# Patient Record
Sex: FEMALE | Race: WHITE | NOT HISPANIC OR LATINO | Employment: OTHER | URBAN - METROPOLITAN AREA
[De-identification: names, ages, dates, MRNs, and addresses within clinical notes are randomized per-mention and may not be internally consistent; named-entity substitution may affect disease eponyms.]

---

## 2017-01-20 ENCOUNTER — MYC MEDICAL ADVICE (OUTPATIENT)
Dept: FAMILY MEDICINE | Facility: CLINIC | Age: 57
End: 2017-01-20

## 2017-01-20 DIAGNOSIS — G93.32 CHRONIC FATIGUE SYNDROME: Primary | ICD-10-CM

## 2017-01-20 RX ORDER — ZOLPIDEM TARTRATE 5 MG/1
TABLET ORAL
Qty: 60 TABLET | Refills: 5 | Status: SHIPPED | OUTPATIENT
Start: 2017-01-20 | End: 2017-05-02

## 2017-01-27 ENCOUNTER — OFFICE VISIT (OUTPATIENT)
Dept: FAMILY MEDICINE | Facility: CLINIC | Age: 57
End: 2017-01-27
Payer: COMMERCIAL

## 2017-01-27 VITALS
TEMPERATURE: 97 F | DIASTOLIC BLOOD PRESSURE: 80 MMHG | SYSTOLIC BLOOD PRESSURE: 126 MMHG | WEIGHT: 202 LBS | HEIGHT: 68 IN | RESPIRATION RATE: 16 BRPM | OXYGEN SATURATION: 97 % | BODY MASS INDEX: 30.62 KG/M2 | HEART RATE: 72 BPM

## 2017-01-27 DIAGNOSIS — H10.45 CHRONIC ALLERGIC CONJUNCTIVITIS: ICD-10-CM

## 2017-01-27 DIAGNOSIS — G93.32 CHRONIC FATIGUE SYNDROME: Primary | ICD-10-CM

## 2017-01-27 PROCEDURE — 99213 OFFICE O/P EST LOW 20 MIN: CPT | Performed by: FAMILY MEDICINE

## 2017-01-27 RX ORDER — FLUTICASONE PROPIONATE 50 MCG
1-2 SPRAY, SUSPENSION (ML) NASAL DAILY
Qty: 1 BOTTLE | Refills: 11 | COMMUNITY
Start: 2017-01-27 | End: 2018-11-12

## 2017-01-27 RX ORDER — METHYLPHENIDATE HYDROCHLORIDE 10 MG/1
10 TABLET ORAL DAILY PRN
Qty: 30 TABLET | Refills: 0 | COMMUNITY
Start: 2016-09-14 | End: 2021-02-19

## 2017-01-27 RX ORDER — ZOLPIDEM TARTRATE 10 MG/1
10 TABLET ORAL
Qty: 30 TABLET | Refills: 1 | Status: SHIPPED | OUTPATIENT
Start: 2017-01-27 | End: 2017-03-09

## 2017-01-27 RX ORDER — KETOROLAC TROMETHAMINE 5 MG/ML
1 SOLUTION OPHTHALMIC 3 TIMES DAILY PRN
Qty: 1 BOTTLE | Refills: 1 | Status: SHIPPED | OUTPATIENT
Start: 2017-01-27 | End: 2017-07-21

## 2017-01-27 ASSESSMENT — PAIN SCALES - GENERAL: PAINLEVEL: NO PAIN (0)

## 2017-01-27 NOTE — PATIENT INSTRUCTIONS
Trial of eye drop for a week at least twice daily and if not better let us know for ophthalmology consult to see if cataract is an issue  If 10 mg of zolpidem is not approve, we will use 5 mg as before.   Keep watching the skin on chest.  It will be years before it becomes more

## 2017-01-27 NOTE — PROGRESS NOTES
Nikki Morales is a 56 year old female who presents to clinic today for A.D.H.D; Insomnia; and Bladder Problems   She is here to discuss chronic fatigue and new found energy drink that helps     She is well known to me.    Past medical, surgical, social histories were reviewed and updated.  Social History   Substance Use Topics     Smoking status: Current Some Day Smoker     Types: Cigarettes     Smokeless tobacco: Never Used      Comment: social  6/mo     Alcohol Use: 1.2 oz/week     2 Standard drinks or equivalent per week      Comment: social     Current Outpatient Prescriptions   Medication Sig     methylphenidate (RITALIN) 10 MG tablet 1/2 to 1 tablet every day as needed     fluticasone (FLONASE) 50 MCG/ACT spray Spray 1-2 sprays into both nostrils daily     zolpidem (AMBIEN) 10 MG tablet Take 1 tablet (10 mg) by mouth nightly as needed for sleep     ketorolac (ACULAR) 0.5 % ophthalmic solution Place 1 drop into both eyes 3 times daily as needed     zolpidem (AMBIEN) 5 MG tablet 1 to 2 tabs at hour of sleep as needed     trospium (SANCTURA) 20 MG tablet 1-2 tablets daily as needed     cyanocobalamin (VITAMIN B12) 1000 MCG/ML injection INJECT 1 ML INTRAMUSCULARLY EVERY 30 DAYS     SYNTHROID 112 MCG tablet Take 1 tablet (112 mcg) by mouth daily     estradiol (VIVELLE-DOT) 0.05 MG/24HR patch Place 1 patch onto the skin twice a week     multivitamin, therapeutic with minerals (MULTI-VITAMIN) TABS Take 1 tablet by mouth daily     venlafaxine (EFFEXOR-XR) 37.5 MG 24 hr capsule TAKE ONE TO TWO CAPSULES BY MOUTH EVERY DAY     buPROPion (WELLBUTRIN XL) 150 MG 24 hr tablet TAKE TWO TABLETS BY MOUTH EVERY MORNING     budesonide (PULMICORT) 0.5 MG/2ML nebulizer solution Mix 2 vials with 1 oz coffee flavoring and drink twice a day and rinse mouth aftrerwards     fluticasone (FLOVENT HFA) 220 MCG/ACT inhaler Inhale 2 puffs into the lungs 2 times daily     valACYclovir (VALTREX) 1000 mg tablet TAKE 1 TABLET BY MOUTH  THREE TIMES DAILY AS NEEDED     budesonide-formoterol (SYMBICORT) 160-4.5 MCG/ACT inhaler Inhale 2 puffs into the lungs 2 times daily     omeprazole (PRILOSEC) 20 MG capsule Take 1 capsule (20 mg) by mouth daily 1 TAB PO QD (Once per day)  As needed     [DISCONTINUED] NEW MED Methylcobalamin 25mg/ml  0.1ml weekly     No current facility-administered medications for this visit.       Allergies   Allergen Reactions     Codeine Other (See Comments)     Causes agitation       Patient Active Problem List   Diagnosis     Chronic fatigue syndrome     Other and unspecified disc disorder of lumbar region     ESOPHAGEAL REFLUX     Female stress incontinence     Herpes simplex virus (HSV) infection     HYPERLIPIDEMIA LDL GOAL <130     Advanced directives, counseling/discussion     Eosinophilic esophagitis     Overweight     Intermittent asthma     Adhesive capsulitis of shoulder     Degenerative arthritis of cervical spine with cord compression     Hypothyroidism due to acquired atrophy of thyroid     Attention deficit hyperactivity disorder, inattentive type     Skin lesion       REVIEW OF SYSTEMS:  Constitutional, HEENT, cardiovascular, pulmonary, gi and gu systems are negative, except as otherwise noted.  Patient found an energy drink that allows her to be active and complete tasks which she always struggled with her chronic fatigue. He does have some caffeine. Sleep is always been an issue for her and this does not seem to be changed with this medication/drink. If she drinks one can, it will be 80 mg a caffeine and she never uses a whole can in the day. 10 mg of Ambien is most useful for her and has been used in the past. Sometimes she uses 5-10 mg over a 24-hour period but small amounts overnight if she awakens, or during the day if she needs a nap. This is worked well for her. Other sleep medications or medications have not done nearly as well. She reports siblings and parents have had similar sleep issues.  Patient  "always has trouble with vision. Optometrist does not think her cataracts are that bad but she does have trouble with lights at night. At times during the day it feels like there is a film on her glasses. She does have itchy eyes at times and uses saline drops.    EXAM:  /80 mmHg  Pulse 72  Temp(Src) 97  F (36.1  C) (Temporal)  Resp 16  Ht 5' 8\" (1.727 m)  Wt 202 lb (91.627 kg)  BMI 30.72 kg/m2  SpO2 97%  GENERAL APPEARANCE: healthy, alert and no distress  EYES: Eyes grossly normal to inspection, PERRL, conjunctivae and sclerae normal except some mild injection  PSYCmentation appears normal, affect normal/bright and more focused than usual    ASSESSMENT:    ICD-10-CM    1. Chronic fatigue syndrome R53.82 methylphenidate (RITALIN) 10 MG tablet     zolpidem (AMBIEN) 10 MG tablet   2. Chronic allergic conjunctivitis H10.45 ketorolac (ACULAR) 0.5 % ophthalmic solution       PLAN:   10 mg of zolpidem is ordered for her daily.  Ketorolac for inflammation of the eye is ordered and she will continue with saline drops. This does not work an ophthalmology consult would be considered to be a cataracts and perhaps for other eye problems.     Weight management plan: Discussed healthy diet and exercise guidelines and patient will follow up in 6 months in clinic to re-evaluate.  Dicussed general issues around the treatment, evaluation and prevetion  [facility]  Reviewed medications and side effects in detail.  Return to clinic 3 months  AVS printed. Electronically signed by Erik Peraza MD      "

## 2017-01-27 NOTE — MR AVS SNAPSHOT
After Visit Summary   1/27/2017    Nikki Morales    MRN: [id number]           Patient Information     Date Of Birth          1960        Visit Information        Provider Department      1/27/2017 3:15 PM Erik Peraza MD [facility]        Today's Diagnoses     Chronic fatigue syndrome    -  1     Chronic allergic conjunctivitis           Care Instructions    Trial of eye drop for a week at least twice daily and if not better let us know for ophthalmology consult to see if cataract is an issue  If 10 mg of zolpidem is not approve, we will use 5 mg as before.   Keep watching the skin on chest.  It will be years before it becomes more          Follow-ups after your visit        Who to contact     If you have questions or need follow up information about today's clinic visit or your schedule please contact [facility] directly at [phone].  Normal or non-critical lab and imaging results will be communicated to you by [employer], letter or phone within 4 business days after the clinic has received the results. If you do not hear from us within 7 days, please contact the clinic through [employer] or phone. If you have a critical or abnormal lab result, we will notify you by phone as soon as possible.  Submit refill requests through [employer] or call your pharmacy and they will forward the refill request to us. Please allow 3 business days for your refill to be completed.          Additional Information About Your Visit        MyChart Information     [employer] gives you secure access to your electronic health record. If you see a primary care provider, you can also send messages to your care team and make appointments. If you have questions, please call your primary care clinic.  If you do not have a primary care provider, please call [phone] and they will assist you.        Care EveryWhere ID     This is your Care EveryWhere ID. This could be used by other  "organizations to access your [place] medical records  [id number]        Your Vitals Were     Pulse Temperature Respirations Height BMI (Body Mass Index) Pulse Oximetry    72 97  F (36.1  C) (Temporal) 16 5' 8\" (1.727 m) 30.72 kg/m2 97%       Blood Pressure from Last 3 Encounters:   01/27/17 126/80   12/06/16 120/69   11/01/16 126/64    Weight from Last 3 Encounters:   01/27/17 202 lb (91.627 kg)   11/01/16 200 lb (90.719 kg)   10/26/16 198 lb (89.812 kg)              Today, you had the following     No orders found for display         Today's Medication Changes          These changes are accurate as of: 1/27/17  4:13 PM.  If you have any questions, ask your nurse or doctor.               Start taking these medicines.        Dose/Directions    ketorolac 0.5 % ophthalmic solution   Commonly known as:  ACULAR   Used for:  Chronic allergic conjunctivitis   Started by:  Erik Peraza MD        Dose:  1 drop   Place 1 drop into both eyes 3 times daily as needed   Quantity:  1 Bottle   Refills:  1         These medicines have changed or have updated prescriptions.        Dose/Directions    methylphenidate 10 MG tablet   Commonly known as:  RITALIN   This may have changed:    - how much to take  - how to take this  - when to take this  - additional instructions   Used for:  Chronic fatigue syndrome   Changed by:  Erik Peraza MD        1/2 to 1 tablet every day as needed   Quantity:  30 tablet   Refills:  0       * zolpidem 5 MG tablet   Commonly known as:  AMBIEN   This may have changed:  Another medication with the same name was added. Make sure you understand how and when to take each.   Used for:  Chronic fatigue syndrome   Changed by:  Erik Peraza MD        1 to 2 tabs at hour of sleep as needed   Quantity:  60 tablet   Refills:  5       * zolpidem 10 MG tablet   Commonly known as:  AMBIEN   This may have changed:  You were already taking a medication with the same name, and this prescription " was added. Make sure you understand how and when to take each.   Used for:  Chronic fatigue syndrome   Changed by:  Erik Peraza MD        Dose:  10 mg   Take 1 tablet (10 mg) by mouth nightly as needed for sleep   Quantity:  30 tablet   Refills:  1       * Notice:  This list has 2 medication(s) that are the same as other medications prescribed for you. Read the directions carefully, and ask your doctor or other care provider to review them with you.      Stop taking these medicines if you haven't already. Please contact your care team if you have questions.     fexofenadine 60 MG tablet   Commonly known as:  ALLEGRA   Stopped by:  Erik Peraza MD                Where to get your medicines      These medications were sent to [facility] - [address]  [address], [place] [address]     Phone:  [phone]    - ketorolac 0.5 % ophthalmic solution      Some of these will need a paper prescription and others can be bought over the counter.  Ask your nurse if you have questions.     Bring a paper prescription for each of these medications    - zolpidem 10 MG tablet             Primary Care Provider Office Phone # Fax #    Erik Peraza [phone]       [facility] [address]  [place] [address]        Thank you!     Thank you for choosing [facility]  for your care. Our goal is always to provide you with excellent care. Hearing back from our patients is one way we can continue to improve our services. Please take a few minutes to complete the written survey that you may receive in the mail after your visit with us. Thank you!             Your Updated Medication List - Protect others around you: Learn how to safely use, store and throw away your medicines at www.disposemymeds.org.          This list is accurate as of: 1/27/17  4:13 PM.  Always use your most recent med list.                   Brand Name Dispense  Instructions for use    budesonide 0.5 MG/2ML neb solution    PULMICORT    1 Box    Mix 2 vials with 1 oz coffee flavoring and drink twice a day and rinse mouth aftrerwards       buPROPion 150 MG 24 hr tablet    WELLBUTRIN XL    60 tablet    TAKE TWO TABLETS BY MOUTH EVERY MORNING       cyanocobalamin 1000 MCG/ML injection    VITAMIN B12    3 mL    INJECT 1 ML INTRAMUSCULARLY EVERY 30 DAYS       estradiol 0.05 MG/24HR BIW patch    VIVELLE-DOT     Place 1 patch onto the skin twice a week       fluticasone 220 MCG/ACT Inhaler    FLOVENT HFA    1 Inhaler    Inhale 2 puffs into the lungs 2 times daily       fluticasone 50 MCG/ACT spray    FLONASE    1 Bottle    Spray 1-2 sprays into both nostrils daily       ketorolac 0.5 % ophthalmic solution    ACULAR    1 Bottle    Place 1 drop into both eyes 3 times daily as needed       methylphenidate 10 MG tablet    RITALIN    30 tablet    1/2 to 1 tablet every day as needed       Multi-vitamin Tabs tablet     100 tablet    Take 1 tablet by mouth daily       omeprazole 20 MG CR capsule    priLOSEC    30 capsule    Take 1 capsule (20 mg) by mouth daily 1 TAB PO QD (Once per day)  As needed       SYMBICORT 160-4.5 MCG/ACT Inhaler   Generic drug:  budesonide-formoterol     3 Inhaler    Inhale 2 puffs into the lungs 2 times daily       SYNTHROID 112 MCG tablet   Generic drug:  levothyroxine     90 tablet    Take 1 tablet (112 mcg) by mouth daily       trospium 20 MG tablet    SANCTURA    40 tablet    1-2 tablets daily as needed       valACYclovir 1000 mg tablet    VALTREX    21 tablet    TAKE 1 TABLET BY MOUTH THREE TIMES DAILY AS NEEDED       venlafaxine 37.5 MG 24 hr capsule    EFFEXOR-XR    180 capsule    TAKE ONE TO TWO CAPSULES BY MOUTH EVERY DAY       * zolpidem 5 MG tablet    AMBIEN    60 tablet    1 to 2 tabs at hour of sleep as needed       * zolpidem 10 MG tablet    AMBIEN    30 tablet    Take 1 tablet (10 mg) by mouth nightly as needed for sleep       * Notice:  This list  has 2 medication(s) that are the same as other medications prescribed for you. Read the directions carefully, and ask your doctor or other care provider to review them with you.

## 2017-01-27 NOTE — NURSING NOTE
"Chief Complaint   Patient presents with     A.D.H.D     Recheck     Insomnia     Recheck     Bladder Problems     Recheck urine incontenence       Initial /80 mmHg  Pulse 72  Temp(Src) 97  F (36.1  C) (Temporal)  Resp 16  Ht 5' 8\" (1.727 m)  Wt 202 lb (91.627 kg)  BMI 30.72 kg/m2  SpO2 97% Estimated body mass index is 30.72 kg/(m^2) as calculated from the following:    Height as of this encounter: 5' 8\" (1.727 m).    Weight as of this encounter: 202 lb (91.627 kg)..  BP completed using cuff size: large  Patient declines Tdap injection today  /Samara Yu/ACE(AAMA)     "

## 2017-01-28 ASSESSMENT — ASTHMA QUESTIONNAIRES: ACT_TOTALSCORE: 21

## 2017-03-06 DIAGNOSIS — J20.9 ACUTE BRONCHITIS, UNSPECIFIED ORGANISM: Primary | ICD-10-CM

## 2017-03-06 DIAGNOSIS — G93.32 CHRONIC FATIGUE SYNDROME: ICD-10-CM

## 2017-03-06 RX ORDER — CODEINE PHOSPHATE AND GUAIFENESIN 10; 100 MG/5ML; MG/5ML
1 SOLUTION ORAL EVERY 4 HOURS PRN
Qty: 120 ML | Refills: 0 | Status: SHIPPED | OUTPATIENT
Start: 2017-03-06 | End: 2017-03-09

## 2017-03-06 RX ORDER — AZITHROMYCIN 250 MG/1
TABLET, FILM COATED ORAL
Qty: 6 TABLET | Refills: 0 | Status: SHIPPED
Start: 2017-03-06 | End: 2017-03-09

## 2017-03-06 NOTE — TELEPHONE ENCOUNTER
Reason for call: Symptom   Symptom or request: cough, fever, body aches    Duration (how long have symptoms been present): 1 week  Have you been treated for this before? Yes    Additional comments: fever just started yesterday    Phone Number Pt can be reached at: Cell number on file:    Telephone Information:   Mobile [phone]     Best Time: anytime  Can we leave a detailed message on this number? YES

## 2017-03-06 NOTE — TELEPHONE ENCOUNTER
": 1960  PHONE #'s: [phone] (home)     PRESENTING PROBLEM:  Body aches, fatigue, cough, fever ( 101) head congestion .  Requesting Z-Adriana and Cough syrup.     NURSING ASSESSMENT:  Pt is actively coughing while talking with me on the phone. She sounds like she has adult croup. Sounds very tight.   Description:  \" It started last Tuesday, 17. I had a tickle cough, then a head cold and now it has settle in my chest. I am so tired of the coughing. I thought I was going to cough an eyeball out yesterday , I was coughing so hard. My temp went up last night to 101. Otherwise it has been hanging around 99.3. \"   Onset/duration:   17  Precip. factors:   \" I did get the flu shot this Fall, but I haven't gone to work. Don't have the stamina to work.\"  Assoc. Sx:  Tired out.   Improves/worsens Sx:   Worse.   Pain scale (1-10)   Denies S. T., headache or  Chest pain at this time.   Sx specific meds:  Has been taking Nyquil to sleep. SYMBICORT 2 TO 3 TIMES PER DAY.   LMP/preg/breast feeding:   NA  Last exam/Tx:  Has NOT been seen for this.  SHE IS ASKING TO SEE IF DR. MURPHY WILL PRESCRIBE A Z-ADRIANA FOR HER AND A COUGH SYRUP.?  \"  I use [facility] in [place]. \"      RECOMMENDED DISPOSITION:  Home care advice - *  Drink 6 to 8 glasses of water daily.  *  Warm mist may help improve conditions. Helps to run humidifier or vaporizer in bedroom. May sit in a steam-filled bathroom for 20 to 30 minutes prn.   *  Elevate head of bed to reduce coughing at night.  * For children  younger than 1 year, give 1/2 tsp lemon mixed with 1/2 tsp corn syrup to soothe cough. ( DO NOT give young children honey.)  *  Give older children and adults 1/2 tsp lemon mixed with 1/2 tsp honey or corn syrup.   * Drink warm lemonade, apple cider, or tea to help soother cough.   * Avoid irritants such as smoking, smog,  and chemical smells.   *  Turn heat down, open the windows, or go out into cooler air to help suppress cough.  *Take " cough suppressants (ask your pharmacist for product suggestions) If cough is interfering with activity, causing chest pain or vomiting, or interrupting sleep at night. Follow instructions on the label.  *  If congested avoid milk products.   *  Take OTC meds as needed, being sure to follow instructions on the labetl. For a wet cough, use a decongestant,for a dry cough, use an expectorant during the day and suppressant at night. for allergy, use an antihistamine or decongestant. Ask your pharmacist for product for product suggestion.  * If fever for > 72 hours then schedule appointment.      RN  Will forward message to Dr. Peraza to approved or deny MIKHAIL-aminha request and would like something for her cough.    TO be seen in the ER if Any of the following Occur:  Altered mental status, difficulty btreathing, fever > 104.9 in adult, flat purple or dark red spots on face or trunk, stiff or painful neck, severe headache New onset of skin or lips turning blue, unable to swallow your own spit.     Will comply with recommendation: YES   If further questions/concerns or if Sx do not improve, worsen or new Sx develop, call your PCP or [facility] as soon as possible.    NOTES:  Disposition was determined by the first positive assessment question, therefore all previous assessment questions were negative.  Informed to check provider manual or call insurance company to assure coverage.     Guideline used:  Influenza. Cough, adult  Telephone Triage Protocols for Nurses, Fifth Edition, Amnia Romero RN

## 2017-03-09 ENCOUNTER — OFFICE VISIT (OUTPATIENT)
Dept: FAMILY MEDICINE | Facility: CLINIC | Age: 57
End: 2017-03-09
Payer: COMMERCIAL

## 2017-03-09 ENCOUNTER — MYC MEDICAL ADVICE (OUTPATIENT)
Dept: FAMILY MEDICINE | Facility: CLINIC | Age: 57
End: 2017-03-09

## 2017-03-09 VITALS
SYSTOLIC BLOOD PRESSURE: 124 MMHG | RESPIRATION RATE: 20 BRPM | BODY MASS INDEX: 30.77 KG/M2 | WEIGHT: 203 LBS | DIASTOLIC BLOOD PRESSURE: 72 MMHG | OXYGEN SATURATION: 98 % | HEIGHT: 68 IN | TEMPERATURE: 98.3 F | HEART RATE: 102 BPM

## 2017-03-09 DIAGNOSIS — R05.9 COUGH: ICD-10-CM

## 2017-03-09 DIAGNOSIS — J06.9 VIRAL URI: Primary | ICD-10-CM

## 2017-03-09 LAB
BASOPHILS # BLD AUTO: 0 10E9/L (ref 0–0.2)
BASOPHILS NFR BLD AUTO: 0.6 %
DIFFERENTIAL METHOD BLD: NORMAL
EOSINOPHIL # BLD AUTO: 0.2 10E9/L (ref 0–0.7)
EOSINOPHIL NFR BLD AUTO: 2.8 %
ERYTHROCYTE [DISTWIDTH] IN BLOOD BY AUTOMATED COUNT: 13.3 % (ref 10–15)
HCT VFR BLD AUTO: 45.2 % (ref 35–47)
HGB BLD-MCNC: 14.9 G/DL (ref 11.7–15.7)
IMM GRANULOCYTES # BLD: 0 10E9/L (ref 0–0.4)
IMM GRANULOCYTES NFR BLD: 0.2 %
LYMPHOCYTES # BLD AUTO: 2.5 10E9/L (ref 0.8–5.3)
LYMPHOCYTES NFR BLD AUTO: 47.2 %
MCH RBC QN AUTO: 29.4 PG (ref 26.5–33)
MCHC RBC AUTO-ENTMCNC: 33 G/DL (ref 31.5–36.5)
MCV RBC AUTO: 89 FL (ref 78–100)
MONOCYTES # BLD AUTO: 0.6 10E9/L (ref 0–1.3)
MONOCYTES NFR BLD AUTO: 10.9 %
NEUTROPHILS # BLD AUTO: 2.1 10E9/L (ref 1.6–8.3)
NEUTROPHILS NFR BLD AUTO: 38.3 %
PLATELET # BLD AUTO: 220 10E9/L (ref 150–450)
RBC # BLD AUTO: 5.06 10E12/L (ref 3.8–5.2)
WBC # BLD AUTO: 5.3 10E9/L (ref 4–11)

## 2017-03-09 PROCEDURE — 36415 COLL VENOUS BLD VENIPUNCTURE: CPT | Performed by: FAMILY MEDICINE

## 2017-03-09 PROCEDURE — 99213 OFFICE O/P EST LOW 20 MIN: CPT | Performed by: FAMILY MEDICINE

## 2017-03-09 PROCEDURE — 85025 COMPLETE CBC W/AUTO DIFF WBC: CPT | Performed by: FAMILY MEDICINE

## 2017-03-09 ASSESSMENT — PAIN SCALES - GENERAL: PAINLEVEL: NO PAIN (0)

## 2017-03-09 NOTE — MR AVS SNAPSHOT
"              After Visit Summary   3/9/2017    Nikki Morales    MRN: [id number]           Patient Information     Date Of Birth          1960        Visit Information        Provider Department      3/9/2017 2:00 PM Orlando Arroyo MD [facility]         Follow-ups after your visit        Who to contact     If you have questions or need follow up information about today's clinic visit or your schedule please contact [facility] directly at [phone].  Normal or non-critical lab and imaging results will be communicated to you by MyChart, letter or phone within 4 business days after the clinic has received the results. If you do not hear from us within 7 days, please contact the clinic through [employer] or phone. If you have a critical or abnormal lab result, we will notify you by phone as soon as possible.  Submit refill requests through [employer] or call your pharmacy and they will forward the refill request to us. Please allow 3 business days for your refill to be completed.          Additional Information About Your Visit        MyChart Information     [employer] gives you secure access to your electronic health record. If you see a primary care provider, you can also send messages to your care team and make appointments. If you have questions, please call your primary care clinic.  If you do not have a primary care provider, please call [phone] and they will assist you.        Care EveryWhere ID     This is your Care EveryWhere ID. This could be used by other organizations to access your [facility] records  [id number]        Your Vitals Were     Pulse Temperature Respirations Height Pulse Oximetry Breastfeeding?    102 98.3  F (36.8  C) 20 5' 8\" (1.727 m) 98% No    BMI (Body Mass Index)                   30.87 kg/m2            Blood Pressure from Last 3 Encounters:   03/09/17 124/72   01/27/17 126/80   12/06/16 120/69    Weight from Last 3 Encounters: "   03/09/17 203 lb (92.1 kg)   01/27/17 202 lb (91.6 kg)   11/01/16 200 lb (90.7 kg)              Today, you had the following     No orders found for display         Today's Medication Changes          These changes are accurate as of: 3/9/17  2:20 PM.  If you have any questions, ask your nurse or doctor.               These medicines have changed or have updated prescriptions.        Dose/Directions    zolpidem 5 MG tablet   Commonly known as:  AMBIEN   This may have changed:  Another medication with the same name was removed. Continue taking this medication, and follow the directions you see here.   Used for:  Chronic fatigue syndrome   Changed by:  Erik Peraza MD        1 to 2 tabs at hour of sleep as needed   Quantity:  60 tablet   Refills:  5         Stop taking these medicines if you haven't already. Please contact your care team if you have questions.     azithromycin 250 MG tablet   Commonly known as:  ZITHROMAX   Stopped by:  Orlando Arroyo MD           budesonide 0.5 MG/2ML neb solution   Commonly known as:  PULMICORT   Stopped by:  Orlando Arroyo MD           fluticasone 220 MCG/ACT Inhaler   Commonly known as:  FLOVENT HFA   Stopped by:  Orlando Arroyo MD           guaiFENesin-codeine 100-10 MG/5ML Soln solution   Commonly known as:  ROBITUSSIN AC   Stopped by:  Orlando Arroyo MD           SYNTHROID 112 MCG tablet   Generic drug:  levothyroxine   Stopped by:  Orlando Arroyo MD           trospium 20 MG tablet   Commonly known as:  SANCTURA   Stopped by:  Orlando Arroyo MD           valACYclovir 1000 mg tablet   Commonly known as:  VALTREX   Stopped by:  Orlando Arroyo MD           venlafaxine 37.5 MG 24 hr capsule   Commonly known as:  EFFEXOR-XR   Stopped by:  Orlando Arroyo MD                    Primary Care Provider Office Phone # Fax #    Erik Hamzah Peraza [phone]       [address] [id number] [facility]  [address] [place] [address]        Thank you!     Thank you for choosing [facility]  for your care. Our goal is always to provide you with excellent care. Hearing back from our patients is one way we can continue to improve our services. Please take a few minutes to complete the written survey that you may receive in the mail after your visit with us. Thank you!             Your Updated Medication List - Protect others around you: Learn how to safely use, store and throw away your medicines at www.disposemymeds.org.          This list is accurate as of: 3/9/17  2:20 PM.  Always use your most recent med list.                   Brand Name Dispense Instructions for use    buPROPion 150 MG 24 hr tablet    WELLBUTRIN XL    60 tablet    TAKE TWO TABLETS BY MOUTH EVERY MORNING       cyanocobalamin 1000 MCG/ML injection    VITAMIN B12    3 mL    INJECT 1 ML INTRAMUSCULARLY EVERY 30 DAYS       estradiol 0.05 MG/24HR BIW patch    VIVELLE-DOT     Place 1 patch onto the skin twice a week       fluticasone 50 MCG/ACT spray    FLONASE    1 Bottle    Spray 1-2 sprays into both nostrils daily       ketorolac 0.5 % ophthalmic solution    ACULAR    1 Bottle    Place 1 drop into both eyes 3 times daily as needed       methylphenidate 10 MG tablet    RITALIN    30 tablet    1/2 to 1 tablet every day as needed       Multi-vitamin Tabs tablet     100 tablet    Take 1 tablet by mouth daily       omeprazole 20 MG CR capsule    priLOSEC    30 capsule    Take 1 capsule (20 mg) by mouth daily 1 TAB PO QD (Once per day)  As needed       SYMBICORT 160-4.5 MCG/ACT Inhaler   Generic drug:  budesonide-formoterol     3 Inhaler    Inhale 2 puffs into the lungs 2 times daily       zolpidem 5 MG tablet    AMBIEN    60 tablet    1 to 2 tabs at hour of sleep as needed

## 2017-03-09 NOTE — PROGRESS NOTES
SUBJECTIVE:                                                    Nikki Morales is a 57 year old female who presents to clinic today for the following health issues:      Acute Illness /cough   Acute illness concerns: cough, sinus fever   Onset: 2-28    Fever: YES    Chills/Sweats: YES    Headache (location?): YES    Sinus Pressure:YES    Conjunctivitis:  no    Ear Pain: no    Rhinorrhea: no     Congestion: YES    Sore Throat: no      Cough: YES-    Wheeze: no     Decreased Appetite: YES    Nausea: no     Vomiting: no     Diarrhea:  YES    Dysuria/Freq.: no     Fatigue/Achiness: YES    Sick/Strep Exposure: no      Therapies Tried and outcome: Z-aminah, inhalers, OTC everything.     Problem list and histories reviewed & adjusted, as indicated.  Additional history: as documented    Reviewed and updated as needed this visit by clinical staff  Tobacco  Allergies  Meds  Problems  Soc Hx      Reviewed and updated as needed this visit by Provider  Allergies  Meds  Problems       Patient notes that she feels a little better today.  She called 3 days ago and was given an prescription for cough medication.  No history of asthma or COPD per patient, but is on Symbicort.  She apparently has a very weakened immune symptoms due to her chronic fatigue syndrome.  She informs me today that she was so immunosuppressed during a similar episode like the one she currently is experiencing that after the doctor saw her blood work, she immediately put the patient on two z-packs.      Patient notes that she has productive cough.  Having lots of postnasal discharge, but she always has this and it is nothing new.  She notes that she is doing nasal rinses, nasal sprays, over the counter cough medication, decongestants, but isn't doing the netti pots all that much and hasn't for a while.  She does not think the Flonase works much anymore.      ROS:  General: as above  Skin: no rash  Eyes: as above  ENT: as above  Resp: as above  CV:  "negative  Musculoskeletal: positive for muscle aches/pains.    OBJECTIVE:                                                    /72 (BP Location: Left arm, Patient Position: Chair, Cuff Size: Adult Regular)  Pulse 102  Temp 98.3  F (36.8  C)  Resp 20  Ht 5' 8\" (1.727 m)  Wt 203 lb (92.1 kg)  SpO2 98%  Breastfeeding? No  BMI 30.87 kg/m2  Body mass index is 30.87 kg/(m^2).  GENERAL APPEARANCE: healthy, alert and no distress  EYES: Eyes grossly normal to inspection and conjunctivae and sclerae normal  HENT: ear canals and TM's normal, TM's pearly grey, normal light reflex bilateral, rhinorrhea clear, oral mucous membranes moist, oropharynx clear and nasal mucosa erythematous and swollen, sinuses nontender to palpation/percussion.  NECK: no adenopathy and no asymmetry, masses, or scars  RESP: lungs clear to auscultation - no rales, rhonchi or wheezes  CV: regular rates and rhythm, normal S1 S2, no S3 or S4 and no murmur, click or rub           ASSESSMENT/PLAN:                                                        ICD-10-CM    1. Viral URI J06.9     B97.89    2. Cough R05 CBC with platelets and differential     PLAN:  1.  Patient has signs and symptoms of a typical viral upper respiratory illness without any concerns for wheezing or reactive airway warranting albuterol, prednisone.  Should not get any additional antibiotic as I do not see a bacterial process at this time.    2.  Patient insists on having a CBC done today to see how weak her immune symptoms is and to find out if she is going to get better.  I attempted to explain to her that this will not predict prognosis, but I obliged to make sure that she does not have an outrageously high or low wbc or differential that would make us more watchful over the next few days of her illness.  I expect that she will improve to her usual state in due time.  See below for over the counter medication recommendations I offered her today to help with her symptoms while her " body fends off viral infection.  We will contact her via [employer] with her CBC results.      Patient Instructions   1.  Saline sinus rinse (one form comes in a squirt bottle form and the another kind is also known as Neti Pots).  Follow directions on package.  May do this 1-2 times per day.  2.  May also use Afrin (oxymetazoline) nasal spray for a maximum of 3 days for symptom control.  Do not use for longer than this.  A good idea is to use this medication with saline nasal irrigation.  If you choose to do this, use the Afrin about 30-45 minutes after the sinus rinse to maximize effect of medication.    3.  Sudafed (psudoephedrine) per package directions.  This is the medication that has to be obtained from behind the pharmacist's counter  4.  Antihistamines:  Daytime:  Claritin (loratadine) or zyrtec (cetirizine).  Nighttime:  Benadryl (diphenhydramine).  5.  Tylenol (acetaminophen) or Advil (ibuprofen) as needed for pain and/or fever.        Follow up with Provider - only if symptoms do not improve within a week, sooner if symptoms worsen.       Orlando Arroyo MD   [facility]

## 2017-03-09 NOTE — PATIENT INSTRUCTIONS
1.  Saline sinus rinse (one form comes in a squirt bottle form and the another kind is also known as Neti Pots).  Follow directions on package.  May do this 1-2 times per day.  2.  May also use Afrin (oxymetazoline) nasal spray for a maximum of 3 days for symptom control.  Do not use for longer than this.  A good idea is to use this medication with saline nasal irrigation.  If you choose to do this, use the Afrin about 30-45 minutes after the sinus rinse to maximize effect of medication.    3.  Sudafed (psudoephedrine) per package directions.  This is the medication that has to be obtained from behind the pharmacist's counter  4.  Antihistamines:  Daytime:  Claritin (loratadine) or zyrtec (cetirizine).  Nighttime:  Benadryl (diphenhydramine).  5.  Tylenol (acetaminophen) or Advil (ibuprofen) as needed for pain and/or fever.

## 2017-03-09 NOTE — NURSING NOTE
"Chief Complaint   Patient presents with     Cough     2/28 cough, then sinus, croup, Got z-aminah  from Peraza, Still has coughing, feverish, diarrhea.        Initial /72 (BP Location: Left arm, Patient Position: Chair, Cuff Size: Adult Regular)  Pulse 102  Temp 98.3  F (36.8  C)  Resp 20  Ht 5' 8\" (1.727 m)  Wt 203 lb (92.1 kg)  SpO2 98%  Breastfeeding? No  BMI 30.87 kg/m2 Estimated body mass index is 30.87 kg/(m^2) as calculated from the following:    Height as of this encounter: 5' 8\" (1.727 m).    Weight as of this encounter: 203 lb (92.1 kg).  Medication Reconciliation: complete    "

## 2017-03-10 NOTE — TELEPHONE ENCOUNTER
"I will have staff contact patient to clarify.  I reviewed her chart.  The tests she had done on 11/8/2005 were a strep test and a \"WBC with diff.\"  The test I ordered on Thursday was the same thing only it included her hemoglobin and platelet counts which is more complete.  Her test on Thursday was negative, so much better than when she was sick in 2005.  If she wants any further testing done we certainly could have her come back to have it drawn.    Orlando Arroyo MD   "

## 2017-05-02 DIAGNOSIS — G93.32 CHRONIC FATIGUE SYNDROME: ICD-10-CM

## 2017-05-02 NOTE — TELEPHONE ENCOUNTER
ambien      Last Written Prescription Date:  1/20/17  Last Fill Quantity: 60,   # refills: 5  Last Office Visit with [facility] prescribing provider: 3/9/17  Future Office visit:       Routing refill request to provider for review/approval because:  Drug not on the [facility] or [facility] refill protocol or controlled substance

## 2017-05-03 RX ORDER — ZOLPIDEM TARTRATE 5 MG/1
TABLET ORAL
Qty: 60 TABLET | Refills: 5 | Status: SHIPPED | OUTPATIENT
Start: 2017-05-03 | End: 2017-08-23

## 2017-06-28 ENCOUNTER — TELEPHONE (OUTPATIENT)
Dept: FAMILY MEDICINE | Facility: CLINIC | Age: 57
End: 2017-06-28

## 2017-06-28 NOTE — TELEPHONE ENCOUNTER
Good morning, Margarita. This is Margarita Romero RN.  Thought I should just check in with you to be sure it is OK to wait until your scheduled kit with Dr. Peraza 7/31/17. Are you having any black or bloody stools with blood clots?  If so, we need to see you sooner than your scheduled appt. If it is hemorrhoids that you are dealing with , I know they can be very painful. I am sending you some information about them to help better understand the  Issues they create.  There are some home therapies to try, like soaking a warm bath for 20 to 30 minutes daily. Helps keep the rectal area soothed, clean and can be very healing.  Can use TUCKS, which can be bought without a prescription. It is a topical anesthetic wipe you can use after BMs , which can be soothing and keeps the area clean. Also, avoid constipating foods ( cheese and white flour products. Your diet should include fresh fruits, vegetables, whole grain, and drinks lots of water every day( or Non-caffeine drinks as they tend to dehydrate you)  You could also use Over the Counter hemorrhoid preparations to help shrink the swelling in the area.   Call or write back if you have any other questions or concerns. Thanks, Margarita................................YRN Brown      Understanding Hemorrhoids    Hemorrhoid tissues are  cushions  of blood vessels that swell slightly during bowel movements. Too much pressure on the anal canal can make these tissues remain enlarged, become inflamed, and cause symptoms. This can happen both inside and outside the anal canal.  Parts of the anal canal  The parts of the anal canal are:    Internal hemorrhoid tissue is in the upper area of the anal canal.    The rectum is the last several inches of the colon. This is where stool is stored prior to bowel movements.    Anal sphincters are ring-shaped muscles that expand and contract to control the anal opening.    External hemorrhoid tissue lies under the anal skin.    The anus is the passage  between the rectum and the outside of the body.  Normal hemorrhoid tissue  Hemorrhoid tissues play an important role in helping your body eliminate waste. Food passes from the stomach through the intestines. The waste (stool) then travels through the colon to the rectum. It is stored in the rectum until it s ready to be passed from the anus. During bowel movements, hemorrhoids swell with blood and become slightly larger. This swelling helps protect and cushion the anal canal as stool passes from the body. Once the stool has passed, the tissues stop swelling and return to normal.  Problem hemorrhoids  Pressure due to straining or other factors can cause hemorrhoid tissues to remain swollen. When this happens to the hemorrhoid tissues in the anal canal they re called internal hemorrhoids. Swollen tissues around the anal opening are called external hemorrhoids. Depending on the location, your symptoms can differ.    Internal hemorrhoids often happen in clusters around the wall of the anal canal. They are usually painless. But they may prolapse (protrude out of the anus) due to straining or pressure from hard stool. After the bowel movement is over, they may then reduce (return inside the body). Internal hemorrhoids often bleed. They can also discharge mucus.      External hemorrhoids are located at the anal opening, just beneath the skin. These tissues rarely cause problems unless they thrombose (form a blood clot). When this happens, a hard, bluish lump may appear. A thrombosed hemorrhoid also causes sudden, severe pain. In time, the clot may go away on its own. This sometimes leaves a  skin tag  of tissue stretched by the clot.  Hemorrhoid symptoms  Hemorrhoid symptoms may include:    Pain or a burning sensation    Bleeding during bowel movements    Protrusion of tissue from the anus    Itching around the anus  Causes of hemorrhoids  There s no single cause of hemorrhoids. Most often, though, they are caused by too  much pressure on the anal canal. This can be due to:    Chronic (ongoing) constipation    Straining during bowel movements    Sitting too long on the toilet    Strenuous exercise or heavy lifting    Pregnancy and childbirth    Aging    Diarrhea

## 2017-06-28 NOTE — TELEPHONE ENCOUNTER
----- Message from Rosibel Jacobo sent at 6/27/2017  4:21 PM CDT -----  Regarding: Appointment scheduled from [facility]  Contact: [phone]  Appointment For: ROSIBEL JACOBO ([id number])  Visit Type: [facility] OFFICE VISIT SHORT (910)    7/31/2017   10:00 AM  30 mins.  Erik Peraza MD     FAMILY PRACTICE    Patient Comments:  Primary Care  Increased pain in the rectum. Check out possible   hemorrhoid. Have had this on going problem for a   couple of years, now getting more pain and discomfort   and sometimes even feel slightly nauseous after BM.   That's the part that scares me time to check it out.

## 2017-07-21 ENCOUNTER — OFFICE VISIT (OUTPATIENT)
Dept: FAMILY MEDICINE | Facility: OTHER | Age: 57
End: 2017-07-21
Payer: COMMERCIAL

## 2017-07-21 VITALS
OXYGEN SATURATION: 95 % | RESPIRATION RATE: 20 BRPM | DIASTOLIC BLOOD PRESSURE: 70 MMHG | BODY MASS INDEX: 31.63 KG/M2 | TEMPERATURE: 95.9 F | HEART RATE: 74 BPM | WEIGHT: 208 LBS | SYSTOLIC BLOOD PRESSURE: 112 MMHG

## 2017-07-21 DIAGNOSIS — N89.8 VAGINAL ODOR: ICD-10-CM

## 2017-07-21 DIAGNOSIS — N89.8 VAGINAL ITCHING: Primary | ICD-10-CM

## 2017-07-21 LAB
MICRO REPORT STATUS: NORMAL
SPECIMEN SOURCE: NORMAL
WET PREP SPEC: NORMAL

## 2017-07-21 PROCEDURE — 99213 OFFICE O/P EST LOW 20 MIN: CPT | Performed by: NURSE PRACTITIONER

## 2017-07-21 PROCEDURE — 87210 SMEAR WET MOUNT SALINE/INK: CPT | Performed by: NURSE PRACTITIONER

## 2017-07-21 RX ORDER — FLUCONAZOLE 150 MG/1
150 TABLET ORAL ONCE
Qty: 1 TABLET | Refills: 0 | Status: SHIPPED | OUTPATIENT
Start: 2017-07-21 | End: 2017-07-21

## 2017-07-21 NOTE — PROGRESS NOTES
SUBJECTIVE:                                                    Nikki Morales is a 57 year old female who presents to clinic today for the following health issues:      Vaginal Symptoms      Duration: 1 week    Description  itching and odor    Intensity:  moderate    Accompanying signs and symptoms (fever/dysuria/abdominal or back pain): None    History  Sexually active: yes, single partner, contraception not required  Possibility of pregnancy: No  Recent antibiotic use: no     Precipitating or alleviating factors: None    Therapies tried and outcome: probiotics, coconut oil   Outcome: helped slightly (short term)    No urinary symptoms, no vaginal discharge  No fevesr/chills  Her  has had some urethral irritation as well.  She has no concerns for STDs, same partner for 20+ years.        Problem list and histories reviewed & adjusted, as indicated.  Additional history: none    Patient Active Problem List   Diagnosis     Chronic fatigue syndrome     Other and unspecified disc disorder of lumbar region     ESOPHAGEAL REFLUX     Female stress incontinence     Herpes simplex virus (HSV) infection     HYPERLIPIDEMIA LDL GOAL <130     Advanced directives, counseling/discussion     Eosinophilic esophagitis     Overweight     Intermittent asthma     Adhesive capsulitis of shoulder     Degenerative arthritis of cervical spine with cord compression     Hypothyroidism due to acquired atrophy of thyroid     Attention deficit hyperactivity disorder, inattentive type     Skin lesion     Past Surgical History:   Procedure Laterality Date     C  DELIVERY ONLY      , Low Cervical     C NONSPECIFIC PROCEDURE  's    sinus     C NONSPECIFIC PROCEDURE      Esophgeal dilatation multiple     C NONSPECIFIC PROCEDURE  2008    sling     C VAGINAL HYSTERECTOMY  1995    Hysterectomy, Vaginal     COLONOSCOPY  10/06/09     COLONOSCOPY  2013    Procedure: COMBINED COLONOSCOPY, SINGLE BIOPSY/POLYPECTOMY  BY BIOPSY;;  Surgeon: Cuate Miles MD;  Location: [facility]     CONIZATION CERVIX,KNIFE/LASER       ESOPHAGOSCOPY, GASTROSCOPY, DUODENOSCOPY (EGD), COMBINED  7/2/2013    Procedure: COMBINED ESOPHAGOSCOPY, GASTROSCOPY, DUODENOSCOPY (EGD), BIOPSY SINGLE OR MULTIPLE;  egd with rabdain biopsies and colonoscopy with polypectomy by biopsy ;  Surgeon: Caute Miles MD;  Location: [facility]     HC DILATION/CURETTAGE DIAG/THER NON OB      D & C     HC REMOVAL OF TONSILS,<9 Y/O      Tonsils <10y.o.     HC UGI ENDOSCOPY DIAG W BIOPSY  12/05/07     HC UGI ENDOSCOPY, SIMPLE EXAM  09/10/99 & 04/14/98     HYSTERECTOMY, PAP NO LONGER INDICATED       LAPAROSCOPIC CHOLECYSTECTOMY  10/12/13     TUBAL LIGATION  1994       Social History   Substance Use Topics     Smoking status: Current Some Day Smoker     Types: Cigarettes     Smokeless tobacco: Never Used      Comment: social  6/mo     Alcohol use 2.4 oz/week     2 Standard drinks or equivalent, 2 Cans of beer per week      Comment: social     Family History   Problem Relation Age of Onset     DIABETES Father      Hypertension Father      CANCER Father      prostate cancer     Cardiovascular Sister      recurrent blood clots     CEREBROVASCULAR DISEASE Sister 51     Prostate Cancer Brother      CANCER Mother      Uterine     DIABETES Maternal Grandfather      HEART DISEASE Maternal Grandmother      Heart condition age 96     DIABETES Paternal Grandfather      Psychotic Disorder Son      severe Add,      Asthma Son      exercised induced asthma     Asthma Son      ecxercise induced asthma     Prostate Cancer Mother      CANCER Brother      CEREBROVASCULAR DISEASE Father      CEREBROVASCULAR DISEASE Paternal Grandmother          Current Outpatient Prescriptions   Medication Sig Dispense Refill     zolpidem (AMBIEN) 5 MG tablet 1 to 2 tabs at hour of sleep as needed 60 tablet 5     methylphenidate (RITALIN) 10 MG tablet 1/2 to 1 tablet every day as needed 30 tablet 0     fluticasone  (FLONASE) 50 MCG/ACT spray Spray 1-2 sprays into both nostrils daily 1 Bottle 11     cyanocobalamin (VITAMIN B12) 1000 MCG/ML injection INJECT 1 ML INTRAMUSCULARLY EVERY 30 DAYS 3 mL 1     budesonide-formoterol (SYMBICORT) 160-4.5 MCG/ACT inhaler Inhale 2 puffs into the lungs 2 times daily 3 Inhaler 3     omeprazole (PRILOSEC) 20 MG capsule Take 1 capsule (20 mg) by mouth daily 1 TAB PO QD (Once per day)  As needed 30 capsule 11     estradiol (VIVELLE-DOT) 0.05 MG/24HR patch Place 1 patch onto the skin twice a week       multivitamin, therapeutic with minerals (MULTI-VITAMIN) TABS Take 1 tablet by mouth daily 100 tablet 3     buPROPion (WELLBUTRIN XL) 150 MG 24 hr tablet TAKE TWO TABLETS BY MOUTH EVERY MORNING 60 tablet 11     [DISCONTINUED] NEW MED Methylcobalamin 25mg/ml  0.1ml weekly 1 Bottle 11     Allergies   Allergen Reactions     Codeine Other (See Comments)     Causes agitation     BP Readings from Last 3 Encounters:   07/21/17 112/70   03/09/17 124/72   01/27/17 126/80    Wt Readings from Last 3 Encounters:   07/21/17 208 lb (94.3 kg)   03/09/17 203 lb (92.1 kg)   01/27/17 202 lb (91.6 kg)                Reviewed and updated as needed this visit by clinical staffTobacco  Allergies  Meds  Soc Hx      Reviewed and updated as needed this visit by Provider         ROS:  C: NEGATIVE for fever, chills, change in weight  E/M: NEGATIVE for ear, mouth and throat problems  R: NEGATIVE for significant cough or SOB  CV: NEGATIVE for chest pain, palpitations or peripheral edema  GI: NEGATIVE for nausea, abdominal pain, heartburn, or change in bowel habits  : as above    OBJECTIVE:     /70  Pulse 74  Temp 95.9  F (35.5  C) (Tympanic)  Resp 20  Wt 208 lb (94.3 kg)  SpO2 95%  Breastfeeding? No  BMI 31.63 kg/m2  Body mass index is 31.63 kg/(m^2).  GENERAL: healthy, alert and no distress  RESP: lungs clear to auscultation - no rales, rhonchi or wheezes  CV: regular rate and rhythm, normal S1 S2, no S3 or  S4, no murmur, click or rub, no peripheral edema and peripheral pulses strong  ABDOMEN: soft, nontender, no hepatosplenomegaly, no masses and bowel sounds normal  MS: no gross musculoskeletal defects noted, no edema    Diagnostic Test Results:  Office Visit on 07/21/2017   Component Date Value Ref Range Status     Specimen Description 07/21/2017 Vagina   Final     Wet Prep 07/21/2017    Final                    Value:No Trichomonas seen  No clue cells seen  No yeast seen       Micro Report Status 07/21/2017 FINAL 07/21/2017   Final           ASSESSMENT/PLAN:         1. Vaginal itching  She has a history of chronic yeast infections and feels this is similar.  Will treat with Diflucan.  I did advise her  be seen for his issues as well.  She states he was doing that today and she would let him know what we discussed today. She did not want any STD screening today.  If symptoms fail to resolve, she will consider that.   - Wet prep  - fluconazole (DIFLUCAN) 150 MG tablet; Take 1 tablet (150 mg) by mouth once for 1 dose  Dispense: 1 tablet; Refill: 0    2. Vaginal odor  - Wet prep  - fluconazole (DIFLUCAN) 150 MG tablet; Take 1 tablet (150 mg) by mouth once for 1 dose  Dispense: 1 tablet; Refill: 0    FUTURE APPOINTMENTS:       - Follow up if symptoms fail to resolve as expected.   See Patient Instructions    JOSEFINA Denis [facility]

## 2017-07-21 NOTE — NURSING NOTE
"Chief Complaint   Patient presents with     Vaginal Problem     vaginal itching, odor       Initial /70  Pulse 74  Temp 95.9  F (35.5  C) (Tympanic)  Resp 20  Wt 208 lb (94.3 kg)  SpO2 95%  Breastfeeding? No  BMI 31.63 kg/m2 Estimated body mass index is 31.63 kg/(m^2) as calculated from the following:    Height as of 3/9/17: 5' 8\" (1.727 m).    Weight as of this encounter: 208 lb (94.3 kg).  Medication Reconciliation: complete   ................Eduardo Barrett LPN,   July 21, 2017,      10:40 AM,   [facility]    "

## 2017-07-21 NOTE — MR AVS SNAPSHOT
After Visit Summary   7/21/2017    Nikki Morales    MRN: [id number]           Patient Information     Date Of Birth          1960        Visit Information        Provider Department      7/21/2017 10:20 AM Belem Mercado APRN CNP [facility]        Today's Diagnoses     Vaginal itching    -  1    Vaginal odor          Care Instructions    Your wet prep was normal.     Take the Diflucan - 1 dose, 1 time.     Follow up if symptoms fail to resolve as expected.                 Follow-ups after your visit        Who to contact     If you have questions or need follow up information about today's clinic visit or your schedule please contact [facility] directly at [phone].  Normal or non-critical lab and imaging results will be communicated to you by XOR.[email or web address], letter or phone within 4 business days after the clinic has received the results. If you do not hear from us within 7 days, please contact the clinic through [email or web address].[email or web address] or phone. If you have a critical or abnormal lab result, we will notify you by phone as soon as possible.  Submit refill requests through [employer] or call your pharmacy and they will forward the refill request to us. Please allow 3 business days for your refill to be completed.          Additional Information About Your Visit        MyChart Information     [employer] gives you secure access to your electronic health record. If you see a primary care provider, you can also send messages to your care team and make appointments. If you have questions, please call your primary care clinic.  If you do not have a primary care provider, please call [phone] and they will assist you.        Care EveryWhere ID     This is your Care EveryWhere ID. This could be used by other organizations to access your [place] medical records  [id number]        Your Vitals Were     Pulse Temperature Respirations Pulse Oximetry Breastfeeding? BMI (Body Mass Index)    74  95.9  F (35.5  C) (Tympanic) 20 95% No 31.63 kg/m2       Blood Pressure from Last 3 Encounters:   07/21/17 112/70   03/09/17 124/72   01/27/17 126/80    Weight from Last 3 Encounters:   07/21/17 208 lb (94.3 kg)   03/09/17 203 lb (92.1 kg)   01/27/17 202 lb (91.6 kg)              We Performed the Following     Wet prep          Today's Medication Changes          These changes are accurate as of: 7/21/17 11:07 AM.  If you have any questions, ask your nurse or doctor.               Start taking these medicines.        Dose/Directions    fluconazole 150 MG tablet   Commonly known as:  DIFLUCAN   Used for:  Vaginal itching, Vaginal odor   Started by:  Belem Mercado APRN CNP        Dose:  150 mg   Take 1 tablet (150 mg) by mouth once for 1 dose   Quantity:  1 tablet   Refills:  0            Where to get your medicines      These medications were sent to [address]  [address], [facility] [address]     Phone:  [phone]     fluconazole 150 MG tablet                Primary Care Provider Office Phone # Fax #    Erik Hamzah Peraza [phone]       [facility] [address] [place] [address]        Equal Access to Services     HAIR STACK AH: Hadii dalia ku hadasho Soomaali, waaxda luqadaha, qaybta kaalmada adeegyada, waxlaurie smallin hayaan myah pike. So [facility] [phone].    ATENCIÓN: Si habla español, tiene a newsome disposición servicios gratuitos de asistencia lingüística. Llame al [phone].    We comply with applicable federal civil rights laws and [place] laws. We do not discriminate on the basis of race, color, national origin, age, disability sex, sexual orientation or gender identity.            Thank you!     Thank you for choosing [facility]  for your care. Our goal is always to provide you with excellent care. Hearing back from our patients is one way we can continue to improve our services. Please take a few  minutes to complete the written survey that you may receive in the mail after your visit with us. Thank you!             Your Updated Medication List - Protect others around you: Learn how to safely use, store and throw away your medicines at www.disposemymeds.org.          This list is accurate as of: 7/21/17 11:07 AM.  Always use your most recent med list.                   Brand Name Dispense Instructions for use Diagnosis    buPROPion 150 MG 24 hr tablet    WELLBUTRIN XL    60 tablet    TAKE TWO TABLETS BY MOUTH EVERY MORNING    Attention deficit hyperactivity disorder, inattentive type       cyanocobalamin 1000 MCG/ML injection    VITAMIN B12    3 mL    INJECT 1 ML INTRAMUSCULARLY EVERY 30 DAYS    Chronic fatigue syndrome, Vitamin B12 deficiency (dietary) anemia       estradiol 0.05 MG/24HR BIW patch    VIVELLE-DOT     Place 1 patch onto the skin twice a week    Hypertrophy of breast, Eosinophilic esophagitis, Overweight(278.02)       fluconazole 150 MG tablet    DIFLUCAN    1 tablet    Take 1 tablet (150 mg) by mouth once for 1 dose    Vaginal itching, Vaginal odor       fluticasone 50 MCG/ACT spray    FLONASE    1 Bottle    Spray 1-2 sprays into both nostrils daily        methylphenidate 10 MG tablet    RITALIN    30 tablet    1/2 to 1 tablet every day as needed    Chronic fatigue syndrome       Multi-vitamin Tabs tablet     100 tablet    Take 1 tablet by mouth daily        omeprazole 20 MG CR capsule    priLOSEC    30 capsule    Take 1 capsule (20 mg) by mouth daily 1 TAB PO QD (Once per day)  As needed    Eosinophilic esophagitis       SYMBICORT 160-4.5 MCG/ACT Inhaler   Generic drug:  budesonide-formoterol     3 Inhaler    Inhale 2 puffs into the lungs 2 times daily        zolpidem 5 MG tablet    AMBIEN    60 tablet    1 to 2 tabs at hour of sleep as needed    Chronic fatigue syndrome

## 2017-07-24 ENCOUNTER — TELEPHONE (OUTPATIENT)
Dept: FAMILY MEDICINE | Facility: CLINIC | Age: 57
End: 2017-07-24

## 2017-07-24 DIAGNOSIS — D51.8 VITAMIN B12 DEFICIENCY (DIETARY) ANEMIA: ICD-10-CM

## 2017-07-24 DIAGNOSIS — R30.0 DYSURIA: ICD-10-CM

## 2017-07-24 DIAGNOSIS — B00.9 HERPES SIMPLEX VIRUS (HSV) INFECTION: ICD-10-CM

## 2017-07-24 DIAGNOSIS — G93.32 CHRONIC FATIGUE SYNDROME: ICD-10-CM

## 2017-07-24 DIAGNOSIS — R30.0 DYSURIA: Primary | ICD-10-CM

## 2017-07-24 LAB
ALBUMIN UR-MCNC: NEGATIVE MG/DL
APPEARANCE UR: CLEAR
BILIRUB UR QL STRIP: NEGATIVE
COLOR UR AUTO: YELLOW
CREAT SERPL-MCNC: 0.77 MG/DL (ref 0.52–1.04)
GFR SERPL CREATININE-BSD FRML MDRD: 76 ML/MIN/1.7M2
GLUCOSE UR STRIP-MCNC: NEGATIVE MG/DL
HGB BLD-MCNC: 14.2 G/DL (ref 11.7–15.7)
HGB UR QL STRIP: NEGATIVE
KETONES UR STRIP-MCNC: NEGATIVE MG/DL
LEUKOCYTE ESTERASE UR QL STRIP: NEGATIVE
NITRATE UR QL: NEGATIVE
PH UR STRIP: 5 PH (ref 5–7)
SP GR UR STRIP: 1.02 (ref 1–1.03)
URN SPEC COLLECT METH UR: NORMAL
UROBILINOGEN UR STRIP-MCNC: 0 MG/DL (ref 0–2)

## 2017-07-24 PROCEDURE — 82565 ASSAY OF CREATININE: CPT | Performed by: FAMILY MEDICINE

## 2017-07-24 PROCEDURE — 85018 HEMOGLOBIN: CPT | Performed by: FAMILY MEDICINE

## 2017-07-24 PROCEDURE — 36415 COLL VENOUS BLD VENIPUNCTURE: CPT | Performed by: FAMILY MEDICINE

## 2017-07-24 PROCEDURE — 81003 URINALYSIS AUTO W/O SCOPE: CPT | Performed by: FAMILY MEDICINE

## 2017-07-24 NOTE — TELEPHONE ENCOUNTER
Reason for Call: Request for an order or referral:    Order or referral being requested: Patient is requesting orders for an UTI, (itching and burning)...patinet states her &  are having issues & he was diagnosed with an UTI.  Patient seen Belem PRESCOTT last week & requesting orders as well.  Please advise    Date needed: as soon as possible, leaving for camping at noon    Has the patient been seen by the PCP for this problem? YES    Additional comments:     Phone number Patient can be reached at:  Cell number on file:    Telephone Information:   Mobile [phone]       Best Time:      Can we leave a detailed message on this number?  YES    Call taken on 7/24/2017 at 7:05 AM by Marilee Christianson

## 2017-07-24 NOTE — TELEPHONE ENCOUNTER
I put in orders for her to drop off a UA.  Please call patient.     Electronically signed by Belem Mercado CNP.

## 2017-07-24 NOTE — TELEPHONE ENCOUNTER
Pt already left urine, results given. Patient voiced understanding.   ................Eduardo Barrett LPN,   July 24, 2017,      11:51 AM,   [facility]

## 2017-08-07 ENCOUNTER — TELEPHONE (OUTPATIENT)
Dept: FAMILY MEDICINE | Facility: CLINIC | Age: 57
End: 2017-08-07

## 2017-08-07 NOTE — TELEPHONE ENCOUNTER
----- Message from Rosibel Jacobo sent at 8/7/2017  1:06 PM CDT -----  Regarding: Appointment scheduled from [facility]  Contact: [phone]  Appointment For: ROSIBEL JACOBO ([id number])  Visit Type: [facility] OFFICE VISIT LONG (907)    8/23/2017    7:30 AM  30 mins.  Erik Peraza MD     FAMILY PRACTICE    Patient Comments:  Primary Care  Pain in the rectum about 1 inch in.

## 2017-08-10 NOTE — TELEPHONE ENCOUNTER
HI Margarita. I sent a message on Monday and I have NOT heard back yet. Just wondering how you are doing and if the pain is getting any better? If it is worse, and you are having black or bloody stools,  more than twice and you have any red peeling rash in the rectal area , you should be seen in the ER and NOT wait for your appt with DR. Peraza.                                                                                                                                         YRN Brown

## 2017-08-23 ENCOUNTER — OFFICE VISIT (OUTPATIENT)
Dept: FAMILY MEDICINE | Facility: CLINIC | Age: 57
End: 2017-08-23
Payer: COMMERCIAL

## 2017-08-23 VITALS
HEART RATE: 72 BPM | DIASTOLIC BLOOD PRESSURE: 62 MMHG | RESPIRATION RATE: 16 BRPM | SYSTOLIC BLOOD PRESSURE: 116 MMHG | WEIGHT: 209 LBS | BODY MASS INDEX: 31.78 KG/M2 | TEMPERATURE: 96.1 F | OXYGEN SATURATION: 97 %

## 2017-08-23 DIAGNOSIS — E03.4 HYPOTHYROIDISM DUE TO ACQUIRED ATROPHY OF THYROID: ICD-10-CM

## 2017-08-23 DIAGNOSIS — G93.32 CHRONIC FATIGUE SYNDROME: ICD-10-CM

## 2017-08-23 DIAGNOSIS — J45.20 INTERMITTENT ASTHMA, UNCOMPLICATED: ICD-10-CM

## 2017-08-23 DIAGNOSIS — Z23 NEED FOR VACCINATION: ICD-10-CM

## 2017-08-23 DIAGNOSIS — H65.21 RIGHT CHRONIC SEROUS OTITIS MEDIA: ICD-10-CM

## 2017-08-23 DIAGNOSIS — E66.3 OVERWEIGHT: ICD-10-CM

## 2017-08-23 DIAGNOSIS — K64.4 EXTERNAL HEMORRHOIDS: Primary | ICD-10-CM

## 2017-08-23 DIAGNOSIS — N39.3 FEMALE STRESS INCONTINENCE: ICD-10-CM

## 2017-08-23 DIAGNOSIS — K20.0 EOSINOPHILIC ESOPHAGITIS: ICD-10-CM

## 2017-08-23 PROCEDURE — 90471 IMMUNIZATION ADMIN: CPT | Performed by: FAMILY MEDICINE

## 2017-08-23 PROCEDURE — 90715 TDAP VACCINE 7 YRS/> IM: CPT | Performed by: FAMILY MEDICINE

## 2017-08-23 PROCEDURE — 99214 OFFICE O/P EST MOD 30 MIN: CPT | Mod: 25 | Performed by: FAMILY MEDICINE

## 2017-08-23 RX ORDER — ZOLPIDEM TARTRATE 5 MG/1
TABLET ORAL
Qty: 60 TABLET | Refills: 5 | Status: SHIPPED | OUTPATIENT
Start: 2017-08-23 | End: 2017-12-22

## 2017-08-23 ASSESSMENT — PAIN SCALES - GENERAL: PAINLEVEL: NO PAIN (0)

## 2017-08-23 NOTE — MR AVS SNAPSHOT
After Visit Summary   8/23/2017    Nikki Morales    MRN: [id number]           Patient Information     Date Of Birth          1960        Visit Information        Provider Department      8/23/2017 7:30 AM Erik Peraza MD [facility]        Today's Diagnoses     Intermittent asthma, with acute exacerbation    -  1    Hypothyroidism due to acquired atrophy of thyroid        Chronic fatigue syndrome        Need for vaccination          Care Instructions    Let me know if rectal area come back.  Ever=ything else is the same.          Follow-ups after your visit        Who to contact     If you have questions or need follow up information about today's clinic visit or your schedule please contact [facility] directly at [phone].  Normal or non-critical lab and imaging results will be communicated to you by AppSociallyhart, letter or phone within 4 business days after the clinic has received the results. If you do not hear from us within 7 days, please contact the clinic through AppSociallyhart or phone. If you have a critical or abnormal lab result, we will notify you by phone as soon as possible.  Submit refill requests through [employer] or call your pharmacy and they will forward the refill request to us. Please allow 3 business days for your refill to be completed.          Additional Information About Your Visit        MyChart Information     [employer] gives you secure access to your electronic health record. If you see a primary care provider, you can also send messages to your care team and make appointments. If you have questions, please call your primary care clinic.  If you do not have a primary care provider, please call [phone] and they will assist you.        Care EveryWhere ID     This is your Care EveryWhere ID. This could be used by other organizations to access your [place] medical records  [id number]        Your Vitals Were     Pulse Temperature  Respirations Pulse Oximetry BMI (Body Mass Index)       72 96.1  F (35.6  C) (Temporal) 16 97% 31.78 kg/m2        Blood Pressure from Last 3 Encounters:   08/23/17 116/62   07/21/17 112/70   03/09/17 124/72    Weight from Last 3 Encounters:   08/23/17 209 lb (94.8 kg)   07/21/17 208 lb (94.3 kg)   03/09/17 203 lb (92.1 kg)              We Performed the Following     1st  Administration  [20622]     Asthma Action Plan (AAP)     TDAP VACCINE (ADACEL) [97862.002]          Where to get your medicines      Some of these will need a paper prescription and others can be bought over the counter.  Ask your nurse if you have questions.     Bring a paper prescription for each of these medications     zolpidem 5 MG tablet          Primary Care Provider Office Phone # Fax #    Erik Hamzah Peraza [phone] [address] [place] [address]        Equal Access to Services     [facility]: Hadii dalia ku hadasho Soomaali, waaxda luqadaha, qaybta kaalmada adeegyada, waxay geovannyin haysole [place] . So [facility] [phone].    ATENCIÓN: Si habla español, tiene a newsome disposición servicios gratuitos de asistencia lingüística. Llame al [phone].    We comply with applicable federal civil rights laws and [place] laws. We do not discriminate on the basis of race, color, national origin, age, disability sex, sexual orientation or gender identity.            Thank you!     Thank you for choosing [facility]  for your care. Our goal is always to provide you with excellent care. Hearing back from our patients is one way we can continue to improve our services. Please take a few minutes to complete the written survey that you may receive in the mail after your visit with us. Thank you!             Your Updated Medication List - Protect others around you: Learn how to safely use, store and throw away your medicines at www.disposemymeds.org.          This list is accurate as of: 8/23/17   8:23 AM.  Always use your most recent med list.                   Brand Name Dispense Instructions for use Diagnosis    buPROPion 150 MG 24 hr tablet    WELLBUTRIN XL    60 tablet    TAKE TWO TABLETS BY MOUTH EVERY MORNING    Attention deficit hyperactivity disorder, inattentive type       cyanocobalamin 1000 MCG/ML injection    VITAMIN B12    3 mL    INJECT 1 ML INTRAMUSCULARLY EVERY 30 DAYS    Chronic fatigue syndrome, Vitamin B12 deficiency (dietary) anemia       estradiol 0.05 MG/24HR BIW patch    VIVELLE-DOT     Place 1 patch onto the skin twice a week    Hypertrophy of breast, Eosinophilic esophagitis, Overweight(278.02)       fluticasone 50 MCG/ACT spray    FLONASE    1 Bottle    Spray 1-2 sprays into both nostrils daily        methylphenidate 10 MG tablet    RITALIN    30 tablet    1/2 to 1 tablet every day as needed    Chronic fatigue syndrome       Multi-vitamin Tabs tablet     100 tablet    Take 1 tablet by mouth daily        omeprazole 20 MG CR capsule    priLOSEC    30 capsule    Take 1 capsule (20 mg) by mouth daily 1 TAB PO QD (Once per day)  As needed    Eosinophilic esophagitis       SYMBICORT 160-4.5 MCG/ACT Inhaler   Generic drug:  budesonide-formoterol     3 Inhaler    Inhale 2 puffs into the lungs 2 times daily        zolpidem 5 MG tablet    AMBIEN    60 tablet    1 to 2 tabs at hour of sleep as needed    Chronic fatigue syndrome

## 2017-08-23 NOTE — PROGRESS NOTES
SUBJECTIVE:   Nikki Morales is a 57 year old female who presents to clinic today for the following health issues:    Primary complaint is rectal discomfort around the rectum and is she would describe at about the 7:00 region. There is pain especially with defecation. There is some bleeding. He has been present for more than a month and not getting better. Interestingly in the last 2 days things have improved greatly. Strong family history for colon cancer with last colonoscopy done maximally 4 years ago. She has appropriate fears about what might be happening.      Asthma Follow-Up    Was ACT completed today?    Yes    ACT Total Scores 8/23/2017   ACT TOTAL SCORE -   ASTHMA ER VISITS -   ASTHMA HOSPITALIZATIONS -   ACT TOTAL SCORE (Goal Greater than or Equal to 20) 24   In the past 12 months, how many times did you visit the emergency room for your asthma without being admitted to the hospital? 0   In the past 12 months, how many times were you hospitalized overnight because of your asthma? 0       Recent asthma triggers that patient is dealing with: humidity and cold air      Hypothyroidism Follow-up      Since last visit, patient describes the following symptoms: Weight gain, no hair loss, no skin changes, no constipation, no loose stools    C/o Rectal Pain      Amount of exercise or physical activity: 2-3 days/week for an average of 45-60 minutes    Problems taking medications regularly: Yes,  cost of medication and problems remembering to take    Medication side effects: none  Diet: regular (no restrictions)      Her reflux symptoms are relatively well-controlled.  Without her estrogen patch she has significant vaginal dryness and soreness and more urinary continence control issues. She is post hysterectomy  Her chronic fatigue remains and she manages this by activity control. She tries to have a consistent daily schedule.  Patient continues to have ear pain periodically which is very limiting and  unpredictable. Hearing seems diminished as well. This is been ongoing for months.    Problem list and histories reviewed & adjusted, as indicated.  Additional history: as documented    BP Readings from Last 3 Encounters:   08/23/17 116/62   07/21/17 112/70   03/09/17 124/72    Wt Readings from Last 3 Encounters:   08/23/17 209 lb (94.8 kg)   07/21/17 208 lb (94.3 kg)   03/09/17 203 lb (92.1 kg)                  Labs reviewed in EPIC      Reviewed and updated as needed this visit by clinical staff     Reviewed and updated as needed this visit by Provider         ROS:  Constitutional, HEENT, cardiovascular, pulmonary, gi and gu systems are negative, except as otherwise noted.      OBJECTIVE:   /62 (BP Location: Left arm, Patient Position: Chair, Cuff Size: Adult Large)  Pulse 72  Temp 96.1  F (35.6  C) (Temporal)  Resp 16  Wt 209 lb (94.8 kg)  SpO2 97%  BMI 31.78 kg/m2  Body mass index is 31.78 kg/(m^2).  GENERAL: healthy, alert and no distress  EYES: Eyes grossly normal to inspection, PERRL and conjunctivae and sclerae normal  HENT: normal cephalic/atraumatic, right ear: Seems very dull, possible fluid behind the drum, withdrawn, left ear: normal: no effusions, no erythema, normal landmarks, nose and mouth without ulcers or lesions, oropharynx clear and oral mucous membranes moist  NECK: no adenopathy, no asymmetry, masses, or scars and thyroid normal to palpation  RESP: lungs clear to auscultation - no rales, rhonchi or wheezes  CV: regular rate and rhythm, normal S1 S2, no S3 or S4, no murmur, click or rub, no peripheral edema and peripheral pulses strong  ABDOMEN: soft, nontender, no hepatosplenomegaly, no masses and bowel sounds normal  RECTAL (female): Digitally rectal exam is unremarkable for lumps bumps. In that 7:00 position she has a discoloration and light inflamed mucosal surface. She is not tender today.  ANOSCOPY: Using clear to scope and a visualization, I do not see any abnormalities this  "to the mucosa beyond that one on the external hemorrhoid  MS: no gross musculoskeletal defects noted, no edema  SKIN: no suspicious lesions or rashes  NEURO: Grossly negative  PSYCH: mentation appears normal, affect normal/bright    Diagnostic Test Results:  none     ASSESSMENT/PLAN:           Tobacco Cessation:   reports that she has been smoking Cigarettes.  She has never used smokeless tobacco.  Tobacco Cessation Action Plan: Information offered: Patient not interested at this time  Patient is already greatly diminished or tobacco use and understands importance of quitting and totality    BMI:   Estimated body mass index is 31.78 kg/(m^2) as calculated from the following:    Height as of 3/9/17: 5' 8\" (1.727 m).    Weight as of this encounter: 209 lb (94.8 kg).   Weight management plan: Discussed healthy diet and exercise guidelines and patient will follow up in 6 months in clinic to re-evaluate.          1. External hemorrhoids  One small external hemorrhoid which actually appears to be shrinking and not an issue. Might consider steroid suppositories if not improving. Patient also has good bowel habits at this point    2. Intermittent asthma, uncomplicated  Well-controlled    3. Hypothyroidism due to acquired atrophy of thyroid  No problems with previous testing    4. Chronic fatigue syndrome  Uses this to help with sleep. Does not have side effects and disappointed insurance only allows a few tablets  - zolpidem (AMBIEN) 5 MG tablet; 1 to 2 tabs at hour of sleep as needed  Dispense: 60 tablet; Refill: 5    5. Need for vaccination  Updated this one and not interested in others  - 1st  Administration  [41581]  - TDAP VACCINE (ADACEL) [07940.002]    6. Eosinophilic esophagitis  Well-controlled, dietary changes have helped    7. Female stress incontinence  Controlled reasonably well with topical estrogen. Will continue same treatment    8. Overweight  Encouraging more activity    9. Right chronic serous otitis " media  Should see ENT given the persistence and appearance of eardrum today      Patient Instructions   Let me know if rectal area come back.  Ever=ything else is the same.      Erik Hamzah Peraza MD  [facility]

## 2017-08-23 NOTE — NURSING NOTE
"Chief Complaint   Patient presents with     Rectal Problem     c/o rectal pain     Asthma     Recheck     Thyroid Disease     Recheck       Initial /62 (BP Location: Left arm, Patient Position: Chair, Cuff Size: Adult Large)  Pulse 72  Temp 96.1  F (35.6  C) (Temporal)  Resp 16  Wt 209 lb (94.8 kg)  SpO2 97%  BMI 31.78 kg/m2 Estimated body mass index is 31.78 kg/(m^2) as calculated from the following:    Height as of 3/9/17: 5' 8\" (1.727 m).    Weight as of this encounter: 209 lb (94.8 kg).   Screening Questionnaire for Adult Immunization    Are you sick today?   No   Do you have allergies to medications, food, a vaccine component or latex?   Yes   Have you ever had a serious reaction after receiving a vaccination?   No   Do you have a long-term health problem with heart disease, lung disease, asthma, kidney disease, metabolic disease (e.g. diabetes), anemia, or other blood disorder?   Yes   Do you have cancer, leukemia, HIV/AIDS, or any other immune system problem?   No   In the past 3 months, have you taken medications that affect  your immune system, such as prednisone, other steroids, or anticancer drugs; drugs for the treatment of rheumatoid arthritis, Crohn s disease, or psoriasis; or have you had radiation treatments?   No   Have you had a seizure, or a brain or other nervous system problem?   No   During the past year, have you received a transfusion of blood or blood     products, or been given immune (gamma) globulin or antiviral drug?   No   For women: Are you pregnant or is there a chance you could become        pregnant during the next month?   No   Have you received any vaccinations in the past 4 weeks?   No     Immunization questionnaire was positive for at least one answer.  Notified Dr. Peraza.        Per orders of Dr. Peraza, injection of Tdap given by Samara Yu. Patient instructed to remain in clinic for 15 minutes afterwards, and to report any adverse reaction to me " immediately.  Prior to injection verified patient identity using patient's name and date of birth.        Screening performed by Samara Yu on 8/23/2017 at 7:58 AM.    Medication Reconciliation: complete

## 2017-08-24 DIAGNOSIS — G93.32 CHRONIC FATIGUE SYNDROME: ICD-10-CM

## 2017-08-24 DIAGNOSIS — D51.8 VITAMIN B12 DEFICIENCY (DIETARY) ANEMIA: ICD-10-CM

## 2017-08-24 ASSESSMENT — ASTHMA QUESTIONNAIRES: ACT_TOTALSCORE: 24

## 2017-08-25 NOTE — TELEPHONE ENCOUNTER
Vitamin B-12    Last Written Prescription Date: 12/19/2016  Last Fill Quantity: 3 mL,    # refills: 1  Last Office Visit with G, P or [facility] prescribing provider:  8/23/2017      Lab Results   Component Value Date    WBC 5.3 03/09/2017     Lab Results   Component Value Date    RBC 5.06 03/09/2017     Lab Results   Component Value Date    HGB 14.2 07/24/2017     Lab Results   Component Value Date    HCT 45.2 03/09/2017     No components found for: MCT  Lab Results   Component Value Date    MCV 89 03/09/2017     Lab Results   Component Value Date    MCH 29.4 03/09/2017     Lab Results   Component Value Date    MCHC 33.0 03/09/2017     Lab Results   Component Value Date    RDW 13.3 03/09/2017     Lab Results   Component Value Date     03/09/2017     Lab Results   Component Value Date    AST 27 02/20/2014     Lab Results   Component Value Date    ALT 33 02/20/2014     Creatinine   Date Value Ref Range Status   07/24/2017 0.77 0.52 - 1.04 mg/dL Final

## 2017-08-29 RX ORDER — CYANOCOBALAMIN 1000 UG/ML
INJECTION, SOLUTION INTRAMUSCULAR; SUBCUTANEOUS
Qty: 3 ML | Refills: 2 | Status: SHIPPED | OUTPATIENT
Start: 2017-08-29 | End: 2019-04-29

## 2017-08-29 NOTE — TELEPHONE ENCOUNTER
Vitamin B12  Routing refill request to provider for review/approval because:  A break in medication    Braeden Serra RN, BSN

## 2017-09-12 ENCOUNTER — TELEPHONE (OUTPATIENT)
Dept: FAMILY MEDICINE | Facility: CLINIC | Age: 57
End: 2017-09-12

## 2017-09-14 ENCOUNTER — OFFICE VISIT (OUTPATIENT)
Dept: FAMILY MEDICINE | Facility: CLINIC | Age: 57
End: 2017-09-14
Payer: COMMERCIAL

## 2017-09-14 VITALS
TEMPERATURE: 98.3 F | OXYGEN SATURATION: 97 % | HEART RATE: 80 BPM | WEIGHT: 209 LBS | BODY MASS INDEX: 31.78 KG/M2 | DIASTOLIC BLOOD PRESSURE: 80 MMHG | SYSTOLIC BLOOD PRESSURE: 130 MMHG

## 2017-09-14 DIAGNOSIS — R06.02 SHORTNESS OF BREATH: ICD-10-CM

## 2017-09-14 DIAGNOSIS — R07.9 CHEST PAIN, UNSPECIFIED TYPE: Primary | ICD-10-CM

## 2017-09-14 DIAGNOSIS — J45.30 MILD PERSISTENT ASTHMA WITHOUT COMPLICATION: ICD-10-CM

## 2017-09-14 PROCEDURE — 99214 OFFICE O/P EST MOD 30 MIN: CPT | Performed by: NURSE PRACTITIONER

## 2017-09-14 RX ORDER — BUDESONIDE AND FORMOTEROL FUMARATE DIHYDRATE 160; 4.5 UG/1; UG/1
2 AEROSOL RESPIRATORY (INHALATION) 2 TIMES DAILY
Qty: 1 INHALER | Refills: 3 | Status: SHIPPED | OUTPATIENT
Start: 2017-09-14 | End: 2017-09-25

## 2017-09-14 RX ORDER — ALBUTEROL SULFATE 90 UG/1
2 AEROSOL, METERED RESPIRATORY (INHALATION) EVERY 6 HOURS PRN
Qty: 1 INHALER | Refills: 3 | Status: SHIPPED | OUTPATIENT
Start: 2017-09-14 | End: 2018-12-05

## 2017-09-14 NOTE — MR AVS SNAPSHOT
After Visit Summary   9/14/2017    Nikki Morales    MRN: [id number]           Patient Information     Date Of Birth          1960        Visit Information        Provider Department      9/14/2017 11:30 AM Devika Ochoa APRN [facility]        Today's Diagnoses     Chest pain, unspecified type    -  1    Shortness of breath        Mild persistent asthma without complication           Follow-ups after your visit        Your next 10 appointments already scheduled     Sep 18, 2017 11:00 AM CDT   Ech Stress Test with PHECHR2   [facility] ([facility])    [address] [place] [address]   [phone]           1. Please bring or wear a comfortable two-piece outfit and walking shoes. 2. Stop eating 3 hours before the test. You may drink water or juice. 3. Stop all caffeine 12 hours before the test. This includes coffee, tea, soda pop, chocolate and certain medicines (such as Anacin and Excederin). Also avoid decaf coffee and tea, as these contain small amounts of caffeine. 4. No alcohol, smoking or use of other tobacco products for 12 hours before the test. 5. Refer to your provider instructions to see if you need to stop any medications (such as beta-blockers or nitrates) for this test. 6. For patients with diabetes: - If you take insulin, call your diabetes care team. Ask if you should take a   dose the morning of your test. - If you take diabetes medicine by mouth, dont take it on the morning of your test. Bring it with you to take after the test. (If you have questions, call your diabetes care team) 7. When you arrive, please tell us if: - You have diabetes. - You have taken Viagra, Cialis or Levitra in the past 48 hours. 8. For any questions that cannot be answered, please contact the ordering physician              Who to contact     If you have questions or need follow up information about today's clinic visit or  your schedule please contact [facility] directly at [phone].  Normal or non-critical lab and imaging results will be communicated to you by MyChart, letter or phone within 4 business days after the clinic has received the results. If you do not hear from us within 7 days, please contact the clinic through [employer] or phone. If you have a critical or abnormal lab result, we will notify you by phone as soon as possible.  Submit refill requests through [employer] or call your pharmacy and they will forward the refill request to us. Please allow 3 business days for your refill to be completed.          Additional Information About Your Visit        [employer] Information     [employer] gives you secure access to your electronic health record. If you see a primary care provider, you can also send messages to your care team and make appointments. If you have questions, please call your primary care clinic.  If you do not have a primary care provider, please call [phone] and they will assist you.        Care EveryWhere ID     This is your Care EveryWhere ID. This could be used by other organizations to access your [facility] medical records  [id number]        Your Vitals Were     Pulse Temperature Pulse Oximetry BMI (Body Mass Index)          80 98.3  F (36.8  C) (Tympanic) 97% 31.78 kg/m2         Blood Pressure from Last 3 Encounters:   09/14/17 130/80   08/23/17 116/62   07/21/17 112/70    Weight from Last 3 Encounters:   09/14/17 209 lb (94.8 kg)   08/23/17 209 lb (94.8 kg)   07/21/17 208 lb (94.3 kg)                 Today's Medication Changes          These changes are accurate as of: 9/14/17 11:59 PM.  If you have any questions, ask your nurse or doctor.               Start taking these medicines.        Dose/Directions    albuterol 108 (90 BASE) MCG/ACT Inhaler   Commonly known as:  PROAIR HFA/PROVENTIL HFA/VENTOLIN HFA   Used for:  Mild persistent asthma without complication   Started by:  Don  Devika Wynn, JOSEFINA CNP        Dose:  2 puff   Inhale 2 puffs into the lungs every 6 hours as needed for shortness of breath / dyspnea or wheezing   Quantity:  1 Inhaler   Refills:  3            Where to get your medicines      These medications were sent to [facility] - [place], [place] - [address]  [address], [facility] [address]     Phone:  [phone]     albuterol 108 (90 BASE) MCG/ACT Inhaler    budesonide-formoterol 160-4.5 MCG/ACT Inhaler                Primary Care Provider Office Phone # Fax #    Erik Hamzah Peraza [phone] [address] [place] [address]        Equal Access to Services     HAIR STACK : Hadii dalia saleh hadasho Soalexysali, waaxda luqadaha, qaybta kaalmada adeegyada, phuc shelton . So [facility] [phone].    ATENCIÓN: Si habla español, tiene a newsome disposición servicios gratuitos de asistencia lingüística. Ll[facility]ton [facility] [phone].    We comply with applicable federal civil rights laws and [place] laws. We do not discriminate on the basis of race, color, national origin, age, disability sex, sexual orientation or gender identity.            Thank you!     Thank you for choosing [facility]  for your care. Our goal is always to provide you with excellent care. Hearing back from our patients is one way we can continue to improve our services. Please take a few minutes to complete the written survey that you may receive in the mail after your visit with us. Thank you!             Your Updated Medication List - Protect others around you: Learn how to safely use, store and throw away your medicines at www.disposemymeds.org.          This list is accurate as of: 9/14/17 11:59 PM.  Always use your most recent med list.                   Brand Name Dispense Instructions for use Diagnosis    albuterol 108 (90 BASE) MCG/ACT Inhaler    PROAIR HFA/PROVENTIL HFA/VENTOLIN HFA    1 Inhaler    Inhale 2 puffs into the lungs every 6  hours as needed for shortness of breath / dyspnea or wheezing    Mild persistent asthma without complication       budesonide-formoterol 160-4.5 MCG/ACT Inhaler    SYMBICORT    1 Inhaler    Inhale 2 puffs into the lungs 2 times daily    Mild persistent asthma without complication       buPROPion 150 MG 24 hr tablet    WELLBUTRIN XL    60 tablet    TAKE TWO TABLETS BY MOUTH EVERY MORNING    Attention deficit hyperactivity disorder, inattentive type       cyanocobalamin 1000 MCG/ML injection    VITAMIN B12    3 mL    INJECT 1ML INTRAMUSCULARLY EVERY 30 DAYS    Chronic fatigue syndrome, Vitamin B12 deficiency (dietary) anemia       estradiol 0.05 MG/24HR BIW patch    VIVELLE-DOT     Place 1 patch onto the skin twice a week    Hypertrophy of breast, Eosinophilic esophagitis, Overweight(278.02)       fluticasone 50 MCG/ACT spray    FLONASE    1 Bottle    Spray 1-2 sprays into both nostrils daily        methylphenidate 10 MG tablet    RITALIN    30 tablet    1/2 to 1 tablet every day as needed    Chronic fatigue syndrome       Multi-vitamin Tabs tablet     100 tablet    Take 1 tablet by mouth daily        omeprazole 20 MG CR capsule    priLOSEC    30 capsule    Take 1 capsule (20 mg) by mouth daily 1 TAB PO QD (Once per day)  As needed    Eosinophilic esophagitis       zolpidem 5 MG tablet    AMBIEN    60 tablet    1 to 2 tabs at hour of sleep as needed    Chronic fatigue syndrome

## 2017-09-14 NOTE — PROGRESS NOTES
SUBJECTIVE:   Nikki Morales is a 57 year old female who presents to clinic today for the following health issues:      Concern - some SOB, acid reflux     Onset: few weeks    Description:   Some increased SOB, increased reflux?    Intensity: moderate    Progression of Symptoms:  same    Accompanying Signs & Symptoms:  Hurts to wear bra, wheezing, non productive cough    Previous history of similar problem:   Hx of asthma    Precipitating factors:   Worsened by: stair climbing, walks distances    Alleviating factors:  Improved by: symbicort helps, but is outdated    Therapies Tried and outcome: tried milk, lying down not much helps      The patient is seen in clinic today with report of an incident of chest tightness and shortness of breath that occurred 6 days ago. She states her chest felt very tight, as if she had a ropel around her chest beneath her breasts. She couldn't stand to even have her bra on and loosened that, but still felt the tightness throughout her chest. She felt  pain with this tightness that radiated through to her back between her shoulder blades; she had no accompanying nausea or diaphoresis. The chest discomfort did not radiate to her neck or upper extremities. She states she has had 2 similar episodes previously. She initially thought perhaps it was her reflux, but her medications did not improve the symptoms. She has recently noted increased shortness of breath and wheezing. States she has decreased exercise tolerance. She does have a history of asthma, states her medications typically work very well, and she has had no problems. The shortness of breath that she is experiencing now seems different than when she has an asthma flare. She has home spirometry, and is able to blow 450-500 mL.  She did not present to the ED with this chest tightness, although she states it crossed her mind that she may be having a heart attack. This discomfort persisted for 3-4 hours, after which she was  exhausted and went to bed. When she got up, the chest tightness had resolved.  The patient has had a cholecystectomy    Problem list and histories reviewed & adjusted, as indicated.  Additional history: as documented    BP Readings from Last 3 Encounters:   09/14/17 130/80   08/23/17 116/62   07/21/17 112/70    Wt Readings from Last 3 Encounters:   09/14/17 209 lb (94.8 kg)   08/23/17 209 lb (94.8 kg)   07/21/17 208 lb (94.3 kg)                      Reviewed and updated as needed this visit by clinical staffTobacco  Meds  Med Hx  Surg Hx  Fam Hx  Soc Hx      Reviewed and updated as needed this visit by Provider         ROS:  Constitutional, HEENT, cardiovascular, pulmonary, gi and gu systems are negative, except as otherwise noted.      OBJECTIVE:   /80  Pulse 80  Temp 98.3  F (36.8  C) (Tympanic)  Wt 209 lb (94.8 kg)  SpO2 97%  BMI 31.78 kg/m2  Body mass index is 31.78 kg/(m^2).   GENERAL: healthy, alert and no distress  EYES: Eyes grossly normal to inspection, PERRL and conjunctivae and sclerae normal  NECK: no adenopathy, no asymmetry, masses, or scars and thyroid normal to palpation  RESP: lungs clear to auscultation - no rales, rhonchi or wheezes  CV: regular rate and rhythm, normal S1 S2, no S3 or S4, no murmur, click or rub, no peripheral edema and peripheral pulses strong  ABDOMEN: soft, nontender, no hepatosplenomegaly, no masses and bowel sounds normal  MS: no gross musculoskeletal defects noted, no edema  NEURO: Normal strength and tone, mentation intact and speech normal        ASSESSMENT/PLAN:     Problem List Items Addressed This Visit        Medium    Shortness of breath    Relevant Medications    budesonide-formoterol (SYMBICORT) 160-4.5 MCG/ACT Inhaler    albuterol (PROAIR HFA/PROVENTIL HFA/VENTOLIN HFA) 108 (90 BASE) MCG/ACT Inhaler    Other Relevant Orders    Exercise Stress Echocardiogram    Mild persistent asthma without complication    Relevant Medications     budesonide-formoterol (SYMBICORT) 160-4.5 MCG/ACT Inhaler    albuterol (PROAIR HFA/PROVENTIL HFA/VENTOLIN HFA) 108 (90 BASE) MCG/ACT Inhaler    Chest pain, unspecified type - Primary    Relevant Orders    Exercise Stress Echocardiogram           Since this incident occurred nearly a week ago, labs were not ordered. We'll get her scheduled for an exercise stress echocardiogram, further follow-up pending that result. Patient is advised to present to the ED if she should experience another similar episode.    JOSEFINA [facility]

## 2017-09-15 ENCOUNTER — TELEPHONE (OUTPATIENT)
Dept: FAMILY MEDICINE | Facility: CLINIC | Age: 57
End: 2017-09-15

## 2017-09-15 DIAGNOSIS — J45.20 INTERMITTENT ASTHMA, UNCOMPLICATED: Primary | ICD-10-CM

## 2017-09-18 ENCOUNTER — HOSPITAL ENCOUNTER (OUTPATIENT)
Dept: CARDIOLOGY | Facility: CLINIC | Age: 57
Discharge: HOME | End: 2017-09-18
Attending: NURSE PRACTITIONER | Admitting: NURSE PRACTITIONER
Payer: COMMERCIAL

## 2017-09-18 DIAGNOSIS — R07.9 CHEST PAIN, UNSPECIFIED TYPE: ICD-10-CM

## 2017-09-18 DIAGNOSIS — R06.02 SHORTNESS OF BREATH: ICD-10-CM

## 2017-09-18 PROCEDURE — 93325 DOPPLER ECHO COLOR FLOW MAPG: CPT | Mod: 26 | Performed by: INTERNAL MEDICINE

## 2017-09-18 PROCEDURE — 25500064 ZZH RX 255 OP 636: Performed by: INTERNAL MEDICINE

## 2017-09-18 PROCEDURE — 93018 CV STRESS TEST I&R ONLY: CPT | Performed by: INTERNAL MEDICINE

## 2017-09-18 PROCEDURE — 40000264 ECHO STRESS WITH OPTISON

## 2017-09-18 PROCEDURE — 93321 DOPPLER ECHO F-UP/LMTD STD: CPT | Mod: 26 | Performed by: INTERNAL MEDICINE

## 2017-09-18 PROCEDURE — 93016 CV STRESS TEST SUPVJ ONLY: CPT | Performed by: INTERNAL MEDICINE

## 2017-09-18 PROCEDURE — 93017 CV STRESS TEST TRACING ONLY: CPT | Performed by: REHABILITATION PRACTITIONER

## 2017-09-18 PROCEDURE — 93350 STRESS TTE ONLY: CPT | Mod: 26 | Performed by: INTERNAL MEDICINE

## 2017-09-18 RX ADMIN — HUMAN ALBUMIN MICROSPHERES AND PERFLUTREN 2 ML: 10; .22 INJECTION, SOLUTION INTRAVENOUS at 11:29

## 2017-09-18 NOTE — TELEPHONE ENCOUNTER
Prior auth denied. Patient has to try all of these before they will approve symbicort.  She has tried dulera but now she has to also try breo ellipta and advair.

## 2017-09-25 NOTE — TELEPHONE ENCOUNTER
I will order BreoElipta for her. We should let her know that this is an equivalent medication if not equal. Her insurance does cover this one. Erik TANISHA Peraza M.D.

## 2017-11-25 DIAGNOSIS — G93.32 CHRONIC FATIGUE SYNDROME: ICD-10-CM

## 2017-11-29 RX ORDER — LEVOTHYROXINE SODIUM 112 MCG
TABLET ORAL
Qty: 90 TABLET | Refills: 1 | Status: SHIPPED | OUTPATIENT
Start: 2017-11-29 | End: 2018-01-26 | Stop reason: DRUGHIGH

## 2017-11-29 NOTE — TELEPHONE ENCOUNTER
Routing refill request to provider for review/approval because:  Drug not active on patient's medication list  Medication discontinued on 3/9/2017  TSH out of range   Jamila Dumas RN    Synthroid     Last Written Prescription Date:   Last Quantity: , # refills:   Last Office Visit with G, P or [facility] prescribing provider: 9/14/2017      TSH   Date Value Ref Range Status   09/14/2016 0.07 (L) 0.40 - 4.00 mU/L Final

## 2017-12-01 DIAGNOSIS — D51.8 VITAMIN B12 DEFICIENCY (DIETARY) ANEMIA: ICD-10-CM

## 2017-12-01 DIAGNOSIS — G93.32 CHRONIC FATIGUE SYNDROME: ICD-10-CM

## 2017-12-01 NOTE — TELEPHONE ENCOUNTER
cyanocobalamin (VITAMIN B12) 1000 MCG/ML injection        Last Written Prescription Date: 8/29/17  Last Fill Quantity: 3,    # refills: 2  Last Office Visit with G, P or [facility] prescribing provider:  9/14/17      Lab Results   Component Value Date    WBC 5.3 03/09/2017     Lab Results   Component Value Date    RBC 5.06 03/09/2017     Lab Results   Component Value Date    HGB 14.2 07/24/2017     Lab Results   Component Value Date    HCT 45.2 03/09/2017     No components found for: MCT  Lab Results   Component Value Date    MCV 89 03/09/2017     Lab Results   Component Value Date    MCH 29.4 03/09/2017     Lab Results   Component Value Date    MCHC 33.0 03/09/2017     Lab Results   Component Value Date    RDW 13.3 03/09/2017     Lab Results   Component Value Date     03/09/2017     Lab Results   Component Value Date    AST 27 02/20/2014     Lab Results   Component Value Date    ALT 33 02/20/2014     Creatinine   Date Value Ref Range Status   07/24/2017 0.77 0.52 - 1.04 mg/dL Final

## 2017-12-04 RX ORDER — CYANOCOBALAMIN 1000 UG/ML
INJECTION, SOLUTION INTRAMUSCULAR; SUBCUTANEOUS
Qty: 1 ML | Refills: 11 | Status: SHIPPED | OUTPATIENT
Start: 2017-12-04 | End: 2018-01-15

## 2017-12-04 NOTE — TELEPHONE ENCOUNTER
Prescription approved per [facility] Refill Protocol.  Zamzam Carlton RN

## 2017-12-22 DIAGNOSIS — G93.32 CHRONIC FATIGUE SYNDROME: ICD-10-CM

## 2017-12-22 RX ORDER — ZOLPIDEM TARTRATE 5 MG/1
TABLET ORAL
Qty: 60 TABLET | Refills: 5 | Status: SHIPPED | OUTPATIENT
Start: 2017-12-22 | End: 2018-08-03

## 2017-12-22 NOTE — TELEPHONE ENCOUNTER
Last Written Prescription Date:  8/23/17  Last Fill Quantity: 60,  # refills: 5   Last Office Visit with [facility], [facility] or [facility] prescribing provider:  11-06-17  Future Office Visit:       Refill for Zolpidem was routed to Dr. Carlton to refill in Dr. Peraza absence.

## 2017-12-22 NOTE — TELEPHONE ENCOUNTER
Reason for call:  Medication  Reason for Call:  Medication or medication refill:    Do you use a [facility]?  Name of the pharmacy and phone number for the current request:  [facility] - [phone]    Name of the medication requested: zolpidem (AMBIEN) 5 MG tablet    Other request: please call when approved     Can we leave a detailed message on this number? YES    Phone number patient can be reached at: Home number on file [phone] (home)    Best Time: anytime    Call taken on 12/22/2017 at 9:02 AM by Janae Corcoran

## 2018-01-11 ENCOUNTER — TELEPHONE (OUTPATIENT)
Dept: FAMILY MEDICINE | Facility: CLINIC | Age: 58
End: 2018-01-11

## 2018-01-11 DIAGNOSIS — L98.9 SKIN PROBLEM: Primary | ICD-10-CM

## 2018-01-11 NOTE — TELEPHONE ENCOUNTER
"Reason for Call: Request for an order or referral:    Order or referral being requested: for skin clinic \"[facility]\" in [place]    Date needed: as soon as possible    Has the patient been seen by the PCP for this problem? YES    Additional comments: patient needs order for this Wednesday 1/17/18    Phone number Patient can be reached at:  Cell number on file:    Telephone Information:   Mobile [phone]       Best Time:  Any     Can we leave a detailed message on this number?  YES    Call taken on 1/11/2018 at 3:10 PM by Doreen Elizabeth    "

## 2018-01-12 NOTE — TELEPHONE ENCOUNTER
I have approved visit to dermatology.  She has had pigmented lesions in the past.  She has had warts slow to resolve in the past.  She has had other lesions requiring dermatology.  Any need would be appropriate for a dermatology consult.  We will let her know that this has been approved.  When we call we might ask if she has a more specific diagnosis than skin problem we could enter.  Erik Peraza MD

## 2018-01-15 ENCOUNTER — NURSE TRIAGE (OUTPATIENT)
Dept: NURSING | Facility: CLINIC | Age: 58
End: 2018-01-15

## 2018-01-15 ENCOUNTER — HOSPITAL ENCOUNTER (EMERGENCY)
Facility: CLINIC | Age: 58
Discharge: HOME | End: 2018-01-16
Attending: FAMILY MEDICINE | Admitting: FAMILY MEDICINE
Payer: COMMERCIAL

## 2018-01-15 DIAGNOSIS — I48.0 PAROXYSMAL ATRIAL FIBRILLATION (H): ICD-10-CM

## 2018-01-15 LAB
ANION GAP SERPL CALCULATED.3IONS-SCNC: 10 MMOL/L (ref 3–14)
APTT PPP: 29 SEC (ref 22–37)
BASOPHILS # BLD AUTO: 0 10E9/L (ref 0–0.2)
BASOPHILS NFR BLD AUTO: 0.3 %
BUN SERPL-MCNC: 19 MG/DL (ref 7–30)
CALCIUM SERPL-MCNC: 8.6 MG/DL (ref 8.5–10.1)
CHLORIDE SERPL-SCNC: 107 MMOL/L (ref 94–109)
CO2 SERPL-SCNC: 25 MMOL/L (ref 20–32)
CREAT SERPL-MCNC: 0.72 MG/DL (ref 0.52–1.04)
DIFFERENTIAL METHOD BLD: ABNORMAL
EOSINOPHIL # BLD AUTO: 0.8 10E9/L (ref 0–0.7)
EOSINOPHIL NFR BLD AUTO: 7.4 %
ERYTHROCYTE [DISTWIDTH] IN BLOOD BY AUTOMATED COUNT: 14.1 % (ref 10–15)
GFR SERPL CREATININE-BSD FRML MDRD: 83 ML/MIN/1.7M2
GLUCOSE SERPL-MCNC: 104 MG/DL (ref 70–99)
HCT VFR BLD AUTO: 42.8 % (ref 35–47)
HGB BLD-MCNC: 13.9 G/DL (ref 11.7–15.7)
IMM GRANULOCYTES # BLD: 0 10E9/L (ref 0–0.4)
IMM GRANULOCYTES NFR BLD: 0.2 %
INR PPP: 0.96 (ref 0.86–1.14)
LYMPHOCYTES # BLD AUTO: 4.2 10E9/L (ref 0.8–5.3)
LYMPHOCYTES NFR BLD AUTO: 39.8 %
MCH RBC QN AUTO: 28.7 PG (ref 26.5–33)
MCHC RBC AUTO-ENTMCNC: 32.5 G/DL (ref 31.5–36.5)
MCV RBC AUTO: 88 FL (ref 78–100)
MONOCYTES # BLD AUTO: 1.4 10E9/L (ref 0–1.3)
MONOCYTES NFR BLD AUTO: 13 %
NEUTROPHILS # BLD AUTO: 4.2 10E9/L (ref 1.6–8.3)
NEUTROPHILS NFR BLD AUTO: 39.3 %
NT-PROBNP SERPL-MCNC: 274 PG/ML (ref 0–900)
PLATELET # BLD AUTO: 253 10E9/L (ref 150–450)
POTASSIUM SERPL-SCNC: 3.8 MMOL/L (ref 3.4–5.3)
RBC # BLD AUTO: 4.84 10E12/L (ref 3.8–5.2)
SODIUM SERPL-SCNC: 142 MMOL/L (ref 133–144)
T4 FREE SERPL-MCNC: 1.22 NG/DL (ref 0.76–1.46)
TROPONIN I SERPL-MCNC: <0.015 UG/L (ref 0–0.04)
TSH SERPL DL<=0.005 MIU/L-ACNC: 0.22 MU/L (ref 0.4–4)
WBC # BLD AUTO: 10.5 10E9/L (ref 4–11)

## 2018-01-15 PROCEDURE — 84443 ASSAY THYROID STIM HORMONE: CPT | Performed by: FAMILY MEDICINE

## 2018-01-15 PROCEDURE — 99152 MOD SED SAME PHYS/QHP 5/>YRS: CPT | Performed by: FAMILY MEDICINE

## 2018-01-15 PROCEDURE — 93005 ELECTROCARDIOGRAM TRACING: CPT | Performed by: FAMILY MEDICINE

## 2018-01-15 PROCEDURE — 25000128 H RX IP 250 OP 636: Performed by: FAMILY MEDICINE

## 2018-01-15 PROCEDURE — 99152 MOD SED SAME PHYS/QHP 5/>YRS: CPT | Mod: Z6 | Performed by: FAMILY MEDICINE

## 2018-01-15 PROCEDURE — 92960 CARDIOVERSION ELECTRIC EXT: CPT | Performed by: FAMILY MEDICINE

## 2018-01-15 PROCEDURE — 99285 EMERGENCY DEPT VISIT HI MDM: CPT | Mod: 25 | Performed by: FAMILY MEDICINE

## 2018-01-15 PROCEDURE — 85730 THROMBOPLASTIN TIME PARTIAL: CPT | Performed by: FAMILY MEDICINE

## 2018-01-15 PROCEDURE — 85610 PROTHROMBIN TIME: CPT | Performed by: FAMILY MEDICINE

## 2018-01-15 PROCEDURE — 96374 THER/PROPH/DIAG INJ IV PUSH: CPT | Performed by: FAMILY MEDICINE

## 2018-01-15 PROCEDURE — 85025 COMPLETE CBC W/AUTO DIFF WBC: CPT | Performed by: FAMILY MEDICINE

## 2018-01-15 PROCEDURE — 92960 CARDIOVERSION ELECTRIC EXT: CPT | Mod: Z6 | Performed by: FAMILY MEDICINE

## 2018-01-15 PROCEDURE — 80048 BASIC METABOLIC PNL TOTAL CA: CPT | Performed by: FAMILY MEDICINE

## 2018-01-15 PROCEDURE — 83880 ASSAY OF NATRIURETIC PEPTIDE: CPT | Performed by: FAMILY MEDICINE

## 2018-01-15 PROCEDURE — 27211117 ZZH CARDIOVERT/DEFIB/PACER SUPP: Performed by: FAMILY MEDICINE

## 2018-01-15 PROCEDURE — 93010 ELECTROCARDIOGRAM REPORT: CPT | Mod: 59 | Performed by: FAMILY MEDICINE

## 2018-01-15 PROCEDURE — 84484 ASSAY OF TROPONIN QUANT: CPT | Performed by: FAMILY MEDICINE

## 2018-01-15 PROCEDURE — 84439 ASSAY OF FREE THYROXINE: CPT | Performed by: FAMILY MEDICINE

## 2018-01-15 RX ORDER — NALOXONE HYDROCHLORIDE 0.4 MG/ML
.1-.4 INJECTION, SOLUTION INTRAMUSCULAR; INTRAVENOUS; SUBCUTANEOUS
Status: DISCONTINUED | OUTPATIENT
Start: 2018-01-15 | End: 2018-01-16 | Stop reason: HOSPADM

## 2018-01-15 RX ORDER — FENTANYL CITRATE 50 UG/ML
50 INJECTION, SOLUTION INTRAMUSCULAR; INTRAVENOUS
Status: DISCONTINUED | OUTPATIENT
Start: 2018-01-15 | End: 2018-01-16 | Stop reason: HOSPADM

## 2018-01-15 RX ORDER — FENTANYL CITRATE 50 UG/ML
50 INJECTION, SOLUTION INTRAMUSCULAR; INTRAVENOUS ONCE
Status: COMPLETED | OUTPATIENT
Start: 2018-01-15 | End: 2018-01-15

## 2018-01-15 RX ORDER — PROPOFOL 10 MG/ML
.5-1 INJECTION, EMULSION INTRAVENOUS ONCE
Status: COMPLETED | OUTPATIENT
Start: 2018-01-15 | End: 2018-01-16

## 2018-01-15 RX ORDER — FLUMAZENIL 0.1 MG/ML
0.2 INJECTION, SOLUTION INTRAVENOUS
Status: DISCONTINUED | OUTPATIENT
Start: 2018-01-15 | End: 2018-01-16 | Stop reason: HOSPADM

## 2018-01-15 RX ADMIN — FENTANYL CITRATE 50 MCG: 50 INJECTION, SOLUTION INTRAMUSCULAR; INTRAVENOUS at 23:21

## 2018-01-15 NOTE — ED AVS SNAPSHOT
[facility] Emergency Department    [address]     [place] [address]    Phone:  [phone]    Fax:  [phone]                                       Nikki Morales   MRN: [id number]    Department:  [facility] Emergency Department   Date of Visit:  1/15/2018           Patient Information     Date Of Birth          1960        Your diagnoses for this visit were:     Paroxysmal atrial fibrillation (H) cardioverted to NSR       You were seen by Samm Smalls MD.      Follow-up Information     Follow up with Erik Peraza MD.    Specialty:  Family Practice    Contact information:    [address]   [place] [id number] [phone]          Follow up with Rodriguez Sandoval MD.    Specialty:  Cardiology    Why:  or one of his partners in the [facility] [place] [facility]    Contact information:    [address]  [place] [address]  [phone]          Discharge Instructions       Take a baby aspirin a day.  I sent Dr. Peraza a note asking him to help you arrange for a cardiology appointment.  Please return to the ED if this recurs and lasts longer than 3-4 hours or if you develop chest pain or shortness of breath.  It was great to see you again tonight.  I am glad you are feeling better.  Happy Birthday next month!    Thank you for choosing [facility]. We appreciate the opportunity to meet your urgent medical needs. Please let us know if we could have done anything to make your stay more satisfying.    After discharge, please closely monitor for any new or worsening symptoms. Return to the Emergency Department if you develop any acute worsening signs or symptoms.    If you had lab work, cultures or imaging studies done during your stay, the final results may still be pending. We will call you if your plan of care needs to change. However, if you are not improving as expected, please follow up with your primary care provider or clinic.      Start any prescription medications that were prescribed to you and take them as directed.     Please see additional handouts that may be pertinent to your condition.        24 Hour Appointment Hotline       To make an appointment at any [place] [facility] clinic, call [phone] ([phone]). If you don't have a family doctor or clinic, we will help you find one. [place] [facility] clinics are conveniently located to serve the needs of you and your family.             Review of your medicines      START taking        Dose / Directions Last dose taken    aspirin 81 MG tablet   Dose:  81 mg        Take 1 tablet (81 mg) by mouth daily   Refills:  OTC          Our records show that you are taking the medicines listed below. If these are incorrect, please call your family doctor or clinic.        Dose / Directions Last dose taken    albuterol 108 (90 BASE) MCG/ACT Inhaler   Commonly known as:  PROAIR HFA/PROVENTIL HFA/VENTOLIN HFA   Dose:  2 puff   Quantity:  1 Inhaler        Inhale 2 puffs into the lungs every 6 hours as needed for shortness of breath / dyspnea or wheezing   Refills:  3        cyanocobalamin 1000 MCG/ML injection   Commonly known as:  VITAMIN B12   Quantity:  3 mL        INJECT 1ML INTRAMUSCULARLY EVERY 30 DAYS   Refills:  2        estradiol 0.05 MG/24HR BIW patch   Commonly known as:  VIVELLE-DOT   Dose:  1 patch        Place 1 patch onto the skin twice a week   Refills:  0        fluticasone 50 MCG/ACT spray   Commonly known as:  FLONASE   Dose:  1-2 spray   Quantity:  1 Bottle        Spray 1-2 sprays into both nostrils daily   Refills:  11        methylphenidate 10 MG tablet   Commonly known as:  RITALIN   Quantity:  30 tablet        1/2 to 1 tablet every day as needed   Refills:  0        Multi-vitamin Tabs tablet   Dose:  1 tablet   Quantity:  100 tablet        Take 1 tablet by mouth daily   Refills:  3        omeprazole 20 MG CR capsule   Commonly known as:  priLOSEC   Dose:  20 mg   Quantity:  30 capsule         Take 1 capsule (20 mg) by mouth daily 1 TAB PO QD (Once per day)  As needed   Refills:  11        SYNTHROID 112 MCG tablet   Quantity:  90 tablet   Generic drug:  levothyroxine        TAKE ONE TABLET BY MOUTH ONCE DAILY   Refills:  1        zolpidem 5 MG tablet   Commonly known as:  AMBIEN   Quantity:  60 tablet        1 to 2 tabs at hour of sleep as needed   Refills:  5                Prescriptions were sent or printed at these locations (1 Prescription)                   [facility]   [address] [address]    Telephone:  [phone]   Fax:  [phone]   Hours:                  Not Printed or Sent (1 of 1)         aspirin 81 MG tablet                Procedures and tests performed during your visit     Procedure/Test Number of Times Performed    Basic metabolic panel 1    CBC with platelets differential 1    Cardiac Continuous Monitoring 2    EKG 12 lead 2    INR 1    Nt probnp inpatient (BNP) 1    Obtain affirmation of informed consent 1    Partial thromboplastin time 1    Peripheral IV: Standard 1    Pulse oximetry nursing 1    T4 free 1    TSH with free T4 reflex 1    Troponin I 1      Orders Needing Specimen Collection     None      Pending Results     No orders found for last 3 day(s).            Pending Culture Results     No orders found for last 3 day(s).            Pending Results Instructions     If you had any lab results that were not finalized at the time of your Discharge, you can call the ED Lab Result RN at [phone]. You will be contacted by this team for any positive Lab results or changes in treatment. The nurses are available 7 days a week from 10A to 6:30P.  You can leave a message 24 hours per day and they will return your call.        Thank you for choosing [facility]       Thank you for choosing [facility] for your care. Our goal is always to provide you with excellent care. Hearing back from our patients is one way we can continue to  improve our services. Please take a few minutes to complete the written survey that you may receive in the mail after you visit with us. Thank you!        [facility] Information     [employer] gives you secure access to your electronic health record. If you see a primary care provider, you can also send messages to your care team and make appointments. If you have questions, please call your primary care clinic.  If you do not have a primary care provider, please call [phone] and they will assist you.        Care EveryWhere ID     This is your Care EveryWhere ID. This could be used by other organizations to access your [facility] medical records  [id number]        Equal Access to Services     HAIR STACK : Purnima Pace, marco carrasco, phuc downey. So [facility] [phone].    ATENCIÓN: Si habla español, tiene a newsome disposición servicios gratuitos de asistencia lingüística. Llame al [phone].    We comply with applicable federal civil rights laws and [place] laws. We do not discriminate on the basis of race, color, national origin, age, disability, sex, sexual orientation, or gender identity.            After Visit Summary       This is your record. Keep this with you and show to your community pharmacist(s) and doctor(s) at your next visit.

## 2018-01-15 NOTE — ED AVS SNAPSHOT
[facility] Emergency Department    [address] [place] [address]    Phone:  [phone]    Fax:  [phone]                                       Nikki Morales   MRN: [id number]    Department:  [facility] Emergency Department   Date of Visit:  1/15/2018           After Visit Summary Signature Page     I have received my discharge instructions, and my questions have been answered. I have discussed any challenges I see with this plan with the nurse or doctor.    ..........................................................................................................................................  Patient/Patient Representative Signature      ..........................................................................................................................................  Patient Representative Print Name and Relationship to Patient    ..................................................               ................................................  Date                                            Time    ..........................................................................................................................................  Reviewed by Signature/Title    ...................................................              ..............................................  Date                                                            Time

## 2018-01-16 VITALS
OXYGEN SATURATION: 93 % | WEIGHT: 205 LBS | BODY MASS INDEX: 31.07 KG/M2 | HEIGHT: 68 IN | DIASTOLIC BLOOD PRESSURE: 76 MMHG | SYSTOLIC BLOOD PRESSURE: 126 MMHG | RESPIRATION RATE: 16 BRPM | TEMPERATURE: 97.1 F

## 2018-01-16 DIAGNOSIS — I48.0 PAROXYSMAL ATRIAL FIBRILLATION (H): Primary | ICD-10-CM

## 2018-01-16 PROCEDURE — 25000132 ZZH RX MED GY IP 250 OP 250 PS 637: Performed by: FAMILY MEDICINE

## 2018-01-16 PROCEDURE — 25000128 H RX IP 250 OP 636: Performed by: FAMILY MEDICINE

## 2018-01-16 RX ORDER — ASPIRIN 81 MG/1
81 TABLET, CHEWABLE ORAL ONCE
Status: COMPLETED | OUTPATIENT
Start: 2018-01-16 | End: 2018-01-16

## 2018-01-16 RX ADMIN — ASPIRIN 81 MG 81 MG: 81 TABLET ORAL at 02:10

## 2018-01-16 RX ADMIN — PROPOFOL 80 MG: 10 INJECTION, EMULSION INTRAVENOUS at 00:39

## 2018-01-16 NOTE — ED NOTES
Pt up to the bathroom. Pt states that she felt like her heart rate increased when she was up walking.  Once back on the monitor heart rate was 114.

## 2018-01-16 NOTE — ED NOTES
Started about 6 hours ago with sudden fast heart rate, has had this before but this time isn't going away

## 2018-01-16 NOTE — DISCHARGE INSTRUCTIONS
Take a baby aspirin a day.  I sent Dr. Peraza a note asking him to help you arrange for a cardiology appointment.  Please return to the ED if this recurs and lasts longer than 3-4 hours or if you develop chest pain or shortness of breath.  It was great to see you again tonight.  I am glad you are feeling better.  Happy Birthday next month!    Thank you for choosing [facility]. We appreciate the opportunity to meet your urgent medical needs. Please let us know if we could have done anything to make your stay more satisfying.    After discharge, please closely monitor for any new or worsening symptoms. Return to the Emergency Department if you develop any acute worsening signs or symptoms.    If you had lab work, cultures or imaging studies done during your stay, the final results may still be pending. We will call you if your plan of care needs to change. However, if you are not improving as expected, please follow up with your primary care provider or clinic.     Start any prescription medications that were prescribed to you and take them as directed.     Please see additional handouts that may be pertinent to your condition.

## 2018-01-16 NOTE — ED NOTES
Provider in room to assess pt.  Pt's heart rate drops to 's then back to the 110's.  At this time provider wants to watch heart rate for about 30 minutes and then reassess for possible cardioversion.  Lights turned down and door closed,  at bedside.

## 2018-01-16 NOTE — TELEPHONE ENCOUNTER
For the last 6 hours her heart has been racing and gets SOB with exertion. Right now heart rate by BP machine is down to 140. BP is 146/80 or so. She took Ambien a little bit ago to see if that would help. There is someone that can bring her to the ED now. They are about 2 miles away from the hospital.     Jovita Cerda RN, [facility]    Reason for Disposition    [1] Heart beating very rapidly (e.g., > 140 / minute) AND [2] present now  (Exception: during exercise)    Additional Information    Negative: Passed out (i.e., lost consciousness, collapsed and was not responding)    Negative: Shock suspected (e.g., cold/pale/clammy skin, too weak to stand, low BP, rapid pulse)    Negative: Difficult to awaken or acting confused  (e.g., disoriented, slurred speech)    Negative: Visible sweat on face or sweat dripping down face    Negative: Unable to walk, or can only walk with assistance (e.g., requires support)    Negative: [1] Received SHOCK from implantable cardiac defibrillator AND [2] persisting symptoms (i.e., palpitations, lightheadedness)    Negative: Sounds like a life-threatening emergency to the triager    Negative: Chest pain    Negative: Dizziness, lightheadedness, or weakness    Negative: Difficulty breathing    Protocols used: HEART RATE AND HEARTBEAT QUESTIONS-ADULT-

## 2018-01-16 NOTE — ED NOTES
Pt complaining of increased heart rate with some shortness of breath.   EKG completed.  Pt placed on cardiac monitor, blood pressure, and oxymetry.  IV was placed and lab work completed.  Pt denies any chest pain.

## 2018-01-16 NOTE — ED NOTES
Synchronized cardioversion planned for A-fib with Dr Smalls:    Prior to procedure pt was placed on oxygen via nasal cannula.  Emergency equipment prepared.  Placed cardioversion pads on pt.  Rina RONDON present to monitor airway.    0037 time out completed with pt and staff  0039 administered 40 mg of Propofol IV  0040 administered additional 40 mg of Propofol IV per provider  0041 synchronized cardioversion using a 100 joules     Pt required a few breaths via PPV after cardioversion then maintained her own respirations.  Pt woke up and was speaking with staff by 0043.  Pt denies pain.  EKG completed.

## 2018-01-17 ENCOUNTER — TRANSFERRED RECORDS (OUTPATIENT)
Dept: HEALTH INFORMATION MANAGEMENT | Facility: CLINIC | Age: 58
End: 2018-01-17

## 2018-01-18 ENCOUNTER — HOSPITAL ENCOUNTER (OUTPATIENT)
Dept: CARDIOLOGY | Facility: CLINIC | Age: 58
Discharge: HOME | End: 2018-01-18
Attending: INTERNAL MEDICINE | Admitting: INTERNAL MEDICINE
Payer: COMMERCIAL

## 2018-01-18 ENCOUNTER — OFFICE VISIT (OUTPATIENT)
Dept: CARDIOLOGY | Facility: CLINIC | Age: 58
End: 2018-01-18
Payer: COMMERCIAL

## 2018-01-18 VITALS
BODY MASS INDEX: 31.43 KG/M2 | RESPIRATION RATE: 12 BRPM | WEIGHT: 207.4 LBS | DIASTOLIC BLOOD PRESSURE: 60 MMHG | SYSTOLIC BLOOD PRESSURE: 124 MMHG | HEART RATE: 70 BPM | HEIGHT: 68 IN | OXYGEN SATURATION: 94 %

## 2018-01-18 DIAGNOSIS — I48.0 PAROXYSMAL ATRIAL FIBRILLATION (H): Primary | ICD-10-CM

## 2018-01-18 DIAGNOSIS — I48.91 NEW ONSET ATRIAL FIBRILLATION (H): Primary | ICD-10-CM

## 2018-01-18 PROCEDURE — 93000 ELECTROCARDIOGRAM COMPLETE: CPT | Performed by: INTERNAL MEDICINE

## 2018-01-18 PROCEDURE — 93005 ELECTROCARDIOGRAM TRACING: CPT | Performed by: REHABILITATION PRACTITIONER

## 2018-01-18 PROCEDURE — 99204 OFFICE O/P NEW MOD 45 MIN: CPT | Performed by: INTERNAL MEDICINE

## 2018-01-18 ASSESSMENT — PAIN SCALES - GENERAL: PAINLEVEL: NO PAIN (0)

## 2018-01-18 NOTE — PROGRESS NOTES
2018         Erik Peraza MD    [facility]    [address] [place] [address]      RE: Nikki Morales   MRN: [id number]   : 1960      Dear Dr. Peraza:      I saw Ms. Morales for evaluation of new onset atrial fibrillation.  She is a 57-year-old white female who has had recurrent palpitations for years.  The duration of palpitation could be up to 2 hours.  The patient did not have syncope, shortness of breath or other severe symptoms.  She therefore did not pay attention or seek medical help for her palpitations.      She did have some chest discomfort and underwent a stress test around 2017.  The test was reported to be normal.      On 01/15, she had a very long episode of persistent palpitation for 6 hours.  She therefore decided to go to the ER where she was found to have atrial fibrillation with heart rate of about 120 beats per minute.  She was cardioverted to sinus rhythm and was discharged to home without change of medications.      Her other medical conditions include hypothyroidism, hyperlipidemia and chronic fatigue.  She had some reaction with muscle ache on a cholesterol medicine in the past and she does not want to start a new cholesterol medicine without discussing with you.  Her recent TSH is relatively low, suggesting that she may have received overtreatment for hypothyroidism,      She has a tendency to drink alcohol when she has social gatherings such as a football game.  During the same time, she also has social smoking.      The patient mentions that she may have sleep apnea at night because she tends to wake up with difficulty breathing occasionally.      PHYSICAL EXAMINATION:   VITAL SIGNS:  Blood pressure was 124/60, heart rate 70 beats per minute, body weight 207 pounds.   HEENT:  Eyes and ENT were unremarkable.   LUNGS:  Clear.   CARDIAC:  Rhythm was regular and heart sounds were normal with no murmur.   ABDOMEN:  Showed moderate obesity.    EXTREMITIES:  There was no pedal edema.      EKG today showed normal sinus rhythm.      ASSESSMENT AND RECOMMENDATIONS:  Ms. Jacobo is a 57-year-old white female with 1 episode of confirmed atrial fibrillation that required DC cardioversion on 01/15/2018.  It is unknown whether her other arrhythmia is due to atrial fibrillation or a different arrhythmia.  For confirmation of the underlying rhythm during her symptoms, she will wear a ZIO Patch monitor for 2 weeks.  She is a regular swimmer and therefore the ZIO Patch monitor may put her on swim restriction for about 2 weeks.  After extensive consideration, she agreed to wear a ZIO Patch monitor.      She will have a repeat echocardiography that will assess the cardiac structure and function.  She had a stress echo in 2017.      She is asked to stop smoking.  She is encouraged to avoid binge drinking in order to prevent recurrent atrial fibrillation.      She will have a sleep study for assessment of possible sleep apnea.  She will return to your office for discussion about hyperlipidemia treatment.  She may need lower dose of hypothyroid medication.      I will see her for followup in February and discuss if we should make long-term commitment of treatment for atrial fibrillation.      Sincerely,         MARCY MORENO MD             D: 2018 10:21   T: 2018 12:14   MT: [place]      Name:     ROSIBEL JACOBO   MRN:      [id number]        Account:      [id number]   :      1960           Service Date: 2018      Document: [id number]

## 2018-01-18 NOTE — PROGRESS NOTES
HPI and Plan:   See dictation    Orders Placed This Encounter   Procedures     SLEEP EVALUATION & MANAGEMENT REFERRAL - [facility] [phone] (Age 11 if over 100 lbs)     Follow-Up with Electrophysiologist     Liamo Patch Monitor     Echocardiogram       No orders of the defined types were placed in this encounter.      There are no discontinued medications.      Encounter Diagnosis   Name Primary?     New onset atrial fibrillation (H) Yes       CURRENT MEDICATIONS:  Current Outpatient Prescriptions   Medication Sig Dispense Refill     aspirin 81 MG tablet Take 1 tablet (81 mg) by mouth daily  OTC     zolpidem (AMBIEN) 5 MG tablet 1 to 2 tabs at hour of sleep as needed 60 tablet 5     SYNTHROID 112 MCG tablet TAKE ONE TABLET BY MOUTH ONCE DAILY 90 tablet 1     albuterol (PROAIR HFA/PROVENTIL HFA/VENTOLIN HFA) 108 (90 BASE) MCG/ACT Inhaler Inhale 2 puffs into the lungs every 6 hours as needed for shortness of breath / dyspnea or wheezing 1 Inhaler 3     cyanocobalamin (VITAMIN B12) 1000 MCG/ML injection INJECT 1ML INTRAMUSCULARLY EVERY 30 DAYS 3 mL 2     methylphenidate (RITALIN) 10 MG tablet 1/2 to 1 tablet every day as needed 30 tablet 0     fluticasone (FLONASE) 50 MCG/ACT spray Spray 1-2 sprays into both nostrils daily 1 Bottle 11     estradiol (VIVELLE-DOT) 0.05 MG/24HR patch Place 1 patch onto the skin twice a week       multivitamin, therapeutic with minerals (MULTI-VITAMIN) TABS Take 1 tablet by mouth daily 100 tablet 3     omeprazole (PRILOSEC) 20 MG capsule Take 1 capsule (20 mg) by mouth daily 1 TAB PO QD (Once per day)  As needed (Patient not taking: Reported on 1/18/2018) 30 capsule 11     [DISCONTINUED] NEW MED Methylcobalamin 25mg/ml  0.1ml weekly 1 Bottle 11       ALLERGIES     Allergies   Allergen Reactions     Codeine Other (See Comments)     Causes agitation       PAST MEDICAL HISTORY:  Past Medical History:   Diagnosis Date     Chronic fatigue      Hyperlipidemia       Hypothyroid      New onset a-fib (H)      Other vitamin B12 deficiency anemia        PAST SURGICAL HISTORY:  Past Surgical History:   Procedure Laterality Date     C  DELIVERY ONLY      , Low Cervical     C NONSPECIFIC PROCEDURE      sinus     C NONSPECIFIC PROCEDURE      Esophgeal dilatation multiple     C NONSPECIFIC PROCEDURE      sling     C VAGINAL HYSTERECTOMY  1995    Hysterectomy, Vaginal     COLONOSCOPY  10/06/09     COLONOSCOPY  2013    Procedure: COMBINED COLONOSCOPY, SINGLE BIOPSY/POLYPECTOMY BY BIOPSY;;  Surgeon: Cuate Miles MD;  Location: [facility]     CONIZATION CERVIX,KNIFE/LASER       ESOPHAGOSCOPY, GASTROSCOPY, DUODENOSCOPY (EGD), COMBINED  2013    Procedure: COMBINED ESOPHAGOSCOPY, GASTROSCOPY, DUODENOSCOPY (EGD), BIOPSY SINGLE OR MULTIPLE;  egd with rabdain biopsies and colonoscopy with polypectomy by biopsy ;  Surgeon: Cuate Miles MD;  Location: [facility]     HC DILATION/CURETTAGE DIAG/THER NON OB      D & C     HC REMOVAL OF TONSILS,<9 Y/O      Tonsils <10y.o.     HC UGI ENDOSCOPY DIAG W BIOPSY  07     HC UGI ENDOSCOPY, SIMPLE EXAM  09/10/99 & 98     HYSTERECTOMY, PAP NO LONGER INDICATED       LAPAROSCOPIC CHOLECYSTECTOMY  10/12/13     TUBAL LIGATION         FAMILY HISTORY:  Family History   Problem Relation Age of Onset     CANCER Mother      Uterine     Prostate Cancer Mother      DIABETES Father      Hypertension Father      CANCER Father      prostate cancer     CEREBROVASCULAR DISEASE Father      Psychotic Disorder Son      severe Add,      Asthma Son      exercised induced asthma     Asthma Son      ecxercise induced asthma     Cardiovascular Sister      recurrent blood clots     CEREBROVASCULAR DISEASE Sister 51     Prostate Cancer Brother      DIABETES Maternal Grandfather      HEART DISEASE Maternal Grandmother      Heart condition age 96     DIABETES Paternal Grandfather      CANCER Brother      CEREBROVASCULAR DISEASE  "Paternal Grandmother        SOCIAL HISTORY:  Social History     Social History     Marital status:      Spouse name: Jared     Number of children: 2     Years of education: 12     Occupational History     HomeMaker Homemaker     Social History Main Topics     Smoking status: Current Some Day Smoker     Types: Cigarettes     Smokeless tobacco: Never Used      Comment: social  6/mo     Alcohol use 2.4 oz/week     2 Standard drinks or equivalent, 2 Cans of beer per week      Comment: social     Drug use: No      Comment: used in past any and all     Sexual activity: Yes     Partners: Male     Birth control/ protection: Female Surgical      Comment: Hx: hystectomy     Other Topics Concern      Service No     Blood Transfusions No     Caffeine Concern No     Occupational Exposure No     Hobby Hazards No     Sleep Concern Yes     Difficult with sleeping at night, sleeps most of the day     Stress Concern No     Weight Concern Yes     desire wt loss     Special Diet No     Back Care Yes     weight restrictions     Exercise Yes     Bike Helmet No     Seat Belt Yes     Self-Exams No     Parent/Sibling W/ Cabg, Mi Or Angioplasty Before 65f 55m? No     Social History Narrative       Review of Systems:  Skin:  Negative       Eyes:  Positive for glasses    ENT:  Negative   throbbing in ear  Respiratory:  Positive for shortness of breath;dyspnea on exertion     Cardiovascular:  Negative;dizziness;edema;chest pain Positive for;palpitations;dizziness;fatigue;exercise intolerance    Gastroenterology: Negative reflux;heartburn meds for reflux  Genitourinary:  not assessed      Musculoskeletal:  Positive for back pain;neck pain low back and right knee  Neurologic:  Negative      Psychiatric:  Positive for excessive stress    Heme/Lymph/Imm:  Positive for allergies    Endocrine:  Negative        Physical Exam:  Vitals: /60 (BP Location: Left arm, Cuff Size: Adult Regular)  Pulse 70  Resp 12  Ht 1.727 m (5' 8\")  " Wt 94.1 kg (207 lb 6.4 oz)  SpO2 94%  BMI 31.54 kg/m2    Constitutional:  cooperative, alert and oriented, well developed, well nourished, in no acute distress        Skin:  warm and dry to the touch, no apparent skin lesions or masses noted          Head:  normocephalic, no masses or lesions        Eyes:  pupils equal and round, conjunctivae and lids unremarkable, sclera white, no xanthalasma, EOMS intact, no nystagmus        Lymph:No Cervical lymphadenopathy present     ENT:  no pallor or cyanosis, dentition good        Neck:  carotid pulses are full and equal bilaterally, JVP normal, no carotid bruit        Respiratory:  normal breath sounds, clear to auscultation, normal A-P diameter, normal symmetry, normal respiratory excursion, no use of accessory muscles         Cardiac: regular rhythm, normal S1/S2, no S3 or S4, apical impulse not displaced, no murmurs, gallops or rubs                                                         GI:  abdomen soft, non-tender, BS normoactive, no mass, no HSM, no bruits        Extremities and Muscular Skeletal:  no deformities, clubbing, cyanosis, erythema observed              Neurological:  no gross motor deficits        Psych:  Alert and Oriented x 3        CC  Erik Hamzah Peraza MD  [address] [place] [address]

## 2018-01-18 NOTE — MR AVS SNAPSHOT
After Visit Summary   1/18/2018    Nikki Morales    MRN: [id number]           Patient Information     Date Of Birth          1960        Visit Information        Provider Department      1/18/2018 9:30 AM Jose Luis Rogers MD [facility]        Today's Diagnoses     New onset atrial fibrillation (H)    -  1       Follow-ups after your visit        Additional Services     SLEEP EVALUATION & MANAGEMENT REFERRAL - [facility] [phone] (Age 11 if over 100 lbs)       Please be aware that coverage of these services is subject to the terms and limitations of your health insurance plan.  Call member services at your health plan with any benefit or coverage questions.      Please bring the following to your appointment:    >>   List of current medications   >>   This referral request   >>   Any documents/labs given to you for this referral                Follow-Up with Electrophysiologist                 Future tests that were ordered for you today     Open Future Orders        Priority Expected Expires Ordered    Follow-Up with Electrophysiologist Routine 2/17/2018 1/18/2019 1/18/2018    SLEEP EVALUATION & MANAGEMENT REFERRAL - [facility] [phone] (Age 11 if over 100 lbs) Routine 1/18/2018 1/18/2020 1/18/2018    Zio Patch Monitor Routine 1/25/2018 1/18/2019 1/18/2018    Echocardiogram Routine 1/25/2018 1/18/2019 1/18/2018            Who to contact     If you have questions or need follow up information about today's clinic visit or your schedule please contact [facility] directly at [phone].  Normal or non-critical lab and imaging results will be communicated to you by MyChart, letter or phone within 4 business days after the clinic has received the results. If you do not hear from us within 7 days, please contact the clinic through [employer] or  "phone. If you have a critical or abnormal lab result, we will notify you by phone as soon as possible.  Submit refill requests through [employer] or call your pharmacy and they will forward the refill request to us. Please allow 3 business days for your refill to be completed.          Additional Information About Your Visit        [facility] Information     [employer] gives you secure access to your electronic health record. If you see a primary care provider, you can also send messages to your care team and make appointments. If you have questions, please call your primary care clinic.  If you do not have a primary care provider, please call [phone] and they will assist you.        Care EveryWhere ID     This is your Care EveryWhere ID. This could be used by other organizations to access your [facility] medical records  [id number]        Your Vitals Were     Pulse Respirations Height Pulse Oximetry BMI (Body Mass Index)       70 12 1.727 m (5' 8\") 94% 31.54 kg/m2        Blood Pressure from Last 3 Encounters:   01/18/18 124/60   01/16/18 126/76   09/14/17 130/80    Weight from Last 3 Encounters:   01/18/18 94.1 kg (207 lb 6.4 oz)   01/15/18 93 kg (205 lb)   09/14/17 94.8 kg (209 lb)               Primary Care Provider Office Phone # Fax #    Erik Hamzah Peraza [phone]       [address] [place] [address]        Equal Access to Services     [facility] AH: Hadii aad ku hadasho Soomaali, waaxda luqadaha, qaybta kaalmada adeegyada, phuc shelton . So [facility] [phone].    ATENCIÓN: Si habla español, tiene a newsome disposición servicios gratuitos de asistencia lingüística. Vazquezame al [phone].    We comply with applicable federal civil rights laws and [place] laws. We do not discriminate on the basis of race, color, national origin, age, disability, sex, sexual orientation, or gender identity.            Thank you!     Thank you for choosing [facility] " [facility]  for your care. Our goal is always to provide you with excellent care. Hearing back from our patients is one way we can continue to improve our services. Please take a few minutes to complete the written survey that you may receive in the mail after your visit with us. Thank you!             Your Updated Medication List - Protect others around you: Learn how to safely use, store and throw away your medicines at www.disposemymeds.org.          This list is accurate as of: 1/18/18 10:08 AM.  Always use your most recent med list.                   Brand Name Dispense Instructions for use Diagnosis    albuterol 108 (90 BASE) MCG/ACT Inhaler    PROAIR HFA/PROVENTIL HFA/VENTOLIN HFA    1 Inhaler    Inhale 2 puffs into the lungs every 6 hours as needed for shortness of breath / dyspnea or wheezing    Mild persistent asthma without complication       aspirin 81 MG tablet      Take 1 tablet (81 mg) by mouth daily        cyanocobalamin 1000 MCG/ML injection    VITAMIN B12    3 mL    INJECT 1ML INTRAMUSCULARLY EVERY 30 DAYS    Chronic fatigue syndrome, Vitamin B12 deficiency (dietary) anemia       estradiol 0.05 MG/24HR BIW patch    VIVELLE-DOT     Place 1 patch onto the skin twice a week    Hypertrophy of breast, Eosinophilic esophagitis, Overweight(278.02)       fluticasone 50 MCG/ACT spray    FLONASE    1 Bottle    Spray 1-2 sprays into both nostrils daily        methylphenidate 10 MG tablet    RITALIN    30 tablet    1/2 to 1 tablet every day as needed    Chronic fatigue syndrome       Multi-vitamin Tabs tablet     100 tablet    Take 1 tablet by mouth daily        omeprazole 20 MG CR capsule    priLOSEC    30 capsule    Take 1 capsule (20 mg) by mouth daily 1 TAB PO QD (Once per day)  As needed    Eosinophilic esophagitis       SYNTHROID 112 MCG tablet   Generic drug:  levothyroxine     90 tablet    TAKE ONE TABLET BY MOUTH ONCE DAILY    Chronic fatigue syndrome       zolpidem 5 MG tablet     AMBIEN    60 tablet    1 to 2 tabs at hour of sleep as needed    Chronic fatigue syndrome

## 2018-01-18 NOTE — LETTER
2018         Erik Peraza MD    [facility]    [address] [place] [address]      RE: Nikki Morales   MRN: [id number]   : 1960      Dear Dr. Peraza:      I saw Ms. Morales for evaluation of new onset atrial fibrillation.  She is a 57-year-old white female who has had recurrent palpitations for years.  The duration of palpitation could be up to 2 hours.  The patient did not have syncope, shortness of breath or other severe symptoms.  She therefore did not pay attention or seek medical help for her palpitations.      She did have some chest discomfort and underwent a stress test around 2017.  The test was reported to be normal.      On 01/15, she had a very long episode of persistent palpitation for 6 hours.  She therefore decided to go to the ER where she was found to have atrial fibrillation with heart rate of about 120 beats per minute.  She was cardioverted to sinus rhythm and was discharged to home without change of medications.      Her other medical conditions include hypothyroidism, hyperlipidemia and chronic fatigue.  She had some reaction with muscle ache on a cholesterol medicine in the past and she does not want to start a new cholesterol medicine without discussing with you.  Her recent TSH is relatively low, suggesting that she may have received overtreatment for hypothyroidism,      She has a tendency to drink alcohol when she has social gatherings such as a football game.  During the same time, she also has social smoking.      The patient mentions that she may have sleep apnea at night because she tends to wake up with difficulty breathing occasionally.      PHYSICAL EXAMINATION:   VITAL SIGNS:  Blood pressure was 124/60, heart rate 70 beats per minute, body weight 207 pounds.   HEENT:  Eyes and ENT were unremarkable.   LUNGS:  Clear.   CARDIAC:  Rhythm was regular and heart sounds were normal with no murmur.   ABDOMEN:  Showed moderate obesity.    EXTREMITIES:  There was no pedal edema.      EKG today showed normal sinus rhythm.      ASSESSMENT AND RECOMMENDATIONS:  Ms. Morales is a 57-year-old white female with 1 episode of confirmed atrial fibrillation that required DC cardioversion on 01/15/2018.  It is unknown whether her other arrhythmia is due to atrial fibrillation or a different arrhythmia.  For confirmation of the underlying rhythm during her symptoms, she will wear a ZIO Patch monitor for 2 weeks.  She is a regular swimmer and therefore the ZIO Patch monitor may put her on swim restriction for about 2 weeks.  After extensive consideration, she agreed to wear a ZIO Patch monitor.      She will have a repeat echocardiography that will assess the cardiac structure and function.  She had a stress echo in 09/2017.      She is asked to stop smoking.  She is encouraged to avoid binge drinking in order to prevent recurrent atrial fibrillation.      She will have a sleep study for assessment of possible sleep apnea.  She will return to your office for discussion about hyperlipidemia treatment.  She may need lower dose of hypothyroid medication.      I will see her for followup in February and discuss if we should make long-term commitment of treatment for atrial fibrillation.           Outpatient Encounter Prescriptions as of 1/18/2018   Medication Sig Dispense Refill     aspirin 81 MG tablet Take 1 tablet (81 mg) by mouth daily  OTC     zolpidem (AMBIEN) 5 MG tablet 1 to 2 tabs at hour of sleep as needed 60 tablet 5     SYNTHROID 112 MCG tablet TAKE ONE TABLET BY MOUTH ONCE DAILY 90 tablet 1     albuterol (PROAIR HFA/PROVENTIL HFA/VENTOLIN HFA) 108 (90 BASE) MCG/ACT Inhaler Inhale 2 puffs into the lungs every 6 hours as needed for shortness of breath / dyspnea or wheezing 1 Inhaler 3     cyanocobalamin (VITAMIN B12) 1000 MCG/ML injection INJECT 1ML INTRAMUSCULARLY EVERY 30 DAYS 3 mL 2     methylphenidate (RITALIN) 10 MG tablet 1/2 to 1 tablet every  day as needed 30 tablet 0     fluticasone (FLONASE) 50 MCG/ACT spray Spray 1-2 sprays into both nostrils daily 1 Bottle 11     estradiol (VIVELLE-DOT) 0.05 MG/24HR patch Place 1 patch onto the skin twice a week       multivitamin, therapeutic with minerals (MULTI-VITAMIN) TABS Take 1 tablet by mouth daily 100 tablet 3     omeprazole (PRILOSEC) 20 MG capsule Take 1 capsule (20 mg) by mouth daily 1 TAB PO QD (Once per day)  As needed (Patient not taking: Reported on 1/18/2018) 30 capsule 11     No facility-administered encounter medications on file as of 1/18/2018.        Again, thank you for allowing me to participate in the care of your patient.      Sincerely,    Jose Luis Rogers MD     [facility]

## 2018-01-23 ENCOUNTER — HOSPITAL ENCOUNTER (OUTPATIENT)
Dept: CARDIOLOGY | Facility: CLINIC | Age: 58
End: 2018-01-23
Attending: INTERNAL MEDICINE
Payer: COMMERCIAL

## 2018-01-23 ENCOUNTER — TELEPHONE (OUTPATIENT)
Dept: CARDIOLOGY | Facility: CLINIC | Age: 58
End: 2018-01-23

## 2018-01-23 ENCOUNTER — HOSPITAL ENCOUNTER (OUTPATIENT)
Dept: CARDIOLOGY | Facility: CLINIC | Age: 58
Discharge: HOME | End: 2018-01-23
Attending: INTERNAL MEDICINE | Admitting: INTERNAL MEDICINE
Payer: COMMERCIAL

## 2018-01-23 DIAGNOSIS — I48.91 NEW ONSET ATRIAL FIBRILLATION (H): ICD-10-CM

## 2018-01-23 PROCEDURE — 0296T ZIO PATCH HOLTER: CPT

## 2018-01-23 PROCEDURE — 93306 TTE W/DOPPLER COMPLETE: CPT | Mod: 26 | Performed by: INTERNAL MEDICINE

## 2018-01-23 PROCEDURE — 0298T ZZC EXT ECG > 48HR TO 21 DAY REVIEW AND INTERPRETATN: CPT | Performed by: INTERNAL MEDICINE

## 2018-01-23 PROCEDURE — 93306 TTE W/DOPPLER COMPLETE: CPT

## 2018-01-23 NOTE — TELEPHONE ENCOUNTER
Interpretation Summary     The left ventricle is normal in size. There is normal left ventricular wall  thickness. Left ventricular systolic function is normal. The visual ejection  fraction is estimated at 55-60%. Grade I or early diastolic dysfunction. No  regional wall motion abnormalities noted.  The right ventricle is normal size. The right ventricular systolic function is  normal.  The left atrium is mildly dilated. Right atrial size is normal. There is no  color Doppler evidence of an atrial shunt.  Trace to mild mitral and tricuspid regurgitation.  No pericardial effusion.    Writer attempted to call pt with results, but no answer.  left with results. TIERNEY Campa RN.

## 2018-01-25 ENCOUNTER — MYC MEDICAL ADVICE (OUTPATIENT)
Dept: FAMILY MEDICINE | Facility: CLINIC | Age: 58
End: 2018-01-25

## 2018-01-25 NOTE — TELEPHONE ENCOUNTER
Spoke with pt who would like to see Dr. Peraza. Pt was scheduled for an appt tomorrow at 9 am. Pt was in agreement with appt time and date.

## 2018-01-26 ENCOUNTER — OFFICE VISIT (OUTPATIENT)
Dept: FAMILY MEDICINE | Facility: CLINIC | Age: 58
End: 2018-01-26
Payer: COMMERCIAL

## 2018-01-26 VITALS
OXYGEN SATURATION: 96 % | BODY MASS INDEX: 30.31 KG/M2 | DIASTOLIC BLOOD PRESSURE: 72 MMHG | RESPIRATION RATE: 14 BRPM | WEIGHT: 200 LBS | SYSTOLIC BLOOD PRESSURE: 122 MMHG | HEIGHT: 68 IN | TEMPERATURE: 96.1 F | HEART RATE: 80 BPM

## 2018-01-26 DIAGNOSIS — E78.5 HYPERLIPIDEMIA LDL GOAL <130: ICD-10-CM

## 2018-01-26 DIAGNOSIS — E66.3 OVERWEIGHT: ICD-10-CM

## 2018-01-26 DIAGNOSIS — I48.0 INTERMITTENT ATRIAL FIBRILLATION (H): Primary | ICD-10-CM

## 2018-01-26 DIAGNOSIS — J45.30 MILD PERSISTENT ASTHMA WITHOUT COMPLICATION: ICD-10-CM

## 2018-01-26 DIAGNOSIS — E03.4 HYPOTHYROIDISM DUE TO ACQUIRED ATROPHY OF THYROID: ICD-10-CM

## 2018-01-26 DIAGNOSIS — F90.0 ATTENTION DEFICIT HYPERACTIVITY DISORDER, INATTENTIVE TYPE: ICD-10-CM

## 2018-01-26 DIAGNOSIS — R73.9 ELEVATED BLOOD SUGAR: ICD-10-CM

## 2018-01-26 DIAGNOSIS — K20.0 EOSINOPHILIC ESOPHAGITIS: ICD-10-CM

## 2018-01-26 PROBLEM — R06.02 SHORTNESS OF BREATH: Status: RESOLVED | Noted: 2017-09-14 | Resolved: 2018-01-26

## 2018-01-26 PROBLEM — R07.9 CHEST PAIN, UNSPECIFIED TYPE: Status: RESOLVED | Noted: 2017-09-14 | Resolved: 2018-01-26

## 2018-01-26 LAB — HBA1C MFR BLD: 5.6 % (ref 4.3–6)

## 2018-01-26 PROCEDURE — 36415 COLL VENOUS BLD VENIPUNCTURE: CPT | Performed by: FAMILY MEDICINE

## 2018-01-26 PROCEDURE — 99215 OFFICE O/P EST HI 40 MIN: CPT | Performed by: FAMILY MEDICINE

## 2018-01-26 PROCEDURE — 83036 HEMOGLOBIN GLYCOSYLATED A1C: CPT | Performed by: FAMILY MEDICINE

## 2018-01-26 RX ORDER — FLUTICASONE PROPIONATE 220 UG/1
2 AEROSOL, METERED RESPIRATORY (INHALATION) 2 TIMES DAILY
Qty: 3 INHALER | Refills: 1 | Status: SHIPPED | OUTPATIENT
Start: 2018-01-26 | End: 2018-11-12

## 2018-01-26 RX ORDER — ROSUVASTATIN CALCIUM 10 MG/1
10 TABLET, COATED ORAL DAILY
Qty: 30 TABLET | Refills: 1 | Status: SHIPPED | OUTPATIENT
Start: 2018-01-26 | End: 2018-02-23

## 2018-01-26 RX ORDER — LEVOTHYROXINE SODIUM 100 UG/1
100 TABLET ORAL DAILY
Qty: 90 TABLET | Refills: 3 | Status: ON HOLD | OUTPATIENT
Start: 2018-01-26 | End: 2018-11-13

## 2018-01-26 ASSESSMENT — PAIN SCALES - GENERAL: PAINLEVEL: SEVERE PAIN (6)

## 2018-01-26 NOTE — NURSING NOTE
"Chief Complaint   Patient presents with     Shortness of Breath       Initial /72 (BP Location: Right arm, Patient Position: Sitting, Cuff Size: Adult Regular)  Pulse 80  Temp 96.1  F (35.6  C) (Temporal)  Resp 14  Ht 5' 8\" (1.727 m)  Wt 200 lb (90.7 kg)  SpO2 96%  BMI 30.41 kg/m2 Estimated body mass index is 30.41 kg/(m^2) as calculated from the following:    Height as of this encounter: 5' 8\" (1.727 m).    Weight as of this encounter: 200 lb (90.7 kg).  Medication Reconciliation: complete   Inez Brito MA 1/26/2018      "

## 2018-01-26 NOTE — MR AVS SNAPSHOT
After Visit Summary   1/26/2018    Nikki Morales    MRN: [id number]           Patient Information     Date Of Birth          1960        Visit Information        Provider Department      1/26/2018 9:00 AM Erik Peraza MD [facility]        Today's Diagnoses     Hyperlipidemia LDL goal <130    -  1    Eosinophilic esophagitis        Mild persistent asthma without complication        Elevated blood sugar        Hypothyroidism due to acquired atrophy of thyroid          Care Instructions    flovent use twice daily for up to 2 weeks and if breathing is better keep doing this.  If not improved greatly, change to Advair which also opens airways and not just controls inflammation  Start rosuvastatin 10 mg daily and if aches and pains start I would do blood test CPK and consider dose reduction.   Lowered dose of levothyroxine to 100 mcg, you should not notice the difference except you may have fewer atrial fibrillation symptoms          Follow-ups after your visit        Your next 10 appointments already scheduled     Jan 31, 2018  1:30 PM CST   New Visit with Martina Andrade MD   [facility] ([facility]    [address] [place] [id number] [phone]            Mar 01, 2018 11:30 AM CST   Return Visit with Jose Luis Rogers MD   [facility] ([facility])    [address] [address] [phone]              Who to contact     If you have questions or need follow up information about today's clinic visit or your schedule please contact [facility] directly at [phone].  Normal or non-critical lab and imaging results will be communicated to you by MyChart, letter or phone within 4 business days after the clinic has received the results. If you do not hear from us within 7 days, please contact the clinic through MyChart or phone. If  "you have a critical or abnormal lab result, we will notify you by phone as soon as possible.  Submit refill requests through [employer]) or call your pharmacy and they will forward the refill request to us. Please allow 3 business days for your refill to be completed.          Additional Information About Your Visit        [email or web address] Information     [employer] gives you secure access to your electronic health record. If you see a primary care provider, you can also send messages to your care team and make appointments. If you have questions, please call your primary care clinic.  If you do not have a primary care provider, please call [phone] and they will assist you.        Care EveryWhere ID     This is your Care EveryWhere ID. This could be used by other organizations to access your [place] medical records  [id number]        Your Vitals Were     Pulse Temperature Respirations Height Pulse Oximetry BMI (Body Mass Index)    80 96.1  F (35.6  C) (Temporal) 14 5' 8\" (1.727 m) 96% 30.41 kg/m2       Blood Pressure from Last 3 Encounters:   01/26/18 122/72   01/18/18 124/60   01/16/18 126/76    Weight from Last 3 Encounters:   01/26/18 200 lb (90.7 kg)   01/18/18 207 lb 6.4 oz (94.1 kg)   01/15/18 205 lb (93 kg)              We Performed the Following     Hemoglobin A1c          Today's Medication Changes          These changes are accurate as of 1/26/18  9:57 AM.  If you have any questions, ask your nurse or doctor.               Start taking these medicines.        Dose/Directions    fluticasone 220 MCG/ACT Inhaler   Commonly known as:  FLOVENT HFA   Used for:  Mild persistent asthma without complication   Started by:  Erik Peraza MD        Dose:  2 puff   Inhale 2 puffs into the lungs 2 times daily   Quantity:  3 Inhaler   Refills:  1       rosuvastatin 10 MG tablet   Commonly known as:  CRESTOR   Used for:  Hyperlipidemia LDL goal <130   Started by:  Erik Peraza MD        Dose:  10 mg   Take 1 tablet " (10 mg) by mouth daily   Quantity:  30 tablet   Refills:  1         These medicines have changed or have updated prescriptions.        Dose/Directions    levothyroxine 100 MCG tablet   Commonly known as:  SYNTHROID/LEVOTHROID   This may have changed:  See the new instructions.   Used for:  Hypothyroidism due to acquired atrophy of thyroid   Changed by:  Erik Peraza MD        Dose:  100 mcg   Take 1 tablet (100 mcg) by mouth daily   Quantity:  90 tablet   Refills:  3            Where to get your medicines      These medications were sent to [address]  [address], [facility] [address]     Phone:  [phone]     fluticasone 220 MCG/ACT Inhaler    levothyroxine 100 MCG tablet    rosuvastatin 10 MG tablet                Primary Care Provider Office Phone # Fax #    Erik Peraza [phone] [address] [place] [address]        Equal Access to Services     [facility]: Hadii aad ku hadasho Soomaali, waaxda luqadaha, qaybta kaalmada adeegyada, waxay idiin hayeloisan myah shelton . So [facility] [phone].    ATENCIÓN: Si habla español, tiene a newsome disposición servicios gratuitos de asistencia lingüística. [facility] [phone].    We comply with applicable federal civil rights laws and [place] laws. We do not discriminate on the basis of race, color, national origin, age, disability, sex, sexual orientation, or gender identity.            Thank you!     Thank you for choosing [facility]  for your care. Our goal is always to provide you with excellent care. Hearing back from our patients is one way we can continue to improve our services. Please take a few minutes to complete the written survey that you may receive in the mail after your visit with us. Thank you!             Your Updated Medication List - Protect others around you: Learn how to safely use, store and throw away your medicines at www.disposemymeds.org.           This list is accurate as of 1/26/18  9:57 AM.  Always use your most recent med list.                   Brand Name Dispense Instructions for use Diagnosis    albuterol 108 (90 BASE) MCG/ACT Inhaler    PROAIR HFA/PROVENTIL HFA/VENTOLIN HFA    1 Inhaler    Inhale 2 puffs into the lungs every 6 hours as needed for shortness of breath / dyspnea or wheezing    Mild persistent asthma without complication       aspirin 81 MG tablet      Take 1 tablet (81 mg) by mouth daily        cyanocobalamin 1000 MCG/ML injection    VITAMIN B12    3 mL    INJECT 1ML INTRAMUSCULARLY EVERY 30 DAYS    Chronic fatigue syndrome, Vitamin B12 deficiency (dietary) anemia       estradiol 0.05 MG/24HR BIW patch    VIVELLE-DOT     Place 1 patch onto the skin twice a week    Hypertrophy of breast, Eosinophilic esophagitis, Overweight(278.02)       fluticasone 220 MCG/ACT Inhaler    FLOVENT HFA    3 Inhaler    Inhale 2 puffs into the lungs 2 times daily    Mild persistent asthma without complication       fluticasone 50 MCG/ACT spray    FLONASE    1 Bottle    Spray 1-2 sprays into both nostrils daily        levothyroxine 100 MCG tablet    SYNTHROID/LEVOTHROID    90 tablet    Take 1 tablet (100 mcg) by mouth daily    Hypothyroidism due to acquired atrophy of thyroid       methylphenidate 10 MG tablet    RITALIN    30 tablet    1/2 to 1 tablet every day as needed    Chronic fatigue syndrome       Multi-vitamin Tabs tablet     100 tablet    Take 1 tablet by mouth daily        omeprazole 20 MG CR capsule    priLOSEC    30 capsule    Take 1 capsule (20 mg) by mouth daily 1 TAB PO QD (Once per day)  As needed    Eosinophilic esophagitis       rosuvastatin 10 MG tablet    CRESTOR    30 tablet    Take 1 tablet (10 mg) by mouth daily    Hyperlipidemia LDL goal <130       zolpidem 5 MG tablet    AMBIEN    60 tablet    1 to 2 tabs at hour of sleep as needed    Chronic fatigue syndrome

## 2018-01-26 NOTE — PATIENT INSTRUCTIONS
flovent use twice daily for up to 2 weeks and if breathing is better keep doing this.  If not improved greatly, change to Advair which also opens airways and not just controls inflammation  Start rosuvastatin 10 mg daily and if aches and pains start I would do blood test CPK and consider dose reduction.   Lowered dose of levothyroxine to 100 mcg, you should not notice the difference except you may have fewer atrial fibrillation symptoms

## 2018-01-26 NOTE — PROGRESS NOTES
SUBJECTIVE:   Nikki Morales is a 57 year old female who presents to clinic today for the following health issues:    Heart doctor said to come in and see Primary     Talk about cholesterol and A1C    Breathing difficulties        Patient had protracted atrial fibrillation which is now felt to have happened off and on for a long time.  She required cardioversion in the emergency room.  She is now wearing a monitor.  Yesterday after going up and down her steps a few times on the last trip up when she quit doing so she felt extremely short of breath in the reason she quit making trips,, somewhat lightheaded, very fatigued and perhaps lightheaded but not nauseated or in a cold sweat.  There was little or no chest pain.  She felt her heart beating regularly and pounding which she could feel and hear in her ears as well.  Within a few minutes she had recovered.  She has known asthma but has not been using her inhaled steroids.  If she uses albuterol as she does often, she gets shaky and heart does race.  She has Flovent available to her.  Cost of inhaler sometimes is been an issue.  She does not have fever or chills associated with any of her cough.  She has not had chest pain although occasionally she will have pain with seems like her esophagitis.  She started pushing the event recorder button now for any symptom just to make sure we are paying attention.  She is worried that even though her echo is negative, her stress test from last fall may not be showing adequately signs of ischemia in her heart.  She has known high cholesterol for a long time and stopped any statin we have used in the past because she would ache afterwards.  She was never sure if the ache was truly from the medication or fibromyalgia or other problem.  2 previous statins were atorvastatin and simvastatin.  She understands now with these fears and these risks, she should resume a statin again.  Appetite remains too good for her.  She  "continues to maintain weight which she would prefer to lose.  She does swim as able for exercise.  It is not a competitive type swimming just a gentle swim for 30 minutes or more.  Currently unable to swim because of the monitor.  She states if she goes too fast with swimming she has trouble breathing in the pool.  She also has ear problems if she gets water in her ears too much so she keeps her head out of the water.  Stressor in her life is her 30+-year-old son who has trouble maintaining employment in will go through treatment remaining sober and then begin using again.  He often ends up living with patient and her  because he has no other place to go.  Treatment is never seem to help him more than few months at a time.  She just does not know where to go with him but understands the need to prevent enabling him.  Patient worries about elevated blood sugar and she has access to test.  There is a lot of family history for diabetes.  When she checks she has seldom over 125 if at all.  TSH when tested in the emergency room was less then 0.5 but T4 was in a normal range and not at the top.  Problem list and histories reviewed & adjusted, as indicated.  Additional history: as documented    BP Readings from Last 3 Encounters:   01/26/18 122/72   01/18/18 124/60   01/16/18 126/76    Wt Readings from Last 3 Encounters:   01/26/18 200 lb (90.7 kg)   01/18/18 207 lb 6.4 oz (94.1 kg)   01/15/18 205 lb (93 kg)                  Labs reviewed in EPIC    Reviewed and updated as needed this visit by clinical staff  Tobacco  Allergies  Meds  Soc Hx      Reviewed and updated as needed this visit by Provider         ROS:  Constitutional, HEENT, cardiovascular, pulmonary, gi and gu systems are negative, except as otherwise noted.    OBJECTIVE:     /72 (BP Location: Right arm, Patient Position: Sitting, Cuff Size: Adult Regular)  Pulse 80  Temp 96.1  F (35.6  C) (Temporal)  Resp 14  Ht 5' 8\" (1.727 m)  Wt 200 lb " "(90.7 kg)  SpO2 96%  BMI 30.41 kg/m2  Body mass index is 30.41 kg/(m^2).  GENERAL: healthy, alert and no distress  EYES: Eyes grossly normal to inspection, PERRL and conjunctivae and sclerae normal  HENT: normal cephalic/atraumatic and oral mucous membranes moist  NECK: no adenopathy, no asymmetry, masses, or scars and thyroid normal to palpation  RESP: Patient seemed slightly dyspneic with a slight dry cough but lungs were very clear and respiration was otherwise unlabored.  CV: regular rate and rhythm, normal S1 S2, no S3 or S4, no murmur, click or rub, no peripheral edema and peripheral pulses strong  ABDOMEN: soft, nontender, no hepatosplenomegaly, no masses and bowel sounds normal  MS: no gross musculoskeletal defects noted, no edema  SKIN: no suspicious lesions or rashes  NEURO: Normal strength and tone, mentation intact and speech normal  PSYCH: mentation appears normal, anxious, judgement and insight intact and appearance well groomed    Diagnostic Test Results:  Results for orders placed or performed in visit on 01/26/18 (from the past 24 hour(s))   Hemoglobin A1c   Result Value Ref Range    Hemoglobin A1C 5.6 4.3 - 6.0 %       ASSESSMENT/PLAN:         BP Screening:   Last 3 BP Readings:    BP Readings from Last 3 Encounters:   01/26/18 122/72   01/18/18 124/60   01/16/18 126/76       The following was recommended to the patient:  Re-screen BP within a year and recommended lifestyle modifications  Tobacco Cessation:   reports that she has been smoking Cigarettes.  She has never used smokeless tobacco.  Tobacco Cessation Action Plan: Self help information given to patient    BMI:   Estimated body mass index is 30.41 kg/(m^2) as calculated from the following:    Height as of this encounter: 5' 8\" (1.727 m).    Weight as of this encounter: 200 lb (90.7 kg).   Weight management plan: Discussed healthy diet and exercise guidelines and patient will follow up in 6 months in clinic to re-evaluate.      1. " Intermittent atrial fibrillation (H)  Given current symptoms she will remain on aspirin and see what her monitor shows.  She will work with cardiology.  We discussed risk for strokes especially since family members have  from strokes at young ages.  We discussed the relationship to possible excess thyroid and have chosen to reduce her thyroid dose today since TSH was low    2. Mild persistent asthma without complication  Discussed asthma medication and how important a steroid is routinely.  She has the medication below and will start using this.  Since albuterol makes her feel anxious with heart racing etc., we may switch to something with steroid and a long-acting beta agonist.  She will let me know in the next couple weeks.  - fluticasone (FLOVENT HFA) 220 MCG/ACT Inhaler; Inhale 2 puffs into the lungs 2 times daily  Dispense: 3 Inhaler; Refill: 1    3. Hyperlipidemia LDL goal <130  Given current cholesterol levels, she really should be on medication.  Since the last 2 were in one class of statin we will try rosuvastatin at 10 mg because I think she needs a bigger dose and 5.  If she has aches or pains we will reduce weekly dose and see if we can get her to tolerate 1.  Given her fear of strokes and now atrial fibrillation and fear of ischemic heart, she seems more motivated.  We discussed how a stress test has high specificity and sensitivity to find ischemia.  She is aware and angiogram is significantly of concern.  - rosuvastatin (CRESTOR) 10 MG tablet; Take 1 tablet (10 mg) by mouth daily  Dispense: 30 tablet; Refill: 1    4. Eosinophilic esophagitis  Reasonably well-controlled and no new changes.  Some of her chest pain may indeed be from this.    5. Elevated blood sugar  With elevated sugars at most probably is related to dietary choices.  She has been on a very healthful diet in the last few weeks.  Today's hemoglobin A1c indicates non-diabetic and we will tell her to continue doing what she is doing.   We discussed that with weight loss she may be mobilizing internal fat and until she does with increased activity at any one point she may develop symptoms like she did going up the stairs.  She is comfortable with this explanation  - Hemoglobin A1c    6. Hypothyroidism due to acquired atrophy of thyroid  Reduced dose as discussed above  - levothyroxine (SYNTHROID/LEVOTHROID) 100 MCG tablet; Take 1 tablet (100 mcg) by mouth daily  Dispense: 90 tablet; Refill: 3    7. Attention deficit hyperactivity disorder, inattentive type  This has not been an issue for her with medication and contributing to heart irregularities.  Would continue medication as desired    8. Overweight  Patient is losing weight and congratulated on same      MEDICATIONS:  Continue current medications without change  Patient Instructions   flovent use twice daily for up to 2 weeks and if breathing is better keep doing this.  If not improved greatly, change to Advair which also opens airways and not just controls inflammation  Start rosuvastatin 10 mg daily and if aches and pains start I would do blood test CPK and consider dose reduction.   Lowered dose of levothyroxine to 100 mcg, you should not notice the difference except you may have fewer atrial fibrillation symptoms    Patient certainly had multiple questions and concerns as noted above.Time spent with greater than 50% spent in discussion, making the plan, or filling out paperwork with patient for illness/treatments was 40 minutes or more.    Erik Peraza MD  [facility]

## 2018-01-27 ASSESSMENT — ASTHMA QUESTIONNAIRES: ACT_TOTALSCORE: 17

## 2018-02-01 ENCOUNTER — PRE VISIT (OUTPATIENT)
Dept: OPHTHALMOLOGY | Facility: CLINIC | Age: 58
End: 2018-02-01

## 2018-02-01 NOTE — TELEPHONE ENCOUNTER
For the evaluation of   Chief Complaint   Patient presents with     Previsit     appt w/ Dr Andrade, refered by Dr Aditya Gamez      Neoplasm, Eyebrow/lid Evaluation     xanthelasma, possible dermatochalasis eval       When was your last eye exam w/ Dilation? not applicable  Do you wear glasses? Yes Please bring them with you to your appointment.  Do you wear contacts?   How many hours per day to you wear your lenses? not applicable  Please bring the boxes with you to your appointment.      HPI:  Location:   OU     Symptoms:   Yellow fatty tisse on eye lids  Pertinent Negatives:   Negative for vision changes or eye problems  Severity:   moderate  Duration:   years    Timing:   gradual onset  Other:           Do you use any eye drops?   For what condition(s)?    Ocular Meds:         ROS:    Review Of Systems  Eyes: as above  Endocrine:  thyroid disorder    Allergies:    Allergies   Allergen Reactions     Codeine Other (See Comments)     Causes agitation       Systemic Medications:   Current Outpatient Prescriptions   Medication     rosuvastatin (CRESTOR) 10 MG tablet     fluticasone (FLOVENT HFA) 220 MCG/ACT Inhaler     levothyroxine (SYNTHROID/LEVOTHROID) 100 MCG tablet     aspirin 81 MG tablet     zolpidem (AMBIEN) 5 MG tablet     albuterol (PROAIR HFA/PROVENTIL HFA/VENTOLIN HFA) 108 (90 BASE) MCG/ACT Inhaler     cyanocobalamin (VITAMIN B12) 1000 MCG/ML injection     methylphenidate (RITALIN) 10 MG tablet     fluticasone (FLONASE) 50 MCG/ACT spray     omeprazole (PRILOSEC) 20 MG capsule     estradiol (VIVELLE-DOT) 0.05 MG/24HR patch     multivitamin, therapeutic with minerals (MULTI-VITAMIN) TABS     [DISCONTINUED] NEW MED     No current facility-administered medications for this visit.        Family History   Problem Relation Age of Onset     CANCER Mother      Uterine     Prostate Cancer Mother      DIABETES Father      Hypertension Father      CANCER Father      prostate cancer     CEREBROVASCULAR DISEASE  Father      Psychotic Disorder Son      severe Add,      Asthma Son      exercised induced asthma     Asthma Son      ecxercise induced asthma     Cardiovascular Sister      recurrent blood clots     CEREBROVASCULAR DISEASE Sister 51     Prostate Cancer Brother      DIABETES Maternal Grandfather      HEART DISEASE Maternal Grandmother      Heart condition age 96     DIABETES Paternal Grandfather      CANCER Brother      CEREBROVASCULAR DISEASE Paternal Grandmother        Social History   Substance Use Topics     Smoking status: Current Some Day Smoker     Types: Cigarettes     Smokeless tobacco: Never Used      Comment: social  6/mo     Alcohol use 2.4 oz/week     2 Cans of beer, 2 Standard drinks or equivalent per week      Comment: social [facility] [facility]

## 2018-02-07 ENCOUNTER — OFFICE VISIT (OUTPATIENT)
Dept: OPHTHALMOLOGY | Facility: CLINIC | Age: 58
End: 2018-02-07
Payer: COMMERCIAL

## 2018-02-07 DIAGNOSIS — H02.836 DERMATOCHALASIS OF EYELIDS OF BOTH EYES: ICD-10-CM

## 2018-02-07 DIAGNOSIS — H02.833 DERMATOCHALASIS OF EYELIDS OF BOTH EYES: ICD-10-CM

## 2018-02-07 DIAGNOSIS — H02.66 XANTHELASMA OF LEFT EYELID: ICD-10-CM

## 2018-02-07 DIAGNOSIS — H02.402 ACQUIRED PTOSIS OF LEFT EYELID: Primary | ICD-10-CM

## 2018-02-07 DIAGNOSIS — H02.401 ACQUIRED PTOSIS OF RIGHT EYELID: ICD-10-CM

## 2018-02-07 PROCEDURE — 92285 EXTERNAL OCULAR PHOTOGRAPHY: CPT | Performed by: OPHTHALMOLOGY

## 2018-02-07 PROCEDURE — 99203 OFFICE O/P NEW LOW 30 MIN: CPT | Performed by: OPHTHALMOLOGY

## 2018-02-07 PROCEDURE — 92081 LIMITED VISUAL FIELD XM: CPT | Performed by: OPHTHALMOLOGY

## 2018-02-07 ASSESSMENT — SLIT LAMP EXAM - LIDS: COMMENTS: HEAVY DERMATOCHALASIS RESTING ON LASHES, TRUE PTOSIS

## 2018-02-07 ASSESSMENT — VISUAL ACUITY
METHOD: SNELLEN - LINEAR
OD_CC: 20/15
OS_CC+: -1
OS_CC: 20/15

## 2018-02-07 ASSESSMENT — CONF VISUAL FIELD
OS_NORMAL: 1
OD_NORMAL: 1

## 2018-02-07 NOTE — MR AVS SNAPSHOT
After Visit Summary   2/7/2018    Nikki Morales    MRN: [id number]           Patient Information     Date Of Birth          1960        Visit Information        Provider Department      2/7/2018 11:00 AM Martina Andrade MD [facility]        Today's Diagnoses     Acquired ptosis of left eyelid    -  1    Acquired ptosis of right eyelid        Xanthelasma of left eyelid        Dermatochalasis of eyelids of both eyes           Follow-ups after your visit        Your next 10 appointments already scheduled     Feb 23, 2018  9:30 AM CST   Office Visit with Erik Peraza MD   [facility] ([address] [phone]           Bring a current list of meds and any records pertaining to this visit. For Physicals, please bring immunization records and any forms needing to be filled out. Please arrive 10 minutes early to complete paperwork.            Mar 01, 2018 11:30 AM CST   Return Visit with Jose Luis Rogers MD   [facility] ([address] [phone]              Who to contact     If you have questions or need follow up information about today's clinic visit or your schedule please contact [facility] directly at [phone].  Normal or non-critical lab and imaging results will be communicated to you by MyChart, letter or phone within 4 business days after the clinic has received the results. If you do not hear from us within 7 days, please contact the clinic through MyChart or phone. If you have a critical or abnormal lab result, we will notify you by phone as soon as possible.  Submit refill requests through [employer] or call your pharmacy and they will forward the refill request to us. Please allow 3 business days for your refill to be completed.          Additional  Information About Your Visit        Next [facility] Information     [employer] gives you secure access to your electronic health record. If you see a primary care provider, you can also send messages to your care team and make appointments. If you have questions, please call your primary care clinic.  If you do not have a primary care provider, please call [phone] and they will assist you.      [employer] is an electronic gateway that provides easy, online access to your medical records. With [employer], you can request a clinic appointment, read your test results, renew a prescription or communicate with your care team.     To access your existing account, please contact your [facility] or call [phone] for assistance.        Care EveryWhere ID     This is your Care EveryWhere ID. This could be used by other organizations to access your [facility] medical records  [id number]         Blood Pressure from Last 3 Encounters:   01/26/18 122/72   01/18/18 124/60   01/16/18 126/76    Weight from Last 3 Encounters:   01/26/18 90.7 kg (200 lb)   01/18/18 94.1 kg (207 lb 6.4 oz)   01/15/18 93 kg (205 lb)              We Performed the Following     External Photos OU (both eyes)     Kinetic Ptosis OU     Sonja-Operative Worksheet (Plastics)        Primary Care Provider Office Phone # Fax #    Erik Hamzah Peraza [phone]       [address] [place] [address]        Equal Access to Services     [facility] Center[facility] : Hadii aad ku hadasho Soomaali, waaxda luqadaha, qaybta kaalmada adeegyada, phuc shelton . So [facility] [phone].    ATENCIÓN: Si habla español, tiene a newsome disposición servicios gratuitos de asistencia lingüística. Llame al [phone].    We comply with applicable federal civil rights laws and [place] laws. We do not discriminate on the basis of race, color, national origin, age, disability, sex, sexual orientation, or gender identity.             Thank you!     Thank you for choosing [facility]  for your care. Our goal is always to provide you with excellent care. Hearing back from our patients is one way we can continue to improve our services. Please take a few minutes to complete the written survey that you may receive in the mail after your visit with us. Thank you!             Your Updated Medication List - Protect others around you: Learn how to safely use, store and throw away your medicines at www.disposemymeds.org.          This list is accurate as of 2/7/18 12:18 PM.  Always use your most recent med list.                   Brand Name Dispense Instructions for use Diagnosis    albuterol 108 (90 BASE) MCG/ACT Inhaler    PROAIR HFA/PROVENTIL HFA/VENTOLIN HFA    1 Inhaler    Inhale 2 puffs into the lungs every 6 hours as needed for shortness of breath / dyspnea or wheezing    Mild persistent asthma without complication       aspirin 81 MG tablet      Take 1 tablet (81 mg) by mouth daily        cyanocobalamin 1000 MCG/ML injection    VITAMIN B12    3 mL    INJECT 1ML INTRAMUSCULARLY EVERY 30 DAYS    Chronic fatigue syndrome, Vitamin B12 deficiency (dietary) anemia       estradiol 0.05 MG/24HR BIW patch    VIVELLE-DOT     Place 1 patch onto the skin twice a week    Hypertrophy of breast, Eosinophilic esophagitis, Overweight(278.02)       fluticasone 220 MCG/ACT Inhaler    FLOVENT HFA    3 Inhaler    Inhale 2 puffs into the lungs 2 times daily    Mild persistent asthma without complication       fluticasone 50 MCG/ACT spray    FLONASE    1 Bottle    Spray 1-2 sprays into both nostrils daily        levothyroxine 100 MCG tablet    SYNTHROID/LEVOTHROID    90 tablet    Take 1 tablet (100 mcg) by mouth daily    Hypothyroidism due to acquired atrophy of thyroid       methylphenidate 10 MG tablet    RITALIN    30 tablet    1/2 to 1 tablet every day as needed    Chronic fatigue syndrome       Multi-vitamin Tabs tablet     100 tablet    Take  1 tablet by mouth daily        omeprazole 20 MG CR capsule    priLOSEC    30 capsule    Take 1 capsule (20 mg) by mouth daily 1 TAB PO QD (Once per day)  As needed    Eosinophilic esophagitis       rosuvastatin 10 MG tablet    CRESTOR    30 tablet    Take 1 tablet (10 mg) by mouth daily    Hyperlipidemia LDL goal <130       zolpidem 5 MG tablet    AMBIEN    60 tablet    1 to 2 tabs at hour of sleep as needed    Chronic fatigue syndrome

## 2018-02-07 NOTE — NURSING NOTE
Patient presents with:  Lesion On Left Lower Lid: Referred by Aditya Cheung.   Dermatochalasis Evaluation      Referring Provider:  Aditya Gamez MD  [facility]  [address] [place] [address]    HPI    Affected eye(s):  Left, Both   Location:  Lower, Upper   Symptoms:     Blurred vision   Decreased vision   Difficulty with reading   Difficulty watching television   Difficulty with driving   Dryness      Duration:  2 years   Frequency:  Daily       Do you have eye pain now?:  No      Comments:  Pt currently using art tears QHS during the night.  Some times she can hardly keep eyelids open they droop so much.   Lid dropping x 10 years and  getting worse.

## 2018-02-07 NOTE — PROGRESS NOTES
Oculoplastic Clinic New Patient    Patient: Nikki Morales MRN# [id number]   YOB: 1960 Age: 57 year old   Date of Visit: Feb 7, 2018    CC: Droopy eyelids obstructing vision.  Chief Complaints and History of Present Illnesses   Patient presents with     Lesion On Left Lower Lid     Referred by Aditya Cheung.      Dermatochalasis Evaluation                 HPI:     Nikki Morales is a 57 year old female who has noted gradual onset of droopy eyelids over the past years. The droopy eyelid is interfering with activities of daily living including driving, and reading. The patient denies double vision, variability of the eyelid position. She does have dry eye symptoms and requires artificial tears - worse at night time. Also had lesions excised from right and left eyelid, xanthelasma. The left side has recurred and is getting bigger.     EXAM:     MRD1: 0 right eye and 1 left eye   Dermatochalasis with excess skin touching eyelashes   Myogenic blepharoptosis  Left upper and lower eyelid yellow lesions     VISUAL FIELD:  Right eye untaped:10 degrees Right eye taped:60 degrees  Left eye untaped:25 degrees Left eye taped:60 degrees    Assessment & Plan     Nikki Morales is a 57 year old female with the following diagnoses:   1. Acquired ptosis of left eyelid    2. Acquired ptosis of right eyelid    3. Xanthelasma of left eyelid    4. Dermatochalasis of eyelids of both eyes       Both upper eyelid blepharoplasty  and Bilateral ptosis repair external levator approach (did respond to phenyl but has dry eye symptoms.     Would like lesion excised separately in clinic.       ANTICOAGULATION:  Aspirin 81, can hold         PHOTOS DEMONSTRATE:    Significant dermatochalasis with lids resting on eyelashes and obstructing visual axis  Myogenic blepharoptosis    Left upper and lower eyelid lesions    Complete documentation of historical and exam elements from today's encounter can be found in the  full encounter summary report (not reduplicated in this progress note). I personally obtained the chief complaint(s) and history of present illness.  I confirmed and edited as necessary the review of systems, past medical/surgical history, family history, social history, and examination findings as documented by others; and I examined the patient myself. I personally reviewed the relevant tests, images, and reports as documented above. I formulated and edited as necessary the assessment and plan and discussed the findings and management plan with the patient and family. - Martina Andrade MD      Today with Nikki Morales, I reviewed the indications, risks, benefits, and alternatives of the proposed surgical procedure including, but not limited to, failure obtain the desired result  and need for additional surgery, bleeding, infection, loss of vision, loss of the eye, and the remote possibility of permanent damage to any organ system or death with the use of anesthesia.  I provided multiple opportunities for the questions, answered all questions to the best of my ability, and confirmed that my answers and my discussion were understood.

## 2018-02-07 NOTE — LETTER
2018         RE:  :  MRN: Nikki Morales  1960  [id number]     Dear Dr. Gamez,    Thank you for asking me to see your patient, Nikki Morales, for an oculoplastic   consultation.  My assessment and plan are below.  For further details, please see my attached clinic note.               HPI:     Nikki Morales is a 57 year old female who has noted gradual onset of droopy eyelids over the past years. The droopy eyelid is interfering with activities of daily living including driving, and reading. The patient denies double vision, variability of the eyelid position. She does have dry eye symptoms and requires artificial tears - worse at night time. Also had lesions excised from right and left eyelid, xanthelasma. The left side has recurred and is getting bigger.     EXAM:     MRD1: 0 right eye and 1 left eye   Dermatochalasis with excess skin touching eyelashes   Myogenic blepharoptosis  Left upper and lower eyelid yellow lesions     VISUAL FIELD:  Right eye untaped:10 degrees Right eye taped:60 degrees  Left eye untaped:25 degrees Left eye taped:60 degrees    Assessment & Plan     Nikki Morales is a 57 year old female with the following diagnoses:   1. Acquired ptosis of left eyelid    2. Acquired ptosis of right eyelid    3. Xanthelasma of left eyelid    4. Dermatochalasis of eyelids of both eyes       Both upper eyelid blepharoplasty  and Bilateral ptosis repair external levator approach (did respond to phenyl but has dry eye symptoms.     Would like lesion excised separately in clinic.       ANTICOAGULATION:  Aspirin 81, can hold        Again, thank you for allowing me to participate in the care of your patient.      Sincerely,    Martina Andrade MD  Department of Ophthalmology and Visual Neurosciences  [facility]    CC: Erikmyrna Peraza MD   [address] [place] [address]  VIA In Basket     Aditya Gamez MD  [facility]  [address]  [facility] [address]  VIA In Basket

## 2018-02-16 ENCOUNTER — TELEPHONE (OUTPATIENT)
Dept: CARDIOLOGY | Facility: CLINIC | Age: 58
End: 2018-02-16

## 2018-02-16 NOTE — TELEPHONE ENCOUNTER
Writer attempted to call pt with results, but no answer. VM left to return our phone call. Scheduled for F/U OV with Dr. Rogers on 3/1/18. TIERNEY Campa RN.

## 2018-02-21 NOTE — TELEPHONE ENCOUNTER
Writer called back and Zio Patch results reviewed. Pt is scheduled to see Dr. Rogers on 3/1/18. TIERNEY Campa RN.

## 2018-02-23 ENCOUNTER — OFFICE VISIT (OUTPATIENT)
Dept: SLEEP MEDICINE | Facility: CLINIC | Age: 58
End: 2018-02-23
Attending: INTERNAL MEDICINE
Payer: COMMERCIAL

## 2018-02-23 ENCOUNTER — OFFICE VISIT (OUTPATIENT)
Dept: FAMILY MEDICINE | Facility: CLINIC | Age: 58
End: 2018-02-23
Payer: COMMERCIAL

## 2018-02-23 VITALS
DIASTOLIC BLOOD PRESSURE: 70 MMHG | SYSTOLIC BLOOD PRESSURE: 122 MMHG | HEIGHT: 68 IN | OXYGEN SATURATION: 96 % | HEART RATE: 90 BPM | WEIGHT: 201 LBS | BODY MASS INDEX: 30.46 KG/M2 | TEMPERATURE: 97.6 F

## 2018-02-23 VITALS
HEART RATE: 90 BPM | HEIGHT: 68 IN | BODY MASS INDEX: 30.46 KG/M2 | SYSTOLIC BLOOD PRESSURE: 122 MMHG | DIASTOLIC BLOOD PRESSURE: 70 MMHG | WEIGHT: 201 LBS | RESPIRATION RATE: 18 BRPM | OXYGEN SATURATION: 96 %

## 2018-02-23 DIAGNOSIS — G93.32 CHRONIC FATIGUE SYNDROME: ICD-10-CM

## 2018-02-23 DIAGNOSIS — I48.91 NEW ONSET ATRIAL FIBRILLATION (H): ICD-10-CM

## 2018-02-23 DIAGNOSIS — J20.9 ACUTE BRONCHITIS WITH SYMPTOMS > 10 DAYS: ICD-10-CM

## 2018-02-23 DIAGNOSIS — E78.5 HYPERLIPIDEMIA LDL GOAL <130: Primary | ICD-10-CM

## 2018-02-23 DIAGNOSIS — I48.0 INTERMITTENT ATRIAL FIBRILLATION (H): ICD-10-CM

## 2018-02-23 DIAGNOSIS — J45.30 MILD PERSISTENT ASTHMA WITHOUT COMPLICATION: ICD-10-CM

## 2018-02-23 DIAGNOSIS — F90.0 ATTENTION DEFICIT HYPERACTIVITY DISORDER, INATTENTIVE TYPE: ICD-10-CM

## 2018-02-23 PROCEDURE — 99214 OFFICE O/P EST MOD 30 MIN: CPT | Performed by: FAMILY MEDICINE

## 2018-02-23 PROCEDURE — 99204 OFFICE O/P NEW MOD 45 MIN: CPT | Performed by: PHYSICIAN ASSISTANT

## 2018-02-23 RX ORDER — ROSUVASTATIN CALCIUM 10 MG/1
10 TABLET, COATED ORAL DAILY
Qty: 30 TABLET | Refills: 1 | COMMUNITY
Start: 2018-02-23 | End: 2020-03-25

## 2018-02-23 RX ORDER — PREDNISONE 20 MG/1
20 TABLET ORAL DAILY
Qty: 5 TABLET | Refills: 0 | Status: SHIPPED | OUTPATIENT
Start: 2018-02-23 | End: 2018-03-01

## 2018-02-23 RX ORDER — AZITHROMYCIN 250 MG/1
TABLET, FILM COATED ORAL
Qty: 6 TABLET | Refills: 0 | Status: SHIPPED | OUTPATIENT
Start: 2018-02-23 | End: 2018-03-01

## 2018-02-23 NOTE — PATIENT INSTRUCTIONS
Take antibiotics and prdnisone  See cardiology for plan.  Start crestor when better  Come for lab about 8 weeks after start

## 2018-02-23 NOTE — MR AVS SNAPSHOT
After Visit Summary   2/23/2018    Nikki Morales    MRN: [id number]           Patient Information     Date Of Birth          1960        Visit Information        Provider Department      2/23/2018 1:00 PM Geoffrey Loving PA-C [facility] [place]        Today's Diagnoses     New onset atrial fibrillation (H)          Care Instructions    Stress, tension, and anxiety can be big factors contributing to insomnia.  If you can t relax your mind and body, then you won t be able to sleep.  You would likely benefit from trying some of the relaxation exercises that we ve assembled below.  These are all internet based links.  If you don t have or use a computer, you may benefit from one of the stress management books listed below that you can get at your public library or at a local bookstore for a relatively low price.      Copy and paste into web browser address window   https://www.BioMedomics.com/watch?v=Ycno4blVfWj    Progressive Muscle Relaxation (PMR):     http://www.Rogue Sports TV/progressive-muscle-relaxation-exercise.html     http://studentsupport.[facility]/counseling/resources/self-help/relaxation-and-stress-management/   Deep Breathing Exercises:    http://www.Rogue Sports TV/breathing-awareness.html     Meditation:     wwwCorkShare    www.QuestetraguidedBokemeditationBokesite.ubigrate You may have to pay for some of these resources.    Guided Imagery:    http://www.Rogue Sports TV/guided-imagery-scripts.html     http://Milk/library/dptgjjbefc-rylbjr-whkxhcc/    [place] site under construction : http://www.[place].org/Services/SleepMedicine/Audio/index.htm  Sometimes insomnia can be made worse by our own habits or situation.  You should consider making some of the following changes to help improve your sleep:     If there is excessive noise in your house / neighborhood use a fan or similar device to make a quiet background noise  that can drown out other noises    If your room is too bright early in the morning, hang a blanket or extra curtain over the windows to keep the light out or use a sleeping mask to cover your eyes    If your room is too warm during the night, set the temperature in the house down a few degrees for the night    Spend a little time before bedtime trying to relax.  Don t work right up until bedtime.  Take 30-60 minutes to relax and unwind before bedtime with a book or watching TV    Do not drink anything with alcohol or caffeine in them for at least 4-5 hours before bedtime    Do not smoke right before bedtime or during the night    No strenuous exercise for 2-3 hours before bedtime  Stress, tension, and anxiety can be big factors contributing to insomnia.  If you can t relax your mind and body, then you won t be able to sleep.  You would likely benefit from trying some of the relaxation exercises that we ve assembled below.  These are all internet based links.  If you don t have or use a computer, you may benefit from one of the stress management books listed below that you can get at your public library or at a local bookstore for a relatively low price.      Copy and paste into web browser address window   https://www.Veezeon.com/watch?v=Gldh0djTmTa    Progressive Muscle Relaxation (PMR):     http://www.Aeromot/progressive-muscle-relaxation-exercise.html     http://studentsupport.[facility]/counseling/resources/self-help/relaxation-and-stress-management/   Deep Breathing Exercises:    http://www.Aeromot/breathing-awareness.html     Meditation:     www.Step On Up GraphicsrantheartTo The Tops    www.theTagMiiguided-meditation-site.com You may have to pay for some of these resources.    Guided Imagery:    http://www.Aeromot/guided-imagery-scripts.html     http://Red Ventures/library/rdlmusiwan-powmxq-zrhybxk/    [place] site under construction :  "http://www.[place].org/Services/SleepMedicine/Audio/index.htm    MY INFORMATION ON SLEEP APNEA-  Nikki Morales    DOCTOR : Lesia Muniz  SLEEP CENTER :      MY CONTACT NUMBER:   [facility]  [phone]  [facility]   [phone]  [facility]   [phone]      [facility]  [phone]  [facility]   [phone]      Li Points:  1. What is Obstructive Sleep Apnea (CHASE)? CHASE is the most common type of sleep apnea. Apnea literally means, \"without breath.\" It is characterized by repetitive pauses in breathing, despite continued effort to breathe, and is usually associated with a reduction in blood oxygen saturation. Apneas can last 10 to over 60 seconds. It is caused by narrowing or collapse of the upper airway as muscles relax during sleep.   2. What are the consequences of CHASE? Symptoms include: daytime sleepiness- possibly increasing the risk of falling asleep while driving, unrefreshing/restless sleep, snoring, insomnia, waking frequently to urinate, waking with heartburn or reflux, reduced concentration and memory, and morning headaches. Other health consequences may include development of high blood pressure. Untreated CHASE also can contribute to heart disease, stroke and diabetes.   3. What are the treatment options? In most situations, sleep apnea is a lifelong disease that must be managed with daily therapy. Continuous Positive Airway (CPAP) is the most reliable treatment. A mouthguard to hold your jaw forward is usually the next most reliable option. Other options include postioning devices (to keep you off your back), nasal valves, tongue retaining device, weight loss, surgery. There is more detail about these options toward the end of this document.  4. What are the most important things to remember about using CPAP?     WHERE CAN I FIND MORE INFORMATION?    American Academy of Sleep " Medicine Patient information on sleep disorders:  http://yoursleep.aasmnet.org    CPAP -  WHY AND HOW?                 Continuous positive airway pressure, or CPAP, is the most effective treatment for obstructive sleep apnea. It works by blowing room air, through a mask, to hold your throat open. A decision to use CPAP is a major step forward in the pursuit of a healthier life. The successful use of CPAP will help you breathe easier, sleep better and live healthier. Using CPAP can be a positive experience if you keep these garcia points in mind:  1. Commitment  CPAP is not a quick fix for your problem. It involves a long-term commitment to improve your sleep and your health.    2. Communication  Stay in close communication with both your sleep doctor and your CPAP supplier. Ask lots of questions and seek help when you need it.    3. Consistency  Use CPAP all night, every night and for every nap. You will receive the maximum health benefits from CPAP when you use it every time that you sleep. This will also make it easier for your body to adjust to the treatment.    4. Correction  The first machine and mask that you try may not be the best ones for you. Work with your sleep doctor and your CPAP supplier to make corrections to your equipment selection. Ask about trying a different type of machine or mask if you have ongoing problems. Make sure that your mask is a good fit and learn to use your equipment properly.    5. Challenge  Tell a family member or close friend to ask you each morning if you used your CPAP the previous night. Have someone to challenge you to give it your best effort.    6. Connection   Your adjustment to CPAP will be easier if you are able to connect with others who use the same treatment. Ask your sleep doctor if there is a support group in your area for people who have sleep apnea, or look for one on the Internet.  7. Comfort   Increase your level of comfort by using a saline spray, decongestant  "or heated humidifier if CPAP irritates your nose, mouth or throat. Use your unit's \"ramp\" setting to slowly get used to the air pressure level. There may be soft pads you can buy that will fit over your mask straps. Look on www.CPAP.com for accessories that can help make CPAP use more comfortable.  8. Cleaning   Clean your mask, tubing and headgear on a regular basis. Put this time in your schedule so that you don't forget to do it. Check and replace the filters for your CPAP unit and humidifier.    9. Completion   Although you are never finished with CPAP therapy, you should reward yourself by celebrating the completion of your first month of treatment. Expect this first month to be your hardest period of adjustment. It will involve some trial and error as you find the machine, mask and pressure settings that are right for you.    10. Continuation  After your first month of treatment, continue to make a daily commitment to use your CPAP all night, every night and for every nap.    CPAP-Tips to starting with success:  Begin using your CPAP for short periods of time during the day while you watch TV or read.    Use CPAP every night and for every nap. Using it less often reduces the health benefits and makes it harder for your body to get used to it.    Newer CPAP models are virtually silent; however, if you find the sound of your CPAP machine to be bothersome, place the unit under your bed to dampen the sound.     Make small adjustments to your mask, tubing, straps and headgear until you get the right fit. Tightening the mask may actually worsen the leak.  If it leaves significant marks on your face or irritates the bridge of your nose, it may not be the best mask for you.  Speak with the person who supplied the mask and consider trying other masks. Insurances will allow you to try different masks during the first month of starting CPAP.  Insurance also covers a new mask, hose and filter about every 6 months.    Use " a saline nasal spray to ease mild nasal congestion. Neti-Pot or saline nasal rinses may also help. Nasal gel sprays can help reduce nasal dryness.  Biotene mouthwash can be helpful to protect your teeth if you experience frequent dry mouth.  Dry mouth may be a sign of air escaping out of your mouth or out of the mask in the case of a full face mask.  Speak with your provider if you expect that is the case.     Take a nasal decongestant to relieve more severe nasal or sinus congestion.  Do not use Afrin (oxymetazoline) nasal spray more than 3 days in a row.  Speak with your sleep doctor if your nasal congestion is chronic.    Use a heated humidifier that fits your CPAP model to enhance your breathing comfort. Adjust the heat setting up if you get a dry nose or throat, down if you get condensation in the hose or mask.  Position the CPAP lower than you so that any condensation in the hose drains back into the machine rather than towards the mask.    Try a system that uses nasal pillows if traditional masks give you problems.    Clean your mask, tubing and headgear once a week. Make sure the equipment dries fully.    Regularly check and replace the filters for your CPAP unit and humidifier.    Work closely with your sleep provider and your CPAP supplier to make sure that you have the machine, mask and air pressure setting that works best for you. It is better to stop using it and call your provider to solve problems than to lay awake all night frustrated with the device.    BESIDES CPAP, WHAT OTHER THERAPIES ARE THERE?    Postioning devices if you only have the snoring or apnea while on your back    Dental devices if your condition is mild    Nasal valves may be effective though experience is limited    Weight loss if you are overweight    Surgery in limited cases where devices are not acceptable or there are problems with structures in the nose and throat  If treated with one of these alternative options, further  evaluation is necessary to ensure that the therapy is effective. This may require some form of repeat testing.      Healthy Lifestyle:  Healthy diet, exercise and limit alcohol: Not only will excessive alcohol increase your weight over time, but it irritates the throat tissues and make them swell, shrinking the airway and causing snoring. Drinking alcohol should be limited and stopped within 3-4 hours before going to bed.   Stop smoking: (Red swollen throat, heat, nicotine), also irritates and swells the airway, among numerous other negative health consequences.    Positioning Device  This example shows a pillow that straps around the waist. It may be appropriate for those whose sleep study shows milder sleep apnea that occurs primarily when lying flat on one's back. Preliminary studies have shown benefit but effectiveness at home should be verified.                      Oral Appliance  These are examples of two of many custom-made devices that are more likely to work in mild sleep apnea   Oral appliances are dental mouth pieces that fit very much like a sports mouth guards or removable orthodontic retainers. They are used to treat snoring and obstructive sleep apnea . The device prevents the airway from collapsing by either supporting the jaw in a forward position. Since oral appliances are non-invasive and easy to use, they may be considered as an early treatment option. Oral appliance therapy (OAT) involves the customization, selection, fabrication, fitting, adjustments and long-term follow-up care.  Custom made oral appliances are proven to be more effective than over-the-counter devices. Therefore, the over-the-counter devices are recommended not to be used as a screening tool nor as a therapeutic option.     Who gets a dental device?  Oral appliance therapy can be used as an alternative to CPAP therapy for the treatment of mild to moderate sleep apnea and for those patients who prefer OAT to CPAP. Oral  appliance therapy is a first line therapy for the treatment of primary snoring. Additionally, OAT is an option for those that cannot tolerate CPAP as therapy or who have experienced insufficient surgical results.                 Possible side effects?  Frequent but minor side effects include: excessive salivation, dry mouth, discomfort of teeth and jaw and temporary changes in the patient s bite.  Potential complications include: jaw pain, permanent occlusal changes and TMJ symptoms.  The above mentioned side effects and complications can be recognized and managed by dentists trained in dental sleep medicine.   Finding a dentist that practices dental sleep medicine  Specific training is available through the American Academy of Dental Sleep Medicine for dentists interested in working in the field of sleep. To find a dentist who is educated in the field of sleep and the use of oral appliances, near you, visit the Web site of the American Academy of Dental Sleep Medicine; also see http://www.accpstorage.org/newOrganization/patients/oralAppliances.pdf   To search for a dentist certified in these practices:  Http://aadsm.org/FindADentist.aspx?1  Nasal Valves              Nasal valves may be an option for mild apnea if other options are not well tolerated. The efficacy of these devices is generally less than CPAP or oral appliances.They may not be effective if you have frequent nasal congestion or have difficulty breathing through your nose.   Weight Loss:    Weight loss decreases severity of sleep apnea in most people with obesity. For those with mild obesity who have developed snoring with weight gain, even 15-30 pound weight loss can improve and occasionally eliminate sleep apnea.  Structured and life-long dietary and health habits are necessary to lose weight and keep healthier weight levels.     Though there are significant health benefits from weight loss, long-term weight loss is very difficult to achieve- studies  show success with dietary management in less than 10% of people. In addition, substantial weight loss may require years of dietary control and may be difficult if patients have severe obesity. In these cases, surgical management may be considered.    If you are interested in methods for weight loss, you should review the options discussed at the National Institutes of Health patient information sites:     http://win.niddk.nih.gov/publications/index.htm  http:/www.health.nih.gov/topic/WeightLossDieting    Bariatric programs offer counseling in all methods of weight loss:    Http:/www.[facility]edic[facility].org/Specialties/WeightLossSurgeryandMedicalMgmt/htm    Your BMI is Body mass index is 30.56 kg/(m^2).    Weight management plan: Patient was referred to their PCP to discuss a diet and exercise plan.    Body mass index (BMI) is one way to tell whether you are at a healthy weight, overweight, or obese. It measures your weight in relation to your height.  A BMI of 18.5 to 24.9 is in the healthy range. A person with a BMI of 25 to 29.9 is considered overweight, and someone with a BMI of 30 or greater is considered obese.  Another way to find out if you are at risk for health problems caused by overweight and obesity is to measure your waist. If you are a woman and your waist is more than 35 inches, or if you are a man and your waist is more than 40 inches, your risk of disease may be higher.  More than two-thirds of American adults are considered overweight or obese. Being overweight or obese increases the risk for further weight gain.  Excess weight may lead to heart disease and diabetes. Creating and following plans for healthy eating and physical activity may help you improve your health.    Methods for maintaining or losing weight.    Weight control is part of healthy lifestyle and includes exercise, emotional health, and healthy eating habits.  Careful eating habits lifelong is the mainstay of weight control.  Though  "there are significant health benefits from weight loss, long-term weight loss with diet alone may be very difficult to achieve- studies show long-term success with dietary management in less than 10% of people. Attaining a healthy weight may be especially difficult to achieve in those with severe obesity. In some cases, medications, devices and surgical management might be considered.    What can you do?    If you are overweight or obese and are interested in methods for weight loss, you should discuss this with your provider. In addition, we recommend that you review healthy life styles and methods for weight loss available through the National Institutes of Health patient information sites:     http://win.niddk.nih.gov/publications/index.htm      Surgery:  There are a number of surgeries that have been attempted to treat apnea. In general, surgical options are usually reserved for cases in which there is a physical abnormality contributing to obstruction or other treatment options are ineffective or not tolerated. Most surgical options are either unreliable or quite invasive. One of the more common procedures is:  Uvulopalatopharyngoplasty: In this procedure, the uvula (the finger-like tissue that hangs in the back of the throat), part of the soft palate (the tissue that the uvula is attached to), and sometimes the tonsils or adenoids are removed. The efficacy of this surgery is around 30-50% .  After surgery, complications may include:  Sleepiness and sleep apnea related to post-surgery medication   Swelling, infection and bleeding   A sore throat and/or difficulty swallowing   Drainage of secretions into the nose and a nasal quality to the voice. English language speech does not seem to be affected by this surgery.   Narrowing of the airway in the nose and throat (hence constricting breathing) snoring and even iatrogenically caused sleep apnea. By cutting the tissues, excess scar tissue can \"tighten\" the airway " and make it even smaller than it was before UPPP.  Patients who have had the uvula removed will become unable to correctly speak Divehi or any other language that has a uvular 'r' phoneme.    Surgeries to help resolve nasal congestion may help reduce the severity of apnea slightly. Nasal congestion does not cause apnea on its own, so these surgeries are usually not performed just for CHASE.  They may be worth considering if the nasal congestion is significantly bothersome independent of apnea.                  Follow-ups after your visit        Follow-up notes from your care team     Return in about 2 weeks (around 3/9/2018).      Your next 10 appointments already scheduled     Feb 28, 2018  1:15 PM CST   [facility]ina [facility]rade [facility]   [facility]    [id number] [place] [id number] [phone]            Mar 01, 2018 11:30 AM CST   Return Visit with Jose Luis Rogers MD   [facility] ([facility]    [address] [facility] [id number] [phone]            Jul 23, 2018   Procedure with Martina Andrade MD   [facility] (--)    [address] [address] [place] [id number] [phone]              Future tests that were ordered for you today     Open Future Orders        Priority Expected Expires Ordered    Comprehensive Sleep Study Routine  8/22/2018 2/23/2018            Who to contact     If you have questions or need follow up information about today's clinic visit or your schedule please contact [facility] directly at [phone].  Normal or non-critical lab and imaging results will be communicated to you by MyChart, letter or phone within 4 business days after the clinic has received the results. If you do not hear from us within 7 days, please contact the clinic through MyChart or phone. If you have a critical or abnormal lab result,  "we will notify you by phone as soon as possible.  Submit refill requests through [employer]he[employer] or call your pharmacy and they will forward the refill request to us. Please allow 3 business days for your refill to be completed.          Additional Information About Your Visit        Accountablehart Information     [employer] gives you secure access to your electronic health record. If you see a primary care provider, you can also send messages to your care team and make appointments. If you have questions, please call your primary care clinic.  If you do not have a primary care provider, please call [phone] and they will assist you.        Care EveryWhere ID     This is your Care EveryWhere ID. This could be used by other organizations to access your [place] medical records  [id number]        Your Vitals Were     Pulse Respirations Height Pulse Oximetry BMI (Body Mass Index)       90 18 1.727 m (5' 8\") 96% 30.56 kg/m2        Blood Pressure from Last 3 Encounters:   02/23/18 122/70   02/23/18 122/70   01/26/18 122/72    Weight from Last 3 Encounters:   02/23/18 91.2 kg (201 lb)   02/23/18 91.2 kg (201 lb)   01/26/18 90.7 kg (200 lb)              We Performed the Following     SLEEP EVALUATION & MANAGEMENT REFERRAL - ADULT -[place] [facility] [phone] (Age 13 if over 100 lbs)          Today's Medication Changes          These changes are accurate as of 2/23/18  2:13 PM.  If you have any questions, ask your nurse or doctor.               Start taking these medicines.        Dose/Directions    azithromycin 250 MG tablet   Commonly known as:  ZITHROMAX   Used for:  Mild persistent asthma without complication, Acute bronchitis with symptoms > 10 days   Started by:  Erik Peraza MD        Two tablets first day, then one tablet daily for four days.   Quantity:  6 tablet   Refills:  0       predniSONE 20 MG tablet   Commonly known as:  DELTASONE   Used for:  Mild persistent asthma without complication, Acute " bronchitis with symptoms > 10 days   Started by:  Erik Preaza MD        Dose:  20 mg   Take 1 tablet (20 mg) by mouth daily   Quantity:  5 tablet   Refills:  0            Where to get your medicines      These medications were sent to [facility] - [place], [place] - [address]  [address], [facility] [address]     Phone:  [phone]     azithromycin 250 MG tablet    predniSONE 20 MG tablet                Primary Care Provider Office Phone # Fax #    Erik Peraza [phone]       3 [facility] [address] [place] [address]        Equal Access to Services     : Hadii aad ku hadasho Soomaali, waaxda luqadaha, qaybta kaalmada adeegyada, waxlaurie bellamy hayaan adedivina shelton . So [facility] [phone].    ATENCIÓN: Si habla español, tiene a newsome disposición servicios gratuitos de asistencia lingüística. [facility] [phone].    We comply with applicable federal civil rights laws and [place] laws. We do not discriminate on the basis of race, color, national origin, age, disability, sex, sexual orientation, or gender identity.            Thank you!     Thank you for choosing [facility]  for your care. Our goal is always to provide you with excellent care. Hearing back from our patients is one way we can continue to improve our services. Please take a few minutes to complete the written survey that you may receive in the mail after your visit with us. Thank you!             Your Updated Medication List - Protect others around you: Learn how to safely use, store and throw away your medicines at www.disposemymeds.org.          This list is accurate as of 2/23/18  2:13 PM.  Always use your most recent med list.                   Brand Name Dispense Instructions for use Diagnosis    albuterol 108 (90 BASE) MCG/ACT Inhaler    PROAIR HFA/PROVENTIL HFA/VENTOLIN HFA    1 Inhaler    Inhale 2 puffs into the lungs every 6 hours as needed for shortness of breath /  dyspnea or wheezing    Mild persistent asthma without complication       aspirin 81 MG tablet      Take 1 tablet (81 mg) by mouth daily        azithromycin 250 MG tablet    ZITHROMAX    6 tablet    Two tablets first day, then one tablet daily for four days.    Mild persistent asthma without complication, Acute bronchitis with symptoms > 10 days       CRESTOR 10 MG tablet   Generic drug:  rosuvastatin     30 tablet    Take 1 tablet (10 mg) by mouth daily    Hyperlipidemia LDL goal <130       cyanocobalamin 1000 MCG/ML injection    VITAMIN B12    3 mL    INJECT 1ML INTRAMUSCULARLY EVERY 30 DAYS    Chronic fatigue syndrome, Vitamin B12 deficiency (dietary) anemia       estradiol 0.05 MG/24HR BIW patch    VIVELLE-DOT     Place 1 patch onto the skin twice a week    Hypertrophy of breast, Eosinophilic esophagitis, Overweight(278.02)       fluticasone 220 MCG/ACT Inhaler    FLOVENT HFA    3 Inhaler    Inhale 2 puffs into the lungs 2 times daily    Mild persistent asthma without complication       fluticasone 50 MCG/ACT spray    FLONASE    1 Bottle    Spray 1-2 sprays into both nostrils daily        levothyroxine 100 MCG tablet    SYNTHROID/LEVOTHROID    90 tablet    Take 1 tablet (100 mcg) by mouth daily    Hypothyroidism due to acquired atrophy of thyroid       methylphenidate 10 MG tablet    RITALIN    30 tablet    1/2 to 1 tablet every day as needed    Chronic fatigue syndrome       Multi-vitamin Tabs tablet     100 tablet    Take 1 tablet by mouth daily        omeprazole 20 MG CR capsule    priLOSEC    30 capsule    Take 1 capsule (20 mg) by mouth daily 1 TAB PO QD (Once per day)  As needed    Eosinophilic esophagitis       predniSONE 20 MG tablet    DELTASONE    5 tablet    Take 1 tablet (20 mg) by mouth daily    Mild persistent asthma without complication, Acute bronchitis with symptoms > 10 days       zolpidem 5 MG tablet    AMBIEN    60 tablet    1 to 2 tabs at hour of sleep as needed    Chronic fatigue syndrome

## 2018-02-23 NOTE — PROGRESS NOTES
Sleep Consultation:    Date on this visit: 2/23/2018    Nikki Morales is sent by Jose Luis Rogers for a sleep consultation regarding snoring, fatigue, multiple awakenings. History of atrial fib. ADHD uses Ritalin rarely. Uses Ambien nightly. .    Primary Physician: Erik Peraza     Nikki Morales reports frequent snoring and snorting for many years.     Nikki goes to sleep at 12:00 PM during the week. She wakes up at 9:00 AM without an alarm. She falls asleep in 60 minutes.  Nikki has difficulty falling asleep.  She wakes up 5-8 times a night for  Minutes to hours before falling back to sleep.  Nikki wakes up to go to the bathroom and uncertain reasons.  On weekends, Nikki goes to sleep at 12:00 PM.  She wakes up at 9:00 AM without an alarm. She falls asleep in 60 minutes.  Patient gets an average of 9 hours of sleep per night.     Patient does use electronics in bed and read in bed.     Nikki does not do shift work.      Nikki does snore every night. Patient does have a regular bed partner. There is report of snoring.  She does not have witnessed apneas. They never sleep separately.  Patient sleeps on her back and side. She has occasional snort arousals and morning dry mouth,. Nikki has occasional sleep paralysis.    She confirms sleep terrors as a child.  Nikki has reflux at night, heartburn and depression.      Nikki has gained 5-10 pounds in the last year.  Patient describes themself as a night person.  She would prefer to go to sleep at 2:00 AM and wake up at 11:00 AM.  Patient's [place] Sleepiness score 8/24 inconsistent with excessive  daytime sleepiness.      Nikki naps 0 times per week. She takes no inadvertant naps.  She denies closing eyes, dozing and falling asleep while driving.Patient was counseled on the importance of driving while alert, to pull over if drowsy, or nap before getting into the vehicle if sleepy.  She uses occasional caffeine.    Allergies:    Allergies    Allergen Reactions     Codeine Other (See Comments)     Causes agitation       Medications:    Current Outpatient Prescriptions   Medication Sig Dispense Refill     rosuvastatin (CRESTOR) 10 MG tablet Take 1 tablet (10 mg) by mouth daily 30 tablet 1     azithromycin (ZITHROMAX) 250 MG tablet Two tablets first day, then one tablet daily for four days. 6 tablet 0     predniSONE (DELTASONE) 20 MG tablet Take 1 tablet (20 mg) by mouth daily 5 tablet 0     fluticasone (FLOVENT HFA) 220 MCG/ACT Inhaler Inhale 2 puffs into the lungs 2 times daily 3 Inhaler 1     levothyroxine (SYNTHROID/LEVOTHROID) 100 MCG tablet Take 1 tablet (100 mcg) by mouth daily 90 tablet 3     aspirin 81 MG tablet Take 1 tablet (81 mg) by mouth daily  OTC     zolpidem (AMBIEN) 5 MG tablet 1 to 2 tabs at hour of sleep as needed 60 tablet 5     albuterol (PROAIR HFA/PROVENTIL HFA/VENTOLIN HFA) 108 (90 BASE) MCG/ACT Inhaler Inhale 2 puffs into the lungs every 6 hours as needed for shortness of breath / dyspnea or wheezing 1 Inhaler 3     cyanocobalamin (VITAMIN B12) 1000 MCG/ML injection INJECT 1ML INTRAMUSCULARLY EVERY 30 DAYS 3 mL 2     methylphenidate (RITALIN) 10 MG tablet 1/2 to 1 tablet every day as needed 30 tablet 0     fluticasone (FLONASE) 50 MCG/ACT spray Spray 1-2 sprays into both nostrils daily 1 Bottle 11     omeprazole (PRILOSEC) 20 MG capsule Take 1 capsule (20 mg) by mouth daily 1 TAB PO QD (Once per day)  As needed 30 capsule 11     estradiol (VIVELLE-DOT) 0.05 MG/24HR patch Place 1 patch onto the skin twice a week       multivitamin, therapeutic with minerals (MULTI-VITAMIN) TABS Take 1 tablet by mouth daily 100 tablet 3     [DISCONTINUED] rosuvastatin (CRESTOR) 10 MG tablet Take 1 tablet (10 mg) by mouth daily (Patient not taking: Reported on 2/23/2018) 30 tablet 1     [DISCONTINUED] NEW MED Methylcobalamin 25mg/ml  0.1ml weekly 1 Bottle 11       Problem List:  Patient Active Problem List    Diagnosis Date Noted     Intermittent  atrial fibrillation (H) 2018     Priority: Medium     Mild persistent asthma without complication 2017     Priority: Medium     Attention deficit hyperactivity disorder, inattentive type 10/20/2015     Priority: Medium     Hypothyroidism due to acquired atrophy of thyroid 2015     Priority: Medium     Adhesive capsulitis of shoulder 2015     Priority: Medium     Degenerative arthritis of cervical spine with cord compression 2015     Priority: Medium     Eosinophilic esophagitis 2014     Priority: Medium     Overweight 2014     Priority: Medium     Problem list name updated by automated process. Provider to review       Advanced directives, counseling/discussion 2013     Priority: Medium     13 Advance care directed given to pt.//Samara Yu/ACE(AAMA)        HYPERLIPIDEMIA LDL GOAL <130 10/31/2010     Priority: Medium     Herpes simplex virus (HSV) infection 2007     Priority: Medium     Problem list name updated by automated process. Provider to review       Female stress incontinence 2007     Priority: Medium     ESOPHAGEAL REFLUX 2006     Priority: Medium     Other and unspecified disc disorder of lumbar region 2003     Priority: Medium     Chronic fatigue syndrome      Priority: Medium        Past Medical/Surgical History:  Past Medical History:   Diagnosis Date     Chronic fatigue      Hyperlipidemia      Hypothyroid      New onset a-fib (H)      Other vitamin B12 deficiency anemia      Past Surgical History:   Procedure Laterality Date     C  DELIVERY ONLY      , Low Cervical     C NONSPECIFIC PROCEDURE  's    sinus     C NONSPECIFIC PROCEDURE      Esophgeal dilatation multiple     C NONSPECIFIC PROCEDURE  2008    sling     C VAGINAL HYSTERECTOMY  1995    Hysterectomy, Vaginal     COLONOSCOPY  10/06/09     COLONOSCOPY  2013    Procedure: COMBINED COLONOSCOPY, SINGLE BIOPSY/POLYPECTOMY BY BIOPSY;;  Surgeon:  Cuate Miles MD;  Location: [facility]     CONIZATION CERVIX,KNIFE/LASER       ESOPHAGOSCOPY, GASTROSCOPY, DUODENOSCOPY (EGD), COMBINED  7/2/2013    Procedure: COMBINED ESOPHAGOSCOPY, GASTROSCOPY, DUODENOSCOPY (EGD), BIOPSY SINGLE OR MULTIPLE;  egd with rabdain biopsies and colonoscopy with polypectomy by biopsy ;  Surgeon: Cuate Miles MD;  Location: [facility]     HC DILATION/CURETTAGE DIAG/THER NON OB      D & C     HC REMOVAL OF TONSILS,<11 Y/O      Tonsils <10y.o.     HC UGI ENDOSCOPY DIAG W BIOPSY  12/05/07     HC UGI ENDOSCOPY, SIMPLE EXAM  09/10/99 & 04/14/98     HYSTERECTOMY, PAP NO LONGER INDICATED       LAPAROSCOPIC CHOLECYSTECTOMY  10/12/13     TUBAL LIGATION  1994       Social History:  Social History     Social History     Marital status:      Spouse name: Jared     Number of children: 2     Years of education: 12     Occupational History     HomeMaker Homemaker     Social History Main Topics     Smoking status: Former Smoker     Types: Cigarettes     Smokeless tobacco: Never Used      Comment: social  6/mo     Alcohol use 2.4 oz/week     2 Cans of beer, 2 Standard drinks or equivalent per week      Comment: social     Drug use: No      Comment: used in past any and all     Sexual activity: Yes     Partners: Male     Birth control/ protection: Female Surgical      Comment: Hx: hystectomy     Other Topics Concern      Service No     Blood Transfusions No     Caffeine Concern No     Occupational Exposure No     Hobby Hazards No     Sleep Concern Yes     Difficult with sleeping at night, sleeps most of the day     Stress Concern No     Weight Concern Yes     desire wt loss     Special Diet No     Back Care Yes     weight restrictions     Exercise Yes     Bike Helmet No     Seat Belt Yes     Self-Exams No     Parent/Sibling W/ Cabg, Mi Or Angioplasty Before 65f 55m? No     Social History Narrative       Family History:  Family History   Problem Relation Age of Onset     CANCER Mother       "Uterine     Prostate Cancer Mother      DIABETES Father      Hypertension Father      CANCER Father      prostate cancer     CEREBROVASCULAR DISEASE Father      Psychotic Disorder Son      severe Add,      Asthma Son      exercised induced asthma     Asthma Son      ecxercise induced asthma     Cardiovascular Sister      recurrent blood clots     CEREBROVASCULAR DISEASE Sister 51     Prostate Cancer Brother      DIABETES Maternal Grandfather      HEART DISEASE Maternal Grandmother      Heart condition age 96     DIABETES Paternal Grandfather      CANCER Brother      CEREBROVASCULAR DISEASE Paternal Grandmother        Review of Systems:  A complete review of systems reviewed by me is negative with the exeption of what has been mentioned in the history of present illness.  CONSTITUTIONAL: NEGATIVE for weight gain/loss, fever, chills, sweats or night sweats, drug allergies.  EYES: NEGATIVE for changes in vision, blind spots, double vision.  ENT:  POSITIVE for  runny nose  CARDIAC:  POSITIVE for  fast heart beats  NEUROLOGIC: NEGATIVE headaches, weakness or numbness in the arms or legs.  DERMATOLOGIC: NEGATIVE for rashes, new moles or change in mole(s)  PULMONARY:  POSITIVE for  SOB with activity, productive cough and wheezing   GASTROINTESTINAL: NEGATIVE for nausea or vomitting, loose or watery stools, fat or grease in stools, constipation, abdominal pain, bowel movements black in color or blood noted.  GENITOURINARY:  POSITIVE for  urinating more frequently than usual  MUSCULOSKELETAL: NEGATIVE for muscle pain, bone or joint pain, swollen joints.  ENDOCRINE: NEGATIVE for increased thirst or urination, diabetes.  LYMPHATIC: NEGATIVE for swollen lymph nodes, lumps or bumps in the breasts or nipple discharge.    Physical Examination:  Vitals: /70  Pulse 90  Resp 18  Ht 1.727 m (5' 8\")  Wt 91.2 kg (201 lb)  SpO2 96%  BMI 30.56 kg/m2  BMI= Body mass index is 30.56 kg/(m^2).    Neck circumference: 36 cm  Stop " bang=3    No flowsheet data found.    GENERAL APPEARANCE: healthy, alert, no distress and over weight  HENT: nose and mouth without ulcers or lesions, oropharynx crowded, nasal mucosa edematous without rhinorrhea and normal cephalic/atraumatic  NECK: no adenopathy, no asymmetry, masses, or scars, thyroid normal to palpation and trachea midline and normal to palpation  RESP: lungs clear to auscultation - no rales, rhonchi or wheezes  CV: regular rates and rhythm, normal S1 S2, no S3 or S4 and no murmur, click or rub  MS: extremities normal- no gross deformities noted  PSYCH: mentation appears normal and affect normal/bright  Mallampati Class: III.  Tonsillar Stage: 0  surgically removed.    Impression/Plan:  Worsening snoring, weight gain, frequent episodes of atrial fib. Will set up for a split night study for possible sleep apnea.   Literature provided regarding sleep apnea.      She will follow up with me in approximately two weeks after her sleep study has been competed to review the results and discuss plan of care.       Polysomnography reviewed.  Obstructive sleep apnea reviewed.  Complications of untreated sleep apnea were reviewed.        CC: Jose Luis Rogers

## 2018-02-23 NOTE — NURSING NOTE
"Chief Complaint   Patient presents with     Recheck Medication     crestor      Cough     cough        Initial /70 (BP Location: Right arm, Patient Position: Chair, Cuff Size: Adult Large)  Pulse 90  Temp 97.6  F (36.4  C) (Temporal)  Ht 5' 8\" (1.727 m)  Wt 201 lb (91.2 kg)  SpO2 96%  BMI 30.56 kg/m2 Estimated body mass index is 30.56 kg/(m^2) as calculated from the following:    Height as of this encounter: 5' 8\" (1.727 m).    Weight as of this encounter: 201 lb (91.2 kg).  Medication Reconciliation: complete     "

## 2018-02-23 NOTE — NURSING NOTE
"Chief Complaint   Patient presents with     Sleep Problem     New onset atrial fibrillation, Ref:Jose Luis Rogers MD     Consult       Initial /70  Pulse 90  Resp 18  Ht 1.727 m (5' 8\")  Wt 91.2 kg (201 lb)  SpO2 96%  BMI 30.56 kg/m2 Estimated body mass index is 30.56 kg/(m^2) as calculated from the following:    Height as of this encounter: 1.727 m (5' 8\").    Weight as of this encounter: 91.2 kg (201 lb).  Medication Reconciliation: complete   Neck circumference: 36cm  Stop bang=3    Lesia Muniz CMA         "

## 2018-02-23 NOTE — MR AVS SNAPSHOT
After Visit Summary   2/23/2018    Nikki Morales    MRN: [id number]           Patient Information     Date Of Birth          1960        Visit Information        Provider Department      2/23/2018 9:30 AM Erik Peraza MD [facility]        Today's Diagnoses     Mild persistent asthma without complication    -  1    Hyperlipidemia LDL goal <130        Intermittent atrial fibrillation (H)        Acute bronchitis with symptoms > 10 days        Attention deficit hyperactivity disorder, inattentive type          Care Instructions    Take antibiotics and prdnisone  See cardiology for plan.  Start crestor when better  Come for lab about 8 weeks after start                Follow-ups after your visit        Your next 10 appointments already scheduled     Feb 23, 2018  1:00 PM CST   New Sleep Patient with Geoffrey Loving PA-C   [facility] [place]    [address] [place] [id number] [phone]            Feb 28, 2018  1:15 PM CST   Return Visit with Martina Andrade MD   [facility]    [address] [place] [address] [id number] [phone]            Mar 01, 2018 11:30 AM CST   Return Visit with Jose Luis Rogers MD   [facility] ([facility])    [address] [place] [id number] [phone]            Jul 23, 2018   Procedure with Martina Andrade MD   [facility] (--)    [address] [place] [id number] [phone]              Who to contact     If you have questions or need follow up information about today's clinic visit or your schedule please contact [facility] directly at [phone].  Normal or non-critical lab and imaging results will be communicated to you by MyChart, letter or phone within 4 business days after the clinic has  "received the results. If you do not hear from us within 7 days, please contact the clinic through [facility] or phone. If you have a critical or abnormal lab result, we will notify you by phone as soon as possible.  Submit refill requests through [employer] or call your pharmacy and they will forward the refill request to us. Please allow 3 business days for your refill to be completed.          Additional Information About Your Visit        [employer] Information     [employer] gives you secure access to your electronic health record. If you see a primary care provider, you can also send messages to your care team and make appointments. If you have questions, please call your primary care clinic.  If you do not have a primary care provider, please call [phone] and they will assist you.        Care EveryWhere ID     This is your Care EveryWhere ID. This could be used by other organizations to access your [facility] medical records  [id number]        Your Vitals Were     Pulse Temperature Height Pulse Oximetry BMI (Body Mass Index)       90 97.6  F (36.4  C) (Temporal) 5' 8\" (1.727 m) 96% 30.56 kg/m2        Blood Pressure from Last 3 Encounters:   02/23/18 122/70   01/26/18 122/72   01/18/18 124/60    Weight from Last 3 Encounters:   02/23/18 201 lb (91.2 kg)   01/26/18 200 lb (90.7 kg)   01/18/18 207 lb 6.4 oz (94.1 kg)              Today, you had the following     No orders found for display         Today's Medication Changes          These changes are accurate as of 2/23/18 10:06 AM.  If you have any questions, ask your nurse or doctor.               Start taking these medicines.        Dose/Directions    azithromycin 250 MG tablet   Commonly known as:  ZITHROMAX   Used for:  Mild persistent asthma without complication, Acute bronchitis with symptoms > 10 days   Started by:  Erik Peraza MD        Two tablets first day, then one tablet daily for four days.   Quantity:  6 tablet   Refills:  0       predniSONE 20 MG " tablet   Commonly known as:  DELTASONE   Used for:  Mild persistent asthma without complication, Acute bronchitis with symptoms > 10 days   Started by:  Erik Peraza MD        Dose:  20 mg   Take 1 tablet (20 mg) by mouth daily   Quantity:  5 tablet   Refills:  0            Where to get your medicines      These medications were sent to [facility] - [place], [place] - [address]  [address], [facility] [address]     Phone:  [phone]     azithromycin 250 MG tablet    predniSONE 20 MG tablet                Primary Care Provider Office Phone # Fax #    Erik Peraza [phone] [address] [place] [address]        Equal Access to Services     [facility]: Hadii dalia saleh hadasho Sokwame, waaxda luqadaha, qaybta kaalmada adeegyada, phuc shelton . So [facility] [phone].    ATENCIÓN: Si habla español, tiene a newsome disposición servicios gratuitos de asistencia lingüística. Llame al [phone].    We comply with applicable federal civil rights laws and [place] laws. We do not discriminate on the basis of race, color, national origin, age, disability, sex, sexual orientation, or gender identity.            Thank you!     Thank you for choosing [facility]  for your care. Our goal is always to provide you with excellent care. Hearing back from our patients is one way we can continue to improve our services. Please take a few minutes to complete the written survey that you may receive in the mail after your visit with us. Thank you!             Your Updated Medication List - Protect others around you: Learn how to safely use, store and throw away your medicines at www.disposemymeds.org.          This list is accurate as of 2/23/18 10:06 AM.  Always use your most recent med list.                   Brand Name Dispense Instructions for use Diagnosis    albuterol 108 (90 BASE) MCG/ACT Inhaler    PROAIR HFA/PROVENTIL HFA/VENTOLIN HFA     1 Inhaler    Inhale 2 puffs into the lungs every 6 hours as needed for shortness of breath / dyspnea or wheezing    Mild persistent asthma without complication       aspirin 81 MG tablet      Take 1 tablet (81 mg) by mouth daily        azithromycin 250 MG tablet    ZITHROMAX    6 tablet    Two tablets first day, then one tablet daily for four days.    Mild persistent asthma without complication, Acute bronchitis with symptoms > 10 days       CRESTOR 10 MG tablet   Generic drug:  rosuvastatin     30 tablet    Take 1 tablet (10 mg) by mouth daily    Hyperlipidemia LDL goal <130       cyanocobalamin 1000 MCG/ML injection    VITAMIN B12    3 mL    INJECT 1ML INTRAMUSCULARLY EVERY 30 DAYS    Chronic fatigue syndrome, Vitamin B12 deficiency (dietary) anemia       estradiol 0.05 MG/24HR BIW patch    VIVELLE-DOT     Place 1 patch onto the skin twice a week    Hypertrophy of breast, Eosinophilic esophagitis, Overweight(278.02)       fluticasone 220 MCG/ACT Inhaler    FLOVENT HFA    3 Inhaler    Inhale 2 puffs into the lungs 2 times daily    Mild persistent asthma without complication       fluticasone 50 MCG/ACT spray    FLONASE    1 Bottle    Spray 1-2 sprays into both nostrils daily        levothyroxine 100 MCG tablet    SYNTHROID/LEVOTHROID    90 tablet    Take 1 tablet (100 mcg) by mouth daily    Hypothyroidism due to acquired atrophy of thyroid       methylphenidate 10 MG tablet    RITALIN    30 tablet    1/2 to 1 tablet every day as needed    Chronic fatigue syndrome       Multi-vitamin Tabs tablet     100 tablet    Take 1 tablet by mouth daily        omeprazole 20 MG CR capsule    priLOSEC    30 capsule    Take 1 capsule (20 mg) by mouth daily 1 TAB PO QD (Once per day)  As needed    Eosinophilic esophagitis       predniSONE 20 MG tablet    DELTASONE    5 tablet    Take 1 tablet (20 mg) by mouth daily    Mild persistent asthma without complication, Acute bronchitis with symptoms > 10 days       zolpidem 5 MG tablet     AMBIEN    60 tablet    1 to 2 tabs at hour of sleep as needed    Chronic fatigue syndrome

## 2018-02-23 NOTE — PATIENT INSTRUCTIONS
Stress, tension, and anxiety can be big factors contributing to insomnia.  If you can t relax your mind and body, then you won t be able to sleep.  You would likely benefit from trying some of the relaxation exercises that we ve assembled below.  These are all internet based links.  If you don t have or use a computer, you may benefit from one of the stress management books listed below that you can get at your public library or at a local bookstore for a relatively low price.      Copy and paste into web browser address window   https://www.NaturalPath Media.com/watch?v=Zqfv4qlYqHy    Progressive Muscle Relaxation (PMR):     http://www.StickyADS.tv/progressive-muscle-relaxation-exercise.html     http://studentsupport.[facility]/counseling/resources/self-help/relaxation-and-stress-management/   Deep Breathing Exercises:    http://www.StickyADS.tv/breathing-awareness.html     Meditation:     wwwArsenal Vascular    www.Plum.ioguidedPixelPinmeditation-site.com You may have to pay for some of these resources.    Guided Imagery:    http://www.StickyADS.tv/guided-imagery-scripts.html     http://Redu.us/library/mwtafbdoyk-wkydig-edsyqci/    [place] site under construction : http://www.[facility]view.org/Services/SleepMedicine/Audio/index.htm  Sometimes insomnia can be made worse by our own habits or situation.  You should consider making some of the following changes to help improve your sleep:     If there is excessive noise in your house / neighborhood use a fan or similar device to make a quiet background noise that can drown out other noises    If your room is too bright early in the morning, hang a blanket or extra curtain over the windows to keep the light out or use a sleeping mask to cover your eyes    If your room is too warm during the night, set the temperature in the house down a few degrees for the night    Spend a little time before bedtime trying to relax.  Don t work right up  until bedtime.  Take 30-60 minutes to relax and unwind before bedtime with a book or watching TV    Do not drink anything with alcohol or caffeine in them for at least 4-5 hours before bedtime    Do not smoke right before bedtime or during the night    No strenuous exercise for 2-3 hours before bedtime  Stress, tension, and anxiety can be big factors contributing to insomnia.  If you can t relax your mind and body, then you won t be able to sleep.  You would likely benefit from trying some of the relaxation exercises that we ve assembled below.  These are all internet based links.  If you don t have or use a computer, you may benefit from one of the stress management books listed below that you can get at your public library or at a local bookstore for a relatively low price.      Copy and paste into web browser address window   https://www.[employer]Up [employer].com/watch?v=Ginn5spVvEn    Progressive Muscle Relaxation (PMR):     http://www.Audinate/progressive-muscle-relaxation-exercise.html     http://studentsupport.[facility]/counseling/resources/self-help/relaxation-and-stress-management/   Deep Breathing Exercises:    http://www.Audinate/breathing-awareness.html     Meditation:     wwwVal[employer]ductor    www.VibesguidedAmerican Hometown MediameditationAmerican Hometown Mediasite.com You may have to pay for some of these resources.    Guided Imagery:    http://www.Audinate/guided-imagery-scripts.html     http://Addictive/library/jmfkyqreqo-pmcffi-fyniyuq/    [place] site under construction : http://www.[place].org/Services/SleepMedicine/Audio/index.htm    MY INFORMATION ON SLEEP APNEA-  Nikki Morales    DOCTOR : Lesia Muniz  SLEEP CENTER :      MY CONTACT NUMBER:   [facility]  [phone]  [facility]   [phone]  [facility]   [phone]      [facility]  [phone]  [facility]  "[facility]   [phone]      Li Points:  1. What is Obstructive Sleep Apnea (CHASE)? CHASE is the most common type of sleep apnea. Apnea literally means, \"without breath.\" It is characterized by repetitive pauses in breathing, despite continued effort to breathe, and is usually associated with a reduction in blood oxygen saturation. Apneas can last 10 to over 60 seconds. It is caused by narrowing or collapse of the upper airway as muscles relax during sleep.   2. What are the consequences of CHASE? Symptoms include: daytime sleepiness- possibly increasing the risk of falling asleep while driving, unrefreshing/restless sleep, snoring, insomnia, waking frequently to urinate, waking with heartburn or reflux, reduced concentration and memory, and morning headaches. Other health consequences may include development of high blood pressure. Untreated CHASE also can contribute to heart disease, stroke and diabetes.   3. What are the treatment options? In most situations, sleep apnea is a lifelong disease that must be managed with daily therapy. Continuous Positive Airway (CPAP) is the most reliable treatment. A mouthguard to hold your jaw forward is usually the next most reliable option. Other options include postioning devices (to keep you off your back), nasal valves, tongue retaining device, weight loss, surgery. There is more detail about these options toward the end of this document.  4. What are the most important things to remember about using CPAP?     WHERE CAN I FIND MORE INFORMATION?    American Academy of Sleep Medicine Patient information on sleep disorders:  http://yoursleep.aasmnet.org    CPAP -  WHY AND HOW?                 Continuous positive airway pressure, or CPAP, is the most effective treatment for obstructive sleep apnea. It works by blowing room air, through a mask, to hold your throat open. A decision to use CPAP is a major step forward in the pursuit of a healthier life. The successful use of CPAP will " "help you breathe easier, sleep better and live healthier. Using CPAP can be a positive experience if you keep these garcia points in mind:  1. Commitment  CPAP is not a quick fix for your problem. It involves a long-term commitment to improve your sleep and your health.    2. Communication  Stay in close communication with both your sleep doctor and your CPAP supplier. Ask lots of questions and seek help when you need it.    3. Consistency  Use CPAP all night, every night and for every nap. You will receive the maximum health benefits from CPAP when you use it every time that you sleep. This will also make it easier for your body to adjust to the treatment.    4. Correction  The first machine and mask that you try may not be the best ones for you. Work with your sleep doctor and your CPAP supplier to make corrections to your equipment selection. Ask about trying a different type of machine or mask if you have ongoing problems. Make sure that your mask is a good fit and learn to use your equipment properly.    5. Challenge  Tell a family member or close friend to ask you each morning if you used your CPAP the previous night. Have someone to challenge you to give it your best effort.    6. Connection   Your adjustment to CPAP will be easier if you are able to connect with others who use the same treatment. Ask your sleep doctor if there is a support group in your area for people who have sleep apnea, or look for one on the Internet.  7. Comfort   Increase your level of comfort by using a saline spray, decongestant or heated humidifier if CPAP irritates your nose, mouth or throat. Use your unit's \"ramp\" setting to slowly get used to the air pressure level. There may be soft pads you can buy that will fit over your mask straps. Look on www.CPAP.com for accessories that can help make CPAP use more comfortable.  8. Cleaning   Clean your mask, tubing and headgear on a regular basis. Put this time in your schedule so that you " don't forget to do it. Check and replace the filters for your CPAP unit and humidifier.    9. Completion   Although you are never finished with CPAP therapy, you should reward yourself by celebrating the completion of your first month of treatment. Expect this first month to be your hardest period of adjustment. It will involve some trial and error as you find the machine, mask and pressure settings that are right for you.    10. Continuation  After your first month of treatment, continue to make a daily commitment to use your CPAP all night, every night and for every nap.    CPAP-Tips to starting with success:  Begin using your CPAP for short periods of time during the day while you watch TV or read.    Use CPAP every night and for every nap. Using it less often reduces the health benefits and makes it harder for your body to get used to it.    Newer CPAP models are virtually silent; however, if you find the sound of your CPAP machine to be bothersome, place the unit under your bed to dampen the sound.     Make small adjustments to your mask, tubing, straps and headgear until you get the right fit. Tightening the mask may actually worsen the leak.  If it leaves significant marks on your face or irritates the bridge of your nose, it may not be the best mask for you.  Speak with the person who supplied the mask and consider trying other masks. Insurances will allow you to try different masks during the first month of starting CPAP.  Insurance also covers a new mask, hose and filter about every 6 months.    Use a saline nasal spray to ease mild nasal congestion. Neti-Pot or saline nasal rinses may also help. Nasal gel sprays can help reduce nasal dryness.  Biotene mouthwash can be helpful to protect your teeth if you experience frequent dry mouth.  Dry mouth may be a sign of air escaping out of your mouth or out of the mask in the case of a full face mask.  Speak with your provider if you expect that is the case.      Take a nasal decongestant to relieve more severe nasal or sinus congestion.  Do not use Afrin (oxymetazoline) nasal spray more than 3 days in a row.  Speak with your sleep doctor if your nasal congestion is chronic.    Use a heated humidifier that fits your CPAP model to enhance your breathing comfort. Adjust the heat setting up if you get a dry nose or throat, down if you get condensation in the hose or mask.  Position the CPAP lower than you so that any condensation in the hose drains back into the machine rather than towards the mask.    Try a system that uses nasal pillows if traditional masks give you problems.    Clean your mask, tubing and headgear once a week. Make sure the equipment dries fully.    Regularly check and replace the filters for your CPAP unit and humidifier.    Work closely with your sleep provider and your CPAP supplier to make sure that you have the machine, mask and air pressure setting that works best for you. It is better to stop using it and call your provider to solve problems than to lay awake all night frustrated with the device.    BESIDES CPAP, WHAT OTHER THERAPIES ARE THERE?    Postioning devices if you only have the snoring or apnea while on your back    Dental devices if your condition is mild    Nasal valves may be effective though experience is limited    Weight loss if you are overweight    Surgery in limited cases where devices are not acceptable or there are problems with structures in the nose and throat  If treated with one of these alternative options, further evaluation is necessary to ensure that the therapy is effective. This may require some form of repeat testing.      Healthy Lifestyle:  Healthy diet, exercise and limit alcohol: Not only will excessive alcohol increase your weight over time, but it irritates the throat tissues and make them swell, shrinking the airway and causing snoring. Drinking alcohol should be limited and stopped within 3-4 hours before  going to bed.   Stop smoking: (Red swollen throat, heat, nicotine), also irritates and swells the airway, among numerous other negative health consequences.    Positioning Device  This example shows a pillow that straps around the waist. It may be appropriate for those whose sleep study shows milder sleep apnea that occurs primarily when lying flat on one's back. Preliminary studies have shown benefit but effectiveness at home should be verified.                      Oral Appliance  These are examples of two of many custom-made devices that are more likely to work in mild sleep apnea   Oral appliances are dental mouth pieces that fit very much like a sports mouth guards or removable orthodontic retainers. They are used to treat snoring and obstructive sleep apnea . The device prevents the airway from collapsing by either supporting the jaw in a forward position. Since oral appliances are non-invasive and easy to use, they may be considered as an early treatment option. Oral appliance therapy (OAT) involves the customization, selection, fabrication, fitting, adjustments and long-term follow-up care.  Custom made oral appliances are proven to be more effective than over-the-counter devices. Therefore, the over-the-counter devices are recommended not to be used as a screening tool nor as a therapeutic option.     Who gets a dental device?  Oral appliance therapy can be used as an alternative to CPAP therapy for the treatment of mild to moderate sleep apnea and for those patients who prefer OAT to CPAP. Oral appliance therapy is a first line therapy for the treatment of primary snoring. Additionally, OAT is an option for those that cannot tolerate CPAP as therapy or who have experienced insufficient surgical results.                 Possible side effects?  Frequent but minor side effects include: excessive salivation, dry mouth, discomfort of teeth and jaw and temporary changes in the patient s bite.  Potential  complications include: jaw pain, permanent occlusal changes and TMJ symptoms.  The above mentioned side effects and complications can be recognized and managed by dentists trained in dental sleep medicine.   Finding a dentist that practices dental sleep medicine  Specific training is available through the American Academy of Dental Sleep Medicine for dentists interested in working in the field of sleep. To find a dentist who is educated in the field of sleep and the use of oral appliances, near you, visit the Web site of the American Academy of Dental Sleep Medicine; also see http://www.accpstorage.org/newOrganization/patients/oralAppliances.pdf   To search for a dentist certified in these practices:  Http://aadsm.org/FindADentist.aspx?1  Nasal Valves              Nasal valves may be an option for mild apnea if other options are not well tolerated. The efficacy of these devices is generally less than CPAP or oral appliances.They may not be effective if you have frequent nasal congestion or have difficulty breathing through your nose.   Weight Loss:    Weight loss decreases severity of sleep apnea in most people with obesity. For those with mild obesity who have developed snoring with weight gain, even 15-30 pound weight loss can improve and occasionally eliminate sleep apnea.  Structured and life-long dietary and health habits are necessary to lose weight and keep healthier weight levels.     Though there are significant health benefits from weight loss, long-term weight loss is very difficult to achieve- studies show success with dietary management in less than 10% of people. In addition, substantial weight loss may require years of dietary control and may be difficult if patients have severe obesity. In these cases, surgical management may be considered.    If you are interested in methods for weight loss, you should review the options discussed at the National Institutes of Health patient information sites:      http://win.niddk.nih.gov/publications/index.htm  http:/www.health.nih.gov/topic/WeightLossDieting    Bariatric programs offer counseling in all methods of weight loss:    Http:/www.[facility].org/Specialties/WeightLossSurgeryandMedicalMgmt/htm    Your BMI is Body mass index is 30.56 kg/(m^2).    Weight management plan: Patient was referred to their PCP to discuss a diet and exercise plan.    Body mass index (BMI) is one way to tell whether you are at a healthy weight, overweight, or obese. It measures your weight in relation to your height.  A BMI of 18.5 to 24.9 is in the healthy range. A person with a BMI of 25 to 29.9 is considered overweight, and someone with a BMI of 30 or greater is considered obese.  Another way to find out if you are at risk for health problems caused by overweight and obesity is to measure your waist. If you are a woman and your waist is more than 35 inches, or if you are a man and your waist is more than 40 inches, your risk of disease may be higher.  More than two-thirds of American adults are considered overweight or obese. Being overweight or obese increases the risk for further weight gain.  Excess weight may lead to heart disease and diabetes. Creating and following plans for healthy eating and physical activity may help you improve your health.    Methods for maintaining or losing weight.    Weight control is part of healthy lifestyle and includes exercise, emotional health, and healthy eating habits.  Careful eating habits lifelong is the mainstay of weight control.  Though there are significant health benefits from weight loss, long-term weight loss with diet alone may be very difficult to achieve- studies show long-term success with dietary management in less than 10% of people. Attaining a healthy weight may be especially difficult to achieve in those with severe obesity. In some cases, medications, devices and surgical management might be considered.    What can you  "do?    If you are overweight or obese and are interested in methods for weight loss, you should discuss this with your provider. In addition, we recommend that you review healthy life styles and methods for weight loss available through the National Institutes of Health patient information sites:     http://win.niddk.nih.gov/publications/index.htm      Surgery:  There are a number of surgeries that have been attempted to treat apnea. In general, surgical options are usually reserved for cases in which there is a physical abnormality contributing to obstruction or other treatment options are ineffective or not tolerated. Most surgical options are either unreliable or quite invasive. One of the more common procedures is:  Uvulopalatopharyngoplasty: In this procedure, the uvula (the finger-like tissue that hangs in the back of the throat), part of the soft palate (the tissue that the uvula is attached to), and sometimes the tonsils or adenoids are removed. The efficacy of this surgery is around 30-50% .  After surgery, complications may include:  Sleepiness and sleep apnea related to post-surgery medication   Swelling, infection and bleeding   A sore throat and/or difficulty swallowing   Drainage of secretions into the nose and a nasal quality to the voice. English language speech does not seem to be affected by this surgery.   Narrowing of the airway in the nose and throat (hence constricting breathing) snoring and even iatrogenically caused sleep apnea. By cutting the tissues, excess scar tissue can \"tighten\" the airway and make it even smaller than it was before UPPP.  Patients who have had the uvula removed will become unable to correctly speak Romanian or any other language that has a uvular 'r' phoneme.    Surgeries to help resolve nasal congestion may help reduce the severity of apnea slightly. Nasal congestion does not cause apnea on its own, so these surgeries are usually not performed just for CHASE.  They may be " worth considering if the nasal congestion is significantly bothersome independent of apnea.

## 2018-02-23 NOTE — PROGRESS NOTES
SUBJECTIVE:   Nikki Morales is a 58 year old female who presents to clinic today for the following health issues:    Patient has not started taking her crestor due to the fact that she didn't want to start a new med during her vacation. She now has a cough which started on Saturday.     Patient is also requesting print outs of past surgeries and medications for her sleep study which she is having done here.       Medication Followup of Crestor    Taking Medication as prescribed: NO    Side Effects:  NA    Medication Helping Symptoms:  not applicable     Acute Illness   Acute illness concerns: Cough   Onset: 6 days ago     Fever: no    Chills/Sweats: YES- chills     Headache (location?): YES- from coughing     Sinus Pressure:no    Conjunctivitis:  no    Ear Pain: no    Rhinorrhea: YES-PND    Congestion: YES- chest     Sore Throat: no     Cough: YES-productive of yellow sputum    Wheeze: YES    Decreased Appetite: YES    Nausea: no    Vomiting: no    Diarrhea:  no    Dysuria/Freq.: no    Fatigue/Achiness: YES- both     Sick/Strep Exposure: YES- sick people      Therapies Tried and outcome: cough drops and inhalers.       See below    Problem list and histories reviewed & adjusted, as indicated.  Additional history: as documented    BP Readings from Last 3 Encounters:   02/23/18 122/70   01/26/18 122/72   01/18/18 124/60    Wt Readings from Last 3 Encounters:   02/23/18 201 lb (91.2 kg)   01/26/18 200 lb (90.7 kg)   01/18/18 207 lb 6.4 oz (94.1 kg)                  Labs reviewed in EPIC    Reviewed and updated as needed this visit by clinical staff       Reviewed and updated as needed this visit by Provider         ROS:  Constitutional, HEENT, cardiovascular, pulmonary, gi and gu systems are negative, except as otherwise noted.  Patient did not start Crestor because she was going to be traveling and already had some aches and pains, came back and became ill with a respiratory infection and wants to make sure  "she is well so that if she has side effects, she can be certain there from medication    Patient's adult son lives with them and continues to struggle with chemical issues.  This just affects her mood greatly and makes it difficult for her to function with her own mental issues.  Currently does not feel significantly depressed.  Because of life situation, she is able to rest when she needs to and be active when she can.    Patient developed slight sore throat slight headache slight fever and now with very persistent cough and mid chest tightness.  Has been using Brio inhaler.  This is been helping minimally.  She is wondering what else we might do for her.    Patient continues to have some irregular heartbeats.  She did have her ZIO patch and PVCs SVT but no atrial fibrillation were noted.  She will see cardiology soon.    OBJECTIVE:     /70 (BP Location: Right arm, Patient Position: Chair, Cuff Size: Adult Large)  Pulse 90  Temp 97.6  F (36.4  C) (Temporal)  Ht 5' 8\" (1.727 m)  Wt 201 lb (91.2 kg)  SpO2 96%  BMI 30.56 kg/m2  Body mass index is 30.56 kg/(m^2).  GENERAL: alert, mild physical distress and frequent dry hacking cough  EYES: Eyes grossly normal to inspection, PERRL and conjunctivae and sclerae normal  HENT: Moist membranes and normal pharynx  NECK: no adenopathy, no asymmetry, masses, or scars and thyroid normal to palpation  RESP: Perhaps mild inspiratory and expiratory wheeze bilaterally.  No rales.  Frequent cough is noted  CV: regular rate and rhythm, normal S1 S2, no S3 or S4, no murmur, click or rub, no peripheral edema and peripheral pulses strong  MS: no gross musculoskeletal defects noted, no edema  NEURO: Normal strength and tone, mentation intact and speech normal  PSYCH: mentation appears normal, fatigued, judgement and insight intact and appearance well groomed    Diagnostic Test Results:  none     ASSESSMENT/PLAN:             1. Hyperlipidemia LDL goal <130  Patient will start " Crestor as soon as this illness is improved.  Then she will come back for labs.  - rosuvastatin (CRESTOR) 10 MG tablet; Take 1 tablet (10 mg) by mouth daily  Dispense: 30 tablet; Refill: 1    2. Mild persistent asthma without complication  Currently technically is having complication with respiratory infection is noted in #4 below.  Antibiotic and prednisone will be prescribed.  She always likes prednisone because it does give her energy.  - azithromycin (ZITHROMAX) 250 MG tablet; Two tablets first day, then one tablet daily for four days.  Dispense: 6 tablet; Refill: 0  - predniSONE (DELTASONE) 20 MG tablet; Take 1 tablet (20 mg) by mouth daily  Dispense: 5 tablet; Refill: 0    3. Intermittent atrial fibrillation (H)  This is in addition to other irregular heartbeats.  She will be seeing cardiology to determine next course whether medication or perhaps EP studies    4. Acute bronchitis with symptoms > 10 days  Treating the acute infection because seldom will she improve without antibiotics and certainly without prednisone  - azithromycin (ZITHROMAX) 250 MG tablet; Two tablets first day, then one tablet daily for four days.  Dispense: 6 tablet; Refill: 0  - predniSONE (DELTASONE) 20 MG tablet; Take 1 tablet (20 mg) by mouth daily  Dispense: 5 tablet; Refill: 0    5. Attention deficit hyperactivity disorder, inattentive type  Remains present but controlled    6. Chronic fatigue syndrome  Again as noted above, if she can rest when she needs to and be active and she feels well this works well.  She is stressed again by her adult son and some of his issues.      Patient Instructions   Take antibiotics and prdnisone  See cardiology for plan.  Start crestor when better  Come for lab about 8 weeks after start          Time spent with greater than 50% spent in discussion, making the plan, or filling out paperwork with patient for illness/treatments was 25 minutes or more.    Erik Peraza MD  [facility]  [place]

## 2018-03-01 ENCOUNTER — OFFICE VISIT (OUTPATIENT)
Dept: CARDIOLOGY | Facility: CLINIC | Age: 58
End: 2018-03-01
Attending: INTERNAL MEDICINE
Payer: COMMERCIAL

## 2018-03-01 VITALS
BODY MASS INDEX: 30.08 KG/M2 | HEART RATE: 68 BPM | DIASTOLIC BLOOD PRESSURE: 80 MMHG | WEIGHT: 198.5 LBS | HEIGHT: 68 IN | SYSTOLIC BLOOD PRESSURE: 124 MMHG | OXYGEN SATURATION: 96 %

## 2018-03-01 DIAGNOSIS — I48.91 NEW ONSET ATRIAL FIBRILLATION (H): ICD-10-CM

## 2018-03-01 PROCEDURE — 99214 OFFICE O/P EST MOD 30 MIN: CPT | Performed by: INTERNAL MEDICINE

## 2018-03-01 NOTE — PROGRESS NOTES
Service Date: 2018      Erik Peraza MD    [facility]    [address] [address]      RE: Nikki Morales   MRN: [id number]   : 1960      Dear Dr. Peraza:       I saw Ms. Morales for followup of atrial fibrillation.  She is a 58-year-old white female who had atrial fibrillation that required DC cardioversion in the ER on 01/15.  I saw her on  and recommended a Zio Patch monitor.  So far, the Zio Patch monitor has detected a few episodes of atrial tachycardia that were nonsustained with variable heart rate.  Her symptoms of palpitation during the Zio Patch monitor were not associated with cardiac arrhythmia.  Symptomatically, she has occasional palpitation but no persistent heart racing.      PHYSICAL EXAMINATION:   VITAL SIGNS:  Blood pressure was 124/80, heart rate 68 beats per minute, body weight 198 pounds.   HEENT:  Eyes and ENT were unremarkable.   LUNGS:  Clear.   CARDIAC:  Rhythm was regular and heart sounds were normal with no murmur.   ABDOMEN:  Examination showed moderate obesity.   EXTREMITIES:  There was no pedal edema.      ASSESSMENT AND RECOMMENDATIONS:  Ms. Morales has not had recurrent atrial fibrillation so far.  The Zio Patch monitor detected some nonsustained atrial tachycardia that does not require intervention at the present time.  It is unknown when or whether she will have recurrent atrial fibrillation.  For the time being, I recommend observation only.  If she has recurrent palpitations, she is asked to check her radial pulses for 1 minute for regularity and rate.  If there is any suspicion of atrial fibrillation, she should get an EKG for confirmation or reconsider a repeat event monitor for confirmation before long-term commitment.  For the time being, I do not schedule her for routine cardiology followup but she can contact us if she has recurrent atrial fibrillation in the future.      Sincerely,         MARCY MORENO MD              D: 2018   T: 2018   MT: [place]      Name:     ROSIBEL JACOBO   MRN:      [id number]        Account:      [id number]   :      1960           Service Date: 2018      Document: [id number]

## 2018-03-01 NOTE — MR AVS SNAPSHOT
After Visit Summary   3/1/2018    Nikki Morales    MRN: [id number]           Patient Information     Date Of Birth          1960        Visit Information        Provider Department      3/1/2018 11:30 AM Jose Luis Rogers MD [facility]        Today's Diagnoses     New onset atrial fibrillation (H)           Follow-ups after your visit        Your next 10 appointments already scheduled     Apr 24, 2018  8:00 PM CDT   PSG Split with PH BED 2   [facility]    [address] [place] [id number] [phone]            May 02, 2018 10:30 AM CDT   Return Sleep Patient with Geoffrey Loving PA-C   [facility]    [address] [place] [id number] [phone]            Jul 23, 2018   Procedure with Martina Andrade MD   [facility]    [address] [place]  [facility] [address] [phone]              Who to contact     If you have questions or need follow up information about today's clinic visit or your schedule please contact [facility] directly at [phone].  Normal or non-critical lab and imaging results will be communicated to you by MyChart, letter or phone within 4 business days after the clinic has received the results. If you do not hear from us within 7 days, please contact the clinic through MyChart or phone. If you have a critical or abnormal lab result, we will notify you by phone as soon as possible.  Submit refill requests through [employer] or call your pharmacy and they will forward the refill request to us. Please allow 3 business days for your refill to be completed.          Additional Information About Your Visit        "[employer] [facility] Information     [employer] gives you secure access to your electronic health record. If you see a  "primary care provider, you can also send messages to your care team and make appointments. If you have questions, please call your primary care clinic.  If you do not have a primary care provider, please call [phone] and they will assist you.        Care EveryWhere ID     This is your Care EveryWhere ID. This could be used by other organizations to access your [facility] medical records  [id number]        Your Vitals Were     Pulse Height Pulse Oximetry BMI (Body Mass Index)          68 1.727 m (5' 8\") 96% 30.18 kg/m2         Blood Pressure from Last 3 Encounters:   03/01/18 124/80   02/23/18 122/70   02/23/18 122/70    Weight from Last 3 Encounters:   03/01/18 90 kg (198 lb 8 oz)   02/23/18 91.2 kg (201 lb)   02/23/18 91.2 kg (201 lb)              We Performed the Following     Follow-Up with Electrophysiologist        Primary Care Provider Office Phone # Fax #    Erik Hamzah Peraza [phone] [address] [place] [address]        Equal Access to Services     [facility] : Hadii aad ku hadasho Sokwame, waaxda luqadaha, qaybta kaalmada adekamron, phuc shelton . So [facility] [phone].    ATENCIÓN: Si habla español, tiene a newsome disposición servicios gratuitos de asistencia lingüística. [facility] [phone].    We comply with applicable federal civil rights laws and [place] laws. We do not discriminate on the basis of race, color, national origin, age, disability, sex, sexual orientation, or gender identity.            Thank you!     Thank you for choosing [facility]  for your care. Our goal is always to provide you with excellent care. Hearing back from our patients is one way we can continue to improve our services. Please take a few minutes to complete the written survey that you may receive in the mail after your visit with us. Thank you!             Your Updated Medication List - Protect others around " you: Learn how to safely use, store and throw away your medicines at www.disposemymeds.org.          This list is accurate as of 3/1/18 11:52 AM.  Always use your most recent med list.                   Brand Name Dispense Instructions for use Diagnosis    albuterol 108 (90 BASE) MCG/ACT Inhaler    PROAIR HFA/PROVENTIL HFA/VENTOLIN HFA    1 Inhaler    Inhale 2 puffs into the lungs every 6 hours as needed for shortness of breath / dyspnea or wheezing    Mild persistent asthma without complication       aspirin 81 MG tablet      Take 1 tablet (81 mg) by mouth daily        CRESTOR 10 MG tablet   Generic drug:  rosuvastatin     30 tablet    Take 1 tablet (10 mg) by mouth daily    Hyperlipidemia LDL goal <130       cyanocobalamin 1000 MCG/ML injection    VITAMIN B12    3 mL    INJECT 1ML INTRAMUSCULARLY EVERY 30 DAYS    Chronic fatigue syndrome, Vitamin B12 deficiency (dietary) anemia       estradiol 0.05 MG/24HR BIW patch    VIVELLE-DOT     Place 1 patch onto the skin twice a week    Hypertrophy of breast, Eosinophilic esophagitis, Overweight(278.02)       fluticasone 220 MCG/ACT Inhaler    FLOVENT HFA    3 Inhaler    Inhale 2 puffs into the lungs 2 times daily    Mild persistent asthma without complication       fluticasone 50 MCG/ACT spray    FLONASE    1 Bottle    Spray 1-2 sprays into both nostrils daily        levothyroxine 100 MCG tablet    SYNTHROID/LEVOTHROID    90 tablet    Take 1 tablet (100 mcg) by mouth daily    Hypothyroidism due to acquired atrophy of thyroid       methylphenidate 10 MG tablet    RITALIN    30 tablet    1/2 to 1 tablet every day as needed    Chronic fatigue syndrome       Multi-vitamin Tabs tablet     100 tablet    Take 1 tablet by mouth daily        omeprazole 20 MG CR capsule    priLOSEC    30 capsule    Take 1 capsule (20 mg) by mouth daily 1 TAB PO QD (Once per day)  As needed    Eosinophilic esophagitis       zolpidem 5 MG tablet    AMBIEN    60 tablet    1 to 2 tabs at hour of sleep  as needed    Chronic fatigue syndrome

## 2018-03-01 NOTE — LETTER
3/1/2018      Erikmyrna Peraza MD  [address] [place] [address]      RE: Nikki Morales       Dear Colleague,    I had the pleasure of seeing Nikki Morales in the [facility].    Service Date: 2018      Erik Peraza MD    [facility]    [address] [place] [address]      RE: Nikki Morales   MRN: [id number]   : 1960      Dear Dr. Peraza:       I saw Ms. Morales for followup of atrial fibrillation.  She is a 58-year-old white female who had atrial fibrillation that required DC cardioversion in the ER on 01/15.  I saw her on  and recommended a Zio Patch monitor.  So far, the Zio Patch monitor has detected a few episodes of atrial tachycardia that were nonsustained with variable heart rate.  Her symptoms of palpitation during the Zio Patch monitor were not associated with cardiac arrhythmia.  Symptomatically, she has occasional palpitation but no persistent heart racing.      PHYSICAL EXAMINATION:   VITAL SIGNS:  Blood pressure was 124/80, heart rate 68 beats per minute, body weight 198 pounds.   HEENT:  Eyes and ENT were unremarkable.   LUNGS:  Clear.   CARDIAC:  Rhythm was regular and heart sounds were normal with no murmur.   ABDOMEN:  Examination showed moderate obesity.   EXTREMITIES:  There was no pedal edema.      ASSESSMENT AND RECOMMENDATIONS:  Ms. Morales has not had recurrent atrial fibrillation so far.  The Zio Patch monitor detected some nonsustained atrial tachycardia that does not require intervention at the present time.  It is unknown when or whether she will have recurrent atrial fibrillation.  For the time being, I recommend observation only.  If she has recurrent palpitations, she is asked to check her radial pulses for 1 minute for regularity and rate.  If there is any suspicion of atrial fibrillation, she should get an EKG for confirmation or reconsider a repeat event monitor for  confirmation before long-term commitment.  For the time being, I do not schedule her for routine cardiology followup but she can contact us if she has recurrent atrial fibrillation in the future.      Sincerely,         MARCY MORENO MD             D: 2018   T: 2018   MT: [place]      Name:     ROSIBEL JACOBO   MRN:      [id number]        Account:      [id number]   :      1960           Service Date: 2018      Document: [id number]           Outpatient Encounter Prescriptions as of 3/1/2018   Medication Sig Dispense Refill     rosuvastatin (CRESTOR) 10 MG tablet Take 1 tablet (10 mg) by mouth daily 30 tablet 1     fluticasone (FLOVENT HFA) 220 MCG/ACT Inhaler Inhale 2 puffs into the lungs 2 times daily 3 Inhaler 1     levothyroxine (SYNTHROID/LEVOTHROID) 100 MCG tablet Take 1 tablet (100 mcg) by mouth daily 90 tablet 3     aspirin 81 MG tablet Take 1 tablet (81 mg) by mouth daily  OTC     zolpidem (AMBIEN) 5 MG tablet 1 to 2 tabs at hour of sleep as needed 60 tablet 5     albuterol (PROAIR HFA/PROVENTIL HFA/VENTOLIN HFA) 108 (90 BASE) MCG/ACT Inhaler Inhale 2 puffs into the lungs every 6 hours as needed for shortness of breath / dyspnea or wheezing 1 Inhaler 3     cyanocobalamin (VITAMIN B12) 1000 MCG/ML injection INJECT 1ML INTRAMUSCULARLY EVERY 30 DAYS 3 mL 2     methylphenidate (RITALIN) 10 MG tablet 1/2 to 1 tablet every day as needed 30 tablet 0     fluticasone (FLONASE) 50 MCG/ACT spray Spray 1-2 sprays into both nostrils daily 1 Bottle 11     omeprazole (PRILOSEC) 20 MG capsule Take 1 capsule (20 mg) by mouth daily 1 TAB PO QD (Once per day)  As needed 30 capsule 11     estradiol (VIVELLE-DOT) 0.05 MG/24HR patch Place 1 patch onto the skin twice a week       multivitamin, therapeutic with minerals (MULTI-VITAMIN) TABS Take 1 tablet by mouth daily 100 tablet 3     [DISCONTINUED] azithromycin (ZITHROMAX) 250 MG tablet Two tablets first day, then one tablet daily for four days. 6 tablet  0     [DISCONTINUED] predniSONE (DELTASONE) 20 MG tablet Take 1 tablet (20 mg) by mouth daily 5 tablet 0     No facility-administered encounter medications on file as of 3/1/2018.        Again, thank you for allowing me to participate in the care of your patient.      Sincerely,    Jose Luis Rogers MD     [facility]

## 2018-03-01 NOTE — PROGRESS NOTES
HPI and Plan:   See dictation    No orders of the defined types were placed in this encounter.      No orders of the defined types were placed in this encounter.      Medications Discontinued During This Encounter   Medication Reason     azithromycin (ZITHROMAX) 250 MG tablet Therapy completed     predniSONE (DELTASONE) 20 MG tablet Therapy completed         Encounter Diagnosis   Name Primary?     New onset atrial fibrillation (H)        CURRENT MEDICATIONS:  Current Outpatient Prescriptions   Medication Sig Dispense Refill     rosuvastatin (CRESTOR) 10 MG tablet Take 1 tablet (10 mg) by mouth daily 30 tablet 1     fluticasone (FLOVENT HFA) 220 MCG/ACT Inhaler Inhale 2 puffs into the lungs 2 times daily 3 Inhaler 1     levothyroxine (SYNTHROID/LEVOTHROID) 100 MCG tablet Take 1 tablet (100 mcg) by mouth daily 90 tablet 3     aspirin 81 MG tablet Take 1 tablet (81 mg) by mouth daily  OTC     zolpidem (AMBIEN) 5 MG tablet 1 to 2 tabs at hour of sleep as needed 60 tablet 5     albuterol (PROAIR HFA/PROVENTIL HFA/VENTOLIN HFA) 108 (90 BASE) MCG/ACT Inhaler Inhale 2 puffs into the lungs every 6 hours as needed for shortness of breath / dyspnea or wheezing 1 Inhaler 3     cyanocobalamin (VITAMIN B12) 1000 MCG/ML injection INJECT 1ML INTRAMUSCULARLY EVERY 30 DAYS 3 mL 2     methylphenidate (RITALIN) 10 MG tablet 1/2 to 1 tablet every day as needed 30 tablet 0     fluticasone (FLONASE) 50 MCG/ACT spray Spray 1-2 sprays into both nostrils daily 1 Bottle 11     omeprazole (PRILOSEC) 20 MG capsule Take 1 capsule (20 mg) by mouth daily 1 TAB PO QD (Once per day)  As needed 30 capsule 11     estradiol (VIVELLE-DOT) 0.05 MG/24HR patch Place 1 patch onto the skin twice a week       multivitamin, therapeutic with minerals (MULTI-VITAMIN) TABS Take 1 tablet by mouth daily 100 tablet 3     [DISCONTINUED] NEW MED Methylcobalamin 25mg/ml  0.1ml weekly 1 Bottle 11       ALLERGIES     Allergies   Allergen Reactions     Codeine Other (See  Comments)     Causes agitation       PAST MEDICAL HISTORY:  Past Medical History:   Diagnosis Date     Chronic fatigue      Hyperlipidemia      Hypothyroid      New onset a-fib (H)      Other vitamin B12 deficiency anemia        PAST SURGICAL HISTORY:  Past Surgical History:   Procedure Laterality Date     C  DELIVERY ONLY      , Low Cervical     C NONSPECIFIC PROCEDURE      sinus     C NONSPECIFIC PROCEDURE      Esophgeal dilatation multiple     C NONSPECIFIC PROCEDURE      sling     C VAGINAL HYSTERECTOMY  1995    Hysterectomy, Vaginal     COLONOSCOPY  10/06/09     COLONOSCOPY  2013    Procedure: COMBINED COLONOSCOPY, SINGLE BIOPSY/POLYPECTOMY BY BIOPSY;;  Surgeon: Cuate Miles MD;  Location: [facility]     CONIZATION CERVIX,KNIFE/LASER       ESOPHAGOSCOPY, GASTROSCOPY, DUODENOSCOPY (EGD), COMBINED  2013    Procedure: COMBINED ESOPHAGOSCOPY, GASTROSCOPY, DUODENOSCOPY (EGD), BIOPSY SINGLE OR MULTIPLE;  egd with rabdain biopsies and colonoscopy with polypectomy by biopsy ;  Surgeon: Cuate Miles MD;  Location: [facility]     HC DILATION/CURETTAGE DIAG/THER NON OB      D & C     HC REMOVAL OF TONSILS,<11 Y/O      Tonsils <10y.o.     HC UGI ENDOSCOPY DIAG W BIOPSY  07     HC UGI ENDOSCOPY, SIMPLE EXAM  09/10/99 & 98     HYSTERECTOMY, PAP NO LONGER INDICATED       LAPAROSCOPIC CHOLECYSTECTOMY  10/12/13     TUBAL LIGATION         FAMILY HISTORY:  Family History   Problem Relation Age of Onset     CANCER Mother      Uterine     Prostate Cancer Mother      DIABETES Father      Hypertension Father      CANCER Father      prostate cancer     CEREBROVASCULAR DISEASE Father      Psychotic Disorder Son      severe Add,      Asthma Son      exercised induced asthma     Asthma Son      ecxercise induced asthma     Cardiovascular Sister      recurrent blood clots     CEREBROVASCULAR DISEASE Sister 51     Prostate Cancer Brother      DIABETES Maternal Grandfather      HEART  DISEASE Maternal Grandmother      Heart condition age 96     DIABETES Paternal Grandfather      CANCER Brother      CEREBROVASCULAR DISEASE Paternal Grandmother        SOCIAL HISTORY:  Social History     Social History     Marital status:      Spouse name: Jared     Number of children: 2     Years of education: 12     Occupational History     HomeMaker Homemaker     Social History Main Topics     Smoking status: Former Smoker     Types: Cigarettes     Smokeless tobacco: Never Used      Comment: social  6/mo     Alcohol use 2.4 oz/week     2 Cans of beer, 2 Standard drinks or equivalent per week      Comment: social     Drug use: No      Comment: used in past any and all     Sexual activity: Yes     Partners: Male     Birth control/ protection: Female Surgical      Comment: Hx: hystectomy     Other Topics Concern      Service No     Blood Transfusions No     Caffeine Concern No     Occupational Exposure No     Hobby Hazards No     Sleep Concern Yes     Difficult with sleeping at night, sleeps most of the day     Stress Concern No     Weight Concern Yes     desire wt loss     Special Diet No     Back Care Yes     weight restrictions     Exercise Yes     Bike Helmet No     Seat Belt Yes     Self-Exams No     Parent/Sibling W/ Cabg, Mi Or Angioplasty Before 65f 55m? No     Social History Narrative       Review of Systems:  Skin:  Negative       Eyes:  Positive for glasses    ENT:  Negative      Respiratory:  Positive for shortness of breath;dyspnea on exertion     Cardiovascular:  Negative;dizziness;edema;chest pain Positive for;palpitations;dizziness;fatigue;exercise intolerance    Gastroenterology: Negative      Genitourinary:  Negative      Musculoskeletal:  Positive for back pain;neck pain    Neurologic:  Negative      Psychiatric:  Positive for excessive stress    Heme/Lymph/Imm:  Positive for allergies    Endocrine:  Negative        Physical Exam:  Vitals: /80 (BP Location: Right arm, Patient  "Position: Fowlers, Cuff Size: Adult Large)  Pulse 68  Ht 1.727 m (5' 8\")  Wt 90 kg (198 lb 8 oz)  SpO2 96%  BMI 30.18 kg/m2    Constitutional:  cooperative, alert and oriented, well developed, well nourished, in no acute distress        Skin:  warm and dry to the touch, no apparent skin lesions or masses noted          Head:  normocephalic, no masses or lesions        Eyes:  pupils equal and round, conjunctivae and lids unremarkable, sclera white, no xanthalasma, EOMS intact, no nystagmus        Lymph:No Cervical lymphadenopathy present     ENT:  no pallor or cyanosis, dentition good        Neck:  carotid pulses are full and equal bilaterally, JVP normal, no carotid bruit        Respiratory:  normal breath sounds, clear to auscultation, normal A-P diameter, normal symmetry, normal respiratory excursion, no use of accessory muscles         Cardiac: regular rhythm, normal S1/S2, no S3 or S4, apical impulse not displaced, no murmurs, gallops or rubs                                                         GI:  abdomen soft, non-tender, BS normoactive, no mass, no HSM, no bruits        Extremities and Muscular Skeletal:  no deformities, clubbing, cyanosis, erythema observed              Neurological:  no gross motor deficits        Psych:  Alert and Oriented x 3        CC  Jose Luis Rogers MD  [address]  [address]  [place] [id number]              "

## 2018-03-01 NOTE — LETTER
3/1/2018    Erik Hamzah Peraza MD  [address] [place] [address]    RE: Nikki Morales       Dear Colleague,    I had the pleasure of seeing Nikki Morales in the [facility].    HPI and Plan:   See dictation    No orders of the defined types were placed in this encounter.      No orders of the defined types were placed in this encounter.      Medications Discontinued During This Encounter   Medication Reason     azithromycin (ZITHROMAX) 250 MG tablet Therapy completed     predniSONE (DELTASONE) 20 MG tablet Therapy completed         Encounter Diagnosis   Name Primary?     New onset atrial fibrillation (H)        CURRENT MEDICATIONS:  Current Outpatient Prescriptions   Medication Sig Dispense Refill     rosuvastatin (CRESTOR) 10 MG tablet Take 1 tablet (10 mg) by mouth daily 30 tablet 1     fluticasone (FLOVENT HFA) 220 MCG/ACT Inhaler Inhale 2 puffs into the lungs 2 times daily 3 Inhaler 1     levothyroxine (SYNTHROID/LEVOTHROID) 100 MCG tablet Take 1 tablet (100 mcg) by mouth daily 90 tablet 3     aspirin 81 MG tablet Take 1 tablet (81 mg) by mouth daily  OTC     zolpidem (AMBIEN) 5 MG tablet 1 to 2 tabs at hour of sleep as needed 60 tablet 5     albuterol (PROAIR HFA/PROVENTIL HFA/VENTOLIN HFA) 108 (90 BASE) MCG/ACT Inhaler Inhale 2 puffs into the lungs every 6 hours as needed for shortness of breath / dyspnea or wheezing 1 Inhaler 3     cyanocobalamin (VITAMIN B12) 1000 MCG/ML injection INJECT 1ML INTRAMUSCULARLY EVERY 30 DAYS 3 mL 2     methylphenidate (RITALIN) 10 MG tablet 1/2 to 1 tablet every day as needed 30 tablet 0     fluticasone (FLONASE) 50 MCG/ACT spray Spray 1-2 sprays into both nostrils daily 1 Bottle 11     omeprazole (PRILOSEC) 20 MG capsule Take 1 capsule (20 mg) by mouth daily 1 TAB PO QD (Once per day)  As needed 30 capsule 11     estradiol (VIVELLE-DOT) 0.05 MG/24HR patch Place 1 patch onto the skin twice a week       multivitamin,  therapeutic with minerals (MULTI-VITAMIN) TABS Take 1 tablet by mouth daily 100 tablet 3     [DISCONTINUED] NEW MED Methylcobalamin 25mg/ml  0.1ml weekly 1 Bottle 11       ALLERGIES     Allergies   Allergen Reactions     Codeine Other (See Comments)     Causes agitation       PAST MEDICAL HISTORY:  Past Medical History:   Diagnosis Date     Chronic fatigue      Hyperlipidemia      Hypothyroid      New onset a-fib (H)      Other vitamin B12 deficiency anemia        PAST SURGICAL HISTORY:  Past Surgical History:   Procedure Laterality Date     C  DELIVERY ONLY      , Low Cervical     C NONSPECIFIC PROCEDURE  's    sinus     C NONSPECIFIC PROCEDURE      Esophgeal dilatation multiple     C NONSPECIFIC PROCEDURE      sling     C VAGINAL HYSTERECTOMY  1995    Hysterectomy, Vaginal     COLONOSCOPY  10/06/09     COLONOSCOPY  2013    Procedure: COMBINED COLONOSCOPY, SINGLE BIOPSY/POLYPECTOMY BY BIOPSY;;  Surgeon: Cuate Miles MD;  Location: [facility]     CONIZATION CERVIX,KNIFE/LASER       ESOPHAGOSCOPY, GASTROSCOPY, DUODENOSCOPY (EGD), COMBINED  2013    Procedure: COMBINED ESOPHAGOSCOPY, GASTROSCOPY, DUODENOSCOPY (EGD), BIOPSY SINGLE OR MULTIPLE;  egd with rabdain biopsies and colonoscopy with polypectomy by biopsy ;  Surgeon: Cuate Miles MD;  Location: [facility]     HC DILATION/CURETTAGE DIAG/THER NON OB      D & C     HC REMOVAL OF TONSILS,<9 Y/O      Tonsils <10y.o.     HC UGI ENDOSCOPY DIAG W BIOPSY  07     HC UGI ENDOSCOPY, SIMPLE EXAM  09/10/99 & 98     HYSTERECTOMY, PAP NO LONGER INDICATED       LAPAROSCOPIC CHOLECYSTECTOMY  10/12/13     TUBAL LIGATION         FAMILY HISTORY:  Family History   Problem Relation Age of Onset     CANCER Mother      Uterine     Prostate Cancer Mother      DIABETES Father      Hypertension Father      CANCER Father      prostate cancer     CEREBROVASCULAR DISEASE Father      Psychotic Disorder Son      severe Add,      Asthma Son       exercised induced asthma     Asthma Son      ecxercise induced asthma     Cardiovascular Sister      recurrent blood clots     CEREBROVASCULAR DISEASE Sister 51     Prostate Cancer Brother      DIABETES Maternal Grandfather      HEART DISEASE Maternal Grandmother      Heart condition age 96     DIABETES Paternal Grandfather      CANCER Brother      CEREBROVASCULAR DISEASE Paternal Grandmother        SOCIAL HISTORY:  Social History     Social History     Marital status:      Spouse name: Jared     Number of children: 2     Years of education: 12     Occupational History     HomeMaker Homemaker     Social History Main Topics     Smoking status: Former Smoker     Types: Cigarettes     Smokeless tobacco: Never Used      Comment: social  6/mo     Alcohol use 2.4 oz/week     2 Cans of beer, 2 Standard drinks or equivalent per week      Comment: social     Drug use: No      Comment: used in past any and all     Sexual activity: Yes     Partners: Male     Birth control/ protection: Female Surgical      Comment: Hx: hystectomy     Other Topics Concern      Service No     Blood Transfusions No     Caffeine Concern No     Occupational Exposure No     Hobby Hazards No     Sleep Concern Yes     Difficult with sleeping at night, sleeps most of the day     Stress Concern No     Weight Concern Yes     desire wt loss     Special Diet No     Back Care Yes     weight restrictions     Exercise Yes     Bike Helmet No     Seat Belt Yes     Self-Exams No     Parent/Sibling W/ Cabg, Mi Or Angioplasty Before 65f 55m? No     Social History Narrative       Review of Systems:  Skin:  Negative       Eyes:  Positive for glasses    ENT:  Negative      Respiratory:  Positive for shortness of breath;dyspnea on exertion     Cardiovascular:  Negative;dizziness;edema;chest pain Positive for;palpitations;dizziness;fatigue;exercise intolerance    Gastroenterology: Negative      Genitourinary:  Negative      Musculoskeletal:  Positive  "for back pain;neck pain    Neurologic:  Negative      Psychiatric:  Positive for excessive stress    Heme/Lymph/Imm:  Positive for allergies    Endocrine:  Negative        Physical Exam:  Vitals: /80 (BP Location: Right arm, Patient Position: Fowlers, Cuff Size: Adult Large)  Pulse 68  Ht 1.727 m (5' 8\")  Wt 90 kg (198 lb 8 oz)  SpO2 96%  BMI 30.18 kg/m2    Constitutional:  cooperative, alert and oriented, well developed, well nourished, in no acute distress        Skin:  warm and dry to the touch, no apparent skin lesions or masses noted          Head:  normocephalic, no masses or lesions        Eyes:  pupils equal and round, conjunctivae and lids unremarkable, sclera white, no xanthalasma, EOMS intact, no nystagmus        Lymph:No Cervical lymphadenopathy present     ENT:  no pallor or cyanosis, dentition good        Neck:  carotid pulses are full and equal bilaterally, JVP normal, no carotid bruit        Respiratory:  normal breath sounds, clear to auscultation, normal A-P diameter, normal symmetry, normal respiratory excursion, no use of accessory muscles         Cardiac: regular rhythm, normal S1/S2, no S3 or S4, apical impulse not displaced, no murmurs, gallops or rubs                                                         GI:  abdomen soft, non-tender, BS normoactive, no mass, no HSM, no bruits        Extremities and Muscular Skeletal:  no deformities, clubbing, cyanosis, erythema observed              Neurological:  no gross motor deficits        Psych:  Alert and Oriented x 3        CC  Jose Luis Rogers MD  [address]  [address]  [place] [id number]                Service Date: 2018      Erik Peraza MD    [facility]    [address] [place] [address]      RE: Nikki Morales   MRN: [id number]   : 1960      Dear Dr. Peraza:       I saw Ms. Morales for followup of atrial fibrillation.  She is a 58-year-old white female who had atrial fibrillation that required DC " cardioversion in the ER on 01/15.  I saw her on  and recommended a Zio Patch monitor.  So far, the Zio Patch monitor has detected a few episodes of atrial tachycardia that were nonsustained with variable heart rate.  Her symptoms of palpitation during the Zio Patch monitor were not associated with cardiac arrhythmia.  Symptomatically, she has occasional palpitation but no persistent heart racing.      PHYSICAL EXAMINATION:   VITAL SIGNS:  Blood pressure was 124/80, heart rate 68 beats per minute, body weight 198 pounds.   HEENT:  Eyes and ENT were unremarkable.   LUNGS:  Clear.   CARDIAC:  Rhythm was regular and heart sounds were normal with no murmur.   ABDOMEN:  Examination showed moderate obesity.   EXTREMITIES:  There was no pedal edema.      ASSESSMENT AND RECOMMENDATIONS:  Ms. Jacobo has not had recurrent atrial fibrillation so far.  The Zio Patch monitor detected some nonsustained atrial tachycardia that does not require intervention at the present time.  It is unknown when or whether she will have recurrent atrial fibrillation.  For the time being, I recommend observation only.  If she has recurrent palpitations, she is asked to check her radial pulses for 1 minute for regularity and rate.  If there is any suspicion of atrial fibrillation, she should get an EKG for confirmation or reconsider a repeat event monitor for confirmation before long-term commitment.  For the time being, I do not schedule her for routine cardiology followup but she can contact us if she has recurrent atrial fibrillation in the future.      Sincerely,         JOSE LUIS ROGERS MD             D: 2018   T: 2018   MT: [place]      Name:     ROSIBEL JACOBO   MRN:      [id number]        Account:      [id number]   :      1960           Service Date: 2018      Document: [id number]        Thank you for allowing me to participate in the care of your patient.      Sincerely,     Jose Luis Rogers MD     [facility]  [facility]    cc:   Jose Luis Rogers MD  [address]  [address]  [place] [id number]

## 2018-03-26 ENCOUNTER — TELEPHONE (OUTPATIENT)
Dept: OPHTHALMOLOGY | Facility: CLINIC | Age: 58
End: 2018-03-26

## 2018-03-26 NOTE — TELEPHONE ENCOUNTER
[facility] Call Center    Phone Message    May a detailed message be left on voicemail: yes    Reason for Call: Other: FYI patient r/s appt from 2/28 to 5/9- and wants to know if it is covered by insurance- what is the procedure called?  what procedure is she having done in July?  please advise- and then she can call insurance to double check     Action Taken: Message routed to:  Adult Clinics: Eye p [id number]

## 2018-03-26 NOTE — TELEPHONE ENCOUNTER
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  She is on the schedule in July for both upper eyelid blepharoplasty and ptosis repair [id number]. PA to preferred one submitted and received auth approval back on 3/25/18.

## 2018-03-27 DIAGNOSIS — N39.3 FEMALE STRESS INCONTINENCE: ICD-10-CM

## 2018-03-27 NOTE — TELEPHONE ENCOUNTER
Margarita called and discussed the lesion excision she is scheduled for in May. She wanted a procedure code so she could call the ins co. Since there was none in the chart noted from last office visit. She was advised to just call and see what they tell her about lesion excision.  Sofia EVANS. [facility]. [facility]

## 2018-03-28 RX ORDER — TROSPIUM CHLORIDE 20 MG/1
TABLET, FILM COATED ORAL
Qty: 40 TABLET | Refills: 2 | Status: SHIPPED | OUTPATIENT
Start: 2018-03-28 | End: 2018-12-05

## 2018-03-28 NOTE — TELEPHONE ENCOUNTER
trospium (SANCTURA) 20 MG tablet      Last Written Prescription Date:  12/28/16  Last Fill Quantity: 40,   # refills: 2  Last Office Visit: 2/23/18  Future Office visit:    Next 5 appointments (look out 90 days)     May 02, 2018  1:30 PM CDT   Return Visit with Martina Anrdade MD   [facility] ([facility])    [address] [place] [id number] [phone]                   Routing refill request to provider for review/approval because:  Drug not on the [facility], [facility] or [facility] refill protocol or controlled substance

## 2018-05-02 ENCOUNTER — OFFICE VISIT (OUTPATIENT)
Dept: OPHTHALMOLOGY | Facility: CLINIC | Age: 58
End: 2018-05-02
Payer: COMMERCIAL

## 2018-05-02 DIAGNOSIS — L98.9 SKIN LESION: Primary | ICD-10-CM

## 2018-05-02 PROCEDURE — 67840 REMOVE EYELID LESION: CPT | Mod: 59 | Performed by: OPHTHALMOLOGY

## 2018-05-02 PROCEDURE — 88305 TISSUE EXAM BY PATHOLOGIST: CPT | Performed by: OPHTHALMOLOGY

## 2018-05-02 PROCEDURE — 99207 ZZC NO CHARGE LOS: CPT | Performed by: OPHTHALMOLOGY

## 2018-05-02 PROCEDURE — 67840 REMOVE EYELID LESION: CPT | Mod: E2 | Performed by: OPHTHALMOLOGY

## 2018-05-02 NOTE — MR AVS SNAPSHOT
After Visit Summary   5/2/2018    Nikki Morales    MRN: [id number]           Patient Information     Date Of Birth          1960        Visit Information        Provider Department      5/2/2018 1:30 PM Martina Andrade MD [facility]         Follow-ups after your visit        Your next 10 appointments already scheduled     May 09, 2018  1:15 PM CDT   Return Visit with Martina Andrade MD   [facility] ([facility])    [address]  [facility] [address] [phone]            Jul 23, 2018   Procedure with Martina Andrade MD   [facility])    [address]  [facility] [address] [phone]              Who to contact     If you have questions or need follow up information about today's clinic visit or your schedule please contact [facility] directly at [phone].  Normal or non-critical lab and imaging results will be communicated to you by Promisechart, letter or phone within 4 business days after the clinic has received the results. If you do not hear from us within 7 days, please contact the clinic through Promisechart or phone. If you have a critical or abnormal lab result, we will notify you by phone as soon as possible.  Submit refill requests through [employer] or call your pharmacy and they will forward the refill request to us. Please allow 3 business days for your refill to be completed.          Additional Information About Your Visit        Promisechart Information     [employer] gives you secure access to your electronic health record. If you see a primary care provider, you can also send messages to your care team and make appointments. If you have questions, please call your primary care clinic.  If you do not have a primary care provider, please call [phone] and they will assist you.      [employer] is an electronic gateway that provides easy, online access to your  medical records. With [employer], you can request a clinic appointment, read your test results, renew a prescription or communicate with your care team.     To access your existing account, please contact your [facility] or call [phone] for assistance.        Care EveryWhere ID     This is your Care EveryWhere ID. This could be used by other organizations to access your [place] medical records  [id number]         Blood Pressure from Last 3 Encounters:   03/01/18 124/80   02/23/18 122/70   02/23/18 122/70    Weight from Last 3 Encounters:   03/01/18 90 kg (198 lb 8 oz)   02/23/18 91.2 kg (201 lb)   02/23/18 91.2 kg (201 lb)              Today, you had the following     No orders found for display       Primary Care Provider Office Phone # Fax #    Erik Hamzah Peraza [phone]       [address] [place] [address]        Equal Access to Services     HAIR STACK : Hadii aad ku hadasho Soomaali, waaxda luqadaha, qaybta kaalmada adeegyada, waxay idiin hayeloisan myah shelton . So [facility] [phone].    ATENCIÓN: Si habla español, tiene a newsome disposición servicios gratuitos de asistencia lingüística. Llame al [phone].    We comply with applicable federal civil rights laws and [place] laws. We do not discriminate on the basis of race, color, national origin, age, disability, sex, sexual orientation, or gender identity.            Thank you!     Thank you for choosing [facility]  for your care. Our goal is always to provide you with excellent care. Hearing back from our patients is one way we can continue to improve our services. Please take a few minutes to complete the written survey that you may receive in the mail after your visit with us. Thank you!             Your Updated Medication List - Protect others around you: Learn how to safely use, store and throw away your medicines at www.disposemymeds.org.          This list is  accurate as of 5/2/18  2:21 PM.  Always use your most recent med list.                   Brand Name Dispense Instructions for use Diagnosis    albuterol 108 (90 Base) MCG/ACT Inhaler    PROAIR HFA/PROVENTIL HFA/VENTOLIN HFA    1 Inhaler    Inhale 2 puffs into the lungs every 6 hours as needed for shortness of breath / dyspnea or wheezing    Mild persistent asthma without complication       aspirin 81 MG tablet      Take 1 tablet (81 mg) by mouth daily        CRESTOR 10 MG tablet   Generic drug:  rosuvastatin     30 tablet    Take 1 tablet (10 mg) by mouth daily    Hyperlipidemia LDL goal <130       cyanocobalamin 1000 MCG/ML injection    VITAMIN B12    3 mL    INJECT 1ML INTRAMUSCULARLY EVERY 30 DAYS    Chronic fatigue syndrome, Vitamin B12 deficiency (dietary) anemia       estradiol 0.05 MG/24HR BIW patch    VIVELLE-DOT     Place 1 patch onto the skin twice a week    Hypertrophy of breast, Eosinophilic esophagitis, Overweight(278.02)       fluticasone 220 MCG/ACT Inhaler    FLOVENT HFA    3 Inhaler    Inhale 2 puffs into the lungs 2 times daily    Mild persistent asthma without complication       fluticasone 50 MCG/ACT spray    FLONASE    1 Bottle    Spray 1-2 sprays into both nostrils daily        levothyroxine 100 MCG tablet    SYNTHROID/LEVOTHROID    90 tablet    Take 1 tablet (100 mcg) by mouth daily    Hypothyroidism due to acquired atrophy of thyroid       methylphenidate 10 MG tablet    RITALIN    30 tablet    1/2 to 1 tablet every day as needed    Chronic fatigue syndrome       Multi-vitamin Tabs tablet     100 tablet    Take 1 tablet by mouth daily        omeprazole 20 MG CR capsule    priLOSEC    30 capsule    Take 1 capsule (20 mg) by mouth daily 1 TAB PO QD (Once per day)  As needed    Eosinophilic esophagitis       trospium 20 MG tablet    SANCTURA    40 tablet    TAKE 1-2 TABLETS BY MOUTH DAILY AS NEEDED.    Female stress incontinence       zolpidem 5 MG tablet    AMBIEN    60 tablet    1 to 2 tabs at  hour of sleep as needed    Chronic fatigue syndrome

## 2018-05-02 NOTE — PROGRESS NOTES
Preoperative diagnosis: Left upper and lower eyelid lesions  Postoperative diagnosis: Left upper and lower eyelid lesions  Procedure performed: 1.  Excision of left upper eyelid lesion final defect size 15 mm x 10 mm.  2.  Excision of left lower eyelid lesion final defect size 15 mm X 10 mm.  Surgeon: Martina Andrade   Anesthesia: Local 2% lidocaine with epinephrine  Specimen: Eyelid lesion in formalin to pathology    Indication for procedure:  Patient presented with recurrent left upper and lower eyelid lesions.  These have been excised years ago but are now recurring.  They are gradually enlarging.  We discussed risks benefits and alternatives to the excision of these lesions and she elected to proceed today.    Description of procedure:  Nikki was brought to the minor room and placed supine on the operating table.  After obtaining informed consent the left upper and lower eyelid lesions were marked out with a marking pen and the area was infiltrated with local anesthetic.  She was prepped and draped in the usual sterile fashion.  Beginning with the left upper eyelid lesion, a 15 blade was used to excise the lesion in an ellipse fashion.  The skin flap and underlying yellowish material were excised with Caleb scissors.  Hemostasis was obtained with thermal cautery.  Attention was then directed to the lower eyelid lesion, which was excised in a similar fashion.  Hemostasis was obtained.  A small amount of undermining was performed to allow reapproximation without any tension on the eyelid margins.  The defects were then closed with interrupted 6-0 nylon sutures.  Erythromycin ophthalmic ointment was applied.  She tolerated the procedure well and left the minor room in stable condition.    This report was dictated using Dragon voice recognition software.     I was present for the entire procedure. Martina Andrade MD

## 2018-05-08 LAB — COPATH REPORT: NORMAL

## 2018-05-09 ENCOUNTER — OFFICE VISIT (OUTPATIENT)
Dept: OPHTHALMOLOGY | Facility: CLINIC | Age: 58
End: 2018-05-09
Payer: COMMERCIAL

## 2018-05-09 DIAGNOSIS — H02.66 XANTHELASMA OF LEFT EYELID: Primary | ICD-10-CM

## 2018-05-09 PROCEDURE — 99024 POSTOP FOLLOW-UP VISIT: CPT | Performed by: OPHTHALMOLOGY

## 2018-05-09 ASSESSMENT — VISUAL ACUITY
OD_CC: 20/20
METHOD: SNELLEN - LINEAR
OS_CC: 20/30
CORRECTION_TYPE: GLASSES

## 2018-05-09 NOTE — PROGRESS NOTES
Nikki Morales is status post excision of enlarging left upper and lower eyelid lesions.  Path Xanthelasma  Incision(s) healing well.  Sutures were removed today    Encounter Diagnosis   Name Primary?     Xanthelasma of left eyelid Yes       I have recommended:  * Continue antibiotic ointment or bland lubricating ointment (eg vaseline or aquaphor) to the incision site BID.  * Massage along the incision 2-3 x daily.   * Warm soaks 3-4x daily until all edema and ecchymoses resolve  * Return to clinic in 2 months     Attending Physician Attestation:  Complete documentation of historical and exam elements from today's encounter can be found in the full encounter summary report (not reduplicated in this progress note).  I personally obtained the chief complaint(s) and history of present illness.  I confirmed and edited as necessary the review of systems, past medical/surgical history, family history, social history, and examination findings as documented by others; and I examined the patient myself.  I personally reviewed the relevant tests, images, and reports as documented above.  I formulated and edited as necessary the assessment and plan and discussed the findings and management plan with the patient and family. - Martina Andrade MD

## 2018-05-09 NOTE — MR AVS SNAPSHOT
After Visit Summary   5/9/2018    Nikki Morales    MRN: [id number]           Patient Information     Date Of Birth          1960        Visit Information        Provider Department      5/9/2018 1:15 PM Martina Andrade MD [facility]         Follow-ups after your visit        Follow-up notes from your care team     Return in about 2 months (around 7/9/2018) for recheck and , Surgery.      Your next 10 appointments already scheduled     Jul 25, 2018 11:00 AM CDT   Return Visit with Martina Andrade MD   [facility] ([facility])    [address] [place] [address] [phone]            Sep 24, 2018   Procedure with Martina Andrade MD   [facility] (--)    [address] [address] [facility] [address]   [phone]            Oct 17, 2018 11:00 AM CDT   Return Visit with Martina Andrade MD   [facility] ([facility])    [address] [place] [address]   [phone]              Who to contact     If you have questions or need follow up information about today's clinic visit or your schedule please contact [facility] directly at [phone].  Normal or non-critical lab and imaging results will be communicated to you by MyChart, letter or phone within 4 business days after the clinic has received the results. If you do not hear from us within 7 days, please contact the clinic through [employer] or phone. If you have a critical or abnormal lab result, we will notify you by phone as soon as possible.  Submit refill requests through [employer] or call your pharmacy and they will forward the refill request to us. Please allow 3 business days for your refill to be completed.          Additional Information About Your Visit        MyChart Information     [employer] gives you secure access to your electronic health record. If you see a primary care  provider, you can also send messages to your care team and make appointments. If you have questions, please call your primary care clinic.  If you do not have a primary care provider, please call [phone] and they will assist you.      [employer] is an electronic gateway that provides easy, online access to your medical records. With [employer], you can request a clinic appointment, read your test results, renew a prescription or communicate with your care team.     To access your existing account, please contact your [facility] or call [phone] for assistance.        Care EveryWhere ID     This is your Care EveryWhere ID. This could be used by other organizations to access your [place] medical records  [id number]         Blood Pressure from Last 3 Encounters:   03/01/18 124/80   02/23/18 122/70   02/23/18 122/70    Weight from Last 3 Encounters:   03/01/18 90 kg (198 lb 8 oz)   02/23/18 91.2 kg (201 lb)   02/23/18 91.2 kg (201 lb)              Today, you had the following     No orders found for display       Primary Care Provider Office Phone # Fax #    Erik Hamzah Peraza [phone]       [address] [place] [address]        Equal Access to Services     HAIR STACK : Hadii aad ku hadasho Soomaali, waaxda luqadaha, qaybta kaalmada adeegyada, waxay idiin hayeloisan myah pike. So [facility] [phone].    ATENCIÓN: Si habla español, tiene a newsome disposición servicios gratuitos de asistencia lingüística. Llame al [phone].    We comply with applicable federal civil rights laws and [place] laws. We do not discriminate on the basis of race, color, national origin, age, disability, sex, sexual orientation, or gender identity.            Thank you!     Thank you for choosing [facility]  for your care. Our goal is always to provide you with excellent care. Hearing back from our patients is one way we can continue to improve our  services. Please take a few minutes to complete the written survey that you may receive in the mail after your visit with us. Thank you!             Your Updated Medication List - Protect others around you: Learn how to safely use, store and throw away your medicines at www.disposemymeds.org.          This list is accurate as of 5/9/18  2:00 PM.  Always use your most recent med list.                   Brand Name Dispense Instructions for use Diagnosis    albuterol 108 (90 Base) MCG/ACT Inhaler    PROAIR HFA/PROVENTIL HFA/VENTOLIN HFA    1 Inhaler    Inhale 2 puffs into the lungs every 6 hours as needed for shortness of breath / dyspnea or wheezing    Mild persistent asthma without complication       aspirin 81 MG tablet      Take 1 tablet (81 mg) by mouth daily        CRESTOR 10 MG tablet   Generic drug:  rosuvastatin     30 tablet    Take 1 tablet (10 mg) by mouth daily    Hyperlipidemia LDL goal <130       cyanocobalamin 1000 MCG/ML injection    VITAMIN B12    3 mL    INJECT 1ML INTRAMUSCULARLY EVERY 30 DAYS    Chronic fatigue syndrome, Vitamin B12 deficiency (dietary) anemia       estradiol 0.05 MG/24HR BIW patch    VIVELLE-DOT     Place 1 patch onto the skin twice a week    Hypertrophy of breast, Eosinophilic esophagitis, Overweight(278.02)       fluticasone 220 MCG/ACT Inhaler    FLOVENT HFA    3 Inhaler    Inhale 2 puffs into the lungs 2 times daily    Mild persistent asthma without complication       fluticasone 50 MCG/ACT spray    FLONASE    1 Bottle    Spray 1-2 sprays into both nostrils daily        levothyroxine 100 MCG tablet    SYNTHROID/LEVOTHROID    90 tablet    Take 1 tablet (100 mcg) by mouth daily    Hypothyroidism due to acquired atrophy of thyroid       methylphenidate 10 MG tablet    RITALIN    30 tablet    1/2 to 1 tablet every day as needed    Chronic fatigue syndrome       Multi-vitamin Tabs tablet     100 tablet    Take 1 tablet by mouth daily        omeprazole 20 MG CR capsule    priLOSEC     30 capsule    Take 1 capsule (20 mg) by mouth daily 1 TAB PO QD (Once per day)  As needed    Eosinophilic esophagitis       trospium 20 MG tablet    SANCTURA    40 tablet    TAKE 1-2 TABLETS BY MOUTH DAILY AS NEEDED.    Female stress incontinence       zolpidem 5 MG tablet    AMBIEN    60 tablet    1 to 2 tabs at hour of sleep as needed    Chronic fatigue syndrome

## 2018-06-08 ENCOUNTER — TELEPHONE (OUTPATIENT)
Dept: FAMILY MEDICINE | Facility: CLINIC | Age: 58
End: 2018-06-08

## 2018-06-08 DIAGNOSIS — Z12.31 VISIT FOR SCREENING MAMMOGRAM: Primary | ICD-10-CM

## 2018-06-08 NOTE — TELEPHONE ENCOUNTER
Panel Management Review      Patient has the following on her problem list:     Asthma review     ACT Total Scores 1/26/2018   ACT TOTAL SCORE -   ASTHMA ER VISITS -   ASTHMA HOSPITALIZATIONS -   ACT TOTAL SCORE (Goal Greater than or Equal to 20) 17   In the past 12 months, how many times did you visit the emergency room for your asthma without being admitted to the hospital? 0   In the past 12 months, how many times were you hospitalized overnight because of your asthma? 0      1. Is Asthma diagnosis on the Problem List? Yes    2. Is Asthma listed on Health Maintenance? Yes    3. Patient is due for:  ACT      Composite cancer screening  Chart review shows that this patient is due/due soon for the following Mammogram  Summary:    Patient is due/failing the following:   ACT and MAMMOGRAM    Action needed:   Patient needs to do ACT  Patient needs to schedule Mammogram    Type of outreach:    Sent [employer] message. and Sent letter.    Questions for provider review:    None                                                                                                                                    Liliane Wood CMA (AAMA)       Chart routed to Closed.

## 2018-06-08 NOTE — LETTER
[address] [id number] [phone]        June 8, 2018    Nikki Morales  [address] [place] [address]              Dear Nikki Morales    After review of your medical record, we would like you to fill out the attached Asthma Control Test and return to the clinic.    A score of 20 or greater indicates that your symptoms are controlled and your asthma medications are working as they should be. A score of 19 or less may indicate that you need additional management with your asthma symptoms. In an effort to ensure the best asthma care for our patients, we will need you to complete the form.     If you have any questions or concerns while completing the Asthma Control Test, please feel free to contact the clinic at [phone] and ask to speak with anyone on the care team.     Sincerely,    Erik Peraza MD

## 2018-08-03 DIAGNOSIS — G93.32 CHRONIC FATIGUE SYNDROME: ICD-10-CM

## 2018-08-03 RX ORDER — ZOLPIDEM TARTRATE 5 MG/1
TABLET ORAL
Qty: 60 TABLET | Refills: 5 | Status: SHIPPED | OUTPATIENT
Start: 2018-08-03 | End: 2019-03-13

## 2018-08-03 NOTE — TELEPHONE ENCOUNTER
Zolpidem 5 MG       Last Written Prescription Date:  12/22/17  Last Fill Quantity: 60,   # refills: 5  Last Office Visit: *1/26/18**  Future Office visit:    Next 5 appointments (look out 90 days)     Oct 17, 2018 11:00 AM CDT   Return Visit with Martina Andrade MD   [facility] ([facility])    [address] [place] [address] [phone]                   Routing refill request to provider for review/approval because:  Drug not on the [facility], [facility] or [facility] refill protocol or controlled substance

## 2018-08-13 ENCOUNTER — TELEPHONE (OUTPATIENT)
Dept: FAMILY MEDICINE | Facility: CLINIC | Age: 58
End: 2018-08-13

## 2018-08-13 DIAGNOSIS — Z12.11 SCREENING FOR COLON CANCER: Primary | ICD-10-CM

## 2018-08-13 NOTE — TELEPHONE ENCOUNTER
Reason for Call: Request for an order or referral:    Order or referral being requested:Colonoscopy     Date needed: at your convenience    Has the patient been seen by the PCP for this problem? YES    Additional comments:     Phone number Patient can be reached at:  Home number on file [phone] (home)    Best Time:  Any     Can we leave a detailed message on this number?  YES    Call taken on 8/13/2018 at 3:04 PM by Sofie Penny

## 2018-08-17 ENCOUNTER — TELEPHONE (OUTPATIENT)
Dept: FAMILY MEDICINE | Facility: CLINIC | Age: 58
End: 2018-08-17

## 2018-08-17 NOTE — TELEPHONE ENCOUNTER
Date of colonoscopy/EGD: 9/6  Surgeon: Dr. Dudley  Prep:Miralax  Packet:Colonoscopy/EGD instructions mailed to patient's home address.   Date: 8/17/2018      Surgery Scheduler

## 2018-08-29 DIAGNOSIS — R30.0 DYSURIA: Primary | ICD-10-CM

## 2018-08-29 NOTE — PROGRESS NOTES
Having some ongoing issues of dysuria and other vaginal symptoms and her  having prostate issue.  Will check urine for infection.  Erik Peraza MD   
Patent

## 2018-09-04 DIAGNOSIS — B37.31 CANDIDIASIS OF VULVA AND VAGINA: ICD-10-CM

## 2018-09-04 DIAGNOSIS — N30.00 ACUTE CYSTITIS WITHOUT HEMATURIA: Primary | ICD-10-CM

## 2018-09-04 LAB
ALBUMIN UR-MCNC: NEGATIVE MG/DL
APPEARANCE UR: CLEAR
BACTERIA #/AREA URNS HPF: ABNORMAL /HPF
BILIRUB UR QL STRIP: NEGATIVE
COLOR UR AUTO: YELLOW
GLUCOSE UR STRIP-MCNC: NEGATIVE MG/DL
HGB UR QL STRIP: NEGATIVE
KETONES UR STRIP-MCNC: NEGATIVE MG/DL
LEUKOCYTE ESTERASE UR QL STRIP: ABNORMAL
MUCOUS THREADS #/AREA URNS LPF: PRESENT /LPF
NITRATE UR QL: NEGATIVE
PH UR STRIP: 6 PH (ref 5–7)
RBC #/AREA URNS AUTO: 12 /HPF (ref 0–2)
SOURCE: ABNORMAL
SP GR UR STRIP: 1.02 (ref 1–1.03)
SQUAMOUS #/AREA URNS AUTO: <1 /HPF (ref 0–1)
UROBILINOGEN UR STRIP-MCNC: 0 MG/DL (ref 0–2)
WBC #/AREA URNS AUTO: 108 /HPF (ref 0–5)
WBC CLUMPS #/AREA URNS HPF: PRESENT /HPF

## 2018-09-04 PROCEDURE — 87186 SC STD MICRODIL/AGAR DIL: CPT | Performed by: FAMILY MEDICINE

## 2018-09-04 PROCEDURE — 81001 URINALYSIS AUTO W/SCOPE: CPT | Performed by: FAMILY MEDICINE

## 2018-09-04 PROCEDURE — 87086 URINE CULTURE/COLONY COUNT: CPT | Performed by: FAMILY MEDICINE

## 2018-09-04 PROCEDURE — 87088 URINE BACTERIA CULTURE: CPT | Performed by: FAMILY MEDICINE

## 2018-09-05 LAB
BACTERIA SPEC CULT: ABNORMAL
Lab: ABNORMAL
SPECIMEN SOURCE: ABNORMAL

## 2018-09-05 NOTE — PROGRESS NOTES
She does have an infection and I am waiting for definitive treatment but states the sensitivity report

## 2018-09-06 ENCOUNTER — ANESTHESIA EVENT (OUTPATIENT)
Dept: GASTROENTEROLOGY | Facility: CLINIC | Age: 58
End: 2018-09-06
Payer: COMMERCIAL

## 2018-09-06 ENCOUNTER — SURGERY (OUTPATIENT)
Age: 58
End: 2018-09-06

## 2018-09-06 ENCOUNTER — HOSPITAL ENCOUNTER (OUTPATIENT)
Facility: CLINIC | Age: 58
Discharge: HOME | End: 2018-09-06
Attending: SURGERY | Admitting: SURGERY
Payer: COMMERCIAL

## 2018-09-06 ENCOUNTER — ANESTHESIA (OUTPATIENT)
Dept: GASTROENTEROLOGY | Facility: CLINIC | Age: 58
End: 2018-09-06
Payer: COMMERCIAL

## 2018-09-06 VITALS
TEMPERATURE: 98 F | DIASTOLIC BLOOD PRESSURE: 84 MMHG | SYSTOLIC BLOOD PRESSURE: 148 MMHG | RESPIRATION RATE: 18 BRPM | OXYGEN SATURATION: 98 %

## 2018-09-06 LAB — COLONOSCOPY: NORMAL

## 2018-09-06 PROCEDURE — G0105 COLORECTAL SCRN; HI RISK IND: HCPCS | Performed by: SURGERY

## 2018-09-06 PROCEDURE — 45378 DIAGNOSTIC COLONOSCOPY: CPT | Performed by: SURGERY

## 2018-09-06 PROCEDURE — 25000128 H RX IP 250 OP 636: Performed by: NURSE ANESTHETIST, CERTIFIED REGISTERED

## 2018-09-06 PROCEDURE — 25000128 H RX IP 250 OP 636: Performed by: SURGERY

## 2018-09-06 PROCEDURE — 25000125 ZZHC RX 250: Performed by: NURSE ANESTHETIST, CERTIFIED REGISTERED

## 2018-09-06 RX ORDER — ONDANSETRON 2 MG/ML
4 INJECTION INTRAMUSCULAR; INTRAVENOUS
Status: COMPLETED | OUTPATIENT
Start: 2018-09-06 | End: 2018-09-06

## 2018-09-06 RX ORDER — SULFAMETHOXAZOLE/TRIMETHOPRIM 800-160 MG
1 TABLET ORAL 2 TIMES DAILY
Qty: 10 TABLET | Refills: 0 | Status: SHIPPED | OUTPATIENT
Start: 2018-09-06 | End: 2018-09-19

## 2018-09-06 RX ORDER — ONDANSETRON 4 MG/1
4 TABLET, ORALLY DISINTEGRATING ORAL EVERY 30 MIN PRN
Status: DISCONTINUED | OUTPATIENT
Start: 2018-09-06 | End: 2018-09-06 | Stop reason: HOSPADM

## 2018-09-06 RX ORDER — PROPOFOL 10 MG/ML
INJECTION, EMULSION INTRAVENOUS PRN
Status: DISCONTINUED | OUTPATIENT
Start: 2018-09-06 | End: 2018-09-06

## 2018-09-06 RX ORDER — FLUCONAZOLE 150 MG/1
150 TABLET ORAL
Qty: 4 TABLET | Refills: 0 | Status: SHIPPED | OUTPATIENT
Start: 2018-09-06 | End: 2018-11-12

## 2018-09-06 RX ORDER — ALBUTEROL SULFATE 0.83 MG/ML
2.5 SOLUTION RESPIRATORY (INHALATION) EVERY 4 HOURS PRN
Status: DISCONTINUED | OUTPATIENT
Start: 2018-09-06 | End: 2018-09-06 | Stop reason: HOSPADM

## 2018-09-06 RX ORDER — ONDANSETRON 4 MG/1
4 TABLET, ORALLY DISINTEGRATING ORAL EVERY 6 HOURS PRN
Status: DISCONTINUED | OUTPATIENT
Start: 2018-09-06 | End: 2018-09-06 | Stop reason: HOSPADM

## 2018-09-06 RX ORDER — LIDOCAINE HYDROCHLORIDE 20 MG/ML
INJECTION, SOLUTION INFILTRATION; PERINEURAL PRN
Status: DISCONTINUED | OUTPATIENT
Start: 2018-09-06 | End: 2018-09-06

## 2018-09-06 RX ORDER — SODIUM CHLORIDE, SODIUM LACTATE, POTASSIUM CHLORIDE, CALCIUM CHLORIDE 600; 310; 30; 20 MG/100ML; MG/100ML; MG/100ML; MG/100ML
INJECTION, SOLUTION INTRAVENOUS CONTINUOUS
Status: DISCONTINUED | OUTPATIENT
Start: 2018-09-06 | End: 2018-09-06 | Stop reason: HOSPADM

## 2018-09-06 RX ORDER — NALOXONE HYDROCHLORIDE 0.4 MG/ML
.1-.4 INJECTION, SOLUTION INTRAMUSCULAR; INTRAVENOUS; SUBCUTANEOUS
Status: DISCONTINUED | OUTPATIENT
Start: 2018-09-06 | End: 2018-09-06 | Stop reason: HOSPADM

## 2018-09-06 RX ORDER — LIDOCAINE 40 MG/G
CREAM TOPICAL
Status: DISCONTINUED | OUTPATIENT
Start: 2018-09-06 | End: 2018-09-06 | Stop reason: HOSPADM

## 2018-09-06 RX ORDER — PROPOFOL 10 MG/ML
INJECTION, EMULSION INTRAVENOUS CONTINUOUS PRN
Status: DISCONTINUED | OUTPATIENT
Start: 2018-09-06 | End: 2018-09-06

## 2018-09-06 RX ORDER — FLUMAZENIL 0.1 MG/ML
0.2 INJECTION, SOLUTION INTRAVENOUS
Status: DISCONTINUED | OUTPATIENT
Start: 2018-09-06 | End: 2018-09-06 | Stop reason: HOSPADM

## 2018-09-06 RX ORDER — ONDANSETRON 2 MG/ML
4 INJECTION INTRAMUSCULAR; INTRAVENOUS EVERY 6 HOURS PRN
Status: DISCONTINUED | OUTPATIENT
Start: 2018-09-06 | End: 2018-09-06 | Stop reason: HOSPADM

## 2018-09-06 RX ORDER — ONDANSETRON 2 MG/ML
4 INJECTION INTRAMUSCULAR; INTRAVENOUS EVERY 30 MIN PRN
Status: DISCONTINUED | OUTPATIENT
Start: 2018-09-06 | End: 2018-09-06 | Stop reason: HOSPADM

## 2018-09-06 RX ADMIN — LIDOCAINE HYDROCHLORIDE 60 MG: 20 INJECTION, SOLUTION INFILTRATION; PERINEURAL at 12:03

## 2018-09-06 RX ADMIN — PROPOFOL 50 MG: 10 INJECTION, EMULSION INTRAVENOUS at 12:05

## 2018-09-06 RX ADMIN — PROPOFOL 50 MG: 10 INJECTION, EMULSION INTRAVENOUS at 12:03

## 2018-09-06 RX ADMIN — ONDANSETRON 4 MG: 2 INJECTION INTRAMUSCULAR; INTRAVENOUS at 12:39

## 2018-09-06 RX ADMIN — SODIUM CHLORIDE, POTASSIUM CHLORIDE, SODIUM LACTATE AND CALCIUM CHLORIDE: 600; 310; 30; 20 INJECTION, SOLUTION INTRAVENOUS at 10:23

## 2018-09-06 RX ADMIN — PROPOFOL 150 MCG/KG/MIN: 10 INJECTION, EMULSION INTRAVENOUS at 12:05

## 2018-09-06 ASSESSMENT — ENCOUNTER SYMPTOMS: DYSRHYTHMIAS: 1

## 2018-09-06 ASSESSMENT — LIFESTYLE VARIABLES: TOBACCO_USE: 1

## 2018-09-06 NOTE — ANESTHESIA POSTPROCEDURE EVALUATION
Patient: Nikki Morales    Procedure(s):  Colonoscopy - Wound Class: II-Clean Contaminated    Diagnosis:Screening  Diagnosis Additional Information: No value filed.    Anesthesia Type:  MAC    Note:  Anesthesia Post Evaluation    Patient location during evaluation: Phase 2  Patient participation: Able to fully participate in evaluation  Level of consciousness: awake  Pain management: adequate  Airway patency: patent  Cardiovascular status: acceptable and hemodynamically stable  Respiratory status: acceptable, room air and nonlabored ventilation  Hydration status: stable  PONV: none     Anesthetic complications: None    Comments: Patient was happy with the anesthesia care received and no anesthesia related complications were noted.  I will follow up with the patient again if it is needed.        Last vitals:  Vitals:    09/06/18 1300 09/06/18 1315 09/06/18 1330   BP: 159/89 146/71 148/84   Resp:      Temp:      SpO2: 99% 98%          Electronically Signed By: JOSEFINA Gray CRNA  September 6, 2018  1:54 PM

## 2018-09-06 NOTE — PROGRESS NOTES
Notified via MyChart of urinary tract infection with antibiotic ordered and possible request for these medication

## 2018-09-06 NOTE — ANESTHESIA PREPROCEDURE EVALUATION
Anesthesia Evaluation     . Pt has had prior anesthetic. Type: General and MAC           ROS/MED HX    ENT/Pulmonary:     (+)CHASE risk factors snores loudly, hypertension, daytime somnolence, tobacco use, Past use Intermittent asthma Treatment: Inhaler prn,  , . .    Neurologic:  - neg neurologic ROS     Cardiovascular: Comment: DC cardioversion in January of 2018 for atrial fibrillation     (+) Dyslipidemia, hypertension----. : . . . :. dysrhythmias a-fib, Irregular Heartbeat/Palpitations, . Previous cardiac testing Echodate:1/23/18results:EF 55-60%date: results:ECG reviewed date:1/18/18 results:NSR date: results:          METS/Exercise Tolerance:     Hematologic:  - neg hematologic  ROS       Musculoskeletal:   (+) arthritis, , , -       GI/Hepatic:     (+) GERD Asymptomatic on medication,       Renal/Genitourinary:  - ROS Renal section negative       Endo:     (+) thyroid problem hypothyroidism, .      Psychiatric: Comment: ADHD        Infectious Disease:  - neg infectious disease ROS       Malignancy:      - no malignancy   Other:    - neg other ROS                 Physical Exam  Normal systems: cardiovascular, pulmonary and dental    Airway   Mallampati: II  TM distance: >3 FB  Neck ROM: full    Dental     Cardiovascular   Rhythm and rate: regular and normal      Pulmonary    breath sounds clear to auscultation                    Anesthesia Plan      History & Physical Review  History and physical reviewed and following examination; no interval change.    ASA Status:  3 .    NPO Status:  > 6 hours    Plan for MAC with Propofol induction. Maintenance will be TIVA.  Reason for MAC:  Deep or markedly invasive procedure (G8)         Postoperative Care      Consents  Anesthetic plan, risks, benefits and alternatives discussed with:  Patient.  Use of blood products discussed: No .   .                          .

## 2018-09-06 NOTE — IP AVS SNAPSHOT
[facility]    [address] [address]    Phone:  [phone]                                       After Visit Summary   9/6/2018    Nikki Morales    MRN: [id number]           After Visit Summary Signature Page     I have received my discharge instructions, and my questions have been answered. I have discussed any challenges I see with this plan with the nurse or doctor.    ..........................................................................................................................................  Patient/Patient Representative Signature      ..........................................................................................................................................  Patient Representative Print Name and Relationship to Patient    ..................................................               ................................................  Date                                            Time    ..........................................................................................................................................  Reviewed by Signature/Title    ...................................................              ..............................................  Date                                                            Time          22EPIC Rev 08/18

## 2018-09-06 NOTE — ANESTHESIA CARE TRANSFER NOTE
Patient: Nikki Morales    Procedure(s):  Colonoscopy - Wound Class: II-Clean Contaminated    Diagnosis: Screening  Diagnosis Additional Information: No value filed.    Anesthesia Type:   MAC     Note:  Airway :Face Mask  Patient transferred to:Phase II  Handoff Report: Identifed the Patient, Identified the Reponsible Provider, Reviewed the pertinent medical history, Discussed the surgical course, Reviewed Intra-OP anesthesia mangement and issues during anesthesia, Set expectations for post-procedure period and Allowed opportunity for questions and acknowledgement of understanding      Vitals: (Last set prior to Anesthesia Care Transfer)    CRNA VITALS  9/6/2018 1155 - 9/6/2018 1244      9/6/2018             Pulse: 58    SpO2: 94 %    Resp Rate (observed): 25                Electronically Signed By: JOSEFINA Gray CRNA  September 6, 2018  12:44 PM

## 2018-09-06 NOTE — IP AVS SNAPSHOT
MRN:[id number]                      After Visit Summary   9/6/2018    Nikki Morales    MRN: [id number]           Thank you!     Thank you for choosing [facility] for your care. Our goal is always to provide you with excellent care. Hearing back from our patients is one way we can continue to improve our services. Please take a few minutes to complete the written survey that you may receive in the mail after you visit with us. Thank you!        Patient Information     Date Of Birth          1960        About your hospital stay     You were admitted on:  September 6, 2018 You last received care in the:  [facility]    You were discharged on:  September 6, 2018       Who to Call     For medical emergencies, please call 911.  For non-urgent questions about your medical care, please call your primary care provider or clinic, [phone]  For questions related to your surgery, please call your surgery clinic        Attending Provider     Provider Specialty    Hamzah Dudley DO Surgery       Primary Care Provider Office Phone # Fax #    Erik Hmazah Peraza [phone]      Your next 10 appointments already scheduled     Sep 24, 2018   Procedure with Martina Andrade MD   [facility] (--)    [address] [place] [address] [id number] [phone]            Oct 17, 2018 11:00 AM CDT   [facility] Visit with Martina Andrade MD   [facility]    [id number] [place] [address] [id number] [phone]              Further instructions from your care team         [facility]    Home Care Following Endoscopy          Activity:    You have just undergone an endoscopic procedure usually performed with conscious sedation.  Do not work or operate machinery (including a car) for at least 12 hours.      I encourage you to walk and attempt to pass this air as soon as  possible.    Diet:    Return to the diet you were on before your procedure but eat lightly for the first 12-24 hours.    Drink plenty of water.    Resume any regular medications unless otherwise advised by your physician.  Please begin any new medication prescribed as a result of your procedure as directed by your physician.     If you had any biopsy or polyp removed please refrain from aspirin or aspirin products for 2 days.  If on Coumadin please restart as instructed by your physician.   Pain:    You may take Tylenol as needed for pain.  Expected Recovery:    You can expect some mild abdominal fullness and/or discomfort due to the air used to inflate your intestinal tract. It is also normal to have a mild sore throat after upper endoscopy.    Call Your Physician if You Have:    After Colonoscopy:  o Worsening persisting abdominal pain which is worse with activity.  o Fevers (>101 degrees F), chills or shakes.  o Passage of continued blood with bowel movements.   Any questions or concerns about your recovery, please call [phone] or after hours [phone] Nurse Advice Line.    Follow-up Care:    You should receive a call or letter with your results within 1 week. Please call if you have not received a notification of your results.    If asked to return to clinic please make an appointment 1 week after your procedure.  Call [phone].       Pending Results     No orders found from 9/4/2018 to 9/7/2018.            Admission Information     Date & Time Provider Department Dept. Phone    9/6/2018 Hamzah Dudley,  [facility] [phone]      Your Vitals Were     Blood Pressure Temperature Respirations Pulse Oximetry          146/71 98  F (36.7  C) (Oral) 18 98%        MyChart Information     [employer] gives you secure access to your electronic health record. If you see a primary care provider, you can also send messages to your care team and make appointments. If you have questions,  please call your primary care clinic.  If you do not have a primary care provider, please call [phone] and they will assist you.        Care EveryWhere ID     This is your Care EveryWhere ID. This could be used by other organizations to access your [place] medical records  [id number]        Equal Access to Services     HAIR STACK : Purnima matthew Sokwame, waaxda luqadaha, qaybta kaalmada jeanne, phuc pike. So [facility] [phone].    ATENCIÓN: Si habla español, tiene a newsome disposición servicios gratuitos de asistencia lingüística. Bety al [phone].    We comply with applicable federal civil rights laws and [place] laws. We do not discriminate on the basis of race, color, national origin, age, disability, sex, sexual orientation, or gender identity.               Review of your medicines      CONTINUE these medicines which have NOT CHANGED        Dose / Directions    albuterol 108 (90 Base) MCG/ACT inhaler   Commonly known as:  PROAIR HFA/PROVENTIL HFA/VENTOLIN HFA   Used for:  Mild persistent asthma without complication        Dose:  2 puff   Inhale 2 puffs into the lungs every 6 hours as needed for shortness of breath / dyspnea or wheezing   Quantity:  1 Inhaler   Refills:  3       aspirin 81 MG tablet        Dose:  81 mg   Take 1 tablet (81 mg) by mouth daily   Refills:  OTC       CRESTOR 10 MG tablet   Used for:  Hyperlipidemia LDL goal <130   Generic drug:  rosuvastatin        Dose:  10 mg   Take 1 tablet (10 mg) by mouth daily   Quantity:  30 tablet   Refills:  1       cyanocobalamin 1000 MCG/ML injection   Commonly known as:  VITAMIN B12   Used for:  Chronic fatigue syndrome, Vitamin B12 deficiency (dietary) anemia        INJECT 1ML INTRAMUSCULARLY EVERY 30 DAYS   Quantity:  3 mL   Refills:  2       estradiol 0.05 MG/24HR BIW patch   Commonly known as:  VIVELLE-DOT   Used for:  Hypertrophy of breast, Eosinophilic esophagitis, Overweight(278.02)         Dose:  1 patch   Place 1 patch onto the skin twice a week   Refills:  0       fluticasone 220 MCG/ACT Inhaler   Commonly known as:  FLOVENT HFA   Used for:  Mild persistent asthma without complication        Dose:  2 puff   Inhale 2 puffs into the lungs 2 times daily   Quantity:  3 Inhaler   Refills:  1       fluticasone 50 MCG/ACT spray   Commonly known as:  FLONASE        Dose:  1-2 spray   Spray 1-2 sprays into both nostrils daily   Quantity:  1 Bottle   Refills:  11       levothyroxine 100 MCG tablet   Commonly known as:  SYNTHROID/LEVOTHROID   Used for:  Hypothyroidism due to acquired atrophy of thyroid        Dose:  100 mcg   Take 1 tablet (100 mcg) by mouth daily   Quantity:  90 tablet   Refills:  3       methylphenidate 10 MG tablet   Commonly known as:  RITALIN   Indication:  Tiredness   Used for:  Chronic fatigue syndrome        1/2 to 1 tablet every day as needed   Quantity:  30 tablet   Refills:  0       Multi-vitamin Tabs tablet        Dose:  1 tablet   Take 1 tablet by mouth daily   Quantity:  100 tablet   Refills:  3       omeprazole 20 MG CR capsule   Commonly known as:  priLOSEC   Used for:  Eosinophilic esophagitis        Dose:  20 mg   Take 1 capsule (20 mg) by mouth daily 1 TAB PO QD (Once per day)  As needed   Quantity:  30 capsule   Refills:  11       trospium 20 MG tablet   Commonly known as:  SANCTURA   Used for:  Female stress incontinence        TAKE 1-2 TABLETS BY MOUTH DAILY AS NEEDED.   Quantity:  40 tablet   Refills:  2       zolpidem 5 MG tablet   Commonly known as:  AMBIEN   Used for:  Chronic fatigue syndrome        1 to 2 tabs at hour of sleep as needed   Quantity:  60 tablet   Refills:  5                Protect others around you: Learn how to safely use, store and throw away your medicines at www.disposemymeds.org.             Medication List: This is a list of all your medications and when to take them. Check marks below indicate your daily home schedule. Keep this list as a  reference.      Medications           Morning Afternoon Evening Bedtime As Needed    albuterol 108 (90 Base) MCG/ACT inhaler   Commonly known as:  PROAIR HFA/PROVENTIL HFA/VENTOLIN HFA   Inhale 2 puffs into the lungs every 6 hours as needed for shortness of breath / dyspnea or wheezing                                aspirin 81 MG tablet   Take 1 tablet (81 mg) by mouth daily                                CRESTOR 10 MG tablet   Take 1 tablet (10 mg) by mouth daily   Generic drug:  rosuvastatin                                cyanocobalamin 1000 MCG/ML injection   Commonly known as:  VITAMIN B12   INJECT 1ML INTRAMUSCULARLY EVERY 30 DAYS                                estradiol 0.05 MG/24HR BIW patch   Commonly known as:  VIVELLE-DOT   Place 1 patch onto the skin twice a week                                fluticasone 220 MCG/ACT Inhaler   Commonly known as:  FLOVENT HFA   Inhale 2 puffs into the lungs 2 times daily                                fluticasone 50 MCG/ACT spray   Commonly known as:  FLONASE   Spray 1-2 sprays into both nostrils daily                                levothyroxine 100 MCG tablet   Commonly known as:  SYNTHROID/LEVOTHROID   Take 1 tablet (100 mcg) by mouth daily                                methylphenidate 10 MG tablet   Commonly known as:  RITALIN   1/2 to 1 tablet every day as needed                                Multi-vitamin Tabs tablet   Take 1 tablet by mouth daily                                omeprazole 20 MG CR capsule   Commonly known as:  priLOSEC   Take 1 capsule (20 mg) by mouth daily 1 TAB PO QD (Once per day)  As needed                                trospium 20 MG tablet   Commonly known as:  SANCTURA   TAKE 1-2 TABLETS BY MOUTH DAILY AS NEEDED.                                zolpidem 5 MG tablet   Commonly known as:  AMBIEN   1 to 2 tabs at hour of sleep as needed

## 2018-09-06 NOTE — DISCHARGE INSTRUCTIONS
[facility]    Home Care Following Endoscopy          Activity:    You have just undergone an endoscopic procedure usually performed with conscious sedation.  Do not work or operate machinery (including a car) for at least 12 hours.      I encourage you to walk and attempt to pass this air as soon as possible.    Diet:    Return to the diet you were on before your procedure but eat lightly for the first 12-24 hours.    Drink plenty of water.    Resume any regular medications unless otherwise advised by your physician.  Please begin any new medication prescribed as a result of your procedure as directed by your physician.     If you had any biopsy or polyp removed please refrain from aspirin or aspirin products for 2 days.  If on Coumadin please restart as instructed by your physician.   Pain:    You may take Tylenol as needed for pain.  Expected Recovery:    You can expect some mild abdominal fullness and/or discomfort due to the air used to inflate your intestinal tract. It is also normal to have a mild sore throat after upper endoscopy.    Call Your Physician if You Have:    After Colonoscopy:  o Worsening persisting abdominal pain which is worse with activity.  o Fevers (>101 degrees F), chills or shakes.  o Passage of continued blood with bowel movements.   Any questions or concerns about your recovery, please call [phone] or after hours [phone] Nurse Advice Line.    Follow-up Care:    You should receive a call or letter with your results within 1 week. Please call if you have not received a notification of your results.    If asked to return to clinic please make an appointment 1 week after your procedure.  Call [phone].

## 2018-09-07 ENCOUNTER — TELEPHONE (OUTPATIENT)
Dept: FAMILY MEDICINE | Facility: CLINIC | Age: 58
End: 2018-09-07

## 2018-09-07 ENCOUNTER — MYC MEDICAL ADVICE (OUTPATIENT)
Dept: FAMILY MEDICINE | Facility: CLINIC | Age: 58
End: 2018-09-07

## 2018-09-07 NOTE — TELEPHONE ENCOUNTER
Reason for Call:  Other appointment    Detailed comments: patient is having surgery on  9/24/18, at [facility], Dr. Barrios, and would like to know if provider can work her in before that, as his next available appointment is not until November. Please call and advise     Phone Number Patient can be reached at: Cell number on file:    Telephone Information:   Mobile [phone]       Best Time: any    Can we leave a detailed message on this number? YES    Call taken on 9/7/2018 at 1:17 PM by Doreen Elizabeth

## 2018-09-10 NOTE — TELEPHONE ENCOUNTER
Per Dr. Peraza, okay to put at 2:30 on Wednesday. I have replied to pt's [employer] message with this time. I have also added pt to schedule. Liliane Wood CMA ([facility])

## 2018-09-12 ENCOUNTER — HOSPITAL ENCOUNTER (OUTPATIENT)
Dept: MAMMOGRAPHY | Facility: CLINIC | Age: 58
Discharge: HOME | End: 2018-09-12
Attending: FAMILY MEDICINE | Admitting: FAMILY MEDICINE
Payer: COMMERCIAL

## 2018-09-12 ENCOUNTER — OFFICE VISIT (OUTPATIENT)
Dept: FAMILY MEDICINE | Facility: CLINIC | Age: 58
End: 2018-09-12
Payer: COMMERCIAL

## 2018-09-12 VITALS
DIASTOLIC BLOOD PRESSURE: 68 MMHG | WEIGHT: 194.5 LBS | BODY MASS INDEX: 29.57 KG/M2 | RESPIRATION RATE: 16 BRPM | TEMPERATURE: 97.6 F | SYSTOLIC BLOOD PRESSURE: 108 MMHG | OXYGEN SATURATION: 94 % | HEART RATE: 96 BPM

## 2018-09-12 DIAGNOSIS — M47.12 DEGENERATIVE ARTHRITIS OF CERVICAL SPINE WITH CORD COMPRESSION: ICD-10-CM

## 2018-09-12 DIAGNOSIS — Z01.818 PREOP GENERAL PHYSICAL EXAM: Primary | ICD-10-CM

## 2018-09-12 DIAGNOSIS — H02.403 PTOSIS, BOTH EYELIDS: ICD-10-CM

## 2018-09-12 DIAGNOSIS — K21.00 GASTROESOPHAGEAL REFLUX DISEASE WITH ESOPHAGITIS: ICD-10-CM

## 2018-09-12 DIAGNOSIS — J45.30 MILD PERSISTENT ASTHMA WITHOUT COMPLICATION: ICD-10-CM

## 2018-09-12 DIAGNOSIS — Z12.31 VISIT FOR SCREENING MAMMOGRAM: ICD-10-CM

## 2018-09-12 PROCEDURE — 99214 OFFICE O/P EST MOD 30 MIN: CPT | Performed by: FAMILY MEDICINE

## 2018-09-12 PROCEDURE — 77067 SCR MAMMO BI INCL CAD: CPT

## 2018-09-12 ASSESSMENT — PAIN SCALES - GENERAL: PAINLEVEL: NO PAIN (0)

## 2018-09-12 NOTE — MR AVS SNAPSHOT
After Visit Summary   9/12/2018    Nikki Morales    MRN: [id number]           Patient Information     Date Of Birth          1960        Visit Information        Provider Department      9/12/2018 2:30 PM Erik Peraza MD [facility]        Today's Diagnoses     Preop general physical exam    -  1      Care Instructions      Before Your Surgery      Call your surgeon if there is any change in your health. This includes signs of a cold or flu (such as a sore throat, runny nose, cough, rash or fever).    Do not smoke, drink alcohol or take over the counter medicine (unless your surgeon or primary care doctor tells you to) for the 24 hours before and after surgery.    If you take prescribed drugs: Follow your doctor s orders about which medicines to take and which to stop until after surgery.    Eating and drinking prior to surgery: follow the instructions from your surgeon    Take a shower or bath the night before surgery. Use the soap your surgeon gave you to gently clean your skin. If you do not have soap from your surgeon, use your regular soap. Do not shave or scrub the surgery site.  Wear clean pajamas and have clean sheets on your bed.           Follow-ups after your visit        Your next 10 appointments already scheduled     Sep 24, 2018   Procedure with Martina Andrade MD   [facility] [place]    [address] [place] [id number]   [phone]            Oct 17, 2018 11:00 AM CDT   Return Visit with Martina Andrade MD   [facility]    [address] [place] [id number]   [phone]              Future tests that were ordered for you today     Open Future Orders        Priority Expected Expires Ordered    MA Screen Bilateral w/Kam Routine  6/8/2019 6/8/2018            Who to contact     If you have questions or need follow up information about today's clinic visit or your  schedule please contact [facility] directly at [phone].  Normal or non-critical lab and imaging results will be communicated to you by MyChart, letter or phone within 4 business days after the clinic has received the results. If you do not hear from us within 7 days, please contact the clinic through MyChart or phone. If you have a critical or abnormal lab result, we will notify you by phone as soon as possible.  Submit refill requests through [employer] or call your pharmacy and they will forward the refill request to us. Please allow 3 business days for your refill to be completed.          Additional Information About Your Visit        [employer] Information     [employer] gives you secure access to your electronic health record. If you see a primary care provider, you can also send messages to your care team and make appointments. If you have questions, please call your primary care clinic.  If you do not have a primary care provider, please call [phone] and they will assist you.        Care EveryWhere ID     This is your Care EveryWhere ID. This could be used by other organizations to access your [place] medical records  [id number]        Your Vitals Were     Pulse Temperature Respirations Pulse Oximetry BMI (Body Mass Index)       96 97.6  F (36.4  C) (Temporal) 16 94% 29.57 kg/m2        Blood Pressure from Last 3 Encounters:   09/12/18 108/68   09/06/18 148/84   03/01/18 124/80    Weight from Last 3 Encounters:   09/12/18 194 lb 8 oz (88.2 kg)   03/01/18 198 lb 8 oz (90 kg)   02/23/18 201 lb (91.2 kg)              Today, you had the following     No orders found for display       Primary Care Provider Office Phone # Fax #    Erik Hamzah Peraza [phone]        [facility] [address] [place] [address]        Equal Access to Services     HAIR STACK AH: Purnima villaltao Sokwame, waaxda luqadaha, qaybta kaalmada jeanne, phuc pike. So  [facility] [phone].    ATENCIÓN: Si maine gaviria, tiene a newsome disposición servicios gratuitos de asistencia lingüística. Bety fields [phone].    We comply with applicable federal civil rights laws and [place] laws. We do not discriminate on the basis of race, color, national origin, age, disability, sex, sexual orientation, or gender identity.            Thank you!     Thank you for choosing [facility]  for your care. Our goal is always to provide you with excellent care. Hearing back from our patients is one way we can continue to improve our services. Please take a few minutes to complete the written survey that you may receive in the mail after your visit with us. Thank you!             Your Updated Medication List - Protect others around you: Learn how to safely use, store and throw away your medicines at www.disposemymeds.org.          This list is accurate as of 9/12/18  2:53 PM.  Always use your most recent med list.                   Brand Name Dispense Instructions for use Diagnosis    albuterol 108 (90 Base) MCG/ACT inhaler    PROAIR HFA/PROVENTIL HFA/VENTOLIN HFA    1 Inhaler    Inhale 2 puffs into the lungs every 6 hours as needed for shortness of breath / dyspnea or wheezing    Mild persistent asthma without complication       aspirin 81 MG tablet      Take 1 tablet (81 mg) by mouth daily        CRESTOR 10 MG tablet   Generic drug:  rosuvastatin     30 tablet    Take 1 tablet (10 mg) by mouth daily    Hyperlipidemia LDL goal <130       cyanocobalamin 1000 MCG/ML injection    VITAMIN B12    3 mL    INJECT 1ML INTRAMUSCULARLY EVERY 30 DAYS    Chronic fatigue syndrome, Vitamin B12 deficiency (dietary) anemia       estradiol 0.05 MG/24HR BIW patch    VIVELLE-DOT     Place 1 patch onto the skin twice a week    Hypertrophy of breast, Eosinophilic esophagitis, Overweight(278.02)       fluconazole 150 MG tablet    DIFLUCAN    4 tablet    Take 1 tablet (150 mg) by mouth every 3 days     Candidiasis of vulva and vagina       fluticasone 220 MCG/ACT Inhaler    FLOVENT HFA    3 Inhaler    Inhale 2 puffs into the lungs 2 times daily    Mild persistent asthma without complication       fluticasone 50 MCG/ACT spray    FLONASE    1 Bottle    Spray 1-2 sprays into both nostrils daily        levothyroxine 100 MCG tablet    SYNTHROID/LEVOTHROID    90 tablet    Take 1 tablet (100 mcg) by mouth daily    Hypothyroidism due to acquired atrophy of thyroid       methylphenidate 10 MG tablet    RITALIN    30 tablet    1/2 to 1 tablet every day as needed    Chronic fatigue syndrome       Multi-vitamin Tabs tablet     100 tablet    Take 1 tablet by mouth daily        omeprazole 20 MG CR capsule    priLOSEC    30 capsule    Take 1 capsule (20 mg) by mouth daily 1 TAB PO QD (Once per day)  As needed    Eosinophilic esophagitis       sulfamethoxazole-trimethoprim 800-160 MG per tablet    BACTRIM DS/SEPTRA DS    10 tablet    Take 1 tablet by mouth 2 times daily    Acute cystitis without hematuria       trospium 20 MG tablet    SANCTURA    40 tablet    TAKE 1-2 TABLETS BY MOUTH DAILY AS NEEDED.    Female stress incontinence       zolpidem 5 MG tablet    AMBIEN    60 tablet    1 to 2 tabs at hour of sleep as needed    Chronic fatigue syndrome

## 2018-09-12 NOTE — PROGRESS NOTES
[address] [address]  [phone]  Dept: [phone]    PRE-OP EVALUATION:  Today's date: 2018    Nikki Morales (: 1960) presents for pre-operative evaluation assessment as requested by Dr. Andrade.  She requires evaluation and anesthesia risk assessment prior to undergoing surgery/procedure for treatment of swollen eyelids .    Fax number for surgical facility: [facility]  Primary Physician: Erik Peraza  Type of Anesthesia Anticipated: Monitor Anesthesia Care    Patient has a Health Care Directive or Living Will:  NO    Preop Questions 2018   Who is doing your surgery? Martina Andrade   What are you having done? Blepharoplasty Bilateral   Date of Surgery/Procedure: 2018   Facility or Hospital where procedure/surgery will be performed: [facility]   1.  Do you have a history of Heart attack, stroke, stent, coronary bypass surgery, or other heart surgery? No   2.  Do you ever have any pain or discomfort in your chest? No   3.  Do you have a history of  Heart Failure? No   4.   Are you troubled by shortness of breath when:  walking on a level surface, or up a slight hill, or at night? No   5.  Do you currently have a cold, bronchitis or other respiratory infection? No   6.  Do you have a cough, shortness of breath, or wheezing? No   7.  Do you sometimes get pains in the calves of your legs when you walk? No   8. Do you or anyone in your family have previous history of blood clots? YES -    9.  Do you or does anyone in your family have a serious bleeding problem such as prolonged bleeding following surgeries or cuts? No   10. Have you ever had problems with anemia or been told to take iron pills? No   11. Have you had any abnormal blood loss such as black, tarry or bloody stools, or abnormal vaginal bleeding? No   12. Have you ever had a blood transfusion? No   13. Have you or any of your relatives ever had  problems with anesthesia? YES - trouble waking up   14. Do you have sleep apnea, excessive snoring or daytime drowsiness? YES -    15. Do you have any prosthetic heart valves? No   16. Do you have prosthetic joints? No   17. Is there any chance that you may be pregnant? No         HPI:     HPI related to upcoming procedure: needs excess eyelid tissue removed to protect eyes      See problem list for active medical problems.  Problems all longstanding and stable, except as noted/documented.  See ROS for pertinent symptoms related to these conditions.                                                                                                                                                          .    MEDICAL HISTORY:     Patient Active Problem List    Diagnosis Date Noted     Intermittent atrial fibrillation (H) 01/26/2018     Priority: Medium     Mild persistent asthma without complication 09/14/2017     Priority: Medium     Attention deficit hyperactivity disorder, inattentive type 10/20/2015     Priority: Medium     Hypothyroidism due to acquired atrophy of thyroid 09/23/2015     Priority: Medium     Adhesive capsulitis of shoulder 01/23/2015     Priority: Medium     Degenerative arthritis of cervical spine with cord compression 01/23/2015     Priority: Medium     Eosinophilic esophagitis 08/04/2014     Priority: Medium     Overweight 08/04/2014     Priority: Medium     Problem list name updated by automated process. Provider to review       Advanced directives, counseling/discussion 07/17/2013     Priority: Medium     7/17/13 Advance care directed given to pt.//Samara Yu/ACE([facility])        HYPERLIPIDEMIA LDL GOAL <130 10/31/2010     Priority: Medium     Herpes simplex virus (HSV) infection 05/05/2007     Priority: Medium     Problem list name updated by automated process. Provider to review       Female stress incontinence 05/04/2007     Priority: Medium     ESOPHAGEAL REFLUX 11/27/2006     Priority: Medium      Other and unspecified disc disorder of lumbar region 2003     Priority: Medium     Chronic fatigue syndrome      Priority: Medium      Past Medical History:   Diagnosis Date     Chronic fatigue      Hyperlipidemia      Hypothyroid      New onset a-fib (H)      Other vitamin B12 deficiency anemia      Past Surgical History:   Procedure Laterality Date     C  DELIVERY ONLY      , Low Cervical     C NONSPECIFIC PROCEDURE      sinus     C NONSPECIFIC PROCEDURE      Esophgeal dilatation multiple     C NONSPECIFIC PROCEDURE      sling     C VAGINAL HYSTERECTOMY  1995    Hysterectomy, Vaginal     COLONOSCOPY  10/06/09     COLONOSCOPY  2013    Procedure: COMBINED COLONOSCOPY, SINGLE BIOPSY/POLYPECTOMY BY BIOPSY;;  Surgeon: Cuate iMles MD;  Location: [facility]     COLONOSCOPY N/A 2018    Procedure: COLONOSCOPY;  Colonoscopy;  Surgeon: Hamzah Dudley DO;  Location: [facility]     CONIZATION CERVIX,KNIFE/LASER       ESOPHAGOSCOPY, GASTROSCOPY, DUODENOSCOPY (EGD), COMBINED  2013    Procedure: COMBINED ESOPHAGOSCOPY, GASTROSCOPY, DUODENOSCOPY (EGD), BIOPSY SINGLE OR MULTIPLE;  egd with rabdain biopsies and colonoscopy with polypectomy by biopsy ;  Surgeon: Cuate Miles MD;  Location: [facility]     HC DILATION/CURETTAGE DIAG/THER NON OB      D & C     HC REMOVAL OF TONSILS,<9 Y/O      Tonsils <10y.o.     HC UGI ENDOSCOPY DIAG W BIOPSY  07     HC UGI ENDOSCOPY, SIMPLE EXAM  09/10/99 & 98     HYSTERECTOMY, PAP NO LONGER INDICATED       LAPAROSCOPIC CHOLECYSTECTOMY  10/12/13     REPAIR PTOSIS       TUBAL LIGATION       Current Outpatient Prescriptions   Medication Sig Dispense Refill     albuterol (PROAIR HFA/PROVENTIL HFA/VENTOLIN HFA) 108 (90 BASE) MCG/ACT Inhaler Inhale 2 puffs into the lungs every 6 hours as needed for shortness of breath / dyspnea or wheezing 1 Inhaler 3     aspirin 81 MG tablet Take 1 tablet (81 mg) by mouth daily  OTC      cyanocobalamin (VITAMIN B12) 1000 MCG/ML injection INJECT 1ML INTRAMUSCULARLY EVERY 30 DAYS 3 mL 2     estradiol (VIVELLE-DOT) 0.05 MG/24HR patch Place 1 patch onto the skin twice a week       fluconazole (DIFLUCAN) 150 MG tablet Take 1 tablet (150 mg) by mouth every 3 days 4 tablet 0     fluticasone (FLONASE) 50 MCG/ACT spray Spray 1-2 sprays into both nostrils daily 1 Bottle 11     fluticasone (FLOVENT HFA) 220 MCG/ACT Inhaler Inhale 2 puffs into the lungs 2 times daily 3 Inhaler 1     levothyroxine (SYNTHROID/LEVOTHROID) 100 MCG tablet Take 1 tablet (100 mcg) by mouth daily 90 tablet 3     methylphenidate (RITALIN) 10 MG tablet 1/2 to 1 tablet every day as needed 30 tablet 0     multivitamin, therapeutic with minerals (MULTI-VITAMIN) TABS Take 1 tablet by mouth daily 100 tablet 3     omeprazole (PRILOSEC) 20 MG capsule Take 1 capsule (20 mg) by mouth daily 1 TAB PO QD (Once per day)  As needed 30 capsule 11     rosuvastatin (CRESTOR) 10 MG tablet Take 1 tablet (10 mg) by mouth daily 30 tablet 1     sulfamethoxazole-trimethoprim (BACTRIM DS/SEPTRA DS) 800-160 MG per tablet Take 1 tablet by mouth 2 times daily 10 tablet 0     trospium (SANCTURA) 20 MG tablet TAKE 1-2 TABLETS BY MOUTH DAILY AS NEEDED. 40 tablet 2     zolpidem (AMBIEN) 5 MG tablet 1 to 2 tabs at hour of sleep as needed 60 tablet 5     [DISCONTINUED] NEW MED Methylcobalamin 25mg/ml  0.1ml weekly 1 Bottle 11     OTC products: None, except as noted above    Allergies   Allergen Reactions     Codeine Other (See Comments)     Causes agitation      Latex Allergy: NO    Social History   Substance Use Topics     Smoking status: Former Smoker     Types: Cigarettes     Smokeless tobacco: Never Used      Comment: social  6/mo     Alcohol use 2.4 oz/week     2 Cans of beer, 2 Standard drinks or equivalent per week      Comment: social     History   Drug Use No     Comment: used in past any and all       REVIEW OF SYSTEMS:   CONSTITUTIONAL: NEGATIVE for fever,  chills, change in weight  INTEGUMENTARY/SKIN: NEGATIVE for worrisome rashes, moles or lesions  EYES: NEGATIVE for vision changes or irritation  ENT/MOUTH: NEGATIVE for ear, mouth and throat problems  RESP: NEGATIVE for significant cough or SOB  BREAST: NEGATIVE for masses, tenderness or discharge  CV: NEGATIVE for chest pain, palpitations or peripheral edema  GI: NEGATIVE for nausea, abdominal pain, heartburn, or change in bowel habits  : NEGATIVE for frequency, dysuria, or hematuria  MUSCULOSKELETAL: NEGATIVE for significant arthralgias or myalgia  NEURO: NEGATIVE for weakness, dizziness or paresthesias  ENDOCRINE: NEGATIVE for temperature intolerance, skin/hair changes  HEME: NEGATIVE for bleeding problems  PSYCHIATRIC: NEGATIVE for changes in mood or affect    EXAM:   There were no vitals taken for this visit.    GENERAL APPEARANCE: healthy, alert and no distress     EYES: EOMI, PERRL     HENT: ear canals and TM's normal and nose and mouth without ulcers or lesions     NECK: no adenopathy, no asymmetry, masses, or scars and thyroid normal to palpation     RESP: lungs clear to auscultation - no rales, rhonchi or wheezes     CV: regular rates and rhythm, normal S1 S2, no S3 or S4 and no murmur, click or rub     ABDOMEN:  soft, nontender, no HSM or masses and bowel sounds normal     MS: extremities normal- no gross deformities noted, no evidence of inflammation in joints, FROM in all extremities.     SKIN: no suspicious lesions or rashes     NEURO: Normal strength and tone, sensory exam grossly normal, mentation intact and speech normal     PSYCH: mentation appears normal. and affect normal/bright     LYMPHATICS: No cervical adenopathy    DIAGNOSTICS:   No labs or EKG required for low risk surgery (cataract, skin procedure, breast biopsy, etc)    Recent Labs   Lab Test  01/26/18   1018  01/15/18   2238  07/24/17   1025  03/09/17   1438   02/05/15   1059  02/20/14   1035   HGB   --   13.9  14.2  14.9   < >   --    14.7   PLT   --   253   --   220   < >   --   243   INR   --   0.96   --    --    --    --    --    NA   --   142   --    --    --    --   139   POTASSIUM   --   3.8   --    --    --    --   4.4   CR   --   0.72  0.77   --    --    --   0.84   A1C  5.6   --    --    --    --   5.6   --     < > = values in this interval not displayed.        IMPRESSION:   Reason for surgery/procedure: Excessive eyelid tissue which is putting pressure on the eye needing reconstruction and removal.  Diagnosis/reason for consult:     ICD-10-CM    1. Preop general physical exam Z01.818    2. Ptosis, both eyelids H02.403    3. Degenerative arthritis of cervical spine with cord compression M47.12    4. Mild persistent asthma without complication J45.30    5. Gastroesophageal reflux disease with esophagitis K21.0        The proposed surgical procedure is considered LOW risk.    REVISED CARDIAC RISK INDEX  The patient has the following serious cardiovascular risks for perioperative complications such as (MI, PE, VFib and 3  AV Block):  No serious cardiac risks  INTERPRETATION: 0 risks: Class I (very low risk - 0.4% complication rate)    The patient has the following additional risks for perioperative complications:  No identified additional risks      ICD-10-CM    1. Preop general physical exam Z01.818        RECOMMENDATIONS:           APPROVAL GIVEN to proceed with proposed procedure, without further diagnostic evaluation       Signed Electronically by: Erik Peraza MD    Copy of this evaluation report is provided to requesting physician.    [facility] Preop Guidelines    Revised Cardiac Risk Index

## 2018-09-13 ASSESSMENT — ASTHMA QUESTIONNAIRES: ACT_TOTALSCORE: 23

## 2018-09-21 ENCOUNTER — ANESTHESIA EVENT (OUTPATIENT)
Dept: SURGERY | Facility: AMBULATORY SURGERY CENTER | Age: 58
End: 2018-09-21

## 2018-09-24 ENCOUNTER — HOSPITAL ENCOUNTER (OUTPATIENT)
Facility: AMBULATORY SURGERY CENTER | Age: 58
Discharge: HOME | End: 2018-09-24
Attending: OPHTHALMOLOGY | Admitting: OPHTHALMOLOGY
Payer: COMMERCIAL

## 2018-09-24 ENCOUNTER — ANESTHESIA (OUTPATIENT)
Dept: SURGERY | Facility: AMBULATORY SURGERY CENTER | Age: 58
End: 2018-09-24
Payer: COMMERCIAL

## 2018-09-24 ENCOUNTER — SURGERY (OUTPATIENT)
Age: 58
End: 2018-09-24
Payer: COMMERCIAL

## 2018-09-24 VITALS
SYSTOLIC BLOOD PRESSURE: 128 MMHG | DIASTOLIC BLOOD PRESSURE: 66 MMHG | HEIGHT: 68 IN | OXYGEN SATURATION: 96 % | HEART RATE: 67 BPM | BODY MASS INDEX: 29.57 KG/M2 | RESPIRATION RATE: 16 BRPM | TEMPERATURE: 97 F

## 2018-09-24 DIAGNOSIS — Z98.890 POSTOPERATIVE EYE STATE: Primary | ICD-10-CM

## 2018-09-24 PROCEDURE — 67904 REPAIR EYELID DEFECT: CPT | Mod: 50 | Performed by: OPHTHALMOLOGY

## 2018-09-24 PROCEDURE — G8907 PT DOC NO EVENTS ON DISCHARG: HCPCS

## 2018-09-24 PROCEDURE — G8918 PT W/O PREOP ORDER IV AB PRO: HCPCS

## 2018-09-24 PROCEDURE — 67904 REPAIR EYELID DEFECT: CPT | Mod: 50

## 2018-09-24 RX ORDER — HYDROMORPHONE HYDROCHLORIDE 1 MG/ML
.3-.5 INJECTION, SOLUTION INTRAMUSCULAR; INTRAVENOUS; SUBCUTANEOUS EVERY 10 MIN PRN
Status: DISCONTINUED | OUTPATIENT
Start: 2018-09-24 | End: 2018-09-25 | Stop reason: HOSPADM

## 2018-09-24 RX ORDER — OXYCODONE HYDROCHLORIDE 5 MG/1
5-10 TABLET ORAL EVERY 4 HOURS PRN
Status: DISCONTINUED | OUTPATIENT
Start: 2018-09-24 | End: 2018-09-25 | Stop reason: HOSPADM

## 2018-09-24 RX ORDER — MEPERIDINE HYDROCHLORIDE 25 MG/ML
12.5 INJECTION INTRAMUSCULAR; INTRAVENOUS; SUBCUTANEOUS
Status: DISCONTINUED | OUTPATIENT
Start: 2018-09-24 | End: 2018-09-25 | Stop reason: HOSPADM

## 2018-09-24 RX ORDER — KETOROLAC TROMETHAMINE 30 MG/ML
30 INJECTION, SOLUTION INTRAMUSCULAR; INTRAVENOUS EVERY 6 HOURS PRN
Status: DISCONTINUED | OUTPATIENT
Start: 2018-09-24 | End: 2018-09-25 | Stop reason: HOSPADM

## 2018-09-24 RX ORDER — ALBUTEROL SULFATE 0.83 MG/ML
2.5 SOLUTION RESPIRATORY (INHALATION) EVERY 4 HOURS PRN
Status: DISCONTINUED | OUTPATIENT
Start: 2018-09-24 | End: 2018-09-25 | Stop reason: HOSPADM

## 2018-09-24 RX ORDER — ERYTHROMYCIN 5 MG/G
OINTMENT OPHTHALMIC
Qty: 3.5 G | Refills: 0 | Status: SHIPPED | OUTPATIENT
Start: 2018-09-24 | End: 2018-09-24

## 2018-09-24 RX ORDER — TETRACAINE HYDROCHLORIDE 5 MG/ML
SOLUTION OPHTHALMIC PRN
Status: DISCONTINUED | OUTPATIENT
Start: 2018-09-24 | End: 2018-09-24 | Stop reason: HOSPADM

## 2018-09-24 RX ORDER — SODIUM CHLORIDE, SODIUM LACTATE, POTASSIUM CHLORIDE, CALCIUM CHLORIDE 600; 310; 30; 20 MG/100ML; MG/100ML; MG/100ML; MG/100ML
INJECTION, SOLUTION INTRAVENOUS CONTINUOUS
Status: DISCONTINUED | OUTPATIENT
Start: 2018-09-24 | End: 2018-09-25 | Stop reason: HOSPADM

## 2018-09-24 RX ORDER — ONDANSETRON 2 MG/ML
INJECTION INTRAMUSCULAR; INTRAVENOUS PRN
Status: DISCONTINUED | OUTPATIENT
Start: 2018-09-24 | End: 2018-09-24

## 2018-09-24 RX ORDER — PROPOFOL 10 MG/ML
INJECTION, EMULSION INTRAVENOUS PRN
Status: DISCONTINUED | OUTPATIENT
Start: 2018-09-24 | End: 2018-09-24

## 2018-09-24 RX ORDER — ERYTHROMYCIN 5 MG/G
OINTMENT OPHTHALMIC
Qty: 3.5 G | Refills: 0 | Status: SHIPPED | OUTPATIENT
Start: 2018-09-24 | End: 2018-11-12

## 2018-09-24 RX ORDER — ONDANSETRON 2 MG/ML
4 INJECTION INTRAMUSCULAR; INTRAVENOUS EVERY 30 MIN PRN
Status: DISCONTINUED | OUTPATIENT
Start: 2018-09-24 | End: 2018-09-25 | Stop reason: HOSPADM

## 2018-09-24 RX ORDER — ONDANSETRON 4 MG/1
4 TABLET, ORALLY DISINTEGRATING ORAL EVERY 30 MIN PRN
Status: DISCONTINUED | OUTPATIENT
Start: 2018-09-24 | End: 2018-09-25 | Stop reason: HOSPADM

## 2018-09-24 RX ORDER — DEXAMETHASONE SODIUM PHOSPHATE 4 MG/ML
4 INJECTION, SOLUTION INTRA-ARTICULAR; INTRALESIONAL; INTRAMUSCULAR; INTRAVENOUS; SOFT TISSUE EVERY 10 MIN PRN
Status: DISCONTINUED | OUTPATIENT
Start: 2018-09-24 | End: 2018-09-25 | Stop reason: HOSPADM

## 2018-09-24 RX ORDER — LIDOCAINE HYDROCHLORIDE 20 MG/ML
INJECTION, SOLUTION INFILTRATION; PERINEURAL PRN
Status: DISCONTINUED | OUTPATIENT
Start: 2018-09-24 | End: 2018-09-24

## 2018-09-24 RX ORDER — DEXAMETHASONE SODIUM PHOSPHATE 4 MG/ML
INJECTION, SOLUTION INTRA-ARTICULAR; INTRALESIONAL; INTRAMUSCULAR; INTRAVENOUS; SOFT TISSUE PRN
Status: DISCONTINUED | OUTPATIENT
Start: 2018-09-24 | End: 2018-09-24

## 2018-09-24 RX ORDER — ERYTHROMYCIN 5 MG/G
OINTMENT OPHTHALMIC PRN
Status: DISCONTINUED | OUTPATIENT
Start: 2018-09-24 | End: 2018-09-24 | Stop reason: HOSPADM

## 2018-09-24 RX ORDER — FENTANYL CITRATE 50 UG/ML
25-50 INJECTION, SOLUTION INTRAMUSCULAR; INTRAVENOUS
Status: DISCONTINUED | OUTPATIENT
Start: 2018-09-24 | End: 2018-09-25 | Stop reason: HOSPADM

## 2018-09-24 RX ORDER — LIDOCAINE 40 MG/G
CREAM TOPICAL
Status: DISCONTINUED | OUTPATIENT
Start: 2018-09-24 | End: 2018-09-25 | Stop reason: HOSPADM

## 2018-09-24 RX ORDER — ACETAMINOPHEN 325 MG/1
975 TABLET ORAL ONCE
Status: COMPLETED | OUTPATIENT
Start: 2018-09-24 | End: 2018-09-24

## 2018-09-24 RX ORDER — FENTANYL CITRATE 50 UG/ML
INJECTION, SOLUTION INTRAMUSCULAR; INTRAVENOUS PRN
Status: DISCONTINUED | OUTPATIENT
Start: 2018-09-24 | End: 2018-09-24

## 2018-09-24 RX ORDER — NALOXONE HYDROCHLORIDE 0.4 MG/ML
.1-.4 INJECTION, SOLUTION INTRAMUSCULAR; INTRAVENOUS; SUBCUTANEOUS
Status: DISCONTINUED | OUTPATIENT
Start: 2018-09-24 | End: 2018-09-25 | Stop reason: HOSPADM

## 2018-09-24 RX ADMIN — TETRACAINE HYDROCHLORIDE 1 DROP: 5 SOLUTION OPHTHALMIC at 07:28

## 2018-09-24 RX ADMIN — ERYTHROMYCIN 1 INCH: 5 OINTMENT OPHTHALMIC at 07:45

## 2018-09-24 RX ADMIN — SODIUM CHLORIDE, SODIUM LACTATE, POTASSIUM CHLORIDE, CALCIUM CHLORIDE: 600; 310; 30; 20 INJECTION, SOLUTION INTRAVENOUS at 07:25

## 2018-09-24 RX ADMIN — ACETAMINOPHEN 975 MG: 325 TABLET ORAL at 06:21

## 2018-09-24 RX ADMIN — FENTANYL CITRATE 25 MCG: 50 INJECTION, SOLUTION INTRAMUSCULAR; INTRAVENOUS at 07:32

## 2018-09-24 RX ADMIN — PROPOFOL 50 MG: 10 INJECTION, EMULSION INTRAVENOUS at 07:34

## 2018-09-24 RX ADMIN — LIDOCAINE HYDROCHLORIDE 50 MG: 20 INJECTION, SOLUTION INFILTRATION; PERINEURAL at 07:34

## 2018-09-24 RX ADMIN — Medication 4.5 ML: at 07:35

## 2018-09-24 RX ADMIN — DEXAMETHASONE SODIUM PHOSPHATE 4 MG: 4 INJECTION, SOLUTION INTRA-ARTICULAR; INTRALESIONAL; INTRAMUSCULAR; INTRAVENOUS; SOFT TISSUE at 07:32

## 2018-09-24 RX ADMIN — PROPOFOL 10 MG: 10 INJECTION, EMULSION INTRAVENOUS at 07:46

## 2018-09-24 RX ADMIN — ONDANSETRON 4 MG: 2 INJECTION INTRAMUSCULAR; INTRAVENOUS at 07:45

## 2018-09-24 NOTE — IP AVS SNAPSHOT
MRN:[id number]                      After Visit Summary   9/24/2018    Nikki Morales    MRN: [id number]           Thank you!     Thank you for choosing [facility] for your care. Our goal is always to provide you with excellent care. Hearing back from our patients is one way we can continue to improve our services. Please take a few minutes to complete the written survey that you may receive in the mail after you visit with us. Thank you!        Patient Information     Date Of Birth          1960        About your hospital stay     You were admitted on:  September 24, 2018 You last received care in the:  [facility]    You were discharged on:  September 24, 2018       Who to Call     For medical emergencies, please call 911.  For non-urgent questions about your medical care, please call your primary care provider or clinic, [phone]  For questions related to your surgery, please call your surgery clinic        Attending Provider     Provider Specialty    Martina Andrade MD Ophthalmology       Primary Care Provider Office Phone # Fax #    Erik Hamzah Peraza [phone]      After Care Instructions     Discharge Medication Instructions       Do NOT take aspirin or medications containing NSAIDS for 72 hours after procedure.            Ice to affected area       Apply cold pack for 15 minutes on, 15 minutes off, for 48 hours while awake.                  Your next 10 appointments already scheduled     Oct 17, 2018 11:00 AM CDT   Return Visit with Martina Andrade MD   [facility]    [address] [place] [id number] [phone]              Further instructions from your care team       [facility]  Same-Day Surgery   Adult Discharge Orders & Instructions   For 24 hours after surgery  1. Get plenty of rest.  A responsible adult must stay with you for at least 24  hours after you leave the hospital.   2. Do not drive or use heavy equipment.  If you have weakness or tingling, don't drive or use heavy equipment until this feeling goes away.  3. Do not drink alcohol.  4. Avoid strenuous or risky activities.  Ask for help when climbing stairs.   5. You may feel lightheaded.  IF so, sit for a few minutes before standing.  Have someone help you get up.   6. If you have nausea (feel sick to your stomach): Drink only clear liquids such as apple juice, ginger ale, broth or 7-Up.  Rest may also help.  Be sure to drink enough fluids.  Move to a regular diet as you feel able.  7. You may have a slight fever. Call the doctor if your fever is over 100 F (37.7 C) (taken under the tongue) or lasts longer than 24 hours.  8. You may have a dry mouth, a sore throat, muscle aches or trouble sleeping.  These should go away after 24 hours.  9. Do not make important or legal decisions.   Call your doctor for any of the followin.  Signs of infection (fever, growing tenderness at the surgery site, a large amount of drainage or bleeding, severe pain, foul-smelling drainage, redness, swelling).    2. It has been over 8 to 10 hours since surgery and you are still not able to urinate (pass water).    3.  Headache for over 24 hours.    4.  Numbness, tingling or weakness the day after surgery (if you had spinal anesthesia).   To contact Dr Andrade call:  [phone] - Day  [phone] - After Hours, ask for the Opthomologist on call      Post-operative Instructions  Ophthalmic Plastic and Reconstructive Surgery    Martina Andrade M.D.     All instructions apply to the operated eye(s) or eyelid(s).    Wound care and personal care  ? If a patch or bandage has been placed, please leave this in place until seen by your physician. Ensure that the bandage does not get wet when you take a shower.  ? Apply ice compresses 15 minutes on 15 minutes off while awake for 2 days, then switch to warm water  compresses 4 times a day until seen by your physician. For warm packs you can place a cup of dry uncooked rice in a clean cotton sock. Then place sock in microwave 30 seconds to one minute. Next place the warm sock into a plastic bag and wrap the bag with clean warm wet washcloth and place over operated eye.    ? You may shower or wash your hair the day after surgery. Do not bathe or go swimming for 1 week to prevent contamination of your wounds.  ? Do not apply make-up to the eyes or eyelids for 2 weeks after surgery.  ? Expect some swelling, bruising, black eye (even into the lower eyelids and cheeks). Also expect serum caking, crusting and discharge from the eye and/or incisions. A small amount of surface bleeding is normal for the first 48 hours.  ? Your eye(s) and eyelid(s) may be painful and tender. This is normal after surgery.      Contact information and follow-up  ? Return to the Eye Clinic for a follow-up appointment with your physician as  scheduled. If no appointment has been scheduled:   - [facility] eye clinic: [phone] for an appointment with Dr. Andrade within 1 to 2 weeks from your date of surgery.   -  [facility] eye clinic: [phone] for an appointment with Dr. Andrade within 1 to 2 weeks from your date of surgery.     ? For severe pain, bleeding, or loss of vision, call the [facility] at [phone] or [facility] at [phone].     After hours or on weekends and holidays, call [phone] and ask to speak with the ophthalmologist on call.    An on call person can be reached after hours for concerns. The on call doctor should not call in medication refill requests after hours or on weekends, so please plan accordingly. An effort has been made to provide adequate pain medications following every surgery, and refills will not be provided in most instances. Narcotic pain medications cannot be called in.     Activity  "restrictions and driving  ? Avoid heavy lifting, bending, exercise or strenuous activity for 1 week after surgery.  You may resume other activities and return to work as tolerated.  ? You may not resume driving until have you stopped using narcotic pain medications (such as Norco, Percocet, Tylenol #3).    Medications  ? Restart all your regular home medications and eye drops. If you take Plavix or  Aspirin on a regular basis, wait for 72 hours after your surgery before restarting these in order to decrease the risk of bleeding complications.  ? Avoid aspirin and aspirin-like medications (Motrin, Aleve, Ibuprofen, Génesis-  [place] etc) for 72 hours to reduce the risk of bleeding. You may take Tylenol  (acetaminophen) for pain.  ? In addition to your home medications, take the following post-operative medications as prescribed by your physician.    ? Apply antibiotic ointment to all sutures three times a day, and into the operated eye(s) at night.    ? WARNING:  You must not take more than 4,000 mg of acetaminophen per  24-hour period.       Pending Results     No orders found from 9/22/2018 to 9/25/2018.            Admission Information     Date & Time Provider Department Dept. Phone    9/24/2018 Martina Andrade MD [facility] [phone]      Your Vitals Were     Blood Pressure Pulse Temperature Respirations Height Pulse Oximetry    112/61 67 97.3  F (36.3  C) (Tympanic) 16 1.727 m (5' 8\") 95%    BMI (Body Mass Index)                   29.57 kg/m2           [employer] Information     [employer] gives you secure access to your electronic health record. If you see a primary care provider, you can also send messages to your care team and make appointments. If you have questions, please call your primary care clinic.  If you do not have a primary care provider, please call [phone] and they will assist you.        Care EveryWhere ID     This is your Care EveryWhere ID. This could be used by other " organizations to access your [facility] medical records  [id number]        Equal Access to Services     HAIR STACK : Hadii dalia Pace, marco carrasco, phuc downey. So [facility] [phone].    ATENCIÓN: Si habla español, tiene a newsome disposición servicios gratuitos de asistencia lingüística. Llame al [phone].    We comply with applicable federal civil rights laws and [place] laws. We do not discriminate on the basis of race, color, national origin, age, disability, sex, sexual orientation, or gender identity.               Review of your medicines      START taking        Dose / Directions    erythromycin ophthalmic ointment   Commonly known as:  ROMYCIN   Used for:  Postoperative eye state        Apply small amount to incision sites three times daily for 7 days, then apply to inner lower lid of operative eye(s) at bedtime for 7 days, as directed per physician instructions.   Quantity:  3.5 g   Refills:  0         CONTINUE these medicines which have NOT CHANGED        Dose / Directions    albuterol 108 (90 Base) MCG/ACT inhaler   Commonly known as:  PROAIR HFA/PROVENTIL HFA/VENTOLIN HFA   Used for:  Mild persistent asthma without complication        Dose:  2 puff   Inhale 2 puffs into the lungs every 6 hours as needed for shortness of breath / dyspnea or wheezing   Quantity:  1 Inhaler   Refills:  3       aspirin 81 MG tablet        Dose:  81 mg   Take 1 tablet (81 mg) by mouth daily   Refills:  OTC       CRESTOR 10 MG tablet   Used for:  Hyperlipidemia LDL goal <130   Generic drug:  rosuvastatin        Dose:  10 mg   Take 1 tablet (10 mg) by mouth daily   Quantity:  30 tablet   Refills:  1       cyanocobalamin 1000 MCG/ML injection   Commonly known as:  VITAMIN B12   Used for:  Chronic fatigue syndrome, Vitamin B12 deficiency (dietary) anemia        INJECT 1ML INTRAMUSCULARLY EVERY 30 DAYS   Quantity:  3 mL   Refills:  2       estradiol 0.05  MG/24HR BIW patch   Commonly known as:  VIVELLE-DOT   Used for:  Hypertrophy of breast, Eosinophilic esophagitis, Overweight(278.02)        Dose:  1 patch   Place 1 patch onto the skin twice a week   Refills:  0       fluconazole 150 MG tablet   Commonly known as:  DIFLUCAN   Used for:  Candidiasis of vulva and vagina        Dose:  150 mg   Take 1 tablet (150 mg) by mouth every 3 days   Quantity:  4 tablet   Refills:  0       fluticasone 220 MCG/ACT Inhaler   Commonly known as:  FLOVENT HFA   Used for:  Mild persistent asthma without complication        Dose:  2 puff   Inhale 2 puffs into the lungs 2 times daily   Quantity:  3 Inhaler   Refills:  1       fluticasone 50 MCG/ACT spray   Commonly known as:  FLONASE        Dose:  1-2 spray   Spray 1-2 sprays into both nostrils daily   Quantity:  1 Bottle   Refills:  11       levothyroxine 100 MCG tablet   Commonly known as:  SYNTHROID/LEVOTHROID   Used for:  Hypothyroidism due to acquired atrophy of thyroid        Dose:  100 mcg   Take 1 tablet (100 mcg) by mouth daily   Quantity:  90 tablet   Refills:  3       methylphenidate 10 MG tablet   Commonly known as:  RITALIN   Indication:  Tiredness   Used for:  Chronic fatigue syndrome        1/2 to 1 tablet every day as needed   Quantity:  30 tablet   Refills:  0       Multi-vitamin Tabs tablet        Dose:  1 tablet   Take 1 tablet by mouth daily   Quantity:  100 tablet   Refills:  3       omeprazole 20 MG CR capsule   Commonly known as:  priLOSEC   Used for:  Eosinophilic esophagitis        Dose:  20 mg   Take 1 capsule (20 mg) by mouth daily 1 TAB PO QD (Once per day)  As needed   Quantity:  30 capsule   Refills:  11       trospium 20 MG tablet   Commonly known as:  SANCTURA   Used for:  Female stress incontinence        TAKE 1-2 TABLETS BY MOUTH DAILY AS NEEDED.   Quantity:  40 tablet   Refills:  2       zolpidem 5 MG tablet   Commonly known as:  AMBIEN   Used for:  Chronic fatigue syndrome        1 to 2 tabs at hour of  sleep as needed   Quantity:  60 tablet   Refills:  5            Where to get your medicines      Some of these will need a paper prescription and others can be bought over the counter. Ask your nurse if you have questions.     Bring a paper prescription for each of these medications     erythromycin ophthalmic ointment                Protect others around you: Learn how to safely use, store and throw away your medicines at www.disposemymeds.org.             Medication List: This is a list of all your medications and when to take them. Check marks below indicate your daily home schedule. Keep this list as a reference.      Medications           Morning Afternoon Evening Bedtime As Needed    albuterol 108 (90 Base) MCG/ACT inhaler   Commonly known as:  PROAIR HFA/PROVENTIL HFA/VENTOLIN HFA   Inhale 2 puffs into the lungs every 6 hours as needed for shortness of breath / dyspnea or wheezing                                aspirin 81 MG tablet   Take 1 tablet (81 mg) by mouth daily                                CRESTOR 10 MG tablet   Take 1 tablet (10 mg) by mouth daily   Generic drug:  rosuvastatin                                cyanocobalamin 1000 MCG/ML injection   Commonly known as:  VITAMIN B12   INJECT 1ML INTRAMUSCULARLY EVERY 30 DAYS                                erythromycin ophthalmic ointment   Commonly known as:  ROMYCIN   Apply small amount to incision sites three times daily for 7 days, then apply to inner lower lid of operative eye(s) at bedtime for 7 days, as directed per physician instructions.   Last time this was given:  1 inch on 9/24/2018  7:45 AM                                estradiol 0.05 MG/24HR BIW patch   Commonly known as:  VIVELLE-DOT   Place 1 patch onto the skin twice a week                                fluconazole 150 MG tablet   Commonly known as:  DIFLUCAN   Take 1 tablet (150 mg) by mouth every 3 days                                fluticasone 220 MCG/ACT Inhaler   Commonly known  as:  FLOVENT HFA   Inhale 2 puffs into the lungs 2 times daily                                fluticasone 50 MCG/ACT spray   Commonly known as:  FLONASE   Spray 1-2 sprays into both nostrils daily                                levothyroxine 100 MCG tablet   Commonly known as:  SYNTHROID/LEVOTHROID   Take 1 tablet (100 mcg) by mouth daily                                methylphenidate 10 MG tablet   Commonly known as:  RITALIN   1/2 to 1 tablet every day as needed                                Multi-vitamin Tabs tablet   Take 1 tablet by mouth daily                                omeprazole 20 MG CR capsule   Commonly known as:  priLOSEC   Take 1 capsule (20 mg) by mouth daily 1 TAB PO QD (Once per day)  As needed                                trospium 20 MG tablet   Commonly known as:  SANCTURA   TAKE 1-2 TABLETS BY MOUTH DAILY AS NEEDED.                                zolpidem 5 MG tablet   Commonly known as:  AMBIEN   1 to 2 tabs at hour of sleep as needed

## 2018-09-24 NOTE — IP AVS SNAPSHOT
[facility]    [address] [place] [address]    Phone:  [phone]                                       After Visit Summary   9/24/2018    Nikki Morales    MRN: [id number]           After Visit Summary Signature Page     I have received my discharge instructions, and my questions have been answered. I have discussed any challenges I see with this plan with the nurse or doctor.    ..........................................................................................................................................  Patient/Patient Representative Signature      ..........................................................................................................................................  Patient Representative Print Name and Relationship to Patient    ..................................................               ................................................  Date                                   Time    ..........................................................................................................................................  Reviewed by Signature/Title    ...................................................              ..............................................  Date                                               Time          22EPIC Rev 08/18

## 2018-09-24 NOTE — BRIEF OP NOTE
[facility]    Brief Operative Note    Pre-operative diagnosis: Dermatochalasis & Ptosis  Post-operative diagnosis * No post-op diagnosis entered *  Procedure: Procedure(s):  Bilateral upper eyelid Blepharoplasty and ptosis repair  - Wound Class: I-Clean  Surgeon: Surgeon(s) and Role:     * Martina Andrade MD - Primary  Anesthesia: Monitor Anesthesia Care   Estimated blood loss: Minimal  Drains: None  Specimens: * No specimens in log *  Findings:   None.  Complications: None.  Implants: None.

## 2018-09-24 NOTE — ANESTHESIA CARE TRANSFER NOTE
Patient: Nikki Morales    Procedure(s):  Bilateral upper eyelid Blepharoplasty and ptosis repair  - Wound Class: I-Clean    Diagnosis: Dermatochalasis & Ptosis  Diagnosis Additional Information: No value filed.    Anesthesia Type:   MAC     Note:  Airway :Room Air  Patient transferred to:Phase II  Handoff Report: Identifed the Patient, Identified the Reponsible Provider, Reviewed the pertinent medical history, Discussed the surgical course, Reviewed Intra-OP anesthesia mangement and issues during anesthesia, Set expectations for post-procedure period and Allowed opportunity for questions and acknowledgement of understanding      Vitals: (Last set prior to Anesthesia Care Transfer)    CRNA VITALS  9/24/2018 0740 - 9/24/2018 0822      9/24/2018             Pulse: 61    SpO2: 96 %                Electronically Signed By: JOSEFINA Junior CRNA  September 24, 2018  8:22 AM

## 2018-09-24 NOTE — DISCHARGE INSTRUCTIONS
[facility]  Same-Day Surgery   Adult Discharge Orders & Instructions   For 24 hours after surgery  1. Get plenty of rest.  A responsible adult must stay with you for at least 24 hours after you leave the hospital.   2. Do not drive or use heavy equipment.  If you have weakness or tingling, don't drive or use heavy equipment until this feeling goes away.  3. Do not drink alcohol.  4. Avoid strenuous or risky activities.  Ask for help when climbing stairs.   5. You may feel lightheaded.  IF so, sit for a few minutes before standing.  Have someone help you get up.   6. If you have nausea (feel sick to your stomach): Drink only clear liquids such as apple juice, ginger ale, broth or 7-Up.  Rest may also help.  Be sure to drink enough fluids.  Move to a regular diet as you feel able.  7. You may have a slight fever. Call the doctor if your fever is over 100 F (37.7 C) (taken under the tongue) or lasts longer than 24 hours.  8. You may have a dry mouth, a sore throat, muscle aches or trouble sleeping.  These should go away after 24 hours.  9. Do not make important or legal decisions.   Call your doctor for any of the followin.  Signs of infection (fever, growing tenderness at the surgery site, a large amount of drainage or bleeding, severe pain, foul-smelling drainage, redness, swelling).    2. It has been over 8 to 10 hours since surgery and you are still not able to urinate (pass water).    3.  Headache for over 24 hours.    4.  Numbness, tingling or weakness the day after surgery (if you had spinal anesthesia).   To contact Dr Andrdae call:  [phone] - Day  [phone] - After Hours, ask for the Opthomologist on call      Post-operative Instructions  Ophthalmic Plastic and Reconstructive Surgery    Martina Andrade M.D.     All instructions apply to the operated eye(s) or eyelid(s).    Wound care and personal care  ? If a patch or bandage has been placed, please leave this in  place until seen by your physician. Ensure that the bandage does not get wet when you take a shower.  ? Apply ice compresses 15 minutes on 15 minutes off while awake for 2 days, then switch to warm water compresses 4 times a day until seen by your physician. For warm packs you can place a cup of dry uncooked rice in a clean cotton sock. Then place sock in microwave 30 seconds to one minute. Next place the warm sock into a plastic bag and wrap the bag with clean warm wet washcloth and place over operated eye.    ? You may shower or wash your hair the day after surgery. Do not bathe or go swimming for 1 week to prevent contamination of your wounds.  ? Do not apply make-up to the eyes or eyelids for 2 weeks after surgery.  ? Expect some swelling, bruising, black eye (even into the lower eyelids and cheeks). Also expect serum caking, crusting and discharge from the eye and/or incisions. A small amount of surface bleeding is normal for the first 48 hours.  ? Your eye(s) and eyelid(s) may be painful and tender. This is normal after surgery.      Contact information and follow-up  ? Return to the Eye Clinic for a follow-up appointment with your physician as  scheduled. If no appointment has been scheduled:   - [facility] eye clinic: [phone] for an appointment with Dr. Andrade within 1 to 2 weeks from your date of surgery.   -  [facility] eye clinic: [phone] for an appointment with Dr. Andraed within 1 to 2 weeks from your date of surgery.     ? For severe pain, bleeding, or loss of vision, call the [facility] Eye Clinic at [phone] or [facility] at [phone].     After hours or on weekends and holidays, call [phone] and ask to speak with the ophthalmologist on call.    An on call person can be reached after hours for concerns. The on call doctor should not call in medication refill requests after hours or on weekends, so please plan  accordingly. An effort has been made to provide adequate pain medications following every surgery, and refills will not be provided in most instances. Narcotic pain medications cannot be called in.     Activity restrictions and driving  ? Avoid heavy lifting, bending, exercise or strenuous activity for 1 week after surgery.  You may resume other activities and return to work as tolerated.  ? You may not resume driving until have you stopped using narcotic pain medications (such as Norco, Percocet, Tylenol #3).    Medications  ? Restart all your regular home medications and eye drops. If you take Plavix or  Aspirin on a regular basis, wait for 72 hours after your surgery before restarting these in order to decrease the risk of bleeding complications.  ? Avoid aspirin and aspirin-like medications (Motrin, Aleve, Ibuprofen, Génesis-  [place] etc) for 72 hours to reduce the risk of bleeding. You may take Tylenol  (acetaminophen) for pain.  ? In addition to your home medications, take the following post-operative medications as prescribed by your physician.    ? Apply antibiotic ointment to all sutures three times a day, and into the operated eye(s) at night.    ? WARNING:  You must not take more than 4,000 mg of acetaminophen per  24-hour period.

## 2018-09-24 NOTE — ANESTHESIA PREPROCEDURE EVALUATION
Anesthesia Evaluation     . Pt has had prior anesthetic. Type: General    History of anesthetic complications   - PONV        ROS/MED HX    ENT/Pulmonary:     (+)Intermittent asthma Treatment: Inhaler prn,  , . .    Neurologic:  - neg neurologic ROS     Cardiovascular:  - neg cardiovascular ROS       METS/Exercise Tolerance:     Hematologic:  - neg hematologic  ROS       Musculoskeletal:  - neg musculoskeletal ROS       GI/Hepatic:     (+) GERD Asymptomatic on medication,       Renal/Genitourinary:  - ROS Renal section negative       Endo:     (+) thyroid problem hypothyroidism, .      Psychiatric:  - neg psychiatric ROS       Infectious Disease:  - neg infectious disease ROS       Malignancy:      - no malignancy   Other:    - neg other ROS                 Physical Exam  Normal systems: cardiovascular and dental    Airway   Mallampati: I  TM distance: >3 FB  Neck ROM: full    Dental     Cardiovascular       Pulmonary    breath sounds clear to auscultation                    Anesthesia Plan      History & Physical Review  History and physical reviewed and following examination; no interval change.    ASA Status:  3 .    NPO Status:  > 8 hours    Plan for MAC with Intravenous and Propofol induction. Reason for MAC:  Deep or markedly invasive procedure (G8)  PONV prophylaxis:  Ondansetron (or other 5HT-3) and Dexamethasone or Solumedrol       Postoperative Care  Postoperative pain management:  Multi-modal analgesia.      Consents  Anesthetic plan, risks, benefits and alternatives discussed with:  Patient..                          .

## 2018-09-24 NOTE — OP NOTE
PREOPERATIVE DIAGNOSES:   1. Bilateral upper eyelid dermatochalasis.   2. Bilateral upper eyelid ptosis.   POSTOPERATIVE DIAGNOSES:   1. Bilateral upper eyelid dermatochalasis.   2. Bilateral upper eyelid ptosis.   PROCEDURES:  1. Bilateral upper eyelid ptosis repair by external levator resection.  2. Bilateral upper eyelid blepharoplasty.  SURGEON: Martina Andrade MD.  ASSISTANT: Dinorah Bowen MD  ANESTHESIA: Monitored with local infiltration of a 50/50 mixture of 2% lidocaine with epinephrine and 0.5% Marcaine.   COMPLICATIONS: None.   ESTIMATED BLOOD LOSS: 3 mL.   SPECIMEN: * No specimens in log *  HISTORY: Nikki Morales presented with upper eyelid drooping secondary to dermatochalasis and ptosis of the upper eyelids leading to blockage of the superior visual field and interfering with activities of daily living. After the risks, benefits and alternatives to the proposed procedure were explained, informed consent was obtained.   PROCEDURE: The patient was brought to the operating room and placed supine on the operating table. IV sedation was given. Each upper lid crease and the excess upper eyelid skin  was marked in a blepharoplasty ellipse with a marking pen.  These areas were all infiltrated with local anesthetic and  was prepped and draped in the typical sterile fashion for oculoplastic surgery. Attention was directed to the left side. The skin was incised following the marked lines. The skin flap was excised with a high temperature cautery. Hemostasis was obtained with high temperature cautery. The orbicularis and orbital septum were opened horizontally and levator aponeurosis identified and dissected from the superior tarsal border. A strip of pretarsal orbicularis was removed. A 5-0 Mersilene suture was placed in a horizontal mattress fashion taking a partial thickness bite of the superior tarsal border, bringing each end of the double armed suture underneath the levator aponeurosis and tied in  a temporary fashion. Attention was directed to the right side where the same procedure was performed. The sutures were adjusted for symmetry then tied in a permanent fashion.  Each skin incision was closed with a running 6-0 plain gut suture. Ophthalmic antibiotic ointment was applied to the incisions and into both eyes. The patient tolerated the procedure well and left the operating room in stable condition.   Martina Andrade MD

## 2018-09-24 NOTE — ANESTHESIA POSTPROCEDURE EVALUATION
Patient: Nikki Morales    Procedure(s):  Bilateral upper eyelid Blepharoplasty and ptosis repair  - Wound Class: I-Clean    Diagnosis:Dermatochalasis & Ptosis  Diagnosis Additional Information: No value filed.    Anesthesia Type:  MAC    Note:  Anesthesia Post Evaluation    Patient location during evaluation: PACU  Patient participation: Able to fully participate in evaluation  Level of consciousness: awake  Pain management: adequate  Airway patency: patent  Cardiovascular status: acceptable  Respiratory status: acceptable  Hydration status: acceptable  PONV: none     Anesthetic complications: None    Comments: Stable recovery noted.        Last vitals:  Vitals:    09/24/18 0600 09/24/18 0815   BP: 124/73 112/61   Pulse: 67    Resp: 16 16   Temp: 97.3  F (36.3  C) 97  F (36.1  C)   SpO2: 96% 95%         Electronically Signed By: Farhat Guido MD  September 24, 2018  8:31 AM

## 2018-10-17 ENCOUNTER — OFFICE VISIT (OUTPATIENT)
Dept: OPHTHALMOLOGY | Facility: CLINIC | Age: 58
End: 2018-10-17
Payer: COMMERCIAL

## 2018-10-17 DIAGNOSIS — H02.66 XANTHELASMA OF LEFT EYELID: Primary | ICD-10-CM

## 2018-10-17 DIAGNOSIS — H02.831 DERMATOCHALASIS OF BOTH UPPER EYELIDS: ICD-10-CM

## 2018-10-17 DIAGNOSIS — H02.403 INVOLUTIONAL PTOSIS, ACQUIRED, BILATERAL: ICD-10-CM

## 2018-10-17 DIAGNOSIS — H02.834 DERMATOCHALASIS OF BOTH UPPER EYELIDS: ICD-10-CM

## 2018-10-17 PROCEDURE — 99024 POSTOP FOLLOW-UP VISIT: CPT | Performed by: OPHTHALMOLOGY

## 2018-10-17 ASSESSMENT — VISUAL ACUITY
OD_CC: 20/15
OS_CC: 20/20

## 2018-10-17 NOTE — MR AVS SNAPSHOT
After Visit Summary   10/17/2018    Nikki Morales    MRN: [id number]           Patient Information     Date Of Birth          1960        Visit Information        Provider Department      10/17/2018 11:00 AM Martina Andrade MD [facility]        Today's Diagnoses     Xanthelasma of left eyelid    -  1    Dermatochalasis of both upper eyelids        Involutional ptosis, acquired, bilateral           Follow-ups after your visit        Your next 10 appointments already scheduled     Jan 09, 2019 11:00 AM CST   Return Visit with Martina Andrade MD   [facility] ([facility])    [address] [address] [place] [address] [phone]              Who to contact     If you have questions or need follow up information about today's clinic visit or your schedule please contact [facility] directly at [phone].  Normal or non-critical lab and imaging results will be communicated to you by Handshakehart, letter or phone within 4 business days after the clinic has received the results. If you do not hear from us within 7 days, please contact the clinic through Business Ins[employer] or phone. If you have a critical or abnormal lab result, we will notify you by phone as soon as possible.  Submit refill requests through [employer] or call your pharmacy and they will forward the refill request to us. Please allow 3 business days for your refill to be completed.          Additional Information About Your Visit        Handshakehart Information     [employer] gives you secure access to your electronic health record. If you see a primary care provider, you can also send messages to your care team and make appointments. If you have questions, please call your primary care clinic.  If you do not have a primary care provider, please call [phone] and they will assist you.      [employer] is an electronic gateway that provides easy, online access to your  medical records. With LetMeGo, you can request a clinic appointment, read your test results, renew a prescription or communicate with your care team.     To access your existing account, please contact your [facility] or call [phone] for assistance.        Care EveryWhere ID     This is your Care EveryWhere ID. This could be used by other organizations to access your [facility] medical records  [id number]         Blood Pressure from Last 3 Encounters:   09/24/18 128/66   09/12/18 108/68   09/06/18 148/84    Weight from Last 3 Encounters:   09/12/18 88.2 kg (194 lb 8 oz)   03/01/18 90 kg (198 lb 8 oz)   02/23/18 91.2 kg (201 lb)              Today, you had the following     No orders found for display       Primary Care Provider Office Phone # Fax #    Erik Hamzah Peraza [phone]       [address] [place] [address]        Equal Access to Services     [facility]: Hadii aad ku hadasho Soomaali, waaxda luqadaha, qaybta kaalmada adeegyada, waxay idiin hayaan myah shelton . So [facility] [phone].    ATENCIÓN: Si habla español, tiene a newsome disposición servicios gratuitos de asistencia lingüística. Llame al [phone].    We comply with applicable federal civil rights laws and [place] laws. We do not discriminate on the basis of race, color, national origin, age, disability, sex, sexual orientation, or gender identity.            Thank you!     Thank you for choosing [facility]  for your care. Our goal is always to provide you with excellent care. Hearing back from our patients is one way we can continue to improve our services. Please take a few minutes to complete the written survey that you may receive in the mail after your visit with us. Thank you!             Your Updated Medication List - Protect others around you: Learn how to safely use, store and throw away your medicines at www.disposemymeds.org.          This list  "is accurate as of 10/17/18 11:31 AM.  Always use your most recent med list.                   Brand Name Dispense Instructions for use Diagnosis    albuterol 108 (90 Base) MCG/ACT inhaler    PROAIR HFA/PROVENTIL HFA/VENTOLIN HFA    1 Inhaler    Inhale 2 puffs into the lungs every 6 hours as needed for shortness of breath / dyspnea or wheezing    Mild persistent asthma without complication       aspirin 81 MG tablet      Take 1 tablet (81 mg) by mouth daily        azithromycin 250 MG tablet    ZITHROMAX    6 tablet    Two tablets first day, then one tablet daily for four days.    Acute wheezy bronchitis       CRESTOR 10 MG tablet   Generic drug:  rosuvastatin     30 tablet    Take 1 tablet (10 mg) by mouth daily    Hyperlipidemia LDL goal <130       cyanocobalamin 1000 MCG/ML injection    VITAMIN B12    3 mL    INJECT 1ML INTRAMUSCULARLY EVERY 30 DAYS    Chronic fatigue syndrome, Vitamin B12 deficiency (dietary) anemia       erythromycin ophthalmic ointment    ROMYCIN    3.5 g    Apply small amount to incision sites three times daily and 1/2\" strip into the operated eye at bedtime    Postoperative eye state       estradiol 0.05 MG/24HR BIW patch    VIVELLE-DOT     Place 1 patch onto the skin twice a week    Hypertrophy of breast, Eosinophilic esophagitis, Overweight(278.02)       fluconazole 150 MG tablet    DIFLUCAN    4 tablet    Take 1 tablet (150 mg) by mouth every 3 days    Candidiasis of vulva and vagina       fluticasone 220 MCG/ACT Inhaler    FLOVENT HFA    3 Inhaler    Inhale 2 puffs into the lungs 2 times daily    Mild persistent asthma without complication       fluticasone 50 MCG/ACT spray    FLONASE    1 Bottle    Spray 1-2 sprays into both nostrils daily        levothyroxine 100 MCG tablet    SYNTHROID/LEVOTHROID    90 tablet    Take 1 tablet (100 mcg) by mouth daily    Hypothyroidism due to acquired atrophy of thyroid       methylphenidate 10 MG tablet    RITALIN    30 tablet    1/2 to 1 tablet every " day as needed    Chronic fatigue syndrome       Multi-vitamin Tabs tablet     100 tablet    Take 1 tablet by mouth daily        omeprazole 20 MG CR capsule    priLOSEC    30 capsule    Take 1 capsule (20 mg) by mouth daily 1 TAB PO QD (Once per day)  As needed    Eosinophilic esophagitis       predniSONE 20 MG tablet    DELTASONE    5 tablet    Take 1 tablet (20 mg) by mouth daily    Acute wheezy bronchitis       trospium 20 MG tablet    SANCTURA    40 tablet    TAKE 1-2 TABLETS BY MOUTH DAILY AS NEEDED.    Female stress incontinence       zolpidem 5 MG tablet    AMBIEN    60 tablet    1 to 2 tabs at hour of sleep as needed    Chronic fatigue syndrome

## 2018-10-17 NOTE — NURSING NOTE
Patient presents with:  Post Op (Ophthalmology) Both Eyes: S/P Bilateral upper eyelid Blepharoplasty and ptosis repair 9/24/18      Referring Provider:  No referring provider defined for this encounter.    HPI    Symptoms:              Comments:  Pt denies any gtts/oint.  Pt feels the left eye is open more than right eye.              Nery Azar, COT

## 2018-11-01 ENCOUNTER — TELEPHONE (OUTPATIENT)
Dept: FAMILY MEDICINE | Facility: CLINIC | Age: 58
End: 2018-11-01

## 2018-11-01 NOTE — TELEPHONE ENCOUNTER
Please call pt/ she would like to know what kind of hearing aids we have etc. She wants to know for ins. to see if she can be seen here for them or not.

## 2018-11-05 NOTE — TELEPHONE ENCOUNTER
Left message indicating we are [employer] providers and work with most of the major manufacturers.    Addi Hercules.  [place] Licensed Audiologist #[id number]

## 2018-11-10 DIAGNOSIS — N39.3 FEMALE STRESS INCONTINENCE: ICD-10-CM

## 2018-11-12 ENCOUNTER — SURGERY (OUTPATIENT)
Age: 58
End: 2018-11-12

## 2018-11-12 ENCOUNTER — HOSPITAL ENCOUNTER (OUTPATIENT)
Facility: CLINIC | Age: 58
Setting detail: OBSERVATION
Discharge: HOME | End: 2018-11-13
Attending: EMERGENCY MEDICINE | Admitting: STUDENT IN AN ORGANIZED HEALTH CARE EDUCATION/TRAINING PROGRAM
Payer: COMMERCIAL

## 2018-11-12 ENCOUNTER — APPOINTMENT (OUTPATIENT)
Dept: GENERAL RADIOLOGY | Facility: CLINIC | Age: 58
End: 2018-11-12
Attending: STUDENT IN AN ORGANIZED HEALTH CARE EDUCATION/TRAINING PROGRAM
Payer: COMMERCIAL

## 2018-11-12 ENCOUNTER — APPOINTMENT (OUTPATIENT)
Dept: GENERAL RADIOLOGY | Facility: CLINIC | Age: 58
End: 2018-11-12
Attending: EMERGENCY MEDICINE
Payer: COMMERCIAL
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
## 2018-11-12 DIAGNOSIS — K20.0 EOSINOPHILIC ESOPHAGITIS: Primary | ICD-10-CM

## 2018-11-12 DIAGNOSIS — E03.4 HYPOTHYROIDISM DUE TO ACQUIRED ATROPHY OF THYROID: ICD-10-CM

## 2018-11-12 DIAGNOSIS — K22.11 ULCER OF ESOPHAGUS WITH BLEEDING: ICD-10-CM

## 2018-11-12 DIAGNOSIS — T18.128A FOOD IMPACTION OF ESOPHAGUS, INITIAL ENCOUNTER: ICD-10-CM

## 2018-11-12 LAB
ANION GAP SERPL CALCULATED.3IONS-SCNC: 8 MMOL/L (ref 3–14)
BASOPHILS # BLD AUTO: 0.3 10E9/L (ref 0–0.2)
BASOPHILS NFR BLD AUTO: 3.5 %
BUN SERPL-MCNC: 17 MG/DL (ref 7–30)
CALCIUM SERPL-MCNC: 8.6 MG/DL (ref 8.5–10.1)
CHLORIDE SERPL-SCNC: 110 MMOL/L (ref 94–109)
CO2 SERPL-SCNC: 29 MMOL/L (ref 20–32)
CREAT SERPL-MCNC: 0.88 MG/DL (ref 0.52–1.04)
DIFFERENTIAL METHOD BLD: ABNORMAL
EOSINOPHIL # BLD AUTO: 1.1 10E9/L (ref 0–0.7)
EOSINOPHIL NFR BLD AUTO: 14.2 %
ERYTHROCYTE [DISTWIDTH] IN BLOOD BY AUTOMATED COUNT: 13.8 % (ref 10–15)
GFR SERPL CREATININE-BSD FRML MDRD: 65 ML/MIN/1.7M2
GLUCOSE SERPL-MCNC: 107 MG/DL (ref 70–99)
HCT VFR BLD AUTO: 42.8 % (ref 35–47)
HGB BLD-MCNC: 14.1 G/DL (ref 11.7–15.7)
INR PPP: 0.95 (ref 0.86–1.14)
LYMPHOCYTES # BLD AUTO: 2.8 10E9/L (ref 0.8–5.3)
LYMPHOCYTES NFR BLD AUTO: 35.4 %
MCH RBC QN AUTO: 30.5 PG (ref 26.5–33)
MCHC RBC AUTO-ENTMCNC: 32.9 G/DL (ref 31.5–36.5)
MCV RBC AUTO: 93 FL (ref 78–100)
MONOCYTES # BLD AUTO: 0.8 10E9/L (ref 0–1.3)
MONOCYTES NFR BLD AUTO: 9.7 %
NEUTROPHILS # BLD AUTO: 2.9 10E9/L (ref 1.6–8.3)
NEUTROPHILS NFR BLD AUTO: 37.2 %
PLATELET # BLD AUTO: 240 10E9/L (ref 150–450)
PLATELET # BLD EST: ABNORMAL 10*3/UL
POTASSIUM SERPL-SCNC: 3.8 MMOL/L (ref 3.4–5.3)
RBC # BLD AUTO: 4.62 10E12/L (ref 3.8–5.2)
RBC MORPH BLD: NORMAL
SODIUM SERPL-SCNC: 146 MMOL/L (ref 133–144)
T4 FREE SERPL-MCNC: 0.84 NG/DL (ref 0.76–1.46)
TSH SERPL DL<=0.005 MIU/L-ACNC: 5.42 MU/L (ref 0.4–4)
WBC # BLD AUTO: 7.8 10E9/L (ref 4–11)

## 2018-11-12 PROCEDURE — 25000128 H RX IP 250 OP 636: Performed by: STUDENT IN AN ORGANIZED HEALTH CARE EDUCATION/TRAINING PROGRAM

## 2018-11-12 PROCEDURE — 85025 COMPLETE CBC W/AUTO DIFF WBC: CPT | Performed by: EMERGENCY MEDICINE

## 2018-11-12 PROCEDURE — 25000128 H RX IP 250 OP 636: Performed by: INTERNAL MEDICINE

## 2018-11-12 PROCEDURE — 99285 EMERGENCY DEPT VISIT HI MDM: CPT | Mod: Z6 | Performed by: EMERGENCY MEDICINE

## 2018-11-12 PROCEDURE — G0500 MOD SEDAT ENDO SERVICE >5YRS: HCPCS | Performed by: INTERNAL MEDICINE

## 2018-11-12 PROCEDURE — 84443 ASSAY THYROID STIM HORMONE: CPT | Performed by: EMERGENCY MEDICINE

## 2018-11-12 PROCEDURE — 25000128 H RX IP 250 OP 636: Performed by: EMERGENCY MEDICINE

## 2018-11-12 PROCEDURE — 25000132 ZZH RX MED GY IP 250 OP 250 PS 637: Performed by: INTERNAL MEDICINE

## 2018-11-12 PROCEDURE — 96375 TX/PRO/DX INJ NEW DRUG ADDON: CPT

## 2018-11-12 PROCEDURE — 99153 MOD SED SAME PHYS/QHP EA: CPT | Performed by: INTERNAL MEDICINE

## 2018-11-12 PROCEDURE — 99285 EMERGENCY DEPT VISIT HI MDM: CPT | Mod: 25

## 2018-11-12 PROCEDURE — 25000132 ZZH RX MED GY IP 250 OP 250 PS 637: Performed by: STUDENT IN AN ORGANIZED HEALTH CARE EDUCATION/TRAINING PROGRAM

## 2018-11-12 PROCEDURE — 71046 X-RAY EXAM CHEST 2 VIEWS: CPT | Mod: 77

## 2018-11-12 PROCEDURE — 96376 TX/PRO/DX INJ SAME DRUG ADON: CPT

## 2018-11-12 PROCEDURE — 84439 ASSAY OF FREE THYROXINE: CPT | Performed by: EMERGENCY MEDICINE

## 2018-11-12 PROCEDURE — 80048 BASIC METABOLIC PNL TOTAL CA: CPT | Performed by: EMERGENCY MEDICINE

## 2018-11-12 PROCEDURE — G0378 HOSPITAL OBSERVATION PER HR: HCPCS

## 2018-11-12 PROCEDURE — 99220 ZZC INITIAL OBSERVATION CARE,LEVL III: CPT | Mod: AI | Performed by: STUDENT IN AN ORGANIZED HEALTH CARE EDUCATION/TRAINING PROGRAM

## 2018-11-12 PROCEDURE — 85610 PROTHROMBIN TIME: CPT | Performed by: EMERGENCY MEDICINE

## 2018-11-12 PROCEDURE — 71046 X-RAY EXAM CHEST 2 VIEWS: CPT

## 2018-11-12 PROCEDURE — 96374 THER/PROPH/DIAG INJ IV PUSH: CPT | Mod: 59

## 2018-11-12 PROCEDURE — 96361 HYDRATE IV INFUSION ADD-ON: CPT | Mod: 59

## 2018-11-12 PROCEDURE — 84443 ASSAY THYROID STIM HORMONE: CPT | Performed by: STUDENT IN AN ORGANIZED HEALTH CARE EDUCATION/TRAINING PROGRAM

## 2018-11-12 PROCEDURE — C9113 INJ PANTOPRAZOLE SODIUM, VIA: HCPCS | Performed by: EMERGENCY MEDICINE

## 2018-11-12 PROCEDURE — 43255 EGD CONTROL BLEEDING ANY: CPT | Performed by: INTERNAL MEDICINE

## 2018-11-12 RX ORDER — LEVOTHYROXINE SODIUM 100 UG/1
100 TABLET ORAL DAILY
Status: DISCONTINUED | OUTPATIENT
Start: 2018-11-12 | End: 2018-11-13 | Stop reason: HOSPADM

## 2018-11-12 RX ORDER — CALCIUM CARBONATE 500 MG/1
2 TABLET, CHEWABLE ORAL 3 TIMES DAILY PRN
COMMUNITY

## 2018-11-12 RX ORDER — ALBUTEROL SULFATE 90 UG/1
2 AEROSOL, METERED RESPIRATORY (INHALATION) EVERY 6 HOURS PRN
Status: DISCONTINUED | OUTPATIENT
Start: 2018-11-12 | End: 2018-11-13 | Stop reason: HOSPADM

## 2018-11-12 RX ORDER — SODIUM CHLORIDE, SODIUM LACTATE, POTASSIUM CHLORIDE, CALCIUM CHLORIDE 600; 310; 30; 20 MG/100ML; MG/100ML; MG/100ML; MG/100ML
INJECTION, SOLUTION INTRAVENOUS CONTINUOUS
Status: ACTIVE | OUTPATIENT
Start: 2018-11-12 | End: 2018-11-12

## 2018-11-12 RX ORDER — NALOXONE HYDROCHLORIDE 0.4 MG/ML
.1-.4 INJECTION, SOLUTION INTRAMUSCULAR; INTRAVENOUS; SUBCUTANEOUS
Status: DISCONTINUED | OUTPATIENT
Start: 2018-11-12 | End: 2018-11-13 | Stop reason: HOSPADM

## 2018-11-12 RX ORDER — SIMETHICONE
LIQUID (ML) MISCELLANEOUS PRN
Status: DISCONTINUED | OUTPATIENT
Start: 2018-11-12 | End: 2018-11-12 | Stop reason: HOSPADM

## 2018-11-12 RX ORDER — ONDANSETRON 2 MG/ML
4 INJECTION INTRAMUSCULAR; INTRAVENOUS EVERY 6 HOURS PRN
Status: DISCONTINUED | OUTPATIENT
Start: 2018-11-12 | End: 2018-11-13 | Stop reason: HOSPADM

## 2018-11-12 RX ORDER — FENTANYL CITRATE 50 UG/ML
INJECTION, SOLUTION INTRAMUSCULAR; INTRAVENOUS PRN
Status: DISCONTINUED | OUTPATIENT
Start: 2018-11-12 | End: 2018-11-12 | Stop reason: HOSPADM

## 2018-11-12 RX ORDER — METHYLPHENIDATE HYDROCHLORIDE 10 MG/1
10 TABLET ORAL DAILY PRN
Status: DISCONTINUED | OUTPATIENT
Start: 2018-11-12 | End: 2018-11-13 | Stop reason: HOSPADM

## 2018-11-12 RX ORDER — ONDANSETRON 2 MG/ML
4 INJECTION INTRAMUSCULAR; INTRAVENOUS ONCE
Status: COMPLETED | OUTPATIENT
Start: 2018-11-12 | End: 2018-11-12

## 2018-11-12 RX ORDER — OLANZAPINE 10 MG/2ML
2.5 INJECTION, POWDER, FOR SOLUTION INTRAMUSCULAR ONCE
Status: COMPLETED | OUTPATIENT
Start: 2018-11-12 | End: 2018-11-12

## 2018-11-12 RX ORDER — TOLTERODINE TARTRATE 2 MG/1
2 TABLET, EXTENDED RELEASE ORAL
Status: DISCONTINUED | OUTPATIENT
Start: 2018-11-12 | End: 2018-11-13 | Stop reason: HOSPADM

## 2018-11-12 RX ORDER — FLUTICASONE PROPIONATE 220 UG/1
2 AEROSOL, METERED RESPIRATORY (INHALATION) 2 TIMES DAILY PRN
COMMUNITY
End: 2020-03-25

## 2018-11-12 RX ORDER — ONDANSETRON 2 MG/ML
INJECTION INTRAMUSCULAR; INTRAVENOUS PRN
Status: DISCONTINUED | OUTPATIENT
Start: 2018-11-12 | End: 2018-11-12 | Stop reason: HOSPADM

## 2018-11-12 RX ORDER — ZOLPIDEM TARTRATE 5 MG/1
5 TABLET ORAL AT BEDTIME
Status: DISCONTINUED | OUTPATIENT
Start: 2018-11-12 | End: 2018-11-13 | Stop reason: HOSPADM

## 2018-11-12 RX ORDER — TROSPIUM CHLORIDE 20 MG/1
TABLET, FILM COATED ORAL
Qty: 40 TABLET | Refills: 8 | Status: SHIPPED | OUTPATIENT
Start: 2018-11-12 | End: 2019-11-20

## 2018-11-12 RX ORDER — SODIUM CHLORIDE 9 MG/ML
1000 INJECTION, SOLUTION INTRAVENOUS CONTINUOUS
Status: DISCONTINUED | OUTPATIENT
Start: 2018-11-12 | End: 2018-11-12

## 2018-11-12 RX ADMIN — OLANZAPINE 2.5 MG: 10 INJECTION, POWDER, LYOPHILIZED, FOR SOLUTION INTRAMUSCULAR at 03:00

## 2018-11-12 RX ADMIN — LEVOTHYROXINE SODIUM 100 MCG: 100 TABLET ORAL at 15:46

## 2018-11-12 RX ADMIN — FENTANYL CITRATE 25 MCG: 50 INJECTION, SOLUTION INTRAMUSCULAR; INTRAVENOUS at 08:59

## 2018-11-12 RX ADMIN — MIDAZOLAM 1 MG: 1 INJECTION INTRAMUSCULAR; INTRAVENOUS at 09:01

## 2018-11-12 RX ADMIN — FENTANYL CITRATE 25 MCG: 50 INJECTION, SOLUTION INTRAMUSCULAR; INTRAVENOUS at 09:04

## 2018-11-12 RX ADMIN — SODIUM CHLORIDE, POTASSIUM CHLORIDE, SODIUM LACTATE AND CALCIUM CHLORIDE: 600; 310; 30; 20 INJECTION, SOLUTION INTRAVENOUS at 15:38

## 2018-11-12 RX ADMIN — GLUCAGON HYDROCHLORIDE 1 MG: 1 INJECTION, POWDER, FOR SOLUTION INTRAMUSCULAR; INTRAVENOUS; SUBCUTANEOUS at 03:27

## 2018-11-12 RX ADMIN — ZOLPIDEM TARTRATE 5 MG: 5 TABLET, FILM COATED ORAL at 22:11

## 2018-11-12 RX ADMIN — ONDANSETRON 4 MG: 2 INJECTION INTRAMUSCULAR; INTRAVENOUS at 08:58

## 2018-11-12 RX ADMIN — PANTOPRAZOLE SODIUM 40 MG: 40 INJECTION, POWDER, FOR SOLUTION INTRAVENOUS at 15:42

## 2018-11-12 RX ADMIN — SODIUM CHLORIDE 1000 ML: 9 INJECTION, SOLUTION INTRAVENOUS at 03:35

## 2018-11-12 RX ADMIN — MIDAZOLAM 1 MG: 1 INJECTION INTRAMUSCULAR; INTRAVENOUS at 09:04

## 2018-11-12 RX ADMIN — MIDAZOLAM 1 MG: 1 INJECTION INTRAMUSCULAR; INTRAVENOUS at 09:26

## 2018-11-12 RX ADMIN — MIDAZOLAM 1 MG: 1 INJECTION INTRAMUSCULAR; INTRAVENOUS at 08:59

## 2018-11-12 RX ADMIN — ONDANSETRON HYDROCHLORIDE 4 MG: 2 INJECTION, SOLUTION INTRAMUSCULAR; INTRAVENOUS at 02:58

## 2018-11-12 RX ADMIN — PANTOPRAZOLE SODIUM 40 MG: 40 INJECTION, POWDER, FOR SOLUTION INTRAVENOUS at 10:55

## 2018-11-12 RX ADMIN — Medication 2 ML: at 09:07

## 2018-11-12 RX ADMIN — FENTANYL CITRATE 25 MCG: 50 INJECTION, SOLUTION INTRAMUSCULAR; INTRAVENOUS at 09:01

## 2018-11-12 RX ADMIN — FENTANYL CITRATE 25 MCG: 50 INJECTION, SOLUTION INTRAMUSCULAR; INTRAVENOUS at 09:26

## 2018-11-12 RX ADMIN — Medication 1 MG: at 22:11

## 2018-11-12 ASSESSMENT — PAIN DESCRIPTION - DESCRIPTORS: DESCRIPTORS: SORE

## 2018-11-12 NOTE — LETTER
Transition Communication Hand-off for Care Transitions to Next Level of Care Provider    Name: Nikki Morales  : 1960  MRN #: [id number]  Primary Care Provider: Erik Peraza     [facility] [address] [facility] [place] [address]     Reason for Hospitalization:  Ulcer of esophagus with bleeding [K22.11]  Food impaction of esophagus, initial encounter [T18.128A]    Admitted to OBS STATUS Date/Time: 2018  2:22 AM  Discharge Date: 2018    Payor Source: Payor: [employer] / Plan: [employer] [place] [employer]AGE / Product Type: PPO /     Follow-up plan:  Future Appointments  Date Time Provider Department Center   11/15/2018 8:30 AM Jamila Pop LMFT [facility]   2019 11:00 AM Martina Andrade MD [facility]       Any outstanding tests or procedures:  YES      Referrals     Future Labs/Procedures    GASTROENTEROLOGY ADULT REF CONSULT ONLY     Comments:    Preferred Location: [facility], [facility] [phone]      Please be aware that coverage of these services is subject to the terms and limitations of your health insurance plan.  Call member services at your health plan with any benefit or coverage questions.  Any procedures must be performed at a [place] facility OR coordinated by your clinic's referral office.    Please bring the following with you to your appointment:    (1) Any X-Rays, CTs or MRIs which have been performed.  Contact the facility where they were done to arrange for  prior to your scheduled appointment.    (2) List of current medications   (3) This referral request   (4) Any documents/labs given to you for this referral    GASTROENTEROLOGY ADULT REF PROCEDURE ONLY     Comments:    Evaluate EoE with biopsy and re-look at at GE junction mucosal tear.  Last Lab Result: Creatinine (mg/dL)       Date                     Value                 2018               0.84             ----------  Body mass index is 29.65 kg/(m^2).      Needed:  No  Language:  English    Patient will be contacted to schedule procedure.     Please be aware that coverage of these services is subject to the terms and limitations of your health insurance plan.  Call member services at your health plan with any benefit or coverage questions.  Any procedures must be performed at a [place] facility OR coordinated by your clinic's referral office.    Please bring the following with you to your appointment:    (1) Any X-Rays, CTs or MRIs which have been performed.  Contact the facility where they were done to arrange for  prior to your scheduled appointment.    (2) List of current medications   (3) This referral request   (4) Any documents/labs given to you for this referral            Key Recommendations:  Please review AVS    Dana Carrington RN, BSN, PHN  Medicine Care Coordinator  [facility], [facility] and [facility]  Desk Phone: [phone]  Pager: [phone]    AVS/Discharge Summary is the source of truth; this is a helpful guide for improved communication of patient story

## 2018-11-12 NOTE — ED PROVIDER NOTES
"  History     Chief Complaint   Patient presents with     Swallowed Foreign Body     steak     HPI  Nikki Morales is a 58 year old female with history of esophageal strictures status post multiple dilations, eosinophilic esophagitis among other medical problems presents with food impaction.  Was at a wedding, ate steak, felt a piece of steak lodged in her esophagus.  This is happened greater than 4 times before.  She usually is able to drink water, vomit multiple times and eventually the bolus advances forward.  When she did present to the emergency department but it spontaneously resolved as well.  Has never needed a specific GI intervention for a food impaction.    Prior to this have been feeling completely well.    I have reviewed the Medications, Allergies, Past Medical and Surgical History, and Social History in the Epic system.    Review of Systems   14 point review of symptoms was performed and is negative except as noted above.     Physical Exam   BP: 142/88  Pulse: 87  Temp: 98  F (36.7  C)  Resp: 14  Height: 172.7 cm (5' 8\")  Weight: 88.5 kg (195 lb)  SpO2: 99 %      Physical Exam  GEN: Well appearing, non toxic, cooperative and conversant.  Unable to tolerate any oral intake.  HEENT: The head is normocephalic and atraumatic. Pupils are equal round and reactive to light. Extraocular motions are intact. There is no facial swelling. The neck is nontender and supple.   CV: Regular rate and rhythm without murmurs rubs or gallops. 2+ radial pulses bilaterally.  PULM: Clear to auscultation bilaterally.  ABD: Soft, nontender, nondistended.   EXT: Full range of motion.  No edema.  NEURO: Cranial nerves II through XII are intact and symmetric. Bilateral upper and lower extremities grossly show full range of motion without any focal deficits.   SKIN: No rashes, ecchymosis, or lacerations  PSYCH: Calm and cooperative, interactive.     ED Course     ED Course     Procedures               Labs Ordered and " Resulted from Time of ED Arrival Up to the Time of Departure from the ED   CBC WITH PLATELETS DIFFERENTIAL - Abnormal; Notable for the following:        Result Value    Absolute Eosinophils 1.1 (*)     Absolute Basophils 0.3 (*)     All other components within normal limits   BASIC METABOLIC PANEL - Abnormal; Notable for the following:     Sodium 146 (*)     Chloride 110 (*)     Glucose 107 (*)     All other components within normal limits   INR            Assessments & Plan (with Medical Decision Making)   58-year-old female with history of food impactions presents for persistent food impaction  Clinically she is otherwise very well-appearing and had been feeling well prior to this event.  She is nontoxic    Patient was given antiemetics, IV hydration and subsequently glucagon 1 mg IV.  Unfortunately, on p.o. trial afterwards the patient felt the presence of the impaction and also did vomit, unable to tolerate p.o. Secretions.  GI was consulted and will see the patient in the morning with anticipated endoscopy for impaction removal.    Patient signed out to morning attending            I have reviewed the nursing notes.    I have reviewed the findings, diagnosis, plan and need for follow up with the patient.    New Prescriptions    No medications on file       Final diagnoses:   Food impaction of esophagus, initial encounter       11/12/2018   [facility], [place], EMERGENCY DEPARTMENT     Anselmo Levy MD  11/12/18 0558

## 2018-11-12 NOTE — H&P
[facility]   General Medicine Service H & P      Patient Name: Nikki Morales   Date of Admission: 11/12/2018            Summary:     59 y/o female with PMHx significant for eosinophilic esophagitis and esophageal strictures s/p multiple dilations, a-fib s/p cardioversion now on sinus rhythm, was admitted due to food impaction in her esophagus, nausea, vomiting and poor PO tolerance.           Assessment and Plan:     #Food impaction in esophagus  #Eosinophilic esophagitis  #Eosinophila  #Esophageal strictures  #Dysphagia  She has been undergoing dilations of her esophagus since at least 1998. At that time, she was experiencing arthralgia. PORFIRIO negative, but RF positive. She then had another EGD in 2013 with biopsies of her esophagus consistent with eosinophilic esophagitis. Does carry a dx of asthma. Has been inconsistent with PPI, which she admits to it. Today, presented after eating steak and feeling food stuck. EGD showed inflammation in upper and middle third consistent with known eosinophilic esophagitis. Impacted food bolus was removed. No biopsies were obtained. Labs showed eosinophilia.   - Continue PPI IV  - NPO for now  - Will need outpatient EGD in 8 weeks  - Discuss with GI utility of starting steroid tx, as she has been on pulmicort in the past    #Large mucosal tear  Noticed following CO2 insufflation and food disimpaction at the lower esophagus close to GE junction. Clips were placed. Hemodynamically stable.   - PPI as above  - CXR upright for signs of free air, if negative will ADAT    #Hypernatremia  #Hyperchloremia  Secondary to dehydration from GI losses.   - mIVF  - Na check in AM    #Hx of mild intermittent asthma  - Continue with home inhalers    #?Hx of hypothyroidism  Has had low TSH with low free T4.   - Continue home synthroid      Patient evaluated and discussed with Dr. Cadet.    Code Status: FULL  FEN: NPO,   PPx:  Dispo: General Medicine    Sancho Smith MD  "PhD  Internal Medicine, PGY-1  Pager: [phone]         HPI:     57 y/o female with PMHx significant for eosinophilic esophagitis and esophageal strictures s/p multiple dilations, a-fib s/p cardioversion now on sinus rhythm, was admitted due to food impaction in her esophagus, nausea, vomiting and poor PO tolerance.     She was at a wedding over the weekend and on the day of admission ate steak. Felt that one of the pieces was stuck to her chest. Drank water to alleviate this, but had difficulties \"keeping water down\". She vomited several times, but denied hematemesis.     Reports that about a week ago had nausea, vomiting and diarrhea that resolved. She otherwise denies fever, chills, night sweats, headaches, chest pain, rhinorrhea, sore throat, cough, shortness of breath, abdominal pain, dysuria, hematuria, joint pain, joint swelling, new rash, presyncope or syncope.     Patient arrived in the ED. Vitals were significant for /95. Labs with elevated eosinophils. Started on PPI IV and admitted to the hospital to undergo EGD and monitoring.               Review of Systems:     10 pt ROS neg except for HPI             Past Medical History:   Past medical history discussed with patient.  Past Medical History:   Diagnosis Date     Chronic fatigue      Hyperlipidemia      Hypothyroid      New onset a-fib (H)      Other vitamin B12 deficiency anemia               Past Surgical History:   Past surgical history discussed with patient.     Past Surgical History:   Procedure Laterality Date     C  DELIVERY ONLY      , Low Cervical     C NONSPECIFIC PROCEDURE  's    sinus     C NONSPECIFIC PROCEDURE      Esophgeal dilatation multiple     C NONSPECIFIC PROCEDURE  2008    sling     C VAGINAL HYSTERECTOMY  1995    Hysterectomy, Vaginal     COLONOSCOPY  10/06/09     COLONOSCOPY  2013    Procedure: COMBINED COLONOSCOPY, SINGLE BIOPSY/POLYPECTOMY BY BIOPSY;;  Surgeon: Cuate Miles MD;  " Location: [facility]     COLONOSCOPY N/A 9/6/2018    Procedure: COLONOSCOPY;  Colonoscopy;  Surgeon: Hamzah Dudley DO;  Location: [facility]     COMBINED REPAIR PTOSIS WITH BLEPHAROPLASTY BILATERAL Bilateral 9/24/2018    Procedure: COMBINED REPAIR PTOSIS WITH BLEPHAROPLASTY BILATERAL;  Bilateral upper eyelid Blepharoplasty and ptosis repair ;  Surgeon: Martina Andrade MD;  Location: [facility]     CONIZATION CERVIX,KNIFE/LASER       ESOPHAGOSCOPY, GASTROSCOPY, DUODENOSCOPY (EGD), COMBINED  7/2/2013    Procedure: COMBINED ESOPHAGOSCOPY, GASTROSCOPY, DUODENOSCOPY (EGD), BIOPSY SINGLE OR MULTIPLE;  egd with rabdain biopsies and colonoscopy with polypectomy by biopsy ;  Surgeon: Cuate Miles MD;  Location: [facility]     HC DILATION/CURETTAGE DIAG/THER NON OB      D & C     HC REMOVAL OF TONSILS,<13 Y/O      Tonsils <10y.o.     HC UGI ENDOSCOPY DIAG W BIOPSY  12/05/07     HC UGI ENDOSCOPY, SIMPLE EXAM  09/10/99 & 04/14/98     HYSTERECTOMY, PAP NO LONGER INDICATED       LAPAROSCOPIC CHOLECYSTECTOMY  10/12/13     REPAIR PTOSIS       TUBAL LIGATION  1994              Social History:   Social history discussed with patient.  Social History   Substance Use Topics     Smoking status: Former Smoker     Types: Cigarettes     Smokeless tobacco: Never Used      Comment: social  6/mo     Alcohol use 2.4 oz/week     2 Cans of beer, 2 Standard drinks or equivalent per week      Comment: social              Family History:   Family history discussed with patient.  Family History   Problem Relation Age of Onset     Cancer Mother      Uterine     Prostate Cancer Mother      Diabetes Father      Hypertension Father      Cancer Father      prostate cancer     Cerebrovascular Disease Father      Psychotic Disorder Son      severe Add,      Asthma Son      exercised induced asthma     Asthma Son      ecxercise induced asthma     Cardiovascular Sister      recurrent blood clots     Cerebrovascular Disease Sister 51     Prostate Cancer  Brother      Diabetes Maternal Grandfather      HEART DISEASE Maternal Grandmother      Heart condition age 96     Diabetes Paternal Grandfather      Cancer Brother      Cerebrovascular Disease Paternal Grandmother               Immunizations:     Immunization History   Administered Date(s) Administered     HepB 05/03/1996, 09/04/1996, 05/14/1997     Historical DTP/aP 1960, 1960, 1960, 06/02/1960     Influenza (IIV3) PF 12/11/2007, 09/19/2008, 12/01/2010, 09/22/2011     Influenza Vaccine IM 3yrs+ 4 Valent IIV4 11/23/2015, 09/14/2016     OPV, trivalent, live 1960, 1960, 02/18/1959     TD (ADULT, 7+) 09/05/1996, 02/02/2007     TDAP Vaccine (Adacel) 08/23/2017              Allergies:   Allergies discussed with patient  Allergies   Allergen Reactions     Codeine Other (See Comments)     Causes agitation              Medications:       dextrose 5% and 0.45% NaCl   Intravenous Once     pantoprazole  40 mg Intravenous BID              Medications prior to hospitalization:       No current facility-administered medications on file prior to encounter.   Current Outpatient Prescriptions on File Prior to Encounter:  albuterol (PROAIR HFA/PROVENTIL HFA/VENTOLIN HFA) 108 (90 BASE) MCG/ACT Inhaler Inhale 2 puffs into the lungs every 6 hours as needed for shortness of breath / dyspnea or wheezing   aspirin 81 MG tablet Take 1 tablet (81 mg) by mouth daily   cyanocobalamin (VITAMIN B12) 1000 MCG/ML injection INJECT 1ML INTRAMUSCULARLY EVERY 30 DAYS   methylphenidate (RITALIN) 10 MG tablet Take 10 mg by mouth daily as needed (for chronic fatigue syndrome)    multivitamin, therapeutic with minerals (MULTI-VITAMIN) TABS Take 1 tablet by mouth daily   omeprazole (PRILOSEC) 20 MG capsule Take 1 capsule (20 mg) by mouth daily 1 TAB PO QD (Once per day)  As needed   trospium (SANCTURA) 20 MG tablet TAKE 1-2 TABLETS BY MOUTH DAILY AS NEEDED.   zolpidem (AMBIEN) 5 MG tablet 1 to 2 tabs at hour of sleep as  "needed   estradiol (VIVELLE-DOT) 0.05 MG/24HR patch Place 1 patch onto the skin twice a week   levothyroxine (SYNTHROID/LEVOTHROID) 100 MCG tablet Take 1 tablet (100 mcg) by mouth daily   rosuvastatin (CRESTOR) 10 MG tablet Take 1 tablet (10 mg) by mouth daily   [DISCONTINUED] fluticasone (FLOVENT HFA) 220 MCG/ACT Inhaler Inhale 2 puffs into the lungs 2 times daily   [DISCONTINUED] NEW MED Methylcobalamin 25mg/ml  0.1ml weekly             Physical Exam:   Vital signs at time of admission note: /88  Pulse 82  Temp 98.7  F (37.1  C)  Resp 20  Ht 1.727 m (5' 8\")  Wt 88.5 kg (195 lb)  SpO2 98%  BMI 29.65 kg/m2  General: AAOX3, NAD  HEENT: PERRLA, EOMI, anicteric sclera, erythema in posterior oropharynx  Neck: no JVD  CV: RRR, no murmur, no rubs, no gallops  Resp: CTAB  Abdomen: non tender, non distended, no organomegaly  Extremities: WWP bilaterally, no edema  Skin: no rash  Psych: adequate mood  Neuro: no focal neurological deficit    No intake or output data in the 24 hours ending 11/12/18 1203                Data:     Results for orders placed or performed during the hospital encounter of 11/12/18 (from the past 24 hour(s))   CBC with platelets differential   Result Value Ref Range    WBC 7.8 4.0 - 11.0 10e9/L    RBC Count 4.62 3.8 - 5.2 10e12/L    Hemoglobin 14.1 11.7 - 15.7 g/dL    Hematocrit 42.8 35.0 - 47.0 %    MCV 93 78 - 100 fl    MCH 30.5 26.5 - 33.0 pg    MCHC 32.9 31.5 - 36.5 g/dL    RDW 13.8 10.0 - 15.0 %    Platelet Count 240 150 - 450 10e9/L    Diff Method Manual Differential     % Neutrophils 37.2 %    % Lymphocytes 35.4 %    % Monocytes 9.7 %    % Eosinophils 14.2 %    % Basophils 3.5 %    Absolute Neutrophil 2.9 1.6 - 8.3 10e9/L    Absolute Lymphocytes 2.8 0.8 - 5.3 10e9/L    Absolute Monocytes 0.8 0.0 - 1.3 10e9/L    Absolute Eosinophils 1.1 (H) 0.0 - 0.7 10e9/L    Absolute Basophils 0.3 (H) 0.0 - 0.2 10e9/L    RBC Morphology Normal     Platelet Estimate Confirming automated cell count  "   Basic metabolic panel   Result Value Ref Range    Sodium 146 (H) 133 - 144 mmol/L    Potassium 3.8 3.4 - 5.3 mmol/L    Chloride 110 (H) 94 - 109 mmol/L    Carbon Dioxide 29 20 - 32 mmol/L    Anion Gap 8 3 - 14 mmol/L    Glucose 107 (H) 70 - 99 mg/dL    Urea Nitrogen 17 7 - 30 mg/dL    Creatinine 0.88 0.52 - 1.04 mg/dL    GFR Estimate 65 >60 mL/min/1.7m2    GFR Estimate If Black 79 >60 mL/min/1.7m2    Calcium 8.6 8.5 - 10.1 mg/dL   INR   Result Value Ref Range    INR 0.95 0.86 - 1.14   XR Chest 2 Views    Narrative    Exam: XR CHEST 2 VW, 11/12/2018 7:12 AM    Indication: Food impaction, eval for any evident FB.    Comparison: 2/20/2014    Findings:   PA and lateral views of the chest were obtained. The cardiomediastinal  silhouette is within normal limits. No pneumothorax or pleural  effusion. No focal airspace opacities. Cholecystectomy clips.      Impression    Impression:   Normal chest radiograph.    I have personally reviewed the examination and initial interpretation  and I agree with the findings.    YENIFER TIAN MD   UPPER GI ENDOSCOPY   Result Value Ref Range    Upper GI Endoscopy       [address] [place] [address] [phone]     Endoscopy Department  _______________________________________________________________________________  Patient Name: Nikki Morales     Procedure Date: 11/12/2018 8:15 AM  MRN: [id number]                       Account Number: [id number]  YOB: 1960              Admit Type: Inpatient  Age: 58                                Gender: Female  Note Status: Finalized                Attending MD: Theodora Ortega MD  Pause for the Cause: Pause for cause completed. Total Sedation Time:   _______________________________________________________________________________     Procedure:           Upper GI endoscopy  Indications:         Foreign body in the esophagus  Providers:           Theodora Ortega MD, Alessandro Palacios RN, Floridalma  Stevan Price MD:          Medicines:           Fentanyl 100 micrograms IV, Midazolam 4 mg IV  Complications:       No immediate complications.  ______________________ _________________________________________________________  Procedure:           Pre-Anesthesia Assessment:                       - Prior to the procedure, a History and Physical was                        performed, and patient medications and allergies were                        reviewed. The patient is competent. The risks and                        benefits of the procedure and the sedation options and                        risks were discussed with the patient. All questions                        were answered and informed consent was obtained. Patient                        identification and proposed procedure were verified by                        the physician and the nurse in the pre-procedure area.                        Mental Status Examination: alert and oriented. Airway                        Examination: normal oropharyngeal airway and neck                        mobility. Respiratory Examination: clear to                        auscultation. CV Examination: normal.  Prophylactic                        Antibiotics: The patient does not require prophylactic                        antibiotics. Prior Anticoagulants: The patient has taken                        no previous anticoagulant or antiplatelet agents. ASA                        Grade Assessment: II - A patient with mild systemic                        disease. After reviewing the risks and benefits, the                        patient was deemed in satisfactory condition to undergo                        the procedure. The anesthesia plan was to use moderate                        sedation / analgesia (conscious sedation). Immediately                        prior to administration of medications, the patient was                        re-assessed for adequacy to receive  sedatives. The heart                        rate, respiratory rate, oxygen saturations, blood                        pressure, adequacy of pulmonary ventilation, and                        response to care were monitored through out the                        procedure. The physical status of the patient was                        re-assessed after the procedure.                       After obtaining informed consent, the endoscope was                        passed under direct vision. Throughout the procedure,                        the patient's blood pressure, pulse, and oxygen                        saturations were monitored continuously. The Endoscope                        was introduced through the mouth, and advanced to the                        second part of duodenum. The upper GI endoscopy was                        accomplished without difficulty. The patient tolerated                        the procedure well.                                                                                   Findings:       The oropharynx was normal.       Mucosal changes including white plaques and congestion (edema) were        found in the upper third of the esophagus and in the middle third of the        esop hagus. Esophageal findings were graded using the Eosinophilic        Esophagitis Endoscopic Reference Score (EoE-EREFS) as: Edema Grade 1        Present (decreased clarity or absence of vascular markings), Rings Grade        0 None (no ridges or rings seen), Exudates Grade 1 Mild (scattered white        lesions involving less than 10 percent of the esophageal surface area),        Furrows Grade 0 None (no vertical lines seen) and Stricture present in        the lower esophagus.       Food was found in the lower third of the esophagus. Food was disimpacted        in to the stomach with gentle CO2 insufflation. A 1.5 cm mucosal tear        was seen at the level of GEJ after disimpaction. Possible from         mechanical effect of the bolus on the edematous esophageal mucosa. Mild        oozing was observed. To prevent bleeding, three hemostatic clips were        successfully placed. There was no bleeding at the end of the procedure.       The cardia, gastric fundus, gastric body and antru m were normal.       The duodenal bulb and second portion of the duodenum were normal.                                                                                   Moderate Sedation:       Moderate (conscious) sedation was administered by the endoscopy nurse        and supervised by the endoscopist. The following parameters were        monitored: oxygen saturation, heart rate, blood pressure, and response        to care. Total physician intraservice time was 30 minutes.  Impression:          - Normal oropharynx.                       - Esophageal mucosal changes in the upper and middle                        third of esophagus secondary to known eosinophilic                        esophagitis.                       - Impacted large food bolus in the lower third of the                        esophagus. A 1.5 cm mucosal tear was seen at the level                        of GEJ after disimpaction just with CO2 insufflation.                        Possible from mechanical effect of the  bolus on the                        edematous esophageal mucosa. Clips were placed to                        prevent bleeding.                       - Normal cardia, gastric fundus, antrum and gastric body.                       - Normal duodenal bulb and second portion of the                        duodenum.  Recommendation:      - Admit the patient to hospital [facility] for observation.                       - NPO for now. Consider advancing to clears this evening.                       - Obtain repeat CXR in the setting of mucosal tear.                       - Use Protonix (pantoprazole) 40 mg IV twice daily.                        Start Omeprazole 40 mg  BID x 2 months at discharge.                       - Educated on importance of chewing the food before                        swallowing and following with GI for                        evaluation/treatment of EoE.                       - Repeat upper endoscopy in 8 weeks to evaluate the                        response to therapy and ob tain esophageal biopsies.                       - Return to GI clinic after repeat EGD in 8 weeks.                                                                                     Electronically signed by: Theodora Ortega  ________________  Theodora Ortega MD  11/12/2018 10:07:33 AM    _______________  Floridalma Calles,   Number of Addenda: 0    Note Initiated On: 11/12/2018 8:15 AM  Scope In:  Scope Out:

## 2018-11-12 NOTE — OR NURSING
EGD completed. Food bolus pushed through esophagus with scope, no tools used.  3 cook clips placed to esophageal ulcer to control bleed.  No additional interventions completed.  Hypertensive and tachycardic during periods of retching, otherwise vitally stable on 4L NC.  Sedation given per MD instruction.  Report called to ED nurseShailesh, post-procedure.

## 2018-11-12 NOTE — ED TRIAGE NOTES
Pt has a piece of steak caught in her throat, she has a hx of this happening and usually she can get it to pass with alittle water, but so far that has not worked, she did get some small pieces of it out but she still can feel theres aa chunk in there. Breathing is fine.

## 2018-11-12 NOTE — ED NOTES
[facility], [place]   ED Nurse to Floor Handoff     Nikki Morales is a 58 year old female who speaks English and lives with a spouse,  in a home  They arrived in the ED by car from home    ED Chief Complaint: Swallowed Foreign Body (steak)    ED Dx;   Final diagnoses:   Food impaction of esophagus, initial encounter - removed via endoscopy this visit   Ulcer of esophagus with bleeding         Needed?: No    Allergies:   Allergies   Allergen Reactions     Codeine Other (See Comments)     Causes agitation   .  Past Medical Hx:   Past Medical History:   Diagnosis Date     Chronic fatigue      Hyperlipidemia      Hypothyroid      New onset a-fib (H)      Other vitamin B12 deficiency anemia       Baseline Mental status: [facility]  Current Mental Status changes: at basesline    Infection present or suspected this encounter: no  Sepsis suspected: No  Isolation type: No active isolations     Activity level - Baseline/Home:  Independent  Activity Level - Current:   Independent    Bariatric equipment needed?: No    In the ED these meds were given:   Medications   pantoprazole (PROTONIX) 40 mg IV push injection (40 mg Intravenous Given 11/12/18 1055)   dextrose 5% and 0.45% NaCl infusion (not administered)   ondansetron (ZOFRAN) injection 4 mg (not administered)   ondansetron (ZOFRAN) injection 4 mg (4 mg Intravenous Given 11/12/18 0258)   glucagon injection 1 mg (1 mg Intravenous Given 11/12/18 0327)   OLANZapine (zyPREXA) injection 2.5 mg (2.5 mg Intramuscular Given 11/12/18 0300)   0.9% sodium chloride BOLUS (0 mLs Intravenous Stopped 11/12/18 0520)       Drips running?  Yes    Home pump  No    Current LDAs  Peripheral IV 11/12/18 Right Upper forearm (Active)   Site Assessment WDL 11/12/2018  9:02 AM   Line Status Infusing 11/12/2018  9:02 AM   Phlebitis Scale 0-->no symptoms 11/12/2018  9:02 AM   Infiltration Scale 0 11/12/2018  9:02 AM   Infiltration Site Treatment Method  None  "11/12/2018  2:58 AM   Extravasation? No 11/12/2018  2:58 AM   Dressing Intervention New dressing  11/12/2018  2:58 AM   Number of days:0       Incision/Surgical Site 09/24/18 Bilateral Eye (Active)   Number of days:49       Labs results:   Labs Ordered and Resulted from Time of ED Arrival Up to the Time of Departure from the ED   CBC WITH PLATELETS DIFFERENTIAL - Abnormal; Notable for the following:        Result Value    Absolute Eosinophils 1.1 (*)     Absolute Basophils 0.3 (*)     All other components within normal limits   BASIC METABOLIC PANEL - Abnormal; Notable for the following:     Sodium 146 (*)     Chloride 110 (*)     Glucose 107 (*)     All other components within normal limits   INR       Imaging Studies:   Recent Results (from the past 24 hour(s))   XR Chest 2 Views    Narrative    Exam: XR CHEST 2 VW, 11/12/2018 7:12 AM    Indication: Food impaction, eval for any evident FB.    Comparison: 2/20/2014    Findings:   PA and lateral views of the chest were obtained. The cardiomediastinal  silhouette is within normal limits. No pneumothorax or pleural  effusion. No focal airspace opacities. Cholecystectomy clips.      Impression    Impression:   Normal chest radiograph.    I have personally reviewed the examination and initial interpretation  and I agree with the findings.    YENIFER TIAN MD       Recent vital signs:   /89  Pulse 87  Temp 98  F (36.7  C) (Oral)  Resp 12  Ht 1.727 m (5' 8\")  Wt 88.5 kg (195 lb)  SpO2 99%  BMI 29.65 kg/m2    Cardiac Rhythm: Normal Sinus  Pt needs tele? No  Skin/wound Issues: None    Code Status: Full Code    Pain control: good    Nausea control: pt had none    Abnormal labs/tests/findings requiring intervention: See EMR    Family present during ED course? Yes   Family Comments/Social Situation comments:     Tasks needing completion: None    Shailesh Rowell, RN  ascom-- [phone] [facility]  [phone] [facility]      "

## 2018-11-12 NOTE — CONSULTS
GASTROENTEROLOGY CONSULTATION      Date of Admission:  11/12/2018          ASSESSMENT AND RECOMMENDATIONS:   Assessment:  Ms. Morales is a 58 year old female with a history of eosinophilic esophagitis, prior distal esophageal stricture (dilated 1999) who presented to the ED with a food impaction (steak ~ midnight).    #. Eosinophilic esophagitis c/w food impaction:  EGD completed this morning, steak piece found in the distal esophagus that was advanced into the stomach with CO2 insufflation. There was a noted 1.5cm mucosal tear at the GE junction that was treated with three lips. Plan to admit to observation overnight and treat with IV PPI to assist mucosal healing. See formal procedure note for full details.     Recommendations:  -- Patient to remain NPO if she is doing well this afternoon she may start clear liquid diet  -- CXR today given mucosal tear  -- Protonix 40mg IV BID today, she may transition to 40mg PO BID for 2 months after discharge  -- She will need repeat EGD in 8 weeks to evaluate response to therapy and obtain esophageal biopsies  -- Follow up in GI clinic in 8 weeks after EGD for ongoing management of Eosinophilic esophagitis  -- Inpatient GI will follow with you    Thank you for involving us in this patient's care. Please do not hesitate to contact the GI service with any questions or concerns.     Pt care plan discussed with Dr. Ortega, GI staff physician.    Floridalma Calles MD  GI Fellow  [phone]  -------------------------------------------------------------------------------------------------------------------          Chief Complaint:   We were asked by the ED to evaluate this patient with a food impaction    History is obtained from the patient and the medical record.          History of Present Illness:   Ms. Morales is a 58 year old female with a history of eosinophilic esophagitis, prior distal esophageal stricture (dilated 1999) who presented to the ED with a food  impaction (steak ~ midnight).    The patient reports that she was at a wedding last night. Just before midnight she ate a few bites of steak at which time she started to feel like they were getting stuck in her throat. She states that when this happens she can usually drink some water which then makes her vomiting to clear her throat out. She notes that this has not happened in some time. Notes that she was diagnosed with EoE years ago, she was initially treated with a swallowed steroid spray but has not taken this is quite a while. She is not currently taking any PPI and has not tried any dietary elimination.    She has otherwise been at her usual state of health with no recent fevers, chills, cough, congestion or any new concerns.            Past Medical History:   Reviewed and edited as appropriate  Past Medical History:   Diagnosis Date     Chronic fatigue      Hyperlipidemia      Hypothyroid      New onset a-fib (H)      Other vitamin B12 deficiency anemia             Past Surgical History:   Reviewed and edited as appropriate   Past Surgical History:   Procedure Laterality Date     C  DELIVERY ONLY      , Low Cervical     C NONSPECIFIC PROCEDURE  's    sinus     C NONSPECIFIC PROCEDURE      Esophgeal dilatation multiple     C NONSPECIFIC PROCEDURE      sling     C VAGINAL HYSTERECTOMY  1995    Hysterectomy, Vaginal     COLONOSCOPY  10/06/09     COLONOSCOPY  2013    Procedure: COMBINED COLONOSCOPY, SINGLE BIOPSY/POLYPECTOMY BY BIOPSY;;  Surgeon: Cuate Miles MD;  Location:  GI     COLONOSCOPY N/A 2018    Procedure: COLONOSCOPY;  Colonoscopy;  Surgeon: Hamzah Dudley DO;  Location:  GI     COMBINED REPAIR PTOSIS WITH BLEPHAROPLASTY BILATERAL Bilateral 2018    Procedure: COMBINED REPAIR PTOSIS WITH BLEPHAROPLASTY BILATERAL;  Bilateral upper eyelid Blepharoplasty and ptosis repair ;  Surgeon: Martina Andrade MD;  Location: [facility]     CONIZATION  CERVIX,KNIFE/LASER       ESOPHAGOSCOPY, GASTROSCOPY, DUODENOSCOPY (EGD), COMBINED  7/2/2013    Procedure: COMBINED ESOPHAGOSCOPY, GASTROSCOPY, DUODENOSCOPY (EGD), BIOPSY SINGLE OR MULTIPLE;  egd with rabdain biopsies and colonoscopy with polypectomy by biopsy ;  Surgeon: Cuate Miles MD;  Location: [facility]     HC DILATION/CURETTAGE DIAG/THER NON OB      D & C     HC REMOVAL OF TONSILS,<13 Y/O      Tonsils <10y.o.     HC UGI ENDOSCOPY DIAG W BIOPSY  12/05/07     HC UGI ENDOSCOPY, SIMPLE EXAM  09/10/99 & 04/14/98     HYSTERECTOMY, PAP NO LONGER INDICATED       LAPAROSCOPIC CHOLECYSTECTOMY  10/12/13     REPAIR PTOSIS       TUBAL LIGATION  1994            Previous Endoscopy:   EGD (11/12/18):  Impression:          - Normal oropharynx.                        - Esophageal mucosal changes in the upper and middle                        third of esophagus secondary to known eosinophilic                        esophagitis.                        - Impacted large food bolus in the lower third of the                        esophagus. A 1.5 cm mucosal tear was seen at the level                        of GEJ after disimpaction just with CO2 insufflation.                        Possible from mechanical effect of the bolus on the                        edematous esophageal mucosa. Clips were placed to                        prevent bleeding.                        - Normal cardia, gastric fundus, antrum and gastric body.                        - Normal duodenal bulb and second portion of the                        duodenum.   Recommendation:      - Admit the patient to hospital [facility] for observation.                        - NPO for now. Consider advancing to clears this evening.                        - Obtain repeat CXR in the setting of mucosal tear.                        - Use Protonix (pantoprazole) 40 mg IV twice daily.                        Start Omeprazole 40 mg BID x 2 months at discharge.                        -  Educated on importance of chewing the food before                        swallowing and following with GI for                        evaluation/treatment of EoE.                        - Repeat upper endoscopy in 8 weeks to evaluate the                        response to therapy and obtain esophageal biopsies.                        - Return to GI clinic after repeat EGD in 8 weeks.       EGD (9/1999):    EGD (4/1998):    EGD (7/2013):      Results for orders placed or performed during the hospital encounter of 09/06/18   COLONOSCOPY   Result Value Ref Range    COLONOSCOPY       [address] [address] [phone]     Endoscopy Department  _______________________________________________________________________________  Patient Name: Nikki Morales     Procedure Date: 9/6/2018 10:32 AM  MRN: [id number]                       Account Number: [id number]  YOB: 1960              Admit Type: Outpatient  Age: 58                               Room: Room 2  Gender: Female                        Note Status: Finalized  Attending MD: Hamzah Dudley MD  Total Sedation Time:   _______________________________________________________________________________     Procedure:           Colonoscopy  Indications:         High risk colon cancer surveillance: Personal history of                        colonic polyps  Providers:           Hamzah Dudley MD  Referring MD:        Erik Peraza MD  Medicines:           Monitored Anesthesia Care  Complications:       No immediate complications. Estimated blood lo ss: None.  _______________________________________________________________________________  Procedure:           Pre-Anesthesia Assessment:                       - Prior to the procedure, a History and Physical was                        performed, and patient medications, allergies and                        sensitivities were reviewed. The patient's  tolerance of                        previous anesthesia was reviewed.                       - The risks and benefits of the procedure and the                        sedation options and risks were discussed with the                        patient. All questions were answered and informed                        consent was obtained.                       - After reviewing the risks and benefits, the patient                        was deemed in satisfactory condition to undergo the                        procedure.                       After obtaining informed consent, the colonoscope was                        passed under direct vision.  Throughout the procedure,                        the patient's blood pressure, pulse, and oxygen                        saturations were monitored continuously. The Colonoscope                        was introduced through the anus and advanced to the                        cecum, identified by appendiceal orifice and ileocecal                        valve. The colonoscopy was performed without difficulty.                        The patient tolerated the procedure well. The quality of                        the bowel preparation was good.                                                                                   Findings:       Hemorrhoids were found on perianal exam.       Many small and large-mouthed diverticula were found in the sigmoid colon        and descending colon.       The sigmoid colon was tortuous.       The exam was otherwise without abnormality.                                                                                   Impression:          - Hemorrhoids fou nd on perianal exam.                       - Diverticulosis in the sigmoid colon and in the                        descending colon.                       - Tortuous colon.                       - The examination was otherwise normal.                       - No specimens collected.  Recommendation:       - Discharge patient to home.                       - High fiber diet and low fat diet.                       - Continue present medications.                       - Repeat colonoscopy in 5 years for surveillance.                                                                                     _____________________  Hamzah Dudley MD  9/6/2018 12:36:06 PM  Number of Addenda: 0    Note Initiated On: 9/6/2018 10:32 AM              Social History:   Reviewed and edited as appropriate  Smokes and drinks socially.    Social History     Social History     Marital status:      Spouse name: Jared     Number of children: 2     Years of education: 12     Occupational History     HomeMaker Homemaker     Social History Main Topics     Smoking status: Former Smoker     Types: Cigarettes     Smokeless tobacco: Never Used      Comment: social  6/mo     Alcohol use 2.4 oz/week     2 Cans of beer, 2 Standard drinks or equivalent per week      Comment: social     Drug use: No      Comment: used in past any and all     Sexual activity: Yes     Partners: Male     Birth control/ protection: Female Surgical      Comment: Hx: hystectomy     Other Topics Concern      Service No     Blood Transfusions No     Caffeine Concern No     Occupational Exposure No     Hobby Hazards No     Sleep Concern Yes     Difficult with sleeping at night, sleeps most of the day     Stress Concern No     Weight Concern Yes     desire wt loss     Special Diet No     Back Care Yes     weight restrictions     Exercise Yes     Bike Helmet No     Seat Belt Yes     Self-Exams No     Parent/Sibling W/ Cabg, Mi Or Angioplasty Before 65f 55m? No     Social History Narrative          Family History:   Reviewed and edited as appropriate  No known history of gastrointestinal/liver disease or  gastrointestinal malignancies       Allergies:   Reviewed and edited as appropriate     Allergies   Allergen Reactions     Codeine Other (See Comments)      "Causes agitation          Medications:     Current Facility-Administered Medications   Medication     sodium chloride 0.9% infusion     Current Outpatient Prescriptions   Medication Sig     albuterol (PROAIR HFA/PROVENTIL HFA/VENTOLIN HFA) 108 (90 BASE) MCG/ACT Inhaler Inhale 2 puffs into the lungs every 6 hours as needed for shortness of breath / dyspnea or wheezing     aspirin 81 MG tablet Take 1 tablet (81 mg) by mouth daily     azithromycin (ZITHROMAX) 250 MG tablet Two tablets first day, then one tablet daily for four days.     cyanocobalamin (VITAMIN B12) 1000 MCG/ML injection INJECT 1ML INTRAMUSCULARLY EVERY 30 DAYS     erythromycin (ROMYCIN) ophthalmic ointment Apply small amount to incision sites three times daily and 1/2\" strip into the operated eye at bedtime     estradiol (VIVELLE-DOT) 0.05 MG/24HR patch Place 1 patch onto the skin twice a week     fluconazole (DIFLUCAN) 150 MG tablet Take 1 tablet (150 mg) by mouth every 3 days     fluticasone (FLONASE) 50 MCG/ACT spray Spray 1-2 sprays into both nostrils daily     fluticasone (FLOVENT HFA) 220 MCG/ACT Inhaler Inhale 2 puffs into the lungs 2 times daily     levothyroxine (SYNTHROID/LEVOTHROID) 100 MCG tablet Take 1 tablet (100 mcg) by mouth daily     methylphenidate (RITALIN) 10 MG tablet 1/2 to 1 tablet every day as needed     multivitamin, therapeutic with minerals (MULTI-VITAMIN) TABS Take 1 tablet by mouth daily     omeprazole (PRILOSEC) 20 MG capsule Take 1 capsule (20 mg) by mouth daily 1 TAB PO QD (Once per day)  As needed     predniSONE (DELTASONE) 20 MG tablet Take 1 tablet (20 mg) by mouth daily     rosuvastatin (CRESTOR) 10 MG tablet Take 1 tablet (10 mg) by mouth daily     trospium (SANCTURA) 20 MG tablet TAKE 1-2 TABLETS BY MOUTH DAILY AS NEEDED.     zolpidem (AMBIEN) 5 MG tablet 1 to 2 tabs at hour of sleep as needed     [DISCONTINUED] NEW MED Methylcobalamin 25mg/ml  0.1ml weekly             Review of Systems:   A complete review of " "systems was performed and is negative except as noted in the HPI           Physical Exam:   BP (!) 148/91  Pulse 87  Temp 98  F (36.7  C) (Oral)  Resp 14  Ht 1.727 m (5' 8\")  Wt 88.5 kg (195 lb)  SpO2 96%  BMI 29.65 kg/m2  Wt:   Wt Readings from Last 2 Encounters:   18 88.5 kg (195 lb)   18 88.2 kg (194 lb 8 oz)      Constitutional: Ms. Morales is lying down in bed in no apparent distress, she is cooperative, pleasant, not dyspneic/diaphoretic, no acute distress  Eyes: Sclera anicteric/injected  Ears/nose/mouth/throat: Normal oropharynx without ulcers or exudate, mucus membranes moist, hearing intact. She is spitting up her secretions on exam.  CV: No edema  Respiratory: Unlabored breathing  Abd:  Nondistended, +bs, no hepatosplenomegaly, nontender, no peritoneal signs  Skin: warm, perfused  Neuro: AAO x 3  Psych: Normal affect  MSK: No gross deformities         Data:   Labs and imaging below were independently reviewed and interpreted    BMP  Recent Labs  Lab 18  0259   *   POTASSIUM 3.8   CHLORIDE 110*   ROLANDA 8.6   CO2 29   BUN 17   CR 0.88   *     CBC  Recent Labs  Lab 18  0259   WBC 7.8   RBC 4.62   HGB 14.1   HCT 42.8   MCV 93   MCH 30.5   MCHC 32.9   RDW 13.8        INR  Recent Labs  Lab 18  0259   INR 0.95     Imaginv CXR (2018):   FINDINGS: PA and lateral views of chest. Heart is normal. No pleural  effusion. No pneumomediastinum or pneumothorax. No pulmonary opacity.  Lower esophageal clips and cholecystectomy clear.    "

## 2018-11-12 NOTE — PROGRESS NOTES
Admitted to [facility] at 1345 from ED, unaccompanied by spouse  With chief c/o food lodged in her esophagus last night at a wedding.  Unable to swallow/manage secretions through the night.  Now s/p endoscopy to remove food bolus with placement of clips in esophagus. C/o throat pain R>L, but is able to swallow.  No difficulty breathing, lungs clear, O2 sats 96% on RA.  Currently NPO pending CXR results.    awake and alert, oriented x4  Oriented to room/call light/unit routines  Admission Profile Done  Med Rec Done  MD, orders Reviewed

## 2018-11-12 NOTE — PHARMACY-ADMISSION MEDICATION HISTORY
Admission medication history interview status for the 11/12/2018 admission is complete. See Epic admission navigator for allergy information, pharmacy, prior to admission medications and immunization status.     Medication history interview sources: Patient and [facility] (regarding levothyroxine only)    Changes made to PTA medication list (reason)  Added: calcium carbonate (Tums)  Deleted: erythromycin (therapy completed following eyelid lift), azithromycin and prednisone (therapy completed for bronchitis), fluconazole  Changed: none    Additional medication history information (including reliability of information, actions taken by pharmacist):  -Patient is a reasonably good medication historian, but acknowledges she is poor about taking her prescribed medications  -She reports not taking her levothyroxine and rosuvastatin for 6 months or so with the last fill of levothyroxine in January 2018. Talked with patient about importance of taking the medications. She did not offer a reason why she is not taking the levothyroxine, but stated she stopped taking the rosuvastatin due to muscle aches  - allergies reviewed with patient  -has not received her influenza vaccine this year    Prior to Admission medications    Medication Sig Last Dose Taking? Auth Provider   albuterol (PROAIR HFA/PROVENTIL HFA/VENTOLIN HFA) 108 (90 BASE) MCG/ACT Inhaler Inhale 2 puffs into the lungs every 6 hours as needed for shortness of breath / dyspnea or wheezing Past Week at 1 week ago Yes Devika Ochoa APRN CNP   aspirin 81 MG tablet Take 1 tablet (81 mg) by mouth daily 11/10/2018 at AM Yes Samm Smalls MD   calcium carbonate (TUMS) 500 MG chewable tablet Take 2 chew tab by mouth 3 times daily as needed for other (bloating/flatulence) Past Week at Unknown time Yes Unknown, Entered By History   cyanocobalamin (VITAMIN B12) 1000 MCG/ML injection INJECT 1ML INTRAMUSCULARLY EVERY 30 DAYS Past Week at last inj 1 week ago  Yes Erik Peraza MD   fluticasone (FLOVENT HFA) 220 MCG/ACT Inhaler Inhale 2 puffs into the lungs 2 times daily as needed (for bronchitis) Past Month at Unknown time Yes Unknown, Entered By History   methylphenidate (RITALIN) 10 MG tablet Take 10 mg by mouth daily as needed (for chronic fatigue syndrome)  Past Month at about one month ago Yes Erik Peraza MD   multivitamin, therapeutic with minerals (MULTI-VITAMIN) TABS Take 1 tablet by mouth daily 11/10/2018 at AM Yes Erik Peraza MD   omeprazole (PRILOSEC) 20 MG capsule Take 1 capsule (20 mg) by mouth daily 1 TAB PO QD (Once per day)  As needed Past Month at about one month ago Yes Erik Peraza MD   trospium (SANCTURA) 20 MG tablet TAKE 1-2 TABLETS BY MOUTH DAILY AS NEEDED. 11/11/2018 at AM Yes Erik Peraza MD   zolpidem (AMBIEN) 5 MG tablet 1 to 2 tabs at hour of sleep as needed 11/11/2018 at HS Yes Erik Peraza MD   estradiol (VIVELLE-DOT) 0.05 MG/24HR patch Place 1 patch onto the skin twice a week 11/10/2018 at unknown time Yes Reported, Patient   levothyroxine (SYNTHROID/LEVOTHROID) 100 MCG tablet Take 1 tablet (100 mcg) by mouth daily More than a month at Unknown time  Erik Peraza MD   rosuvastatin (CRESTOR) 10 MG tablet Take 1 tablet (10 mg) by mouth daily More than a month at 6 months ago  Erik Peraza MD       Medication history completed by: Hamzah Portillo, PGY-1 Pharmacy Resident

## 2018-11-12 NOTE — IP AVS SNAPSHOT
MRN:[id number]                      After Visit Summary   11/12/2018    Nikki Morales    MRN: [id number]           Thank you!     Thank you for choosing [facility] for your care. Our goal is always to provide you with excellent care. Hearing back from our patients is one way we can continue to improve our services. Please take a few minutes to complete the written survey that you may receive in the mail after you visit with us. Thank you!        Patient Information     Date Of Birth          1960        Designated Caregiver       Most Recent Value    Caregiver    Will someone help with your care after discharge? yes    Name of designated caregiver Andrzej (spouse)    Phone number of caregiver [phone] (cell)    Caregiver address same as pt      About your hospital stay     You were admitted on:  November 12, 2018 You last received care in the:  [address] [facility]    You were discharged on:  November 13, 2018        Reason for your hospital stay       You were admitted because of food impaction in your esophagus.                  Who to Call     For medical emergencies, please call 911.  For non-urgent questions about your medical care, please call your primary care provider or clinic, [phone]  For questions related to your surgery, please call your surgery clinic        Attending Provider     Provider Specialty    Anselmo Levy MD Emergency Medicine    Wilder Leigh MD Emergency Medicine    Geoffrey Cadet MD Internal Medicine    Salome Thompson MD Internal Medicine       Primary Care Provider Office Phone # Fax #    Erik Peraza [phone]      After Care Instructions     Activity       Your activity upon discharge: activity as tolerated            Diet       Follow this diet upon discharge: Orders Placed This Encounter      Snacks/Supplements Adult: Boost Shake; Between Meals      Mechanical/Dental Soft Diet                   Follow-up Appointments     Adult [facility] Follow-up and recommended labs and tests       Follow up with primary care provider, Erik Peraza, within 7 days to evaluate medication change and for hospital follow- up.  The following labs/tests are recommended: BMP + Mg, CBC.      Appointments on [facility] and/or [facility] (with [facility] or [facility] provider or service). Call [phone] if you haven't heard regarding these appointments within 7 days of discharge.            Follow Up and recommended labs and tests       -- Follow up recommendations:                          - Clinic appointment within 8 weeks (after ED) in the General GI clinic - ([phone])                          - Repeat procedure needed:  EGD in 8 weeks to assess for healing and biopsies for EoE                  Your next 10 appointments already scheduled     Nov 15, 2018  8:30 AM CST   [facility]    [address] [place] [id number] [phone]            Jan 09, 2019 11:00 AM CST   [facility] Visit with Martina Andrade MD   [facility]    [address] [place] [id number] [phone]              Additional Services     GASTROENTEROLOGY ADULT REF CONSULT ONLY       Preferred Location: [facility] [phone]      Please be aware that coverage of these services is subject to the terms and limitations of your health insurance plan.  Call member services at your health plan with any benefit or coverage questions.  Any procedures must be performed at a [facility] OR coordinated by your clinic's referral office.    Please bring the following with you to your appointment:    (1) Any X-Rays, CTs or MRIs which have been performed.  Contact the facility where they were done to arrange for  prior to your scheduled appointment.    (2) List of current  "medications   (3) This referral request   (4) Any documents/labs given to you for this referral            GASTROENTEROLOGY ADULT REF PROCEDURE ONLY       Evaluate EoE with biopsy and re-look at at GE junction mucosal tear.  Last Lab Result: Creatinine (mg/dL)       Date                     Value                 11/13/2018               0.84             ----------  Body mass index is 29.65 kg/(m^2).     Needed:  No  Language:  English    Patient will be contacted to schedule procedure.     Please be aware that coverage of these services is subject to the terms and limitations of your health insurance plan.  Call member services at your health plan with any benefit or coverage questions.  Any procedures must be performed at a [facility] facility OR coordinated by your clinic's referral office.    Please bring the following with you to your appointment:    (1) Any X-Rays, CTs or MRIs which have been performed.  Contact the facility where they were done to arrange for  prior to your scheduled appointment.    (2) List of current medications   (3) This referral request   (4) Any documents/labs given to you for this referral                  Pending Results     Date and Time Order Name Status Description    11/13/2018 0653 CBC with platelets In process     11/12/2018 0259 T4 free In process             Statement of Approval     Ordered          11/13/18 1122  I have reviewed and agree with all the recommendations and orders detailed in this document.  EFFECTIVE NOW     Approved and electronically signed by:  Sancho Smith MD             Admission Information     Date & Time Provider Department Dept. Phone    11/12/2018 Salome Thompson MD [address] [facility] [phone]      Your Vitals Were     Blood Pressure Pulse Temperature Respirations Height Weight    142/66 (BP Location: Left arm) 57 96.2  F (35.7  C) (Oral) 16 1.727 m (5' 8\") 88.5 kg (195 lb)    Pulse Oximetry BMI (Body Mass Index)    "             96% 29.65 kg/m2          [facility] Information     [facility] gives you secure access to your electronic health record. If you see a primary care provider, you can also send messages to your care team and make appointments. If you have questions, please call your primary care clinic.  If you do not have a primary care provider, please call [phone] and they will assist you.        Care EveryWhere ID     This is your Care EveryWhere ID. This could be used by other organizations to access your [facility] medical records  [id number]        Equal Access to Services     HAIR STACK : Hadii dalia saleh hadasho Soomaali, waaxda luqadaha, qaybta kaalmada jeanne, phuc shelton . So [facility] [phone].    ATENCIÓN: Si habla español, tiene a newsome disposición servicios gratuitos de asistencia lingüística. Llame al [phone].    We comply with applicable federal civil rights laws and [place] laws. We do not discriminate on the basis of race, color, national origin, age, disability, sex, sexual orientation, or gender identity.               Review of your medicines      START taking        Dose / Directions    pantoprazole 40 MG EC tablet   Commonly known as:  PROTONIX   Used for:  Eosinophilic esophagitis        Dose:  40 mg   Take 1 tablet (40 mg) by mouth 2 times daily (before meals)   Quantity:  90 tablet   Refills:  1         CONTINUE these medicines which may have CHANGED, or have new prescriptions. If we are uncertain of the size of tablets/capsules you have at home, strength may be listed as something that might have changed.        Dose / Directions    * trospium 20 MG tablet   Commonly known as:  SANCTURA   This may have changed:  Another medication with the same name was added. Make sure you understand how and when to take each.   Used for:  Female stress incontinence        TAKE 1-2 TABLETS BY MOUTH DAILY AS NEEDED.   Quantity:  40 tablet   Refills:  2       * trospium 20 MG  tablet   Commonly known as:  SANCTURA   This may have changed:  You were already taking a medication with the same name, and this prescription was added. Make sure you understand how and when to take each.   Used for:  Female stress incontinence        TAKE 1-2 TABLETS BY MOUTH DAILY AS NEEDED.   Quantity:  40 tablet   Refills:  8       * Notice:  This list has 2 medication(s) that are the same as other medications prescribed for you. Read the directions carefully, and ask your doctor or other care provider to review them with you.      CONTINUE these medicines which have NOT CHANGED        Dose / Directions    albuterol 108 (90 Base) MCG/ACT inhaler   Commonly known as:  PROAIR HFA/PROVENTIL HFA/VENTOLIN HFA   Used for:  Mild persistent asthma without complication        Dose:  2 puff   Inhale 2 puffs into the lungs every 6 hours as needed for shortness of breath / dyspnea or wheezing   Quantity:  1 Inhaler   Refills:  3       aspirin 81 MG tablet        Dose:  81 mg   Take 1 tablet (81 mg) by mouth daily   Refills:  OTC       calcium carbonate 500 MG chewable tablet   Commonly known as:  TUMS        Dose:  2 chew tab   Take 2 chew tab by mouth 3 times daily as needed for other (bloating/flatulence)   Refills:  0       CRESTOR 10 MG tablet   Used for:  Hyperlipidemia LDL goal <130   Generic drug:  rosuvastatin        Dose:  10 mg   Take 1 tablet (10 mg) by mouth daily   Quantity:  30 tablet   Refills:  1       cyanocobalamin 1000 MCG/ML injection   Commonly known as:  VITAMIN B12   Used for:  Chronic fatigue syndrome, Vitamin B12 deficiency (dietary) anemia        INJECT 1ML INTRAMUSCULARLY EVERY 30 DAYS   Quantity:  3 mL   Refills:  2       estradiol 0.05 MG/24HR BIW patch   Commonly known as:  VIVELLE-DOT   Used for:  Hypertrophy of breast, Eosinophilic esophagitis, Overweight(278.02)        Dose:  1 patch   Place 1 patch onto the skin twice a week   Refills:  0       fluticasone 220 MCG/ACT Inhaler   Commonly  known as:  FLOVENT HFA   Indication:  Bronchitis        Dose:  2 puff   Inhale 2 puffs into the lungs 2 times daily as needed (for bronchitis)   Refills:  0       levothyroxine 100 MCG tablet   Commonly known as:  SYNTHROID/LEVOTHROID   Used for:  Hypothyroidism due to acquired atrophy of thyroid        Dose:  100 mcg   Take 1 tablet (100 mcg) by mouth daily   Quantity:  90 tablet   Refills:  3       methylphenidate 10 MG tablet   Commonly known as:  RITALIN   Indication:  Tiredness   Used for:  Chronic fatigue syndrome        Dose:  10 mg   Take 10 mg by mouth daily as needed (for chronic fatigue syndrome)   Quantity:  30 tablet   Refills:  0       Multi-vitamin Tabs tablet        Dose:  1 tablet   Take 1 tablet by mouth daily   Quantity:  100 tablet   Refills:  3       zolpidem 5 MG tablet   Commonly known as:  AMBIEN   Used for:  Chronic fatigue syndrome        1 to 2 tabs at hour of sleep as needed   Quantity:  60 tablet   Refills:  5         STOP taking     omeprazole 20 MG CR capsule   Commonly known as:  priLOSEC                Where to get your medicines      These medications were sent to [facility]  [address] [facility] [address]     Phone:  [phone]     levothyroxine 100 MCG tablet    pantoprazole 40 MG EC tablet    trospium 20 MG tablet                Protect others around you: Learn how to safely use, store and throw away your medicines at www.disposemymeds.org.             Medication List: This is a list of all your medications and when to take them. Check marks below indicate your daily home schedule. Keep this list as a reference.      Medications           Morning Afternoon Evening Bedtime As Needed    albuterol 108 (90 Base) MCG/ACT inhaler   Commonly known as:  PROAIR HFA/PROVENTIL HFA/VENTOLIN HFA   Inhale 2 puffs into the lungs every 6 hours as needed for shortness of breath / dyspnea or wheezing                                aspirin 81 MG tablet    Take 1 tablet (81 mg) by mouth daily                                calcium carbonate 500 MG chewable tablet   Commonly known as:  TUMS   Take 2 chew tab by mouth 3 times daily as needed for other (bloating/flatulence)                                CRESTOR 10 MG tablet   Take 1 tablet (10 mg) by mouth daily   Generic drug:  rosuvastatin                                cyanocobalamin 1000 MCG/ML injection   Commonly known as:  VITAMIN B12   INJECT 1ML INTRAMUSCULARLY EVERY 30 DAYS                                estradiol 0.05 MG/24HR BIW patch   Commonly known as:  VIVELLE-DOT   Place 1 patch onto the skin twice a week                                fluticasone 220 MCG/ACT Inhaler   Commonly known as:  FLOVENT HFA   Inhale 2 puffs into the lungs 2 times daily as needed (for bronchitis)                                levothyroxine 100 MCG tablet   Commonly known as:  SYNTHROID/LEVOTHROID   Take 1 tablet (100 mcg) by mouth daily   Last time this was given:  100 mcg on 11/13/2018  7:41 AM                                methylphenidate 10 MG tablet   Commonly known as:  RITALIN   Take 10 mg by mouth daily as needed (for chronic fatigue syndrome)                                Multi-vitamin Tabs tablet   Take 1 tablet by mouth daily                                pantoprazole 40 MG EC tablet   Commonly known as:  PROTONIX   Take 1 tablet (40 mg) by mouth 2 times daily (before meals)   Last time this was given:  40 mg on 11/13/2018  7:41 AM                                * trospium 20 MG tablet   Commonly known as:  SANCTURA   TAKE 1-2 TABLETS BY MOUTH DAILY AS NEEDED.                                * trospium 20 MG tablet   Commonly known as:  SANCTURA   TAKE 1-2 TABLETS BY MOUTH DAILY AS NEEDED.                                zolpidem 5 MG tablet   Commonly known as:  AMBIEN   1 to 2 tabs at hour of sleep as needed   Last time this was given:  5 mg on 11/12/2018 10:11 PM                                * Notice:  This  list has 2 medication(s) that are the same as other medications prescribed for you. Read the directions carefully, and ask your doctor or other care provider to review them with you.              More Information        Soft Diet  Your healthcare provider has prescribed a soft diet (also called gastrointestinal soft diet). This means eating foods that are soft, low in fiber, and easy to digest. This diet is for people with digestive problems. A soft diet provides foods that are easy to chew and swallow. Foods should be bite-size and very soft or moist. Follow your healthcare provider s specific instructions about what foods and drinks you may have. The general guidelines below can help you get started on this diet.       Beverages  OK: Milk, tea, coffee, fruit juices, carbonated beverages, nutrition shakes, and drinks (Note: Thin liquids may be hard to swallow. They may need to be thickened.)  Avoid: None, unless they need to be thickened  Breads and crackers  OK: Refined white, wheat, or seedless rye bread; ally or soda crackers that have been moistened; plain rolls or bagels; very soft tortillas  Avoid: Whole-grain breads, rolls, or bagels with nuts, raisins, or seeds; crackers, croutons, taco shells  Cereals and grains  OK: Cooked cereals, plain dry cereals that have been moistened, plain macaroni, spaghetti, noodles, rice  Avoid: Whole-grain cereals and granola, or cereals containing bran, raisins, seeds or nuts; coconut; brown or wild rice  Desserts and sweets  OK: Moist cake; soft fruit pie with bottom crust only; soft cookies moistened in milk or other liquid; gelatin, custard, pudding, plain ice cream, plain sherbet, sugar, honey, clear jelly  Avoid: Pastries, desserts, and ice cream that have nuts, coconut, seeds, or dried fruit; popcorn; chips of any kind including potato chips and tortilla chips; jam, marmalade  Eggs and cheese  OK: Poached, soft boiled, or scrambled eggs; cottage cheese, ricotta  cheese, cream cheese, cheese sauces, or cheese melted in other dishes  Avoid: Crisp fried eggs, cheese slices and cubes  Fruits  OK: Avocado, banana, baked peeled apple, applesauce, peeled ripe peaches or pears, canned fruit (apricots, cherries, peaches, pears), melons  Avoid: Raw apple, dried fruits, coconut, pineapple, grapes, fruit chasidy, fruit snacks  Meat and fish  OK: All fresh meat, poultry, or fish that is cooked until tender  Avoid: Meat, fish, or poultry that is fried; tough or stringy meat including alvarado, sausage, bratwurst, jerky, corned beef  Other protein foods  OK: Tofu, baked beans, smooth peanut butter or other nut or seed butters  Avoid: Deep-fried tofu; crunchy peanut or other nut or seed butters; nuts or seeds that are whole or chopped  Soups  OK: All soups, but they may need to be thickened. Thin liquid may be too hard to swallow.  Avoid: Soups made with stringy meat pieces or chunky vegetables  Vegetables  OK: Peeled and well-cooked potatoes or sweet potatoes; fresh, cooked, canned, or frozen vegetables without seeds, skin, or coarse fiber  Avoid: Raw vegetables, deep-fried vegetables (such as tempura), and corn  Date Last Reviewed: 8/1/2016 2000-2018 [employer]. [address] [place] [address]. All rights reserved. This information is not intended as a substitute for professional medical care. Always follow your healthcare professional's instructions.                Eosinophilic Esophagitis (EoE)  Eosinophilic esophagitis (EoE) is an allergic reaction. It occurs in the esophagus. This is the tube that leads from the mouth down to the stomach. The immune system responds to an allergen by making white blood cells called eosinophils. The esophagus then becomes red and swollen. EoE is much more common in people with asthma or allergies.  What causes eosinophilic esophagitis?  Researchers are working to understand the causes of EoE. It may be caused by allergens in  the environment. Or it may be caused by food allergens. Foods that are often found to be the cause of EoE include wheat, dairy foods, eggs, and soy. You may be at higher risk for EoE if you have a family history of allergies or EoE as there may be a genetic link.  Symptoms of eosinophilic esophagitis  Symptoms of EoE vary from person to person and may include:    Trouble swallowing or painful swallowing    Chest pain    Pain in the belly (abdomen)    Vomiting    Food getting stuck in the throat (a medical emergency)  Diagnosing eosinophilic esophagitis  Your healthcare provider will ask about your health history and symptoms. He or she will likely want to test you for allergies. You may have an endoscopy. A thin, flexible tube (endoscope) is passed through your mouth and down your throat. The scope has a camera. This lets your healthcare provider look at your esophagus. The doctor will check for signs of inflammation and an increased number of eosinophils. You may have a biopsy during the procedure. This is a small tissue sample taken from your esophagus. In some situations, stretching (dilation) will be performed if the esophagus is narrowed.  Other diseases can cause eosinophils in the esophagus. These include gastroesophageal reflux disease (GERD) and inflammatory bowel disease. Your healthcare provider will check for these.  Treatment for eosinophilic esophagitis  You will likely work with special doctors for treatment. You may see an allergy doctor. And you may see a doctor who treats digestive problems (gastroenterologist). Your treatment may include:    Medicine. No medicines can cure EoE. but some can help reduce the redness and swelling. These include corticosteroids and proton pump inhibitors.    Elimination diet. Not eating certain foods can also help reduce the redness and swelling in your esophagus. These may include dairy foods, eggs, wheat, soy, peanuts, tree nuts, and fish. Your healthcare team will  give you a plan for finding out what foods may cause your symptoms. This will likely include an elimination diet. On this type of diet, you eat very few foods at first. You then slowly add more foods to see if you have a reaction. An EoE reaction may take days or weeks to develop. Keep this in mind when starting a food elimination diet. It may take some time after avoiding a food to see if that strategy worked.  Living with EoE  You can help manage EoE by learning what things cause your allergic reaction. You will need to avoid these things ongoing to prevent symptoms of EoE. If medicines are prescribed, take them as directed. If you have difficulty swallowing, your healthcare provider can recommend certain eating habits to improve your symptoms. Repeat endoscopy is sometimes needed to check for improvement in the esophagus.  When to call your healthcare provider  Call your healthcare provider if you have any of the following:    Chest pain    Unexplained weight loss    Vomiting   When to call 911  Call 911 if you have:    Food stuck in your throat    Trouble breathing or talking    Trouble handling your spit or secretions   Date Last Reviewed: 8/1/2017 2000-2018 [employer]. [address] [place] [address], [place] [address]. All rights reserved. This information is not intended as a substitute for professional medical care. Always follow your healthcare professional's instructions.

## 2018-11-12 NOTE — UTILIZATION REVIEW
"[facility] Utilization Review  Admission Status; Secondary Review Determination     Admission Date: 11/12/2018  2:22 AM      Under the authority of the Utilization Management Committee, the utilization review process indicated a secondary review on the above patient.  The review outcome is based on review of the medical records, discussions with staff, and applying clinical experience noted on the date of the review.        (X) Observation Status Appropriate - This patient does not meet hospital inpatient criteria and is placed in observation status. If this patient's primary payer is Medicare and was admitted as an inpatient, Condition Code 44 should be used and patient status changed to \"observation\".   () Observation Status concurrent Review           RATIONALE FOR DETERMINATION   58-year-old female with history of eosinophilic esophagitis and esophageal strictures status post multiple dilatations, atrial fibrillation, admitted due to food impaction in her esophagus, nausea, vomiting, poor oral tolerance.  Patient has had history of multiple dilatations of her esophagus, has been noncompliant with her PPI.  Patient had steak and feeling of food stuck, EGD showed inflammation in the upper and middle third consistent with known eosinophilic esophagitis, impacted food bolus was removed.  Currently n.p.o., started on IV PPI, possibly starting steroids.  EGD also showed large mucosal tear, clips were placed.  Patient has severe electrolyte abnormalities including hypernatremia and hyper uremia secondary to dehydration from GI losses.  Discussed with Dr. Smith, patient appears to be stable hemodynamically, plan for discharge tomorrow, recommend change to observation status to which he is in agreement      The severity of illness, intensity of service provided, expected LOS make the care appropriate for observation status at this time.        The information on this document is developed by the utilization " review team in order for the business office to ensure compliance.  This only denotes the appropriateness of proper admission status and does not reflect the quality of care rendered.              Sincerely,       Saul Bowens MD  Physician Advisor  Utilization Review-[place]    Phone: [phone]

## 2018-11-12 NOTE — IP AVS SNAPSHOT
[address] [address]    Phone:  [phone]                                       After Visit Summary   11/12/2018    Nikki Morales    MRN: [id number]           After Visit Summary Signature Page     I have received my discharge instructions, and my questions have been answered. I have discussed any challenges I see with this plan with the nurse or doctor.    ..........................................................................................................................................  Patient/Patient Representative Signature      ..........................................................................................................................................  Patient Representative Print Name and Relationship to Patient    ..................................................               ................................................  Date                                   Time    ..........................................................................................................................................  Reviewed by Signature/Title    ...................................................              ..............................................  Date                                               Time          22EPIC Rev 08/18

## 2018-11-13 ENCOUNTER — PATIENT OUTREACH (OUTPATIENT)
Dept: CARE COORDINATION | Facility: CLINIC | Age: 58
End: 2018-11-13

## 2018-11-13 ENCOUNTER — TELEPHONE (OUTPATIENT)
Dept: GASTROENTEROLOGY | Facility: CLINIC | Age: 58
End: 2018-11-13

## 2018-11-13 VITALS
SYSTOLIC BLOOD PRESSURE: 142 MMHG | DIASTOLIC BLOOD PRESSURE: 66 MMHG | BODY MASS INDEX: 29.55 KG/M2 | HEIGHT: 68 IN | RESPIRATION RATE: 16 BRPM | HEART RATE: 57 BPM | TEMPERATURE: 96.2 F | WEIGHT: 195 LBS | OXYGEN SATURATION: 96 %

## 2018-11-13 LAB
ANION GAP SERPL CALCULATED.3IONS-SCNC: 8 MMOL/L (ref 3–14)
BUN SERPL-MCNC: 8 MG/DL (ref 7–30)
CALCIUM SERPL-MCNC: 8.3 MG/DL (ref 8.5–10.1)
CHLORIDE SERPL-SCNC: 109 MMOL/L (ref 94–109)
CO2 SERPL-SCNC: 27 MMOL/L (ref 20–32)
CREAT SERPL-MCNC: 0.84 MG/DL (ref 0.52–1.04)
ERYTHROCYTE [DISTWIDTH] IN BLOOD BY AUTOMATED COUNT: 13.8 % (ref 10–15)
GFR SERPL CREATININE-BSD FRML MDRD: 69 ML/MIN/1.7M2
GLUCOSE SERPL-MCNC: 90 MG/DL (ref 70–99)
HCT VFR BLD AUTO: 40.8 % (ref 35–47)
HGB BLD-MCNC: 12.8 G/DL (ref 11.7–15.7)
MCH RBC QN AUTO: 29.6 PG (ref 26.5–33)
MCHC RBC AUTO-ENTMCNC: 31.4 G/DL (ref 31.5–36.5)
MCV RBC AUTO: 94 FL (ref 78–100)
PLATELET # BLD AUTO: 213 10E9/L (ref 150–450)
POTASSIUM SERPL-SCNC: 3.9 MMOL/L (ref 3.4–5.3)
RBC # BLD AUTO: 4.32 10E12/L (ref 3.8–5.2)
SODIUM SERPL-SCNC: 144 MMOL/L (ref 133–144)
WBC # BLD AUTO: 8.8 10E9/L (ref 4–11)

## 2018-11-13 PROCEDURE — 94640 AIRWAY INHALATION TREATMENT: CPT

## 2018-11-13 PROCEDURE — 25000132 ZZH RX MED GY IP 250 OP 250 PS 637: Performed by: STUDENT IN AN ORGANIZED HEALTH CARE EDUCATION/TRAINING PROGRAM

## 2018-11-13 PROCEDURE — G0378 HOSPITAL OBSERVATION PER HR: HCPCS

## 2018-11-13 PROCEDURE — 85027 COMPLETE CBC AUTOMATED: CPT | Performed by: STUDENT IN AN ORGANIZED HEALTH CARE EDUCATION/TRAINING PROGRAM

## 2018-11-13 PROCEDURE — 80048 BASIC METABOLIC PNL TOTAL CA: CPT | Performed by: STUDENT IN AN ORGANIZED HEALTH CARE EDUCATION/TRAINING PROGRAM

## 2018-11-13 PROCEDURE — 36415 COLL VENOUS BLD VENIPUNCTURE: CPT | Performed by: STUDENT IN AN ORGANIZED HEALTH CARE EDUCATION/TRAINING PROGRAM

## 2018-11-13 PROCEDURE — 99217 ZZC OBSERVATION CARE DISCHARGE: CPT | Mod: 24 | Performed by: INTERNAL MEDICINE

## 2018-11-13 RX ORDER — POLYETHYLENE GLYCOL 3350 17 G/17G
17 POWDER, FOR SOLUTION ORAL DAILY PRN
Status: DISCONTINUED | OUTPATIENT
Start: 2018-11-13 | End: 2018-11-13 | Stop reason: HOSPADM

## 2018-11-13 RX ORDER — LEVOTHYROXINE SODIUM 100 UG/1
100 TABLET ORAL DAILY
Qty: 90 TABLET | Refills: 3 | Status: SHIPPED | OUTPATIENT
Start: 2018-11-13 | End: 2020-02-12 | Stop reason: DRUGHIGH

## 2018-11-13 RX ORDER — PANTOPRAZOLE SODIUM 40 MG/1
40 TABLET, DELAYED RELEASE ORAL
Status: DISCONTINUED | OUTPATIENT
Start: 2018-11-13 | End: 2018-11-13 | Stop reason: HOSPADM

## 2018-11-13 RX ORDER — PANTOPRAZOLE SODIUM 40 MG/1
40 TABLET, DELAYED RELEASE ORAL
Qty: 90 TABLET | Refills: 1 | Status: SHIPPED | OUTPATIENT
Start: 2018-11-13 | End: 2019-11-21

## 2018-11-13 RX ADMIN — PANTOPRAZOLE SODIUM 40 MG: 40 TABLET, DELAYED RELEASE ORAL at 07:41

## 2018-11-13 RX ADMIN — ALBUTEROL SULFATE 2 PUFF: 90 AEROSOL, METERED RESPIRATORY (INHALATION) at 12:10

## 2018-11-13 RX ADMIN — LEVOTHYROXINE SODIUM 100 MCG: 100 TABLET ORAL at 07:41

## 2018-11-13 RX ADMIN — POLYETHYLENE GLYCOL 3350 17 G: 17 POWDER, FOR SOLUTION ORAL at 07:49

## 2018-11-13 NOTE — PLAN OF CARE
Problem: Patient Care Overview  Goal: Plan of Care/Patient Progress Review  Outcome: Improving  A&O x 4.  Independent. VSS. Voiding, had a BM. Ambien and melatonin given for sleep. Tolerating clears, then advanced to fulls and tolerating fine. Throat pain is tolerable, no pain medication needed. Swallowing without difficulty. LS clear. BS+.

## 2018-11-13 NOTE — PLAN OF CARE
Problem: Patient Care Overview  Goal: Plan of Care/Patient Progress Review  Outcome: Improving  A&O x 4.  Independent. VSS. Voiding spontaneously.Tolerating fulls. Throat pain is tolerable, no pain medication needed. Swallowing without difficulty. LS clear. BS+. Sleeping between cares. Possible discharge. Continue current POC.     R arm with mild edema, elevated with pillows. PIV WDL, blood return noted.

## 2018-11-13 NOTE — PLAN OF CARE
Problem: Patient Care Overview  Goal: Plan of Care/Patient Progress Review  Outcome: Adequate for Discharge Date Met: 11/13/18  Patient is tolerating soft diet, minimal discomfort.    OBSERVATION GOALS: Met  Tolerate PO diet. Tolerated soft  Correction of hypernatremia- lab normal

## 2018-11-13 NOTE — PROGRESS NOTES
OBS Goals:  Tolerate diet: YES, tolerating clears  Correction of hypernatremia: No, Na 146. Waiting for AM lab draw.

## 2018-11-13 NOTE — TELEPHONE ENCOUNTER
Nurse called and left a message to return call to schedule.    Kandy Yanez RN, BSN, PHN  [facility]  GI/Gen Surg/Hepatology Care Coordinator

## 2018-11-13 NOTE — PROGRESS NOTES
Gastroenterology Inpatient Sign Off Note    ASSESSMENT:  Ms. Morales is a 58 year old female with a history of eosinophilic esophagitis, prior distal esophageal stricture (dilated 1999) who presented to the ED with a food impaction (steak ~ midnight). EGD completed 11/12/18 with successful relief of impaction. There was a 1.5cm mucosal tear at the GE junction that was treated with 3 clips, the patient was observed overnight and did well. She was educated on strict adherence to a mechanical soft diet for the next week, twice daily oral PPI for the next 8 weeks. She will follow up in our GI clinic for ongoing management of her EoE with repeat EGD in 8 weeks.     Inpatient GI consults service will sign off. No further recommendations at this time. If primary team has addition questions, please page consult fellow listed in [place].    RECOMMENDATIONS:  -- Continue protonix 40mg PO BID for at least 2 months after discharge (until repeat EGD)  -- Follow up recommendations:    - Clinic appointment within 8 weeks (after ED) in the General GI clinic - ([phone])    - Repeat procedure needed:  EGD in 8 weeks to assess for healing and biopsies for EoE    Patient and plan discussed with Dr. Ortega.    Floridalma Calles MD  GI Fellow, PGY-4  P: [phone]

## 2018-11-13 NOTE — TELEPHONE ENCOUNTER
[facility] Call Center    Phone Message    May a detailed message be left on voicemail: yes    Reason for Call: Other: Patient needs a Gi consult within 8 weeks per [facility] discharge     Action Taken: Message routed to:  [facility] GI

## 2018-11-13 NOTE — PROGRESS NOTES
OBSERVATION GOALS: Not Met  Tolerate PO diet. Just ordered liquid diet  Correction of hypernatremia- sodium pending this morning

## 2018-11-14 ENCOUNTER — PATIENT OUTREACH (OUTPATIENT)
Dept: FAMILY MEDICINE | Facility: CLINIC | Age: 58
End: 2018-11-14

## 2018-11-14 ENCOUNTER — MYC MEDICAL ADVICE (OUTPATIENT)
Dept: FAMILY MEDICINE | Facility: CLINIC | Age: 58
End: 2018-11-14

## 2018-11-14 NOTE — DISCHARGE SUMMARY
[facility], [place]    Internal Medicine Discharge Summary- [facility]    Date of Admission:  11/12/2018  Date of Discharge:  11/13/2018  1:48 PM  Discharging Attending Provider: Salome Thompson MD  Discharge Team: [facility]    Discharge Diagnoses   Food impaction in esophagus  Eosinophilic esophagitis  Eosinophilia  Esophageal strictures  Dysphagia  Large mucosal tear  Hypernatremia  Hyperchloremia  Hx of intermittent asthma  Hx of hypothyroidism      Follow-ups Needed After Discharge   PCP in 1-week  GI clinic in 8-weeks for EGD    Hospital Course   59 y/o female with PMHx significant for eosinophilic esophagitis and esophageal strictures s/p multiple dilations, a-fib s/p cardioversion now on sinus rhythm, was admitted due to food impaction in her esophagus, nausea, vomiting and poor PO tolerance.     #Food impaction in esophagus  #Eosinophilic esophagitis  #Eosinophila  #Esophageal strictures  #Dysphagia  She has been undergoing dilations of her esophagus since at least 1998. At that time, she was experiencing arthralgia. PORFIRIO negative, but RF positive. She then had another EGD in 2013 with biopsies of her esophagus consistent with eosinophilic esophagitis. Does carry a dx of asthma. Has been inconsistent with PPI, which she admits to it. Today, presented after eating steak and feeling food stuck. EGD showed inflammation in upper and middle third consistent with known eosinophilic esophagitis. Impacted food bolus was removed. No biopsies were obtained. Labs with eosinophilia.  She discharged on oral PPI BID and is to undergo repeat EGD in 8-weeks. Stressed the importance of mechanical soft diet.      #Large mucosal tear  Noticed following CO2 insufflation and food disimpaction at the lower esophagus close to GE junction. Clips were placed. Hemodynamically stable. PPI as above. No melena, hemoglobin stable.    #Hypernatremia  #Hyperchloremia  Secondary to dehydration from GI  losses. Resolved with IVF and oral hydration.      #Hx of mild intermittent asthma  Continued with home inhalers.     #?Hx of hypothyroidism  Has had low TSH with low free T4.  Discussed that she needs to take synthroid.       Consultations This Hospital Stay   MEDICATION HISTORY IP PHARMACY CONSULT  GI LUMINAL ADULT IP CONSULT    Code Status   Full Code       The patient was discussed with Dr. Thompson.     Sancho Smith MD  [facility]  Pager: [phone]  ______________________________________________________________________    Physical Exam   Vital Signs: Temp: 96.2  F (35.7  C) Temp src: Oral BP: 142/66 Pulse: 57   Resp: 16 SpO2: 96 % O2 Device: None (Room air)    Weight: 195 lbs 0 oz    General: AAOX3, NAD  HEENT: PERRLA, EOMI, anicteric sclera, erythema in posterior oropharynx  Neck: no JVD  CV: RRR, no murmur, no rubs, no gallops  Resp: CTAB  Abdomen: non tender, non distended, no organomegaly  Extremities: WWP bilaterally, no edema  Skin: no rash  Psych: adequate mood  Neuro: no focal neurological deficit    Significant Results and Procedures   Results for orders placed or performed during the hospital encounter of 11/12/18   XR Chest 2 Views    Narrative    Exam: XR CHEST 2 VW, 11/12/2018 7:12 AM    Indication: Food impaction, eval for any evident FB.    Comparison: 2/20/2014    Findings:   PA and lateral views of the chest were obtained. The cardiomediastinal  silhouette is within normal limits. No pneumothorax or pleural  effusion. No focal airspace opacities. Cholecystectomy clips.      Impression    Impression:   Normal chest radiograph.    I have personally reviewed the examination and initial interpretation  and I agree with the findings.    YENIFER TIAN MD   XR Chest 2 Views    Narrative    EXAM: XR CHEST 2 VW  11/12/2018 2:41 PM     HISTORY:  please assess standing to evaluate free air, pt with large  mucosal ulceration in esophagus seen on EGD today;        COMPARISON:  11/12/2018    FINDINGS: PA and lateral views of chest. Heart is normal. No pleural  effusion. No pneumomediastinum or pneumothorax. No pulmonary opacity.  Lower esophageal clips and cholecystectomy clear.      Impression    IMPRESSION: Clear lungs. No pneumothorax or pneumomediastinum.    I have personally reviewed the examination and initial interpretation  and I agree with the findings.    PIPER DONOHUE MD       Pending Results   These results will be followed up by PCP  Unresulted Labs Ordered in the Past 30 Days of this Admission     Date and Time Order Name Status Description    11/12/2018 0259 T4 free In process              Primary Care Physician   Erik Peraza    Discharge Disposition   Discharged to home  Condition at discharge: Stable    Discharge Orders     GASTROENTEROLOGY ADULT REF PROCEDURE ONLY     GASTROENTEROLOGY ADULT REF CONSULT ONLY     Reason for your hospital stay   You were admitted because of food impaction in your esophagus.     Adult [facility] Follow-up and recommended labs and tests   Follow up with primary care provider, Erik Peraza, within 7 days to evaluate medication change and for hospital follow- up.  The following labs/tests are recommended: BMP + Mg, CBC.      Appointments on [place] and/or [facility] (with [facility] or [facility] provider or service). Call [phone] if you haven't heard regarding these appointments within 7 days of discharge.     Follow Up and recommended labs and tests   -- Follow up recommendations:                         - Clinic appointment within 8 weeks (after ED) in the General GI clinic - ([phone])                         - Repeat procedure needed:  EGD in 8 weeks to assess for healing and biopsies for EoE     Activity   Your activity upon discharge: activity as tolerated     Full Code     Diet   Follow this diet upon discharge: Orders Placed This Encounter     Snacks/Supplements Adult: Boost Shake; Between Meals     Mechanical/Dental Soft  Diet       Discharge Medications   Discharge Medication List as of 11/13/2018 11:33 AM      START taking these medications    Details   pantoprazole (PROTONIX) 40 MG EC tablet Take 1 tablet (40 mg) by mouth 2 times daily (before meals), Disp-90 tablet, R-1, E-Prescribe         CONTINUE these medications which have CHANGED    Details   levothyroxine (SYNTHROID/LEVOTHROID) 100 MCG tablet Take 1 tablet (100 mcg) by mouth daily, Disp-90 tablet, R-3, E-Prescribe         CONTINUE these medications which have NOT CHANGED    Details   albuterol (PROAIR HFA/PROVENTIL HFA/VENTOLIN HFA) 108 (90 BASE) MCG/ACT Inhaler Inhale 2 puffs into the lungs every 6 hours as needed for shortness of breath / dyspnea or wheezing, Disp-1 Inhaler, R-3, E-Prescribe      aspirin 81 MG tablet Take 1 tablet (81 mg) by mouth daily, R-OTC, OTC      calcium carbonate (TUMS) 500 MG chewable tablet Take 2 chew tab by mouth 3 times daily as needed for other (bloating/flatulence), Historical      cyanocobalamin (VITAMIN B12) 1000 MCG/ML injection INJECT 1ML INTRAMUSCULARLY EVERY 30 DAYS, Disp-3 mL, R-2, E-Prescribe      fluticasone (FLOVENT HFA) 220 MCG/ACT Inhaler Inhale 2 puffs into the lungs 2 times daily as needed (for bronchitis), Historical      methylphenidate (RITALIN) 10 MG tablet Take 10 mg by mouth daily as needed (for chronic fatigue syndrome) , Disp-30 tablet, R-0, Historical      multivitamin, therapeutic with minerals (MULTI-VITAMIN) TABS Take 1 tablet by mouth daily, Disp-100 tablet, R-3, Historical      !! trospium (SANCTURA) 20 MG tablet TAKE 1-2 TABLETS BY MOUTH DAILY AS NEEDED., Disp-40 tablet, R-2, E-Prescribe      zolpidem (AMBIEN) 5 MG tablet 1 to 2 tabs at hour of sleep as needed, Disp-60 tablet, R-5, Local Print      estradiol (VIVELLE-DOT) 0.05 MG/24HR patch Place 1 patch onto the skin twice a weekHistorical      rosuvastatin (CRESTOR) 10 MG tablet Take 1 tablet (10 mg) by mouth daily, Disp-30 tablet, R-1, Historical      !!  trospium (SANCTURA) 20 MG tablet TAKE 1-2 TABLETS BY MOUTH DAILY AS NEEDED., Disp-40 tablet, R-8, E-Prescribe       !! - Potential duplicate medications found. Please discuss with provider.      STOP taking these medications       omeprazole (PRILOSEC) 20 MG capsule Comments:   Reason for Stopping:             Allergies   Allergies   Allergen Reactions     Codeine Other (See Comments)     Causes agitation

## 2018-11-14 NOTE — TELEPHONE ENCOUNTER
Pt returned call and scheduled with Dr. Dominguez.    Kandy Yanez RN, BSN, PHN  [facility]  GI/Gen Surg/Hepatology Care Coordinator

## 2018-11-14 NOTE — TELEPHONE ENCOUNTER
She needs help arranging a follow up no more than 7 days after discharge.  Regarding her cough, it may because of the clips she is coughing. Usually this tissue heals in 3 days if nothing like acid is continuing to irritate.  If she does begin bleeding she needs to go to ED.   Erik Peraza MD

## 2018-11-14 NOTE — PROGRESS NOTES
Patient has clinic visit within 24-48 hours of discharge so no post DC follow up call is needed.    Name: ROSIBEL JACOBO MRN: [id number]   Date: 11/15/2018 Status: [facility]   Time: 8:30 AM Length: 60     Visit Type: NEW [656]   BRIE:     Provider: Jamila Pop LMFT Department:  BEHAVIORAL HEALTH

## 2018-11-15 ENCOUNTER — TELEPHONE (OUTPATIENT)
Dept: FAMILY MEDICINE | Facility: CLINIC | Age: 58
End: 2018-11-15

## 2018-11-15 ENCOUNTER — TELEPHONE (OUTPATIENT)
Dept: GASTROENTEROLOGY | Facility: CLINIC | Age: 58
End: 2018-11-15

## 2018-11-15 DIAGNOSIS — J45.20 INTERMITTENT ASTHMA, UNCOMPLICATED: ICD-10-CM

## 2018-11-15 ASSESSMENT — ACTIVITIES OF DAILY LIVING (ADL): DEPENDENT_IADLS:: INDEPENDENT

## 2018-11-15 NOTE — TELEPHONE ENCOUNTER
Reason for Call:  Other question    Detailed comments: patient calling would like to know if her and her , who have been treated by Jamila Pop, if they need to continue to come together or come seporate, please call and advise    Phone Number Patient can be reached at: Home number on file [phone] (home)    Best Time: any    Can we leave a detailed message on this number? YES    Call taken on 11/15/2018 at 4:17 PM by Doreen Elizabeth

## 2018-11-15 NOTE — TELEPHONE ENCOUNTER
LVM for patient in regards to setting up GI clinic appointment for EoE management per Dr. Calles. Patient can see either Ori Tran or Ghazala Godinez. Left call back number for patient to call and schedule appointment.

## 2018-11-15 NOTE — PROGRESS NOTES
Clinic Care Coordination Contact    Clinic Care Coordination Contact  OUTREACH    Referral Information:  Referral Source: IP Handoff    Primary Diagnosis: GI Disorders    Chief Complaint   Patient presents with     Clinic Care Coordination - Post Hospital     RN assessment      Universal Utilization:   Clinic Utilization  Difficulty keeping appointments:: No  Compliance Concerns: Yes  No-Show Concerns: No  No PCP office visit in Past Year: No  Utilization    Last refreshed: 11/15/2018  4:16 PM:  No Show Count (past year) 3       Last refreshed: 11/15/2018  4:16 PM:  ED visits 1       Last refreshed: 11/15/2018  4:16 PM:  Hospital admissions 1          Current as of: 11/15/2018  4:16 PM           Clinical Concerns:  Current Medical Concerns:  Called and spoke with pt,  Pt states she seems to be doing ok. Pt admits she has difficulty remembering to take her medications. States she used to be on thyroid medication but she stopped because she was having a hard time taking it on an empty stomach. Also reviewed new medications and the importance of taking them. Pt also states she has bronchitis again and is wanting her inhaler filled. Pt states she only uses it when she needs it which is usually this time of year.  RNCC did review lung cancer screening with pt as she is a 30 plus year smoker.  States she currently smokes socially. Advised her PCP can talk to her about the screening at her appt.    Current Behavioral Concerns: no current concerns    Education Provided to patient: advised to set phone alarms, set meds by bed or in bathroom so she can take when she gets up.     Pain  Pain (GOAL):: No  Health Maintenance Reviewed: Due/Overdue not assessed   Clinical Pathway: None    Medication Management:  Difficulty remembering to take medications-would benefit from MTM     Functional Status:  Dependent ADLs:: Independent  Dependent IADLs:: Independent  Mobility Status: Independent    Living Situation:  Current living  arrangement:: I live in a private home with family  Type of residence:: Private home - stairs    Diet/Exercise/Sleep:  Diet:: Regular  Inadequate nutrition (GOAL):: No  Food Insecurity: No  Tube Feeding: No  Exercise:: Currently not exercising  Inadequate activity/exercise (GOAL):: No  Significant changes in sleep pattern (GOAL): No    Transportation:  Transportation concerns (GOAL):: No  Transportation means:: Regular car     Psychosocial:  Mental health DX:: No  Mental health management concern (GOAL):: No  Informal Support system:: Family    Financial/Insurance concerns (GOAL):: No     Resources and Interventions:  Current Resources:   Community Resources: None  Supplies used at home:: None  Equipment Currently Used at Home: none    Advance Care Plan/Directive  Advanced Care Plans/Directives on file:: No  Advanced Care Plan/Directive Status: Not Applicable    Referrals Placed: None     Goals: take medications every day on time all the time.     Patient/Caregiver understanding: pt verbalizes understanding of medications and the need to take them daily.     Outreach Frequency: 2 weeks  Future Appointments              In 4 days Jamila Pop LMFT [facility]    In 4 days Erik Peraza MD [facility]    In 3 weeks Garry Dominguez MD [facility]    In 1 month Martina Andrade MD [facility]          Plan:   Pt will find away that works best for her to remember medications  Pt will follow up with PCP on 11/19  Pt declines any further CC needs.  RN CC will do no further outreaches.     Radha SIMMS, RN, PHN  Care Coordination    [facility]  [address] [place] [address]  Office: [phone]  Fax [phone]   [facility]  [address] [place] [address]  Office: [phone] Fax [phone]  [email or web address]    [email or web address]   Connect with [facility] on social media.

## 2018-11-15 NOTE — TELEPHONE ENCOUNTER
Breo-Ellipta Routing refill request to provider for review/approval because:  Drug not active on patient's medication list  YRN Brown

## 2018-11-16 ENCOUNTER — OFFICE VISIT (OUTPATIENT)
Dept: BEHAVIORAL HEALTH | Facility: CLINIC | Age: 58
End: 2018-11-16
Payer: COMMERCIAL

## 2018-11-16 ENCOUNTER — TELEPHONE (OUTPATIENT)
Dept: GASTROENTEROLOGY | Facility: CLINIC | Age: 58
End: 2018-11-16

## 2018-11-16 DIAGNOSIS — F43.23 ADJUSTMENT DISORDER WITH MIXED ANXIETY AND DEPRESSED MOOD: Primary | ICD-10-CM

## 2018-11-16 PROCEDURE — 90837 PSYTX W PT 60 MINUTES: CPT | Performed by: MARRIAGE & FAMILY THERAPIST

## 2018-11-16 ASSESSMENT — ANXIETY QUESTIONNAIRES
6. BECOMING EASILY ANNOYED OR IRRITABLE: SEVERAL DAYS
5. BEING SO RESTLESS THAT IT IS HARD TO SIT STILL: NOT AT ALL
7. FEELING AFRAID AS IF SOMETHING AWFUL MIGHT HAPPEN: NEARLY EVERY DAY
GAD7 TOTAL SCORE: 13
IF YOU CHECKED OFF ANY PROBLEMS ON THIS QUESTIONNAIRE, HOW DIFFICULT HAVE THESE PROBLEMS MADE IT FOR YOU TO DO YOUR WORK, TAKE CARE OF THINGS AT HOME, OR GET ALONG WITH OTHER PEOPLE: SOMEWHAT DIFFICULT
2. NOT BEING ABLE TO STOP OR CONTROL WORRYING: MORE THAN HALF THE DAYS
1. FEELING NERVOUS, ANXIOUS, OR ON EDGE: NEARLY EVERY DAY
3. WORRYING TOO MUCH ABOUT DIFFERENT THINGS: MORE THAN HALF THE DAYS

## 2018-11-16 ASSESSMENT — PATIENT HEALTH QUESTIONNAIRE - PHQ9
SUM OF ALL RESPONSES TO PHQ QUESTIONS 1-9: 13
5. POOR APPETITE OR OVEREATING: MORE THAN HALF THE DAYS

## 2018-11-16 NOTE — PROGRESS NOTES
[facility]  November 16, 2018      Behavioral Health Clinician Progress Note    Patient Name: Nikki Morales           Service Type:  Individual      Service Location:   Face to Face in Clinic     Session Start Time: 8:00  Session End Time: 9:15      Session Length: 53 - 60      Attendees: Patient    Visit Activities (Refresh list every visit): NEW and [facility] Only    Diagnostic Assessment Date: due next visit  Treatment Plan Review Date: due 3rd visit  See Flowsheets for today's PHQ-9 and KAILEE-7 results  Previous PHQ-9:   PHQ-9 SCORE 9/23/2015 11/16/2018   Total Score 15 13     Previous KAILEE-7:   KAILEE-7 SCORE 11/16/2018   Total Score 13       JOHANNE LEVEL:  JOHANNE Score (Last Two) 7/17/2013   JOHANNE Raw Score 36   Activation Score 47.4   JOHANNE Level 2       DATA  Extended Session (60+ minutes): PROLONGED SERVICE IN THE OUTPATIENT SETTING REQUIRING DIRECT (FACE-TO-FACE) PATIENT CONTACT BEYOND THE USUAL SERVICE:    - Longer session due to limited access to mental health appointments and necessity to address patient's distress / complexity    - High distress and under complex circumstances.  See Data section for details  Interactive Complexity: -The need to manage maladaptive communication (related to, e.g., high anxiety, high reactivity, repeated questions, or disagreement) among participants that complicates delivery of care  Crisis: No  [facility] Patient: No    Treatment Objective(s) Addressed in This Session:  Target Behavior(s): disease management/lifestyle changes depression, anxiety and relationship issues    Depressed Mood: Increase interest, engagement, and pleasure in doing things  Decrease frequency and intensity of feeling down, depressed, hopeless  Improve quantity and quality of night time sleep / decrease daytime naps  Feel less tired and more energy during the day   Improve diet, appetite, mindful eating, and / or meal planning  Identify negative self-talk and behaviors: challenge core  beliefs, myths, and actions  Improve concentration, focus, and mindfulness in daily activities   Feel less fidgety, restless or slow in daily activities / interpersonal interactions  Anxiety: will experience a reduction in anxiety, will develop more effective coping skills to manage anxiety symptoms, will develop healthy cognitive patterns and beliefs and will increase ability to function adaptively  Relationship Problems: will address relationship difficulties in a more adaptive manner  Psychological distress related to Chronic Disease Management    Current Stressors / Issues:    Patient reports feeling more depressed and anxious; she feels she is either one or the other and up or down. She finds that she is cleaning one day and then sleeping the next. Early this summer patient found texts on her 's phone to a younger woman. This has increased her anxiety and caused her to worry and feel paranoid that her spouse has been unfaithful.    Patient reports that she is feeling triggered to her experience with her high school sweetheart; whom had been unfaithful. Patient talked about how she would be hearing one thing and seeing another.   Patient recalled that two years ago, at someone else's wedding, she noticed her spouse rubbing his sister-in-law's arm. This created questions and feelings of jealousy. She has had questions about his faithfulness after this.    Patient is currently disabled. Patient has been on disability for 10 years. She states that her dad was giving them $15,000, annually.      Patient's spouse is open to couples counseling as well as individual. Patient reports that they were close to divorce earlier this spring, but things have come out in the open and they are both wanting to work on their marriage. Patient states that they have been talking more, however there continues to be increased conflict. Her son also lives with them and is a source of conflict in their marriage as her son is an  alcoholic. She states that they feel stuck right now as they don't feel able to kick him out due to the cold weather months.      Progress on Treatment Objective(s) / Homework:  In development-first visit    Motivational Interviewing    MI Intervention: Expressed Empathy/Understanding, Supported Autonomy, Collaboration, Evocation, Permission to raise concern or advise, Open-ended questions, Reflections: simple and complex, Change talk (evoked) and Reframe     Change Talk Expressed by the Patient: Desire to change Ability to change Reasons to change Need to change Committment to change Activation Taking steps    Provider Response to Change Talk: E - Evoked more info from patient about behavior change, A - Affirmed patient's thoughts, decisions, or attempts at behavior change, R - Reflected patient's change talk and S - Summarized patient's change talk statements    Also provided psychoeducation about behavioral health condition, symptoms, and treatment options    Care Plan review completed: Yes    Medication Review:  No changes to current psychiatric medication(s)    Medication Compliance:  Yes    Changes in Health Issues:   None reported    Chemical Use Review:   Substance Use: Chemical use reviewed, no active concerns identified      Tobacco Use: No current tobacco use.      Assessment: Current Emotional / Mental Status (status of significant symptoms):  Risk status (Self / Other harm or suicidal ideation)  Patient has had a history of suicide attempts: age 18 patient overdosed, she did not clarify on what she used and denies a history of suicidal ideation, self-injurious behavior, homicidal ideation, homicidal behavior and and other safety concerns  Patient denies current fears or concerns for personal safety.  Patient reports the following current or recent suicidal ideation or behaviors: patient will have thoughts of wishing she were not alive, however denies any thoughts of wanting to take her own life, she  denies any suicidal plan or intent.  Patient denies current or recent homicidal ideation or behaviors.  Patient denies current or recent self injurious behavior or ideation.  Patient denies other safety concerns.  A safety and risk management plan has not been developed at this time, however patient was encouraged to call [place] [phone] should there be a change in any of these risk factors.    Appearance:   Appropriate   Eye Contact:   Good   Psychomotor Behavior: Restless   Attitude:   Cooperative   Orientation:   All  Speech   Rate / Production: Normal    Volume:  Normal   Mood:    Anxious  Depressed   Affect:    Restricted   Thought Content:  Rumination   Thought Form:  Circumstantial  Insight:    Good     Diagnoses:  1. Adjustment disorder with mixed anxiety and depressed mood        Collateral Reports Completed:  Not Applicable    Plan: (Homework, other):  Patient was given information about behavioral services and encouraged to schedule a follow up appointment with the clinic [facility] in 1 week.  She was also given information about mental health symptoms and treatment options , Cognitive Behavioral Therapy skills to practice when experiencing anxiety and depression and deep Breathing Strategies to practice when experiencing anxiety and depression.  CD Recommendations: No indications of CD issues.  ROSANNE Block, [facility]

## 2018-11-17 ASSESSMENT — ANXIETY QUESTIONNAIRES: GAD7 TOTAL SCORE: 13

## 2018-11-19 ENCOUNTER — TELEPHONE (OUTPATIENT)
Dept: GASTROENTEROLOGY | Facility: CLINIC | Age: 58
End: 2018-11-19

## 2018-11-19 ENCOUNTER — OFFICE VISIT (OUTPATIENT)
Dept: FAMILY MEDICINE | Facility: CLINIC | Age: 58
End: 2018-11-19
Payer: COMMERCIAL

## 2018-11-19 ENCOUNTER — OFFICE VISIT (OUTPATIENT)
Dept: BEHAVIORAL HEALTH | Facility: CLINIC | Age: 58
End: 2018-11-19
Payer: COMMERCIAL

## 2018-11-19 VITALS
RESPIRATION RATE: 16 BRPM | SYSTOLIC BLOOD PRESSURE: 122 MMHG | HEART RATE: 88 BPM | DIASTOLIC BLOOD PRESSURE: 78 MMHG | TEMPERATURE: 96.9 F | OXYGEN SATURATION: 97 % | WEIGHT: 195.3 LBS | BODY MASS INDEX: 29.7 KG/M2

## 2018-11-19 DIAGNOSIS — K20.0 EOSINOPHILIC ESOPHAGITIS: ICD-10-CM

## 2018-11-19 DIAGNOSIS — F51.04 PSYCHOPHYSIOLOGICAL INSOMNIA: Primary | ICD-10-CM

## 2018-11-19 DIAGNOSIS — K21.00 GASTROESOPHAGEAL REFLUX DISEASE WITH ESOPHAGITIS: ICD-10-CM

## 2018-11-19 DIAGNOSIS — F41.1 GAD (GENERALIZED ANXIETY DISORDER): Primary | ICD-10-CM

## 2018-11-19 DIAGNOSIS — F31.60 MIXED BIPOLAR I DISORDER (H): ICD-10-CM

## 2018-11-19 DIAGNOSIS — J45.21 INTERMITTENT ASTHMA, WITH ACUTE EXACERBATION: ICD-10-CM

## 2018-11-19 DIAGNOSIS — F90.0 ATTENTION DEFICIT HYPERACTIVITY DISORDER, INATTENTIVE TYPE: ICD-10-CM

## 2018-11-19 DIAGNOSIS — F33.1 MODERATE RECURRENT MAJOR DEPRESSION (H): ICD-10-CM

## 2018-11-19 PROBLEM — T18.128A ESOPHAGEAL OBSTRUCTION DUE TO FOOD IMPACTION: Status: RESOLVED | Noted: 2018-11-12 | Resolved: 2018-11-19
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
## 2018-11-19 PROCEDURE — 99495 TRANSJ CARE MGMT MOD F2F 14D: CPT | Performed by: FAMILY MEDICINE

## 2018-11-19 PROCEDURE — 90791 PSYCH DIAGNOSTIC EVALUATION: CPT | Performed by: MARRIAGE & FAMILY THERAPIST

## 2018-11-19 RX ORDER — BUDESONIDE 0.5 MG/2ML
INHALANT ORAL
Qty: 1 BOX | Refills: 11 | Status: SHIPPED | OUTPATIENT
Start: 2018-11-19 | End: 2018-12-05

## 2018-11-19 RX ORDER — BENZONATATE 200 MG/1
200 CAPSULE ORAL 3 TIMES DAILY PRN
Qty: 30 CAPSULE | Refills: 3 | Status: SHIPPED | OUTPATIENT
Start: 2018-11-19 | End: 2019-07-17

## 2018-11-19 RX ORDER — TRAZODONE HYDROCHLORIDE 50 MG/1
50 TABLET, FILM COATED ORAL
Qty: 30 TABLET | Refills: 1 | Status: SHIPPED | OUTPATIENT
Start: 2018-11-19 | End: 2018-12-05

## 2018-11-19 ASSESSMENT — PAIN SCALES - GENERAL: PAINLEVEL: NO PAIN (1)

## 2018-11-19 NOTE — PROGRESS NOTES
[facility]  Integrated Behavioral Health Services   Diagnostic Assessment      PATIENT'S NAME: Nikki Morales  MRN:   [id number]  :   1960  DATE OF SERVICE: 2018  SERVICE LOCATION: Face to Face in Clinic  Visit Activities: NEW and [facility] Only      Identifying Information:  Patient is a 58 year old year old, ,  female.  Patient attended the session alone.        Referral:  Patient was referred for an assessment by self.   Reason for referral: determine behavioral health treatment options and address the interaction of behavioral and medical issues.       Patient's Statement of Presenting Concern:  Patient reports the following reason(s) for seeking an assessment at this time: anxiety and depression. Patient reports increased anxiety and depressed mood. She states that she will feel very anxious and have difficulty controlling her worry thoughts as well as trouble relaxing. She and her spouse have had increased conflict. Patient states that she has also felt more depressed and more tearful. She finds that she will ruminate and have negative thoughts about herself and her body.  Patient stated that her symptoms have resulted in the following functional impairments: management of the household and or completion of tasks, relationship(s), self-care, social interactions and work / vocational responsibilities      History of Presenting Concern:  Patient reports that these problem(s) began over 10 years ago. Patient has attempted to resolve these concerns in the past through: counseling, inpatient mental health services, medication(s) from physician / PCP and psychiatry. Patient reports that other professional(s) are involved in providing support / services. Patient's PCP prescribes medication.  Patient reports that she and her  were close to  earlier this spring. They have been working to address their issues and are interested in  "couples counseling. Patient states that they are reading articles together about relationships and ADHD and she has found these insightful for how they respond and react to one another. Patient expressed continued concern about her  possibly cheating. She states that she will go back and forth between wanting to know yet feeling that this would also worsen her mood and symptoms.  Patient's 36yo son lives with them and he is an alcoholic. She and her spouse are not sure what to do to get him the help he needs. Her son is a contributing factor to stress on her and her marriage.      Social History:  Patient reported she grew up in [place], [place]. They were the fourth born of 4 children. She has two brothers and one  sister due to a stroke/aneurism.  Patient reported that by age 9 all of her siblings were out of the house, and her parents went to the bar every night. She states that her dad was a \"happy go bebeto fun successful drinker\". Mom was a homemaker.  Patient reported a history of 2 committed relationships or marriages. Patient was previously  for 1 year at age 17. Then she remained single until she met her current  in her 30's. Patient has been  for 27 years. Patient reported having 2 children; she has two adult sons. Patient identified extensive stable and meaningful social connections.     Patient lives with her spouse and her oldest son.  Patient is currently disabled.  Patient has been on disability for 10 years. She states that this is for mental health.    Patient reported that she has not been involved with the legal system.  Patient's highest education level was high school graduate. Patient did identify the following learning problems: reading and writing. There are no ethnic, cultural or Jewish factors that may be relevant for therapy.  Patient did not serve in the .       Mental Health History:  Patient reported no family history of mental health issues. " "Patient has received the following mental health services in the past: counseling, inpatient mental health services, medication(s) from physician / PCP and psychiatry. Patient is currently receiving the following services: medication(s) from physician / PCP.  Patient reports that she has been diagnosed with ADHD and there have been questions of possible Bipolar II. She has been treated for anxiety and depression.    Chemical Health History:  Patient reported the following biological family members or relatives with chemical health issues: Brother reportedly uses alcohol , Father reportedly used alcohol . Patient has received chemical dependency treatment in the past at an unknown location, she went after she attempted suicide. Patient is not currently receiving any chemical dependency treatment. Patient reports no problems as a result of their drinking / drug use.  Patient reports use of alcohol as once or twice a month.  Patient denies use of cannabis and other illicit drugs.  Patient reports use of tobacco as only times when she drinks alcohol.  Patient reports use of caffeine as \"seldom\". She states that she had some today and she will have half of a caffeinated slim-fast drink. She will also have Pepsi, if she is drinking a caffeinated beverage.    Cage-AID score is: negative. Based on Cage-Aid score and clinical interview there  are not indications of drug or alcohol abuse.      Discussed the general effects of drugs and alcohol on health and well-being.      Significant Losses / Trauma / Abuse / Neglect Issues:  There are indications or report of significant loss, trauma, abuse or neglect issues related to: death of older sister and mom, patient reports that her mom was 61 when she ; patient reports that there has been a lot of cancer in her family; and divorce / relational changes at age 17 in her first marriage, which ended in divorce after infidelity by her spouse, patient reports that her current " spouse has issues with anger.     Issues of possible neglect are not present.      Medical History:   Patient Active Problem List   Diagnosis     Chronic fatigue syndrome     Other and unspecified disc disorder of lumbar region     ESOPHAGEAL REFLUX     Female stress incontinence     Herpes simplex virus (HSV) infection     HYPERLIPIDEMIA LDL GOAL <130     Eosinophilic esophagitis     Overweight     Adhesive capsulitis of shoulder     Degenerative arthritis of cervical spine with cord compression     Hypothyroidism due to acquired atrophy of thyroid     Attention deficit hyperactivity disorder, inattentive type     Mild persistent asthma without complication     Intermittent atrial fibrillation (H)     Esophageal obstruction due to food impaction       Medication Review:  Current Outpatient Prescriptions   Medication     albuterol (PROAIR HFA/PROVENTIL HFA/VENTOLIN HFA) 108 (90 BASE) MCG/ACT Inhaler     aspirin 81 MG tablet     BREO ELLIPTA 200-25 MCG/INH Inhaler     calcium carbonate (TUMS) 500 MG chewable tablet     cyanocobalamin (VITAMIN B12) 1000 MCG/ML injection     estradiol (VIVELLE-DOT) 0.05 MG/24HR patch     fluticasone (FLOVENT HFA) 220 MCG/ACT Inhaler     levothyroxine (SYNTHROID/LEVOTHROID) 100 MCG tablet     methylphenidate (RITALIN) 10 MG tablet     multivitamin, therapeutic with minerals (MULTI-VITAMIN) TABS     pantoprazole (PROTONIX) 40 MG EC tablet     rosuvastatin (CRESTOR) 10 MG tablet     trospium (SANCTURA) 20 MG tablet     trospium (SANCTURA) 20 MG tablet     zolpidem (AMBIEN) 5 MG tablet     [DISCONTINUED] NEW MED     No current facility-administered medications for this visit.        Patient was provided recommendation to follow-up with physician.    Mental Status Assessment:  Appearance:   Appropriate   Eye Contact:   Good   Psychomotor Behavior: Normal   Attitude:   Cooperative   Orientation:   All  Speech   Rate / Production: Normal    Volume:  Normal   Mood:    Anxious  Depressed    Affect:    Restricted   Thought Content:  Clear   Thought Form:  Coherent  Circumstantial  Insight:    Good       Safety Assessment:    Patient has had a history of suicide attempts: age 18 patient overdosed, she did not clarify on what she used and denies a history of suicidal ideation, self-injurious behavior, homicidal ideation, homicidal behavior and and other safety concerns  Patient reports the following current or recent suicidal ideation or behaviors: patient reports having off and on thoughts that she wishes she were not alive; she denies having thoughts of wanting to take her own life and denies any suicidal plan or intent.  Patient denies current or recent homicidal ideation or behaviors.  Patient denies current or recent self injurious behavior or ideation.  Patient denies other safety concerns.  Patient reports there are no firearms in the house  Protective Factors Sense of responsibility to family, Reality testing ability and Positive social support   Risk Factors Current high stress and Intense emotionality      Plan for Safety and Risk Management:  A safety and risk management plan has been developed including: calling spouse, calling friend, calling 911 and presenting to the ER      Review of Symptoms:  Depression: Sleep Interest Energy Concentration Psychomotor slowing or agitation Worthless Ruminations Irritability  Chelle:  Racing Thoughts Sleepless  Psychosis: No symptoms  Anxiety: Worries Nervousness Describe: concerns about spouse cheating   Panic:  No symptoms  Post Traumatic Stress Disorder: No symptoms  Obsessive Compulsive Disorder: No symptoms  Eating Disorder: No symptoms  Oppositional Defiant Disorder: No symptoms  ADD / ADHD: Attention Task Completion Distractiblity Interrupts  Conduct Disorder: No symptoms    Patient's Strengths and Limitations:  Patient identified the following strengths or resources that will help her succeed in counseling: commitment to health and well being and  intelligence. Patient identified the following supports: friends. Things that may interfere with the patien'ts success in behavioral health services include:none identified.    Diagnostic Criteria:  A. Excessive anxiety and worry about a number of events or activities (such as work or school performance).   B. The person finds it difficult to control the worry.  C. Select 3 or more symptoms (required for diagnosis). Only one item is required in children.   - Restlessness or feeling keyed up or on edge.    - Being easily fatigued.    - Difficulty concentrating or mind going blank.    - Irritability.    - Muscle tension.    - Sleep disturbance (difficulty falling or staying asleep, or restless unsatisfying sleep).   D. The focus of the anxiety and worry is not confined to features of an Axis I disorder.  E. The anxiety, worry, or physical symptoms cause clinically significant distress or impairment in social, occupational, or other important areas of functioning.   F. The disturbance is not due to the direct physiological effects of a substance (e.g., a drug of abuse, a medication) or a general medical condition (e.g., hyperthyroidism) and does not occur exclusively during a Mood Disorder, a Psychotic Disorder, or a Pervasive Developmental Disorder.    - The aformentioned symptoms began over 1 year(s) ago and occurs 7 days per week and is experienced as moderate.  A) Recurrent episode(s) - symptoms have been present during the same 2-week period and represent a change from previous functioning 5 or more symptoms (required for diagnosis)   - Depressed mood. Note: In children and adolescents, can be irritable mood.     - Diminished interest or pleasure in all, or almost all, activities.    - Decreased sleep.    - Psychomotor activity agitation.    - Fatigue or loss of energy.    - Feelings of worthlessness or inappropriate and excessive guilt.    - Diminished ability to think or concentrate, or indecisiveness.   B) The  symptoms cause clinically significant distress or impairment in social, occupational, or other important areas of functioning  C) The episode is not attributable to the physiological effects of a substance or to another medical condition  D) The occurence of major depressive episode is not better explained by other thought / psychotic disorders  E) There has never been a manic episode or hypomanic episode      Functional Status:  Patient's symptoms are causing reduced functional status in the following areas: Activities of Daily Living - low energy and motivation to complete tasks, sleep disturbance  Occupational / Vocational - patient has been on disability for 10 years  Social / Relational - increased marital conflict      DSM5 Diagnoses: (Sustained by DSM5 Criteria Listed Above)  Diagnoses: 296.32 (F33.1) Major Depressive Disorder, Recurrent Episode, Moderate With mixed features  300.02 (F41.1) Generalized Anxiety Disorder   ADHD per patient  Psychosocial & Contextual Factors: marital conflict, patient is on disability  WHODAS Score: 17  See Media section of EPIC medical record for completed WHODAS    Preliminary Treatment Plan:    The client reports no currently identified Restorationist, ethnic or cultural issues relevant to therapy.    Initial Treatment will focus on: Depressed Mood - low energy and motivation, sleep difficulty, feelings of worthlessness, psychomotor agitation  Anxiety - excess worry, trouble relaxing, increased irritability, concentration difficulty  Relational Problems related to: Conflict or difficulties with partner/spouse.    Chemical dependency recommendations: No indications of CD issues    As a preliminary treatment goal, patient will experience a reduction in depressed mood, will develop more effective coping skills to manage depressive symptoms, will develop healthy cognitive patterns and beliefs, will increase ability to function adaptively and will continue to take medications as  prescribed / participate in supportive activities and services , will experience a reduction in anxiety and will develop more effective coping skills to manage anxiety symptoms and will address relationship difficulties in a more adaptive manner.    The focus of initial interventions will be to alleviate anxiety, alleviate depressed mood, increase ability to function adaptively, increase coping skills, increase self esteem, teach CBT skills, teach communication skills, teach conflict management skills, teach distress tolerance skills, teach emotional regulation, teach relaxation strategies and teach sleep hygiene.    The patient is receiving treatment / structured support from the following professional(s) / service and treatment. Collaboration will be initiated with: primary care physician.    Referral to another professional/service is not indicated at this time..    A Release of Information is not needed at this time.    Report to child or adult protection services was NA.    ROSANNE Swift, Behavioral Health Clinician

## 2018-11-19 NOTE — PROGRESS NOTES
SUBJECTIVE:   Nikki Morales is a 58 year old female who presents to clinic today for the following health issues:        Hospital Follow-up Visit:    Hospital/Nursing Home/IP Rehab Facility: [facility]  Date of Admission: 11/12/2018  Date of Discharge: 11/13/2018  Reason(s) for Admission: esophagitis            Problems taking medications regularly:  None       Medication changes since discharge: started on Protonix       Problems adhering to non-medication therapy:  None    Summary of hospitalization:  [facility] discharge summary reviewed  Diagnostic Tests/Treatments reviewed.  Follow up needed: none  Other Healthcare Providers Involved in Patient s Care:         None  Update since discharge: stable.     Post Discharge Medication Reconciliation: discharge medications reconciled and changed, per note/orders (see AVS).  Plan of care communicated with patient     Coding guidelines for this visit:  Type of Medical   Decision Making Face-to-Face Visit       within 7 Days of discharge Face-to-Face Visit        within 14 days of discharge   Moderate Complexity 18296 [id number]   High Complexity 45050 [id number]                  Problem list and histories reviewed & adjusted, as indicated.  Additional history: as documented    BP Readings from Last 3 Encounters:   11/19/18 122/78   11/13/18 142/66   09/24/18 128/66    Wt Readings from Last 3 Encounters:   11/19/18 195 lb 4.8 oz (88.6 kg)   11/12/18 195 lb (88.5 kg)   09/12/18 194 lb 8 oz (88.2 kg)                  Labs reviewed in EPIC    Reviewed and updated as needed this visit by clinical staff  Tobacco  Allergies  Meds  Med Hx  Surg Hx  Fam Hx  Soc Hx      Reviewed and updated as needed this visit by Provider         ROS:  Constitutional, HEENT, cardiovascular, pulmonary, gi and gu systems are negative, except as otherwise noted.  Patient has had a long-standing chronic cough that we have had trouble dealing with using multiple inhalers.   After this finding of eosinophilic esophagitis again and ulcers and acid, we may have been treating the wrong organ in need to treat esophagus better.  It was suggested they just use a proton pump inhibitor and then follow-up before the address that eosinophilic part of her esophagus.  She would like something done more urgently.  She wonders if Tessalon could be used.  Her son had some and this did help quiet her cough.  Any breathing any suddenly motion any laugh causes a cough.  She cannot lay flat because she is coughing.  It is truly disrupting her life and sleep.  She does not like Ambien for sleep anymore and wonders if trazodone could be used.  She is heard from an RN friend that this works for her.  Her depression and mood is bothered while of this persistent illness.  She actually says today is 1 of her manic days where she can do everything but she will crash now for a few days after this.    OBJECTIVE:     /78  Pulse 88  Temp 96.9  F (36.1  C) (Temporal)  Resp 16  Wt 195 lb 4.8 oz (88.6 kg)  SpO2 97%  BMI 29.7 kg/m2  Body mass index is 29.7 kg/(m^2).  GENERAL: healthy, alert, mild physical distress and coughs with any deep breath, often with speech, and certainly with a left and laying down  EYES: Eyes grossly normal to inspection, PERRL and conjunctivae and sclerae normal  HENT: normal cephalic/atraumatic, oropharynx clear and oral mucous membranes moist  NECK: no adenopathy, no asymmetry, masses, or scars and thyroid normal to palpation  RESP: Some bilateral wheezing but no rales  CV: regular rate and rhythm, normal S1 S2, no S3 or S4, no murmur, click or rub, no peripheral edema and peripheral pulses strong  ABDOMEN: Tender mid epigastric  MS: no gross musculoskeletal defects noted, no edema  SKIN: no suspicious lesions or rashes  PSYCH: mentation appears normal, speech pressured, judgement and insight intact, appearance well groomed and everything is rapidfire and almost nonstop  today.    Diagnostic Test Results:  none     ASSESSMENT/PLAN:             1. Intermittent asthma, with acute exacerbation  I think is much is anything she is having trouble with #3 below.  This will be used to treat though both are asthma related from esophagitis and eosinophilic esophagitis.  Because she is having such significant daily symptoms I do not want for her to wait until mid December before we actually address this problem of eosinophilic esophagitis and persistent cough.  She will keep her inhalers but they have not really been working  - budesonide (PULMICORT) 0.5 MG/2ML neb solution; Mix 2 vials with 1 oz coffee flavoring and drink twice a day and rinse mouth aftrerwards  Dispense: 1 Box; Refill: 11    2. Psychophysiological insomnia  We will try 25-50 mg of trazodone at night for sleep  - traZODone (DESYREL) 50 MG tablet; Take 1 tablet (50 mg) by mouth nightly as needed for sleep  Dispense: 30 tablet; Refill: 1    3. Eosinophilic esophagitis  As above  - benzonatate (TESSALON) 200 MG capsule; Take 1 capsule (200 mg) by mouth 3 times daily as needed for cough  Dispense: 30 capsule; Refill: 3    4. Gastroesophageal reflux disease with esophagitis  As above  - benzonatate (TESSALON) 200 MG capsule; Take 1 capsule (200 mg) by mouth 3 times daily as needed for cough  Dispense: 30 capsule; Refill: 3    5. Mixed bipolar I disorder (H)  Seems to be on them hypomania face today.  No change in treatment.      Patient Instructions   Use budesonide solution as ordered  Keep taking pantoprazole  Benzonatate can still be used  Use Breo as well for now  Try honey also for cough  See you in couple weeks  trazodone 25 mg to start and if no better sleep in 5 days or more, take whole 50 mg tab      Erik Hamzah Peraza MD  [facility]

## 2018-11-19 NOTE — MR AVS SNAPSHOT
After Visit Summary   11/19/2018    Nikki Morales    MRN: [id number]           Patient Information     Date Of Birth          1960        Visit Information        Provider Department      11/19/2018 2:15 PM Erik Peraza MD [facility]        Today's Diagnoses     Psychophysiological insomnia    -  1    Intermittent asthma, with acute exacerbation        Eosinophilic esophagitis        Gastroesophageal reflux disease with esophagitis        Mixed bipolar I disorder (H)          Care Instructions    Use budesonide solution as ordered  Keep taking pantoprazole  Benzonatate can still be used  Use Breo as well for now  Try honey also for cough  See you in couple weeks  trazodone 25 mg to start and if no better sleep in 5 days or more, take whole 50 mg tab          Follow-ups after your visit        Your next 10 appointments already scheduled     Dec 05, 2018 10:30 AM CST   [facility] MD   [facility]    [address] [id number] [phone]           Bring a current list of meds and any records pertaining to this visit. For Physicals, please bring immunization records and any forms needing to be filled out. Please arrive 10 minutes early to complete paperwork.            Dec 11, 2018 11:00 AM CST   New Visit with Garry Dominguez MD   [facility]    [address] [place] [address] [phone]            Jan 09, 2019 11:00 AM CST   Return Visit with Martina Andrade MD   [facility])    [address] [place] [address] [phone]              Who to contact     If you have questions or need follow up information about today's clinic visit or your schedule please contact [facility] directly at [phone].  Normal or  non-critical lab and imaging results will be communicated to you by MyChart, letter or phone within 4 business days after the clinic has received the results. If you do not hear from us within 7 days, please contact the clinic through [employer] or phone. If you have a critical or abnormal lab result, we will notify you by phone as soon as possible.  Submit refill requests through [employer] or call your pharmacy and they will forward the refill request to us. Please allow 3 business days for your refill to be completed.          Additional Information About Your Visit        [employer] Information     [employer] gives you secure access to your electronic health record. If you see a primary care provider, you can also send messages to your care team and make appointments. If you have questions, please call your primary care clinic.  If you do not have a primary care provider, please call [phone] and they will assist you.        Care EveryWhere ID     This is your Care EveryWhere ID. This could be used by other organizations to access your [place] medical records  [id number]        Your Vitals Were     Pulse Temperature Respirations Pulse Oximetry BMI (Body Mass Index)       88 96.9  F (36.1  C) (Temporal) 16 97% 29.7 kg/m2        Blood Pressure from Last 3 Encounters:   11/19/18 122/78   11/13/18 142/66   09/24/18 128/66    Weight from Last 3 Encounters:   11/19/18 195 lb 4.8 oz (88.6 kg)   11/12/18 195 lb (88.5 kg)   09/12/18 194 lb 8 oz (88.2 kg)              Today, you had the following     No orders found for display         Today's Medication Changes          These changes are accurate as of 11/19/18  5:22 PM.  If you have any questions, ask your nurse or doctor.               Start taking these medicines.        Dose/Directions    benzonatate 200 MG capsule   Commonly known as:  TESSALON   Used for:  Gastroesophageal reflux disease with esophagitis, Eosinophilic esophagitis   Started by:  Erik Peraza MD         Dose:  200 mg   Take 1 capsule (200 mg) by mouth 3 times daily as needed for cough   Quantity:  30 capsule   Refills:  3       budesonide 0.5 MG/2ML neb solution   Commonly known as:  PULMICORT   Used for:  Intermittent asthma, with acute exacerbation   Started by:  Erik Peraza MD        Mix 2 vials with 1 oz coffee flavoring and drink twice a day and rinse mouth aftrerwards   Quantity:  1 Box   Refills:  11       traZODone 50 MG tablet   Commonly known as:  DESYREL   Used for:  Psychophysiological insomnia   Started by:  Erik Peraza MD        Dose:  50 mg   Take 1 tablet (50 mg) by mouth nightly as needed for sleep   Quantity:  30 tablet   Refills:  1            Where to get your medicines      These medications were sent to [address]  [address], [facility] [address]     Phone:  [phone]     benzonatate 200 MG capsule    budesonide 0.5 MG/2ML neb solution    traZODone 50 MG tablet                Primary Care Provider Office Phone # Fax #    Erik Peraza [phone] [address] [place] [address]        Equal Access to Services     [facility]: Hadii aad ku hadasho Soomaali, waaxda luqadaha, qaybta kaalmada adeegyada, waxay geovannyin haysole shelton . So [facility] [phone].    ATENCIÓN: Si habla español, tiene a newsome disposición servicios gratuitos de asistencia lingüística. Llame al [phone].    We comply with applicable federal civil rights laws and [place] laws. We do not discriminate on the basis of race, color, national origin, age, disability, sex, sexual orientation, or gender identity.            Thank you!     Thank you for choosing [facility]  for your care. Our goal is always to provide you with excellent care. Hearing back from our patients is one way we can continue to improve our services. Please take a few minutes to complete the written survey that you may receive in  the mail after your visit with us. Thank you!             Your Updated Medication List - Protect others around you: Learn how to safely use, store and throw away your medicines at www.disposemymeds.org.          This list is accurate as of 11/19/18  5:22 PM.  Always use your most recent med list.                   Brand Name Dispense Instructions for use Diagnosis    albuterol 108 (90 Base) MCG/ACT inhaler    PROAIR HFA/PROVENTIL HFA/VENTOLIN HFA    1 Inhaler    Inhale 2 puffs into the lungs every 6 hours as needed for shortness of breath / dyspnea or wheezing    Mild persistent asthma without complication       aspirin 81 MG tablet      Take 1 tablet (81 mg) by mouth daily        benzonatate 200 MG capsule    TESSALON    30 capsule    Take 1 capsule (200 mg) by mouth 3 times daily as needed for cough    Gastroesophageal reflux disease with esophagitis, Eosinophilic esophagitis       BREO ELLIPTA 200-25 MCG/INH inhaler   Generic drug:  fluticasone-vilanterol     60 Inhaler    INHALE 1 PUFF BY MOUTH ONCE DAILY    Intermittent asthma, uncomplicated       budesonide 0.5 MG/2ML neb solution    PULMICORT    1 Box    Mix 2 vials with 1 oz coffee flavoring and drink twice a day and rinse mouth aftrerwards    Intermittent asthma, with acute exacerbation       calcium carbonate 500 MG chewable tablet    TUMS     Take 2 chew tab by mouth 3 times daily as needed for other (bloating/flatulence)        CRESTOR 10 MG tablet   Generic drug:  rosuvastatin     30 tablet    Take 1 tablet (10 mg) by mouth daily    Hyperlipidemia LDL goal <130       cyanocobalamin 1000 MCG/ML injection    VITAMIN B12    3 mL    INJECT 1ML INTRAMUSCULARLY EVERY 30 DAYS    Chronic fatigue syndrome, Vitamin B12 deficiency (dietary) anemia       estradiol 0.05 MG/24HR BIW patch    VIVELLE-DOT     Place 1 patch onto the skin twice a week    Hypertrophy of breast, Eosinophilic esophagitis, Overweight(278.02)       fluticasone 220 MCG/ACT Inhaler    FLOVENT  HFA     Inhale 2 puffs into the lungs 2 times daily as needed (for bronchitis)        levothyroxine 100 MCG tablet    SYNTHROID/LEVOTHROID    90 tablet    Take 1 tablet (100 mcg) by mouth daily    Hypothyroidism due to acquired atrophy of thyroid       methylphenidate 10 MG tablet    RITALIN    30 tablet    Take 10 mg by mouth daily as needed (for chronic fatigue syndrome)    Chronic fatigue syndrome       Multi-vitamin Tabs tablet     100 tablet    Take 1 tablet by mouth daily        pantoprazole 40 MG EC tablet    PROTONIX    90 tablet    Take 1 tablet (40 mg) by mouth 2 times daily (before meals)    Eosinophilic esophagitis       traZODone 50 MG tablet    DESYREL    30 tablet    Take 1 tablet (50 mg) by mouth nightly as needed for sleep    Psychophysiological insomnia       * trospium 20 MG tablet    SANCTURA    40 tablet    TAKE 1-2 TABLETS BY MOUTH DAILY AS NEEDED.    Female stress incontinence       * trospium 20 MG tablet    SANCTURA    40 tablet    TAKE 1-2 TABLETS BY MOUTH DAILY AS NEEDED.    Female stress incontinence       zolpidem 5 MG tablet    AMBIEN    60 tablet    1 to 2 tabs at hour of sleep as needed    Chronic fatigue syndrome       * Notice:  This list has 2 medication(s) that are the same as other medications prescribed for you. Read the directions carefully, and ask your doctor or other care provider to review them with you.

## 2018-11-19 NOTE — TELEPHONE ENCOUNTER
LVM #2 for patient in regards to setting up GI clinic appointment for EoE management per Dr. Calles. Patient can see either Ori Tran or Ghazala Godinez. Left call back number for patient to call and schedule appointment.

## 2018-11-28 ENCOUNTER — TELEPHONE (OUTPATIENT)
Dept: FAMILY MEDICINE | Facility: CLINIC | Age: 58
End: 2018-11-28

## 2018-11-28 NOTE — TELEPHONE ENCOUNTER
Phone Encounter   [facility] spoke with patient regarding scheduling. She states that she was hoping to be seen sooner than two weeks out. She states that she and her spouse are making progress on their own with conversations they are having. Patient was scheduled for an appointment on 12/7/18.

## 2018-11-28 NOTE — TELEPHONE ENCOUNTER
Reason for Call:  Other call back    Detailed comments: Is just asking for a call back from Jamila Pop. Would like to discuss her schedule.    Phone Number Patient can be reached at: Cell number on file:    Telephone Information:   Mobile [phone]       Best Time: any    Can we leave a detailed message on this number? YES    Call taken on 11/28/2018 at 3:32 PM by Norma Vasquez

## 2018-12-05 ENCOUNTER — OFFICE VISIT (OUTPATIENT)
Dept: FAMILY MEDICINE | Facility: CLINIC | Age: 58
End: 2018-12-05
Payer: COMMERCIAL

## 2018-12-05 VITALS
DIASTOLIC BLOOD PRESSURE: 68 MMHG | RESPIRATION RATE: 16 BRPM | SYSTOLIC BLOOD PRESSURE: 112 MMHG | WEIGHT: 198.3 LBS | OXYGEN SATURATION: 98 % | BODY MASS INDEX: 30.15 KG/M2 | HEART RATE: 80 BPM | TEMPERATURE: 96.2 F

## 2018-12-05 DIAGNOSIS — J45.21 INTERMITTENT ASTHMA, WITH ACUTE EXACERBATION: ICD-10-CM

## 2018-12-05 DIAGNOSIS — F51.05 INSOMNIA DUE TO OTHER MENTAL DISORDER: ICD-10-CM

## 2018-12-05 DIAGNOSIS — G93.32 CHRONIC FATIGUE SYNDROME: ICD-10-CM

## 2018-12-05 DIAGNOSIS — K20.0 EOSINOPHILIC ESOPHAGITIS: Primary | ICD-10-CM

## 2018-12-05 DIAGNOSIS — F99 INSOMNIA DUE TO OTHER MENTAL DISORDER: ICD-10-CM

## 2018-12-05 DIAGNOSIS — K21.00 GASTROESOPHAGEAL REFLUX DISEASE WITH ESOPHAGITIS: ICD-10-CM

## 2018-12-05 PROCEDURE — 99214 OFFICE O/P EST MOD 30 MIN: CPT | Performed by: FAMILY MEDICINE

## 2018-12-05 RX ORDER — BUDESONIDE 0.5 MG/2ML
INHALANT ORAL
Qty: 4 BOX | Refills: 11 | Status: SHIPPED | OUTPATIENT
Start: 2018-12-05 | End: 2021-06-29

## 2018-12-05 ASSESSMENT — PAIN SCALES - GENERAL: PAINLEVEL: NO PAIN (0)

## 2018-12-05 NOTE — MR AVS SNAPSHOT
After Visit Summary   12/5/2018    Nikki Morales    MRN: [id number]           Patient Information     Date Of Birth          1960        Visit Information        Provider Department      12/5/2018 10:30 AM Erik Peraza MD [facility]        Today's Diagnoses     Intermittent asthma, with acute exacerbation          Care Instructions    Quetiapine, risperidone, olanzapine are some choices in sleep aid category  Ask him if you can continue pantoprazole and he will make sure your insurance covers it.  I believe this is a better proton pump inhibitor  Would budesonide be needed with it since we did not find what food you reacted to   Should you used both or could 1 medicine do it, if possible, I suggest both meds  Since testing with anesthesia, my clarity of thought is less present. Do I really need another exam with anesthesia.  I have had 4 procedures in the last.  Since better if another scoping is needed, 3 or more months might be an idea.          Follow-ups after your visit        Your next 10 appointments already scheduled     Dec 11, 2018 11:00 AM CST   New Visit with Garry Dominguez MD   [facility])    [address] [place] [address] [phone]            Dec 13, 2018  8:00 AM CST   [facility] with ROSANNE Swift   [facility]    [address] [place] [address] [phone]            Jan 09, 2019 11:00 AM CST   Return Visit with Martina Andrade MD   [facility])    [address] [place] [address] [phone]              Who to contact     If you have questions or need follow up information about today's clinic visit or your schedule please contact [facility] directly at [phone].  Normal or non-critical lab and imaging results will  be communicated to you by [employer], letter or phone within 4 business days after the clinic has received the results. If you do not hear from us within 7 days, please contact the clinic through [employer] or phone. If you have a critical or abnormal lab result, we will notify you by phone as soon as possible.  Submit refill requests through [employer] or call your pharmacy and they will forward the refill request to us. Please allow 3 business days for your refill to be completed.          Additional Information About Your Visit        [employer] Information     [employer] gives you secure access to your electronic health record. If you see a primary care provider, you can also send messages to your care team and make appointments. If you have questions, please call your primary care clinic.  If you do not have a primary care provider, please call [phone] and they will assist you.        Care EveryWhere ID     This is your Care EveryWhere ID. This could be used by other organizations to access your [place] medical records  [id number]        Your Vitals Were     Pulse Temperature Respirations Pulse Oximetry BMI (Body Mass Index)       80 96.2  F (35.7  C) (Temporal) 16 98% 30.15 kg/m2        Blood Pressure from Last 3 Encounters:   12/05/18 112/68   11/19/18 122/78   11/13/18 142/66    Weight from Last 3 Encounters:   12/05/18 198 lb 4.8 oz (89.9 kg)   11/19/18 195 lb 4.8 oz (88.6 kg)   11/12/18 195 lb (88.5 kg)              Today, you had the following     No orders found for display         Today's Medication Changes          These changes are accurate as of 12/5/18 11:23 AM.  If you have any questions, ask your nurse or doctor.               These medicines have changed or have updated prescriptions.        Dose/Directions    trospium 20 MG tablet   Commonly known as:  SANCTURA   This may have changed:  Another medication with the same name was removed. Continue taking this medication, and follow the directions you see  here.   Used for:  Female stress incontinence   Changed by:  Erik Peraza MD        TAKE 1-2 TABLETS BY MOUTH DAILY AS NEEDED.   Quantity:  40 tablet   Refills:  8            Where to get your medicines      These medications were sent to [facility] - [place], [place] - [address]  [address], [facility] [address]     Phone:  [phone]     budesonide 0.5 MG/2ML neb solution                Primary Care Provider Office Phone # Fax #    Erik Peraza [phone]       [address] [place] [address]        Equal Access to Services     [facility]: Hadii aad ku hadasho Soomaali, waaxda luqadaha, qaybta kaalmada adeegyada, waxlaurie bellamy haysole shelton . So [facility] [phone].    ATENCIÓN: Si habla español, tiene a newsome disposición servicios gratuitos de asistencia lingüística. [facility] [phone].    We comply with applicable federal civil rights laws and [place] laws. We do not discriminate on the basis of race, color, national origin, age, disability, sex, sexual orientation, or gender identity.            Thank you!     Thank you for choosing [facility]  for your care. Our goal is always to provide you with excellent care. Hearing back from our patients is one way we can continue to improve our services. Please take a few minutes to complete the written survey that you may receive in the mail after your visit with us. Thank you!             Your Updated Medication List - Protect others around you: Learn how to safely use, store and throw away your medicines at www.disposemymeds.org.          This list is accurate as of 12/5/18 11:23 AM.  Always use your most recent med list.                   Brand Name Dispense Instructions for use Diagnosis    aspirin 81 MG tablet    ASA     Take 1 tablet (81 mg) by mouth daily        benzonatate 200 MG capsule    TESSALON    30 capsule    Take 1 capsule (200 mg) by mouth 3 times daily as needed for cough     Gastroesophageal reflux disease with esophagitis, Eosinophilic esophagitis       budesonide 0.5 MG/2ML neb solution    PULMICORT    4 Box    Mix 2 vials with 1 oz coffee flavoring and drink twice a day and rinse mouth aftrerwards    Intermittent asthma, with acute exacerbation       calcium carbonate 500 MG chewable tablet    TUMS     Take 2 chew tab by mouth 3 times daily as needed for other (bloating/flatulence)        CRESTOR 10 MG tablet   Generic drug:  rosuvastatin     30 tablet    Take 1 tablet (10 mg) by mouth daily    Hyperlipidemia LDL goal <130       estradiol 0.05 MG/24HR bi-weekly patch    VIVELLE-DOT     Place 1 patch onto the skin twice a week    Hypertrophy of breast, Eosinophilic esophagitis, Overweight(278.02)       fluticasone 220 MCG/ACT inhaler    FLOVENT HFA     Inhale 2 puffs into the lungs 2 times daily as needed (for bronchitis)        levothyroxine 100 MCG tablet    SYNTHROID/LEVOTHROID    90 tablet    Take 1 tablet (100 mcg) by mouth daily    Hypothyroidism due to acquired atrophy of thyroid       methylphenidate 10 MG tablet    RITALIN    30 tablet    Take 10 mg by mouth daily as needed (for chronic fatigue syndrome)    Chronic fatigue syndrome       Multi-vitamin tablet     100 tablet    Take 1 tablet by mouth daily        pantoprazole 40 MG EC tablet    PROTONIX    90 tablet    Take 1 tablet (40 mg) by mouth 2 times daily (before meals)    Eosinophilic esophagitis       trospium 20 MG tablet    SANCTURA    40 tablet    TAKE 1-2 TABLETS BY MOUTH DAILY AS NEEDED.    Female stress incontinence       vitamin B-12 1000 MCG/ML injection    CYANOCOBALAMIN    3 mL    INJECT 1ML INTRAMUSCULARLY EVERY 30 DAYS    Chronic fatigue syndrome, Vitamin B12 deficiency (dietary) anemia       zolpidem 5 MG tablet    AMBIEN    60 tablet    1 to 2 tabs at hour of sleep as needed    Chronic fatigue syndrome

## 2018-12-05 NOTE — PROGRESS NOTES
SUBJECTIVE:   Nikki Morales is a 58 year old female who presents to clinic today for the following health issues:      Chief Complaint   Patient presents with     RECHECK     discuss esophagitis and what to do next     Patient was seen and had gastroscopy noted to have an ulcer distal esophagus or stomach.  She was placed on pantoprazole and was still having symptoms.  I added budesonide topical solution.  She believes she is improving more now.  She still will have some heartburn.  Her cough which was present even on pantoprazole has diminished.  She believes both medications are helping her improve.    Patient had her third anesthesia event or fourth anesthesia event this year when she had gastroscopy.  Her memory has diminished greatly.  She has trouble remembering the names of family at times.  She has trouble doing calculations or more difficult problem solving.  This is never happened with anesthesia event in the past.        Problem list and histories reviewed & adjusted, as indicated.  Additional history:     BP Readings from Last 3 Encounters:   12/05/18 112/68   11/19/18 122/78   11/13/18 142/66    Wt Readings from Last 3 Encounters:   12/05/18 198 lb 4.8 oz (89.9 kg)   11/19/18 195 lb 4.8 oz (88.6 kg)   11/12/18 195 lb (88.5 kg)                  Labs reviewed in EPIC    Reviewed and updated as needed this visit by clinical staff  Tobacco  Allergies  Meds  Med Hx  Surg Hx  Fam Hx  Soc Hx      Reviewed and updated as needed this visit by Provider         ROS:  Constitutional, HEENT, cardiovascular, pulmonary, gi and gu systems are negative, except as otherwise noted.  Has had stressful summer with , and her adult son and total relationship  Does not sleep well. trazodone worked at first but now seems more wired. Would like Ambien but does have concerns about it, especially since it was taking more to be effective    OBJECTIVE:     /68  Pulse 80  Temp 96.2  F (35.7  C)  (Temporal)  Resp 16  Wt 198 lb 4.8 oz (89.9 kg)  SpO2 98%  BMI 30.15 kg/m2  Body mass index is 30.15 kg/(m^2).  GENERAL: healthy, alert and no distress  EYES: Eyes grossly normal to inspection, PERRL and conjunctivae and sclerae normal  HENT: normal cephalic/atraumatic, oropharynx clear and oral mucous membranes moist  NECK: no adenopathy, no asymmetry, masses, or scars and thyroid normal to palpation  RESP: lungs clear to auscultation - no rales, rhonchi or wheezes  CV: regular rate and rhythm, normal S1 S2, no S3 or S4, no murmur, click or rub, no peripheral edema and peripheral pulses strong  ABDOMEN: soft, nontender, no hepatosplenomegaly, no masses and bowel sounds normal  MS: no gross musculoskeletal defects noted, no edema  SKIN: no suspicious lesions or rashes  NEURO: Normal strength and tone, mentation intact and speech normal  PSYCH: mentation appears normal, slightly animated speech  Diagnostic Test Results:  none     ASSESSMENT/PLAN:             1. Eosinophilic esophagitis  This is present and may or may not be the reason she was having food stuck in the first place.  Symptoms have improved with this and pantoprazole.  It would be my opinion that she should be on both medications for at least 3 months.  She has previously been worked up in the did not find what was the original source for her allergic response.  She is scheduled to see gastroenterology to follow-up.  See #3 below    2. Intermittent asthma, with acute exacerbation  This was listed as the reason for medication perhaps to help insurance cover it.  His use mainly for above.  Currently her asthma symptoms have improved greatly with treatment of 1 and 3  - budesonide (PULMICORT) 0.5 MG/2ML neb solution; Mix 2 vials with 1 oz coffee flavoring and drink twice a day and rinse mouth aftrerwards  Dispense: 4 Box; Refill: 11    3. Gastroesophageal reflux disease with esophagitis  Much improved with pantoprazole.  Ideally she would continue the  same proton pump inhibitor.  It has worked better for her than any others.  She is seeing gastroenterology and they will determine when and if she should have another gastroscopy.  Because of the memory issues after this conscious sedation, she is not sure she would want an exam very quickly if symptoms are better.  She did have at least 3 episodes of true anesthesia or conscious sedation during the last year.  It may be appropriate especially given her underlying with attention deficit chronic fatigue, sometimes depression, recent family stress etc.    4. Chronic fatigue syndrome  Because she does not sleep well she does have symptoms.  She enjoys her 1 day periodically where she has energy to accomplish things.  Sleep may be beneficial but we have not found a medication that works well enough without side effects    5. Insomnia due to other mental disorder  Since she has Ambien she will continue this for now but is aware of all potential side effects.  Discussed possible an atypical antipsychotic like Seroquel, Risperdal, Zyprexa.  We discussed some of the side effects and she said she would like to think about these medications.  Also she has not really been on tricyclic antidepressants for a while.  This may be considered.  The atypical may help with some of her ups and down mood swings.      MEDICATIONS:  Continue current medications without change  Patient Instructions   Quetiapine, risperidone, olanzapine are some choices in sleep aid category  Ask him if you can continue pantoprazole and he will make sure your insurance covers it.  I believe this is a better proton pump inhibitor  Would budesonide be needed with it since we did not find what food you reacted to   Should you used both or could 1 medicine do it, if possible, I suggest both meds  Since testing with anesthesia, my clarity of thought is less present. Do I really need another exam with anesthesia.  I have had 4 procedures in the last.  Since  better if another scoping is needed, 3 or more months might be an idea.    Time spent with greater than 50% spent in discussion, making the plan, or filling out paperwork with patient for illness/treatments was 25 minutes or more.    Erik Peraza MD  [facility]

## 2018-12-05 NOTE — PATIENT INSTRUCTIONS
Quetiapine, risperidone, olanzapine are some choices in sleep aid category  Ask him if you can continue pantoprazole and he will make sure your insurance covers it.  I believe this is a better proton pump inhibitor  Would budesonide be needed with it since we did not find what food you reacted to   Should you used both or could 1 medicine do it, if possible, I suggest both meds  Since testing with anesthesia, my clarity of thought is less present. Do I really need another exam with anesthesia.  I have had 4 procedures in the last.  Since better if another scoping is needed, 3 or more months might be an idea.

## 2018-12-06 ENCOUNTER — TELEPHONE (OUTPATIENT)
Dept: GASTROENTEROLOGY | Facility: CLINIC | Age: 58
End: 2018-12-06

## 2018-12-06 NOTE — TELEPHONE ENCOUNTER
PREVISIT INFORMATION                                                    Nikki JOSHI Andrew scheduled for future visit at [facility].    Patient is scheduled to see Dr. Dominguez on 12/11/18  Reason for visit: eosinophilic esophagitis  Referring provider ER hospitalist  Has patient seen previous specialist? Yes-  [facility]  Medical Records:  In chart, doesn't remember where the records are in [employer]    REVIEW                                                      New patient packet mailed to patient:   Medication reconciliation complete:       Current Outpatient Prescriptions   Medication Sig Dispense Refill     aspirin 81 MG tablet Take 1 tablet (81 mg) by mouth daily (Patient not taking: Reported on 12/5/2018)  OTC     benzonatate (TESSALON) 200 MG capsule Take 1 capsule (200 mg) by mouth 3 times daily as needed for cough 30 capsule 3     budesonide (PULMICORT) 0.5 MG/2ML neb solution Mix 2 vials with 1 oz coffee flavoring and drink twice a day and rinse mouth aftrerwards 4 Box 11     calcium carbonate (TUMS) 500 MG chewable tablet Take 2 chew tab by mouth 3 times daily as needed for other (bloating/flatulence)       cyanocobalamin (VITAMIN B12) 1000 MCG/ML injection INJECT 1ML INTRAMUSCULARLY EVERY 30 DAYS 3 mL 2     estradiol (VIVELLE-DOT) 0.05 MG/24HR patch Place 1 patch onto the skin twice a week       fluticasone (FLOVENT HFA) 220 MCG/ACT Inhaler Inhale 2 puffs into the lungs 2 times daily as needed (for bronchitis)       levothyroxine (SYNTHROID/LEVOTHROID) 100 MCG tablet Take 1 tablet (100 mcg) by mouth daily 90 tablet 3     methylphenidate (RITALIN) 10 MG tablet Take 10 mg by mouth daily as needed (for chronic fatigue syndrome)  30 tablet 0     multivitamin, therapeutic with minerals (MULTI-VITAMIN) TABS Take 1 tablet by mouth daily 100 tablet 3     pantoprazole (PROTONIX) 40 MG EC tablet Take 1 tablet (40 mg) by mouth 2 times daily (before meals) 90 tablet 1      rosuvastatin (CRESTOR) 10 MG tablet Take 1 tablet (10 mg) by mouth daily 30 tablet 1     trospium (SANCTURA) 20 MG tablet TAKE 1-2 TABLETS BY MOUTH DAILY AS NEEDED. 40 tablet 8     zolpidem (AMBIEN) 5 MG tablet 1 to 2 tabs at hour of sleep as needed 60 tablet 5     [DISCONTINUED] NEW MED Methylcobalamin 25mg/ml  0.1ml weekly 1 Bottle 11       Allergies: Codeine        PLAN/FOLLOW-UP NEEDED                                                      Previsit review complete.  Patient will see provider at future scheduled appointment.     Patient Reminders Given:  Please, make sure you bring an updated list of your medications.   If you are having a procedure, please, present 15 minutes early.  If you need to cancel or reschedule,please call [phone].    Kandy Yanez

## 2018-12-11 ENCOUNTER — OFFICE VISIT (OUTPATIENT)
Dept: GASTROENTEROLOGY | Facility: CLINIC | Age: 58
End: 2018-12-11
Payer: COMMERCIAL

## 2018-12-11 VITALS
HEIGHT: 68 IN | OXYGEN SATURATION: 96 % | BODY MASS INDEX: 30.11 KG/M2 | SYSTOLIC BLOOD PRESSURE: 132 MMHG | HEART RATE: 57 BPM | WEIGHT: 198.7 LBS | DIASTOLIC BLOOD PRESSURE: 81 MMHG
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
## 2018-12-11 DIAGNOSIS — K20.0 EOSINOPHILIC ESOPHAGITIS: Primary | ICD-10-CM

## 2018-12-11 DIAGNOSIS — T18.128D FOOD IMPACTION OF ESOPHAGUS, SUBSEQUENT ENCOUNTER: ICD-10-CM

## 2018-12-11 PROCEDURE — 99204 OFFICE O/P NEW MOD 45 MIN: CPT | Performed by: INTERNAL MEDICINE

## 2018-12-11 ASSESSMENT — MIFFLIN-ST. JEOR: SCORE: 1519.8

## 2018-12-11 NOTE — NURSING NOTE
Nikki Morales's goals for this visit include: eosiniphil esophogitis  She requests these members of her care team be copied on today's visit information: no    PCP: Erik Peraza    Referring Provider:  No referring provider defined for this encounter.    There were no vitals taken for this visit.    Do you need any medication refills at today's visit? No    Concepción Burgess LPN

## 2018-12-11 NOTE — PATIENT INSTRUCTIONS
Schedule upper endoscopy with Dr. Dominguez at [facility] with monitored anesthesia care ([facility])- Call [phone] to schedule    Continue pantoprazole twice daily.    Continue swallowed budesonide    Follow up with Dr. Dominguez in 3 months

## 2018-12-11 NOTE — PROGRESS NOTES
GASTROENTEROLOGY NEW PATIENT CLINIC VISIT    CC/REFERRING MD:    Erik Peraza    REASON FOR CONSULTATION:   No ref. provider found for   Chief Complaint   Patient presents with     RECHECK     eosiniphil esophogitis       HISTORY OF PRESENT ILLNESS:    Nikki Morales is 58 year old female who presents for evaluation of recent food impaction and eosinophilic esophagitis.   She notes that she has had esophageal problems going back 20-25 years in the past.  She has required EGD with dilation several times in the past.  She has previously been prescribed acid reducing medication with PPI and has been on swallowed steroid therapy in the past.  Most recently, she was not following with a gastroenterologist and was not on any therapy for EOE.  She notes that she recently was at a social function and ended up having a piece of steak become impacted at her esophagus.  Upper endoscopy November 2018 did note a large food bolus in the esophagus which was removed endoscopically.  There was a mucosal tear at the time of the endoscopy and this was clipped with 3 hemostatic clips.  The patient was monitored and subsequently released and was provided a prescription for pantoprazole twice daily.  She then followed up with her PCP and was prescribed budesonide swallowed therapy.  She reports that her symptoms have improved at this time.  She still does get occasional dysphagia but has not had any additional food impactions.  She reports that she gets increased symptoms if she misses her swallowed budesonide.  She is noted to have several endoscopies in the past and has had EGD with biopsy in 2013  with elevated eosinophils.  She is not clear if she was on PPI at that time.  She notes that she has multiple first and second degree family members who also have eosinophilic esophagitis.  The patient reports that she has had allergy testing in the past which has been unremarkable.  The patient reports that she has done 6  food elimination diet in the past.  She feels that certain foods such as gluten do bother her more but she has not had a formal elimination diet to date.    PROBLEM LIST  Patient Active Problem List    Diagnosis Date Noted     Intermittent atrial fibrillation (H) 2018     Priority: Medium     Mild persistent asthma without complication 2017     Priority: Medium     Attention deficit hyperactivity disorder, inattentive type 10/20/2015     Priority: Medium     Hypothyroidism due to acquired atrophy of thyroid 2015     Priority: Medium     Adhesive capsulitis of shoulder 2015     Priority: Medium     Degenerative arthritis of cervical spine with cord compression 2015     Priority: Medium     Eosinophilic esophagitis 2014     Priority: Medium     Overweight 2014     Priority: Medium     Problem list name updated by automated process. Provider to review       HYPERLIPIDEMIA LDL GOAL <130 10/31/2010     Priority: Medium     Herpes simplex virus (HSV) infection 2007     Priority: Medium     Problem list name updated by automated process. Provider to review       Female stress incontinence 2007     Priority: Medium     ESOPHAGEAL REFLUX 2006     Priority: Medium     Other and unspecified disc disorder of lumbar region 2003     Priority: Medium     Chronic fatigue syndrome      Priority: Medium       PERTINENT PAST MEDICAL HISTORY:  (I personally reviewed this history with the patient at today's visit)   Past Medical History:   Diagnosis Date     Chronic fatigue      Hyperlipidemia      Hypothyroid      New onset a-fib (H)      Other vitamin B12 deficiency anemia          PREVIOUS SURGERIES: (I personally reviewed this history with the patient at today's visit)   Past Surgical History:   Procedure Laterality Date     C  DELIVERY ONLY      , Low Cervical     C NONSPECIFIC PROCEDURE      sinus     C NONSPECIFIC PROCEDURE      Esophgeal  dilatation multiple     C NONSPECIFIC PROCEDURE  2008    sling     C VAGINAL HYSTERECTOMY  09/1995    Hysterectomy, Vaginal     COLONOSCOPY  10/06/09     COLONOSCOPY  7/2/2013    Procedure: COMBINED COLONOSCOPY, SINGLE BIOPSY/POLYPECTOMY BY BIOPSY;;  Surgeon: Cuate Miles MD;  Location: [facility]     COLONOSCOPY N/A 9/6/2018    Procedure: COLONOSCOPY;  Colonoscopy;  Surgeon: Hamzah Dudley DO;  Location: [facility]     COMBINED REPAIR PTOSIS WITH BLEPHAROPLASTY BILATERAL Bilateral 9/24/2018    Procedure: COMBINED REPAIR PTOSIS WITH BLEPHAROPLASTY BILATERAL;  Bilateral upper eyelid Blepharoplasty and ptosis repair ;  Surgeon: Martina Andrade MD;  Location: [facility]     CONIZATION CERVIX,KNIFE/LASER       ESOPHAGOSCOPY, GASTROSCOPY, DUODENOSCOPY (EGD), COMBINED  7/2/2013    Procedure: COMBINED ESOPHAGOSCOPY, GASTROSCOPY, DUODENOSCOPY (EGD), BIOPSY SINGLE OR MULTIPLE;  egd with rabdain biopsies and colonoscopy with polypectomy by biopsy ;  Surgeon: Cuate Miles MD;  Location: [facility]     HC DILATION/CURETTAGE DIAG/THER NON OB      D & C     HC REMOVAL OF TONSILS,<13 Y/O      Tonsils <10y.o.     HC UGI ENDOSCOPY DIAG W BIOPSY  12/05/07     HC UGI ENDOSCOPY, SIMPLE EXAM  09/10/99 & 04/14/98     HYSTERECTOMY, PAP NO LONGER INDICATED       LAPAROSCOPIC CHOLECYSTECTOMY  10/12/13     REPAIR PTOSIS       TUBAL LIGATION  1994         ALLERGIES:     Allergies   Allergen Reactions     Codeine Other (See Comments)     Causes agitation       PERTINENT MEDICATIONS:    Current Outpatient Medications:      aspirin 81 MG tablet, Take 1 tablet (81 mg) by mouth daily, Disp: , Rfl: OTC     benzonatate (TESSALON) 200 MG capsule, Take 1 capsule (200 mg) by mouth 3 times daily as needed for cough, Disp: 30 capsule, Rfl: 3     budesonide (PULMICORT) 0.5 MG/2ML neb solution, Mix 2 vials with 1 oz coffee flavoring and drink twice a day and rinse mouth aftrerwards, Disp: 4 Box, Rfl: 11     calcium carbonate (TUMS) 500 MG chewable  tablet, Take 2 chew tab by mouth 3 times daily as needed for other (bloating/flatulence), Disp: , Rfl:      cyanocobalamin (VITAMIN B12) 1000 MCG/ML injection, INJECT 1ML INTRAMUSCULARLY EVERY 30 DAYS, Disp: 3 mL, Rfl: 2     estradiol (VIVELLE-DOT) 0.05 MG/24HR patch, Place 1 patch onto the skin twice a week, Disp: , Rfl:      fluticasone (FLOVENT HFA) 220 MCG/ACT Inhaler, Inhale 2 puffs into the lungs 2 times daily as needed (for bronchitis), Disp: , Rfl:      levothyroxine (SYNTHROID/LEVOTHROID) 100 MCG tablet, Take 1 tablet (100 mcg) by mouth daily, Disp: 90 tablet, Rfl: 3     methylphenidate (RITALIN) 10 MG tablet, Take 10 mg by mouth daily as needed (for chronic fatigue syndrome) , Disp: 30 tablet, Rfl: 0     multivitamin, therapeutic with minerals (MULTI-VITAMIN) TABS, Take 1 tablet by mouth daily, Disp: 100 tablet, Rfl: 3     pantoprazole (PROTONIX) 40 MG EC tablet, Take 1 tablet (40 mg) by mouth 2 times daily (before meals), Disp: 90 tablet, Rfl: 1     rosuvastatin (CRESTOR) 10 MG tablet, Take 1 tablet (10 mg) by mouth daily, Disp: 30 tablet, Rfl: 1     trospium (SANCTURA) 20 MG tablet, TAKE 1-2 TABLETS BY MOUTH DAILY AS NEEDED., Disp: 40 tablet, Rfl: 8     zolpidem (AMBIEN) 5 MG tablet, 1 to 2 tabs at hour of sleep as needed, Disp: 60 tablet, Rfl: 5    SOCIAL HISTORY:  Social History     Socioeconomic History     Marital status:      Spouse name: Jared     Number of children: 2     Years of education: 12     Highest education level: Not on file   Social Needs     Financial resource strain: Not on file     Food insecurity - worry: Not on file     Food insecurity - inability: Not on file     Transportation needs - medical: Not on file     Transportation needs - non-medical: Not on file   Occupational History     Occupation: HomeMaker     Employer: [employer]   Tobacco Use     Smoking status: Former Smoker     Packs/day: 1.00     Years: 30.00     Pack years: 30.00     Types: Cigarettes     Smokeless  tobacco: Never Used   Substance and Sexual Activity     Alcohol use: Yes     Alcohol/week: 2.4 oz     Types: 2 Cans of beer, 2 Standard drinks or equivalent per week     Comment: social     Drug use: No     Comment: used in past any and all     Sexual activity: Yes     Partners: Male     Birth control/protection: Female Surgical     Comment: Hx: hysterectomy   Other Topics Concern      Service No     Blood Transfusions No     Caffeine Concern No     Occupational Exposure No     Hobby Hazards No     Sleep Concern Yes     Comment: Difficult with sleeping at night, sleeps most of the day     Stress Concern No     Weight Concern Yes     Comment: desire wt loss     Special Diet No     Back Care Yes     Comment: weight restrictions     Exercise Yes     Bike Helmet No     Seat Belt Yes     Self-Exams No     Parent/sibling w/ CABG, MI or angioplasty before 65F 55M? No   Social History Narrative     Not on file       FAMILY HISTORY: (I personally reviewed this history with the patient at today's visit)  Family History   Problem Relation Age of Onset     Cancer Mother         Uterine     Prostate Cancer Mother      Diabetes Father      Hypertension Father      Cancer Father         prostate cancer     Cerebrovascular Disease Father      Psychotic Disorder Son         severe Add,      Asthma Son         exercised induced asthma     Asthma Son         ecxercise induced asthma     Cardiovascular Sister         recurrent blood clots     Cerebrovascular Disease Sister 51     Prostate Cancer Brother      Diabetes Maternal Grandfather      Heart Disease Maternal Grandmother         Heart condition age 96     Diabetes Paternal Grandfather      Cancer Brother      Cerebrovascular Disease Paternal Grandmother         ROS:    No fevers or chills  No weight loss  No blurry vision, double vision or change in vision  No sore throat  No lymphadenopathy  No headache, paraesthesias, or weakness in a limb  No shortness of breath or  "wheezing  No chest pain or pressure  No arthralgias or myalgias  No rashes or skin changes  No odynophagia or dysphagia  No BRBPR, hematochezia, melena  No dysuria, frequency or urgency  No hot/cold intolerance or polyria  No anxiety or depression  PHYSICAL EXAMINATION:  Constitutional: aaox3, cooperative, pleasant, not dyspneic/diaphoretic, no acute distress  Vitals reviewed: /81   Pulse 57   Ht 1.727 m (5' 8\")   Wt 90.1 kg (198 lb 11.2 oz)   SpO2 96%   BMI 30.21 kg/m    Wt:   Wt Readings from Last 2 Encounters:   12/11/18 90.1 kg (198 lb 11.2 oz)   12/05/18 89.9 kg (198 lb 4.8 oz)      Eyes: Sclera anicteric/injected  Ears/nose/mouth/throat: Normal oropharynx without ulcers or exudate, mucus membranes moist, hearing intact  Neck: supple, thyroid normal size  CV: No edema, RRR  Respiratory: Unlabored breathing, CTAB  Lymph: No submandibular, supraclavicular or inguinal lymphadenopathy  Abd: Nondistended, no masses, +bs, no hepatosplenomegaly, nontender, no peritoneal signs  Skin: warm, perfused, no jaundice  Psych: Normal affect  MSK: Normal gait      PERTINENT STUDIES: (I personally reviewed these laboratory studies today)  Most recent CBC:   Recent Labs   Lab Test 11/13/18  1131 11/12/18  0259   WBC 8.8 7.8   HGB 12.8 14.1   HCT 40.8 42.8    240     Most recent hepatic panel:  Recent Labs   Lab Test 02/20/14  1035 03/12/13   ALT 33 35   AST 27 23     Most recent creatinine:  Recent Labs   Lab Test 11/13/18  0718 11/12/18  0259   CR 0.84 0.88         ASSESSMENT/PLAN:    Nikki Morales is a 58 year old female who presents for evaluation of esophageal stricture with recent food impaction status post endoscopic removal of food bolus.  She also had a mucosal tear which was clipped at the time of her most recent endoscopy November 2018.  She is currently on therapy with twice daily PPI as well as swallowed budesonide.  By history, it is not clear if she formally had an 8-week high-dose PPI " trial with repeat biopsies.  However, current diagnostic criteria for eosinophilic esophagitis does not absolutely require this high dose PPI trial prior to solidifying the diagnosis and treating with swallowed steroid is indicated.  I do not think this is simply reflux associated.   I think that she should continue the swallowed steroid as prescribed as well as her PPI therapy.  I recommended that she undergo repeat EGD with monitored anesthesia care for repeat biopsies as well as to consider dilation of the distal esophagus.  She reports that she will consider doing this but is hesitant given that she has minimal dysphagia symptoms at this point.  We did discuss the rationale behind proactive monitoring and treatment of this disease.  We also discussed the role of endoscopic dilation in the setting of eosinophilic esophagitis.   We discussed nutrition referral as well as a 6 food elimination diet but she prefers to hold on that at this time.  She is interested in pursuing clinical trials if she is a candidate, we will see if anything is available at the [facility].  She can also contact [facility] if desired to see if they have any trials at this point.      Eosinophilic esophagitis    RTC 3 months    Thank you for this consultation.  It was a pleasure to participate in the care of this patient; please contact us with any further questions.     This note was created with voice recognition software, and while reviewed for accuracy, typos may remain.     Garry Dominguez MD  Adjunct  of Medicine  Division of Gastroenterology, Hepatology and Nutrition  [facility]  [phone]

## 2018-12-12 ENCOUNTER — TELEPHONE (OUTPATIENT)
Dept: BEHAVIORAL HEALTH | Facility: CLINIC | Age: 58
End: 2018-12-12

## 2018-12-12 NOTE — TELEPHONE ENCOUNTER
Phone Encounter   [facility] spoke with patient to clarify whether patient wanted to attend the short visit she scheduled today or the longer visit for tomorrow as doing both does not make sense. She stated that she feels tense today after last night and she was hoping they could process further. Discussed that more time would make sense. Also informed that looking at referrals for long-term counseling would be helpful as it seems patient is needing more services than what the patient is able to provide. Patient was in agreement and stated that she doesn't want to be scheduled out two months. Reflected that she may not be seen for a month or two, however this writer would still be able to bridge that gap, with the understanding that patient will have additional services soon. Patient denied any suicidal or homicidal ideation or other safety concerns and will cancel today's appointment and keep tomorrow's.

## 2018-12-13 ENCOUNTER — OFFICE VISIT (OUTPATIENT)
Dept: BEHAVIORAL HEALTH | Facility: CLINIC | Age: 58
End: 2018-12-13
Payer: COMMERCIAL

## 2018-12-13 DIAGNOSIS — F33.1 MODERATE RECURRENT MAJOR DEPRESSION (H): ICD-10-CM

## 2018-12-13 DIAGNOSIS — F90.0 ATTENTION DEFICIT HYPERACTIVITY DISORDER, INATTENTIVE TYPE: ICD-10-CM

## 2018-12-13 DIAGNOSIS — F41.1 GAD (GENERALIZED ANXIETY DISORDER): Primary | ICD-10-CM

## 2018-12-13 PROCEDURE — 90834 PSYTX W PT 45 MINUTES: CPT | Performed by: MARRIAGE & FAMILY THERAPIST

## 2018-12-13 NOTE — PROGRESS NOTES
[facility]  December 13, 2018      Behavioral Health Clinician Progress Note    Patient Name: Nikki Morales           Service Type:  Individual      Service Location:   Face to Face in Clinic     Session Start Time: 8:00  Session End Time: 8:52      Session Length: 38 - 52      Attendees: Patient and Spouse / Significant Other    Visit Activities (Refresh list every visit): [facility] Only    Diagnostic Assessment Date: 11/19/18  Treatment Plan Review Date: due next visit  See Flowsheets for today's PHQ-9 and KAILEE-7 results  Previous PHQ-9:   PHQ-9 SCORE 9/23/2015 11/16/2018   PHQ-9 Total Score 15 13     Previous KAILEE-7:   KAILEE-7 SCORE 11/16/2018   Total Score 13       JOHANNE LEVEL:  JOHANNE Score (Last Two) 7/17/2013   JOHANNE Raw Score 36   Activation Score 47.4   JOHANNE Level 2       DATA  Extended Session (60+ minutes): No  Interactive Complexity: No  Crisis: No  [facility] Patient: No    Treatment Objective(s) Addressed in This Session:  Target Behavior(s): disease management/lifestyle changes depression, anxiety and relationship issues    Depressed Mood: Increase interest, engagement, and pleasure in doing things  Decrease frequency and intensity of feeling down, depressed, hopeless  Improve quantity and quality of night time sleep / decrease daytime naps  Feel less tired and more energy during the day   Improve diet, appetite, mindful eating, and / or meal planning  Identify negative self-talk and behaviors: challenge core beliefs, myths, and actions  Improve concentration, focus, and mindfulness in daily activities   Feel less fidgety, restless or slow in daily activities / interpersonal interactions  Anxiety: will experience a reduction in anxiety, will develop more effective coping skills to manage anxiety symptoms, will develop healthy cognitive patterns and beliefs and will increase ability to function adaptively  Relationship Problems: will address relationship difficulties in a more adaptive  manner  Psychological distress related to Chronic Disease Management    Current Stressors / Issues:  Patient and her spouse present with increased stress primarily around their adult son. They state that he is an alcoholic and this creates conflict not only between patient's son and spouse, but between patient and her spouse. They state that they do not know what to do. When they have called the  the  tell them that they can not remove their son from the home, because it is his primary residence as it is on his 's license. Patient states that they want him out of the home and they want him to get the help he needs however he refuses to do either. They have tried to call the  and they have also tried to lock him out and set boundaries, however this does not seem to do anything.     Patient states that she has continued stress related to her marriage and ongoing worry about her spouse being unfaithful. Discussed ways to communicate with each other. Reflected that they are on the same page when it comes to their oldest son and encouraged continued support with stress around this. Discussed boundary setting and continuing to contact the . Informed that they can kick him out of their home, however they have to remain firm on their boundaries. They currently struggle with kicking him out as it is now the winter months. Discussed when it is appropriate for them to contact the  and how to ensure they communicate their concerns with authorities appropriately.     Recommended that they each meet with an individual therapist. Suggested patient consider EMDR due to feeling that she is triggered currently about things that have previously occurred.      Progress on Treatment Objective(s) / Homework:  Minimal progress - PREPARATION (Decided to change - considering how); Intervened by negotiating a change plan and determining options / strategies for behavior change, identifying triggers, exploring social  supports, and working towards setting a date to begin behavior change    Motivational Interviewing    MI Intervention: Expressed Empathy/Understanding, Supported Autonomy, Collaboration, Evocation, Permission to raise concern or advise, Open-ended questions, Reflections: simple and complex, Change talk (evoked) and Reframe     Change Talk Expressed by the Patient: Desire to change Ability to change Reasons to change Need to change Committment to change Activation Taking steps    Provider Response to Change Talk: E - Evoked more info from patient about behavior change, A - Affirmed patient's thoughts, decisions, or attempts at behavior change, R - Reflected patient's change talk and S - Summarized patient's change talk statements    Also provided psychoeducation about behavioral health condition, symptoms, and treatment options    Skills training    Explored skills useful to client in current situation    Skills include assertiveness, communication, conflict management, problem-solving, relaxation, etc.    Solution-Focused Therapy    Explored patterns in patient's relationships and discussed options for new behaviors    Explored patterns in patient's actions and choices and discussed options for new behaviors    Cognitive-behavioral Therapy    Discussed common cognitive distortions, identified them in patient's life    Explored ways to challenge, replace, and act against these cognitions    Acceptance and Commitment Therapy    Explored and identified important values in patient's life    Discussed ways to commit to behavioral activation around these values    Psychodynamic psychotherapy    Discussed patient's emotional dynamics and issues and how they impact behaviors    Explored patient's history of relationships and how they impact present behaviors    Explored how to work with and make changes in these schemas and patterns    Behavioral Activation    Discussed steps patient can take to become more involved in  meaningful activity    Identified barriers to these activities and explored possible solutions    Mindfulness-Based Strategies    Discussed skills based on development and application of mindfulness    Skills drawn from dialectical behavior therapy, mindfulness-based stress reduction, mindfulness-based cognitive therapy, etc.      Care Plan review completed: Yes    Medication Review:  No changes to current psychiatric medication(s)    Medication Compliance:  Yes    Changes in Health Issues:   None reported    Chemical Use Review:   Substance Use: Chemical use reviewed, no active concerns identified      Tobacco Use: No current tobacco use.      Assessment: Current Emotional / Mental Status (status of significant symptoms):  Risk status (Self / Other harm or suicidal ideation)  Patient has had a history of suicide attempts: age 18 patient overdosed, she did not clarify on what she used and denies a history of suicidal ideation, self-injurious behavior, homicidal ideation, homicidal behavior and and other safety concerns  Patient denies current fears or concerns for personal safety.  Patient reports the following current or recent suicidal ideation or behaviors: patient reports having off and on thoughts that she wishes she were not alive; she denies having thoughts of wanting to take her own life and denies any suicidal plan or intent..  Patient denies current or recent homicidal ideation or behaviors.  Patient denies current or recent self injurious behavior or ideation.  Patient denies other safety concerns.  A safety and risk management plan has been developed including: calling her spouse, calling her friend, calling 911 and presenting to the ER    Appearance:   Appropriate   Eye Contact:   Good   Psychomotor Behavior: Restless   Attitude:   Cooperative   Orientation:   All  Speech   Rate / Production: Normal    Volume:  Normal   Mood:    Anxious  Depressed   Affect:    Restricted   Thought Content:  Rumination    Thought Form:  Circumstantial  Insight:    Good     Diagnoses:  1. KAILEE (generalized anxiety disorder)    2. Moderate recurrent major depression (H)    3. Attention deficit hyperactivity disorder, inattentive type        Collateral Reports Completed:  Not Applicable    Plan: (Homework, other):  Patient was given information about behavioral services and encouraged to schedule a follow up appointment with the clinic [facility] in 3 weeks, as needed.  She was also given information about mental health symptoms and treatment options , Cognitive Behavioral Therapy skills to practice when experiencing anxiety and depression and deep Breathing Strategies to practice when experiencing anxiety and depression.  CD Recommendations: No indications of CD issues. Patient was referred to Steph Crain for specialty counseling and EMDR. Patient can meet with [facility] for bridging sessions. This writer will connect with [facility] to see if there is any further recommendations regarding getting their son out of the home and if there are different recommendations from what was discussed patient will be contacted.  ROSANNE Block, [facility]

## 2018-12-21 LAB — UPPER GI ENDOSCOPY: NORMAL

## 2018-12-26 DIAGNOSIS — D51.8 VITAMIN B12 DEFICIENCY (DIETARY) ANEMIA: ICD-10-CM

## 2018-12-26 DIAGNOSIS — G93.32 CHRONIC FATIGUE SYNDROME: ICD-10-CM

## 2018-12-28 RX ORDER — CYANOCOBALAMIN 1000 UG/ML
INJECTION, SOLUTION INTRAMUSCULAR; SUBCUTANEOUS
Qty: 1 ML | Refills: 11 | Status: SHIPPED | OUTPATIENT
Start: 2018-12-28 | End: 2020-02-10

## 2018-12-28 NOTE — TELEPHONE ENCOUNTER
"Requested Prescriptions   Pending Prescriptions Disp Refills     vitamin B-12 (CYANOCOBALAMIN) 1000 MCG/ML injection [Pharmacy Med Name: CYANOCOBALAMIN 1000MCG/ML SOLN] 1 mL 11    Last Written Prescription Date:  8/29/17  Last Fill Quantity: 3ml,  # refills: 2   Last office visit: 12/5/2018 with prescribing provider:     Future Office Visit:   Next 5 appointments (look out 90 days)    Jan 09, 2019 11:00 AM CST  Return Visit with Martina Andrade MD  [facility] ([facility]) [address] [place] [id number]  [phone]   Mar 12, 2019 12:30 PM CDT  Return Visit with Garry Dominguez MD  [facility]) [address] [place] [id number]  [phone]          Sig: INJECT 1ML INTRAMUSCULARLY EVERY 30 DAYS    Vitamin Supplements (Adult) Protocol Passed - 12/26/2018  1:28 PM       Passed - High dose Vitamin D not ordered       Passed - Recent (12 mo) or future (30 days) visit within the authorizing provider's specialty    Patient had office visit in the last 12 months or has a visit in the next 30 days with authorizing provider or within the authorizing provider's specialty.  See \"Patient Info\" tab in inbasket, or \"Choose Columns\" in Meds & Orders section of the refill encounter.            Prescription approved per [facility] Refill Protocol.  Mariela Srinivasan RN    "

## 2019-01-09 ENCOUNTER — OFFICE VISIT (OUTPATIENT)
Dept: OPHTHALMOLOGY | Facility: CLINIC | Age: 59
End: 2019-01-09
Payer: COMMERCIAL

## 2019-01-09 DIAGNOSIS — H02.66 XANTHELASMA OF LEFT EYELID: Primary | ICD-10-CM

## 2019-01-09 DIAGNOSIS — H02.831 DERMATOCHALASIS OF BOTH UPPER EYELIDS: ICD-10-CM

## 2019-01-09 DIAGNOSIS — H02.834 DERMATOCHALASIS OF BOTH UPPER EYELIDS: ICD-10-CM

## 2019-01-09 DIAGNOSIS — H02.403 INVOLUTIONAL PTOSIS, ACQUIRED, BILATERAL: ICD-10-CM

## 2019-01-09 PROCEDURE — 99213 OFFICE O/P EST LOW 20 MIN: CPT | Performed by: OPHTHALMOLOGY

## 2019-01-09 ASSESSMENT — VISUAL ACUITY
CORRECTION_TYPE: GLASSES
OD_CC: 20/15
METHOD: SNELLEN - LINEAR
OS_CC: 20/15

## 2019-01-09 ASSESSMENT — CONF VISUAL FIELD
OS_NORMAL: 1
OD_NORMAL: 1

## 2019-01-09 NOTE — PROGRESS NOTES
Chief Complaint(s) and History of Present Illness(es)     Post Op (Ophthalmology) Both Eyes     Laterality: both eyes    Onset: 2 months ago    Course: resolved    Treatments tried: no treatments    Response to treatment: issue resolved    Pain scale: 0/10    Comments: Bilateral upper eyelid Blepharoplasty and ptosis repair 9/2018      Notices improvement in peripheral vision, and glad xanthelasma is gone. She is noticing a new area of xanthelasma left lower lid laterally - not near where her previous ones were excised.       Assessment & Plan     Nikki Morales is a 58 year old female with the following diagnoses:   1. Xanthelasma of left eyelid    2. Dermatochalasis of both upper eyelids    3. Involutional ptosis, acquired, bilateral       Looks great, healed well  Recommend not using visine any more, switch to regular artificial tears  Use warm compresses   F/u as needed with me         Attending Physician Attestation:  Complete documentation of historical and exam elements from today's encounter can be found in the full encounter summary report (not reduplicated in this progress note).  I personally obtained the chief complaint(s) and history of present illness.  I confirmed and edited as necessary the review of systems, past medical/surgical history, family history, social history, and examination findings as documented by others; and I examined the patient myself.  I personally reviewed the relevant tests, images, and reports as documented above.  I formulated and edited as necessary the assessment and plan and discussed the findings and management plan with the patient and family. - Martina Andrade MD

## 2019-01-09 NOTE — NURSING NOTE
Patient presents with:  Post Op (Ophthalmology) Both Eyes: Bilateral upper eyelid Blepharoplasty and ptosis repair 9/2018      Referring Provider:  No referring provider defined for this encounter.            Sofia EVANS. COA. [facility]

## 2019-03-13 DIAGNOSIS — G93.32 CHRONIC FATIGUE SYNDROME: ICD-10-CM

## 2019-03-13 RX ORDER — ZOLPIDEM TARTRATE 5 MG/1
TABLET ORAL
Qty: 60 TABLET | Refills: 5 | Status: SHIPPED | OUTPATIENT
Start: 2019-03-13 | End: 2019-09-27

## 2019-03-13 NOTE — TELEPHONE ENCOUNTER
Zolpidem 5 MG       Last Written Prescription Date:  8/3/18  Last Fill Quantity: 60,   # refills: 5  Last Office Visit: 12/5/18  Future Office visit:       Routing refill request to provider for review/approval because:  Drug not on the [facility], [facility] or [facility] refill protocol or controlled substance

## 2019-04-01 ENCOUNTER — OFFICE VISIT (OUTPATIENT)
Dept: ORTHOPEDICS | Facility: CLINIC | Age: 59
End: 2019-04-01
Payer: COMMERCIAL

## 2019-04-01 VITALS
OXYGEN SATURATION: 94 % | WEIGHT: 198 LBS | SYSTOLIC BLOOD PRESSURE: 133 MMHG | BODY MASS INDEX: 30.01 KG/M2 | DIASTOLIC BLOOD PRESSURE: 87 MMHG | RESPIRATION RATE: 13 BRPM | HEART RATE: 73 BPM | HEIGHT: 68 IN

## 2019-04-01 DIAGNOSIS — M75.42 IMPINGEMENT SYNDROME OF LEFT SHOULDER: ICD-10-CM

## 2019-04-01 PROCEDURE — 99203 OFFICE O/P NEW LOW 30 MIN: CPT | Mod: 25 | Performed by: ORTHOPAEDIC SURGERY

## 2019-04-01 PROCEDURE — 20610 DRAIN/INJ JOINT/BURSA W/O US: CPT | Mod: LT | Performed by: ORTHOPAEDIC SURGERY

## 2019-04-01 RX ORDER — LIDOCAINE HYDROCHLORIDE 10 MG/ML
5 INJECTION, SOLUTION INFILTRATION; PERINEURAL
Status: DISCONTINUED | OUTPATIENT
Start: 2019-04-01 | End: 2019-04-29

## 2019-04-01 RX ORDER — TRIAMCINOLONE ACETONIDE 40 MG/ML
40 INJECTION, SUSPENSION INTRA-ARTICULAR; INTRAMUSCULAR
Status: DISCONTINUED | OUTPATIENT
Start: 2019-04-01 | End: 2019-04-29

## 2019-04-01 RX ADMIN — LIDOCAINE HYDROCHLORIDE 5 ML: 10 INJECTION, SOLUTION INFILTRATION; PERINEURAL at 11:19

## 2019-04-01 RX ADMIN — TRIAMCINOLONE ACETONIDE 40 MG: 40 INJECTION, SUSPENSION INTRA-ARTICULAR; INTRAMUSCULAR at 11:19

## 2019-04-01 ASSESSMENT — MIFFLIN-ST. JEOR: SCORE: 1511.62
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
## 2019-04-01 NOTE — PATIENT INSTRUCTIONS
Get elastic tubing.  External rotation.  Internal rotation.  Lift to 90 degrees to the side and front with weight. (Scaption)  Shoulder shrug and roll with weights.  Seated row, pull shoulder blades together.  (Horizontal abduction).  Pushup.                                     Rotator Cuff Injury   What is a rotator cuff injury?   A rotator cuff injury is a strain or tear in the group of tendons and muscles that hold your shoulder joint together and help move your shoulder.   How does it occur?   A rotator cuff injury may result from:     using your arm to break a fall     falling onto your arm     lifting a heavy object     use of your shoulder in sports with a repetitive overhead movement, such as swimming, baseball (mainly pitchers), football, and tennis, which gradually strains the tendon     manual labor such as painting, plastering, raking leaves, or housework   What are the symptoms?   The symptoms of a torn rotator cuff are:     arm and shoulder pain     shoulder weakness     shoulder tenderness     loss of shoulder movement, especially overhead   How is it diagnosed?   Your healthcare provider will examine you and check your shoulder for pain, tenderness, and loss of motion as you move your arm in all directions. Your provider will ask if your shoulder pain began suddenly or gradually. You may have an X-ray to make sure there are not any fractures or bone spurs.   Based on these results, you may have other tests or procedures right away or later, such as:     magnetic resonance imaging (MRI), which creates images of your shoulder and surrounding structures with sound waves     an arthrogram, which is an X-ray or MRI that is taken after a special dye has been injected into your shoulder joint to outline its soft structures     arthroscopy, a surgical procedure in which a small instrument is inserted into your shoulder joint so your provider can look directly at your rotator cuff   What is the treatment?   A  tendon in your shoulder can be inflamed, partially torn, or completely torn. What is done about it depends on how torn it is and how much it hurts.   If your tear is a minor one, it can be left to heal by itself if it does not interfere with your everyday activities. Your treatment plan should include:     proper sitting posture, in which your head and shoulders are balanced     rest for your shoulder, which means avoiding strenuous activity or any overhead motion that causes pain     ice packs at least once a day, and preferably 2 or 3 times a day     doing the exercises your healthcare provider gives you     anti-inflammatory drugs. Adults aged 65 years and older should not take non-steroidal anti-inflammatory medicine for more than 7 days without their healthcare provider's approval.     physical therapy to strengthen your shoulder as it heals   If you have a bad tear, you may need to have it repaired by arthroscopy. Arthroscopy can be used to perform surgery on a joint as well as to see inside the joint. The rough edges of a torn tendon can be trimmed and left to heal. Larger tears can be stitched back together. After surgery, your treatment plan will include physical therapy to strengthen your shoulder as it heals.   How long will the effects last?   Full recovery depends on what is torn and how it is treated.   When can I return to my normal activities?   Everyone recovers from an injury at a different rate. Return to your activities will be determined by how soon your shoulder recovers, not by how many days or weeks it has been since your injury has occurred. In general, the longer you have symptoms before you start treatment, the longer it will take to get better. The goal of rehabilitation is to return you to your normal activities as soon as is safely possible. If you return too soon you may worsen your injury.   You may safely return to your normal activities when:     Your injured shoulder has full range  of motion without pain.     Your injured shoulder has regained normal strength compared to the uninjured shoulder.   What can be done to help prevent this from recurring?   The best way to prevent a recurrence is to strengthen your shoulder muscles and keep them in peak condition with shoulder exercises.           Rotator Cuff Strain Rehabilitation Exercises                You may do all of these exercises right away.   Isometric shoulder external rotation:  a doorway with your elbow bent 90 degrees and the back of the wrist on your injured side pressed against the door frame. Try to press your hand outward into the door frame. Hold for 5 seconds. Do 3 sets of 10.   Isometric shoulder internal rotation:  a doorway with your elbow bent 90 degrees and the front of the wrist on your injured side pressed against the door frame. Try to press your palm into the door frame. Hold for 5 seconds. Do 3 sets of 10.   Wand exercise: Flexion: Stand upright and hold a stick in both hands, palms down. Stretch your arms by lifting them over your head, keeping your arms straight. Hold for 5 seconds and return to the starting position. Repeat 10 times.   Wand exercise: Extension: Stand upright and hold a stick in both hands behind your back. Move the stick away from your back. Hold the end position for 5 seconds. Relax and return to the starting position. Repeat 10 times.   Wand exercise: External rotation: Lie on your back and hold a stick in both hands, palms up. Your upper arms should be resting on the floor with your elbows at your sides and bent 90 degrees. Use your uninjured arm to push your injured arm out away from your body. Keep the elbow of your injured arm at your side while it is being pushed. Hold the stretch for 5 seconds. Repeat 10 times.   Wand exercise: Shoulder abduction and adduction: Stand and hold a stick with both hands, palms facing away from your body. Rest the stick against the front of your  thighs. Use your uninjured arm to push your injured arm out to the side and up as high as possible. Keep your arms straight. Hold for 5 seconds. Repeat 10 times.   Resisted shoulder external rotation: Stand sideways next to a door with your injured arm farther from the door. Tie a knot in the end of the tubing and shut the knot in the door at waist level. Hold the other end of the tubing with the hand of your injured arm. Rest the hand of your injured arm across your stomach. Keeping your elbow in at your side, rotate your arm outward and away from your waist. Make sure you keep your elbow bent 90 degrees and your forearm parallel to the floor. Repeat 10 times. Build up to 3 sets of 10.   Resisted shoulder internal rotation: Stand sideways next to a door with your injured arm closest to the door. Tie a knot in the end of the tubing and shut the knot in the door at waist level. Hold the other end of the tubing with the hand of your injured arm. Bend the elbow of your injured arm 90 degrees. Keeping your elbow in at your side, rotate your forearm across your body and then back to the starting position. Make sure you keep your forearm parallel to the floor. Do 3 sets of 10.   Scaption: Stand with your arms at your sides and with your elbows straight. Slowly raise your arms to eye level. As you raise your arms, spread them apart so that they are only slightly in front of your body (at about a 30-degree angle to the front of your body). Point your thumbs toward the ceiling. Hold for 2 seconds and lower your arms slowly. Do 3 sets of 10. Progress to holding a soup can or light weight when you are doing the exercise and increase the weight as the exercise gets easier.   Side-lying external rotation: Lie on your uninjured side with your injured arm at your side and your elbow bent 90 degrees. Keeping your elbow against your side, raise your forearm toward the ceiling and hold for 2 seconds. Slowly lower your arm. Do 3 sets  "of 10. You can start doing this exercise holding a soup can or light weight and gradually increase the weight as long as there is no pain.   Horizontal abduction: Lie on your stomach on a table or the edge of a bed with the arm on your injured side hanging down over the edge. Raise your arm out to the side, with your thumb pointed toward the ceiling, until your arm is parallel to the floor. Hold for 2 seconds and then lower it slowly. Start this exercise with no weight. As you get stronger, add a light weight or hold a soup can. Do 3 sets of 10.   Push-up with a plus: Begin on the floor on your hands and knees. Keep your arms a shoulder width apart and lift your feet off the floor. Arch your back as high as possible and round your shoulders (this is the \"plus\" part or the exercise). Bend your elbows and lower your body to the floor. Return to the starting position and arch your back again. Do 3 sets of 10.   Published by [employer].  This content is reviewed periodically and is subject to change as new health information becomes available. The information is intended to inform and educate and is not a replacement for medical evaluation, advice, diagnosis or treatment by a healthcare professional.   Written by Sofia Vanegas, MS, PT, and Prisca Scott PT, [facility], [facility] [place], for [employer].   ? 2010 [employer] and/or its affiliates. All Rights Reserved.   Copyright   [employer] 2011  Adult Health Advisor                  "
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
## 2019-04-24 ENCOUNTER — TELEPHONE (OUTPATIENT)
Dept: FAMILY MEDICINE | Facility: CLINIC | Age: 59
End: 2019-04-24

## 2019-04-24 NOTE — TELEPHONE ENCOUNTER
I have contacted pt and scheduled her for 1:15 on 4/29/2019. Liliane Wood CMA ([facility])

## 2019-04-24 NOTE — TELEPHONE ENCOUNTER
Reason for Call:  Same Day Appointment, Requested Provider:  Erik Peraza M.D.    PCP: Erik Peraza    Reason for visit: headaches- They happen right before an orgasm and can last up to 24 hours.      Duration of symptoms: 1 month     Have you been treated for this in the past? Yes    Additional comments: Pt has been seeing chiro. They suggest that she see you due to her hx of stroke. Next available is June 10th. Can you work her in before then? Please call.    Can we leave a detailed message on this number? YES    Phone number patient can be reached at: [phone]    Best Time: any     Call taken on 4/24/2019 at 3:13 PM by Tamara Espinoza

## 2019-04-29 ENCOUNTER — OFFICE VISIT (OUTPATIENT)
Dept: FAMILY MEDICINE | Facility: CLINIC | Age: 59
End: 2019-04-29
Payer: COMMERCIAL

## 2019-04-29 VITALS
TEMPERATURE: 96.8 F | WEIGHT: 199.9 LBS | DIASTOLIC BLOOD PRESSURE: 68 MMHG | BODY MASS INDEX: 30.39 KG/M2 | SYSTOLIC BLOOD PRESSURE: 112 MMHG | RESPIRATION RATE: 16 BRPM | HEART RATE: 88 BPM | OXYGEN SATURATION: 92 %

## 2019-04-29 DIAGNOSIS — G44.82 PRIMARY HEADACHE ASSOCIATED WITH SEXUAL ACTIVITY: Primary | ICD-10-CM

## 2019-04-29 DIAGNOSIS — F41.1 GAD (GENERALIZED ANXIETY DISORDER): ICD-10-CM

## 2019-04-29 PROCEDURE — 99214 OFFICE O/P EST MOD 30 MIN: CPT | Performed by: FAMILY MEDICINE

## 2019-04-29 RX ORDER — LORAZEPAM 1 MG/1
1 TABLET ORAL 2 TIMES DAILY PRN
Qty: 20 TABLET | Refills: 1 | Status: SHIPPED | OUTPATIENT
Start: 2019-04-29 | End: 2019-09-05

## 2019-04-29 RX ORDER — INDOMETHACIN 50 MG/1
50 CAPSULE ORAL
Qty: 90 CAPSULE | Refills: 3 | Status: SHIPPED | OUTPATIENT
Start: 2019-04-29 | End: 2019-07-17

## 2019-04-29 ASSESSMENT — PATIENT HEALTH QUESTIONNAIRE - PHQ9: SUM OF ALL RESPONSES TO PHQ QUESTIONS 1-9: 10

## 2019-04-29 ASSESSMENT — PAIN SCALES - GENERAL: PAINLEVEL: MILD PAIN (2)

## 2019-04-29 NOTE — PATIENT INSTRUCTIONS
Take indomethacin twice daily to start.  This is a small but not tiny dose.  Plan is for this to prevent headache but I may suggest taking the third dose during foreplay the next few times.   Follow up in June and anydtime with my chart.

## 2019-04-29 NOTE — PROGRESS NOTES
SUBJECTIVE:   Nikki Morales is a 59 year old female who presents to clinic today for the following   health issues:      Chief Complaint   Patient presents with     Headache     gets a headache during sex       Patient does have history of auras with migraines.  Current headache will developed during foreplay to orgasm and often stop all sexual interaction before orgasm.  Seems to start at the back of her head and move forward and it gets more intense to close her she approaches orgasm.  This is been going on for the last couple weeks.  She has no other neurologic symptoms when this happened.  She has a strong family history of strokes.  She wants to make sure that we are on top of this.    The patient's alcoholic mid 30-year-old son has basically moved out.  This is reduced tension in the house but now she and her  are working on their relationship.  At times she gets very frustrated with him.  He was given some lorazepam, when she gets especially worked up angry and frustrated and starts running what she calls a loop in her head and cannot stop thinking about everything, she is used part of a tablet of his and this calms her and helps her relax.  She is aware many of the females in her family were on benzodiazepines with success for many years.    Additional history: as documented    Reviewed  and updated as needed this visit by clinical staff         Reviewed and updated as needed this visit by Provider         BP Readings from Last 3 Encounters:   04/29/19 112/68   04/01/19 133/87   12/11/18 132/81    Wt Readings from Last 3 Encounters:   04/29/19 90.7 kg (199 lb 14.4 oz)   04/01/19 89.8 kg (198 lb)   12/11/18 90.1 kg (198 lb 11.2 oz)                  Labs reviewed in EPIC    ROS:  Constitutional, HEENT, cardiovascular, pulmonary, gi and gu systems are negative, except as otherwise noted.    OBJECTIVE:     /68   Pulse 88   Temp 96.8  F (36  C) (Temporal)   Resp 16   Wt 90.7 kg (199 lb  "14.4 oz)   SpO2 92%   BMI 30.39 kg/m    Body mass index is 30.39 kg/m .  GENERAL: healthy, alert and no distress  EYES: Eyes grossly normal to inspection, fundi benign-no diabetic or hypertensive changes seen and nystagmus nystagmus absent  HENT: normal cephalic/atraumatic, ear canals and TM's normal, nose and mouth without ulcers or lesions, oropharynx clear and oral mucous membranes moist  NECK: no adenopathy, no asymmetry, masses, or scars and thyroid normal to palpation  RESP: lungs clear to auscultation - no rales, rhonchi or wheezes  CV: regular rate and rhythm, normal S1 S2, no S3 or S4, no murmur, click or rub, no peripheral edema and peripheral pulses strong  MS: no gross musculoskeletal defects noted, no edema  NEURO: Normal strength and tone, mentation intact and speech normal    Diagnostic Test Results:  none     ASSESSMENT/PLAN:           Tobacco Cessation:   reports that she has quit smoking. Her smoking use included cigarettes. She has a 30.00 pack-year smoking history. She has never used smokeless tobacco.      BMI:   Estimated body mass index is 30.39 kg/m  as calculated from the following:    Height as of 4/1/19: 1.727 m (5' 8\").    Weight as of this encounter: 90.7 kg (199 lb 14.4 oz).   Weight management plan: Discussed healthy diet and exercise guidelines      1. Primary headache associated with sexual activity  We discussed headaches associate with sexual activity and that this seems to be that alone.  However aneurysms cannot be totally ruled out.  Other vascular problems cannot be ruled out.  At this point I am starting indomethacin as a preventive agent.  She will be on 2 daily but can take a third when sexual activity begins.  Propranolol would also be considered or 1 of the triptan's.  In order to evaluate brain and vascular function MR I and MRA are to be done.  - indomethacin (INDOCIN) 50 MG capsule; Take 1 capsule (50 mg) by mouth 3 times daily (with meals)  Dispense: 90 capsule; " Refill: 3  - MR Brain w/o Contrast; Future  - MRA Brain ([place]) wo Contrast; Future    2. KAILEE (generalized anxiety disorder)  Of note her long enough that I know she will not abuse benzodiazepine.  If this will help with relationship issues, 20 tablets are ordered and we will see how she does.  - LORazepam (ATIVAN) 1 MG tablet; Take 1 tablet (1 mg) by mouth 2 times daily as needed for anxiety  Dispense: 20 tablet; Refill: 1    Patient Instructions   Take indomethacin twice daily to start.  This is a small but not tiny dose.  Plan is for this to prevent headache but I may suggest taking the third dose during foreplay the next few times.   Follow up in June and anydtime with my chart.     Time spent with greater than 50% spent in discussion, making the plan, or filling out paperwork with patient for illness/treatments was 25 minutes or more.  Part of this was discussing the fear of strokes and in any rhythm especially with headaches with intercourse.  She does understand that this is a real possibility and will keep us informed if something changes or she has neurologic symptoms before MRA/MRI    Erik Peraza MD  [facility]

## 2019-04-30 ASSESSMENT — ASTHMA QUESTIONNAIRES: ACT_TOTALSCORE: 23

## 2019-05-01 ENCOUNTER — HOSPITAL ENCOUNTER (OUTPATIENT)
Dept: MRI IMAGING | Facility: CLINIC | Age: 59
End: 2019-05-01
Attending: FAMILY MEDICINE
Payer: COMMERCIAL

## 2019-05-01 ENCOUNTER — MYC MEDICAL ADVICE (OUTPATIENT)
Dept: FAMILY MEDICINE | Facility: CLINIC | Age: 59
End: 2019-05-01

## 2019-05-01 ENCOUNTER — HOSPITAL ENCOUNTER (OUTPATIENT)
Dept: MRI IMAGING | Facility: CLINIC | Age: 59
Discharge: HOME | End: 2019-05-01
Attending: FAMILY MEDICINE | Admitting: FAMILY MEDICINE
Payer: COMMERCIAL

## 2019-05-01 DIAGNOSIS — G44.82 HEADACHE ASSOCIATED WITH SEXUAL ACTIVITY: ICD-10-CM

## 2019-05-01 DIAGNOSIS — G44.82 PRIMARY HEADACHE ASSOCIATED WITH SEXUAL ACTIVITY: ICD-10-CM

## 2019-05-01 PROCEDURE — 25500064 ZZH RX 255 OP 636: Performed by: RADIOLOGY

## 2019-05-01 PROCEDURE — A9585 GADOBUTROL INJECTION: HCPCS | Performed by: RADIOLOGY

## 2019-05-01 PROCEDURE — 70553 MRI BRAIN STEM W/O & W/DYE: CPT

## 2019-05-01 PROCEDURE — 70544 MR ANGIOGRAPHY HEAD W/O DYE: CPT

## 2019-05-01 RX ORDER — GADOBUTROL 604.72 MG/ML
10 INJECTION INTRAVENOUS ONCE
Status: COMPLETED | OUTPATIENT
Start: 2019-05-01 | End: 2019-05-01

## 2019-05-01 RX ADMIN — GADOBUTROL 10 ML: 604.72 INJECTION INTRAVENOUS at 14:08

## 2019-05-01 NOTE — RESULT ENCOUNTER NOTE
Normal angiogram of head which seems to preclude aneurysm or other major vascular problem to account for sex-induced headaches

## 2019-05-01 NOTE — RESULT ENCOUNTER NOTE
No structural problems found which would account for her sex induced headaches.  Plan was defined in the office and she will continue current plan.

## 2019-05-02 NOTE — TELEPHONE ENCOUNTER
Patient has some questions and confusion on testing that was completed. Was questioning testing on carotid arteries. Please advise. Jelena Mon LPN

## 2019-05-16 PROBLEM — G44.82 HEADACHE ASSOCIATED WITH SEXUAL ACTIVITY: Status: ACTIVE | Noted: 2019-05-16

## 2019-05-16 NOTE — TELEPHONE ENCOUNTER
This was the note I sent patient about testing for her headache in [facility] when she had questions about an MRA. [facility] sent it back with message it was never viewed.   Please check with patient and give her the information if she really did not read it. Determine if she has any concerns. A follow up is scheduled and date is ok if she is doing well.    Erik Peraza MD To  Margarita JOSHI Andrew Sent  5/2/2019  2:23 PM   I ordered the exam for the blood vessels associated most strongly with headaches occurring during sex. These are the ones near the back of the brain. These are supplied mostly by  the posterior cerebral arteries in the neck and associated with the [place] at the base of the brain. Your headaches come from this area and are migraine as we disussed.  This [place] is also supplied by the carotids. From this [place] the brain receives its blood supply. I know people who only had one of these  4 vessels open live for years with no effects because the open one supplied enough blood to feed the connection for all the brain. The examination checks carotids and cerebral arteries as they arrive at the [place] and all vessels after. When your neck is extended, POSSIBLY the cerebral arteries compress but the carotids continue to supply the brain. The [place] is deep in the skull and not affected by any neck position. Mostly the medical community thinks the headache is due to   chemicals released from the brain, near the vessels which leads to headache. Once I know all vessels are open and there are no aneurysms, stroke risk is greatly reduced. This exam tell me this.  We could do an ultrasound of the carotids but that would be unnecessary because this test already tells me blood is well supplied. I have included the impression at the end of this but I hope this response answers your question and provides some reassurance for you. Also if it was purely a matter compression, the headache should  improve greatly with return to normal position. Finally with all the questions I asked, there should be some other issues including loss of vision, coordination problem, speech problems, other very obvious symptoms if it were only a matter of compression. You had negative response to all of these questions.     FINDINGS: The bilateral distal internal carotid, basilar, bilateral   anterior cerebral, bilateral middle cerebral and bilateral posterior   cerebral arteries are patent and unremarkable with no evidence for   cerebral artery stenosis or aneurysm. The anterior communicating   artery is patent and unremarkable.      let me now if you still have questions. Erik Peraza MD

## 2019-05-16 NOTE — TELEPHONE ENCOUNTER
"RN called the patient to inform her of the results of her MRA of her brain. Dr. Peraza had wondering if she had read his My Chart message to her?. \" I did, but I still would feel better if he would check my carotid arteries out. That would make me feel way better about things.  I would really appreciate it. [phone] (home)     RN will forward chart to Dr. Peraza so he can place orders and notify Margarita of appt. .................YRN Brown          "

## 2019-05-16 NOTE — TELEPHONE ENCOUNTER
Left message for patient to return call to clinic.  Order placed for carotid US. Please assist patient in scheduling when she returns call  Karina Dickinson CMA

## 2019-07-17 ENCOUNTER — OFFICE VISIT (OUTPATIENT)
Dept: FAMILY MEDICINE | Facility: CLINIC | Age: 59
End: 2019-07-17
Payer: COMMERCIAL

## 2019-07-17 VITALS
TEMPERATURE: 96.4 F | BODY MASS INDEX: 30.61 KG/M2 | OXYGEN SATURATION: 97 % | HEART RATE: 86 BPM | SYSTOLIC BLOOD PRESSURE: 118 MMHG | DIASTOLIC BLOOD PRESSURE: 70 MMHG | WEIGHT: 201.3 LBS | RESPIRATION RATE: 16 BRPM

## 2019-07-17 DIAGNOSIS — G93.32 CHRONIC FATIGUE SYNDROME: ICD-10-CM

## 2019-07-17 DIAGNOSIS — F31.60 MIXED BIPOLAR I DISORDER (H): ICD-10-CM

## 2019-07-17 DIAGNOSIS — F33.1 MODERATE RECURRENT MAJOR DEPRESSION (H): ICD-10-CM

## 2019-07-17 DIAGNOSIS — G44.82 HEADACHE ASSOCIATED WITH SEXUAL ACTIVITY: Primary | ICD-10-CM

## 2019-07-17 DIAGNOSIS — I48.91 NEW ONSET ATRIAL FIBRILLATION (H): ICD-10-CM

## 2019-07-17 DIAGNOSIS — E03.4 HYPOTHYROIDISM DUE TO ACQUIRED ATROPHY OF THYROID: ICD-10-CM

## 2019-07-17 DIAGNOSIS — I48.0 INTERMITTENT ATRIAL FIBRILLATION (H): ICD-10-CM

## 2019-07-17 PROCEDURE — 99214 OFFICE O/P EST MOD 30 MIN: CPT | Performed by: FAMILY MEDICINE

## 2019-07-17 ASSESSMENT — PAIN SCALES - GENERAL: PAINLEVEL: NO PAIN (0)

## 2019-07-17 ASSESSMENT — PATIENT HEALTH QUESTIONNAIRE - PHQ9: SUM OF ALL RESPONSES TO PHQ QUESTIONS 1-9: 6

## 2019-07-17 NOTE — PROGRESS NOTES
Nikki Morales is a 59 year old female who presents to clinic today for RECHECK (headaches after sexual activity; doing better)   She is here for recheck of headache    Patient was having instant headaches with sexual orgasm.  Since last being here they have diminished greatly in nature although she remains sexually active.  She attributes this some to her treatment, some to craniosacral therapy, some chiropractic therapy of her neck.  She did not have the vascular study done after MRA of brain or other vascular study of the brain was negative.  Headaches improved.  She has no longer on Indocin.  She remains under some stress at home because of behavior of her .  He is working to a number of childhood issues after being raised in an alcoholic home.  He apparently is often deceitful and cannot tell the truth.  1 of her frustrations is her  has resumed smoking.  He is not exactly forthright about this.  She had been hoping the $400/month spent on tobacco could be used for something more fun or entertaining.  This bothers her greatly.  She states she was a child who when she came home past curfew would automatically tell her parents everything she had been doing and why she was late.  She cannot understand why other people cannot be is forthright and honest.  Otherwise physically doing reasonably well.  Occasional use of benzodiazepine helps calm her if things become out of control.     She is well known to me.    Past medical, surgical, social histories were reviewed and updated.  Social History     Tobacco Use     Smoking status: Former Smoker     Packs/day: 1.00     Years: 30.00     Pack years: 30.00     Types: Cigarettes     Smokeless tobacco: Never Used   Substance Use Topics     Alcohol use: Yes     Alcohol/week: 2.4 oz     Types: 2 Cans of beer, 2 Standard drinks or equivalent per week     Comment: social     Current Outpatient Medications   Medication Sig     budesonide (PULMICORT) 0.5  MG/2ML neb solution Mix 2 vials with 1 oz coffee flavoring and drink twice a day and rinse mouth aftrerwards     calcium carbonate (TUMS) 500 MG chewable tablet Take 2 chew tab by mouth 3 times daily as needed for other (bloating/flatulence)     estradiol (VIVELLE-DOT) 0.05 MG/24HR patch Place 1 patch onto the skin twice a week     fluticasone (FLOVENT HFA) 220 MCG/ACT Inhaler Inhale 2 puffs into the lungs 2 times daily as needed (for bronchitis)     levothyroxine (SYNTHROID/LEVOTHROID) 100 MCG tablet Take 1 tablet (100 mcg) by mouth daily     LORazepam (ATIVAN) 1 MG tablet Take 1 tablet (1 mg) by mouth 2 times daily as needed for anxiety     methylphenidate (RITALIN) 10 MG tablet Take 10 mg by mouth daily as needed (for chronic fatigue syndrome)      multivitamin, therapeutic with minerals (MULTI-VITAMIN) TABS Take 1 tablet by mouth daily     pantoprazole (PROTONIX) 40 MG EC tablet Take 1 tablet (40 mg) by mouth 2 times daily (before meals)     trospium (SANCTURA) 20 MG tablet TAKE 1-2 TABLETS BY MOUTH DAILY AS NEEDED.     vitamin B-12 (CYANOCOBALAMIN) 1000 MCG/ML injection INJECT 1ML INTRAMUSCULARLY EVERY 30 DAYS     zolpidem (AMBIEN) 5 MG tablet 1 to 2 tabs at hour of sleep as needed     aspirin 81 MG tablet Take 1 tablet (81 mg) by mouth daily (Patient not taking: Reported on 4/29/2019)     benzonatate (TESSALON) 200 MG capsule Take 1 capsule (200 mg) by mouth 3 times daily as needed for cough (Patient not taking: Reported on 4/29/2019)     indomethacin (INDOCIN) 50 MG capsule Take 1 capsule (50 mg) by mouth 3 times daily (with meals) (Patient not taking: Reported on 7/17/2019)     rosuvastatin (CRESTOR) 10 MG tablet Take 1 tablet (10 mg) by mouth daily     No current facility-administered medications for this visit.        Allergies   Allergen Reactions     Codeine Other (See Comments)     Causes agitation       Patient Active Problem List   Diagnosis     Chronic fatigue syndrome     Other and unspecified disc  disorder of lumbar region     ESOPHAGEAL REFLUX     Female stress incontinence     Herpes simplex virus (HSV) infection     HYPERLIPIDEMIA LDL GOAL <130     Eosinophilic esophagitis     Overweight     Adhesive capsulitis of shoulder     Degenerative arthritis of cervical spine with cord compression     Hypothyroidism due to acquired atrophy of thyroid     Attention deficit hyperactivity disorder, inattentive type     Mild persistent asthma without complication     Intermittent atrial fibrillation (H)     KAILEE (generalized anxiety disorder)     Moderate recurrent major depression (H)     Impingement syndrome of left shoulder     Headache associated with sexual activity       REVIEW OF SYSTEMS:  Constitutional, HEENT, cardiovascular, pulmonary, gi and gu systems are negative, except as otherwise noted.  No heartburn type issues reported today.    EXAM:  /70   Pulse 86   Temp 96.4  F (35.8  C) (Temporal)   Resp 16   Wt 91.3 kg (201 lb 4.8 oz)   SpO2 97%   BMI 30.61 kg/m    GENERAL APPEARANCE: healthy, alert and no distress  HENT: ear canals and TM's normal and nose and mouth without ulcers or lesions  RESP: lungs clear to auscultation - no rales, rhonchi or wheezes  CV: regular rate and rhythm, normal S1 S2, no S3 or S4 and no murmur, click or rub   ABDOMEN: soft, nontender, no HSM or masses and bowel sounds normal  NEURO: Normal strength and tone, sensory exam grossly normal, mentation intact and speech normal  PSYCH: mentation appears normal, affect normal/bright and frustration with her 's apparent but she also describes good management techniques for.    ASSESSMENT:    ICD-10-CM    1. Headache associated with sexual activity G44.82    2. Chronic fatigue syndrome R53.82    3. Mixed bipolar I disorder (H) F31.60    4. New onset atrial fibrillation (H) I48.91    5. Hypothyroidism due to acquired atrophy of thyroid E03.4    6. Moderate recurrent major depression (H) F33.1    7. Intermittent atrial  fibrillation (H) I48.0        PLAN:  Essentially patient does not want any further medical treatments other than natural meth that she is using and current medication.  I am comfortable with this.  She will let us know if she needs more attention to any of her issues    Weight management plan: Discussed healthy diet and exercise guidelines  Current treatment plan is effective, no changes in therapy.  Dicussed general issues around the treatment, evaluation and prevetion  Lab results and schedule of future lab studies reviewed with patient.  Reviewed medications and side effects in detail.  Return to clinic 6 months  Time spent with greater than 50% spent in discussion, making the plan, or filling out paperwork with patient for illness/treatments was a 25 minutes or more.    AVS printed. Electronically signed by Erik Peraza MD

## 2019-07-17 NOTE — PATIENT INSTRUCTIONS
Keep things the same.   Ask for refills of meds   Try freezing wart or use salicylic acid,  Duct tape

## 2019-08-02 ENCOUNTER — OFFICE VISIT (OUTPATIENT)
Dept: URGENT CARE | Facility: RETAIL CLINIC | Age: 59
End: 2019-08-02
Payer: COMMERCIAL

## 2019-08-02 ENCOUNTER — NURSE TRIAGE (OUTPATIENT)
Dept: NURSING | Facility: CLINIC | Age: 59
End: 2019-08-02

## 2019-08-02 VITALS
DIASTOLIC BLOOD PRESSURE: 74 MMHG | OXYGEN SATURATION: 95 % | TEMPERATURE: 97.5 F | SYSTOLIC BLOOD PRESSURE: 151 MMHG | HEART RATE: 70 BPM

## 2019-08-02 DIAGNOSIS — B00.9 HSV (HERPES SIMPLEX VIRUS) INFECTION: ICD-10-CM

## 2019-08-02 PROCEDURE — 99213 OFFICE O/P EST LOW 20 MIN: CPT | Performed by: INTERNAL MEDICINE

## 2019-08-02 RX ORDER — VALACYCLOVIR HYDROCHLORIDE 1 G/1
TABLET, FILM COATED ORAL
Qty: 21 TABLET | Refills: 8 | Status: CANCELLED | OUTPATIENT
Start: 2019-08-02

## 2019-08-02 RX ORDER — VALACYCLOVIR HYDROCHLORIDE 1 G/1
1000 TABLET, FILM COATED ORAL 2 TIMES DAILY
Qty: 20 TABLET | Refills: 0 | Status: SHIPPED | OUTPATIENT
Start: 2019-08-02 | End: 2019-11-15

## 2019-08-02 NOTE — PROGRESS NOTES
[facility] Care Progress Note        Huseyin Schmidt MD, MPH  08/02/2019    Nikki Morales is a pleasant 59 year old female seen for a painful cold sore involving upper lip . No intraoral lesions are referred. There has not been any change in the diet or new detergent, soap or toiletries.  No new medications, no insect bite. No fever or chills and no recent illness. No cough or shortness of breath or wheezing is referred.  No nausea, vomiting or diarrhea.  No arthralgia or myalgia.  No tongue, lip or mouth swelling or swallowing problems are referred.    Physical Exam   BP (!) 151/74   Pulse 70   Temp 97.5  F (36.4  C) (Temporal)   SpO2 95%      Constitutional: Patient is in no distress The patient is pleasant and cooperative.   HEENT: Head:  Head is atraumatic, normocephalic.    Eyes: Pupils are equal, round and reactive to light and accomodation.  Sclera is non-icteric. No conjunctival injection, or exudate noted. Extraocular motion is intact. Visual acuity is intact bilaterally.  Ears:  External acoustic canals are patent and clear.  There is no erythema and bulging( exudate)  of the ( R/L ) tympanic membrane(s ).   Nose:  No Nasal congestion w/o drainage or mucosal ulceration is noted.  Throat:  Oral mucosa is moist.  No oral lesions are noted.  No posterior pharyngeal hyperemia or exudate noted.     Neck Supple.  There is no cervical lymphadenopathy.  No nuchal rigidity noted.  There is no meningismus.     Cardiovascular: Heart is regular to rate and rhythm.  No murmur is noted.     Chest. Chest Symmetrical, no soft tissues, swelling, or tenderness upon palpation   Lungs: Clear in the anterior and posterior pulmonary fields.   Abdomen: Soft and non-tender.    Back No flank tenderness is noted.   Extremeties No edema, no calf tenderness.   Neuro: No focal deficit.   Skin No petechiae or purpura is noted.  There is a solitary vesicular lesion at the mucocutaneous junction ( vermilion  border) of the  upper lip ( mid portion) w/o surrounding cellulitis.There is no pustules, no papules, and no vesicular eruption. No crusty or exudative skin lesions. No ulcerations. No lymphangitis.        Mood Normal         Assessment & Plan        HSV (herpes simplex virus) infection ): upper lip    - valACYclovir (VALTREX) 1000 mg tablet; Take 1 tablet (1,000 mg) by mouth 2 times daily for 10 days  Dispense: 20 tablet; Refill: 0    Follow-up with your PCP in 4-5 days, earlier if symptoms worsen.    Please avoid direct skin contact ( in this particular scenario kissing) with others..  Acetaminophen 650 mg or Ibuprofen 400 mg by mouth every 6 hours as needed.  2. Elevated Blood pressure:  She indicates that she is feeling well and denies any symptoms referable to elevated blood pressure. No chest pain,dyspnea or visual disturbance is referred. She is advised to follow up with Primary Care provider regarding elevated blood pressure.

## 2019-08-02 NOTE — TELEPHONE ENCOUNTER
Nikki calls and says that she feels as if she is getting a cold sore, on her lip, and wants her Valtrex. Pt. Says that she has a standing order for the Valtrex. RN then checked Epic but did not see pt. Taking Valtrex. RN told this to pt. And told pt. That she will need to go to an  clinic, this [facility], if she wants any Valtrex. Pt. Declined triage and voiced understanding.    Reason for Disposition    [1] Follow-up call to recent contact AND [2] information only call, no triage required    Additional Information    Negative: [1] Caller is not with the adult (patient) AND [2] reporting urgent symptoms    Negative: Lab result questions    Negative: Medication questions    Negative: Caller can't be reached by phone    Negative: Caller has already spoken to PCP or another triager    Negative: RN needs further essential information from caller in order to complete triage    Negative: Requesting regular office appointment    Negative: [1] Caller requesting NON-URGENT health information AND [2] PCP's office is the best resource    Negative: Health Information question, no triage required and triager able to answer question    Negative: General information question, no triage required and triager able to answer question    Negative: Question about upcoming scheduled test, no triage required and triager able to answer question    Negative: [1] Caller is not with the adult (patient) AND [2] probable NON-URGENT symptoms    Protocols used: INFORMATION ONLY CALL-A-

## 2019-08-26 ENCOUNTER — TELEPHONE (OUTPATIENT)
Dept: FAMILY MEDICINE | Facility: CLINIC | Age: 59
End: 2019-08-26

## 2019-08-26 DIAGNOSIS — J20.9 ACUTE WHEEZY BRONCHITIS: Primary | ICD-10-CM

## 2019-08-26 RX ORDER — AZITHROMYCIN 250 MG/1
TABLET, FILM COATED ORAL
Qty: 6 TABLET | Refills: 0 | Status: SHIPPED | OUTPATIENT
Start: 2019-08-26 | End: 2019-11-25

## 2019-08-26 NOTE — TELEPHONE ENCOUNTER
Reason for Call:  Medication or medication refill:    Do you use a [facility]?  Name of the pharmacy and phone number for the current request:  [facility] - [phone]    Name of the medication requested: upper raspatory congestion. She has been using the nebulizer, tried cough syrup and has used inhalers. Nothing is working. Patient states that this is something that she deals with every year for the past 25 years. Dr. Peraza is aware of this and that he always sends her a script for this. Would like a script sent again.     Other request:     Can we leave a detailed message on this number? YES    Phone number patient can be reached at: Home number on file [phone] (home)    Best Time: any    Call taken on 8/26/2019 at 2:16 PM by Norma Vasquez CNA

## 2019-08-26 NOTE — TELEPHONE ENCOUNTER
Indeed patient usually has trouble clearing infections due to underlying asthma-like condition.  Antibiotic will be ordered with possible prednisone if she requests this.  She does not like the side effects of prednisone.  Erik Peraza MD

## 2019-09-05 ENCOUNTER — MYC REFILL (OUTPATIENT)
Dept: FAMILY MEDICINE | Facility: CLINIC | Age: 59
End: 2019-09-05

## 2019-09-05 DIAGNOSIS — F41.1 GAD (GENERALIZED ANXIETY DISORDER): ICD-10-CM

## 2019-09-06 RX ORDER — LORAZEPAM 1 MG/1
1 TABLET ORAL 2 TIMES DAILY PRN
Qty: 20 TABLET | Refills: 1 | Status: SHIPPED | OUTPATIENT
Start: 2019-09-06 | End: 2020-02-11

## 2019-09-06 NOTE — TELEPHONE ENCOUNTER
Lorazepam 1 MG       Last Written Prescription Date:  4/29/19  Last Fill Quantity: 20,   # refills: 1  Last Office Visit: 7/17/19  Future Office visit:       Routing refill request to provider for review/approval because:  Drug not on the [facility], P or [facility] refill protocol or controlled substance

## 2019-09-27 DIAGNOSIS — G93.32 CHRONIC FATIGUE SYNDROME: Primary | ICD-10-CM

## 2019-09-27 RX ORDER — ZOLPIDEM TARTRATE 5 MG/1
TABLET ORAL
Qty: 60 TABLET | Refills: 5 | Status: SHIPPED | OUTPATIENT
Start: 2019-09-27 | End: 2020-02-10

## 2019-09-27 NOTE — TELEPHONE ENCOUNTER
Zolpidem        Last Written Prescription Date:  3/13/19  Last Fill Quantity: 60,   # refills: 5  Last Office Visit: 7/17/19  Future Office visit:       Routing refill request to provider for review/approval because:  Drug not on the [facility], [facility] or [facility] refill protocol or controlled substance

## 2019-09-28 ENCOUNTER — HEALTH MAINTENANCE LETTER (OUTPATIENT)
Age: 59
End: 2019-09-28

## 2019-10-26 ENCOUNTER — HEALTH MAINTENANCE LETTER (OUTPATIENT)
Age: 59
End: 2019-10-26

## 2019-11-13 ENCOUNTER — TELEPHONE (OUTPATIENT)
Dept: FAMILY MEDICINE | Facility: CLINIC | Age: 59
End: 2019-11-13

## 2019-11-13 NOTE — TELEPHONE ENCOUNTER
Reason for Call:  Same Day Appointment, Requested Provider:  Erik Peraza MD    PCP: Erik Peraza    Reason for visit: medication check    Duration of symptoms: ongoing    Have you been treated for this in the past?      Additional comments: Patient was offered next available on 12/23/2019 and she does not want to wait that long.  She prefers to be worked in within next week or two.  Please call.    Can we leave a detailed message on this number? YES    Phone number patient can be reached at: Cell number on file:    Telephone Information:   Mobile [phone]       Best Time: any    Call taken on 11/13/2019 at 3:24 PM by Fabio Hunt

## 2019-11-13 NOTE — TELEPHONE ENCOUNTER
Dr. Peraza cannot see pt next week as he is off a couple days and his schedule is full. I have contacted the pt and scheduled an appt for 11/25/2019 at 1:45 pm. Liliane Wood CMA ([facility])

## 2019-11-14 ENCOUNTER — MYC MEDICAL ADVICE (OUTPATIENT)
Dept: FAMILY MEDICINE | Facility: CLINIC | Age: 59
End: 2019-11-14

## 2019-11-14 DIAGNOSIS — B00.9 HSV (HERPES SIMPLEX VIRUS) INFECTION: ICD-10-CM

## 2019-11-15 ENCOUNTER — MYC MEDICAL ADVICE (OUTPATIENT)
Dept: FAMILY MEDICINE | Facility: CLINIC | Age: 59
End: 2019-11-15

## 2019-11-15 RX ORDER — VALACYCLOVIR HYDROCHLORIDE 1 G/1
1000 TABLET, FILM COATED ORAL 2 TIMES DAILY
Qty: 20 TABLET | Refills: 0 | COMMUNITY
Start: 2019-11-15 | End: 2019-12-10

## 2019-11-20 DIAGNOSIS — N39.3 FEMALE STRESS INCONTINENCE: ICD-10-CM

## 2019-11-20 RX ORDER — TROSPIUM CHLORIDE 20 MG/1
TABLET, FILM COATED ORAL
Qty: 40 TABLET | Refills: 0 | Status: SHIPPED | OUTPATIENT
Start: 2019-11-20 | End: 2020-11-10

## 2019-11-20 NOTE — TELEPHONE ENCOUNTER
"sanctura  Last Written Prescription Date:  11/12/2018  Last Fill Quantity: 40,  # refills: 8   Last office visit: 7/17/2019 with prescribing provider:     Future Office Visit:   Next 5 appointments (look out 90 days)    Nov 25, 2019  1:45 PM CST  [facility] Visit with [facility] [address] [place] [id number] [phone]      Prescription approved per [facility] Refill Protocol.   Requested Prescriptions   Pending Prescriptions Disp Refills     trospium (SANCTURA) 20 MG tablet [Pharmacy Med Name: TROSPIUM CHLORIDE 20MG TABS] 40 tablet 8     Sig: TAKE ONE TO TWO TABLETS BY MOUTH EVERY DAY AS NEEDED       Muscarinic Antagonists (Urinary Incontinence Agents) Passed - 11/20/2019  4:54 PM        Passed - Recent (12 mo) or future (30 days) visit within the authorizing provider's specialty     Patient has had an office visit with the authorizing provider or a provider within the authorizing providers department within the previous 12 mos or has a future within next 30 days. See \"Patient Info\" tab in inbasket, or \"Choose Columns\" in Meds & Orders section of the refill encounter.              Passed - Patient does not have a diagnosis of glaucoma on the problem list     If glaucoma diagnosis is new, refer refill to physician.          Passed - Medication is active on med list        Passed - Patient is 18 years of age or older          "

## 2019-11-21 DIAGNOSIS — K20.0 EOSINOPHILIC ESOPHAGITIS: ICD-10-CM

## 2019-11-21 RX ORDER — PANTOPRAZOLE SODIUM 40 MG/1
40 TABLET, DELAYED RELEASE ORAL
Qty: 90 TABLET | Refills: 1 | Status: SHIPPED | OUTPATIENT
Start: 2019-11-21 | End: 2020-11-10

## 2019-11-21 NOTE — TELEPHONE ENCOUNTER
"Prescription approved per RN refill protocol.    protonix  Last Written Prescription Date:  11/13/2018  Last Fill Quantity: 90,  # refills: 1   Last office visit: 7/17/2019 with prescribing provider:  Stu   Future Office Visit:   Next 5 appointments (look out 90 days)    Nov 25, 2019  1:45 PM CST  Office Visit with Erikmyrna [facility] MD  [facility] [address] [place] [id number] [phone]           Requested Prescriptions   Pending Prescriptions Disp Refills     pantoprazole (PROTONIX) 40 MG EC tablet 90 tablet 1     Sig: Take 1 tablet (40 mg) by mouth 2 times daily (before meals)       PPI Protocol Passed - 11/21/2019  9:43 AM        Passed - Not on Clopidogrel (unless Pantoprazole ordered)        Passed - No diagnosis of osteoporosis on record        Passed - Recent (12 mo) or future (30 days) visit within the authorizing provider's specialty     Patient has had an office visit with the authorizing provider or a provider within the authorizing providers department within the previous 12 mos or has a future within next 30 days. See \"Patient Info\" tab in inbasket, or \"Choose Columns\" in Meds & Orders section of the refill encounter.              Passed - Medication is active on med list        Passed - Patient is age 18 or older        Passed - No active pregnacy on record        Passed - No positive pregnancy test in past 12 months        Jamila Dumas RN on 11/21/2019 at 2:14 PM    "

## 2019-11-25 ENCOUNTER — OFFICE VISIT (OUTPATIENT)
Dept: FAMILY MEDICINE | Facility: CLINIC | Age: 59
End: 2019-11-25
Payer: COMMERCIAL

## 2019-11-25 VITALS
SYSTOLIC BLOOD PRESSURE: 132 MMHG | WEIGHT: 201 LBS | HEART RATE: 100 BPM | DIASTOLIC BLOOD PRESSURE: 76 MMHG | TEMPERATURE: 96.5 F | OXYGEN SATURATION: 95 % | RESPIRATION RATE: 16 BRPM | BODY MASS INDEX: 30.56 KG/M2

## 2019-11-25 DIAGNOSIS — F90.0 ATTENTION DEFICIT HYPERACTIVITY DISORDER, INATTENTIVE TYPE: ICD-10-CM

## 2019-11-25 DIAGNOSIS — F41.1 GAD (GENERALIZED ANXIETY DISORDER): ICD-10-CM

## 2019-11-25 DIAGNOSIS — M50.30 DDD (DEGENERATIVE DISC DISEASE), CERVICAL: Primary | ICD-10-CM

## 2019-11-25 PROCEDURE — 99214 OFFICE O/P EST MOD 30 MIN: CPT | Performed by: FAMILY MEDICINE

## 2019-11-25 ASSESSMENT — ANXIETY QUESTIONNAIRES
5. BEING SO RESTLESS THAT IT IS HARD TO SIT STILL: NOT AT ALL
7. FEELING AFRAID AS IF SOMETHING AWFUL MIGHT HAPPEN: SEVERAL DAYS
6. BECOMING EASILY ANNOYED OR IRRITABLE: SEVERAL DAYS
GAD7 TOTAL SCORE: 9
IF YOU CHECKED OFF ANY PROBLEMS ON THIS QUESTIONNAIRE, HOW DIFFICULT HAVE THESE PROBLEMS MADE IT FOR YOU TO DO YOUR WORK, TAKE CARE OF THINGS AT HOME, OR GET ALONG WITH OTHER PEOPLE: SOMEWHAT DIFFICULT
3. WORRYING TOO MUCH ABOUT DIFFERENT THINGS: MORE THAN HALF THE DAYS
2. NOT BEING ABLE TO STOP OR CONTROL WORRYING: MORE THAN HALF THE DAYS
1. FEELING NERVOUS, ANXIOUS, OR ON EDGE: MORE THAN HALF THE DAYS

## 2019-11-25 ASSESSMENT — PAIN SCALES - GENERAL: PAINLEVEL: NO PAIN (0)

## 2019-11-25 ASSESSMENT — PATIENT HEALTH QUESTIONNAIRE - PHQ9
5. POOR APPETITE OR OVEREATING: SEVERAL DAYS
SUM OF ALL RESPONSES TO PHQ QUESTIONS 1-9: 11

## 2019-11-25 NOTE — PROGRESS NOTES
Subjective     Nikki Morales is a 59 year old female who presents to clinic today for the following health issues:    HPI   Depression and Anxiety Follow-Up    How are you doing with your depression since your last visit? No change    How are you doing with your anxiety since your last visit?  No change    Are you having other symptoms that might be associated with depression or anxiety? No    Have you had a significant life event? Job Concerns     Do you have any concerns with your use of alcohol or other drugs? No    Social History     Tobacco Use     Smoking status: Former Smoker     Packs/day: 1.00     Years: 30.00     Pack years: 30.00     Types: Cigarettes     Smokeless tobacco: Never Used   Substance Use Topics     Alcohol use: Yes     Alcohol/week: 4.0 standard drinks     Types: 2 Cans of beer, 2 Standard drinks or equivalent per week     Comment: social     Drug use: No     Comment: used in past any and all     PHQ 11/16/2018 4/29/2019 7/17/2019   PHQ-9 Total Score 13 10 6   Q9: Thoughts of better off dead/self-harm past 2 weeks Several days Not at all Not at all     KAILEE-7 SCORE 11/16/2018   Total Score 13     Last PHQ-9 11/25/2019   1.  Little interest or pleasure in doing things 1   2.  Feeling down, depressed, or hopeless 1   3.  Trouble falling or staying asleep, or sleeping too much 3   4.  Feeling tired or having little energy 1   5.  Poor appetite or overeating 2   6.  Feeling bad about yourself 1   7.  Trouble concentrating 0   8.  Moving slowly or restless 1   Q9: Thoughts of better off dead/self-harm past 2 weeks 1   PHQ-9 Total Score 11   Difficulty at work, home, or with people Somewhat difficult     KAILEE-7  11/25/2019   1. Feeling nervous, anxious, or on edge 2   2. Not being able to stop or control worrying 2   3. Worrying too much about different things 2   4. Trouble relaxing 1   5. Being so restless that it is hard to sit still 0   6. Becoming easily annoyed or irritable 1   7.  Feeling afraid, as if something awful might happen 1   KAILEE-7 Total Score 9   If you checked any problems, how difficult have they made it for you to do your work, take care of things at home, or get along with other people? Somewhat difficult     Not suicidal    Suicide Assessment Five-step Evaluation and Treatment (SAFE-T)      How many servings of fruits and vegetables do you eat daily?  0-1    On average, how many sweetened beverages do you drink each day (Examples: soda, juice, sweet tea, etc.  Do NOT count diet or artificially sweetened beverages)?   0  How many days per week do you miss taking your medication? 3    What makes it hard for you to take your medications?  remembering to take    Bilateral shoulder pain that radiates into both arms. 2 separate episodes-two weeks ago and two days ago    Patient reviewed family records and knows many women managed their life with benzodiazepine(s).  Patient is finding if she uses lorazepam sparingly and intermittently her times of overwhelming anxiety are diminished greatly.  It keeps her brain from racing.  She understands that these medications need to be used sparingly and is fearful that I will cut them off completely.  Wonders if 1 form such as lorazepam is better than another such as alprazolam.  Wonders how diazepam or Valium might fit into this picture.  Currently patient is worried because her  is leaving a job he cannot stand for one that is better for him but puts him in contact with a lot of women away from home.  There is a long-standing issue that he does not understand boundaries.  When he was doing this for a while in the past, will need to be texting him at home and he saw nothing wrong with this and states he will not do anything.  However patient understands that intimacy sometimes develops when one does not expected and therefore is a little more anxious about this change.  She understands she cannot change his behavior.  Also patient  worries about her adult alcoholic son who can stay away from alcohol for.  Of time and then he has issues and he goes back to it.  This son lives with them and is causing some stress.    Few days ago patient was just basically standing and enjoying 1/50 wedding anniversary when she suddenly had a jolt of pain down both arms to the fingertips.  It happened once more and she describes it as intensely uncomfortable.  She has had some issues with pain down her leg before which is much different.  She had a previous MRI of the neck which showed degeneration.  This was some years ago.  She has not had symptoms.  She was active in the 24 hours before this party helping set things up.  Currently she is doing well but she has fears that something more catastrophic may happen.    BP Readings from Last 3 Encounters:   11/25/19 132/76   08/02/19 (!) 151/74   07/17/19 118/70    Wt Readings from Last 3 Encounters:   11/25/19 91.2 kg (201 lb)   07/17/19 91.3 kg (201 lb 4.8 oz)   04/29/19 90.7 kg (199 lb 14.4 oz)                      Reviewed and updated as needed this visit by Provider         Review of Systems   ROS COMP: Constitutional, HEENT, cardiovascular, pulmonary, gi and gu systems are negative, except as otherwise noted.      Objective    /76   Pulse 100   Temp 96.5  F (35.8  C) (Temporal)   Resp 16   Wt 91.2 kg (201 lb)   SpO2 95%   BMI 30.56 kg/m    Body mass index is 30.56 kg/m .  Physical Exam   GENERAL: healthy, alert and no distress  EYES: Eyes grossly normal to inspection, PERRL and conjunctivae and sclerae normal  NECK: no adenopathy, no asymmetry, masses, or scars and thyroid normal to palpation  RESP: lungs clear to auscultation - no rales, rhonchi or wheezes  CV: regular rate and rhythm, normal S1 S2, no S3 or S4, no murmur, click or rub, no peripheral edema and peripheral pulses strong  ABDOMEN: soft, nontender, no hepatosplenomegaly, no masses and bowel sounds normal  MS: extremities normal- no  gross deformities noted and specifically can move her head and neck and her shoulders without causing any further of these episodes of pain.  SKIN: no suspicious lesions or rashes  NEURO: Normal strength and tone, mentation intact and speech normal with good upper extremity strength, nearly absent reflexes upper extremity, sensation intact upper extremity.  Lower extremities unremarkable  PSYCH: mentation appears normal, affect flat, anxious, judgement and insight intact and appearance well groomed    Diagnostic Test Results:  Labs reviewed in Epic  No results found for any visits on 11/25/19.        Assessment & Plan     1. DDD (degenerative disc disease), cervical  I suspect she had some episode of nerve compression on bilateral nerves same level and MRI should be done.  At time of dictation this is confirmed with MRI.  She is not at immediate risk for anything based on this MRI but would benefit from seeing neurosurgical spinal team for advice and treatment options.  She is amenable to this.- MR Cervical Spine w/o Contrast; Future    2. Attention deficit hyperactivity disorder, inattentive type  Continue current medications for attention deficit.    3. KAILEE (generalized anxiety disorder)  We had a discussion about half-life of different benzodiazepines and usefulness.  She currently finds lorazepam helpful and understands that if she uses it daily she would develop possible tolerance.  I do not think she will misuse this and I have reassured her that she may indeed be a person who needs this type of medication although medical community and oral in general sometimes frowns on it.  Based on the fact that her grandmother lived into her 100s on daily Xanax without any risk, this will color my treatment options for this individual.         Regular exercise  Patient Instructions   Have MRI on C spine and I will most probably have you see our spine team.  Once I see the results, we will let you know how the next step  happens.   Use lorazepam for anxiety before MRI  And I will be ok with current use of the above medicine      Return in about 6 months (around 5/25/2020) for mental health.   However we will need more recent follow-up for cervical spine and episode that happened recently.  Encouraged her to continue telling her  about necessary boundaries etc.  She will do the best she can with him and her son.      Erik Peraza MD  [facility]

## 2019-11-25 NOTE — PATIENT INSTRUCTIONS
Have MRI on C spine and I will most probably have you see our spine team.  Once I see the results, we will let you know how the next step happens.   Use lorazepam for anxiety before MRI  And I will be ok with current use of the above medicine

## 2019-11-26 ENCOUNTER — HOSPITAL ENCOUNTER (OUTPATIENT)
Dept: MRI IMAGING | Facility: CLINIC | Age: 59
Discharge: HOME | End: 2019-11-26
Attending: FAMILY MEDICINE | Admitting: FAMILY MEDICINE
Payer: COMMERCIAL

## 2019-11-26 DIAGNOSIS — M50.30 DDD (DEGENERATIVE DISC DISEASE), CERVICAL: ICD-10-CM

## 2019-11-26 PROCEDURE — 72141 MRI NECK SPINE W/O DYE: CPT

## 2019-11-26 ASSESSMENT — ANXIETY QUESTIONNAIRES: GAD7 TOTAL SCORE: 9

## 2019-11-26 ASSESSMENT — ASTHMA QUESTIONNAIRES: ACT_TOTALSCORE: 19

## 2019-11-27 ENCOUNTER — TELEPHONE (OUTPATIENT)
Dept: FAMILY MEDICINE | Facility: CLINIC | Age: 59
End: 2019-11-27

## 2019-11-27 DIAGNOSIS — M50.30 DDD (DEGENERATIVE DISC DISEASE), CERVICAL: Primary | ICD-10-CM

## 2019-11-27 NOTE — RESULT ENCOUNTER NOTE
Notfied via MyChart cervical spine shows C3-C4 and C4-C5 issues of degeneration which would account for her pain down her arm suddenly with a movement.  Neurosurgical team consult will be obtained.

## 2019-11-27 NOTE — TELEPHONE ENCOUNTER
----- Message from Erik Peraza MD sent at 11/27/2019  8:27 AM CST -----  Notfied via [email or web address] cervical spine shows C3-C4 and C4-C5 issues of degeneration which would account for her pain down her arm suddenly with a movement.  Neurosurgical team consult will be obtained.

## 2019-11-27 NOTE — TELEPHONE ENCOUNTER
Left message for Margarita to call back to [phone] to schedule a appointment with Neurosurgeon.    Order placed and pended. Please sign order if appropriate and route back to me..

## 2019-12-10 ENCOUNTER — MYC REFILL (OUTPATIENT)
Dept: FAMILY MEDICINE | Facility: CLINIC | Age: 59
End: 2019-12-10

## 2019-12-10 DIAGNOSIS — B00.9 HSV (HERPES SIMPLEX VIRUS) INFECTION: ICD-10-CM

## 2019-12-10 RX ORDER — VALACYCLOVIR HYDROCHLORIDE 1 G/1
1000 TABLET, FILM COATED ORAL 2 TIMES DAILY
Qty: 20 TABLET | Refills: 0 | Status: SHIPPED | OUTPATIENT
Start: 2019-12-10 | End: 2020-10-10

## 2019-12-10 NOTE — TELEPHONE ENCOUNTER
"Valtrex  Last Written Prescription Date:  11/15/2019  Last Fill Quantity: 20,  # refills: 0   Last office visit: 11/25/2019 with prescribing provider:     Future Office Visit:      Requested Prescriptions   Pending Prescriptions Disp Refills     valACYclovir (VALTREX) 1000 mg tablet 20 tablet 0     Sig: Take 1 tablet (1,000 mg) by mouth 2 times daily       Antivirals for Herpes Protocol Failed - 12/10/2019 10:28 AM        Failed - Normal serum creatinine on file in past 12 months     Recent Labs   Lab Test 11/13/18  0718   CR 0.84             Passed - Patient is age 12 or older        Passed - Recent (12 mo) or future (30 days) visit within the authorizing provider's specialty     Patient has had an office visit with the authorizing provider or a provider within the authorizing providers department within the previous 12 mos or has a future within next 30 days. See \"Patient Info\" tab in inbasket, or \"Choose Columns\" in Meds & Orders section of the refill encounter.              Passed - Medication is active on med list          "

## 2020-02-05 ENCOUNTER — TELEPHONE (OUTPATIENT)
Dept: FAMILY MEDICINE | Facility: CLINIC | Age: 60
End: 2020-02-05

## 2020-02-05 NOTE — TELEPHONE ENCOUNTER
I have attempted to contact pt to schedule an appt. No voicemail picked up. Okay to use a doctor only next Friday or the following week. Liliane Wood CMA ([facility])

## 2020-02-05 NOTE — TELEPHONE ENCOUNTER
Reason for Call:  Same Day Appointment, Requested Provider:  Erik Peraza MD    PCP: Erik Peraza    Reason for visit: chronic fatigue syndrome      Duration of symptoms:      Have you been treated for this in the past? Yes    Additional comments: Patient calling stating she has been sick lately and hit a wall and can't get her body to kick it. Patient requesting a work in [employer] as she does not want to wait until the 26th of Feb to be seen. Please advise     Can we leave a detailed message on this number? YES    Phone number patient can be reached at: Cell number on file:    Telephone Information:   Mobile [phone]       Best Time:      Call taken on 2/5/2020 at 12:10 PM by Juli Carlton

## 2020-02-05 NOTE — TELEPHONE ENCOUNTER
Would you like her scheduled in one of your Dr Only spots on 2/7/2020?    Please advise.     Doyle Loaiza, CMA

## 2020-02-06 NOTE — TELEPHONE ENCOUNTER
Patient is scheduled on 2/14/20 with Dr. Peraza but he she feels that she really needs to be seen sooner. She states that she cannot do anything because she has chronic fatigue and her immune system is down. She would like to see if Douglas can get her in earlier than 2/14/2020?    Please advise.

## 2020-02-10 ENCOUNTER — OFFICE VISIT (OUTPATIENT)
Dept: FAMILY MEDICINE | Facility: CLINIC | Age: 60
End: 2020-02-10
Payer: COMMERCIAL

## 2020-02-10 VITALS
DIASTOLIC BLOOD PRESSURE: 70 MMHG | BODY MASS INDEX: 29.8 KG/M2 | HEIGHT: 69 IN | OXYGEN SATURATION: 94 % | TEMPERATURE: 97 F | RESPIRATION RATE: 12 BRPM | WEIGHT: 201.2 LBS | SYSTOLIC BLOOD PRESSURE: 122 MMHG | HEART RATE: 84 BPM

## 2020-02-10 DIAGNOSIS — I48.0 INTERMITTENT ATRIAL FIBRILLATION (H): ICD-10-CM

## 2020-02-10 DIAGNOSIS — J45.30 MILD PERSISTENT ASTHMA WITHOUT COMPLICATION: ICD-10-CM

## 2020-02-10 DIAGNOSIS — R05.9 COUGH: ICD-10-CM

## 2020-02-10 DIAGNOSIS — E03.4 HYPOTHYROIDISM DUE TO ACQUIRED ATROPHY OF THYROID: ICD-10-CM

## 2020-02-10 DIAGNOSIS — G93.32 CHRONIC FATIGUE SYNDROME: Primary | ICD-10-CM

## 2020-02-10 DIAGNOSIS — F31.60 MIXED BIPOLAR I DISORDER (H): ICD-10-CM

## 2020-02-10 LAB
ALBUMIN SERPL-MCNC: 3.7 G/DL (ref 3.4–5)
ALP SERPL-CCNC: 62 U/L (ref 40–150)
ALT SERPL W P-5'-P-CCNC: 31 U/L (ref 0–50)
ANION GAP SERPL CALCULATED.3IONS-SCNC: 4 MMOL/L (ref 3–14)
AST SERPL W P-5'-P-CCNC: 19 U/L (ref 0–45)
BILIRUB SERPL-MCNC: 0.4 MG/DL (ref 0.2–1.3)
BUN SERPL-MCNC: 27 MG/DL (ref 7–30)
CALCIUM SERPL-MCNC: 9.7 MG/DL (ref 8.5–10.1)
CHLORIDE SERPL-SCNC: 108 MMOL/L (ref 94–109)
CO2 SERPL-SCNC: 28 MMOL/L (ref 20–32)
CREAT SERPL-MCNC: 0.86 MG/DL (ref 0.52–1.04)
ERYTHROCYTE [DISTWIDTH] IN BLOOD BY AUTOMATED COUNT: 12.7 % (ref 10–15)
ERYTHROCYTE [SEDIMENTATION RATE] IN BLOOD BY WESTERGREN METHOD: 11 MM/H (ref 0–30)
GFR SERPL CREATININE-BSD FRML MDRD: 72 ML/MIN/{1.73_M2}
GLUCOSE SERPL-MCNC: 116 MG/DL (ref 70–99)
HCT VFR BLD AUTO: 42.3 % (ref 35–47)
HGB BLD-MCNC: 14.1 G/DL (ref 11.7–15.7)
MCH RBC QN AUTO: 30.3 PG (ref 26.5–33)
MCHC RBC AUTO-ENTMCNC: 33.3 G/DL (ref 31.5–36.5)
MCV RBC AUTO: 91 FL (ref 78–100)
PLATELET # BLD AUTO: 282 10E9/L (ref 150–450)
POTASSIUM SERPL-SCNC: 4.4 MMOL/L (ref 3.4–5.3)
PROT SERPL-MCNC: 7.4 G/DL (ref 6.8–8.8)
RBC # BLD AUTO: 4.65 10E12/L (ref 3.8–5.2)
SODIUM SERPL-SCNC: 140 MMOL/L (ref 133–144)
T4 FREE SERPL-MCNC: 0.83 NG/DL (ref 0.76–1.46)
TSH SERPL DL<=0.005 MIU/L-ACNC: 4.2 MU/L (ref 0.4–4)
WBC # BLD AUTO: 12.1 10E9/L (ref 4–11)

## 2020-02-10 PROCEDURE — 36415 COLL VENOUS BLD VENIPUNCTURE: CPT | Performed by: FAMILY MEDICINE

## 2020-02-10 PROCEDURE — 85027 COMPLETE CBC AUTOMATED: CPT | Performed by: FAMILY MEDICINE

## 2020-02-10 PROCEDURE — 80053 COMPREHEN METABOLIC PANEL: CPT | Performed by: FAMILY MEDICINE

## 2020-02-10 PROCEDURE — 84443 ASSAY THYROID STIM HORMONE: CPT | Performed by: FAMILY MEDICINE

## 2020-02-10 PROCEDURE — 85652 RBC SED RATE AUTOMATED: CPT | Performed by: FAMILY MEDICINE

## 2020-02-10 PROCEDURE — 84439 ASSAY OF FREE THYROXINE: CPT | Performed by: FAMILY MEDICINE

## 2020-02-10 PROCEDURE — 99214 OFFICE O/P EST MOD 30 MIN: CPT | Performed by: FAMILY MEDICINE

## 2020-02-10 RX ORDER — ZOLPIDEM TARTRATE 5 MG/1
TABLET ORAL
Qty: 60 TABLET | Refills: 5 | Status: SHIPPED | OUTPATIENT
Start: 2020-02-10 | End: 2020-08-17

## 2020-02-10 ASSESSMENT — PAIN SCALES - GENERAL: PAINLEVEL: MODERATE PAIN (4)

## 2020-02-10 ASSESSMENT — MIFFLIN-ST. JEOR: SCORE: 1545.4

## 2020-02-11 ENCOUNTER — HOSPITAL ENCOUNTER (OUTPATIENT)
Dept: CT IMAGING | Facility: CLINIC | Age: 60
Discharge: HOME | End: 2020-02-11
Attending: FAMILY MEDICINE | Admitting: FAMILY MEDICINE
Payer: COMMERCIAL

## 2020-02-11 DIAGNOSIS — R05.9 COUGH: ICD-10-CM

## 2020-02-11 DIAGNOSIS — J45.30 MILD PERSISTENT ASTHMA WITHOUT COMPLICATION: ICD-10-CM

## 2020-02-11 PROCEDURE — 25000128 H RX IP 250 OP 636: Performed by: FAMILY MEDICINE

## 2020-02-11 PROCEDURE — 71260 CT THORAX DX C+: CPT

## 2020-02-11 PROCEDURE — 25000125 ZZHC RX 250: Performed by: FAMILY MEDICINE

## 2020-02-11 RX ORDER — IOPAMIDOL 755 MG/ML
500 INJECTION, SOLUTION INTRAVASCULAR ONCE
Status: COMPLETED | OUTPATIENT
Start: 2020-02-11 | End: 2020-02-11

## 2020-02-11 RX ADMIN — IOPAMIDOL 75 ML: 755 INJECTION, SOLUTION INTRAVENOUS at 08:03

## 2020-02-11 RX ADMIN — SODIUM CHLORIDE 75 ML: 9 INJECTION, SOLUTION INTRAVENOUS at 08:02

## 2020-02-12 RX ORDER — LEVOTHYROXINE SODIUM 112 UG/1
112 TABLET ORAL DAILY
Qty: 90 TABLET | Refills: 3 | Status: SHIPPED | OUTPATIENT
Start: 2020-02-12 | End: 2020-06-25 | Stop reason: DRUGHIGH

## 2020-02-12 NOTE — RESULT ENCOUNTER NOTE
Notified via my chart that there are no suspicious lung masses nor obvious or serious looking emphysema COPD or pneumonia

## 2020-02-12 NOTE — PATIENT INSTRUCTIONS
We will check labs and CT and see what else we might do.  For now finish antibiotics for current infection.

## 2020-03-25 ENCOUNTER — MYC REFILL (OUTPATIENT)
Dept: FAMILY MEDICINE | Facility: CLINIC | Age: 60
End: 2020-03-25

## 2020-03-25 DIAGNOSIS — J45.30 MILD PERSISTENT ASTHMA WITHOUT COMPLICATION: ICD-10-CM

## 2020-03-25 DIAGNOSIS — J01.91 ACUTE RECURRENT SINUSITIS, UNSPECIFIED LOCATION: ICD-10-CM

## 2020-03-25 DIAGNOSIS — E78.5 HYPERLIPIDEMIA LDL GOAL <130: Primary | ICD-10-CM

## 2020-03-25 RX ORDER — FLUTICASONE PROPIONATE 220 UG/1
2 AEROSOL, METERED RESPIRATORY (INHALATION) 2 TIMES DAILY PRN
Qty: 1 INHALER | Refills: 3 | Status: SHIPPED | OUTPATIENT
Start: 2020-03-25 | End: 2020-12-07

## 2020-03-25 RX ORDER — ROSUVASTATIN CALCIUM 10 MG/1
10 TABLET, COATED ORAL DAILY
Qty: 30 TABLET | Refills: 1 | Status: SHIPPED | OUTPATIENT
Start: 2020-03-25 | End: 2020-06-17 | Stop reason: SINTOL

## 2020-03-25 RX ORDER — PREDNISONE 10 MG/1
20 TABLET ORAL DAILY
Qty: 10 TABLET | Refills: 0 | Status: SHIPPED | OUTPATIENT
Start: 2020-03-25 | End: 2020-06-17

## 2020-03-25 NOTE — TELEPHONE ENCOUNTER
"rosuvastatin  Last Written Prescription Date:  2/23/2018  Last Fill Quantity: 30,  # refills: 1   Last office visit: 2/10/2020 with prescribing provider:      Future Office Visit:      Requested Prescriptions   Pending Prescriptions Disp Refills     rosuvastatin (CRESTOR) 10 MG tablet 30 tablet 1     Sig: Take 1 tablet (10 mg) by mouth daily       Statins Protocol Failed - 3/25/2020  3:26 PM        Failed - LDL on file in past 12 months     Recent Labs   Lab Test 05/18/16  1002   *             Passed - No abnormal creatine kinase in past 12 months     No lab results found.             Passed - Recent (12 mo) or future (30 days) visit within the authorizing provider's specialty     Patient has had an office visit with the authorizing provider or a provider within the authorizing providers department within the previous 12 mos or has a future within next 30 days. See \"Patient Info\" tab in inbasket, or \"Choose Columns\" in Meds & Orders section of the refill encounter.              Passed - Medication is active on med list        Passed - Patient is age 18 or older        Passed - No active pregnancy on record        Passed - No positive pregnancy test in past 12 months        Fluticasone-historical  Last Written Prescription Date:     Last Fill Quantity:  ,  # refills:     Last office visit: 2/10/2020 with prescribing provider:      Future Office Visit:         fluticasone (FLOVENT HFA) 220 MCG/ACT inhaler 1 Inhaler 3     Sig: Inhale 2 puffs into the lungs 2 times daily as needed (for bronchitis)       Inhaled Steroids Protocol Failed - 3/25/2020  3:26 PM        Failed - Asthma control assessment score within normal limits in last 6 months     Please review ACT score.           Passed - Patient is age 12 or older        Passed - Medication is active on med list        Passed - Recent (6 mo) or future (30 days) visit within the authorizing provider's specialty     Patient had office visit in the last 6 months or " "has a visit in the next 30 days with authorizing provider or within the authorizing provider's specialty.  See \"Patient Info\" tab in inbasket, or \"Choose Columns\" in Meds & Orders section of the refill encounter.                      Zamzam Carlton RN on 3/25/2020 at 3:34 PM    "

## 2020-03-25 NOTE — TELEPHONE ENCOUNTER
PREDNISONE 10 mg  Last Written Prescription Date:  9/11/19  Last Fill Quantity: 10,  # refills: 0   Last office visit: 2/10/2020 with prescribing provider:  Dr. Peraza   DX for use:  Acute recurrent sinusitis, unspecified location.  Future Office Visit:  NONE  Routing refill request to provider for review/approval because:  Drug not on the [facility] [place]   YRN Brown

## 2020-03-25 NOTE — TELEPHONE ENCOUNTER
Routing refill request to provider for review/approval because:  A break in medication-rosuvastin  Medication is reported/historical-fluticasone

## 2020-03-25 NOTE — TELEPHONE ENCOUNTER
"rosuvastatin  Last Written Prescription Date:  2/23/2018  Last Fill Quantity: 30,  # refills: 1   Last office visit: 2/10/2020 with prescribing provider:      Future Office Visit:      Requested Prescriptions   Pending Prescriptions Disp Refills     rosuvastatin (CRESTOR) 10 MG tablet 30 tablet 1     Sig: Take 1 tablet (10 mg) by mouth daily       Statins Protocol Failed - 3/25/2020  3:26 PM        Failed - LDL on file in past 12 months     Recent Labs   Lab Test 05/18/16  1002   *             Passed - No abnormal creatine kinase in past 12 months     No lab results found.             Passed - Recent (12 mo) or future (30 days) visit within the authorizing provider's specialty     Patient has had an office visit with the authorizing provider or a provider within the authorizing providers department within the previous 12 mos or has a future within next 30 days. See \"Patient Info\" tab in inbasket, or \"Choose Columns\" in Meds & Orders section of the refill encounter.              Passed - Medication is active on med list        Passed - Patient is age 18 or older        Passed - No active pregnancy on record        Passed - No positive pregnancy test in past 12 months          Fluticasone-historical  Last Written Prescription Date:     Last Fill Quantity:  ,  # refills:     Last office visit: 2/10/2020 with prescribing provider:      Future Office Visit:         fluticasone (FLOVENT HFA) 220 MCG/ACT inhaler 1 Inhaler 3     Sig: Inhale 2 puffs into the lungs 2 times daily as needed (for bronchitis)       Inhaled Steroids Protocol Failed - 3/25/2020  3:26 PM        Failed - Asthma control assessment score within normal limits in last 6 months     Please review ACT score.           Passed - Patient is age 12 or older        Passed - Medication is active on med list        Passed - Recent (6 mo) or future (30 days) visit within the authorizing provider's specialty     Patient had office visit in the last 6 months " "or has a visit in the next 30 days with authorizing provider or within the authorizing provider's specialty.  See \"Patient Info\" tab in inbasket, or \"Choose Columns\" in Meds & Orders section of the refill encounter.                      Zamzam Carlton RN on 3/25/2020 at 3:37 PM    "

## 2020-06-10 ENCOUNTER — TELEPHONE (OUTPATIENT)
Dept: FAMILY MEDICINE | Facility: CLINIC | Age: 60
End: 2020-06-10

## 2020-06-10 NOTE — TELEPHONE ENCOUNTER
Patient is due for a PHQ-9.  Index start date:2/29/2020  Index end date:6/28/2020    Please call patient. Pt is active on [email or web address]. I have sent a PHQ-9 via [email or web address] and will postpone the encounter. Liliane Wood CMA ([facility])

## 2020-06-17 ENCOUNTER — VIRTUAL VISIT (OUTPATIENT)
Dept: FAMILY MEDICINE | Facility: CLINIC | Age: 60
End: 2020-06-17
Payer: COMMERCIAL

## 2020-06-17 VITALS — BODY MASS INDEX: 28.91 KG/M2 | WEIGHT: 197 LBS

## 2020-06-17 DIAGNOSIS — G93.32 CHRONIC FATIGUE SYNDROME: Primary | ICD-10-CM

## 2020-06-17 DIAGNOSIS — E03.4 HYPOTHYROIDISM DUE TO ACQUIRED ATROPHY OF THYROID: ICD-10-CM

## 2020-06-17 DIAGNOSIS — E78.5 HYPERLIPIDEMIA LDL GOAL <130: ICD-10-CM

## 2020-06-17 PROCEDURE — 99214 OFFICE O/P EST MOD 30 MIN: CPT | Mod: 95 | Performed by: FAMILY MEDICINE

## 2020-06-17 ASSESSMENT — PATIENT HEALTH QUESTIONNAIRE - PHQ9: SUM OF ALL RESPONSES TO PHQ QUESTIONS 1-9: 9

## 2020-06-17 NOTE — PROGRESS NOTES
"Nikki Morales is a 60 year old female who is being evaluated via a billable telephone visit.      The patient has been notified of following:     \"This telephone visit will be conducted via a call between you and your physician/provider. We have found that certain health care needs can be provided without the need for a physical exam.  This service lets us provide the care you need with a short phone conversation.  If a prescription is necessary we can send it directly to your pharmacy.  If lab work is needed we can place an order for that and you can then stop by our lab to have the test done at a later time.    Telephone visits are billed at different rates depending on your insurance coverage. During this emergency period, for some insurers they may be billed the same as an in-person visit.  Please reach out to your insurance provider with any questions.    If during the course of the call the physician/provider feels a telephone visit is not appropriate, you will not be charged for this service.\"    Patient has given verbal consent for Telephone visit?  Yes    What phone number would you like to be contacted at? [phone]    How would you like to obtain your AVS? MyChart    Subjective     Nikki Morales is a 60 year old female who presents via phone visit today for the following health issues:    HPI  Hypothyroidism Follow-up      Since last visit, patient describes the following symptoms: fatigue    Discuss ongoing sore throat; states it is part of her autoimmune problem         Patient was originally scheduled to discuss some of these issues at March when all things were cut off due to COVID-19.  Because her thyroid was slightly low she started being very diligent about taking her thyroid medication.  It would be at the same time every day.  She does ask about interaction with other medications that she is on.  However again she was routinely taking it at the same time every day.  She noticed " that as she was on the medication her sleep was improved.  However she still felt tired during the day.  She had a period of headache and muscle aches but that has improved to some degree.  She feels as if there is a tightness and soreness and swelling in the area of the thyroid now.  She believes her thyroid probably is out of balance again.  She is generally fatigued and sleepy tired.  She does function okay but does not like to do a lot of things.  Denies true sore throat.  Breathing is okay.    Patient Active Problem List   Diagnosis     Chronic fatigue syndrome     Other and unspecified disc disorder of lumbar region     ESOPHAGEAL REFLUX     Female stress incontinence     Herpes simplex virus (HSV) infection     HYPERLIPIDEMIA LDL GOAL <130     Eosinophilic esophagitis     Overweight     Adhesive capsulitis of shoulder     Degenerative arthritis of cervical spine with cord compression     Hypothyroidism due to acquired atrophy of thyroid     Attention deficit hyperactivity disorder, inattentive type     Mild persistent asthma without complication     Intermittent atrial fibrillation (H)     KAILEE (generalized anxiety disorder)     Moderate recurrent major depression (H)     Impingement syndrome of left shoulder     Headache associated with sexual activity     Past Surgical History:   Procedure Laterality Date     C  DELIVERY ONLY      , Low Cervical     C NONSPECIFIC PROCEDURE      sinus     C NONSPECIFIC PROCEDURE      Esophgeal dilatation multiple     C NONSPECIFIC PROCEDURE      sling     C VAGINAL HYSTERECTOMY  1995    Hysterectomy, Vaginal     COLONOSCOPY  10/06/09     COLONOSCOPY  2013    Procedure: COMBINED COLONOSCOPY, SINGLE BIOPSY/POLYPECTOMY BY BIOPSY;;  Surgeon: Cuate Miles MD;  Location:  GI     COLONOSCOPY N/A 2018    Procedure: COLONOSCOPY;  Colonoscopy;  Surgeon: Hamzah Dudley DO;  Location:  GI     COMBINED REPAIR PTOSIS WITH  BLEPHAROPLASTY BILATERAL Bilateral 9/24/2018    Procedure: COMBINED REPAIR PTOSIS WITH BLEPHAROPLASTY BILATERAL;  Bilateral upper eyelid Blepharoplasty and ptosis repair ;  Surgeon: Martina Andrade MD;  Location: [facility]     CONIZATION CERVIX,KNIFE/LASER       ESOPHAGOSCOPY, GASTROSCOPY, DUODENOSCOPY (EGD), COMBINED  7/2/2013    Procedure: COMBINED ESOPHAGOSCOPY, GASTROSCOPY, DUODENOSCOPY (EGD), BIOPSY SINGLE OR MULTIPLE;  egd with rabdain biopsies and colonoscopy with polypectomy by biopsy ;  Surgeon: Cuate Miles MD;  Location: [facility]     HC DILATION/CURETTAGE DIAG/THER NON OB      D & C     HC REMOVAL OF TONSILS,<11 Y/O      Tonsils <10y.o.     HC UGI ENDOSCOPY DIAG W BIOPSY  12/05/07     HC UGI ENDOSCOPY, SIMPLE EXAM  09/10/99 & 04/14/98     HYSTERECTOMY, PAP NO LONGER INDICATED       LAPAROSCOPIC CHOLECYSTECTOMY  10/12/13     REPAIR PTOSIS       TUBAL LIGATION  1994       Social History     Tobacco Use     Smoking status: Former Smoker     Packs/day: 1.00     Years: 30.00     Pack years: 30.00     Types: Cigarettes     Smokeless tobacco: Never Used   Substance Use Topics     Alcohol use: Yes     Alcohol/week: 4.0 standard drinks     Types: 2 Cans of beer, 2 Standard drinks or equivalent per week     Comment: social     Family History   Problem Relation Age of Onset     Cancer Mother         Uterine     Prostate Cancer Mother      Diabetes Father      Hypertension Father      Cancer Father         prostate cancer     Cerebrovascular Disease Father      Psychotic Disorder Son         severe Add,      Asthma Son         exercised induced asthma     Asthma Son         ecxercise induced asthma     Cardiovascular Sister         recurrent blood clots     Cerebrovascular Disease Sister 51     Prostate Cancer Brother      Diabetes Maternal Grandfather      Heart Disease Maternal Grandmother         Heart condition age 96     Diabetes Paternal Grandfather      Cancer Brother      Cerebrovascular Disease Paternal  Grandmother          Current Outpatient Medications   Medication Sig Dispense Refill     budesonide (PULMICORT) 0.5 MG/2ML neb solution Mix 2 vials with 1 oz coffee flavoring and drink twice a day and rinse mouth aftrerwards 4 Box 11     calcium carbonate (TUMS) 500 MG chewable tablet Take 2 chew tab by mouth 3 times daily as needed for other (bloating/flatulence)       cyanocobalamin (CYANOCOBALAMIN) 1000 MCG/ML injection INJECT 1ML INTRAMUSCULARLY EVERY 30 DAYS 1 mL 5     estradiol (VIVELLE-DOT) 0.05 MG/24HR patch Place 1 patch onto the skin twice a week       fluticasone (FLOVENT HFA) 220 MCG/ACT inhaler Inhale 2 puffs into the lungs 2 times daily as needed (for bronchitis) 1 Inhaler 3     levothyroxine (SYNTHROID/LEVOTHROID) 112 MCG tablet Take 1 tablet (112 mcg) by mouth daily 90 tablet 3     LORazepam (ATIVAN) 1 MG tablet TAKE ONE TABLET BY MOUTH TWICE A DAY AS NEEDED FOR ANXIETY 20 tablet 1     methylphenidate (RITALIN) 10 MG tablet Take 10 mg by mouth daily as needed (for chronic fatigue syndrome)  30 tablet 0     multivitamin, therapeutic with minerals (MULTI-VITAMIN) TABS Take 1 tablet by mouth daily 100 tablet 3     pantoprazole (PROTONIX) 40 MG EC tablet Take 1 tablet (40 mg) by mouth 2 times daily (before meals) 90 tablet 1     trospium (SANCTURA) 20 MG tablet TAKE ONE TO TWO TABLETS BY MOUTH EVERY DAY AS NEEDED 40 tablet 0     valACYclovir (VALTREX) 1000 mg tablet Take 1 tablet (1,000 mg) by mouth 2 times daily 20 tablet 0     zolpidem (AMBIEN) 5 MG tablet 1 to 2 tabs at hour of sleep as needed 60 tablet 5     Allergies   Allergen Reactions     Codeine Other (See Comments)     Causes agitation     BP Readings from Last 3 Encounters:   02/10/20 122/70   11/25/19 132/76   08/02/19 (!) 151/74    Wt Readings from Last 3 Encounters:   06/17/20 89.4 kg (197 lb)   02/10/20 91.3 kg (201 lb 3.2 oz)   11/25/19 91.2 kg (201 lb)                    Reviewed and updated as needed this visit by Provider          Review of Systems   Constitutional, HEENT, cardiovascular, pulmonary, gi and gu systems are negative, except as otherwise noted.       Objective   Reported vitals:  There were no vitals taken for this visit.   healthy, alert and no distress  PSYCH: Alert and oriented times 3; coherent speech, normal   rate and volume, able to articulate logical thoughts, able   to abstract reason, no tangential thoughts, no hallucinations   or delusions  Her affect is normal  RESP: No cough, no audible wheezing, able to talk in full sentences  Remainder of exam unable to be completed due to telephone visits    Diagnostic Test Results:  Labs reviewed in Epic        Assessment/Plan:  1. Chronic fatigue syndrome  Patient has issues and we will check blood work to see if anything is out of line to account for compilation of symptoms described above.  I would anticipate these are relatively normal.  Patient is aware she needs to schedule appointment  - **Comprehensive metabolic panel FUTURE anytime; Future  - **CBC with platelets FUTURE anytime; Future    2. Hypothyroidism due to acquired atrophy of thyroid  She easily could be overmedicated now she is taking medication more routinely.  Reassured that if she takes it at the same time with the same meds every day, absorption should be consistent and that is more important than whether she is on medication or not that might affect thyroid.  She does understand that both excess and deficiency of thyroid could be causing some of her symptoms.  We will let her know how to adjust medication.  - **Comprehensive metabolic panel FUTURE anytime; Future  - **CBC with platelets FUTURE anytime; Future  - **ESR FUTURE anytime; Future  - TSH; Future  - T3, Free; Future  - T4, free; Future  - Anti thyroglobulin antibody; Future    3. Hyperlipidemia LDL goal <130  We have not checked lipids for a while.  She had an episode 1 day where she told her son to go out and start a car and she had already  gone out to start it.  She is fearful of strokes because many of her relatives have had them.  This happened in February and she has not had an episode like this since.  Reassured that she did not have a stroke at that time.  She is no longer a smoker.  Her blood pressure has been controlled.  We did check vascular status once and she was okay.  However this might be something that we need to consider.  - Lipid panel reflex to direct LDL Fasting; Future    Return in about 3 months (around 9/17/2020) for endocrine, Physical Exam, MSK problem, mental health.    Hopefully clinics open up and she can actually be in for an exam sometime in the next 3 months  Phone call duration:  15 minutes 3:37 PM to 3:52 PM    Erik Hamzah Peraza MD

## 2020-06-17 NOTE — TELEPHONE ENCOUNTER
Pt completed PHQ-9.    PHQ 6/17/2020   PHQ-9 Total Score 9   Q9: Thoughts of better off dead/self-harm past 2 weeks Several days     Liliane Wood CMA ([facility])

## 2020-06-17 NOTE — PATIENT INSTRUCTIONS
Once I see laboratory testing I will send a message via my chart with further ideas or instructions

## 2020-06-23 DIAGNOSIS — E78.5 HYPERLIPIDEMIA LDL GOAL <130: ICD-10-CM

## 2020-06-23 DIAGNOSIS — G93.32 CHRONIC FATIGUE SYNDROME: ICD-10-CM

## 2020-06-23 DIAGNOSIS — E03.4 HYPOTHYROIDISM DUE TO ACQUIRED ATROPHY OF THYROID: ICD-10-CM

## 2020-06-23 LAB
ALBUMIN SERPL-MCNC: 3.7 G/DL (ref 3.4–5)
ALP SERPL-CCNC: 60 U/L (ref 40–150)
ALT SERPL W P-5'-P-CCNC: 21 U/L (ref 0–50)
ANION GAP SERPL CALCULATED.3IONS-SCNC: 4 MMOL/L (ref 3–14)
AST SERPL W P-5'-P-CCNC: 14 U/L (ref 0–45)
BILIRUB SERPL-MCNC: 0.4 MG/DL (ref 0.2–1.3)
BUN SERPL-MCNC: 19 MG/DL (ref 7–30)
CALCIUM SERPL-MCNC: 8.4 MG/DL (ref 8.5–10.1)
CHLORIDE SERPL-SCNC: 108 MMOL/L (ref 94–109)
CHOLEST SERPL-MCNC: 300 MG/DL
CO2 SERPL-SCNC: 27 MMOL/L (ref 20–32)
CREAT SERPL-MCNC: 0.82 MG/DL (ref 0.52–1.04)
ERYTHROCYTE [DISTWIDTH] IN BLOOD BY AUTOMATED COUNT: 12.3 % (ref 10–15)
ERYTHROCYTE [SEDIMENTATION RATE] IN BLOOD BY WESTERGREN METHOD: 12 MM/H (ref 0–30)
GFR SERPL CREATININE-BSD FRML MDRD: 77 ML/MIN/{1.73_M2}
GLUCOSE SERPL-MCNC: 111 MG/DL (ref 70–99)
HCT VFR BLD AUTO: 42.8 % (ref 35–47)
HDLC SERPL-MCNC: 55 MG/DL
HGB BLD-MCNC: 14 G/DL (ref 11.7–15.7)
LDLC SERPL CALC-MCNC: 220 MG/DL
MCH RBC QN AUTO: 29.9 PG (ref 26.5–33)
MCHC RBC AUTO-ENTMCNC: 32.7 G/DL (ref 31.5–36.5)
MCV RBC AUTO: 92 FL (ref 78–100)
NONHDLC SERPL-MCNC: 245 MG/DL
PLATELET # BLD AUTO: 240 10E9/L (ref 150–450)
POTASSIUM SERPL-SCNC: 4.1 MMOL/L (ref 3.4–5.3)
PROT SERPL-MCNC: 7.5 G/DL (ref 6.8–8.8)
RBC # BLD AUTO: 4.68 10E12/L (ref 3.8–5.2)
SODIUM SERPL-SCNC: 139 MMOL/L (ref 133–144)
T3FREE SERPL-MCNC: 3.3 PG/ML (ref 2.3–4.2)
T4 FREE SERPL-MCNC: 1.55 NG/DL (ref 0.76–1.46)
TRIGL SERPL-MCNC: 125 MG/DL
TSH SERPL DL<=0.005 MIU/L-ACNC: 0.27 MU/L (ref 0.4–4)
WBC # BLD AUTO: 6.7 10E9/L (ref 4–11)

## 2020-06-23 PROCEDURE — 80053 COMPREHEN METABOLIC PANEL: CPT | Performed by: FAMILY MEDICINE

## 2020-06-23 PROCEDURE — 85652 RBC SED RATE AUTOMATED: CPT | Performed by: FAMILY MEDICINE

## 2020-06-23 PROCEDURE — 36415 COLL VENOUS BLD VENIPUNCTURE: CPT | Performed by: FAMILY MEDICINE

## 2020-06-23 PROCEDURE — 80061 LIPID PANEL: CPT | Performed by: FAMILY MEDICINE

## 2020-06-23 PROCEDURE — 84481 FREE ASSAY (FT-3): CPT | Performed by: FAMILY MEDICINE

## 2020-06-23 PROCEDURE — 85027 COMPLETE CBC AUTOMATED: CPT | Performed by: FAMILY MEDICINE

## 2020-06-23 PROCEDURE — 86800 THYROGLOBULIN ANTIBODY: CPT | Performed by: FAMILY MEDICINE

## 2020-06-23 PROCEDURE — 84439 ASSAY OF FREE THYROXINE: CPT | Performed by: FAMILY MEDICINE

## 2020-06-23 PROCEDURE — 84443 ASSAY THYROID STIM HORMONE: CPT | Performed by: FAMILY MEDICINE

## 2020-06-24 NOTE — RESULT ENCOUNTER NOTE
Notfied via MyChart labs are suggest we need to lower thyroid dose but awaiting thyroid antibodies.  Also that treatment with a statin should be initiated.

## 2020-06-25 DIAGNOSIS — E78.5 HYPERLIPIDEMIA LDL GOAL <130: Primary | ICD-10-CM

## 2020-06-25 DIAGNOSIS — E03.4 HYPOTHYROIDISM DUE TO ACQUIRED ATROPHY OF THYROID: ICD-10-CM

## 2020-06-25 LAB — THYROGLOB AB SERPL IA-ACNC: <20 IU/ML (ref 0–40)

## 2020-06-25 RX ORDER — LEVOTHYROXINE SODIUM 100 UG/1
100 TABLET ORAL DAILY
Qty: 90 TABLET | Refills: 1 | Status: SHIPPED | OUTPATIENT
Start: 2020-06-25 | End: 2020-12-07

## 2020-06-25 RX ORDER — ROSUVASTATIN CALCIUM 10 MG/1
TABLET, COATED ORAL
Qty: 90 TABLET | Refills: 1 | Status: SHIPPED | OUTPATIENT
Start: 2020-06-25 | End: 2020-12-07

## 2020-08-16 ENCOUNTER — MYC REFILL (OUTPATIENT)
Dept: FAMILY MEDICINE | Facility: CLINIC | Age: 60
End: 2020-08-16

## 2020-08-16 DIAGNOSIS — G93.32 CHRONIC FATIGUE SYNDROME: ICD-10-CM

## 2020-08-16 RX ORDER — ZOLPIDEM TARTRATE 5 MG/1
TABLET ORAL
Qty: 60 TABLET | Refills: 5 | Status: CANCELLED | OUTPATIENT
Start: 2020-08-16

## 2020-08-31 ENCOUNTER — MYC MEDICAL ADVICE (OUTPATIENT)
Dept: FAMILY MEDICINE | Facility: CLINIC | Age: 60
End: 2020-08-31

## 2020-08-31 ENCOUNTER — VIRTUAL VISIT (OUTPATIENT)
Dept: FAMILY MEDICINE | Facility: CLINIC | Age: 60
End: 2020-08-31
Payer: COMMERCIAL

## 2020-08-31 ENCOUNTER — TELEPHONE (OUTPATIENT)
Dept: FAMILY MEDICINE | Facility: CLINIC | Age: 60
End: 2020-08-31

## 2020-08-31 DIAGNOSIS — F33.1 MODERATE RECURRENT MAJOR DEPRESSION (H): Primary | ICD-10-CM

## 2020-08-31 DIAGNOSIS — F41.1 GAD (GENERALIZED ANXIETY DISORDER): ICD-10-CM

## 2020-08-31 DIAGNOSIS — G93.32 CHRONIC FATIGUE SYNDROME: ICD-10-CM

## 2020-08-31 DIAGNOSIS — D51.8 VITAMIN B12 DEFICIENCY (DIETARY) ANEMIA: ICD-10-CM

## 2020-08-31 PROCEDURE — 99214 OFFICE O/P EST MOD 30 MIN: CPT | Mod: TEL | Performed by: FAMILY MEDICINE

## 2020-08-31 RX ORDER — DULOXETIN HYDROCHLORIDE 30 MG/1
30 CAPSULE, DELAYED RELEASE ORAL DAILY
Qty: 30 CAPSULE | Refills: 1 | Status: SHIPPED | OUTPATIENT
Start: 2020-08-31 | End: 2021-03-24

## 2020-08-31 RX ORDER — CYANOCOBALAMIN 1000 UG/ML
INJECTION, SOLUTION INTRAMUSCULAR; SUBCUTANEOUS
Qty: 1 ML | Refills: 11 | Status: SHIPPED | OUTPATIENT
Start: 2020-08-31 | End: 2021-03-24

## 2020-08-31 RX ORDER — LORAZEPAM 1 MG/1
1 TABLET ORAL 2 TIMES DAILY PRN
Qty: 20 TABLET | Refills: 1 | Status: SHIPPED | OUTPATIENT
Start: 2020-08-31 | End: 2020-11-10

## 2020-08-31 ASSESSMENT — ANXIETY QUESTIONNAIRES
1. FEELING NERVOUS, ANXIOUS, OR ON EDGE: MORE THAN HALF THE DAYS
2. NOT BEING ABLE TO STOP OR CONTROL WORRYING: SEVERAL DAYS
5. BEING SO RESTLESS THAT IT IS HARD TO SIT STILL: NOT AT ALL
7. FEELING AFRAID AS IF SOMETHING AWFUL MIGHT HAPPEN: NOT AT ALL
GAD7 TOTAL SCORE: 6
IF YOU CHECKED OFF ANY PROBLEMS ON THIS QUESTIONNAIRE, HOW DIFFICULT HAVE THESE PROBLEMS MADE IT FOR YOU TO DO YOUR WORK, TAKE CARE OF THINGS AT HOME, OR GET ALONG WITH OTHER PEOPLE: SOMEWHAT DIFFICULT
3. WORRYING TOO MUCH ABOUT DIFFERENT THINGS: SEVERAL DAYS
6. BECOMING EASILY ANNOYED OR IRRITABLE: NOT AT ALL

## 2020-08-31 ASSESSMENT — PATIENT HEALTH QUESTIONNAIRE - PHQ9
SUM OF ALL RESPONSES TO PHQ QUESTIONS 1-9: 9
5. POOR APPETITE OR OVEREATING: MORE THAN HALF THE DAYS

## 2020-08-31 NOTE — PROGRESS NOTES
"Nikki Morales is a 60 year old female who is being evaluated via a billable telephone visit.      The patient has been notified of following:     \"This telephone visit will be conducted via a call between you and your physician/provider. We have found that certain health care needs can be provided without the need for a physical exam.  This service lets us provide the care you need with a short phone conversation.  If a prescription is necessary we can send it directly to your pharmacy.  If lab work is needed we can place an order for that and you can then stop by our lab to have the test done at a later time.    Telephone visits are billed at different rates depending on your insurance coverage. During this emergency period, for some insurers they may be billed the same as an in-person visit.  Please reach out to your insurance provider with any questions.    If during the course of the call the physician/provider feels a telephone visit is not appropriate, you will not be charged for this service.\"    Patient has given verbal consent for Telephone visit?  Yes    What phone number would you like to be contacted at? [phone]    How would you like to obtain your AVS? Kelsit    Subjective     Nikki Morales is a 60 year old female who presents via phone visit today for the following health issues:    HPI     Will need Lorazpem refilled if there is not medication dosage change.   HM Due: ACT due sent to patient on mychart to complete.     Anxiety Follow-Up    How are you doing with your anxiety since your last visit? No change    Are you having other symptoms that might be associated with anxiety? No    Have you had a significant life event? No     Are you feeling depressed? Yes:  some days     Do you have any concerns with your use of alcohol or other drugs? No    Social History     Tobacco Use     Smoking status: Former Smoker     Packs/day: 1.00     Years: 30.00     Pack years: 30.00     Types: " Cigarettes     Smokeless tobacco: Never Used   Substance Use Topics     Alcohol use: Yes     Alcohol/week: 4.0 standard drinks     Types: 2 Cans of beer, 2 Standard drinks or equivalent per week     Comment: social     Drug use: No     Comment: used in past any and all     KAILEE-7 SCORE 11/16/2018 11/25/2019   Total Score 13 9     PHQ 7/17/2019 11/25/2019 6/17/2020   PHQ-9 Total Score 6 11 9   Q9: Thoughts of better off dead/self-harm past 2 weeks Not at all Several days Several days     Last PHQ-9 8/31/2020   1.  Little interest or pleasure in doing things 1   2.  Feeling down, depressed, or hopeless 1   3.  Trouble falling or staying asleep, or sleeping too much 3   4.  Feeling tired or having little energy 1   5.  Poor appetite or overeating 1   6.  Feeling bad about yourself 1   7.  Trouble concentrating 1   8.  Moving slowly or restless 0   Q9: Thoughts of better off dead/self-harm past 2 weeks 0   PHQ-9 Total Score 9   Difficulty at work, home, or with people Somewhat difficult     KAILEE-7  8/31/2020   1. Feeling nervous, anxious, or on edge 2   2. Not being able to stop or control worrying 1   3. Worrying too much about different things 1   4. Trouble relaxing 2   5. Being so restless that it is hard to sit still 0   6. Becoming easily annoyed or irritable 0   7. Feeling afraid, as if something awful might happen 0   KAILEE-7 Total Score 6   If you checked any problems, how difficult have they made it for you to do your work, take care of things at home, or get along with other people? Somewhat difficult         How many servings of fruits and vegetables do you eat daily?  0-1    On average, how many sweetened beverages do you drink each day (Examples: soda, juice, sweet tea, etc.  Do NOT count diet or artificially sweetened beverages)?   1    How many days per week do you exercise enough to make your heart beat faster? 3 or less    How many minutes a day do you exercise enough to make your heart beat faster? 9 or  less    How many days per week do you miss taking your medication? 0        Patient is having extreme anxiety with her son and his behavior.  Alcohol abuse continues in his liver enzymes elevated significantly in the last week.  He has been running fevers and has been evaluated.  However patient really fears for his long-term wellbeing.  She continues to use benzodiazepine at small dose but requires this to calm down again the beach today.  She understands her only risk for alcohol abuse and benzodiazepine use.  She is interested in trying something that might help with her chronic fatigue and anxiety.  Duloxetine has never been used for her before but multiple drugs including  hydroxyzine buspirone Lorazepam Depakote Lexapro Serzone bupropion venlafaxine Sinequan and probably more in the years before electronic records have been used.  None have really had a great effect calming her anxiety.  In addition to concerns about her son, marriage relationships are difficult.  They are in marriage counseling but this is not going well.    Review of Systems   Constitutional, HEENT, cardiovascular, pulmonary, gi and gu systems are negative, except as otherwise noted.  Patient still states she has trouble motivating herself and getting work accomplished.       Objective          Vitals:  No vitals were obtained today due to virtual visit.    healthy, alert, mild distress, cooperative and emotional distress  PSYCH: Alert and oriented times 3; coherent speech, normal   rate and volume, able to articulate logical thoughts, able   to abstract reason, no tangential thoughts, no hallucinations   or delusions  Her affect is anxious and rapid speech  RESP: No cough, no audible wheezing, able to talk in full sentences  Remainder of exam unable to be completed due to telephone visits    None reviewed        Assessment/Plan:    Assessment & Plan     Nikki was seen today for recheck medication.    Diagnoses and all orders for this  "visit:    Moderate recurrent major depression (H)    KAILEE (generalized anxiety disorder)  -     LORazepam (ATIVAN) 1 MG tablet; Take 1 tablet (1 mg) by mouth 2 times daily as needed for anxiety  -     DULoxetine (CYMBALTA) 30 MG capsule; Take 1 capsule (30 mg) by mouth daily    Chronic fatigue syndrome  -     cyanocobalamin (CYANOCOBALAMIN) 1000 MCG/ML injection; INJECT 1ML INTRAMUSCULARLY EVERY 30 DAYS  -     DULoxetine (CYMBALTA) 30 MG capsule; Take 1 capsule (30 mg) by mouth daily    Vitamin B12 deficiency (dietary) anemia  -     cyanocobalamin (CYANOCOBALAMIN) 1000 MCG/ML injection; INJECT 1ML INTRAMUSCULARLY EVERY 30 DAYS    At this point I am asking for duloxetine because of failure of multiple medications for anxiety.  Hopefully this is not going to be too difficult a prior authorization.  Otherwise she will continue current plan as before.  Limited use benzodiazepine.  But as stated above my concern about potential abuse of this family medication is high given patient's own behavior and family alcohol history.     BMI:   Estimated body mass index is 28.91 kg/m  as calculated from the following:    Height as of 2/10/20: 1.758 m (5' 9.21\").    Weight as of 6/17/20: 89.4 kg (197 lb).   Weight management plan: Patient aware of need to limit calories and exercise more        See Patient Instructions    Return in about 4 weeks (around 9/28/2020) for mental health.    Erik Peraza MD  [facility]    Phone call duration: 15 minutes 2:09 PM to 2:24 PM              "

## 2020-08-31 NOTE — PATIENT INSTRUCTIONS
1.  Test when I said nearly every insurance covers duloxetine, he does require a prior authorization.  We are working on that if it is not dispensed immediately.  Begin this when and if we can make it happen.  Otherwise continue usual medications  2.  Continue with everything else doing for thyroid B12 deficiency reflux cholesterol as you are doing.

## 2020-08-31 NOTE — TELEPHONE ENCOUNTER
"Reason for Call:  Other     Detailed comments: Requesting to speak with Dr Peraza.  States \"personal\" and \"Mental Health\".      Phone Number Patient can be reached at: Cell number on file:    Telephone Information:   Mobile [phone]       Best Time: any    Can we leave a detailed message on this number? YES    Call taken on 8/31/2020 at 11:07 AM by Catherine Holman      "

## 2020-09-01 ASSESSMENT — ANXIETY QUESTIONNAIRES: GAD7 TOTAL SCORE: 6

## 2020-09-29 ENCOUNTER — HOSPITAL ENCOUNTER (OUTPATIENT)
Dept: MAMMOGRAPHY | Facility: CLINIC | Age: 60
Discharge: HOME | End: 2020-09-29
Attending: FAMILY MEDICINE | Admitting: FAMILY MEDICINE
Payer: COMMERCIAL

## 2020-09-29 DIAGNOSIS — Z12.31 VISIT FOR SCREENING MAMMOGRAM: ICD-10-CM

## 2020-09-29 PROCEDURE — 77067 SCR MAMMO BI INCL CAD: CPT

## 2020-10-10 ENCOUNTER — NURSE TRIAGE (OUTPATIENT)
Dept: NURSING | Facility: CLINIC | Age: 60
End: 2020-10-10

## 2020-10-10 DIAGNOSIS — J20.9 ACUTE BRONCHITIS WITH SYMPTOMS > 10 DAYS: ICD-10-CM

## 2020-10-10 DIAGNOSIS — J45.30 MILD PERSISTENT ASTHMA WITHOUT COMPLICATION: ICD-10-CM

## 2020-10-10 DIAGNOSIS — B00.9 HSV (HERPES SIMPLEX VIRUS) INFECTION: ICD-10-CM

## 2020-10-10 RX ORDER — VALACYCLOVIR HYDROCHLORIDE 1 G/1
1000 TABLET, FILM COATED ORAL 2 TIMES DAILY
Qty: 20 TABLET | Refills: 0 | Status: SHIPPED | OUTPATIENT
Start: 2020-10-10 | End: 2020-10-19

## 2020-10-10 NOTE — TELEPHONE ENCOUNTER
"Called to request for a medication refill order for valtrex for cold sore.  This triage nurse paged the on call provider (Dr. Nirali Lagos) for patient's primary care provider (Dr. Peraza, Erik Hamzah) at 12.56 pm, called back at 12.58 and was informed about patient's request.  Caller was updated  Leda Conrad RN    Reason for Disposition    Caller has urgent medication question about med that PCP prescribed and triager unable to answer question     valtrex    Additional Information    Negative: Drug overdose and nurse unable to answer question    Negative: Caller requesting information not related to medicine    Negative: Caller requesting a prescription for Strep throat and has a positive culture result    Negative: Rash while taking a medication or within 3 days of stopping it    Negative: Immunization reaction suspected    Negative: [1] Asthma AND [2] having symptoms of asthma (cough, wheezing, etc)    Negative: MORE THAN A DOUBLE DOSE of a prescription or over-the-counter (OTC) drug    Negative: [1] DOUBLE DOSE (an extra dose or lesser amount) of over-the-counter (OTC) drug AND [2] any symptoms (e.g., dizziness, nausea, pain, sleepiness)    Negative: [1] DOUBLE DOSE (an extra dose or lesser amount) of prescription drug AND [2] any symptoms (e.g., dizziness, nausea, pain, sleepiness)    Negative: Took another person's prescription drug    Negative: [1] DOUBLE DOSE (an extra dose or lesser amount) of prescription drug AND [2] NO symptoms (Exception: a double dose of antibiotics)    Negative: Diabetes drug error or overdose (e.g., insulin or extra dose)    Negative: [1] Request for URGENT new prescription or refill of \"essential\" medication (i.e., likelihood of harm to patient if not taken) AND [2] triager unable to fill per unit policy    Negative: [1] Prescription not at pharmacy AND [2] was prescribed today by PCP    Negative: Pharmacy calling with prescription questions and triager unable to answer " question    Protocols used: MEDICATION QUESTION CALL-A-AH

## 2020-10-12 RX ORDER — VALACYCLOVIR HYDROCHLORIDE 1 G/1
TABLET, FILM COATED ORAL
Qty: 20 TABLET | Refills: 3 | Status: SHIPPED | OUTPATIENT
Start: 2020-10-12 | End: 2021-03-24

## 2020-10-12 RX ORDER — AZITHROMYCIN 250 MG/1
TABLET, FILM COATED ORAL
Qty: 6 TABLET | Refills: 0 | OUTPATIENT
Start: 2020-10-12

## 2020-10-12 NOTE — TELEPHONE ENCOUNTER
"Requested Prescriptions   Pending Prescriptions Disp Refills     azithromycin (ZITHROMAX) 250 MG tablet [Pharmacy Med Name: AZITHROMYCIN 250MG TABS] 6 tablet 0     Sig: TAKE TWO TABLETS BY MOUTH AS ONE DOSE ON THE FIRST DAY, THEN TAKE ONE DAILY THEREAFTER.       There is no refill protocol information for this order   Denied  No medication active - this is for an acute diagnosis that will need to have a scheduled office visit.         valACYclovir (VALTREX) 1000 mg tablet [Pharmacy Med Name: VALACYCLOVIR HCL 1GM TABS] 20 tablet 0     Sig: TAKE ONE TABLET BY MOUTH TWICE A DAY   Last office visit 8/31/2020    Antivirals for Herpes Protocol Passed - 10/10/2020 11:54 AM        Passed - Patient is age 12 or older        Passed - Recent (12 mo) or future (30 days) visit within the authorizing provider's specialty     Patient has had an office visit with the authorizing provider or a provider within the authorizing providers department within the previous 12 mos or has a future within next 30 days. See \"Patient Info\" tab in inbasket, or \"Choose Columns\" in Meds & Orders section of the refill encounter.              Passed - Medication is active on med list        Passed - Normal serum creatinine on file in past 12 months     Recent Labs   Lab Test 06/23/20  1053   CR 0.82       Ok to refill medication if creatinine is low           Prescription approved per [facility] Refill Protocol.  Mariela Srinivasan RN      "

## 2020-10-16 DIAGNOSIS — G93.32 CHRONIC FATIGUE SYNDROME: ICD-10-CM

## 2020-10-16 RX ORDER — ZOLPIDEM TARTRATE 5 MG/1
TABLET ORAL
Qty: 60 TABLET | Refills: 1 | Status: SHIPPED | OUTPATIENT
Start: 2020-10-16 | End: 2020-12-07

## 2020-10-16 NOTE — TELEPHONE ENCOUNTER
Ambien      Last Written Prescription Date:  8/17/2020  Last Fill Quantity: 60,   # refills: 1  Last Office Visit: 8/31/2020  Future Office visit:       Routing refill request to provider for review/approval because:  Drug not on the [facility], [facility] or [facility] refill protocol or controlled substance

## 2020-10-19 ENCOUNTER — NURSE TRIAGE (OUTPATIENT)
Dept: FAMILY MEDICINE | Facility: CLINIC | Age: 60
End: 2020-10-19

## 2020-10-19 ENCOUNTER — VIRTUAL VISIT (OUTPATIENT)
Dept: INTERNAL MEDICINE | Facility: CLINIC | Age: 60
End: 2020-10-19
Payer: COMMERCIAL

## 2020-10-19 VITALS — BODY MASS INDEX: 28.62 KG/M2 | TEMPERATURE: 96.8 F | WEIGHT: 195 LBS

## 2020-10-19 DIAGNOSIS — K57.32 DIVERTICULITIS OF COLON: Primary | ICD-10-CM

## 2020-10-19 PROCEDURE — 99213 OFFICE O/P EST LOW 20 MIN: CPT | Mod: TEL | Performed by: INTERNAL MEDICINE

## 2020-10-19 RX ORDER — METRONIDAZOLE 500 MG/1
500 TABLET ORAL 3 TIMES DAILY
Qty: 21 TABLET | Refills: 0 | Status: SHIPPED | OUTPATIENT
Start: 2020-10-19 | End: 2020-10-26

## 2020-10-19 RX ORDER — CIPROFLOXACIN 500 MG/1
500 TABLET, FILM COATED ORAL 2 TIMES DAILY
Qty: 14 TABLET | Refills: 0 | Status: SHIPPED | OUTPATIENT
Start: 2020-10-19 | End: 2020-12-02

## 2020-10-19 ASSESSMENT — ASTHMA QUESTIONNAIRES
QUESTION_2 LAST FOUR WEEKS HOW OFTEN HAVE YOU HAD SHORTNESS OF BREATH: NOT AT ALL
QUESTION_5 LAST FOUR WEEKS HOW WOULD YOU RATE YOUR ASTHMA CONTROL: COMPLETELY CONTROLLED
QUESTION_4 LAST FOUR WEEKS HOW OFTEN HAVE YOU USED YOUR RESCUE INHALER OR NEBULIZER MEDICATION (SUCH AS ALBUTEROL): ONCE A WEEK OR LESS
QUESTION_1 LAST FOUR WEEKS HOW MUCH OF THE TIME DID YOUR ASTHMA KEEP YOU FROM GETTING AS MUCH DONE AT WORK, SCHOOL OR AT HOME: NONE OF THE TIME
QUESTION_3 LAST FOUR WEEKS HOW OFTEN DID YOUR ASTHMA SYMPTOMS (WHEEZING, COUGHING, SHORTNESS OF BREATH, CHEST TIGHTNESS OR PAIN) WAKE YOU UP AT NIGHT OR EARLIER THAN USUAL IN THE MORNING: NOT AT ALL
ACT_TOTALSCORE: 24

## 2020-10-19 NOTE — PROGRESS NOTES
"1:11pm busy  1:14pm busy      Nikki Morales is a 60 year old female who is being evaluated via a billable telephone visit.      The patient has been notified of following:     \"This telephone visit will be conducted via a call between you and your physician/provider. We have found that certain health care needs can be provided without the need for a physical exam.  This service lets us provide the care you need with a short phone conversation.  If a prescription is necessary we can send it directly to your pharmacy.  If lab work is needed we can place an order yes kylee is is Margarita documentation how are you today pending lab so was: INR on have you ever had diverticulitis before for that and you can then stop by our lab to have the test done at a later time.    Telephone visits are billed at different rates depending on your insurance coverage. During this emergency period, for some insurers they may be billed the same as an in-person visit.  Please reach out to your insurance provider with any questions.    If during the course of the call the physician/provider feels a telephone visit is not appropriate, you will not be charged for this service.\"    Patient has given verbal consent for Telephone visit?  Yes    What phone number would you like to be contacted at? [phone]    How would you like to obtain your AVS? MyChart    Subjective     Nikki Morales is a 60 year old female who presents via phone visit today for the following health issues:    HPI     Abdominal/Flank Pain  Onset/Duration: 1 week  Description:   Character: Gnawing and Cramping  Location: suprapubic region pelvic region  Radiation: Back  Intensity: moderate  Progression of Symptoms:  waxing and waning  Accompanying Signs & Symptoms:  Fever/Chills: no  Gas/Bloating: YES  Nausea: no  Vomitting: no  Diarrhea: YES  Constipation: no  Dysuria or Hematuria: no  History:   Trauma: no  Previous similar pain: YES  Previous tests done: " Colonoscopy  Precipitating factors:   Does the pain change with:     Food: YES- depends on the food    Bowel Movement: YES-improved    Urination: no   Other factors:  Like period cramping  Therapies tried and outcome: probiotics  No LMP recorded. Patient has had a hysterectomy.            EMR reviewed including: History of chief complaint, pertinent distant history, medications, concurrent diagnoses.             Chief Complaint:  #1   Lower abdominal pain  Worse after meals  Associated with bowel urgency  Occasional diarrhea  No blood or mucus  Denies urinary symptoms  Post hysterectomy  Prior colonoscopy demonstrates diverticulosis  Denies fever or chills  Has had similar symptoms previously.                           PAST, FAMILY,SOCIAL HISTORY:     Medical  History:   has a past medical history of Chronic fatigue, Hyperlipidemia, Hypothyroid, New onset a-fib (H), and Other vitamin B12 deficiency anemia.     Surgical History:   has a past surgical history that includes VAGINAL HYSTERECTOMY (1995);  DELIVERY ONLY; DILATION/CURETTAGE DIAG/THER NON OB; conization cervix,knife/laser; REMOVAL OF TONSILS,<13 Y/O; NONSPECIFIC PROCEDURE (); NONSPECIFIC PROCEDURE; tubal ligation (); UGI ENDOSCOPY DIAG W BIOPSY (07); UPPER GI ENDOSCOPY,EXAM (09/10/99 & 98); colonoscopy (10/06/09); Esophagoscopy, gastroscopy, duodenoscopy (EGD), combined (2013); Colonoscopy (2013); Laparoscopic cholecystectomy (10/12/13); NONSPECIFIC PROCEDURE (); hysterectomy, pap no longer indicated; Repair ptosis; Colonoscopy (N/A, 2018); and Combined repair ptosis with blepharoplasty bilateral (Bilateral, 2018).     Social History:   reports that she has quit smoking. Her smoking use included cigarettes. She has a 30.00 pack-year smoking history. She has never used smokeless tobacco. She reports current alcohol use of about 4.0 standard drinks of alcohol per week. She reports that she does not use  drugs.     Family History:  family history includes Asthma in her son and son; Cancer in her brother, father, and mother; Cardiovascular in her sister; Cerebrovascular Disease in her father and paternal grandmother; Cerebrovascular Disease (age of onset: 51) in her sister; Diabetes in her father, maternal grandfather, and paternal grandfather; Heart Disease in her maternal grandmother; Hypertension in her father; Prostate Cancer in her brother and mother; Psychotic Disorder in her son.            MEDICATIONS  Current Outpatient Medications   Medication Sig Dispense Refill     budesonide (PULMICORT) 0.5 MG/2ML neb solution Mix 2 vials with 1 oz coffee flavoring and drink twice a day and rinse mouth aftrerwards 4 Box 11     calcium carbonate (TUMS) 500 MG chewable tablet Take 2 chew tab by mouth 3 times daily as needed for other (bloating/flatulence)       ciprofloxacin (CIPRO) 500 MG tablet Take 1 tablet (500 mg) by mouth 2 times daily 14 tablet 0     cyanocobalamin (CYANOCOBALAMIN) 1000 MCG/ML injection INJECT 1ML INTRAMUSCULARLY EVERY 30 DAYS 1 mL 11     DULoxetine (CYMBALTA) 30 MG capsule Take 1 capsule (30 mg) by mouth daily 30 capsule 1     estradiol (VIVELLE-DOT) 0.05 MG/24HR patch Place 1 patch onto the skin twice a week       fluticasone (FLOVENT HFA) 220 MCG/ACT inhaler Inhale 2 puffs into the lungs 2 times daily as needed (for bronchitis) 1 Inhaler 3     levothyroxine (SYNTHROID/LEVOTHROID) 100 MCG tablet Take 1 tablet (100 mcg) by mouth daily 90 tablet 1     LORazepam (ATIVAN) 1 MG tablet Take 1 tablet (1 mg) by mouth 2 times daily as needed for anxiety 20 tablet 1     methylphenidate (RITALIN) 10 MG tablet Take 10 mg by mouth daily as needed (for chronic fatigue syndrome)  30 tablet 0     metroNIDAZOLE (FLAGYL) 500 MG tablet Take 1 tablet (500 mg) by mouth 3 times daily for 7 days 21 tablet 0     multivitamin, therapeutic with minerals (MULTI-VITAMIN) TABS Take 1 tablet by mouth daily 100 tablet 3      pantoprazole (PROTONIX) 40 MG EC tablet Take 1 tablet (40 mg) by mouth 2 times daily (before meals) 90 tablet 1     rosuvastatin (CRESTOR) 10 MG tablet 1 daily or as directed. 90 tablet 1     trospium (SANCTURA) 20 MG tablet TAKE ONE TO TWO TABLETS BY MOUTH EVERY DAY AS NEEDED 40 tablet 0     valACYclovir (VALTREX) 1000 mg tablet TAKE ONE TABLET BY MOUTH TWICE A DAY 20 tablet 3     zolpidem (AMBIEN) 5 MG tablet TAKE ONE TO TWO TABLETS BY MOUTH AT BEDTIME AS NEEDED 60 tablet 1         --------------------------------------------------------------------------------------------------------------------                              Review of Systems       LUNGS: Pt denies: cough, excess sputum, hemoptysis, or shortness of breath.   HEART: Pt denies: chest pain, arrhythmia, syncope, tachy or bradyarrhythmia.   GI: Pt denies: nausea, vomiting, diarrhea, constipation, melena, or hematochezia.   NEURO: Pt denies: seizures, strokes, diplopia, weakness, paraesthesias, or paralysis.   SKIN: Pt denies: itching, rashes, discoloration, or specific lesions of concern. Denies recent hair loss.   PSYCH: The patient denies significant depression, anxiety, mood imbalance. Specifically denies any suicidal ideation.                                    No physical examination is performed as this is a virtual visit.  The patient's voice is strong, there is no evidence of labored breathing or wheezing.  The patient is alert and appropriate and demonstrates no obvious behavioral irregularities.             Decision Making       1. Diverticulitis of colon  Cipro plus Flagyl  Recommend no alcohol  Avoid insoluble particular foods (i.e. corn)  - ciprofloxacin (CIPRO) 500 MG tablet; Take 1 tablet (500 mg) by mouth 2 times daily  Dispense: 14 tablet; Refill: 0  - metroNIDAZOLE (FLAGYL) 500 MG tablet; Take 1 tablet (500 mg) by mouth 3 times daily for 7 days  Dispense: 21 tablet; Refill: 0                             FOLLOW UP   I have asked the  patient to make an appointment for followup with me in 1 week if not improved            I have carefully explained the diagnosis and treatment options to the patient.  The patient has displayed an understanding of the above, and all subsequent questions were answered.      DO VIKKI Pinedo    Portions of this note were produced using [employer]  Although every attempt at real-time proof reading has been made, occasional grammar/syntax errors may have been missed.    Telephone time 14 minutes

## 2020-10-19 NOTE — TELEPHONE ENCOUNTER
"S-(situation): intermittent bloating, cramping in her lower abdominal area,  especially after eating or drinking.  She said she usually ends up in the bathroom within the hour to have FM. Starting to affect her ability to go places as worried about making it to the bathroom.     B-(background): She said Dr. Peraza told her that she could develop diverticulitis and colonoscopy showed some diverticula.    A-(assessment): lower abdominal pain of unknown etiology for the past week, Intermittently.     R-(recommendations):  She has made a virtual phone kit to discuss this with  today at 1 PM....................YRN Brown'      Additional Information    Negative: Passed out (i.e., fainted, collapsed and was not responding)    Negative: Sounds like a life-threatening emergency to the triager    Negative: Pain is mainly in upper abdomen (if needed ask: 'is it mainly above the belly button?')    Negative: Chest pain    Negative: SEVERE abdominal pain (e.g., excruciating)    Negative: Vomiting red blood or black (coffee ground) material    Negative: Bloody, black, or tarry bowel movements    Negative: Constant abdominal pain lasting > 2 hours    Negative: Vomiting and abdomen looks much more swollen than usual    Negative: Fever > 100.0 F (37.8 C) and has diabetes mellitus or a weak immune system (e.g., HIV positive, cancer chemotherapy, organ transplant, splenectomy, chronic steroids)    MODERATE OR MILD pain that comes and goes (cramps) lasts > 24 hours    Answer Assessment - Initial Assessment Questions  1. LOCATION: \"Where does it hurt?\"      \" It hurts right above the pelvic bone. Earlier in the week off to the sides and now pretty much centered. Every time she eats she is cramping and then will have BM. Dr. Peraza told me he thought I had Diverticulitis. This has been going on for a week. Don't know what to do about it. \"    2. RADIATION: \"Does the pain shoot anywhere else?\" (e.g., chest, back)      Pain just " "down in lower abdomen.    3. ONSET: \"When did the pain begin?\" (e.g., minutes, hours or days ago)       Started about a week ago. She notices when she eats or drinks, she has to go the bathroom. Usually has to go within the hour. Afraid to go anywhere.\"     4. SUDDEN: \"Gradual or sudden onset?\"      First she just felt swollen, bloated and then cramping and then having to run to the bathroom after eating. NO fever. \"     5. PATTERN \"Does the pain come and go, or is it constant?\"     - If constant: \"Is it getting better, staying the same, or worsening?\"       (Note: Constant means the pain never goes away completely; most serious pain is constant and it progresses)      - If intermittent: \"How long does it last?\" \"Do you have pain now?\"      (Note: Intermittent means the pain goes away completely between bouts)      It is intermittent, depends on when eats for drinks.   6. SEVERITY: \"How bad is the pain?\"  (e.g., Scale 1-10; mild, moderate, or severe)    - MILD (1-3): doesn't interfere with normal activities, abdomen soft and not tender to touch     - MODERATE (4-7): interferes with normal activities or awakens from sleep, tender to touch     - SEVERE (8-10): excruciating pain, doubled over, unable to do any normal activities       Moderate to severe discomfort.     7. RECURRENT SYMPTOM: \"Have you ever had this type of abdominal pain before?\" If so, ask: \"When was the last time?\" and \"What happened that time?\"       Yes, had this before, just for a day or so. This was about a month ago.     8. CAUSE: \"What do you think is causing the abdominal pain?\"       She is wondering if she has diverticulitis?  NOT eating makes it better. Eating makes it worse.     9. RELIEVING/AGGRAVATING FACTORS: \"What makes it better or worse?\" (e.g., movement, antacids, bowel movement)      NOTHING helps it, except NOT eating.     10. OTHER SYMPTOMS: \"Has there been any vomiting, diarrhea, constipation, or urine problems?\"        No " "vomiting, Sharp gas pains.     11. PREGNANCY: \"Is there any chance you are pregnant?\" \"When was your last menstrual period?\"         NA    Protocols used: ABDOMINAL PAIN - FEMALE-A-OH      "

## 2020-10-20 ASSESSMENT — ASTHMA QUESTIONNAIRES: ACT_TOTALSCORE: 24

## 2020-11-10 ENCOUNTER — MYC REFILL (OUTPATIENT)
Dept: FAMILY MEDICINE | Facility: CLINIC | Age: 60
End: 2020-11-10

## 2020-11-10 DIAGNOSIS — F41.1 GAD (GENERALIZED ANXIETY DISORDER): ICD-10-CM

## 2020-11-10 DIAGNOSIS — N39.3 FEMALE STRESS INCONTINENCE: ICD-10-CM

## 2020-11-10 DIAGNOSIS — K20.0 EOSINOPHILIC ESOPHAGITIS: ICD-10-CM

## 2020-11-11 RX ORDER — PANTOPRAZOLE SODIUM 40 MG/1
40 TABLET, DELAYED RELEASE ORAL
Qty: 90 TABLET | Refills: 1 | Status: SHIPPED | OUTPATIENT
Start: 2020-11-11 | End: 2021-03-24

## 2020-11-11 RX ORDER — LORAZEPAM 1 MG/1
1 TABLET ORAL 2 TIMES DAILY PRN
Qty: 20 TABLET | Refills: 1 | Status: SHIPPED | OUTPATIENT
Start: 2020-11-11 | End: 2020-12-07

## 2020-11-11 RX ORDER — TROSPIUM CHLORIDE 20 MG/1
TABLET, FILM COATED ORAL
Qty: 180 TABLET | Refills: 0 | Status: SHIPPED | OUTPATIENT
Start: 2020-11-11 | End: 2021-02-05

## 2020-11-25 ENCOUNTER — MYC MEDICAL ADVICE (OUTPATIENT)
Dept: FAMILY MEDICINE | Facility: CLINIC | Age: 60
End: 2020-11-25

## 2020-11-25 DIAGNOSIS — G93.32 CHRONIC FATIGUE SYNDROME: ICD-10-CM

## 2020-12-02 ENCOUNTER — VIRTUAL VISIT (OUTPATIENT)
Dept: PHARMACY | Facility: CLINIC | Age: 60
End: 2020-12-02
Payer: COMMERCIAL

## 2020-12-02 DIAGNOSIS — G93.32 CHRONIC FATIGUE SYNDROME: Primary | ICD-10-CM

## 2020-12-02 DIAGNOSIS — N39.3 FEMALE STRESS INCONTINENCE: ICD-10-CM

## 2020-12-02 DIAGNOSIS — E78.5 HYPERLIPIDEMIA LDL GOAL <130: ICD-10-CM

## 2020-12-02 DIAGNOSIS — K21.9 GASTROESOPHAGEAL REFLUX DISEASE, UNSPECIFIED WHETHER ESOPHAGITIS PRESENT: ICD-10-CM

## 2020-12-02 DIAGNOSIS — Z78.9 TAKES DIETARY SUPPLEMENTS: ICD-10-CM

## 2020-12-02 DIAGNOSIS — F33.1 MODERATE RECURRENT MAJOR DEPRESSION (H): ICD-10-CM

## 2020-12-02 DIAGNOSIS — F41.1 GAD (GENERALIZED ANXIETY DISORDER): ICD-10-CM

## 2020-12-02 PROCEDURE — 99607 MTMS BY PHARM ADDL 15 MIN: CPT | Mod: TEL | Performed by: PHARMACIST

## 2020-12-02 PROCEDURE — 99605 MTMS BY PHARM NP 15 MIN: CPT | Mod: TEL | Performed by: PHARMACIST

## 2020-12-02 NOTE — PROGRESS NOTES
MTM ENCOUNTER  SUBJECTIVE/OBJECTIVE:                           Nikki Morales is a 60 year old female called for an initial visit. She was referred to me from her [employer] insurance plan.      Reason for visit: Comprehensive medication review.    Allergies/ADRs: Reviewed in chart  Tobacco: She reports that she has quit smoking. Her smoking use included cigarettes. She has a 30.00 pack-year smoking history. She has never used smokeless tobacco.  Alcohol: Less than 1 beverages / week  Caffeine: 1-2 cups/day of coffee  Activity: Chronic fatigue syndrome for 3 years so very little  Past Medical History: Reviewed in chart      Medication Adherence/Access: Patient takes medications directly from bottles.  Patient takes medications 2 time(s) per day.   Per patient, misses medication 0 times per week. Sometimes forgets thyroid medications.   Medication barriers: none.   The patient fills medications at [place]: NO, fills medications at [facility].    Anxiety/Chronic Fatigue Syndrome/Insomnia: Pt is currently taking zolpidem 5 mg at bedtime, lorazepam 1 mg at bedtime +/- second dose if she wakes up in the middle of the night. This regimen is effective for her sleep. Still fatigue during the day from chronic fatigue, but better sleep has helped. Has a prescription for methylphenidate, but cannot recall the last time she used. Prescribed duloxetine - antidepressants either keep her awake or don't do anything. She is hesitant to start another medication.     Hyperlipidemia: Current therapy includes rosuvastatin 10 mg some days. She is concerned about possible statin-myalgias though has not taken this current therapy consistently.  Patient reports no significant myalgias or other side effects.  The 10-year ASCVD risk score (Marion BIJU Jr., et al., 2013) is: 4.9%    Values used to calculate the score:      Age: 60 years      Sex: Female      Is Non- : No      Diabetic: No      Tobacco smoker: No       Systolic Blood Pressure: 122 mmHg      Is BP treated: No      HDL Cholesterol: 55 mg/dL      Total Cholesterol: 300 mg/dL  Recent Labs   Lab Test 06/23/20  1053 05/18/16  1002 09/23/15  1136 02/20/14  1035   CHOL 300* 379* 356* 333*   HDL 55 56 54 56   * 281* 247* 240*   TRIG 125 208* 274* 190*   CHOLHDLRATIO  --   --  6.6* 6.0*       GERD: Current medications include: Protonix (pantoprazole) 40 mg once daily. Pt reports no current symptoms.  Patient feels that current regimen is effective. Helps with swallowing. She does use Pulmicort nebs with coffee flavoring to help with inflammation in her throat as needed - has not used this regularly. Will also use Flovent HFA as needed for bronchitis.    Incontinence: Patient takes trospium IR 20-40 mg daily as needed. Usually only for longer drives, plane flights, or other events where bathroom break opportunities are limited. She feels that she bloats/fills with fluid when she takes. Is effective at higher dose. No issues with drowsiness.     Supplements: Patient is taking vitamin B12 injection monthly and a dailly multivitamin. Denies any issues.     Today's Vitals: LMP  (LMP Unknown)     BP Readings from Last 3 Encounters:   02/10/20 122/70   11/25/19 132/76   08/02/19 (!) 151/74     Wt Readings from Last 5 Encounters:   10/19/20 195 lb (88.5 kg)   06/17/20 197 lb (89.4 kg)   02/10/20 201 lb 3.2 oz (91.3 kg)   11/25/19 201 lb (91.2 kg)   07/17/19 201 lb 4.8 oz (91.3 kg)       ASSESSMENT:                              Medication Adherence: No issues identified    Anxiety/Chronic Fatigue Syndrome/Insomnia: Would benefit from taking duloxetine daily.      Hyperlipidemia: Would benefit from taking daily to determine if patient can tolerate. Could consider alternative dosing schedule if she develops myalgias.       GERD: Stable.     Incontinence: Somewhat stable.     Supplements: Stable.     PLAN:                            1. Would benefit from trying to taking  duloxetine and rosuvastatin daily.     I spent 30 minutes with this patient today. A copy of the visit note was provided to the patient's primary care provider.    Will follow up in 6 months or sooner if needed.    The patient was sent via [employer] a summary of these recommendations.     Will Cadet, StanD, [facility]  Medication Therapy Management Pharmacist  Pager: [phone]      Patient consented to a telehealth visit: yes  Telemedicine Visit Details  Type of service:  Telephone visit  Start Time: 11:00 AM  End Time: 11:30 AM  Originating Location (patient location): Home  Distant Location (provider location):  [facility]  Mode of Communication:  Telephone

## 2020-12-05 NOTE — PATIENT INSTRUCTIONS
Recommendations from today's MTM visit:                                                    MTM (medication therapy management) is a service provided by a clinical pharmacist designed to help you get the most of out of your medicines.   Today we reviewed what your medicines are for, how to know if they are working, that your medicines are safe and how to make your medicine regimen as easy as possible.     1. Would benefit from trying to taking duloxetine and rosuvastatin daily. If you have difficulty tolerating the rosuvastatin daily we often have people take it a few days a week.    It was great to speak with you today.  I value your experience and would be very thankful for your time with providing feedback on our clinic survey. You may receive a survey via email or text message in the next few days.     Next MTM visit: 6 months or sooner if needed    To schedule another MTM appointment, please call the clinic directly or you may call the MTM scheduling line at [phone] or toll-free at [phone].     My Clinical Pharmacist's contact information:                                                      It was a pleasure talking with you today!  Please feel free to contact me with any questions or concerns you have.      Will Cadet, Abhijit, BCACP  Medication Therapy Management Pharmacist  Pager: [phone]

## 2020-12-07 ENCOUNTER — VIRTUAL VISIT (OUTPATIENT)
Dept: FAMILY MEDICINE | Facility: CLINIC | Age: 60
End: 2020-12-07
Payer: COMMERCIAL

## 2020-12-07 DIAGNOSIS — N39.46 MIXED INCONTINENCE: ICD-10-CM

## 2020-12-07 DIAGNOSIS — M47.12 DEGENERATIVE ARTHRITIS OF CERVICAL SPINE WITH CORD COMPRESSION: Primary | ICD-10-CM

## 2020-12-07 DIAGNOSIS — E03.4 HYPOTHYROIDISM DUE TO ACQUIRED ATROPHY OF THYROID: ICD-10-CM

## 2020-12-07 DIAGNOSIS — F41.1 GAD (GENERALIZED ANXIETY DISORDER): ICD-10-CM

## 2020-12-07 DIAGNOSIS — G93.32 CHRONIC FATIGUE SYNDROME: ICD-10-CM

## 2020-12-07 DIAGNOSIS — E78.5 HYPERLIPIDEMIA LDL GOAL <130: ICD-10-CM

## 2020-12-07 DIAGNOSIS — J45.30 MILD PERSISTENT ASTHMA WITHOUT COMPLICATION: ICD-10-CM

## 2020-12-07 DIAGNOSIS — G44.82 PRIMARY HEADACHE ASSOCIATED WITH SEXUAL ACTIVITY: ICD-10-CM

## 2020-12-07 PROCEDURE — 99214 OFFICE O/P EST MOD 30 MIN: CPT | Mod: TEL | Performed by: FAMILY MEDICINE

## 2020-12-07 RX ORDER — METHYLPHENIDATE HYDROCHLORIDE 10 MG/1
10 TABLET ORAL DAILY
Qty: 30 TABLET | Refills: 0 | Status: SHIPPED | OUTPATIENT
Start: 2020-12-07 | End: 2021-01-06

## 2020-12-07 RX ORDER — METHYLPHENIDATE HYDROCHLORIDE 10 MG/1
10 TABLET ORAL DAILY PRN
Qty: 30 TABLET | Refills: 0 | Status: CANCELLED | OUTPATIENT
Start: 2020-12-07

## 2020-12-07 RX ORDER — ZOLPIDEM TARTRATE 5 MG/1
TABLET ORAL
Qty: 60 TABLET | Refills: 1 | Status: SHIPPED | OUTPATIENT
Start: 2020-12-07 | End: 2021-03-05

## 2020-12-07 RX ORDER — FLUTICASONE PROPIONATE 220 UG/1
2 AEROSOL, METERED RESPIRATORY (INHALATION) 2 TIMES DAILY PRN
Qty: 1 INHALER | Refills: 3 | Status: SHIPPED | OUTPATIENT
Start: 2020-12-07 | End: 2021-03-24

## 2020-12-07 RX ORDER — DULOXETIN HYDROCHLORIDE 30 MG/1
30 CAPSULE, DELAYED RELEASE ORAL DAILY
Qty: 30 CAPSULE | Refills: 1 | Status: CANCELLED | OUTPATIENT
Start: 2020-12-07

## 2020-12-07 RX ORDER — ROSUVASTATIN CALCIUM 10 MG/1
TABLET, COATED ORAL
Qty: 90 TABLET | Refills: 1 | Status: SHIPPED | OUTPATIENT
Start: 2020-12-07 | End: 2021-03-24

## 2020-12-07 RX ORDER — METHYLPHENIDATE HYDROCHLORIDE 10 MG/1
10 TABLET ORAL DAILY
Qty: 30 TABLET | Refills: 0 | Status: SHIPPED | OUTPATIENT
Start: 2021-01-07 | End: 2021-02-06

## 2020-12-07 RX ORDER — INDOMETHACIN 50 MG/1
50 CAPSULE ORAL
Qty: 90 CAPSULE | Refills: 3 | Status: SHIPPED | OUTPATIENT
Start: 2020-12-07 | End: 2021-03-24

## 2020-12-07 RX ORDER — LORAZEPAM 1 MG/1
1 TABLET ORAL 2 TIMES DAILY PRN
Qty: 20 TABLET | Refills: 1 | Status: SHIPPED | OUTPATIENT
Start: 2020-12-07 | End: 2021-04-02

## 2020-12-07 RX ORDER — MIRABEGRON 25 MG/1
25 TABLET, FILM COATED, EXTENDED RELEASE ORAL DAILY
Qty: 30 TABLET | Refills: 1 | Status: SHIPPED | OUTPATIENT
Start: 2020-12-07 | End: 2021-02-05

## 2020-12-07 RX ORDER — LEVOTHYROXINE SODIUM 100 UG/1
100 TABLET ORAL DAILY
Qty: 90 TABLET | Refills: 1 | Status: SHIPPED | OUTPATIENT
Start: 2020-12-07 | End: 2021-03-24

## 2020-12-07 RX ORDER — METHYLPHENIDATE HYDROCHLORIDE 10 MG/1
10 TABLET ORAL DAILY
Qty: 30 TABLET | Refills: 0 | Status: SHIPPED | OUTPATIENT
Start: 2021-02-07 | End: 2021-03-09

## 2020-12-07 NOTE — PROGRESS NOTES
"Nikki Morales is a 60 year old female who is being evaluated via a billable telephone visit.      The patient has been notified of following:     \"This telephone visit will be conducted via a call between you and your physician/provider. We have found that certain health care needs can be provided without the need for a physical exam.  This service lets us provide the care you need with a short phone conversation.  If a prescription is necessary we can send it directly to your pharmacy.  If lab work is needed we can place an order for that and you can then stop by our lab to have the test done at a later time.    Telephone visits are billed at different rates depending on your insurance coverage. During this emergency period, for some insurers they may be billed the same as an in-person visit.  Please reach out to your insurance provider with any questions.    If during the course of the call the physician/provider feels a telephone visit is not appropriate, you will not be charged for this service.\"    Patient has given verbal consent for Telephone visit?  Yes    What phone number would you like to be contacted at? [phone]    How would you like to obtain your AVS? Jorje    Subjective     Nikki Morales is a 60 year old female who presents via phone visit today for the following health issues:    HPI     Medication Followup    Taking Medication as prescribed: yes    Side Effects:  None    Medication Helping Symptoms:  yes     Patient has discovered that using methylphenidate helps her focus and get work done.  She had started drinking Pepsi with caffeine and had jitters.  Methylphenidate works better and she would like to be back on this routinely.  She has never abused it and last prescription was long ago and she is just using old medication that she had.  Lorazepam will help her from being anxious.  Currently her alcoholic son has a girlfriend that keeps him on the straight and narrow.  This " limits her anxiety greatly.  She and her  are continuing to work things out.    She is starting to have more arm and shoulder pain and believes perhaps her spinal stenosis is the issue.  She would like to have consultation no for this problem.  Review of last MRI indicates significant narrowing of the spinal canal and C4-7.  Indomethacin has helped the pain.  She realizes this might be bad for her reflux.    She has urgency and frequency of urination and previous medications have caused significant side effects.  She is interested in trying something different    Review of Systems   Constitutional, HEENT, cardiovascular, pulmonary, gi and gu systems are negative, except as otherwise noted.       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  They should call you soon to arrange.  - NEUROSURGERY REFERRAL    Mixed incontinence  Patient describes problems with frequent urination.  Other medications have not worked.  This will be given a try.  - mirabegron (MYRBETRIQ) 25 MG 24 hr tablet; Take 1 tablet (25 mg) by mouth daily    Mild persistent asthma without complication  Continue current inhalers.  - fluticasone (FLOVENT HFA) 220 MCG/ACT inhaler; Inhale 2 puffs into the lungs 2 times daily as needed (for bronchitis)    Hypothyroidism due to acquired  atrophy of thyroid  Check levels make sure medication is appropriate  - levothyroxine (SYNTHROID/LEVOTHROID) 100 MCG tablet; Take 1 tablet (100 mcg) by mouth daily    KAILEE (generalized anxiety disorder)  She uses this relatively sparingly.  She understands the addictive nature of it.  - LORazepam (ATIVAN) 1 MG tablet; Take 1 tablet (1 mg) by mouth 2 times daily as needed for anxiety    Chronic fatigue syndrome  With zolpidem, she sleeps well and has less fatigue.  - zolpidem (AMBIEN) 5 MG tablet; TAKE ONE TO TWO TABLETS BY MOUTH AT BEDTIME AS NEEDED  - methylphenidate (RITALIN) 10 MG tablet; Take 1 tablet (10 mg) by mouth daily  - methylphenidate (RITALIN) 10 MG tablet; Take 1 tablet (10 mg) by mouth daily  - methylphenidate (RITALIN) 10 MG tablet; Take 1 tablet (10 mg) by mouth daily    Hyperlipidemia LDL goal <130  Taking rosuvastatin and we will check blood work  - rosuvastatin (CRESTOR) 10 MG tablet; 1 daily or as directed.    Primary headache associated with sexual activity  Indomethacin originally prescribed for this and she has not had follow-up headaches.  - indomethacin (INDOCIN) 50 MG capsule; Take 1 capsule (50 mg) by mouth 3 times daily (with meals)        MEDICATIONS:  Continue current medications without change  Regular exercise  See Patient Instructions    Return in about 3 months (around 3/7/2021) for BP Recheck, MSK problem, mental health.    Erik Peraza MD  [facility]    Phone call duration:  21 minutes [phone]

## 2020-12-08 NOTE — PATIENT INSTRUCTIONS
1.  Use Ritalin as you are to help with your attention deficit and avoid caffeine.  Continue with duloxetine Cymbalta.  2.  Lorazepam also may be used.  As we discussed at least 2 of these 3 medications are controlled substances and you need to establish care with someone.  3.  Someone should be in contact with you about scheduling with neurosurgery with your spinal stenosis.  4.  In no particular order Douglas ROCHA, Dr. Rodríguez, Dr. Nicole, Dr. Arroyo, Dr. Vasquez and possibly Dr. Campoverde

## 2021-01-10 ENCOUNTER — HEALTH MAINTENANCE LETTER (OUTPATIENT)
Age: 61
End: 2021-01-10

## 2021-01-26 ENCOUNTER — VIRTUAL VISIT (OUTPATIENT)
Dept: FAMILY MEDICINE | Facility: CLINIC | Age: 61
End: 2021-01-26
Payer: COMMERCIAL

## 2021-01-26 DIAGNOSIS — J06.9 UPPER RESPIRATORY TRACT INFECTION, UNSPECIFIED TYPE: ICD-10-CM

## 2021-01-26 DIAGNOSIS — Z76.89 ENCOUNTER TO ESTABLISH CARE: Primary | ICD-10-CM

## 2021-01-26 PROCEDURE — 99214 OFFICE O/P EST MOD 30 MIN: CPT | Mod: TEL | Performed by: NURSE PRACTITIONER

## 2021-01-26 RX ORDER — METHYLPREDNISOLONE 4 MG
TABLET, DOSE PACK ORAL
Qty: 21 TABLET | Refills: 0 | Status: SHIPPED | OUTPATIENT
Start: 2021-01-26 | End: 2021-02-19

## 2021-01-26 RX ORDER — AZITHROMYCIN 250 MG/1
TABLET, FILM COATED ORAL
Qty: 6 TABLET | Refills: 0 | Status: SHIPPED | OUTPATIENT
Start: 2021-01-26 | End: 2021-01-31

## 2021-01-26 NOTE — PROGRESS NOTES
"Margarita is a 60 year old who is being evaluated via a billable telephone visit.      What phone number would you like to be contacted at? [phone]  How would you like to obtain your AVS? Yasmanyhart  {PROVIDER CHARTING PREFERENCE:458065}    Subjective     Margarita is a 60 year old who presents to clinic today for the following health issues {ACCOMPANIED BY STATEMENT (Optional):282308}    HPI       New Patient/Transfer of Care  -fatigue  -adhd  -arthritis  -reflux  -asthma  -depr/anx  -thyroid  -hyperlipids  -thyroid    {additonal problems for provider to add (Optional):395979}    Review of Systems   {ROS COMP (Optional):164790}      Objective           Vitals:  No vitals were obtained today due to virtual visit.    Physical Exam   {GENERAL APPEARANCE:50::\"healthy\",\"alert\",\"no distress\"}  PSYCH: Alert and oriented times 3; coherent speech, normal   rate and volume, able to articulate logical thoughts, able   to abstract reason, no tangential thoughts, no hallucinations   or delusions  Her affect is { :5513595::\"normal\"}  RESP: No cough, no audible wheezing, able to talk in full sentences  Remainder of exam unable to be completed due to telephone visits    {Diagnostic Test Results (Optional):841250}    {AMBULATORY ATTESTATION (Optional):814371}        Phone call duration: *** minutes    "

## 2021-01-26 NOTE — PROGRESS NOTES
Margarita is a 60 year old who is being evaluated via a billable telephone visit.      How would you like to obtain your AVS? MyChart  If the visit is dropped, the invitation should be resent by: Text to cell phone: [phone]  Will anyone else be joining your video visit? No    Unable to do video visit: Attempted Amwell and Doximity: Ended up with Telephone visit.   Assessment & Plan     Encounter to establish care  - Reviewed medical history, medication history, discussed health maintenance needs. She just got all meds refilled from Primary care provider in December. She needs Preventative care visit in March. We will get her set up for visit in March.     Upper respiratory tract infection, unspecified type  - Patient has been struggling with URI symptoms for over 2 weeks. She is not getting better, coughing up thick phlegm now, getting very run down. Z-pack with steroids has helped a great deal in the past.   - Due to ongoing symptoms up upper respiratory congestion, rib pain from coughing and congestions and thick secretions we will treat for bacterial infection.   - Discussed having her follow up in clinic if symptoms do not improve in 10-14 days.   - azithromycin (ZITHROMAX) 250 MG tablet; Take 2 tablets (500 mg) by mouth daily for 1 day, THEN 1 tablet (250 mg) daily for 4 days.  - methylPREDNISolone (MEDROL DOSEPAK) 4 MG tablet therapy pack; Follow Package Directions              30 minutes spent on the date of the encounter doing chart review, review of test results, patient visit and documentation                Return in about 2 months (around 3/26/2021) for Physical Exam, Lab Work, Recheck if symptoms worsen or fail to improve.    Douglas Max NP  [facility]     Margarita is a 60 year old who presents to clinic today for the following health issues     HPI       New Patient/Transfer of  Care  -fatigue  -adhd  -arthritis  -reflux  -depr/anx  -asthma  -thyroid  -hyperlipidemia    She has been sick with URI symptoms for over 2 weeks. Struggles with most winter. Has tried rest and Over the counter medication. Now with thick phlegm when coughing and she is run down. Would like to discuss Z pack and steroids, she has asthma, this has worked well in the past. If waits to long will get full out pneumonia. She is other wise stable.     Review of Systems   Constitutional, HEENT, cardiovascular, pulmonary, gi and gu systems are negative, except as otherwise noted.      Objective           Vitals:  No vitals were obtained today due to virtual visit.    Physical Exam   GENERAL: Alert and no distress  RESP: No audible wheeze, cough, or visible cyanosis.  No visible retractions or increased work of breathing.    PSYCH: mentation appears normal and judgement and insight intact    Reviewed labs and imaging in Xray.           Telephone visit: Video failed due to patient unable to access the Ubimo or YumDots.     Type of service:  Video Visit    Telephone visit: Total time 22 minutes.         Distant Location (provider location):  [facility]     Platform used for Video Visit: Unable to complete video visit

## 2021-02-19 ENCOUNTER — OFFICE VISIT (OUTPATIENT)
Dept: INTERNAL MEDICINE | Facility: CLINIC | Age: 61
End: 2021-02-19
Payer: COMMERCIAL

## 2021-02-19 VITALS
HEART RATE: 100 BPM | WEIGHT: 210 LBS | OXYGEN SATURATION: 95 % | BODY MASS INDEX: 31.1 KG/M2 | SYSTOLIC BLOOD PRESSURE: 118 MMHG | HEIGHT: 69 IN | TEMPERATURE: 96.9 F | RESPIRATION RATE: 16 BRPM | DIASTOLIC BLOOD PRESSURE: 76 MMHG

## 2021-02-19 DIAGNOSIS — R10.32 LLQ ABDOMINAL PAIN: ICD-10-CM

## 2021-02-19 DIAGNOSIS — Z80.0 FAMILY HISTORY OF COLON CANCER: ICD-10-CM

## 2021-02-19 DIAGNOSIS — K57.32 DIVERTICULITIS OF COLON: Primary | ICD-10-CM

## 2021-02-19 PROCEDURE — 99214 OFFICE O/P EST MOD 30 MIN: CPT | Performed by: INTERNAL MEDICINE

## 2021-02-19 ASSESSMENT — MIFFLIN-ST. JEOR: SCORE: 1571.93

## 2021-02-19 ASSESSMENT — PAIN SCALES - GENERAL: PAINLEVEL: MILD PAIN (2)

## 2021-02-19 NOTE — PROGRESS NOTES
"    Assessment & Plan     Diverticulitis of colon  Patient diverticulitis of the colon she had diverticuli on her colonoscopy in 2018.  She had more severe pain in October and was treated with Cipro and Flagyl but had a virtual visit so no scan was done.  She had not had a recent abdominal CT scan.  She is having pain in the lower pelvic area.  She is already seen GYN and had a negative pelvic ultrasound.  We will set her up for a abdominal CT scan.  No treatment at this time.  - CT Abdomen w/o Contrast; Future    LLQ abdominal pain  See above probable diverticulitis.    Family history of colon cancer  Family history of cancer in multiple areas: And others.  The CT scan will help reassure her.  Continue colonoscopies as scheduled.               BMI:   Estimated body mass index is 31.01 kg/m  as calculated from the following:    Height as of this encounter: 1.753 m (5' 9\").    Weight as of this encounter: 95.3 kg (210 lb).   Weight management plan: Discussed healthy diet and exercise guidelines        No follow-ups on file.    Cirilo Tadeo MD  [facility]   Margarita is a 61 year old who presents for the following health issues    HPI     Chief Complaint   Patient presents with     Abdominal Pain     discuss lower abdominal pain/cramping     Abd pain started this fall, comes and goes. Flares up.  Thought to be diverticulitis.   No vomitting, no fevers, some diarrhea and cramping.  No blood.      October was treated with cipro and flagyl, made it better for a while.     Diverticulosis on colonoscopy from 2018.      Went to gyn, Dr Milagros Ogden and had negative pelvic ultrasound.     Review of Systems   CONSTITUTIONAL: NEGATIVE for fever, chills, change in weight  RESP: NEGATIVE for significant cough or SOB  CV: NEGATIVE for chest pain, palpitations or peripheral edema  GI: POSITIVE for abdominal pain LLQ  MUSCULOSKELETAL: NEGATIVE for significant arthralgias or myalgia  NEURO: NEGATIVE " "for weakness, dizziness or paresthesias  ENDOCRINE: NEGATIVE for temperature intolerance, skin/hair changes  HEME: NEGATIVE for bleeding problems  PSYCHIATRIC: NEGATIVE for changes in mood or affect      Objective    /76   Pulse 100   Temp 96.9  F (36.1  C) (Temporal)   Resp 16   Ht 1.753 m (5' 9\")   Wt 95.3 kg (210 lb)   LMP  (LMP Unknown)   SpO2 95%   BMI 31.01 kg/m    Body mass index is 31.01 kg/m .  Physical Exam   No acute distress  Abdomen is soft benign tender in the left lower quadrant no masses palpable    Colonoscopy from September 2018 is reviewed with diverticuli seen            "

## 2021-03-05 DIAGNOSIS — G93.32 CHRONIC FATIGUE SYNDROME: ICD-10-CM

## 2021-03-05 RX ORDER — ZOLPIDEM TARTRATE 5 MG/1
TABLET ORAL
Qty: 60 TABLET | Refills: 1 | Status: SHIPPED | OUTPATIENT
Start: 2021-03-05 | End: 2021-03-24

## 2021-03-05 NOTE — TELEPHONE ENCOUNTER
Ambien      Last Written Prescription Date:  12/7/2020  Last Fill Quantity: 60,   # refills: 1  Last Office Visit: 1/26/2021  Future Office visit:    Next 5 appointments (look out 90 days)    Mar 24, 2021  9:20 AM  PHYSICAL with Douglas Max NP  [facility] [address] [place] [id number]  [phone]           Routing refill request to provider for review/approval because:  Drug not on the [facility], [facility] or [facility] refill protocol or controlled substance

## 2021-03-24 ENCOUNTER — OFFICE VISIT (OUTPATIENT)
Dept: FAMILY MEDICINE | Facility: CLINIC | Age: 61
End: 2021-03-24
Payer: COMMERCIAL

## 2021-03-24 ENCOUNTER — TELEPHONE (OUTPATIENT)
Dept: FAMILY MEDICINE | Facility: CLINIC | Age: 61
End: 2021-03-24

## 2021-03-24 VITALS
BODY MASS INDEX: 30.49 KG/M2 | TEMPERATURE: 97.4 F | HEART RATE: 85 BPM | WEIGHT: 206.44 LBS | DIASTOLIC BLOOD PRESSURE: 80 MMHG | OXYGEN SATURATION: 97 % | SYSTOLIC BLOOD PRESSURE: 126 MMHG | RESPIRATION RATE: 20 BRPM

## 2021-03-24 DIAGNOSIS — Z00.00 ENCOUNTER FOR MEDICARE ANNUAL WELLNESS EXAM: Primary | ICD-10-CM

## 2021-03-24 DIAGNOSIS — J45.30 MILD PERSISTENT ASTHMA WITHOUT COMPLICATION: ICD-10-CM

## 2021-03-24 DIAGNOSIS — E03.4 HYPOTHYROIDISM DUE TO ACQUIRED ATROPHY OF THYROID: Primary | ICD-10-CM

## 2021-03-24 DIAGNOSIS — E78.5 HYPERLIPIDEMIA LDL GOAL <130: ICD-10-CM

## 2021-03-24 DIAGNOSIS — G44.82 PRIMARY HEADACHE ASSOCIATED WITH SEXUAL ACTIVITY: ICD-10-CM

## 2021-03-24 DIAGNOSIS — D51.8 VITAMIN B12 DEFICIENCY (DIETARY) ANEMIA: ICD-10-CM

## 2021-03-24 DIAGNOSIS — Z12.31 ENCOUNTER FOR SCREENING MAMMOGRAM FOR BREAST CANCER: ICD-10-CM

## 2021-03-24 DIAGNOSIS — B00.9 HSV (HERPES SIMPLEX VIRUS) INFECTION: ICD-10-CM

## 2021-03-24 DIAGNOSIS — F41.1 GAD (GENERALIZED ANXIETY DISORDER): ICD-10-CM

## 2021-03-24 DIAGNOSIS — E03.4 HYPOTHYROIDISM DUE TO ACQUIRED ATROPHY OF THYROID: ICD-10-CM

## 2021-03-24 DIAGNOSIS — N39.46 MIXED INCONTINENCE: ICD-10-CM

## 2021-03-24 DIAGNOSIS — K20.0 EOSINOPHILIC ESOPHAGITIS: ICD-10-CM

## 2021-03-24 DIAGNOSIS — G93.32 CHRONIC FATIGUE SYNDROME: ICD-10-CM

## 2021-03-24 LAB
ALBUMIN SERPL-MCNC: 3.5 G/DL (ref 3.4–5)
ALP SERPL-CCNC: 61 U/L (ref 40–150)
ALT SERPL W P-5'-P-CCNC: 25 U/L (ref 0–50)
ANION GAP SERPL CALCULATED.3IONS-SCNC: 4 MMOL/L (ref 3–14)
AST SERPL W P-5'-P-CCNC: 14 U/L (ref 0–45)
BASOPHILS # BLD AUTO: 0 10E9/L (ref 0–0.2)
BASOPHILS NFR BLD AUTO: 0.5 %
BILIRUB SERPL-MCNC: 0.4 MG/DL (ref 0.2–1.3)
BUN SERPL-MCNC: 33 MG/DL (ref 7–30)
CALCIUM SERPL-MCNC: 9.3 MG/DL (ref 8.5–10.1)
CHLORIDE SERPL-SCNC: 108 MMOL/L (ref 94–109)
CHOLEST SERPL-MCNC: 190 MG/DL
CO2 SERPL-SCNC: 28 MMOL/L (ref 20–32)
CREAT SERPL-MCNC: 0.75 MG/DL (ref 0.52–1.04)
DIFFERENTIAL METHOD BLD: NORMAL
EOSINOPHIL # BLD AUTO: 0.3 10E9/L (ref 0–0.7)
EOSINOPHIL NFR BLD AUTO: 4 %
ERYTHROCYTE [DISTWIDTH] IN BLOOD BY AUTOMATED COUNT: 12.5 % (ref 10–15)
GFR SERPL CREATININE-BSD FRML MDRD: 85 ML/MIN/{1.73_M2}
GLUCOSE SERPL-MCNC: 103 MG/DL (ref 70–99)
HCT VFR BLD AUTO: 41.1 % (ref 35–47)
HDLC SERPL-MCNC: 59 MG/DL
HGB BLD-MCNC: 13.7 G/DL (ref 11.7–15.7)
IMM GRANULOCYTES # BLD: 0 10E9/L (ref 0–0.4)
IMM GRANULOCYTES NFR BLD: 0.1 %
LDLC SERPL CALC-MCNC: 108 MG/DL
LYMPHOCYTES # BLD AUTO: 2.8 10E9/L (ref 0.8–5.3)
LYMPHOCYTES NFR BLD AUTO: 37.6 %
MCH RBC QN AUTO: 29.7 PG (ref 26.5–33)
MCHC RBC AUTO-ENTMCNC: 33.3 G/DL (ref 31.5–36.5)
MCV RBC AUTO: 89 FL (ref 78–100)
MONOCYTES # BLD AUTO: 0.7 10E9/L (ref 0–1.3)
MONOCYTES NFR BLD AUTO: 10.1 %
NEUTROPHILS # BLD AUTO: 3.5 10E9/L (ref 1.6–8.3)
NEUTROPHILS NFR BLD AUTO: 47.7 %
NONHDLC SERPL-MCNC: 131 MG/DL
NRBC # BLD AUTO: 0 10*3/UL
NRBC BLD AUTO-RTO: 0 /100
PLATELET # BLD AUTO: 214 10E9/L (ref 150–450)
POTASSIUM SERPL-SCNC: 4.5 MMOL/L (ref 3.4–5.3)
PROT SERPL-MCNC: 7 G/DL (ref 6.8–8.8)
RBC # BLD AUTO: 4.62 10E12/L (ref 3.8–5.2)
SODIUM SERPL-SCNC: 140 MMOL/L (ref 133–144)
T4 FREE SERPL-MCNC: 1.12 NG/DL (ref 0.76–1.46)
TRIGL SERPL-MCNC: 114 MG/DL
TSH SERPL DL<=0.005 MIU/L-ACNC: 0.33 MU/L (ref 0.4–4)
WBC # BLD AUTO: 7.3 10E9/L (ref 4–11)

## 2021-03-24 PROCEDURE — 36415 COLL VENOUS BLD VENIPUNCTURE: CPT | Performed by: NURSE PRACTITIONER

## 2021-03-24 PROCEDURE — 99396 PREV VISIT EST AGE 40-64: CPT | Performed by: NURSE PRACTITIONER

## 2021-03-24 PROCEDURE — 80050 GENERAL HEALTH PANEL: CPT | Performed by: NURSE PRACTITIONER

## 2021-03-24 PROCEDURE — 80061 LIPID PANEL: CPT | Performed by: NURSE PRACTITIONER

## 2021-03-24 PROCEDURE — 84439 ASSAY OF FREE THYROXINE: CPT | Performed by: NURSE PRACTITIONER

## 2021-03-24 RX ORDER — CYANOCOBALAMIN 1000 UG/ML
INJECTION, SOLUTION INTRAMUSCULAR; SUBCUTANEOUS
Qty: 1 ML | Refills: 11 | Status: SHIPPED | OUTPATIENT
Start: 2021-03-24 | End: 2023-03-14

## 2021-03-24 RX ORDER — DULOXETIN HYDROCHLORIDE 30 MG/1
30 CAPSULE, DELAYED RELEASE ORAL DAILY
Qty: 90 CAPSULE | Refills: 3 | Status: SHIPPED | OUTPATIENT
Start: 2021-03-24 | End: 2021-06-29

## 2021-03-24 RX ORDER — METHYLPHENIDATE HYDROCHLORIDE 10 MG/1
10 TABLET ORAL 2 TIMES DAILY
COMMUNITY
End: 2022-11-09

## 2021-03-24 RX ORDER — ROSUVASTATIN CALCIUM 10 MG/1
TABLET, COATED ORAL
Qty: 90 TABLET | Refills: 3 | Status: SHIPPED | OUTPATIENT
Start: 2021-03-24 | End: 2023-03-14 | Stop reason: SINTOL

## 2021-03-24 RX ORDER — VALACYCLOVIR HYDROCHLORIDE 1 G/1
1000 TABLET, FILM COATED ORAL 2 TIMES DAILY
Qty: 20 TABLET | Refills: 3 | Status: SHIPPED | OUTPATIENT
Start: 2021-03-24 | End: 2022-07-01

## 2021-03-24 RX ORDER — LEVOTHYROXINE SODIUM 100 UG/1
100 TABLET ORAL DAILY
Qty: 90 TABLET | Refills: 1 | Status: SHIPPED | OUTPATIENT
Start: 2021-03-24 | End: 2021-03-24 | Stop reason: DRUGHIGH

## 2021-03-24 RX ORDER — PANTOPRAZOLE SODIUM 40 MG/1
40 TABLET, DELAYED RELEASE ORAL DAILY
Qty: 90 TABLET | Refills: 3 | Status: SHIPPED | OUTPATIENT
Start: 2021-03-24 | End: 2021-06-22

## 2021-03-24 RX ORDER — FLUTICASONE PROPIONATE 220 UG/1
2 AEROSOL, METERED RESPIRATORY (INHALATION) 2 TIMES DAILY PRN
Qty: 12 G | Refills: 1 | Status: SHIPPED | OUTPATIENT
Start: 2021-03-24 | End: 2021-06-29

## 2021-03-24 RX ORDER — INDOMETHACIN 50 MG/1
50 CAPSULE ORAL 2 TIMES DAILY WITH MEALS
Qty: 180 CAPSULE | Refills: 1 | Status: SHIPPED | OUTPATIENT
Start: 2021-03-24 | End: 2021-06-29

## 2021-03-24 RX ORDER — LEVOTHYROXINE SODIUM 88 UG/1
88 TABLET ORAL DAILY
Qty: 90 TABLET | Refills: 1 | Status: SHIPPED | OUTPATIENT
Start: 2021-03-24 | End: 2021-06-29

## 2021-03-24 RX ORDER — MIRABEGRON 25 MG/1
25 TABLET, FILM COATED, EXTENDED RELEASE ORAL DAILY
Qty: 90 TABLET | Refills: 1 | Status: SHIPPED | OUTPATIENT
Start: 2021-03-24 | End: 2021-05-18

## 2021-03-24 RX ORDER — ZOLPIDEM TARTRATE 5 MG/1
TABLET ORAL
Qty: 60 TABLET | Refills: 1 | Status: SHIPPED | OUTPATIENT
Start: 2021-03-24 | End: 2021-06-15

## 2021-03-24 ASSESSMENT — ENCOUNTER SYMPTOMS
BREAST MASS: 0
SHORTNESS OF BREATH: 1
DYSURIA: 0
NERVOUS/ANXIOUS: 1
PARESTHESIAS: 0
ARTHRALGIAS: 1
DIARRHEA: 1
NAUSEA: 0
FEVER: 0
WEAKNESS: 0
HEARTBURN: 1
HEADACHES: 1
SORE THROAT: 0
MYALGIAS: 1
DIZZINESS: 1
JOINT SWELLING: 0
HEMATURIA: 0
EYE PAIN: 0
COUGH: 1
ABDOMINAL PAIN: 1
FREQUENCY: 1
CONSTIPATION: 1
CHILLS: 1
HEMATOCHEZIA: 0
PALPITATIONS: 0

## 2021-03-24 ASSESSMENT — ACTIVITIES OF DAILY LIVING (ADL): CURRENT_FUNCTION: NO ASSISTANCE NEEDED

## 2021-03-24 ASSESSMENT — PAIN SCALES - GENERAL: PAINLEVEL: NO PAIN (0)

## 2021-03-24 ASSESSMENT — PATIENT HEALTH QUESTIONNAIRE - PHQ9: SUM OF ALL RESPONSES TO PHQ QUESTIONS 1-9: 4

## 2021-03-24 NOTE — PROGRESS NOTES
SUBJECTIVE:   Nikki Morales is a 61 year old female who presents for Preventive Visit.      Patient has been advised of split billing requirements and indicates understanding: Yes   Are you in the first 12 months of your Medicare coverage?  No    Healthy Habits:     Frequency of exercise:  1 day/week    Duration of exercise:  Less than 15 minutes    Taking medications regularly:  No    Barriers to taking medications:  Problems remembering to take them    Medication side effects:  Muscle aches    PHQ-2 Total Score: 1    Additional concerns today:  No    Do you feel safe in your environment? Yes    Have you ever done Advance Care Planning? (For example, a Health Directive, POLST, or a discussion with a medical provider or your loved ones about your wishes): No, advance care planning information given to patient to review.  Patient declined advance care planning discussion at this time.       Fall risk  Fallen 2 or more times in the past year?: No  Any fall with injury in the past year?: No    Cognitive Screening   1) Repeat 3 items (Leader, Season, Table)    2) Clock draw: NORMAL  3) 3 item recall: Recalls 3 objects  Results: 3 items recalled: COGNITIVE IMPAIRMENT LESS LIKELY    Mini-CogTM Copyright JOHN Hays. Licensed by the author for use in [facility]; reprinted with permission (soob@[facility]). All rights reserved.      Do you have sleep apnea, excessive snoring or daytime drowsiness?: yes    Reviewed and updated as needed this visit by clinical staff  Tobacco  Allergies  Meds   Med Hx  Surg Hx  Fam Hx  Soc Hx        Reviewed and updated as needed this visit by Provider                Social History     Tobacco Use     Smoking status: Former Smoker     Packs/day: 1.00     Years: 30.00     Pack years: 30.00     Types: Cigarettes     Smokeless tobacco: Never Used   Substance Use Topics     Alcohol use: Yes     Alcohol/week: 4.0 standard drinks     Types: 2 Cans of beer, 2 Standard drinks or  equivalent per week     Comment: social     If you drink alcohol do you typically have >3 drinks per day or >7 drinks per week? Yes      Alcohol Use 3/24/2021   Prescreen: >3 drinks/day or >7 drinks/week? No   Prescreen: >3 drinks/day or >7 drinks/week? -   No flowsheet data found.      Current providers sharing in care for this patient include:   Patient Care Team:  No Ref-Primary, Physician as PCP - General  Aditya Gamez MD as MD (Dermatology)  Martina Andrade MD as MD (Ophthalmology)  Anselmo Cadet [facility] as Pharmacist (Pharmacist Clinician- Clinical Pharmacy Specialist)  Cirilo Tadeo MD as Assigned PCP    The following health maintenance items are reviewed in Epic and correct as of today:  Health Maintenance   Topic Date Due     Pneumococcal Vaccine: Pediatrics (0 to 5 Years) and At-Risk Patients (6 to 64 Years) (1 of 2 - PPSV23) Never done     HIV SCREENING  Never done     ZOSTER IMMUNIZATION (1 of 2) Never done     PREVENTIVE CARE VISIT  09/14/2017     INFLUENZA VACCINE (1) 09/01/2020     ASTHMA ACTION PLAN  11/25/2020     ASTHMA CONTROL TEST  04/19/2021     COVID-19 Vaccine (2 - Pfizer 2-dose series) 04/01/2021     TSH W/FREE T4 REFLEX  06/23/2021     MAMMO SCREENING  09/29/2022     COLORECTAL CANCER SCREENING  09/06/2023     ADVANCE CARE PLANNING  11/13/2023     LIPID  06/23/2025     DTAP/TDAP/TD IMMUNIZATION (8 - Td) 08/23/2027     HEPATITIS C SCREENING  Completed     IPV IMMUNIZATION  Aged Out     MENINGITIS IMMUNIZATION  Aged Out     HEPATITIS B IMMUNIZATION  Aged Out     PAP  Discontinued       Mammogram Screening: Recommended mammography every 1-2 years with patient discussion and risk factor consideration    Review of Systems   Constitutional: Positive for chills. Negative for fever.   HENT: Positive for congestion and hearing loss. Negative for ear pain and sore throat.    Eyes: Negative for pain and visual disturbance.   Respiratory: Positive for cough and shortness of breath.   "  Cardiovascular: Positive for peripheral edema. Negative for chest pain and palpitations.   Gastrointestinal: Positive for abdominal pain, constipation, diarrhea and heartburn. Negative for hematochezia and nausea.   Breasts:  Negative for tenderness, breast mass and discharge.   Genitourinary: Positive for frequency, pelvic pain and urgency. Negative for dysuria, genital sores, hematuria, vaginal bleeding and vaginal discharge.   Musculoskeletal: Positive for arthralgias and myalgias. Negative for joint swelling.   Skin: Negative for rash.   Neurological: Positive for dizziness and headaches. Negative for weakness and paresthesias.   Psychiatric/Behavioral: Negative for mood changes. The patient is nervous/anxious.      All symptoms are chronic nothing new. She has been nervous and anxious but is going to start the Duloxetine.     OBJECTIVE:   /80   Pulse 85   Temp 97.4  F (36.3  C) (Temporal)   Resp 20   Wt 93.6 kg (206 lb 7 oz)   LMP  (LMP Unknown)   SpO2 97%   Breastfeeding No   BMI 30.49 kg/m   Estimated body mass index is 30.49 kg/m  as calculated from the following:    Height as of 2/19/21: 1.753 m (5' 9\").    Weight as of this encounter: 93.6 kg (206 lb 7 oz).  Physical Exam  GENERAL APPEARANCE: healthy, alert and no distress  EYES: Eyes grossly normal to inspection, PERRL and conjunctivae and sclerae normal  HENT: ear canals and TM's normal, nose and mouth without ulcers or lesions, oropharynx clear and oral mucous membranes moist  NECK: no adenopathy, no asymmetry, masses, or scars and thyroid normal to palpation  RESP: lungs clear to auscultation - no rales, rhonchi or wheezes  BREAST: normal without masses, tenderness or nipple discharge and no palpable axillary masses or adenopathy  CV: regular rate and rhythm, normal S1 S2, no S3 or S4, no murmur, click or rub, no peripheral edema and peripheral pulses strong  ABDOMEN: soft, nontender, no hepatosplenomegaly, no masses and bowel sounds " normal  MS: no musculoskeletal defects are noted and gait is age appropriate without ataxia  SKIN: no suspicious lesions or rashes  NEURO: Normal strength and tone, sensory exam grossly normal, mentation intact and speech normal  PSYCH: mentation appears normal and affect normal/bright    Diagnostic Test Results:  Labs reviewed in Epic    ASSESSMENT / PLAN:   1. Encounter for Medicare annual wellness exam  - Labs today he will call with results.   - CBC with platelets and differential    2. Chronic fatigue syndrome  - Well controlled will continue with current plan of care.   - cyanocobalamin (CYANOCOBALAMIN) 1000 MCG/ML injection; INJECT 1ML INTRAMUSCULARLY EVERY 30 DAYS  Dispense: 1 mL; Refill: 11  - zolpidem (AMBIEN) 5 MG tablet; TAKE ONE TO TWO TABLETS BY MOUTH AT BEDTIME AS NEEDED  Dispense: 60 tablet; Refill: 1  - DULoxetine (CYMBALTA) 30 MG capsule; Take 1 capsule (30 mg) by mouth daily  Dispense: 90 capsule; Refill: 3    3. Vitamin B12 deficiency (dietary) anemia  - Working well will continue with current plan of care.   - cyanocobalamin (CYANOCOBALAMIN) 1000 MCG/ML injection; INJECT 1ML INTRAMUSCULARLY EVERY 30 DAYS  Dispense: 1 mL; Refill: 11    4. Mild persistent asthma without complication  - Well controlled will continue with current plan of care.   - fluticasone (FLOVENT HFA) 220 MCG/ACT inhaler; Inhale 2 puffs into the lungs 2 times daily as needed (for bronchitis)  Dispense: 12 g; Refill: 1    5. Primary headache associated with sexual activity  - Stable with this plan, will continue with current plan of care.   - indomethacin (INDOCIN) 50 MG capsule; Take 1 capsule (50 mg) by mouth 2 times daily (with meals)  Dispense: 180 capsule; Refill: 1    6. Hypothyroidism due to acquired atrophy of thyroid  - Stable will continue with current plan of care.   - TSH with free T4 reflex  - levothyroxine (SYNTHROID/LEVOTHROID) 100 MCG tablet; Take 1 tablet (100 mcg) by mouth daily  Dispense: 90 tablet; Refill:  "1    7. Mixed incontinence  - Working well for her plan of care.   - mirabegron (MYRBETRIQ) 25 MG 24 hr tablet; Take 1 tablet (25 mg) by mouth daily  Dispense: 90 tablet; Refill: 1    8. Eosinophilic esophagitis  -Stable,  has tried to wean off her did not work for her, takes daily.   - pantoprazole (PROTONIX) 40 MG EC tablet; Take 1 tablet (40 mg) by mouth daily  Dispense: 90 tablet; Refill: 3    9. Hyperlipidemia LDL goal <130  - Labs today, she will be called with results.   - rosuvastatin (CRESTOR) 10 MG tablet; 1 daily or as directed.  Dispense: 90 tablet; Refill: 3  - Comprehensive metabolic panel  - Lipid panel reflex to direct LDL Fasting    10. HSV (herpes simplex virus) infection  - This works well for outbreaks.   - valACYclovir (VALTREX) 1000 mg tablet; Take 1 tablet (1,000 mg) by mouth 2 times daily  Dispense: 20 tablet; Refill: 3    11. KAILEE (generalized anxiety disorder)  - She has not been taking regularly, will continue with current plan of care.   - DULoxetine (CYMBALTA) 30 MG capsule; Take 1 capsule (30 mg) by mouth daily  Dispense: 90 capsule; Refill: 3    12. Encounter for screening mammogram for breast cancer  - She will schedule this September.   - *MA Screening Digital Bilateral; Future    Patient has been advised of split billing requirements and indicates understanding: Yes  COUNSELING:  Reviewed preventive health counseling, as reflected in patient instructions  Special attention given to:       Regular exercise       Healthy diet/nutrition       Vision screening       Osteoporosis prevention/bone health    Estimated body mass index is 30.49 kg/m  as calculated from the following:    Height as of 2/19/21: 1.753 m (5' 9\").    Weight as of this encounter: 93.6 kg (206 lb 7 oz).    Weight management plan: Discussed healthy diet and exercise guidelines    She reports that she has quit smoking. Her smoking use included cigarettes. She has a 30.00 pack-year smoking history. She has never used " smokeless tobacco.      Appropriate preventive services were discussed with this patient, including applicable screening as appropriate for cardiovascular disease, diabetes, osteopenia/osteoporosis, and glaucoma.  As appropriate for age/gender, discussed screening for colorectal cancer, prostate cancer, breast cancer, and cervical cancer. Checklist reviewing preventive services available has been given to the patient.    Reviewed patients plan of care and provided an AVS. The Intermediate Care Plan ( asthma action plan, low back pain action plan, and migraine action plan) for Nikki meets the Care Plan requirement. This Care Plan has been established and reviewed with the Patient.    Counseling Resources:  ATP IV Guidelines  Pooled Cohorts Equation Calculator  Breast Cancer Risk Calculator  Breast Cancer: Medication to Reduce Risk  FRAX Risk Assessment  ICSI Preventive Guidelines  Dietary Guidelines for Americans, 2010  USDA's MyPlate  ASA Prophylaxis  Lung CA Screening    Douglas Max NP  [facility]    Identified Health Risks:

## 2021-03-24 NOTE — PATIENT INSTRUCTIONS
Patient Education   Personalized Prevention Plan  You are due for the preventive services outlined below.  Your care team is available to assist you in scheduling these services.  If you have already completed any of these items, please share that information with your care team to update in your medical record.  Health Maintenance Due   Topic Date Due     Pneumococcal Vaccine (1 of 2 - PPSV23) Never done     HIV Screening  Never done     Zoster (Shingles) Vaccine (1 of 2) Never done     Preventive Care Visit  09/14/2017     Flu Vaccine (1) 09/01/2020     Asthma Action Plan - yearly  11/25/2020     Asthma Control Test  04/19/2021

## 2021-03-24 NOTE — TELEPHONE ENCOUNTER
----- Message from Douglas Max NP sent at 3/24/2021 12:31 PM CDT -----  Please call and let her know the thyroid studies indicate her synthroid is still to high, I did call in a dose modification and I would now like her taking 88 mcg if of the synthroid daily. This can be picked up at her local pharmacy. I would also like her to repeat the thyroid labs in 3 months and those orders have been placed. Other labs are stable and as expected. The cholesterol levels are much better. Keep taking the rest of your medications as ordered.     Please call clinic with any questions.     Douglas Max CNP

## 2021-03-24 NOTE — TELEPHONE ENCOUNTER
Spoke with patient and informed of note below. She states that they keep the level of medication higher because of her chroinic fatigue.     She is wondering what happens if they keep it higher? Is there symptoms she could be having that she needs to look for.     Deepali Loaiza MA

## 2021-03-30 NOTE — TELEPHONE ENCOUNTER
Please tell her I am willing to leave dosing as is and recheck in 2 months.     Dr. Ibarra had just dose adjusted her as the Thyroid stimulating hormone was low. It is still low this indicates she does not need as much hormone, so continuing to give her to much Synthroid will eventually put her in a state of hyperthyroidism.     Symptoms of hyperthyroid:     The classic symptoms of hyperthyroidism include heat intolerance, tremor, palpitations, anxiety, weight loss despite a normal or increased appetite, increased frequency of bowel movements, and shortness of breath.     Please make lab appointment in 2 months.     Thank you,    Douglas Max CNP

## 2021-04-02 DIAGNOSIS — F41.1 GAD (GENERALIZED ANXIETY DISORDER): ICD-10-CM

## 2021-04-02 RX ORDER — LORAZEPAM 1 MG/1
TABLET ORAL
Qty: 20 TABLET | Refills: 1 | Status: SHIPPED | OUTPATIENT
Start: 2021-04-02 | End: 2021-08-24

## 2021-04-02 NOTE — TELEPHONE ENCOUNTER
Requested Prescriptions   Pending Prescriptions Disp Refills     LORazepam (ATIVAN) 1 MG tablet [Pharmacy Med Name: LORAZEPAM 1MG TABS] 20 tablet 1     Sig: TAKE ONE TABLET BY MOUTH TWICE A DAY AS NEEDED FOR ANIXETY        Last Written Prescription Date:  12/07/2020  Last Fill Quantity: 20,   # refills: 1  Last Office Visit: 03/24/2021  Future Office visit:       Routing refill request to provider for review/approval because:  Drug not on the [facility], [facility] or [facility] refill protocol or controlled substance    Deepali Loaiza MA

## 2021-04-12 ENCOUNTER — OFFICE VISIT (OUTPATIENT)
Dept: INTERNAL MEDICINE | Facility: CLINIC | Age: 61
End: 2021-04-12
Payer: COMMERCIAL

## 2021-04-12 ENCOUNTER — HOSPITAL ENCOUNTER (OUTPATIENT)
Dept: GENERAL RADIOLOGY | Facility: CLINIC | Age: 61
Discharge: HOME | End: 2021-04-12
Attending: INTERNAL MEDICINE | Admitting: INTERNAL MEDICINE
Payer: COMMERCIAL

## 2021-04-12 VITALS
RESPIRATION RATE: 16 BRPM | HEART RATE: 76 BPM | SYSTOLIC BLOOD PRESSURE: 126 MMHG | WEIGHT: 210 LBS | BODY MASS INDEX: 31.01 KG/M2 | OXYGEN SATURATION: 93 % | TEMPERATURE: 97.2 F | DIASTOLIC BLOOD PRESSURE: 72 MMHG

## 2021-04-12 DIAGNOSIS — M25.561 ACUTE PAIN OF RIGHT KNEE: Primary | ICD-10-CM

## 2021-04-12 DIAGNOSIS — M25.561 ACUTE PAIN OF RIGHT KNEE: ICD-10-CM

## 2021-04-12 PROCEDURE — 99213 OFFICE O/P EST LOW 20 MIN: CPT | Performed by: INTERNAL MEDICINE

## 2021-04-12 PROCEDURE — 73565 X-RAY EXAM OF KNEES: CPT

## 2021-04-12 ASSESSMENT — PAIN SCALES - GENERAL: PAINLEVEL: MODERATE PAIN (4)

## 2021-04-12 NOTE — PROGRESS NOTES
Assessment & Plan     Acute pain of right knee  Right knee pain, no known injury, pain on the medial aspect when it is touched.  We will have her do ice, rest, NSAIDs 400 mg 3 times a day for 5 days.  We will hold off on any injection.  Would like to do an x-ray today to see the amount of arthritis.  Recommended different exercises to strengthen her quadriceps.  Follow-up if not getting better then consider an injection.  - XR Knee AP Standing Bilateral; Future                 No follow-ups on file.    Cirilo Tadeo MD  [facility]    Subjective   Margarita is a 61 year old who presents for the following health issues     HPI     Chief Complaint   Patient presents with     Knee Pain     right knee pain, going for a couple of weeks - NKI     Right knee pain, no known injury.  Pain for a couple weeks. Happened once before 3-5 years ago, bothered at night.    Most of her pain is when laying on her side when sleeping.  Medial joint line pain, like a pin going in, worse with walking.  Worse when laying on the side.  Steps are bad. Doesn't give out, no falls.       Review of Systems         Objective    /72   Pulse 76   Temp 97.2  F (36.2  C) (Temporal)   Resp 16   Wt 95.3 kg (210 lb)   LMP  (LMP Unknown)   SpO2 93%   BMI 31.01 kg/m    Body mass index is 31.01 kg/m .  Physical Exam   No acute distress  Right knee has no swelling, tender over the medial joint line, full range of motion, normal ligament testing bilaterally.  Negative Chaparro's.

## 2021-04-20 DIAGNOSIS — N39.46 MIXED INCONTINENCE: ICD-10-CM

## 2021-04-21 RX ORDER — MIRABEGRON 25 MG/1
TABLET, FILM COATED, EXTENDED RELEASE ORAL
Qty: 30 TABLET | Refills: 1 | OUTPATIENT
Start: 2021-04-21

## 2021-04-21 NOTE — TELEPHONE ENCOUNTER
Prescription was sent 3/24/2021 for #90 with 1 refill.  Pharmacy notified via E-Prescribe refusal.     MAGALIE EspinoN, RN  [facility]

## 2021-05-10 ENCOUNTER — OFFICE VISIT (OUTPATIENT)
Dept: INTERNAL MEDICINE | Facility: CLINIC | Age: 61
End: 2021-05-10
Payer: COMMERCIAL

## 2021-05-10 VITALS
TEMPERATURE: 96.4 F | SYSTOLIC BLOOD PRESSURE: 120 MMHG | WEIGHT: 204 LBS | DIASTOLIC BLOOD PRESSURE: 66 MMHG | HEART RATE: 86 BPM | RESPIRATION RATE: 16 BRPM | BODY MASS INDEX: 30.13 KG/M2 | OXYGEN SATURATION: 96 %

## 2021-05-10 DIAGNOSIS — M17.11 ARTHRITIS OF RIGHT KNEE: Primary | ICD-10-CM

## 2021-05-10 PROCEDURE — 20610 DRAIN/INJ JOINT/BURSA W/O US: CPT | Mod: RT | Performed by: INTERNAL MEDICINE

## 2021-05-10 PROCEDURE — 99207 PR DROP WITH A PROCEDURE: CPT | Performed by: INTERNAL MEDICINE

## 2021-05-10 RX ORDER — TRIAMCINOLONE ACETONIDE 40 MG/ML
40 INJECTION, SUSPENSION INTRA-ARTICULAR; INTRAMUSCULAR ONCE
Status: COMPLETED | OUTPATIENT
Start: 2021-05-10 | End: 2021-05-10

## 2021-05-10 RX ADMIN — TRIAMCINOLONE ACETONIDE 40 MG: 40 INJECTION, SUSPENSION INTRA-ARTICULAR; INTRAMUSCULAR at 09:34

## 2021-05-10 ASSESSMENT — PAIN SCALES - GENERAL: PAINLEVEL: MILD PAIN (3)

## 2021-05-10 NOTE — PROGRESS NOTES
Assessment & Plan     Arthritis of right knee  Right knee arthritis seen on her x-ray, will do an injection today. Patient has failed NSAIDs, rest, ice. Continue to recommend weight loss and exercise  - triamcinolone (KENALOG-40) injection 40 mg      Procedure note-right knee injection  Consent was obtained  Area was marked and prepped with ChloraPrep  Using 1/2 inch 25-gauge needle I injected 40 mg of Kenalog and 2 cc of 1% lidocaine both from new vials  No complications  Aftercare instructions given to the patient.                 No follow-ups on file.    Cirilo Tadeo MD  [facility]    Cherise Avila is a 61 year old who presents for the following health issues     HPI     Chief Complaint   Patient presents with     Knee Pain     right knee pain, possible knee injection     Chronic right knee pain, brother had knee replacement and problems.     She has had problems and pain, tried nsaids.      Past Medical History:   Diagnosis Date     Chronic fatigue      Hyperlipidemia      Hypothyroid      New onset a-fib (H)      Other vitamin B12 deficiency anemia      Current Outpatient Medications   Medication     budesonide (PULMICORT) 0.5 MG/2ML neb solution     calcium carbonate (TUMS) 500 MG chewable tablet     cyanocobalamin (CYANOCOBALAMIN) 1000 MCG/ML injection     DULoxetine (CYMBALTA) 30 MG capsule     estradiol (VIVELLE-DOT) 0.05 MG/24HR patch     indomethacin (INDOCIN) 50 MG capsule     levothyroxine (SYNTHROID/LEVOTHROID) 88 MCG tablet     LORazepam (ATIVAN) 1 MG tablet     methylphenidate (RITALIN) 10 MG tablet     mirabegron (MYRBETRIQ) 25 MG 24 hr tablet     multivitamin, therapeutic with minerals (MULTI-VITAMIN) TABS     pantoprazole (PROTONIX) 40 MG EC tablet     rosuvastatin (CRESTOR) 10 MG tablet     zolpidem (AMBIEN) 5 MG tablet     fluticasone (FLOVENT HFA) 220 MCG/ACT inhaler     valACYclovir (VALTREX) 1000 mg tablet     No current facility-administered medications for  this visit.      Social History     Tobacco Use     Smoking status: Former Smoker     Packs/day: 1.00     Years: 30.00     Pack years: 30.00     Types: Cigarettes     Smokeless tobacco: Never Used   Substance Use Topics     Alcohol use: Yes     Alcohol/week: 4.0 standard drinks     Types: 2 Cans of beer, 2 Standard drinks or equivalent per week     Comment: social     Drug use: No     Comment: used in past any and all       Review of Systems         Objective    /66   Pulse 86   Temp 96.4  F (35.8  C) (Temporal)   Resp 16   Wt 92.5 kg (204 lb)   LMP  (LMP Unknown)   SpO2 96%   BMI 30.13 kg/m    Body mass index is 30.13 kg/m .  Physical Exam   No acute distress, pain on the medial joint line of the right knee

## 2021-05-11 ASSESSMENT — ASTHMA QUESTIONNAIRES: ACT_TOTALSCORE: 20

## 2021-05-18 DIAGNOSIS — N39.46 MIXED INCONTINENCE: ICD-10-CM

## 2021-05-18 RX ORDER — MIRABEGRON 25 MG/1
TABLET, FILM COATED, EXTENDED RELEASE ORAL
Qty: 30 TABLET | Refills: 1 | Status: SHIPPED | OUTPATIENT
Start: 2021-05-18 | End: 2021-06-29

## 2021-06-14 DIAGNOSIS — G93.32 CHRONIC FATIGUE SYNDROME: ICD-10-CM

## 2021-06-15 RX ORDER — ZOLPIDEM TARTRATE 5 MG/1
TABLET ORAL
Qty: 60 TABLET | Refills: 1 | Status: SHIPPED | OUTPATIENT
Start: 2021-06-15 | End: 2021-09-07

## 2021-06-15 NOTE — TELEPHONE ENCOUNTER
Please let Margarita know she will need to establish care with a new provider for future refills. She may need to consider starting to wean down on the medication as 5 mg nightly is max recommended dosing.

## 2021-06-15 NOTE — TELEPHONE ENCOUNTER
Routing refill request to provider for review/approval because:  Drug not on the [facility] refill protocol     ambien  Last Written Prescription Date:  3/24/2021  Last Fill Quantity: 60,  # refills: 1   Last office visit: 5/10/2021 with prescribing provider:  adelso Jansen Office Visit:      Requested Prescriptions   Pending Prescriptions Disp Refills     zolpidem (AMBIEN) 5 MG tablet [Pharmacy Med Name: ZOLPIDEM TARTRATE 5MG TABS] 60 tablet 1     Sig: TAKE ONE TO TWO TABLETS BY MOUTH AT BEDTIME AS NEEDED       There is no refill protocol information for this order        Jamila Dumas RN on 6/15/2021 at 1:12 PM'

## 2021-06-24 DIAGNOSIS — N39.46 MIXED INCONTINENCE: ICD-10-CM

## 2021-06-28 RX ORDER — MIRABEGRON 25 MG/1
TABLET, FILM COATED, EXTENDED RELEASE ORAL
Qty: 30 TABLET | Refills: 1 | OUTPATIENT
Start: 2021-06-28

## 2021-06-28 NOTE — TELEPHONE ENCOUNTER
"Denied  Refill request too soon  Requested Prescriptions   Pending Prescriptions Disp Refills     MYRBETRIQ 25 MG 24 hr tablet [Pharmacy Med Name: MYRBETRIQ 25MG TB24] 30 tablet 1     Sig: TAKE ONE TABLET BY MOUTH ONCE DAILY   5/10/2021    Beta 3 Adrenergic Agonists Passed - 6/24/2021  2:55 PM        Passed - Most recent BP less than 140/90 on record     BP Readings from Last 3 Encounters:   05/10/21 120/66   04/12/21 126/72   03/24/21 126/80           Passed - Recent or future visit with authorizing provider's specialty     Patient has had an office visit with the authorizing provider or a provider within the authorizing providers department within the previous 12 mos or has a future within next 30 days. See \"Patient Info\" tab in inbasket, or \"Choose Columns\" in Meds & Orders section of the refill encounter.            Passed - Medication is active on med list        Passed - Most recent eGFR greater than or equal to 30 within past 12 months     Recent Labs   Lab Test 03/24/21  1033   GFRESTIMATED 85   GFRESTBLACK >90           Passed - Patient is of age 18 years or older        Passed - Patient is not pregnant        Passed - Patient has not had a positive pregnancy test within the past 12 months         Mariela Srinivasan RN    "

## 2021-06-29 ENCOUNTER — VIRTUAL VISIT (OUTPATIENT)
Dept: INTERNAL MEDICINE | Facility: CLINIC | Age: 61
End: 2021-06-29
Payer: COMMERCIAL

## 2021-06-29 DIAGNOSIS — N39.46 MIXED INCONTINENCE: ICD-10-CM

## 2021-06-29 DIAGNOSIS — E03.4 HYPOTHYROIDISM DUE TO ACQUIRED ATROPHY OF THYROID: Primary | ICD-10-CM

## 2021-06-29 DIAGNOSIS — J45.21 INTERMITTENT ASTHMA, WITH ACUTE EXACERBATION: ICD-10-CM

## 2021-06-29 PROCEDURE — 99213 OFFICE O/P EST LOW 20 MIN: CPT | Mod: GT | Performed by: INTERNAL MEDICINE

## 2021-06-29 RX ORDER — MIRABEGRON 25 MG/1
25 TABLET, FILM COATED, EXTENDED RELEASE ORAL DAILY
Qty: 90 TABLET | Refills: 1 | Status: SHIPPED | OUTPATIENT
Start: 2021-06-29

## 2021-06-29 RX ORDER — FLUTICASONE PROPIONATE 220 UG/1
1 AEROSOL, METERED RESPIRATORY (INHALATION) 2 TIMES DAILY
COMMUNITY
End: 2021-06-29

## 2021-06-29 RX ORDER — FLUTICASONE PROPIONATE 220 UG/1
1 AEROSOL, METERED RESPIRATORY (INHALATION) 2 TIMES DAILY
Qty: 36 G | Refills: 1 | Status: SHIPPED | OUTPATIENT
Start: 2021-06-29 | End: 2023-10-26

## 2021-06-29 RX ORDER — LEVOTHYROXINE SODIUM 88 UG/1
88 TABLET ORAL DAILY
COMMUNITY
End: 2021-06-29

## 2021-06-29 RX ORDER — LEVOTHYROXINE SODIUM 88 UG/1
88 TABLET ORAL DAILY
Qty: 90 TABLET | Refills: 1 | Status: SHIPPED | OUTPATIENT
Start: 2021-06-29 | End: 2022-11-10

## 2021-06-29 RX ORDER — BUDESONIDE 0.5 MG/2ML
INHALANT ORAL
Qty: 60 ML | Refills: 1 | Status: SHIPPED | OUTPATIENT
Start: 2021-06-29 | End: 2023-10-26

## 2021-06-29 RX ORDER — PANTOPRAZOLE SODIUM 40 MG/1
40 TABLET, DELAYED RELEASE ORAL DAILY
COMMUNITY

## 2021-06-29 NOTE — PROGRESS NOTES
Margarita is a 61 year old who is being evaluated via a billable video visit.      How would you like to obtain your AVS? MyChart  If the video visit is dropped, the invitation should be resent by: Text to cell phone: [phone]  Will anyone else be joining your video visit? No  Video Start Time: 3:27 PM    Assessment & Plan    Patient who's primary retired, then when she switched to a new nurse practitioner she has resigned.  Patient needs a new provider and I have seen her before.  Today she needs her medications refilled which I will do.        Hypothyroidism due to acquired atrophy of thyroid  Hypothyroidism on levothyroxine 88 mcg a day we will refill this for her.  - levothyroxine (SYNTHROID/LEVOTHROID) 88 MCG tablet; Take 1 tablet (88 mcg) by mouth daily    Mixed incontinence  Mixed incontinence on mirabegron and will refill this at 25 mg daily.  - mirabegron (MYRBETRIQ) 25 MG 24 hr tablet; Take 1 tablet (25 mg) by mouth daily    Intermittent asthma, with acute exacerbation  Intermittent asthma and possibly some eosinophilic esophagitis.  Will refill her Flovent and Pulmicort nebulizers that she uses for inflammation.  - fluticasone (FLOVENT HFA) 220 MCG/ACT inhaler; Inhale 1 puff into the lungs 2 times daily  - budesonide (PULMICORT) 0.5 MG/2ML neb solution; Mix 2 vials with 1 oz coffee flavoring and drink twice a day and rinse mouth aftrerwards      20 minutes spent on the date of the encounter doing chart review, history and exam, documentation and further activities per the note           No follow-ups on file.    Cirilo Tadeo MD  [facility]   Margarita is a 61 year old who presents for the following health issues     HPI     Chief Complaint   Patient presents with     Video Visit     establish care and discuss medications     Previous provider left, now new nurse practitioner is leaving as well.      Needs refills for her medications.  May be changing insurance.      Past  Medical History:   Diagnosis Date     Chronic fatigue      Hyperlipidemia      Hypothyroid      New onset a-fib (H)      Other vitamin B12 deficiency anemia      Current Outpatient Medications   Medication     budesonide (PULMICORT) 0.5 MG/2ML neb solution     calcium carbonate (TUMS) 500 MG chewable tablet     cyanocobalamin (CYANOCOBALAMIN) 1000 MCG/ML injection     estradiol (VIVELLE-DOT) 0.05 MG/24HR patch     fluticasone (FLOVENT HFA) 220 MCG/ACT inhaler     levothyroxine (SYNTHROID/LEVOTHROID) 88 MCG tablet     LORazepam (ATIVAN) 1 MG tablet     methylphenidate (RITALIN) 10 MG tablet     multivitamin, therapeutic with minerals (MULTI-VITAMIN) TABS     MYRBETRIQ 25 MG 24 hr tablet     pantoprazole (PROTONIX) 40 MG EC tablet     rosuvastatin (CRESTOR) 10 MG tablet     zolpidem (AMBIEN) 5 MG tablet     valACYclovir (VALTREX) 1000 mg tablet     No current facility-administered medications for this visit.      Social History     Tobacco Use     Smoking status: Former Smoker     Packs/day: 1.00     Years: 30.00     Pack years: 30.00     Types: Cigarettes     Smokeless tobacco: Never Used   Substance Use Topics     Alcohol use: Yes     Alcohol/week: 4.0 standard drinks     Types: 2 Cans of beer, 2 Standard drinks or equivalent per week     Comment: social     Drug use: No     Comment: used in past any and all       Review of Systems         Objective           Vitals:  No vitals were obtained today due to virtual visit.    Physical Exam   GENERAL: Healthy, alert and no distress  EYES: Eyes grossly normal to inspection.  No discharge or erythema, or obvious scleral/conjunctival abnormalities.  RESP: No audible wheeze, cough, or visible cyanosis.  No visible retractions or increased work of breathing.    SKIN: Visible skin clear. No significant rash, abnormal pigmentation or lesions.  NEURO: Cranial nerves grossly intact.  Mentation and speech appropriate for age.  PSYCH: Mentation appears normal, affect normal/bright,  judgement and insight intact, normal speech and appearance well-groomed.                Video-Visit Details    Type of service:  Video Visit    Video End Time:3:38    Originating Location (pt. Location): Home    Distant Location (provider location):  [facility]     Platform used for Video Visit: Adelaida

## 2021-08-16 ENCOUNTER — MYC MEDICAL ADVICE (OUTPATIENT)
Dept: INTERNAL MEDICINE | Facility: CLINIC | Age: 61
End: 2021-08-16

## 2021-08-24 ENCOUNTER — VIRTUAL VISIT (OUTPATIENT)
Dept: INTERNAL MEDICINE | Facility: CLINIC | Age: 61
End: 2021-08-24
Payer: COMMERCIAL

## 2021-08-24 DIAGNOSIS — E53.8 VITAMIN B12 DEFICIENCY (NON ANEMIC): ICD-10-CM

## 2021-08-24 DIAGNOSIS — F41.1 GAD (GENERALIZED ANXIETY DISORDER): ICD-10-CM

## 2021-08-24 DIAGNOSIS — G47.00 INSOMNIA, UNSPECIFIED TYPE: Primary | ICD-10-CM

## 2021-08-24 DIAGNOSIS — F33.1 MODERATE RECURRENT MAJOR DEPRESSION (H): ICD-10-CM

## 2021-08-24 DIAGNOSIS — E66.09 CLASS 1 OBESITY DUE TO EXCESS CALORIES WITHOUT SERIOUS COMORBIDITY WITH BODY MASS INDEX (BMI) OF 31.0 TO 31.9 IN ADULT: ICD-10-CM

## 2021-08-24 DIAGNOSIS — E66.811 CLASS 1 OBESITY DUE TO EXCESS CALORIES WITHOUT SERIOUS COMORBIDITY WITH BODY MASS INDEX (BMI) OF 31.0 TO 31.9 IN ADULT: ICD-10-CM

## 2021-08-24 DIAGNOSIS — M17.11 ARTHRITIS OF RIGHT KNEE: ICD-10-CM

## 2021-08-24 PROBLEM — I48.0 INTERMITTENT ATRIAL FIBRILLATION (H): Status: RESOLVED | Noted: 2018-01-26 | Resolved: 2021-08-24

## 2021-08-24 PROCEDURE — 99214 OFFICE O/P EST MOD 30 MIN: CPT | Mod: 95 | Performed by: INTERNAL MEDICINE

## 2021-08-24 RX ORDER — SEMAGLUTIDE 0.25 MG/.5ML
0.25 INJECTION, SOLUTION SUBCUTANEOUS
Qty: 2 ML | Refills: 3 | Status: SHIPPED | OUTPATIENT
Start: 2021-08-24 | End: 2022-03-02

## 2021-08-24 RX ORDER — LORAZEPAM 1 MG/1
TABLET ORAL
Qty: 20 TABLET | Refills: 1 | Status: SHIPPED | OUTPATIENT
Start: 2021-08-24 | End: 2022-01-18

## 2021-08-24 RX ORDER — ZOLPIDEM TARTRATE 5 MG/1
10 TABLET ORAL
Qty: 60 TABLET | Refills: 5 | Status: SHIPPED | OUTPATIENT
Start: 2021-08-24 | End: 2022-02-18

## 2021-08-24 RX ORDER — CYANOCOBALAMIN 1000 UG/ML
1000 INJECTION, SOLUTION INTRAMUSCULAR; SUBCUTANEOUS
Status: DISCONTINUED | OUTPATIENT
Start: 2021-08-24 | End: 2023-03-14

## 2021-08-24 NOTE — PROGRESS NOTES
Margarita is a 61 year old who is being evaluated via a billable video visit.      How would you like to obtain your AVS? MyChart  If the video visit is dropped, the invitation should be resent by: Text to cell phone: [phone]  Will anyone else be joining your video visit? No    Video Start Time: 2:46 PM    Assessment & Plan     Insomnia, unspecified type  Patient with insomnia is usually on Ambien 5 mg twice a night.  She like to have a sleep evaluation and see if she has sleep apnea.  We will refer her to sleep medicine.  We will also refill her Ambien.  - zolpidem (AMBIEN) 5 MG tablet; Take 2 tablets (10 mg) by mouth nightly as needed for sleep  - SLEEP EVALUATION & MANAGEMENT REFERRAL - ADULT -; Future    Arthritis of right knee  Chronic arthritis of the right knee we did an injection in May it helped her for a week.  She like to see an orthopedic her brother has a knee replacement and she may need one as well.  - Orthopedic  Referral; Future    KAILEE (generalized anxiety disorder)  Any.  She has been on antidepressants in the past said side effects.  She does well with lorazepam and limited use.  We will refill her lorazepam 1 mg as needed.  - LORazepam (ATIVAN) 1 MG tablet; TAKE ONE TABLET BY MOUTH TWICE A DAY AS NEEDED FOR ANIXETY    Vitamin B12 deficiency (non anemic)  B12 deficiency she is on B12 shots but now her insurance wants her to get the shots in the clinic instead of at home she has less of a co-pay.    Class 1 obesity due to excess calories without serious comorbidity with body mass index (BMI) of 31.0 to 31.9 in adult  Obesity with a BMI up over 3132 cannot lose weight.  Difficult for her to exercise and is giving more problem with her knee with the weight gain.  We will try her on Ozempic if insurance covers this once a week to help lose her weight.  - Semaglutide-Weight Management (WEGOVY) 0.25 MG/0.5ML SOAJ; Inject 0.25 mg Subcutaneous every 7 days    Moderate recurrent major depression  (H)  Depression appears stable she has feels this is just intermittent anxiety with situations not sure depression all the time.                   Return in about 6 months (around 2/24/2022) for Physical Exam.    Cirilo Tadeo MD  [facility] JOSE G Avila is a 61 year old who presents for the following health issues     HPI     Chief Complaint   Patient presents with     Video Visit     pt changed insurance - discuss medications and possible lab work     Insurance changed and after appeal had ambien approved for 10mg daily.      Lorazepam she uses that for anxiety. Tried other medications in the past,     Still having problems with her knees, one is worse and throbbing, limiting her stairs. Injection helped her for a week.      Past Medical History:   Diagnosis Date     Chronic fatigue      Hyperlipidemia      Hypothyroid      New onset a-fib (H)      Other vitamin B12 deficiency anemia      Current Outpatient Medications   Medication     budesonide (PULMICORT) 0.5 MG/2ML neb solution     calcium carbonate (TUMS) 500 MG chewable tablet     cyanocobalamin (CYANOCOBALAMIN) 1000 MCG/ML injection     estradiol (VIVELLE-DOT) 0.05 MG/24HR patch     fluticasone (FLOVENT HFA) 220 MCG/ACT inhaler     levothyroxine (SYNTHROID/LEVOTHROID) 88 MCG tablet     LORazepam (ATIVAN) 1 MG tablet     methylphenidate (RITALIN) 10 MG tablet     mirabegron (MYRBETRIQ) 25 MG 24 hr tablet     multivitamin, therapeutic with minerals (MULTI-VITAMIN) TABS     pantoprazole (PROTONIX) 40 MG EC tablet     rosuvastatin (CRESTOR) 10 MG tablet     zolpidem (AMBIEN) 5 MG tablet     valACYclovir (VALTREX) 1000 mg tablet     No current facility-administered medications for this visit.     Social History     Tobacco Use     Smoking status: Former Smoker     Packs/day: 1.00     Years: 30.00     Pack years: 30.00     Types: Cigarettes     Smokeless tobacco: Never Used   Substance Use Topics     Alcohol use: Yes      Alcohol/week: 4.0 standard drinks     Types: 2 Cans of beer, 2 Standard drinks or equivalent per week     Comment: social     Drug use: No     Comment: used in past any and all         Review of Systems         Objective           Vitals:  No vitals were obtained today due to virtual visit.    Physical Exam   GENERAL: Healthy, alert and no distress  EYES: Eyes grossly normal to inspection.  No discharge or erythema, or obvious scleral/conjunctival abnormalities.  RESP: No audible wheeze, cough, or visible cyanosis.  No visible retractions or increased work of breathing.    SKIN: Visible skin clear. No significant rash, abnormal pigmentation or lesions.  NEURO: Cranial nerves grossly intact.  Mentation and speech appropriate for age.  PSYCH: Mentation appears normal, affect normal/bright, judgement and insight intact, normal speech and appearance well-groomed.                Video-Visit Details    Type of service:  Video Visit    Video End Time:3:02 PM    Originating Location (pt. Location): Home    Distant Location (provider location):  [facility] [place]     Platform used for Video Visit: Bar

## 2021-08-25 ENCOUNTER — TELEPHONE (OUTPATIENT)
Dept: INTERNAL MEDICINE | Facility: CLINIC | Age: 61
End: 2021-08-25

## 2021-08-25 NOTE — TELEPHONE ENCOUNTER
Prior Authorization Retail Medication Request- [id number]    Medication/Dose: Semaglutide-Weight Management (WEGOVY) 0.25 MG/0.5ML SOAJ  ICD code (if different than what is on RX):    Previously Tried and Failed:    Rationale:      Insurance Name:  [facility]   Insurance ID:  [id number]       Pharmacy Information (if different than what is on RX)  Name:    Phone:

## 2021-08-25 NOTE — TELEPHONE ENCOUNTER
Central Prior Authorization Team   Phone: [phone]    PA Initiation    Medication: Semaglutide-Weight Management (WEGOVY) 0.25 MG/0.5ML SOAJ  Insurance Company: [email or web address] [place] - Phone [phone] Fax [phone]  Pharmacy Filling the Rx: [facility] - [place], [place] - [address]  Filling Pharmacy Phone: [phone]  Filling Pharmacy Fax:    Start Date: 8/25/2021

## 2021-08-27 NOTE — TELEPHONE ENCOUNTER
PRIOR AUTHORIZATION DENIED    Medication: Semaglutide-Weight Management (WEGOVY) 0.25 MG/0.5ML SOAJ    Denial Date: 8/27/2021    Denial Rational: Medication is excluded from coverage.  All medications being used to treat weight loss are excluded across all Medicare Part D plans.         Appeal Information: Medication exclusions can not be appealed.

## 2021-09-07 ENCOUNTER — OFFICE VISIT (OUTPATIENT)
Dept: INTERNAL MEDICINE | Facility: CLINIC | Age: 61
End: 2021-09-07
Payer: COMMERCIAL

## 2021-09-07 VITALS
RESPIRATION RATE: 16 BRPM | WEIGHT: 204 LBS | SYSTOLIC BLOOD PRESSURE: 128 MMHG | BODY MASS INDEX: 30.13 KG/M2 | HEART RATE: 94 BPM | TEMPERATURE: 96.8 F | DIASTOLIC BLOOD PRESSURE: 70 MMHG | OXYGEN SATURATION: 97 %

## 2021-09-07 DIAGNOSIS — S99.922A INJURY OF TOE ON LEFT FOOT, INITIAL ENCOUNTER: ICD-10-CM

## 2021-09-07 DIAGNOSIS — R05.9 COUGH: ICD-10-CM

## 2021-09-07 DIAGNOSIS — Z83.49 FAMILY HISTORY OF ALPHA 1 ANTITRYPSIN DEFICIENCY: Primary | ICD-10-CM

## 2021-09-07 PROCEDURE — 99213 OFFICE O/P EST LOW 20 MIN: CPT | Mod: 25 | Performed by: INTERNAL MEDICINE

## 2021-09-07 PROCEDURE — 99000 SPECIMEN HANDLING OFFICE-LAB: CPT | Performed by: INTERNAL MEDICINE

## 2021-09-07 PROCEDURE — 96372 THER/PROPH/DIAG INJ SC/IM: CPT | Performed by: INTERNAL MEDICINE

## 2021-09-07 PROCEDURE — 36415 COLL VENOUS BLD VENIPUNCTURE: CPT | Performed by: INTERNAL MEDICINE

## 2021-09-07 PROCEDURE — 82103 ALPHA-1-ANTITRYPSIN TOTAL: CPT | Mod: 90 | Performed by: INTERNAL MEDICINE

## 2021-09-07 PROCEDURE — 82104 ALPHA-1-ANTITRYPSIN PHENO: CPT | Mod: 90 | Performed by: INTERNAL MEDICINE

## 2021-09-07 RX ADMIN — CYANOCOBALAMIN 1000 MCG: 1000 INJECTION, SOLUTION INTRAMUSCULAR; SUBCUTANEOUS at 14:19

## 2021-09-07 ASSESSMENT — PAIN SCALES - GENERAL: PAINLEVEL: MODERATE PAIN (4)

## 2021-09-07 NOTE — PROGRESS NOTES
Clinic Administered Medication Documentation          Injectable Medication Documentation    Patient was given Cyanocobalamin (B-12). Prior to medication administration, verified patients identity using patient s name and date of birth. Please see MAR and medication order for additional information. Patient instructed to remain in clinic for 15 minutes and report any adverse reaction to staff immediately .      Was entire vial of medication used? Yes  Vial/Syringe: Single dose vial  Expiration Date:  3/2023  Was this medication supplied by the patient? No

## 2021-09-07 NOTE — PROGRESS NOTES
Assessment & Plan     Family history of alpha 1 antitrypsin deficiency  Patient has a cousin who had alpha-1 antitrypsin.  She has had a long history of lung issues with some asthma.  Normal PFTs in 2012.  Concerns with her son having liver disease from alcohol but could also have alpha-1 antitrypsin in addition to it.  We will do a screening test for her today.  It may not change but would be good for her to know  - Alpha 1 Antitrypsin; Future    Cough  Cough possibly related to alpha-1 antitrypsin.  Otherwise if that is negative could do more pulmonary work-up.  - Alpha 1 Antitrypsin; Future    Injury of toe on left foot, initial encounter  Injury of the toe on the left side she appears to have jammed it.  No obvious signs of fracture and is nontender.  I think there is little bit of traumatic neuropathy going on.      No follow-ups on file.    Cirilo Tadeo MD  [facility]   Margarita is a 61 year old who presents for the following health issues     HPI     Chief Complaint   Patient presents with     Patient Request     Alpha 1 Anitrypsin deficiency testing     Toe Injury     Has had some lung irritation on and off. Cousin had alpha 1 antitrypsin defiency and a liver transplant. She wonders about herself.      Had normal spirometry in 2012. Son has liver disease.      Jammed her left 2nd toe into the playpen, ok when standing on it.  Happened a few weeks ago.      Past Medical History:   Diagnosis Date     Chronic fatigue      Hyperlipidemia      Hypothyroid      New onset a-fib (H)      Other vitamin B12 deficiency anemia      Current Outpatient Medications   Medication     budesonide (PULMICORT) 0.5 MG/2ML neb solution     calcium carbonate (TUMS) 500 MG chewable tablet     cyanocobalamin (CYANOCOBALAMIN) 1000 MCG/ML injection     estradiol (VIVELLE-DOT) 0.05 MG/24HR patch     fluticasone (FLOVENT HFA) 220 MCG/ACT inhaler     levothyroxine (SYNTHROID/LEVOTHROID) 88 MCG  tablet     LORazepam (ATIVAN) 1 MG tablet     methylphenidate (RITALIN) 10 MG tablet     mirabegron (MYRBETRIQ) 25 MG 24 hr tablet     multivitamin, therapeutic with minerals (MULTI-VITAMIN) TABS     pantoprazole (PROTONIX) 40 MG EC tablet     rosuvastatin (CRESTOR) 10 MG tablet     Semaglutide-Weight Management (WEGOVY) 0.25 MG/0.5ML SOAJ     valACYclovir (VALTREX) 1000 mg tablet     zolpidem (AMBIEN) 5 MG tablet     Current Facility-Administered Medications   Medication     cyanocobalamin injection 1,000 mcg     Social History     Tobacco Use     Smoking status: Former Smoker     Packs/day: 1.00     Years: 30.00     Pack years: 30.00     Types: Cigarettes     Smokeless tobacco: Never Used   Substance Use Topics     Alcohol use: Yes     Alcohol/week: 4.0 standard drinks     Types: 2 Cans of beer, 2 Standard drinks or equivalent per week     Comment: social     Drug use: No     Comment: used in past any and all         Review of Systems         Objective    Pulse 94   Temp 96.8  F (36  C) (Temporal)   Resp 16   Wt 92.5 kg (204 lb)   LMP  (LMP Unknown)   SpO2 97%   BMI 30.13 kg/m    Body mass index is 30.13 kg/m .  Physical Exam   NAD  Left second toe is nontender to touch, normal range of motion.

## 2021-09-08 NOTE — PROGRESS NOTES
Does Nikki have a CPAP/Bipap?  No     [place] Sleep Scale:         Sleep Consultation:    Date on this visit: 9/10/2021    Nikki Morales is sent by Cirilo Tadeo for a sleep consultation regarding possible sleep apnea, Insomnia.     Primary Physician: Cirilo Tadeo     Nikki Morales reports nightly snoring and poor quality of sleep and occasional gasping, choking and snorting for many years.     Nikki goes to sleep at 11:00 PM during the week. She wakes up at 8:30 AM without an alarm. She falls asleep in 60 minutes.  Nikki has difficulty falling asleep.  She wakes up 5-8 times a night for 5 minutes to 2 hours before falling back to sleep.  Nikki wakes up to go to the bathroom and uncertain reasons.  On weekends, Nikki keeps a similar schedule. Patient gets an average of 8 hours of sleep per night.     Patient does watch TV in bed.     Nikki does not do shift work.      Nikki does snore every night. Patient does have a regular bed partner. There is report of snoring and poor quality of sleep.  She does not have witnessed apneas. They occasionally sleep separately.  Patient sleeps on her side. She has occasional snort arousals and morning dry mouth,. Nikki has occasional sleep paralysis.    She denies sleep walking, sleep talking and sleep terrors as a child.  Nikki has claustrophobia, reflux at night, heartburn, depression and anxiety.      Nikki has gained 5-10 pounds in the last year.  Patient describes themself as a night person.  She would prefer to go to sleep at 11:00 PM and wake up at 8:30 AM.  Patient's [place] Sleepiness score 11/24 consistent with excessive  daytime sleepiness.      Nikki naps 1-2 times per week for  minutes, feels refreshed after naps. She takes some inadvertant naps.  She denies closing eyes, dozing and falling asleep while driving.  Patient was counseled on the importance of driving while alert, to pull over if drowsy, or nap before getting into the  vehicle if sleepy.  She uses 1 energy drinks/day. Last caffeine intake is usually before 10 AM.    Allergies:    Allergies   Allergen Reactions     Codeine Other (See Comments)     Causes agitation       Medications:    Current Outpatient Medications   Medication Sig Dispense Refill     budesonide (PULMICORT) 0.5 MG/2ML neb solution Mix 2 vials with 1 oz coffee flavoring and drink twice a day and rinse mouth aftrerwards 60 mL 1     calcium carbonate (TUMS) 500 MG chewable tablet Take 2 chew tab by mouth 3 times daily as needed for other (bloating/flatulence)       cyanocobalamin (CYANOCOBALAMIN) 1000 MCG/ML injection INJECT 1ML INTRAMUSCULARLY EVERY 30 DAYS 1 mL 11     estradiol (VIVELLE-DOT) 0.05 MG/24HR patch Place 1 patch onto the skin twice a week       fluticasone (FLOVENT HFA) 220 MCG/ACT inhaler Inhale 1 puff into the lungs 2 times daily 36 g 1     levothyroxine (SYNTHROID/LEVOTHROID) 88 MCG tablet Take 1 tablet (88 mcg) by mouth daily 90 tablet 1     LORazepam (ATIVAN) 1 MG tablet TAKE ONE TABLET BY MOUTH TWICE A DAY AS NEEDED FOR ANIXETY 20 tablet 1     methylphenidate (RITALIN) 10 MG tablet Take 10 mg by mouth 2 times daily       mirabegron (MYRBETRIQ) 25 MG 24 hr tablet Take 1 tablet (25 mg) by mouth daily 90 tablet 1     multivitamin, therapeutic with minerals (MULTI-VITAMIN) TABS Take 1 tablet by mouth daily 100 tablet 3     pantoprazole (PROTONIX) 40 MG EC tablet Take 40 mg by mouth daily       rosuvastatin (CRESTOR) 10 MG tablet 1 daily or as directed. 90 tablet 3     Semaglutide-Weight Management (WEGOVY) 0.25 MG/0.5ML SOAJ Inject 0.25 mg Subcutaneous every 7 days 2 mL 3     valACYclovir (VALTREX) 1000 mg tablet Take 1 tablet (1,000 mg) by mouth 2 times daily (Patient not taking: Reported on 4/12/2021) 20 tablet 3     zolpidem (AMBIEN) 5 MG tablet Take 2 tablets (10 mg) by mouth nightly as needed for sleep 60 tablet 5       Problem List:  Patient Active Problem List    Diagnosis Date Noted     Mixed  incontinence 2020     Priority: Medium     Primary headache associated with sexual activity 2019     Priority: Medium     Impingement syndrome of left shoulder 2019     Priority: Medium     KAILEE (generalized anxiety disorder) 2018     Priority: Medium     Moderate recurrent major depression (H) 2018     Priority: Medium     Mild persistent asthma without complication 2017     Priority: Medium     Attention deficit hyperactivity disorder, inattentive type 10/20/2015     Priority: Medium     Hypothyroidism due to acquired atrophy of thyroid 2015     Priority: Medium     Adhesive capsulitis of shoulder 2015     Priority: Medium     Degenerative arthritis of cervical spine with cord compression 2015     Priority: Medium     Eosinophilic esophagitis 2014     Priority: Medium     Overweight 2014     Priority: Medium     Problem list name updated by automated process. Provider to review       HYPERLIPIDEMIA LDL GOAL <130 10/31/2010     Priority: Medium     Herpes simplex virus (HSV) infection 2007     Priority: Medium     Problem list name updated by automated process. Provider to review       Female stress incontinence 2007     Priority: Medium     ESOPHAGEAL REFLUX 2006     Priority: Medium     Other and unspecified disc disorder of lumbar region 2003     Priority: Medium     Chronic fatigue syndrome      Priority: Medium        Past Medical/Surgical History:  Past Medical History:   Diagnosis Date     Chronic fatigue      Hyperlipidemia      Hypothyroid      New onset a-fib (H)      Other vitamin B12 deficiency anemia      Past Surgical History:   Procedure Laterality Date     C  DELIVERY ONLY      , Low Cervical     C  DELIVERY ONLY      Description:  Section;  Recorded: 11/10/2009;  Comments: @ 29 weeks     C LIGATE FALLOPIAN TUBE      Description: Tubal Ligation;  Recorded: 11/10/2009;     C TOTAL  ABDOM HYSTERECTOMY      Description: Hysterectomy;  Recorded: 11/10/2009;  Comments: due to cervical dysplasia     C VAGINAL HYSTERECTOMY  09/1995    Hysterectomy, Vaginal     COLONOSCOPY  10/06/09     COLONOSCOPY  7/2/2013    Procedure: COMBINED COLONOSCOPY, SINGLE BIOPSY/POLYPECTOMY BY BIOPSY;;  Surgeon: Cuate Miles MD;  Location: [facility]     COLONOSCOPY N/A 9/6/2018    Procedure: COLONOSCOPY;  Colonoscopy;  Surgeon: Hamzah Dudley DO;  Location: [facility]     COMBINED REPAIR PTOSIS WITH BLEPHAROPLASTY BILATERAL Bilateral 9/24/2018    Procedure: COMBINED REPAIR PTOSIS WITH BLEPHAROPLASTY BILATERAL;  Bilateral upper eyelid Blepharoplasty and ptosis repair ;  Surgeon: Martina Andrade MD;  Location: [facility]     CONIZATION CERVIX,KNIFE/LASER       ESOPHAGOSCOPY, GASTROSCOPY, DUODENOSCOPY (EGD), COMBINED  7/2/2013    Procedure: COMBINED ESOPHAGOSCOPY, GASTROSCOPY, DUODENOSCOPY (EGD), BIOPSY SINGLE OR MULTIPLE;  egd with rabdain biopsies and colonoscopy with polypectomy by biopsy ;  Surgeon: Cuate Miles MD;  Location: [facility]     HC DILATION/CURETTAGE DIAG/THER NON OB      D & C     HC REMOVAL OF TONSILS,<13 Y/O      Tonsils <10y.o.     HC REMOVAL OF TONSILS,<11 Y/O      Description: Tonsillectomy;  Recorded: 11/29/2009;  Comments: in grade school     HC UGI ENDOSCOPY DIAG W BIOPSY  12/05/07     HYSTERECTOMY, PAP NO LONGER INDICATED       LAPAROSCOPIC CHOLECYSTECTOMY  10/12/13     REPAIR PTOSIS       TUBAL LIGATION  1994     ZZC NONSPECIFIC PROCEDURE  2000's    sinus     ZZC NONSPECIFIC PROCEDURE      Esophgeal dilatation multiple     ZZC NONSPECIFIC PROCEDURE  2008    sling     ZZHC UGI ENDOSCOPY, SIMPLE EXAM  09/10/99 & 04/14/98       Social History:  Social History     Socioeconomic History     Marital status:      Spouse name: Jared     Number of children: 2     Years of education: 12     Highest education level: Not on file   Occupational History     Occupation: HomeMaker     Employer:  [employer]   Tobacco Use     Smoking status: Former Smoker     Packs/day: 1.00     Years: 30.00     Pack years: 30.00     Types: Cigarettes     Smokeless tobacco: Never Used   Substance and Sexual Activity     Alcohol use: Yes     Alcohol/week: 4.0 standard drinks     Types: 2 Cans of beer, 2 Standard drinks or equivalent per week     Comment: social     Drug use: No     Comment: used in past any and all     Sexual activity: Yes     Partners: Male     Birth control/protection: Female Surgical     Comment: Hx: hysterectomy   Other Topics Concern      Service No     Blood Transfusions No     Caffeine Concern No     Occupational Exposure No     Hobby Hazards No     Sleep Concern Yes     Comment: Difficult with sleeping at night, sleeps most of the day     Stress Concern No     Weight Concern Yes     Comment: desire wt loss     Special Diet No     Back Care Yes     Comment: weight restrictions     Exercise Yes     Bike Helmet No     Seat Belt Yes     Self-Exams No     Parent/sibling w/ CABG, MI or angioplasty before 65F 55M? No   Social History Narrative     Not on file     Social Determinants of Health     Financial Resource Strain:      Difficulty of Paying Living Expenses:    Food Insecurity:      Worried About Running Out of Food in the Last Year:      Ran Out of Food in the Last Year:    Transportation Needs:      Lack of Transportation (Medical):      Lack of Transportation (Non-Medical):    Physical Activity:      Days of Exercise per Week:      Minutes of Exercise per Session:    Stress:      Feeling of Stress :    Social Connections:      Frequency of Communication with Friends and Family:      Frequency of Social Gatherings with Friends and Family:      Attends Cheondo[employer] Services:      Active Member of Clubs or Organizations:      Attends Club or Organization Meetings:      Marital Status:    Intimate Partner Violence:      Fear of Current or Ex-Partner:      Emotionally Abused:      Physically Abused:       Sexually Abused:        Family History:  Family History   Problem Relation Age of Onset     Cancer Mother         Uterine     Prostate Cancer Mother      Diabetes Father      Hypertension Father      Cancer Father         prostate cancer     Cerebrovascular Disease Father      Psychotic Disorder Son         severe Add,      Asthma Son         exercised induced asthma     Asthma Son         ecxercise induced asthma     Cardiovascular Sister         recurrent blood clots     Cerebrovascular Disease Sister 51     Prostate Cancer Brother      Diabetes Maternal Grandfather      Heart Disease Maternal Grandmother         Heart condition age 96     Diabetes Paternal Grandfather      Cancer Brother      Cerebrovascular Disease Paternal Grandmother        Review of Systems:  A complete review of systems reviewed by me is negative with the exeption of what has been mentioned in the history of present illness.  CONSTITUTIONAL:  POSITIVE for  weight gain  EYES: NEGATIVE for changes in vision, blind spots, double vision.  ENT:  POSITIVE for  ear pain, sore throat and post-nasal drip  CARDIAC:  POSITIVE for  fast heart beats  NEUROLOGIC: NEGATIVE headaches, weakness or numbness in the arms or legs.  DERMATOLOGIC: NEGATIVE for rashes, new moles or change in mole(s)  PULMONARY:  POSITIVE for  SOB with activity, dry cough and productive cough  GASTROINTESTINAL: NEGATIVE for nausea or vomitting, loose or watery stools, fat or grease in stools, constipation, abdominal pain, bowel movements black in color or blood noted.  GENITOURINARY: NEGATIVE for pain during urination, blood in urine, urinating more frequently than usual, irregular menstrual periods.  MUSCULOSKELETAL:  POSITIVE for  bone or joint pain  ENDOCRINE: NEGATIVE for increased thirst or urination, diabetes.  LYMPHATIC: NEGATIVE for swollen lymph nodes, lumps or bumps in the breasts or nipple discharge.    Physical Examination:  Vitals: LMP  (LMP Unknown)   BMI= There is  no height or weight on file to calculate BMI.         [place] Total Score 2/23/2018   Total score - Wynot 8            GENERAL APPEARANCE: healthy, alert and no distress  EYES: Eyes grossly normal to inspection, PERRL and conjunctivae and sclerae normal  HENT: nose and mouth without ulcers or lesions and normal cephalic/atraumatic  NECK: no adenopathy, no asymmetry, masses, or scars and thyroid normal to palpation  RESP: lungs clear to auscultation - no rales, rhonchi or wheezes  CV: regular rates and rhythm, normal S1 S2, no S3 or S4 and no murmur, click or rub  MS: extremities normal- no gross deformities noted  PSYCH: mentation appears normal and affect normal/bright  Mallampati Class: I.  Tonsillar Stage: 0  surgically removed.    Impression/Plan:  Insomnia- frequent difficulty falling asleep, frequent prolonged awakening in the middle of the night. She uses Ambien nightly which has improved her sleep as well as occasional Lorazapam. Also has c/o frequent sleep paralysis. These issues will be better addressed after completing a sleep study. Will consider CBT for insomnia.   Snoring, EDS- Since study done in 2002 she has had significant weight gain. She wakes gasping, choking at times. No observed apneas.   Due to significant comorbidities that will affect quality of a home study a lab study is preferred. Orders placed.   Literature provided regarding sleep apnea.      She will follow up with me in approximately two weeks after her sleep study has been competed to review the results and discuss plan of care.       Polysomnography reviewed.  Obstructive sleep apnea reviewed.  Complications of untreated sleep apnea were reviewed.        CC: Cirilo Tadeo

## 2021-09-10 ENCOUNTER — OFFICE VISIT (OUTPATIENT)
Dept: SLEEP MEDICINE | Facility: CLINIC | Age: 61
End: 2021-09-10
Payer: COMMERCIAL

## 2021-09-10 VITALS
OXYGEN SATURATION: 96 % | BODY MASS INDEX: 37.06 KG/M2 | WEIGHT: 250.2 LBS | HEIGHT: 69 IN | DIASTOLIC BLOOD PRESSURE: 80 MMHG | SYSTOLIC BLOOD PRESSURE: 120 MMHG | HEART RATE: 80 BPM

## 2021-09-10 DIAGNOSIS — G47.00 INSOMNIA, UNSPECIFIED TYPE: Primary | ICD-10-CM

## 2021-09-10 DIAGNOSIS — G47.53 SLEEP PARALYSIS, RECURRENT ISOLATED: ICD-10-CM

## 2021-09-10 DIAGNOSIS — R29.818 SUSPECTED SLEEP APNEA: ICD-10-CM

## 2021-09-10 DIAGNOSIS — E66.01 MORBID OBESITY (H): ICD-10-CM

## 2021-09-10 LAB
A1AT PHENOTYP SERPL-IMP: NORMAL
A1AT SERPL-MCNC: 114 MG/DL

## 2021-09-10 PROCEDURE — 99203 OFFICE O/P NEW LOW 30 MIN: CPT | Performed by: PHYSICIAN ASSISTANT

## 2021-09-10 RX ORDER — ZOLPIDEM TARTRATE 10 MG/1
TABLET ORAL
Qty: 1 TABLET | Refills: 0 | Status: SHIPPED | OUTPATIENT
Start: 2021-09-10 | End: 2022-03-02

## 2021-09-10 ASSESSMENT — MIFFLIN-ST. JEOR: SCORE: 1754.28

## 2021-10-07 ENCOUNTER — ALLIED HEALTH/NURSE VISIT (OUTPATIENT)
Dept: FAMILY MEDICINE | Facility: CLINIC | Age: 61
End: 2021-10-07
Payer: COMMERCIAL

## 2021-10-07 DIAGNOSIS — D51.8 VITAMIN B12 DEFICIENCY (DIETARY) ANEMIA: Primary | ICD-10-CM

## 2021-10-07 PROCEDURE — 96372 THER/PROPH/DIAG INJ SC/IM: CPT | Performed by: INTERNAL MEDICINE

## 2021-10-07 PROCEDURE — 99207 PR NO CHARGE NURSE ONLY: CPT

## 2021-10-07 RX ADMIN — CYANOCOBALAMIN 1000 MCG: 1000 INJECTION, SOLUTION INTRAMUSCULAR; SUBCUTANEOUS at 09:28

## 2021-10-07 NOTE — PROGRESS NOTES
Clinic Administered Medication Documentation    Administrations This Visit     cyanocobalamin injection 1,000 mcg     Admin Date  10/07/2021 Action  Given Dose  1,000 mcg Route  Intramuscular Site  Right Deltoid Administered By  Chetna Olivares MA    Ordering Provider: Cirilo Tadeo MD    Patient Supplied?: No                  Injectable Medication Documentation    Patient was given Cyanocobalamin (B-12). Prior to medication administration, verified patients identity using patient s name and date of birth. Please see MAR and medication order for additional information. Patient instructed to remain in clinic for 15 minutes.      Was entire vial of medication used? Yes  Vial/Syringe: Single dose vial  Expiration Date:  03/2023  Given: Right Deltoid   Was this medication supplied by the patient? No     Chetna Olivares MA 10/7/2021  9:32 AM

## 2021-10-08 ENCOUNTER — TELEPHONE (OUTPATIENT)
Dept: PHARMACY | Facility: CLINIC | Age: 61
End: 2021-10-08

## 2021-10-08 NOTE — TELEPHONE ENCOUNTER
This patient is due for MTM follow-up. I called the patient to schedule an appointment and left a message with the clinic phone number for the patient to call to schedule.    Will Cadet, StanD, [facility]  Medication Therapy Management Pharmacist  Pager: [phone]

## 2021-10-20 ENCOUNTER — THERAPY VISIT (OUTPATIENT)
Dept: SLEEP MEDICINE | Facility: CLINIC | Age: 61
End: 2021-10-20
Payer: COMMERCIAL

## 2021-10-20 DIAGNOSIS — G47.53 SLEEP PARALYSIS, RECURRENT ISOLATED: ICD-10-CM

## 2021-10-20 DIAGNOSIS — R29.818 SUSPECTED SLEEP APNEA: ICD-10-CM

## 2021-10-20 DIAGNOSIS — E66.01 MORBID OBESITY (H): ICD-10-CM

## 2021-10-20 DIAGNOSIS — G47.00 INSOMNIA, UNSPECIFIED TYPE: ICD-10-CM

## 2021-10-20 PROCEDURE — 95810 POLYSOM 6/> YRS 4/> PARAM: CPT | Performed by: OTOLARYNGOLOGY

## 2021-10-23 ENCOUNTER — HEALTH MAINTENANCE LETTER (OUTPATIENT)
Age: 61
End: 2021-10-23

## 2021-10-25 ENCOUNTER — NURSE TRIAGE (OUTPATIENT)
Dept: INTERNAL MEDICINE | Facility: CLINIC | Age: 61
End: 2021-10-25

## 2021-10-25 NOTE — TELEPHONE ENCOUNTER
Patient stated she had a sleep study done last Wednesday and after removing the sticky pads for the leads on her chest, she developed a rash where the stickers were placed.  She stated in the following days the rash/ redness has spread across her upper chest and onto the lower part of her neck.  She stated she has not tried benadryl at this time but does have clindamycin and triamcinolone cream from a dermatologist she had seen at a different facility and is questioning if one of these two cream/ gels might be helpful for this allergic reaction from the sticky pads.  Advised home care instructions.  Advised patient to seek emergency care for worsening symptoms.  Patient stated understanding. Patient stated she uses the [facility] pharmacy in [place] if PCP thinks another kind of cream might help.      Will forward to PCP for review.      Reason for Disposition    Localized hives    Additional Information    Negative: Difficulty breathing or wheezing now    Negative: Rapid onset of swollen tongue    Negative: Rapid onset of hoarseness or cough    Negative: Very weak (can't stand)    Negative: Difficult to awaken or acting confused (e.g., disoriented, slurred speech)    Negative: Life-threatening reaction (anaphylaxis) in the past to similar substance (e.g., food, insect bite/sting, chemical, etc.) and < 2 hours since exposure    Negative: Sounds like a life-threatening emergency to the triager    Negative: Bee, wasp, or yellow jacket sting within last 24 hours    Negative: Taking a new medicine now or within last 3 days (Exception: antihistamine, decongestant or other OTC cough/cold medicines)    Negative: Doesn't match the SYMPTOMS of hives    Negative: Swollen tongue    Negative: Widespread hives, itching, or facial swelling and onset < 2 hours of exposure to high-risk allergen (e.g., 1st dose of antibiotic, nuts, sting)    Negative: Patient sounds very sick or weak to the triager    Negative: MODERATE-SEVERE hives  "persist (i.e., hives interfere with normal activities or work) and taking antihistamine (e.g., Benadryl, Claritin) > 24 hours    Negative: Hives have become worse and taking oral steroids (e.g., prednisone) > 24 hours    Negative: Abdominal pain    Negative: Joint swelling    Negative: Fever    Negative: Patient wants to be seen    Answer Assessment - Initial Assessment Questions  1. APPEARANCE: \"What does the rash look like?\"       Dark pin, itchy, prickly, more one large area colored    2. LOCATION: \"Where is the rash located?\"       Chest     3. NUMBER: \"How many hives are there?\"       More rash/hive    4. SIZE: \"How big are the hives?\" (inches, cm, compare to coins) \"Do they all look the same or is there lots of variation in shape and size?\"       Whole area on front of chest and up the neck after leads placed for sleep study    5. ONSET: \"When did the hives begin?\" (Hours or days ago)       Approximately 5 days    6. ITCHING: \"Does it itch?\" If so, ask: \"How bad is the itch?\"     - MILD: doesn't interfere with normal activities    - MODERATE-SEVERE: interferes with work, school, sleep, or other activities       Yes    7. RECURRENT PROBLEM: \"Have you had hives before?\" If so, ask: \"When was the last time?\" and \"What happened that time?\"       Mild    8. TRIGGERS: \"Were you exposed to any new food, plant, cosmetic product or animal just before the hives began?\"      After sleep study, sticky pads     9. OTHER SYMPTOMS: \"Do you have any other symptoms?\" (e.g., fever, tongue swelling, difficulty breathing, abdominal pain)      No    10. PREGNANCY: \"Is there any chance you are pregnant?\" \"When was your last menstrual period?\"        NA    Protocols used: HIVES-A-[place]  Mariela Srinivasan RN      "

## 2021-10-25 NOTE — TELEPHONE ENCOUNTER
Yes this is a reaction from the adhesive material of the pads.  She can certainly try the triamcinolone cream as it is a steroid in it which may help her.  Apply it twice a day for 5 days to see if it improves.

## 2021-11-01 LAB — SLPCOMP: NORMAL

## 2021-11-03 ENCOUNTER — E-VISIT (OUTPATIENT)
Dept: INTERNAL MEDICINE | Facility: CLINIC | Age: 61
End: 2021-11-03
Payer: COMMERCIAL

## 2021-11-03 DIAGNOSIS — Z20.822 EXPOSURE TO 2019 NOVEL CORONAVIRUS: Primary | ICD-10-CM

## 2021-11-03 PROCEDURE — 99207 PR NON-BILLABLE SERV PER CHARTING: CPT | Performed by: INTERNAL MEDICINE

## 2021-11-03 NOTE — PROGRESS NOTES
Margarita is a 61 year old who is being evaluated via a billable telephone visit.      What phone number would you like to be contacted at? [phone]  How would you like to obtain your AVS? Jorje      Vitals:  No vitals were obtained today due to virtual visit.    Phone call duration: 15 minutes    Does Nikki have a CPAP/Bipap?  No    [place] Sleep Scale: 6           Sleep Study Follow-Up Visit:    Date on this visit: 11/5/2021    Nikki Morales comes in today for follow-up of her sleep study done on 10/20/21 at the [facility] for insomnia  sleep maintence and possible sleep apnea.    Sleep latency 6.2 minutes with Ambien.  REM achieved.   REM latency 81.5 minutes.  Sleep efficiency 71.2%. Total sleep time 309.5 minutes.    Sleep architecture:  Stage 1, 12.8% (5%), stage 2, 45.6% (45-55%), stage 3, 25.8% (15-20%), stage REM, 15.8% (20-25%).  AHI was 1, without desaturations. RDI 6.  REM RDI 2.4, consistent with no REM CHASE.  Supine RDI 4.8, consistent with no SUPINE CHASE.  Periodic Limb Movement Index 0.8/hour.            These findings were reviewed with patient.     Past medical/surgical history, family history, social history, medications and allergies were reviewed.      Problem List:  Patient Active Problem List    Diagnosis Date Noted     Morbid obesity (H) 09/10/2021     Priority: Medium     Mixed incontinence 12/07/2020     Priority: Medium     Primary headache associated with sexual activity 05/16/2019     Priority: Medium     Impingement syndrome of left shoulder 04/01/2019     Priority: Medium     KAILEE (generalized anxiety disorder) 12/13/2018     Priority: Medium     Moderate recurrent major depression (H) 12/13/2018     Priority: Medium     Mild persistent asthma without complication 09/14/2017     Priority: Medium     Attention deficit hyperactivity disorder, inattentive type 10/20/2015     Priority: Medium     Hypothyroidism due to acquired atrophy of thyroid 09/23/2015      Priority: Medium     Adhesive capsulitis of shoulder 01/23/2015     Priority: Medium     Degenerative arthritis of cervical spine with cord compression 01/23/2015     Priority: Medium     Eosinophilic esophagitis 08/04/2014     Priority: Medium     Overweight 08/04/2014     Priority: Medium     Problem list name updated by automated process. Provider to review       HYPERLIPIDEMIA LDL GOAL <130 10/31/2010     Priority: Medium     Herpes simplex virus (HSV) infection 05/05/2007     Priority: Medium     Problem list name updated by automated process. Provider to review       Female stress incontinence 05/04/2007     Priority: Medium     ESOPHAGEAL REFLUX 11/27/2006     Priority: Medium     Other and unspecified disc disorder of lumbar region 04/29/2003     Priority: Medium     Chronic fatigue syndrome      Priority: Medium        Impression/Plan:  No sleep apnea, primary insomnia. Symptoms have been ongoing for many years. Currently on Ambien, but feels that is no longer as beneficial. Discussed a restricted sleep schedule, but she states she has done that and many other things which are not helpful. Will refer to sleep psychology.   She will follow up with me in about 3 month(s).     Fifteen minutes spent with patient, all of which were spent face-to-face counseling, consulting, coordinating plan of care.          CC: Cirilo Tadeo

## 2021-11-04 ENCOUNTER — LAB (OUTPATIENT)
Dept: FAMILY MEDICINE | Facility: CLINIC | Age: 61
End: 2021-11-04
Attending: INTERNAL MEDICINE
Payer: COMMERCIAL

## 2021-11-04 DIAGNOSIS — R05.9 COUGH: Primary | ICD-10-CM

## 2021-11-04 DIAGNOSIS — Z20.822 EXPOSURE TO 2019 NOVEL CORONAVIRUS: ICD-10-CM

## 2021-11-04 PROCEDURE — U0003 INFECTIOUS AGENT DETECTION BY NUCLEIC ACID (DNA OR RNA); SEVERE ACUTE RESPIRATORY SYNDROME CORONAVIRUS 2 (SARS-COV-2) (CORONAVIRUS DISEASE [COVID-19]), AMPLIFIED PROBE TECHNIQUE, MAKING USE OF HIGH THROUGHPUT TECHNOLOGIES AS DESCRIBED BY CMS-2020-01-R: HCPCS

## 2021-11-04 PROCEDURE — U0005 INFEC AGEN DETEC AMPLI PROBE: HCPCS

## 2021-11-04 RX ORDER — BENZONATATE 200 MG/1
CAPSULE ORAL
Qty: 30 CAPSULE | Refills: 3 | Status: SHIPPED | OUTPATIENT
Start: 2021-11-04 | End: 2022-03-02

## 2021-11-04 NOTE — TELEPHONE ENCOUNTER
Tessalon      Last Written Prescription Date:  11/19/18  Last Fill Quantity: 30,   # refills: 3  Last Office Visit: 9/7/2021  Future Office visit:    Next 5 appointments (look out 90 days)    Nov 09, 2021  9:00 AM  Nurse Only with [facility] MA/LPN  [facility] ([facility] ) [address] [place] [id number]  [phone]           Routing refill request to provider for review/approval because:  Drug not on the [facility], [facility] or [facility] refill protocol or controlled substance

## 2021-11-05 ENCOUNTER — E-VISIT (OUTPATIENT)
Dept: INTERNAL MEDICINE | Facility: CLINIC | Age: 61
End: 2021-11-05

## 2021-11-05 ENCOUNTER — VIRTUAL VISIT (OUTPATIENT)
Dept: SLEEP MEDICINE | Facility: CLINIC | Age: 61
End: 2021-11-05
Payer: COMMERCIAL

## 2021-11-05 DIAGNOSIS — J20.8 ACUTE BACTERIAL BRONCHITIS: Primary | ICD-10-CM

## 2021-11-05 DIAGNOSIS — B96.89 ACUTE BACTERIAL BRONCHITIS: Primary | ICD-10-CM

## 2021-11-05 DIAGNOSIS — F51.04 PSYCHOPHYSIOLOGIC INSOMNIA: Primary | ICD-10-CM

## 2021-11-05 LAB — SARS-COV-2 RNA RESP QL NAA+PROBE: NEGATIVE

## 2021-11-05 PROCEDURE — 99421 OL DIG E/M SVC 5-10 MIN: CPT | Performed by: INTERNAL MEDICINE

## 2021-11-05 PROCEDURE — 99212 OFFICE O/P EST SF 10 MIN: CPT | Mod: 95 | Performed by: PHYSICIAN ASSISTANT

## 2021-11-05 RX ORDER — PREDNISONE 20 MG/1
20 TABLET ORAL DAILY
Qty: 5 TABLET | Refills: 0 | Status: SHIPPED | OUTPATIENT
Start: 2021-11-05 | End: 2021-12-08

## 2021-11-05 RX ORDER — AZITHROMYCIN 250 MG/1
TABLET, FILM COATED ORAL
Qty: 6 TABLET | Refills: 0 | Status: SHIPPED | OUTPATIENT
Start: 2021-11-05 | End: 2021-11-10

## 2021-11-05 NOTE — PATIENT INSTRUCTIONS
"    Dear Nikki Morales    After reviewing your responses, I've been able to diagnose you with \"Bronchitis\" which is a common infection of your lungs. While this is most commonly caused by a virus, the symptoms you have given suggest you should be treated with antibiotics.     I have sent antibiotic and prednisone to your pharmacy to treat this infection.     It is important that you take all of your prescribed medication even if your symptoms are improving after a few doses. Taking all of your medicine helps prevent the symptoms from returning.     If your symptoms worsen, you develop chest pain or shortness of breath, fevers over 101, or are not improving in 5 days, please contact your primary care provider for an appointment or visit any of our convenient Walk-in Care or Urgent Care Centers to be seen which can be found on our website here.    Thanks again for choosing us as your health care partner,    Cirilo Tadeo MD    "

## 2021-11-09 ENCOUNTER — ALLIED HEALTH/NURSE VISIT (OUTPATIENT)
Dept: FAMILY MEDICINE | Facility: CLINIC | Age: 61
End: 2021-11-09
Payer: COMMERCIAL

## 2021-11-09 DIAGNOSIS — D51.8 VITAMIN B12 DEFICIENCY (DIETARY) ANEMIA: Primary | ICD-10-CM

## 2021-11-09 PROCEDURE — 99207 PR NO CHARGE NURSE ONLY: CPT

## 2021-11-09 PROCEDURE — 99207 PR NO CHARGE NURSE ONLY: CPT | Performed by: INTERNAL MEDICINE

## 2021-11-09 PROCEDURE — 96372 THER/PROPH/DIAG INJ SC/IM: CPT | Performed by: INTERNAL MEDICINE

## 2021-11-09 RX ADMIN — CYANOCOBALAMIN 1000 MCG: 1000 INJECTION, SOLUTION INTRAMUSCULAR; SUBCUTANEOUS at 09:22

## 2021-11-09 NOTE — PROGRESS NOTES
Clinic Administered Medication Documentation          Injectable Medication Documentation    Patient was given Cyanocobalamin (B-12). Prior to medication administration, verified patients identity using patient s name and date of birth. Please see MAR and medication order for additional information. Patient instructed to remain in clinic for 15 minutes and report any adverse reaction to staff immediately .      Was entire vial of medication used? Yes  Vial/Syringe: Single dose vial  Expiration Date:  6/2023  Was this medication supplied by the patient? No

## 2021-11-10 ENCOUNTER — E-VISIT (OUTPATIENT)
Dept: INTERNAL MEDICINE | Facility: CLINIC | Age: 61
End: 2021-11-10
Payer: COMMERCIAL

## 2021-11-10 DIAGNOSIS — Z20.822 SUSPECTED COVID-19 VIRUS INFECTION: Primary | ICD-10-CM

## 2021-11-10 PROCEDURE — 99421 OL DIG E/M SVC 5-10 MIN: CPT | Performed by: INTERNAL MEDICINE

## 2021-11-10 NOTE — PATIENT INSTRUCTIONS
Dear Nikki Morales,    Your symptoms show that you may have coronavirus (COVID-19). This illness can cause fever, cough and trouble breathing. Many people get a mild case and get better on their own. Some people can get very sick.    Will I be tested for COVID-19?  We would like to test you for Covid-19 virus. I have placed orders for this test.     To schedule: go to your [facility] home page and scroll down to the section that says  You have an appointment that needs to be scheduled  and click the large green button that says  Schedule Now  and follow the steps to find the next available openings.    If you are unable to complete these [facility] scheduling steps, please call [phone] to schedule your testing.     Return to work/school/ guidance:  Please let your workplace manager and staffing office know when your quarantine ends     We can t give you an exact date as it depends on the above. You can calculate this on your own or work with your manager/staffing office to calculate this. (For example if you were exposed on 10/4, you would have to quarantine for 14 full days. That would be through 10/18. You could return on 10/19.)      If you receive a positive COVID-19 test result, follow the guidance of the those who are giving you the results. Usually the return to work is 10 (or in some cases 20 days from symptom onset.) If you work at [facility], you must also be cleared by Employee Occupational Health and Safety to return to work.        If you receive a negative COVID-19 test result and did not have a high risk exposure to someone with a known positive COVID-19 test, you can return to work once you're free of fever for 24 hours without fever-reducing medication and your symptoms are improving or resolved.      If you receive a negative COVID-19 test and If you had a high risk exposure to someone who has tested positive for COVID-19 then you can return to work 14 days after your last  contact with the positive individual    Note: If you have ongoing exposure to the covid positive person, this quarantine period may be more than 14 days. (For example, if you are continued to be exposed to your child who tested positive and cannot isolate from them, then the quarantine of 7-14 days can't start until your child is no longer contagious. This is typically 10 days from onset of the child's symptoms. So the total duration may be 17-24 days in this case.)    Sign up for [employer].   We know it's scary to hear that you might have COVID-19. We want to track your symptoms to make sure you're okay over the next 2 weeks. Please look for an email from [employer]--this is a free, online program that we'll use to keep in touch. To sign up, follow the link in the email you will receive. Learn more at [email or web address]    How can I take care of myself?    Get lots of rest. Drink extra fluids (unless a doctor has told you not to)    Take Tylenol (acetaminophen) or ibuprofen for fever or pain. If you have liver or kidney problems, ask your family doctor if it's okay to take Tylenol o ibuprofen    If you have other health problems (like cancer, heart failure, an organ transplant or severe kidney disease): Call your specialty clinic if you don't feel better in the next 2 days.    Know when to call 911. Emergency warning signs include:  o Trouble breathing or shortness of breath  o Pain or pressure in the chest that doesn't go away  o Feeling confused like you haven't felt before, or not being able to wake up  o Bluish-colored lips or face    Where can I get more information?  [facility] - About COVID-19:   [email or web address]    CDC - What to Do If You're Sick:   www.cdc.gov/coronavirus/2019-ncov/about/steps-when-sick.html

## 2021-11-11 ENCOUNTER — LAB (OUTPATIENT)
Dept: FAMILY MEDICINE | Facility: CLINIC | Age: 61
End: 2021-11-11
Attending: INTERNAL MEDICINE
Payer: COMMERCIAL

## 2021-11-11 DIAGNOSIS — Z20.822 SUSPECTED COVID-19 VIRUS INFECTION: ICD-10-CM

## 2021-11-11 PROCEDURE — U0003 INFECTIOUS AGENT DETECTION BY NUCLEIC ACID (DNA OR RNA); SEVERE ACUTE RESPIRATORY SYNDROME CORONAVIRUS 2 (SARS-COV-2) (CORONAVIRUS DISEASE [COVID-19]), AMPLIFIED PROBE TECHNIQUE, MAKING USE OF HIGH THROUGHPUT TECHNOLOGIES AS DESCRIBED BY CMS-2020-01-R: HCPCS

## 2021-11-11 PROCEDURE — U0005 INFEC AGEN DETEC AMPLI PROBE: HCPCS

## 2021-11-12 LAB — SARS-COV-2 RNA RESP QL NAA+PROBE: NEGATIVE

## 2021-11-23 ENCOUNTER — LAB (OUTPATIENT)
Dept: FAMILY MEDICINE | Facility: CLINIC | Age: 61
End: 2021-11-23
Payer: COMMERCIAL

## 2021-11-23 ENCOUNTER — E-VISIT (OUTPATIENT)
Dept: INTERNAL MEDICINE | Facility: CLINIC | Age: 61
End: 2021-11-23

## 2021-11-23 DIAGNOSIS — J06.9 UPPER RESPIRATORY INFECTION: Primary | ICD-10-CM

## 2021-11-23 DIAGNOSIS — J02.9 ACUTE PHARYNGITIS, UNSPECIFIED ETIOLOGY: Primary | ICD-10-CM

## 2021-11-23 DIAGNOSIS — J02.0 ACUTE STREPTOCOCCAL PHARYNGITIS: ICD-10-CM

## 2021-11-23 LAB
DEPRECATED S PYO AG THROAT QL EIA: NEGATIVE
GROUP A STREP BY PCR: NOT DETECTED

## 2021-11-23 PROCEDURE — 99207 PR NO CHARGE LOS: CPT

## 2021-11-23 PROCEDURE — 87651 STREP A DNA AMP PROBE: CPT

## 2021-11-23 PROCEDURE — 99207 PR NON-BILLABLE SERV PER CHARTING: CPT | Performed by: INTERNAL MEDICINE

## 2021-11-23 NOTE — PATIENT INSTRUCTIONS
Thank you for choosing us for your care. Given your symptoms, I would like you to do a lab-only visit to determine what is causing them.  I have placed the orders.  Please schedule an appointment with the lab right here [place], or call [phone].  I will let you know when the results are back and next steps to take.

## 2021-11-29 ENCOUNTER — E-VISIT (OUTPATIENT)
Dept: INTERNAL MEDICINE | Facility: CLINIC | Age: 61
End: 2021-11-29
Payer: COMMERCIAL

## 2021-11-29 DIAGNOSIS — R05.9 COUGH: Primary | ICD-10-CM

## 2021-11-29 DIAGNOSIS — Z20.822 SUSPECTED COVID-19 VIRUS INFECTION: ICD-10-CM

## 2021-11-29 PROCEDURE — 99421 OL DIG E/M SVC 5-10 MIN: CPT | Performed by: INTERNAL MEDICINE

## 2021-11-29 NOTE — PATIENT INSTRUCTIONS
Dear Nikki Morales,    Your symptoms show that you may have coronavirus (COVID-19). This illness can cause fever, cough and trouble breathing. Many people get a mild case and get better on their own. Some people can get very sick.    Will I be tested for COVID-19?  We would like to test you for Covid-19 virus. I have placed orders for this test.     To schedule: go to your [facility] home page and scroll down to the section that says  You have an appointment that needs to be scheduled  and click the large green button that says  Schedule Now  and follow the steps to find the next available openings.    If you are unable to complete these [facility] scheduling steps, please call [phone] to schedule your testing.     Return to work/school/ guidance:  Please let your workplace manager and staffing office know when your quarantine ends     We can t give you an exact date as it depends on the above. You can calculate this on your own or work with your manager/staffing office to calculate this. (For example if you were exposed on 10/4, you would have to quarantine for 14 full days. That would be through 10/18. You could return on 10/19.)      If you receive a positive COVID-19 test result, follow the guidance of the those who are giving you the results. Usually the return to work is 10 (or in some cases 20 days from symptom onset.) If you work at [facility], you must also be cleared by Employee Occupational Health and Safety to return to work.        If you receive a negative COVID-19 test result and did not have a high risk exposure to someone with a known positive COVID-19 test, you can return to work once you're free of fever for 24 hours without fever-reducing medication and your symptoms are improving or resolved.      If you receive a negative COVID-19 test and If you had a high risk exposure to someone who has tested positive for COVID-19 then you can return to work 14 days after your last  contact with the positive individual    Note: If you have ongoing exposure to the covid positive person, this quarantine period may be more than 14 days. (For example, if you are continued to be exposed to your child who tested positive and cannot isolate from them, then the quarantine of 7-14 days can't start until your child is no longer contagious. This is typically 10 days from onset of the child's symptoms. So the total duration may be 17-24 days in this case.)    Sign up for [employer].   We know it's scary to hear that you might have COVID-19. We want to track your symptoms to make sure you're okay over the next 2 weeks. Please look for an email from [employer]--this is a free, online program that we'll use to keep in touch. To sign up, follow the link in the email you will receive. Learn more at [email or web address]    How can I take care of myself?    Get lots of rest. Drink extra fluids (unless a doctor has told you not to)    Take Tylenol (acetaminophen) or ibuprofen for fever or pain. If you have liver or kidney problems, ask your family doctor if it's okay to take Tylenol o ibuprofen    If you have other health problems (like cancer, heart failure, an organ transplant or severe kidney disease): Call your specialty clinic if you don't feel better in the next 2 days.    Know when to call 911. Emergency warning signs include:  o Trouble breathing or shortness of breath  o Pain or pressure in the chest that doesn't go away  o Feeling confused like you haven't felt before, or not being able to wake up  o Bluish-colored lips or face    Where can I get more information?  [facility] - About COVID-19:   [email or web address]    CDC - What to Do If You're Sick:   www.cdc.gov/coronavirus/2019-ncov/about/steps-when-sick.html

## 2021-12-06 ENCOUNTER — E-VISIT (OUTPATIENT)
Dept: INTERNAL MEDICINE | Facility: CLINIC | Age: 61
End: 2021-12-06
Payer: COMMERCIAL

## 2021-12-06 ENCOUNTER — LAB (OUTPATIENT)
Dept: LAB | Facility: CLINIC | Age: 61
End: 2021-12-06
Payer: COMMERCIAL

## 2021-12-06 DIAGNOSIS — J20.8 ACUTE BACTERIAL BRONCHITIS: ICD-10-CM

## 2021-12-06 DIAGNOSIS — B96.89 ACUTE BACTERIAL BRONCHITIS: ICD-10-CM

## 2021-12-06 DIAGNOSIS — R05.9 COUGH: Primary | ICD-10-CM

## 2021-12-06 DIAGNOSIS — R05.9 COUGH: ICD-10-CM

## 2021-12-06 LAB
BASOPHILS # BLD AUTO: 0 10E3/UL (ref 0–0.2)
BASOPHILS NFR BLD AUTO: 1 %
EOSINOPHIL # BLD AUTO: 0.3 10E3/UL (ref 0–0.7)
EOSINOPHIL NFR BLD AUTO: 3 %
ERYTHROCYTE [DISTWIDTH] IN BLOOD BY AUTOMATED COUNT: 13.1 % (ref 10–15)
HCT VFR BLD AUTO: 41.9 % (ref 35–47)
HGB BLD-MCNC: 13.8 G/DL (ref 11.7–15.7)
IMM GRANULOCYTES # BLD: 0 10E3/UL
IMM GRANULOCYTES NFR BLD: 0 %
LYMPHOCYTES # BLD AUTO: 3.5 10E3/UL (ref 0.8–5.3)
LYMPHOCYTES NFR BLD AUTO: 42 %
MCH RBC QN AUTO: 30 PG (ref 26.5–33)
MCHC RBC AUTO-ENTMCNC: 32.9 G/DL (ref 31.5–36.5)
MCV RBC AUTO: 91 FL (ref 78–100)
MONOCYTES # BLD AUTO: 0.9 10E3/UL (ref 0–1.3)
MONOCYTES NFR BLD AUTO: 11 %
NEUTROPHILS # BLD AUTO: 3.6 10E3/UL (ref 1.6–8.3)
NEUTROPHILS NFR BLD AUTO: 43 %
NRBC # BLD AUTO: 0 10E3/UL
NRBC BLD AUTO-RTO: 0 /100
PLATELET # BLD AUTO: 259 10E3/UL (ref 150–450)
RBC # BLD AUTO: 4.6 10E6/UL (ref 3.8–5.2)
WBC # BLD AUTO: 8.3 10E3/UL (ref 4–11)

## 2021-12-06 PROCEDURE — 36415 COLL VENOUS BLD VENIPUNCTURE: CPT

## 2021-12-06 PROCEDURE — 85025 COMPLETE CBC W/AUTO DIFF WBC: CPT

## 2021-12-06 PROCEDURE — 99207 PR NON-BILLABLE SERV PER CHARTING: CPT | Performed by: INTERNAL MEDICINE

## 2021-12-06 RX ORDER — AZITHROMYCIN 250 MG/1
TABLET, FILM COATED ORAL
Qty: 6 TABLET | Refills: 0 | Status: SHIPPED | OUTPATIENT
Start: 2021-12-06 | End: 2022-03-02

## 2021-12-08 RX ORDER — AZITHROMYCIN 250 MG/1
TABLET, FILM COATED ORAL
Qty: 6 TABLET | Refills: 0 | OUTPATIENT
Start: 2021-12-08

## 2021-12-08 RX ORDER — PREDNISONE 20 MG/1
TABLET ORAL
Qty: 5 TABLET | Refills: 0 | Status: SHIPPED | OUTPATIENT
Start: 2021-12-08 | End: 2022-03-02

## 2021-12-08 NOTE — TELEPHONE ENCOUNTER
Pending Prescriptions:                       Disp   Refills    predniSONE (DELTASONE) 20 MG tablet [Pharm*5 tabl*0        Sig: TAKE ONE TABLET BY MOUTH ONCE DAILY  Refused Prescriptions:                       Disp   Refills    azithromycin (ZITHROMAX) 250 MG tablet [Ph*6 tabl*0        Sig: TAKE TWO TABLETS BY MOUTH AS ONE DOSE ON THE FIRST           DAY, THEN TAKE ONE DAILY THEREAFTER.  Refused By: RAUL ALVAREZ  Reason for Refusal: Duplicate      Routing refill request to provider for review/approval because:  Drug not on the [facility] refill protocol     Raul Alvarez RN on 12/8/2021 at 5:02 AM

## 2022-01-17 DIAGNOSIS — F41.1 GAD (GENERALIZED ANXIETY DISORDER): ICD-10-CM

## 2022-01-18 RX ORDER — LORAZEPAM 1 MG/1
TABLET ORAL
Qty: 20 TABLET | Refills: 1 | Status: SHIPPED | OUTPATIENT
Start: 2022-01-18 | End: 2022-06-13

## 2022-01-18 NOTE — TELEPHONE ENCOUNTER
Ativan      Last Written Prescription Date:  8/24/2021  Last Fill Quantity: 20,   # refills: 1  Last Office Visit: 9/7/2021  Future Office visit:       Routing refill request to provider for review/approval because:  Drug not on the [facility], [facility] or [facility] refill protocol or controlled substance

## 2022-01-27 ENCOUNTER — ALLIED HEALTH/NURSE VISIT (OUTPATIENT)
Dept: FAMILY MEDICINE | Facility: CLINIC | Age: 62
End: 2022-01-27
Payer: COMMERCIAL

## 2022-01-27 DIAGNOSIS — G93.32 CHRONIC FATIGUE SYNDROME: Primary | ICD-10-CM

## 2022-01-27 PROCEDURE — 96372 THER/PROPH/DIAG INJ SC/IM: CPT | Performed by: INTERNAL MEDICINE

## 2022-01-27 PROCEDURE — 99207 PR NO CHARGE NURSE ONLY: CPT

## 2022-01-27 RX ADMIN — CYANOCOBALAMIN 1000 MCG: 1000 INJECTION, SOLUTION INTRAMUSCULAR; SUBCUTANEOUS at 10:23

## 2022-01-27 NOTE — PROGRESS NOTES
Clinic Administered Medication Documentation    Administrations This Visit     cyanocobalamin injection 1,000 mcg     Admin Date  01/27/2022 Action  Given Dose  1,000 mcg Route  Intramuscular Site  Right Deltoid Administered By  Deepali Loaiza CMA    Ordering Provider: Cirilo Tadeo MD    Patient Supplied?: No                  Injectable Medication Documentation    Patient was given Cyanocobalamin (B-12). Prior to medication administration, verified patients identity using patient s name and date of birth. Please see MAR and medication order for additional information. Patient instructed to remain in clinic for 15 minutes and report any adverse reaction to staff immediately .      Was entire vial of medication used? Yes  Vial/Syringe: Single dose vial  Expiration Date:  12/2022  Was this medication supplied by the patient? No   Deepali Loaiza MA

## 2022-02-17 DIAGNOSIS — G47.00 INSOMNIA, UNSPECIFIED TYPE: ICD-10-CM

## 2022-02-18 RX ORDER — ZOLPIDEM TARTRATE 5 MG/1
TABLET ORAL
Qty: 60 TABLET | Refills: 5 | Status: SHIPPED | OUTPATIENT
Start: 2022-02-18 | End: 2022-09-06

## 2022-02-18 NOTE — TELEPHONE ENCOUNTER
Pending Prescriptions:                       Disp   Refills    zolpidem (AMBIEN) 5 MG tablet [Pharmacy Me*60 tab*5        Sig: TAKE TWO TABLETS BY MOUTH EVERY EVENING AS NEEDED FOR           SLEEP    Routing refill request to provider for review/approval because:  Drug not on the [facility] refill protocol     Requested Prescriptions   Pending Prescriptions Disp Refills    zolpidem (AMBIEN) 5 MG tablet [Pharmacy Med Name: ZOLPIDEM TARTRATE 5MG TABS] 60 tablet 5     Sig: TAKE TWO TABLETS BY MOUTH EVERY EVENING AS NEEDED FOR SLEEP        There is no refill protocol information for this order

## 2022-02-21 ENCOUNTER — E-VISIT (OUTPATIENT)
Dept: FAMILY MEDICINE | Facility: CLINIC | Age: 62
End: 2022-02-21
Payer: COMMERCIAL

## 2022-02-21 ENCOUNTER — LAB (OUTPATIENT)
Dept: LAB | Facility: CLINIC | Age: 62
End: 2022-02-21
Payer: COMMERCIAL

## 2022-02-21 DIAGNOSIS — K57.32 DIVERTICULITIS OF COLON: ICD-10-CM

## 2022-02-21 DIAGNOSIS — R10.32 LLQ ABDOMINAL PAIN: ICD-10-CM

## 2022-02-21 DIAGNOSIS — R10.32 LLQ ABDOMINAL PAIN: Primary | ICD-10-CM

## 2022-02-21 DIAGNOSIS — E03.4 HYPOTHYROIDISM DUE TO ACQUIRED ATROPHY OF THYROID: ICD-10-CM

## 2022-02-21 LAB
ALBUMIN UR-MCNC: NEGATIVE MG/DL
APPEARANCE UR: CLEAR
BILIRUB UR QL STRIP: NEGATIVE
COLOR UR AUTO: YELLOW
CRP SERPL-MCNC: 177 MG/L (ref 0–8)
ERYTHROCYTE [DISTWIDTH] IN BLOOD BY AUTOMATED COUNT: 12.9 % (ref 10–15)
GLUCOSE UR STRIP-MCNC: NEGATIVE MG/DL
HCT VFR BLD AUTO: 40.4 % (ref 35–47)
HGB BLD-MCNC: 13.3 G/DL (ref 11.7–15.7)
HGB UR QL STRIP: ABNORMAL
KETONES UR STRIP-MCNC: ABNORMAL MG/DL
LEUKOCYTE ESTERASE UR QL STRIP: NEGATIVE
MCH RBC QN AUTO: 30 PG (ref 26.5–33)
MCHC RBC AUTO-ENTMCNC: 32.9 G/DL (ref 31.5–36.5)
MCV RBC AUTO: 91 FL (ref 78–100)
MUCOUS THREADS #/AREA URNS LPF: PRESENT /LPF
NITRATE UR QL: NEGATIVE
PH UR STRIP: 5.5 [PH] (ref 5–7)
PLATELET # BLD AUTO: 289 10E3/UL (ref 150–450)
RBC # BLD AUTO: 4.44 10E6/UL (ref 3.8–5.2)
RBC URINE: 1 /HPF
SP GR UR STRIP: >=1.03 (ref 1–1.03)
SQUAMOUS EPITHELIAL: 1 /HPF
TSH SERPL DL<=0.005 MIU/L-ACNC: 3.13 MU/L (ref 0.4–4)
UROBILINOGEN UR STRIP-MCNC: NORMAL MG/DL
WBC # BLD AUTO: 13.8 10E3/UL (ref 4–11)
WBC URINE: 1 /HPF

## 2022-02-21 PROCEDURE — 85027 COMPLETE CBC AUTOMATED: CPT

## 2022-02-21 PROCEDURE — 36415 COLL VENOUS BLD VENIPUNCTURE: CPT

## 2022-02-21 PROCEDURE — 84443 ASSAY THYROID STIM HORMONE: CPT

## 2022-02-21 PROCEDURE — 81001 URINALYSIS AUTO W/SCOPE: CPT

## 2022-02-21 PROCEDURE — 86140 C-REACTIVE PROTEIN: CPT

## 2022-02-21 PROCEDURE — 99421 OL DIG E/M SVC 5-10 MIN: CPT | Performed by: FAMILY MEDICINE

## 2022-02-21 RX ORDER — CIPROFLOXACIN 500 MG/1
500 TABLET, FILM COATED ORAL 2 TIMES DAILY
Qty: 14 TABLET | Refills: 0 | Status: SHIPPED | OUTPATIENT
Start: 2022-02-21 | End: 2022-02-28

## 2022-02-21 RX ORDER — METRONIDAZOLE 500 MG/1
500 TABLET ORAL 2 TIMES DAILY
Qty: 14 TABLET | Refills: 0 | Status: SHIPPED | OUTPATIENT
Start: 2022-02-21 | End: 2022-02-28

## 2022-02-21 NOTE — PATIENT INSTRUCTIONS
Thank you for choosing us for your care. Based on your symptoms and length of illness, I do not think that you need an antibiotic prescription at this time. I have placed an order for lab work and a CT scan of abdomen and pelvis. This was previously ordered by Dr. Tadeo 1 year ago but never completed. You will be contacted by radiology staff to schedule the appointment and lab work can be done at the same time. Please follow the care advise I ve provided and use the prescribed medication to help relieve your symptoms. View your full visit summary for details by clicking on the link below.     If you re not feeling better within 5-7 days, please respond to this message and we can consider if an antibiotic prescription is needed.  You can schedule an appointment right here in [place], or call [phone]  If the visit is for the same symptoms as your eVisit, we ll refund the cost of your eVisit if seen within seven days

## 2022-02-22 ENCOUNTER — HOSPITAL ENCOUNTER (OUTPATIENT)
Dept: CT IMAGING | Facility: CLINIC | Age: 62
Discharge: HOME | End: 2022-02-22
Attending: FAMILY MEDICINE | Admitting: FAMILY MEDICINE
Payer: MEDICARE

## 2022-02-22 DIAGNOSIS — R10.32 LLQ ABDOMINAL PAIN: ICD-10-CM

## 2022-02-22 PROCEDURE — 74177 CT ABD & PELVIS W/CONTRAST: CPT

## 2022-02-22 PROCEDURE — 250N000011 HC RX IP 250 OP 636: Performed by: FAMILY MEDICINE

## 2022-02-22 PROCEDURE — 250N000009 HC RX 250: Performed by: FAMILY MEDICINE

## 2022-02-22 RX ORDER — IOPAMIDOL 755 MG/ML
500 INJECTION, SOLUTION INTRAVASCULAR ONCE
Status: COMPLETED | OUTPATIENT
Start: 2022-02-22 | End: 2022-02-22

## 2022-02-22 RX ADMIN — SODIUM CHLORIDE 57 ML: 9 INJECTION, SOLUTION INTRAVENOUS at 14:06

## 2022-02-22 RX ADMIN — IOPAMIDOL 99 ML: 755 INJECTION, SOLUTION INTRAVENOUS at 14:07

## 2022-02-22 ASSESSMENT — SLEEP AND FATIGUE QUESTIONNAIRES
HOW LIKELY ARE YOU TO NOD OFF OR FALL ASLEEP WHILE SITTING INACTIVE IN A PUBLIC PLACE: SLIGHT CHANCE OF DOZING
HOW LIKELY ARE YOU TO NOD OFF OR FALL ASLEEP WHEN YOU ARE A PASSENGER IN A CAR FOR AN HOUR WITHOUT A BREAK: SLIGHT CHANCE OF DOZING
HOW LIKELY ARE YOU TO NOD OFF OR FALL ASLEEP IN A CAR, WHILE STOPPED FOR A FEW MINUTES IN TRAFFIC: WOULD NEVER DOZE
HOW LIKELY ARE YOU TO NOD OFF OR FALL ASLEEP WHILE SITTING AND READING: SLIGHT CHANCE OF DOZING
HOW LIKELY ARE YOU TO NOD OFF OR FALL ASLEEP WHILE WATCHING TV: SLIGHT CHANCE OF DOZING
HOW LIKELY ARE YOU TO NOD OFF OR FALL ASLEEP WHILE SITTING AND TALKING TO SOMEONE: WOULD NEVER DOZE
HOW LIKELY ARE YOU TO NOD OFF OR FALL ASLEEP WHILE LYING DOWN TO REST IN THE AFTERNOON WHEN CIRCUMSTANCES PERMIT: HIGH CHANCE OF DOZING
HOW LIKELY ARE YOU TO NOD OFF OR FALL ASLEEP WHILE SITTING QUIETLY AFTER LUNCH WITHOUT ALCOHOL: SLIGHT CHANCE OF DOZING

## 2022-02-24 ENCOUNTER — VIRTUAL VISIT (OUTPATIENT)
Dept: SLEEP MEDICINE | Facility: CLINIC | Age: 62
End: 2022-02-24
Payer: COMMERCIAL

## 2022-02-24 VITALS — BODY MASS INDEX: 30.21 KG/M2 | WEIGHT: 204 LBS | HEIGHT: 69 IN

## 2022-02-24 DIAGNOSIS — F51.04 CHRONIC INSOMNIA: Primary | ICD-10-CM

## 2022-02-24 PROCEDURE — 90791 PSYCH DIAGNOSTIC EVALUATION: CPT | Mod: 95 | Performed by: PSYCHOLOGIST

## 2022-02-24 NOTE — PROGRESS NOTES
Margarita is a 62 year old who is being evaluated via a billable video visit.      Patient states she has not had a flu shot this year.    How would you like to obtain your AVS? Yasmanyhart  If the video visit is dropped, the invitation should be resent by: Text to cell phone: [phone]   Will anyone else be joining your video visit? No    Video Start Time: 11:01 AM  Video-Visit Details    Type of service:  Video Visit    Video End Time:11:57 AM    Originating Location (pt. Location): Home    Distant Location (provider location):  [facility]     Platform used for Video Visit: [facility]     SLEEP MEDICINE CONSULTATION  Sleep Psychology  Feb 24, 2022    Name: Nikki Morales MRN# [id number]   Age: 62 year old YOB: 1960     Date of Consultation: Feb 24, 2022  Consultation is requested by: Geoffrey Loving PA-C  [address] [place] [address]  Primary care provider: Cirilo Tadeo    Reason for Sleep Consultation     Nikki Morales is a 62 year old female seen today for a behavioral sleep medicine consultation because of insomnia    Assessment and Plan     Sleep Diagnoses/Recommendations:         Chronic insomnia    Nikki Morales was seen for sleep psychology consultation and possible cognitive behavioral treatment of chronic insomnia.    A recent sleep study ruled out intrinsic sleep disorders including sleep apnea.    This evaluation suggests chronic, multifactored insomnia with contribution daytime impairment and non-restorative likely from major depressive disorder, generalized anxiety, history of ADHD, and reported chronic fatigue.      Patient pattern of sedative hypnotics meets criteria for sedative hypnotic dependent insomnia including patient currently prescribed a dose of 10 mg, twice the recommended dose for women.  She is reporting daytime side effects of medication including impaired memory and neurocognitive functioning..   Additionally zolpidem is recommended for use at bedtime  allowing for asleep window of 7-8 hours.  However, patient is taking 5 mg in the middle of the nigh in addition to her bedtime 5 mg dose raising additional concern about motor and cognitive impairment when getting up for the day.     Patient has insight into her psychological dependence on zolpidem, tolerance, and daytime impairment. She also has understandable fear and concern about changing or reducing her sleep medications given duration of use. She reports she is willing to consider CBT for Insomnia and working with her primary care physician around appropriate use of zolpidem per FDA guidelines and looking at reducing dose to 5 mg under medical supervision of her PCP if they feel it medically advised.    She agreed to keep a two week sleep diary prior to our next visit. Patien    Summary Counseling:      Nikki was provided information about the pathophysiology of insomnia, psychophysiological factors contributing to the onset and maintenance of insomnia and how co-occurring medical conditions and intrinsic sleep disorders can affect sleep.  Treatment options were discussed  as applicable to patient specific sleep concerns and symptoms. The benefits and potential early side effects of treatment including increased daytime sleepiness were discussed.     Patient was advised to consult with their prescribing provider around use of or changes to prescription sleep medication.  Patient was counseled on the importance of avoiding driving if drowsy.    See patient instructions for initial treatment recommendations and behavioral sleep plan.    Follow-up: 3 weeks     History of Present Sleep Complaint     Nikki Morales is a 62 year old year old female with a relevant medical history of  MDD, ADHD, Chronic Fatigue Syndrome, Asthma who presents with chronic non-restorative.sleep  She reports frrequent dreaming.    Patient currently is using zolpidem for 10  "years.  Currently taking 10 mg.  She states it would be nice to be able to reduce the dose.  She reports \"broken\" sleep     She reports being a night owl for much of her life but had jobs that required early morning      Nikki Morales reports prior history of depression with anxiety but is not reporting clinically significant symptoms at this time.  There were significant stressors during a period of marital discord 3-4 years ago but insomnia symptoms have been present since early adulthood.    There is prominent sleep specific worry and concern, specifically around whether a provider will cut off or discontinue her prescription her zolpidem.  She has tried other sleep medications including trazodone states that they have not worked well and have left morning carryover effects.  Her experience is that zolpidem has helped improve her level of functioning.      She describes a remote history of marked overall dysfunction and difficulty with daily living tasks due to spending up to 20 hours in bed.  Sleep studies do not indicate or confirm narcolepsy.  Medical records do not indicate clearly what was there because of this level of time in bed and excessive sleepiness.  It is also unclear as to whether the time in bed was characterized by actual sleep or simply retreat to the bedroom along with exhaustion and fatigue.  She does carry a diagnoses of chronic fatigue syndrome.        SLEEP-WAKE SCHEDULE:     Shift Work - Retired  Source of Sleep Estimates:  verbal self-report    Time to Bed:  Bed at 9-10 PM at watch TV for two hours until taking sleep medication at 11 PM  Activity in Bed (stimulus control): use electronics, listen to radio or podcasts  Sleep Latency: 20 minutes with medication  Times awakened:  1-2  Total Time Awake After Sleep Onset: 30 minutes with zolpidem, 120 miniutes without  Time Up for the Day: 8 am average, may vary.  Total Time in Bed:  10-5-12 hours  Estimated Total Sleep Time: 8.5 -  9  " Hours  With medication  Sleep Efficiency Estimate:  80-85%% with sleep medication    Naps: None      SLEEP HYGIENE:    Behaviors affecting Sleep   extend time in bed longer if after poor night of sleep, double doses of zoloidem     Sleep Environment:    Bedroom is quiet, comfortable and dark, Regular bedpartner    Substance Use:    Caffeine: 1-2 beverages, does not drink caffeine within 6 hours of bedtime   Alcohol: None reported  Nicotine: None  Recreational/Illicit Drugs: None reported    PREVIOUS SLEEP EVALUATIONS:    10/20/2021 [facility] Sleep Study (250.0 lbs) - AHI 1.0, RDI 6.0, Supine AHI 4.8, REM AHI 2.4, Low O2 88.3%, Time Spent ?88% 0 minutes / Time Spent ?89% 1.9 minutes.      SCALES      EPWORTH SLEEPINESS SCALE    Likeliness of dozing or falling  asleep:    Sitting and reading: Slight chance of dozing    Watching TV: Slight chance of dozing    Sitting, inactive in a public place (e.g. a theatre or a meeting): Slight chance of dozing    As a passenger in a car for an hour without a break: Slight chance of dozing    Lying down to rest in the afternoon when circumstances permit: High chance of dozing    Sitting and talking to someone: Would never doze    Sitting quietly after a lunch without alcohol: Slight chance of dozing    In a car, while stopped for a few minutes in traffic: Would never doze    ESS Total Score:  Total score - [place]: 8     DERRELL Pa  [id number] ESS - [place]/English - Final version - 21 Nov 07 - [facility] [place].       INSOMNIA SEVERITY INDEX (KARYN)      The Insomnia Severity Index measures the severity of insomnia symptoms over the past two weeks.    1. Difficulty falling asleep: Severe    2. Difficulty staying asleep: Very Severe    3. Problems waking up too early: None    4. How SATISFIED/DISSATISFIED are youwith your CURRENT sleep pattern? Very Dissatisfied    5. How NOTICEABLE to others do you think your sleep problem is in terms of impairing the quality of your  life? Very Much Noticeable      6. How WORRIED/DISTRESSED are you about your sleep problem? Much    7. To what extent do you consider your sleep problem to INTERFERE with your daily functioning (e.g. daytime fatigue, mood, ability to functon at work/daily chores, concentration, memory, mood, etc.) CURRENTLY?   Very Much Interfering    KARYN Total Score: KARYN Total Score: 22    Guidelines for Scoring/Interpretation:     0-7 = No clinically significant insomnia   8-14 = Subthreshold insomnia   15-21 = Clinical insomnia (moderate severity)  22-28 = Clinical insomnia (severe)    Used via courtesy of www.[employer]iced [place].va.gov with permission from Elian Wells PhD., [facility]      STOP BANG     1. Snoring: Do you snore loudly (louder than talking or loud enough to be heard through closed doors)?       2. Tired: Do you often feel tired, fatigued, or sleepy during daytime?       3. Observed: Has anyone observed you stop breathing during your sleep?       4. Blood pressure: Do you have or are you being treated for high blood pressure?       5. BMI: BMI more than 35 kg/m2?       6. Age: Age over 50 yr old?       7. Neck circumference: Neck circumference greater than 40 cm?       8. Gender: Gender male?       STOP-BANG Total Score:        KAILEE-7      KAILEE-7  8/31/2020   1. Feeling nervous, anxious, or on edge 2   2. Not being able to stop or control worrying 1   3. Worrying too much about different things 1   4. Trouble relaxing 2   5. Being so restless that it is hard to sit still 0   6. Becoming easily annoyed or irritable 0   7. Feeling afraid, as if something awful might happen 0   KAILEE-7 Total Score 6   If you checked any problems, how difficult have they made it for you to do your work, take care of things at home, or get along with other people? Somewhat difficult         CAGE-AID    Have you felt you ought to cut down on your drinking or drug use?      Have people annoyed you by criticizing your drinking or drug use?   "    Have you felt bad or guilty about your drinking or drug use?      Have you ever had a drink or used drugs first thing in the morning to steady your nerves or to get rid of a hangover (eye opener)?      CAGE-AID SCORE:       CAGE-AID reprinted with permission from the [facility] Journal, JOCELYNE Palomino. and BONI Stephens, \"Conjoint screening questionnaires for alcohol and drug abuse\" [facility] Journal 94: 135-140, 1995.      PATIENT HEALTH QUESTIONNAIRE-9 (PHQ - 9)    Frequency of symptoms over the past two weeks:      1. Little interest or pleasure in doing things -      2. Feeling down, depressed, or hopeless -      3. Trouble falling or staying asleep, or sleeping too much -     4. Feeling tired or having little energy -      5. Poor appetite or overeating -      6. Feeling bad about yourself - or that you are a failure or have let yourself or your family down -      7. Trouble concentrating on things, such as reading the newspaper or watching television -     8. Moving or speaking so slowly that other people could have noticed? Or the opposite - being so fidgety or restless that you have been moving around a lot more than usual     9. Thoughts that you would be better off dead or of hurting  yourself in some way     Total Score:       If you checked off any problems, how difficult have these problems made it for you to do your work, take care of things at home, or get along with other people?      Developed by Nafisa Astudillo, Nicole Hooks, Reza Torres and colleagues, with an educational yenny from [employer] No permission required to reproduce, translate, display or distribute.      Vitals     Ht 1.753 m (5' 9\")   Wt 92.5 kg (204 lb)   LMP  (LMP Unknown)   BMI 30.13 kg/m       Medical History     Allergies:    Allergies   Allergen Reactions     Codeine Other (See Comments)     Causes agitation       Medications:    Current Outpatient Medications   Medication Sig Dispense Refill     " azithromycin (ZITHROMAX) 250 MG tablet Two tablets first day, then one tablet daily for four days. 6 tablet 0     benzonatate (TESSALON) 200 MG capsule TAKE ONE CAPSULE BY MOUTH THREE TIMES A DAY AS NEEDED FOR COUGH 30 capsule 3     budesonide (PULMICORT) 0.5 MG/2ML neb solution Mix 2 vials with 1 oz coffee flavoring and drink twice a day and rinse mouth aftrerwards 60 mL 1     calcium carbonate (TUMS) 500 MG chewable tablet Take 2 chew tab by mouth 3 times daily as needed for other (bloating/flatulence)       ciprofloxacin (CIPRO) 500 MG tablet Take 1 tablet (500 mg) by mouth 2 times daily for 7 days 14 tablet 0     cyanocobalamin (CYANOCOBALAMIN) 1000 MCG/ML injection INJECT 1ML INTRAMUSCULARLY EVERY 30 DAYS 1 mL 11     estradiol (VIVELLE-DOT) 0.05 MG/24HR patch Place 1 patch onto the skin twice a week       fluticasone (FLOVENT HFA) 220 MCG/ACT inhaler Inhale 1 puff into the lungs 2 times daily 36 g 1     levothyroxine (SYNTHROID/LEVOTHROID) 88 MCG tablet Take 1 tablet (88 mcg) by mouth daily 90 tablet 1     LORazepam (ATIVAN) 1 MG tablet TAKE ONE TABLET BY MOUTH TWICE A DAY AS NEEDED FOR ANXIETY 20 tablet 1     methylphenidate (RITALIN) 10 MG tablet Take 10 mg by mouth 2 times daily       metroNIDAZOLE (FLAGYL) 500 MG tablet Take 1 tablet (500 mg) by mouth 2 times daily for 7 days 14 tablet 0     mirabegron (MYRBETRIQ) 25 MG 24 hr tablet Take 1 tablet (25 mg) by mouth daily 90 tablet 1     multivitamin, therapeutic with minerals (MULTI-VITAMIN) TABS Take 1 tablet by mouth daily 100 tablet 3     pantoprazole (PROTONIX) 40 MG EC tablet Take 40 mg by mouth daily       predniSONE (DELTASONE) 20 MG tablet TAKE ONE TABLET BY MOUTH ONCE DAILY 5 tablet 0     rosuvastatin (CRESTOR) 10 MG tablet 1 daily or as directed. 90 tablet 3     valACYclovir (VALTREX) 1000 mg tablet Take 1 tablet (1,000 mg) by mouth 2 times daily 20 tablet 3     zolpidem (AMBIEN) 5 MG tablet TAKE TWO TABLETS BY MOUTH EVERY EVENING AS NEEDED FOR  SLEEP 60 tablet 5     Semaglutide-Weight Management (WEGOVY) 0.25 MG/0.5ML SOAJ Inject 0.25 mg Subcutaneous every 7 days 2 mL 3     zolpidem (AMBIEN) 10 MG tablet Take tablet by mouth 15 minutes prior to sleep, for Sleep Study 1 tablet 0       Problem List:  Patient Active Problem List    Diagnosis Date Noted     Morbid obesity (H) 09/10/2021     Priority: Medium     Mixed incontinence 12/07/2020     Priority: Medium     Primary headache associated with sexual activity 05/16/2019     Priority: Medium     Impingement syndrome of left shoulder 04/01/2019     Priority: Medium     KAILEE (generalized anxiety disorder) 12/13/2018     Priority: Medium     Moderate recurrent major depression (H) 12/13/2018     Priority: Medium     Mild persistent asthma without complication 09/14/2017     Priority: Medium     Attention deficit hyperactivity disorder, inattentive type 10/20/2015     Priority: Medium     Hypothyroidism due to acquired atrophy of thyroid 09/23/2015     Priority: Medium     Adhesive capsulitis of shoulder 01/23/2015     Priority: Medium     Degenerative arthritis of cervical spine with cord compression 01/23/2015     Priority: Medium     Eosinophilic esophagitis 08/04/2014     Priority: Medium     Overweight 08/04/2014     Priority: Medium     Problem list name updated by automated process. Provider to review       HYPERLIPIDEMIA LDL GOAL <130 10/31/2010     Priority: Medium     Herpes simplex virus (HSV) infection 05/05/2007     Priority: Medium     Problem list name updated by automated process. Provider to review       Female stress incontinence 05/04/2007     Priority: Medium     ESOPHAGEAL REFLUX 11/27/2006     Priority: Medium     Other and unspecified disc disorder of lumbar region 04/29/2003     Priority: Medium     Chronic fatigue syndrome      Priority: Medium        Past Medical/Surgical History:  Past Medical History:   Diagnosis Date     Chronic fatigue      Hyperlipidemia      Hypothyroid      New  onset a-fib (H)      Other vitamin B12 deficiency anemia      Patient Active Problem List    Diagnosis Date Noted     Morbid obesity (H) 09/10/2021     Priority: Medium     Mixed incontinence 12/07/2020     Priority: Medium     Primary headache associated with sexual activity 05/16/2019     Priority: Medium     Impingement syndrome of left shoulder 04/01/2019     Priority: Medium     KAILEE (generalized anxiety disorder) 12/13/2018     Priority: Medium     Moderate recurrent major depression (H) 12/13/2018     Priority: Medium     Mild persistent asthma without complication 09/14/2017     Priority: Medium     Attention deficit hyperactivity disorder, inattentive type 10/20/2015     Priority: Medium     Hypothyroidism due to acquired atrophy of thyroid 09/23/2015     Priority: Medium     Adhesive capsulitis of shoulder 01/23/2015     Priority: Medium     Degenerative arthritis of cervical spine with cord compression 01/23/2015     Priority: Medium     Eosinophilic esophagitis 08/04/2014     Priority: Medium     Overweight 08/04/2014     Priority: Medium     Problem list name updated by automated process. Provider to review       HYPERLIPIDEMIA LDL GOAL <130 10/31/2010     Priority: Medium     Herpes simplex virus (HSV) infection 05/05/2007     Priority: Medium     Problem list name updated by automated process. Provider to review       Female stress incontinence 05/04/2007     Priority: Medium     ESOPHAGEAL REFLUX 11/27/2006     Priority: Medium     Other and unspecified disc disorder of lumbar region 04/29/2003     Priority: Medium     Chronic fatigue syndrome      Priority: Medium     Patient Active Problem List   Diagnosis     Chronic fatigue syndrome     Other and unspecified disc disorder of lumbar region     ESOPHAGEAL REFLUX     Female stress incontinence     Herpes simplex virus (HSV) infection     HYPERLIPIDEMIA LDL GOAL <130     Eosinophilic esophagitis     Overweight     Adhesive capsulitis of shoulder      Degenerative arthritis of cervical spine with cord compression     Hypothyroidism due to acquired atrophy of thyroid     Attention deficit hyperactivity disorder, inattentive type     Mild persistent asthma without complication     KAILEE (generalized anxiety disorder)     Moderate recurrent major depression (H)     Impingement syndrome of left shoulder     Primary headache associated with sexual activity     Mixed incontinence     Morbid obesity (H)        Mental Health History     Prior Mental Health Diagnosis:   major depressive disorder, generalized anxiety disorder and ADHD    Current Mental Health Treatment:   primary care medication management    Chemical Abuse/Treatment:    None reported      Family History     Family History   Problem Relation Age of Onset     Cancer Mother         Uterine     Prostate Cancer Mother      Diabetes Father      Hypertension Father      Cancer Father         prostate cancer     Cerebrovascular Disease Father      Psychotic Disorder Son         severe Add,      Asthma Son         exercised induced asthma     Asthma Son         ecxercise induced asthma     Cardiovascular Sister         recurrent blood clots     Cerebrovascular Disease Sister 51     Prostate Cancer Brother      Diabetes Maternal Grandfather      Heart Disease Maternal Grandmother         Heart condition age 96     Diabetes Paternal Grandfather      Cancer Brother      Cerebrovascular Disease Paternal Grandmother        Social History     Social History     Socioeconomic History     Marital status:      Spouse name: Jared     Number of children: 2     Years of education: 12     Highest education level: None   Occupational History     Occupation: HomeMaker     Employer: [employer]   Tobacco Use     Smoking status: Former Smoker     Packs/day: 1.00     Years: 30.00     Pack years: 30.00     Types: Cigarettes     Smokeless tobacco: Never Used   Substance and Sexual Activity     Alcohol use: Yes     Alcohol/week: 4.0  standard drinks     Types: 2 Cans of beer, 2 Standard drinks or equivalent per week     Comment: social     Drug use: No     Comment: used in past any and all     Sexual activity: Yes     Partners: Male     Birth control/protection: Female Surgical     Comment: Hx: hysterectomy   Other Topics Concern      Service No     Blood Transfusions No     Caffeine Concern No     Occupational Exposure No     Hobby Hazards No     Sleep Concern Yes     Comment: Difficult with sleeping at night, sleeps most of the day     Stress Concern No     Weight Concern Yes     Comment: desire wt loss     Special Diet No     Back Care Yes     Comment: weight restrictions     Exercise Yes     Bike Helmet No     Seat Belt Yes     Self-Exams No     Parent/sibling w/ CABG, MI or angioplasty before 65F 55M? No   Social History Narrative     None     Social Determinants of Health     Financial Resource Strain: Not on file   Food Insecurity: Not on file   Transportation Needs: Not on file   Physical Activity: Not on file   Stress: Not on file   Social Connections: Not on file   Intimate Partner Violence: Not on file   Housing Stability: Not on file       , not employed     Mental Status Examination     Nikki presented as oriented X3 with speech and language intact.  The patient was cooperative throughout the evaluation with no signs of hallucinations, delusions, loosening of associations or other thought disturbance.  Mood was normal Affect was congruent with mood. Insight and judgement were intact.  Memory appeared intact for recent and remote elements.  There was no report of suicidal ideation, intention or plan. Attention and concentration were within normal.        Felipe Rivera PsyD, LP, [facility]  Diplomate, Behavioral Sleep Medicine  [facility]      Copy:   Cirilo Tadeo PA-C  [address] [place] [address]    Note: This dictation was created using voice  recognition software. This document may contain an error not identified before finalizing the document. If the error changes the accuracy of the document, I would appreciate it being brought to my attention.

## 2022-02-24 NOTE — Clinical Note
Dr. Tadeo  Please see my impression, summary and recommendations related to this patients insomnia and its treatment.    Felipe Rivera PsyD, LP, [facility]  Diplomate, Behavioral Sleep Medicine  [facility]

## 2022-02-25 ENCOUNTER — HOSPITAL ENCOUNTER (OUTPATIENT)
Dept: MAMMOGRAPHY | Facility: CLINIC | Age: 62
Discharge: HOME | End: 2022-02-25
Attending: NURSE PRACTITIONER | Admitting: NURSE PRACTITIONER
Payer: MEDICARE

## 2022-02-25 DIAGNOSIS — Z12.31 ENCOUNTER FOR SCREENING MAMMOGRAM FOR BREAST CANCER: ICD-10-CM

## 2022-02-25 PROCEDURE — 77067 SCR MAMMO BI INCL CAD: CPT

## 2022-02-25 NOTE — PATIENT INSTRUCTIONS
CBT-I Introductory Module    Understanding Sleep and Insomnia    Most people have trouble sleeping at some point in their life.   Chronic insomnia means you have had trouble falling asleep and/or staying asleep for at least the past three months.  Despite allowing enough time for sleep, it is affecting how you feel. You are not alone.  It is estimated that 10-15% of adults experience chronic insomnia.     Cognitive Behavioral Therapy for Insomnia (CBT-I)    Consistent with the guidelines of the American [employer], [facility] recommends  Cognitive Behavioral Therapy for Insomnia (CBT-I) as the first-line treatment for insomnia.  CBT-I targets factors that lead to long-term insomnia:    ? Behavioral Conditioning    When you lay awake in bed over many nights, your body actually becomes trained or 'conditioned' to be awake during the night.  It makes the bed associated with alertness instead of sleepiness.    ? Habits that weaken your body's sleep drive and circadian sleep rhythm    Changing sleep schedules, extended time in bed, using mobile devices and computers close to bedtime, and naps too late in the day can harm your sleep at night.    ? Emotional and Physical Arousal    Things such as worrying about sleep, stress, drinking too much caffeine, and not winding down before bed can interfere with your body's natural sleep drive.    Understanding Sleep and Insomnia    [facility] CBT-I is a four-part program that teaches you the skills needed for a better night's sleep. The first step in your program is learning a bit about sleep and insomnia.      The Basics of Sleep    How does sleep help us?    Sleep, like food and water, is something we need every day. The purpose of sleep is still not exactly clear, but sleep experts agree we need consistent quality sleep to function at our best. There is evidence that sleep helps maintain brain and body functions.  It helps maintain thinking  ability and mood.  Sleep is actually a very active part of life. Sleep occurs in four stages that cycle every  minutes throughout the night. We get our deepest sleep during the first few hours of sleep.  During the last half of our sleep, we usually get the bulk of our REM (Rapid Eye Movement) sleep.  REM sleep is when most of our dreaming occurs.    How much sleep do we need?    Sleep needs vary from person to person. Most adults need between 6-8 hours of sleep.  Sleeping too little or too much may be a health risk.  As we age, most people report their sleep gets lighter, earlier, shorter, and more restless.     What Controls Our Sleep?    The three things that regulate your sleep are your sleep drive, biological clock and your arousal system (emotional and physical).  Together these make us feel alert during the day and promote sleep at night.    Your sleep drive depends on how long you have been awake. It is lowest when you first wake up.  Sleep drive gradually increases as the day goes on.  The longer time you are awake the easier it is to fall asleep.  Sleeping gradually reduces your sleep drive. That is why napping in the evening or close to bed can make it harder to sleep at night.    Your biological clock promotes wakefulness during the day and sleep at night.        Figure 1 two process sleep model (source: 1. Irais J, Colleen R, Candelario T, et al. Future research directions in sleep and ADHD: report of a consensus working group. J Atten Disord. 2013;17(7):550-564. Doi:10.1177/[id number])    Understanding Insomnia    What causes insomnia?    Some people have a greater likelihood of developing insomnia due to genetics, personality or age. A host of things can trigger it: jet lag, working a different shift, medication, or the onset of a medical or mental health condition.             Insomnia and Your Arousal System    Mental activity, emotions and physical symptoms can make your brain too active to  sleep by masking the strength of your sleep drive. Your brain's arousal system not only triggers insomnia, it plays a role in maintaining it as well.  Common sources of arousal include:    ? Worry about sleep in bed  ? An active mind concerned about unfinished tasks  ? Anxiety, Stress and Depression  ? Pain     What maintains insomnia?    Short-term insomnia can become chronic when you begin to feel fearful, worried and  on guard  about sleep loss. As you spend more time in bed or try forcing yourself to sleep your bed becomes linked with wakefulness.  This is why people with insomnia commonly report feeling tired before bed but suddenly more alert when getting into bed.  This type of  conditioning  along with unhelpful sleep habits maintains the insomnia even when the triggering event has resolved.    Tracking your Sleep    Sleep tracking is an essential part of training yourself to sleep better and monitoring your progress.  There are several ways to keep track of your sleep habits.    Insomnia  Ki    The Insomnia  Ki is a convenient way to keep track of your sleep prior to your sleep consultation.  Simply download the free ki on your Apple or Android phone and record your information each morning.  The ki also includes training and sleep schedule recommendations.  For the purposes of your consultation, we recommend you only use the tracking function. You can export your data to yourself in a file under the settings section.  During your consultation your provider will review you data with you.      Path To Better Sleep - Online Sleep Diary    We strongly recommend the [facility] Path to Better Sleep convenient online sleep diary to track your sleep as you prepare for your sleep consultation.  The online sleep diary can be used on your mobile phone, tablet or computer to keeps track of important data about your sleep.  Simply copy and paste the following link into your browser and save it as a favorite for  daily recording in the morning:    Https://www.[facility].va.gov/apps/insomnia/resources/interactivities/diary/pages/index.html     Do not watch or monitor the clock in the middle of the night while keeping your sleep diary. The online program allows you to e-mail a spreadsheet of your data to yourself.  Make sure to have your data available at the time of your first visit.      [facility] Sleep Diary            Mobile and Wearable Sleep Tracking Devices    There are many sleep tracking devices and mobile applications that estimate the timing and quantity of sleep by measuring body movement.  Though many of these devices claim to measure the depth or stages of sleep, they cannot measure brain wave activity or other measures required to determine the actual stages of sleep.  They are helpful in estimating the timing and total amount of time you sleep.    CBT-I and Sleeping Pills    Sleep medications can be helpful in the short-term but often stop working in the long-term.  Sleeping pills treat symptoms and not the underlying cause of insomnia.  They also can have side effects that last well into the day. Abruptly stopping sleeping pills can cause temporary rebound insomnia and lead to increased distress about sleeplessness.  This in turn strengthens the belief that pills are necessary for sleep.    Many patients choose to discontinue sleeping pills prior to beginning CBT-I.  You should talk to your prescribing provider before tapering or discontinuing sleep medication.         CBT-I Supplementary Module - Cognitive Training    Developing Healthy Sleep Thoughts    Insomnia is often triggered by stressful events such as a change in employment, a separation, medical illness, or loss.  How you handle your sleep problem, mainly your thoughts and behaviors, determines in large part whether your sleeplessness is short term or develops into chronic insomnia well after the stressful event is over.     This  part of  "your program involves learning a set of skills to change negative and worrisome thoughts about sleep.   Insomnia is more likely to persist if you interpret the onset as a threat or loss of control.  Worry, fears and untrue beliefs about insomnia can become a vicious cycle.  Negative sleep thoughts activate the physical and emotional systems of your body.  This strengthens wakefulness and weakens your sleep system.  This in turn produces increased stress and pressure to sleep or undo \"sleep effort.\"    Changing How You Think About Insomnia    We now know that our thoughts and attitudes affect our stress response.  Negative sleep thoughts can worsen your insomnia. They can lead to greater fear or anxiety about sleep, which in turn can aggravate your sleep problem. Thinking more positively and realistically about your sleep promotes its healing.     Myths about Sleep    ? People need 8 hours of sleep     This is untrue.  Sleep needs vary from person to person.  Most people need between 6-8 hours of sleep to feel alert during the day.  People who sleep 7 hours live longer than those who sleep 8 hours.  Research also suggests people who sleep 5 hours live longer than those who sleep 9 hours    ? Insomnia Causes Dementia        While there has been a great deal of research exploring the causes of dementia, there is      no conclusive evidence that insomnia causes dementia.  We do know that the rate of       Insomnia is higher in a host of health conditions that have been associated with increased      risk of certain types of dementia.  In general, people with primary insomnia perform similar      To normal sleepers in tests of neurocognitive ability.    ? Insomnia is the same as sleep deprivation    Sleep deprivation is lack of sleep due habits and circumstanced that prevent enough time for sleep.  This is typically not true for insomnia where people have enough time for sleep and even extend their sleep time trying to " get more sleep.  Most people with insomnia get about the same amount of sleep as normal sleepers.  The difference is that with insomnia the sleep is fragmented and less consolidated.       You are Getting More Sleep than You Think    Research using objective sleep tests reveal that people with insomnia get an average of one hour more sleep than they think. This is due in part because the brain misperceives Stage 1 and 2 sleep as being awake.  In addition, stress and arousal while awake in bed changes the brain's perception of time awake.    Examples of Unhealthy Sleep Thoughts    Negative sleep thoughts can have a profound impact on your ability to get a good night's sleep. Below are some examples of negative thoughts associated with insomnia that may sound familiar to you:    ? I must get 8 hours of sleep to function during the day.  ? I shouldn't wake up at all in the middle of the night.  ? Insomnia is going to cause health problems.  ? I am dreading going to bed.  ? I woke up early again.  I know I won't get back to sleep.  ? The reason I feel terrible today is because of my insomnia.  ? I've totally lost control of my sleep.  ? I can't sleep without a sleeping pill.    Negative thoughts usually occur automatically and feel like a knee-jerk reaction.  They are often untrue or distorted, especially late in the evening as you become increasingly tired and others are asleep.      Changing Unhealthy Sleep Thoughts    Now that you understand the impact negative thoughts can have on your sleep, you are ready to change these unhelpful thoughts.  The strategy is powerful and simple:  By recognizing and replacing your negative thoughts about sleep with more accurate, positive thoughts, you will be less anxious and frustrated about your sleep. A more realistic and positive attitude about sleep will allow you to relax and sleep more easily through the night.           There are several important steps involved in changing  your unhelpful and negative thoughts about sleep:            Examples of Healthy Sleep Thoughts    ? My work will not suffer much if I have a poor night's sleep.    ? I'm probably getting more sleep than I think.    ? Other things than my sleep affect my daytime functioning.    ? Because I didn't sleep well last night, I am more likely to sleep well tonight because of increased sleep drive that leads to deeper sleep.    ? Sleep requirements vary from one person to another.    ? If I don't sleep well, most of the time the worst that can happen is I may feel a bit more tired later in the day or my mood might not be as bright.    ? If I awaken after about 4-5 hours of sleep, I have gotten the core sleep I need for the day.    ? I'm more likely to fall asleep the longer I've been awake    ? I'm more likely to fall asleep as my body temperature begins to decrease through the night.    ? My body's wakefulness system takes charge during the day to promote daytime functioning.    ? These sleep skills have worked for others, and they can work for me.    Other Recommendations for Changing Beliefs and Attitudes   About Your Sleep    ? Keep Realistic Expectations     There is a widespread belief that 8 hours of sleep is necessary to feel refreshed and function well during the day. Many believe that good sleep means never waking up at night.  Others come to expect that they should always wake up in the morning feeling full of energy.  Concerns may arise when your actual sleep falls short of these expectations. Try to avoid placing undue pressure on yourself to achieve certain sleep levels.  It only increases anxiety about sleeping.  Focus on quality sleep not quantity of sleep.    ? Revise Your Thoughts about the Causes of Insomnia      There is a natural tendency to attribute our sleep problems completely to external factors such as a chemical imbalance, pain, aging or things over which we may have little control.  Although  these factors may contribute to your insomnia, research shows CBT-I is beneficial even if they are present.    ? Don't Blame Insomnia for All Daytime Impairments      Many individuals blame insomnia for every symptom or concern they experience during the day from fatigue to lack of concentration. Though poor sleep may produce some of these symptoms, it us usually untrue that all daytime impairment is attributable to insomnia.  It is more often that other factors such as stress and co-occurring medical problems contribute more to how you feel during the day.      ? Don't Catastrophize      Catastrophic thinking means making a sleep mountain out of a molehill.  Some people believe poor sleep will have catastrophic consequences to their physical health, mental health and appearance. Others see insomnia as a complete loss of control.  These perceived consequences of insomnia often prompt people to seek medication or other treatment.  Keep in mind insomnia can be very unpleasant but for the most part is not dangerous.    ? Don't Focus on Sleep     Some people reduce their activity level because of poor sleep.  Although sleep is a necessary part of life, don't make it the focus of your thoughts and concern. Trust that if you engage in health sleep habits, your body will give you the sleep it needs.  Make sure you continue your normal activities despite your insomnia.    ? Never Try to Sleep      Of all the habits the most important one is this:  Never try to force yourself to sleep.  Doing so usually backfires and makes things worse. Instead, focus on keeping to your prescribed sleep schedule, getting up at the same time every day and using the bed only for sleep.    What are Your Three Top Negative Thoughts About Your Sleep?    Negative Thought                        Resulting Feeling                   Alternate Thought  I must get 8 hours of sleep                   Fear                      People need differing amounts  of sleep and   every night                                                                         sleep time varies somewhat each night    1.__________________________________________________________________________________________    2.__________________________________________________________________________________________    3.__________________________________________________________________________________________

## 2022-02-28 ENCOUNTER — ALLIED HEALTH/NURSE VISIT (OUTPATIENT)
Dept: FAMILY MEDICINE | Facility: CLINIC | Age: 62
End: 2022-02-28
Payer: COMMERCIAL

## 2022-02-28 DIAGNOSIS — D51.8 VITAMIN B12 DEFICIENCY (DIETARY) ANEMIA: Primary | ICD-10-CM

## 2022-02-28 PROCEDURE — 96372 THER/PROPH/DIAG INJ SC/IM: CPT | Performed by: INTERNAL MEDICINE

## 2022-02-28 PROCEDURE — 99207 PR NO CHARGE NURSE ONLY: CPT | Performed by: INTERNAL MEDICINE

## 2022-02-28 PROCEDURE — 99207 PR NO CHARGE NURSE ONLY: CPT

## 2022-02-28 RX ADMIN — CYANOCOBALAMIN 1000 MCG: 1000 INJECTION, SOLUTION INTRAMUSCULAR; SUBCUTANEOUS at 10:14

## 2022-02-28 NOTE — PROGRESS NOTES
Clinic Administered Medication Documentation    Administrations This Visit     cyanocobalamin injection 1,000 mcg     Admin Date  02/28/2022 Action  Given Dose  1,000 mcg Route  Intramuscular Site  Left Deltoid Administered By  Mamie Carlton CMA    Ordering Provider: Cirilo Tadeo MD    Patient Supplied?: No                  Injectable Medication Documentation    Patient was given Cyanocobalamin (B-12). Prior to medication administration, verified patients identity using patient s name and date of birth. Please see MAR and medication order for additional information. Patient instructed to remain in clinic for 15 minutes and report any adverse reaction to staff immediately .      Was entire vial of medication used? Yes  Vial/Syringe: Single dose vial  Expiration Date:  12/22  Was this medication supplied by the patient? No

## 2022-03-02 ENCOUNTER — OFFICE VISIT (OUTPATIENT)
Dept: INTERNAL MEDICINE | Facility: CLINIC | Age: 62
End: 2022-03-02
Payer: COMMERCIAL

## 2022-03-02 VITALS
DIASTOLIC BLOOD PRESSURE: 80 MMHG | BODY MASS INDEX: 30.27 KG/M2 | OXYGEN SATURATION: 97 % | HEART RATE: 80 BPM | WEIGHT: 205 LBS | TEMPERATURE: 97.3 F | RESPIRATION RATE: 20 BRPM | SYSTOLIC BLOOD PRESSURE: 122 MMHG

## 2022-03-02 DIAGNOSIS — K57.32 DIVERTICULITIS OF COLON: Primary | ICD-10-CM

## 2022-03-02 DIAGNOSIS — M47.12 DEGENERATIVE ARTHRITIS OF CERVICAL SPINE WITH CORD COMPRESSION: ICD-10-CM

## 2022-03-02 LAB
CRP SERPL-MCNC: 12.4 MG/L (ref 0–8)
WBC # BLD AUTO: 7.5 10E3/UL (ref 4–11)

## 2022-03-02 PROCEDURE — 36415 COLL VENOUS BLD VENIPUNCTURE: CPT | Performed by: INTERNAL MEDICINE

## 2022-03-02 PROCEDURE — 99213 OFFICE O/P EST LOW 20 MIN: CPT | Performed by: INTERNAL MEDICINE

## 2022-03-02 PROCEDURE — 85048 AUTOMATED LEUKOCYTE COUNT: CPT | Performed by: INTERNAL MEDICINE

## 2022-03-02 PROCEDURE — 86140 C-REACTIVE PROTEIN: CPT | Performed by: INTERNAL MEDICINE

## 2022-03-02 ASSESSMENT — PAIN SCALES - GENERAL: PAINLEVEL: NO PAIN (0)

## 2022-03-02 NOTE — PROGRESS NOTES
Assessment & Plan     Diverticulitis of colon  Diverticulitis of the colon, patient's CRP and white count were high.  She was treated with Cipro and Flagyl and feels much better.  She had a light case of this a year ago.  She finished a week of antibiotics.  We will recheck her CRP and white count today.  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  I will refer to neurosurgery for options such as an injection.      No follow-ups on file.    Cirilo Tadeo MD  [facility]    Cherise Avila is a 62 year old who presents for the following health issues   Chief Complaint   Patient presents with     Follow Up     diverticulitis        History of Present Illness     Reason for visit:  Follow up    She eats 0-1 servings of fruits and vegetables daily.She consumes 1 sweetened beverage(s) daily.She exercises with enough effort to increase her heart rate 30 to 60 minutes per day.  She exercises with enough effort to increase her heart rate 3 or less days per week.   She is taking medications regularly.     Had labs and ct scan showed diverticulitis, had a fever then and abdominal pain. No diarrhea had normal BM.   Ct scan showed sigmoid diverticulitis, crp was 177, wbc of 13.8    Treated with antibiotics of cipro and flagyl and felt better in 3 days, still a few pangs of pain.  Treated for 7 days.      colonoscopy in 2018 showed diverticulosis.         Past Medical History:   Diagnosis Date     Chronic fatigue      Hyperlipidemia      Hypothyroid      New onset a-fib (H)      Other vitamin B12 deficiency anemia      Uncomplicated asthma      Current Outpatient Medications   Medication     budesonide (PULMICORT) 0.5 MG/2ML neb solution     calcium carbonate (TUMS)  500 MG chewable tablet     cyanocobalamin (CYANOCOBALAMIN) 1000 MCG/ML injection     estradiol (VIVELLE-DOT) 0.05 MG/24HR patch     fluticasone (FLOVENT HFA) 220 MCG/ACT inhaler     levothyroxine (SYNTHROID/LEVOTHROID) 88 MCG tablet     LORazepam (ATIVAN) 1 MG tablet     methylphenidate (RITALIN) 10 MG tablet     mirabegron (MYRBETRIQ) 25 MG 24 hr tablet     multivitamin, therapeutic with minerals (MULTI-VITAMIN) TABS     pantoprazole (PROTONIX) 40 MG EC tablet     rosuvastatin (CRESTOR) 10 MG tablet     valACYclovir (VALTREX) 1000 mg tablet     zolpidem (AMBIEN) 5 MG tablet     Current Facility-Administered Medications   Medication     cyanocobalamin injection 1,000 mcg     Social History     Tobacco Use     Smoking status: Former Smoker     Packs/day: 0.00     Years: 10.00     Pack years: 0.00     Types: Cigarettes     Smokeless tobacco: Never Used   Substance Use Topics     Alcohol use: Yes     Alcohol/week: 4.0 standard drinks     Comment: social     Drug use: No     Comment: used in past any and all       Review of Systems         Objective    /80 (BP Location: Right arm, Patient Position: Sitting, Cuff Size: Adult Large)   Pulse 80   Temp 97.3  F (36.3  C) (Temporal)   Resp 20   Wt 93 kg (205 lb)   LMP  (LMP Unknown)   SpO2 97%   BMI 30.27 kg/m    Body mass index is 30.27 kg/m .  Physical Exam   No acute distress  Heart is regular lungs are clear  Abdomen is positive bowel sounds soft nontender without any rebound or guarding.

## 2022-03-03 ENCOUNTER — MYC MEDICAL ADVICE (OUTPATIENT)
Dept: INTERNAL MEDICINE | Facility: CLINIC | Age: 62
End: 2022-03-03
Payer: COMMERCIAL

## 2022-03-03 DIAGNOSIS — K57.32 DIVERTICULITIS OF COLON: Primary | ICD-10-CM

## 2022-03-03 RX ORDER — CIPROFLOXACIN 500 MG/1
500 TABLET, FILM COATED ORAL 2 TIMES DAILY
Qty: 20 TABLET | Refills: 0 | Status: SHIPPED | OUTPATIENT
Start: 2022-03-03 | End: 2022-04-08

## 2022-03-03 RX ORDER — METRONIDAZOLE 500 MG/1
500 TABLET ORAL 3 TIMES DAILY
Qty: 30 TABLET | Refills: 0 | Status: SHIPPED | OUTPATIENT
Start: 2022-03-03 | End: 2022-03-10

## 2022-03-11 ENCOUNTER — TELEPHONE (OUTPATIENT)
Dept: INTERNAL MEDICINE | Facility: CLINIC | Age: 62
End: 2022-03-11
Payer: COMMERCIAL

## 2022-03-11 DIAGNOSIS — K57.32 DIVERTICULITIS OF COLON: Primary | ICD-10-CM

## 2022-03-11 NOTE — TELEPHONE ENCOUNTER
Pl message copied and sent to patient as reference. Education given on clear liquid diet and to contact the clinic if patient is wanting to check those labs. Per chart future orders already in place    Karina Dickinson RN

## 2022-03-11 NOTE — TELEPHONE ENCOUNTER
"Patient calls with concerns of diverticulitis. Per patient she is currently being treated and on flagyl and cipro since 3/3. She rates her pain a 2-3/10 and states it is tolerable. She has been taking tylenol and using a heating pack PRN for comfort at home. She is having loose stools frequently and a temp at 99.4. Eating and drinking well. Patient recently was watching her 1 year old grandchild and they had a GI bug and is now concerned this will make her worse.  Patient calls anxious about the outcome of having a possible GI on top of her diverticulitis. This writer reassured patient that pain level remains low, no fevers, and on treatment, but patient became frustrated. She is wanting advise from Dr. Tadeo as her normal temp is 97s and she has a strong fear of not catching things before they become worse and ending up with \"parts of her colon removed\". Per patient she would like labs rechecked to see if her inflammation or possible infection is worsening.  Patient would like a [employer] message or call back from Dr. Tadeo to discuss.    Karina Dickinson RN    "

## 2022-03-11 NOTE — TELEPHONE ENCOUNTER
She likely has a viral gastroenteritis on top of her diverticulitis which can make her feel poorly.  Cipro and Flagyl are quite good at treating the bacterial component.  She can check her CRP and white count but I do not know what else we would do differently.  I would have her try taking clear liquid diet with no food for the next 36 hours to see if that improves.

## 2022-03-14 ENCOUNTER — MYC MEDICAL ADVICE (OUTPATIENT)
Dept: INTERNAL MEDICINE | Facility: CLINIC | Age: 62
End: 2022-03-14

## 2022-03-15 ENCOUNTER — LAB (OUTPATIENT)
Dept: LAB | Facility: CLINIC | Age: 62
End: 2022-03-15
Payer: COMMERCIAL

## 2022-03-15 DIAGNOSIS — K57.32 DIVERTICULITIS OF COLON: ICD-10-CM

## 2022-03-15 LAB
CRP SERPL-MCNC: 13.2 MG/L (ref 0–8)
WBC # BLD AUTO: 6.5 10E3/UL (ref 4–11)

## 2022-03-15 PROCEDURE — 36415 COLL VENOUS BLD VENIPUNCTURE: CPT

## 2022-03-15 PROCEDURE — 85048 AUTOMATED LEUKOCYTE COUNT: CPT

## 2022-03-15 PROCEDURE — 86140 C-REACTIVE PROTEIN: CPT

## 2022-03-15 NOTE — TELEPHONE ENCOUNTER
My Chart message sent for patient to call clinic and schedule appointment/ speak with a triage nurse.    Mariela Srinivasan RN

## 2022-03-28 ENCOUNTER — ALLIED HEALTH/NURSE VISIT (OUTPATIENT)
Dept: FAMILY MEDICINE | Facility: CLINIC | Age: 62
End: 2022-03-28
Payer: COMMERCIAL

## 2022-03-28 DIAGNOSIS — D51.8 VITAMIN B12 DEFICIENCY (DIETARY) ANEMIA: Primary | ICD-10-CM

## 2022-03-28 PROCEDURE — 96372 THER/PROPH/DIAG INJ SC/IM: CPT | Performed by: INTERNAL MEDICINE

## 2022-03-28 PROCEDURE — 99207 PR NO CHARGE NURSE ONLY: CPT

## 2022-03-28 RX ADMIN — CYANOCOBALAMIN 1000 MCG: 1000 INJECTION, SOLUTION INTRAMUSCULAR; SUBCUTANEOUS at 11:27

## 2022-03-28 NOTE — PROGRESS NOTES
Clinic Administered Medication Documentation    Administrations This Visit     cyanocobalamin injection 1,000 mcg     Admin Date  03/28/2022 Action  Given Dose  1,000 mcg Route  Intramuscular Site  Right Deltoid Administered By  Mamie Carlton CMA    Ordering Provider: Cirilo Tadeo MD    Patient Supplied?: No                  Injectable Medication Documentation    Patient was given Cyanocobalamin (B-12). Prior to medication administration, verified patients identity using patient s name and date of birth. Please see MAR and medication order for additional information. Patient instructed to remain in clinic for 15 minutes and report any adverse reaction to staff immediately .      Was entire vial of medication used? Yes  Vial/Syringe: Single dose vial  Expiration Date:  7/2023  Was this medication supplied by the patient? No

## 2022-04-08 ENCOUNTER — OFFICE VISIT (OUTPATIENT)
Dept: NEUROSURGERY | Facility: CLINIC | Age: 62
End: 2022-04-08
Attending: INTERNAL MEDICINE
Payer: COMMERCIAL

## 2022-04-08 VITALS
BODY MASS INDEX: 30.27 KG/M2 | TEMPERATURE: 96.4 F | DIASTOLIC BLOOD PRESSURE: 82 MMHG | WEIGHT: 205 LBS | SYSTOLIC BLOOD PRESSURE: 120 MMHG

## 2022-04-08 DIAGNOSIS — R41.3 MEMORY LOSS OF UNKNOWN CAUSE: ICD-10-CM

## 2022-04-08 DIAGNOSIS — M54.12 CERVICAL RADICULOPATHY: Primary | ICD-10-CM

## 2022-04-08 DIAGNOSIS — M47.12 DEGENERATIVE ARTHRITIS OF CERVICAL SPINE WITH CORD COMPRESSION: ICD-10-CM

## 2022-04-08 DIAGNOSIS — M54.16 LUMBAR RADICULOPATHY: ICD-10-CM

## 2022-04-08 PROCEDURE — 99204 OFFICE O/P NEW MOD 45 MIN: CPT | Performed by: PHYSICIAN ASSISTANT

## 2022-04-08 ASSESSMENT — PAIN SCALES - GENERAL: PAINLEVEL: NO PAIN (0)

## 2022-04-08 NOTE — PROGRESS NOTES
Dr. Javier Ramirez  [facility]  Neurosurgery Clinic Visit      CC: bialteral arm pain    Primary care Provider: Cirilo Tadeo    Referring Provider:  Cirilo Tadeo MD      Reason For Visit:   I was asked to consult on the patient for bilateral arm pain.      HPI: Nikki Morales is a 62 year old female who presents for evaluation of her chief complaint of bilateral arm pain, which is intermittent.  She states that over the last several months to couple of years, she has episodes where her arms experience a burning pain and they go numb.  These episodes last for about 15 to 30 seconds and resolve spontaneously.  There are no precipitating factors.  She does notice some occasional neck pain, but this does not seem to necessarily be associated with the episodes in her arms.  She meant to get treated before the Covid pandemic struck, but symptoms were not bad enough and she wanted up rescheduling for later.  She also has some issues in her low back, with bilateral leg pain as well.  This has really been a greater problem for her than her neck issues, she notes.  No bowel or bladder changes, or any problems with balance or coordination.  She has not really had any conservative treatment for her neck or her back.    Past Medical History:   Diagnosis Date     Chronic fatigue      Hyperlipidemia      Hypothyroid      New onset a-fib (H)      Other vitamin B12 deficiency anemia      Uncomplicated asthma        Past Medical History reviewed with patient during visit.    Past Surgical History:   Procedure Laterality Date     ABDOMEN SURGERY  6/10/93    C section     COLONOSCOPY  10/06/09     COLONOSCOPY  7/2/2013    Procedure: COMBINED COLONOSCOPY, SINGLE BIOPSY/POLYPECTOMY BY BIOPSY;;  Surgeon: Cuate Miles MD;  Location:  GI     COLONOSCOPY N/A 9/6/2018    Procedure: COLONOSCOPY;  Colonoscopy;  Surgeon: Hamzah Dudley DO;  Location:  GI     COMBINED REPAIR PTOSIS WITH  BLEPHAROPLASTY BILATERAL Bilateral 2018    Procedure: COMBINED REPAIR PTOSIS WITH BLEPHAROPLASTY BILATERAL;  Bilateral upper eyelid Blepharoplasty and ptosis repair ;  Surgeon: Martina Andrade MD;  Location: [facility]     CONIZATION CERVIX,KNIFE/LASER       ESOPHAGOSCOPY, GASTROSCOPY, DUODENOSCOPY (EGD), COMBINED  2013    Procedure: COMBINED ESOPHAGOSCOPY, GASTROSCOPY, DUODENOSCOPY (EGD), BIOPSY SINGLE OR MULTIPLE;  egd with rabdain biopsies and colonoscopy with polypectomy by biopsy ;  Surgeon: Cuate Miles MD;  Location:  GI     GENITOURINARY SURGERY  ?     I have a bladder sling     HC DILATION/CURETTAGE DIAG/THER NON OB      D & C     HC REMOVAL OF TONSILS,<11 Y/O      Tonsils <10y.o.     HC REMOVAL OF TONSILS,<9 Y/O      Description: Tonsillectomy;  Recorded: 2009;  Comments: in grade school     HC UGI ENDOSCOPY DIAG W BIOPSY  07     HYSTERECTOMY, PAP NO LONGER INDICATED       LAPAROSCOPIC CHOLECYSTECTOMY  10/12/13     REPAIR PTOSIS       TUBAL LIGATION       [facility]  DELIVERY ONLY      , Low Cervical     [facility]  DELIVERY ONLY      Description:  Section;  Recorded: 11/10/2009;  Comments: @ 29 weeks     [facility] LIGATE FALLOPIAN TUBE      Description: Tubal Ligation;  Recorded: 11/10/2009;     [facility] NONSPECIFIC PROCEDURE  's    sinus     [facility] NONSPECIFIC PROCEDURE      Esophgeal dilatation multiple     Z NONSPECIFIC PROCEDURE      sling     [facility] TOTAL ABDOM HYSTERECTOMY      Description: Hysterectomy;  Recorded: 11/10/2009;  Comments: due to cervical dysplasia     [facility] VAGINAL HYSTERECTOMY  1995    Hysterectomy, Vaginal     [facility] UGI ENDOSCOPY, SIMPLE EXAM  09/10/99 & 98     Past Surgical History reviewed with patient during visit.    Current Outpatient Medications   Medication     budesonide (PULMICORT) 0.5 MG/2ML neb solution     calcium carbonate (TUMS) 500 MG chewable tablet     cyanocobalamin (CYANOCOBALAMIN) 1000 MCG/ML injection      estradiol (VIVELLE-DOT) 0.05 MG/24HR patch     fluticasone (FLOVENT HFA) 220 MCG/ACT inhaler     levothyroxine (SYNTHROID/LEVOTHROID) 88 MCG tablet     LORazepam (ATIVAN) 1 MG tablet     methylphenidate (RITALIN) 10 MG tablet     mirabegron (MYRBETRIQ) 25 MG 24 hr tablet     multivitamin, therapeutic with minerals (MULTI-VITAMIN) TABS     pantoprazole (PROTONIX) 40 MG EC tablet     rosuvastatin (CRESTOR) 10 MG tablet     valACYclovir (VALTREX) 1000 mg tablet     zolpidem (AMBIEN) 5 MG tablet     Current Facility-Administered Medications   Medication     cyanocobalamin injection 1,000 mcg       Allergies   Allergen Reactions     Codeine Other (See Comments)     Causes agitation       Social History     Socioeconomic History     Marital status:      Spouse name: Jared     Number of children: 2     Years of education: 12     Highest education level: None   Occupational History     Occupation: HomeMaker     Employer: [employer]   Tobacco Use     Smoking status: Former Smoker     Packs/day: 0.00     Years: 10.00     Pack years: 0.00     Types: Cigarettes     Smokeless tobacco: Never Used   Substance and Sexual Activity     Alcohol use: Yes     Alcohol/week: 4.0 standard drinks     Comment: social     Drug use: No     Comment: used in past any and all     Sexual activity: Yes     Partners: Male     Birth control/protection: Female Surgical     Comment: Hx: hysterectomy   Other Topics Concern      Service No     Blood Transfusions No     Caffeine Concern No     Occupational Exposure No     Hobby Hazards No     Sleep Concern Yes     Comment: Difficult with sleeping at night, sleeps most of the day     Stress Concern No     Weight Concern Yes     Comment: desire wt loss     Special Diet No     Back Care Yes     Comment: weight restrictions     Exercise Yes     Bike Helmet No     Seat Belt Yes     Self-Exams No     Parent/sibling w/ CABG, MI or angioplasty before 65F 55M? No   Social History Narrative     None      Social Determinants of Health     Financial Resource Strain: Not on file   Food Insecurity: Not on file   Transportation Needs: Not on file   Physical Activity: Not on file   Stress: Not on file   Social Connections: Not on file   Intimate Partner Violence: Not on file   Housing Stability: Not on file       Family History   Problem Relation Age of Onset     Cancer Mother         Uterine     Prostate Cancer Mother      Other Cancer Mother         First surgery uterine area. Then spread     Diabetes Father      Hypertension Father      Cancer Father         prostate cancer     Cerebrovascular Disease Father      Prostate Cancer Father      Psychotic Disorder Son         severe Add,      Asthma Son         exercised induced asthma     Depression Son      Substance Abuse Son      Asthma Son         ecxercise induced asthma     Cardiovascular Sister         recurrent blood clots     Cerebrovascular Disease Sister 51     Prostate Cancer Brother      Coronary Artery Disease Brother         Quadruple bypass     Cerebrovascular Disease Brother      Other Cancer Brother         Non-Hodgkin's lymphoma * 2     Diabetes Maternal Grandfather      Heart Disease Maternal Grandmother         Heart condition age 96     Diabetes Paternal Grandfather      Cancer Brother      Cerebrovascular Disease Paternal Grandmother      Coronary Artery Disease Brother         Quadruple bypass also     Hypertension Brother      Cerebrovascular Disease Brother      Prostate Cancer Brother      Cerebrovascular Disease Other         Uncle     Cerebrovascular Disease Other         Uncle     Colon Cancer Other      Genetic Disorder Cousin         Alpha-1 Antitrypsin Deficiency  Liver transplant     Genetic Disorder Cousin         Idiopathic Trombosenia [place]             ROS: 10 point ROS neg other than the symptoms noted above in the HPI.    Vital Signs: /82   Temp (!) 96.4  F (35.8  C) (Temporal)   Wt 93 kg (205 lb)   LMP  (LMP  Unknown)   BMI 30.27 kg/m      Examination:  Constitutional:  Alert, well nourished, NAD.  HEENT: Normocephalic, atraumatic.   Pulmonary:  Without shortness of breath, normal effort.   Lymph: no lymphadenopathy to low back or LE.   Integumentary: Skin is free of rashes or lesions.   Cardiovascular:  No pitting edema of BLE.    Psych: Normal affect, no apparent distress    Neurological:  Awake  Alert  Oriented x 3  Speech clear  Cranial nerves II - XII grossly intact  Motor exam   Shoulder Abduction:  Right:  5/5   Left:  5/5  Biceps:                      Right:  5/5   Left:  5/5  Triceps:                     Right:  5/5   Left:  5/5  Wrist Extensors:       Right:  5/5   Left:  5/5  Wrist Flexors:           Right:  5/5   Left:  5/5  Intrinsics:                   Right:  5/5   Left:  5/5  Sensation normal to bilateral upper and lower extremities.    Reflexes are 2+ in the brachial radialis and triceps. Negative Delta sign bilaterally.    Musculoskeletal:  Gait: Able to stand from a seated position. Normal non-antalgic, non-myelopathic gait.    Cervical examination reveals good range of motion.  No tenderness to palpation of the cervical spine or paraspinous muscles bilaterally.    Imaging:   MRI of the cervical spine from 2019 was reviewed in the office today.  She does have multilevel degeneration mainly at  C5-6 and C6-7, with reactive endplate changes and right worse than left foraminal narrowing at C6-7.    Assessment/Plan:     Cervicalgia  Intermittent upper extremity paresthesias      Nikki Morales is a 62 year old female.  I did have a discussion with the patient regarding her symptoms.  She has had gradually worsening neck pain, along with paresthesias that radiate into her upper extremities without provocation or any predictability.  These are transient episode lasting 15 to 30 seconds, and resolved spontaneously.  Review of her cervical spine MRI does show some disc degeneration present at C5-6  and C6-7, along with foraminal narrowing most pronounced on the right at C6-7.  There is some moderate central stenosis as well.  This could be contributing to her episodes of upper extremity paresthesias.  At this point, I recommended obtaining an updated cervical spine MRI for better evaluation.  We will also include imaging on her low back, as she does have quite a lot of back pain and has also some pain that radiates into her thighs bilaterally.  She did wish to proceed with that option.  We will see her back in the office or via a telephone visit to review the results.  She voiced agreement and understanding.    Also important, Margarita has expressed some concern about some short-term memory issues as well as what sounds like transient ischemic attacks.  She has a strong family history of stroke, and is concerned about this.  I think she would benefit from consultation with neurology, and she did wish for me to place that referral for her.          Nathan Kilgore PA-C  [facility] [place] [address]    Tel [phone]  Pager [phone]      The use of Dragon/Novawise dictation services may have been used to construct the content in this note; any grammatical or spelling errors are non-intentional. Please contact the author of this note directly if you are in need of any clarification.

## 2022-04-08 NOTE — LETTER
4/8/2022         RE: Nikki Morales  [address]  [facility] [address]        Dear Colleague,    Thank you for referring your patient, Nikki Morales, to the [facility] [place]. Please see a copy of my visit note below.    Dr. Javier Ramirez  [facility]  Neurosurgery Clinic Visit      CC: bialteral arm pain    Primary care Provider: Cirilo Tadeo    Referring Provider:  Cirilo Tadeo MD      Reason For Visit:   I was asked to consult on the patient for bilateral arm pain.      HPI: Nikki Morales is a 62 year old female who presents for evaluation of her chief complaint of bilateral arm pain, which is intermittent.  She states that over the last several months to couple of years, she has episodes where her arms experience a burning pain and they go numb.  These episodes last for about 15 to 30 seconds and resolve spontaneously.  There are no precipitating factors.  She does notice some occasional neck pain, but this does not seem to necessarily be associated with the episodes in her arms.  She meant to get treated before the Covid pandemic struck, but symptoms were not bad enough and she wanted up rescheduling for later.  She also has some issues in her low back, with bilateral leg pain as well.  This has really been a greater problem for her than her neck issues, she notes.  No bowel or bladder changes, or any problems with balance or coordination.  She has not really had any conservative treatment for her neck or her back.    Past Medical History:   Diagnosis Date     Chronic fatigue      Hyperlipidemia      Hypothyroid      New onset a-fib (H)      Other vitamin B12 deficiency anemia      Uncomplicated asthma        Past Medical History reviewed with patient during visit.    Past Surgical History:   Procedure Laterality Date     ABDOMEN SURGERY  6/10/93    C section     COLONOSCOPY  10/06/09     COLONOSCOPY  7/2/2013    Procedure:  COMBINED COLONOSCOPY, SINGLE BIOPSY/POLYPECTOMY BY BIOPSY;;  Surgeon: Cuate Miles MD;  Location: [facility]     COLONOSCOPY N/A 2018    Procedure: COLONOSCOPY;  Colonoscopy;  Surgeon: Hamzah Dudley DO;  Location: [facility]     COMBINED REPAIR PTOSIS WITH BLEPHAROPLASTY BILATERAL Bilateral 2018    Procedure: COMBINED REPAIR PTOSIS WITH BLEPHAROPLASTY BILATERAL;  Bilateral upper eyelid Blepharoplasty and ptosis repair ;  Surgeon: Martina Andrade MD;  Location: [facility] OR     CONIZATION CERVIX,KNIFE/LASER       ESOPHAGOSCOPY, GASTROSCOPY, DUODENOSCOPY (EGD), COMBINED  2013    Procedure: COMBINED ESOPHAGOSCOPY, GASTROSCOPY, DUODENOSCOPY (EGD), BIOPSY SINGLE OR MULTIPLE;  egd with rabdain biopsies and colonoscopy with polypectomy by biopsy ;  Surgeon: Cuate Miles MD;  Location: [facility]     GENITOURINARY SURGERY  ?     I have a bladder sling     HC DILATION/CURETTAGE DIAG/THER NON OB      D & C     HC REMOVAL OF TONSILS,<11 Y/O      Tonsils <10y.o.     HC REMOVAL OF TONSILS,<11 Y/O      Description: Tonsillectomy;  Recorded: 2009;  Comments: in grade school     HC UGI ENDOSCOPY DIAG W BIOPSY  07     HYSTERECTOMY, PAP NO LONGER INDICATED       LAPAROSCOPIC CHOLECYSTECTOMY  10/12/13     REPAIR PTOSIS       TUBAL LIGATION       [facility]  DELIVERY ONLY      , Low Cervical     [facility]  DELIVERY ONLY      Description:  Section;  Recorded: 11/10/2009;  Comments: @ 29 weeks     [facility] LIGATE FALLOPIAN TUBE      Description: Tubal Ligation;  Recorded: 11/10/2009;     [facility] NONSPECIFIC PROCEDURE  's    sinus     [facility] NONSPECIFIC PROCEDURE      Esophgeal dilatation multiple     [facility] NONSPECIFIC PROCEDURE      sling     [facility] TOTAL ABDOM HYSTERECTOMY      Description: Hysterectomy;  Recorded: 11/10/2009;  Comments: due to cervical dysplasia     [facility] VAGINAL HYSTERECTOMY  1995    Hysterectomy, Vaginal     [facility] UGI ENDOSCOPY, SIMPLE EXAM  09/10/99 & 98     Past  Surgical History reviewed with patient during visit.    Current Outpatient Medications   Medication     budesonide (PULMICORT) 0.5 MG/2ML neb solution     calcium carbonate (TUMS) 500 MG chewable tablet     cyanocobalamin (CYANOCOBALAMIN) 1000 MCG/ML injection     estradiol (VIVELLE-DOT) 0.05 MG/24HR patch     fluticasone (FLOVENT HFA) 220 MCG/ACT inhaler     levothyroxine (SYNTHROID/LEVOTHROID) 88 MCG tablet     LORazepam (ATIVAN) 1 MG tablet     methylphenidate (RITALIN) 10 MG tablet     mirabegron (MYRBETRIQ) 25 MG 24 hr tablet     multivitamin, therapeutic with minerals (MULTI-VITAMIN) TABS     pantoprazole (PROTONIX) 40 MG EC tablet     rosuvastatin (CRESTOR) 10 MG tablet     valACYclovir (VALTREX) 1000 mg tablet     zolpidem (AMBIEN) 5 MG tablet     Current Facility-Administered Medications   Medication     cyanocobalamin injection 1,000 mcg       Allergies   Allergen Reactions     Codeine Other (See Comments)     Causes agitation       Social History     Socioeconomic History     Marital status:      Spouse name: Jared     Number of children: 2     Years of education: 12     Highest education level: None   Occupational History     Occupation: HomeMaker     Employer: [employer]   Tobacco Use     Smoking status: Former Smoker     Packs/day: 0.00     Years: 10.00     Pack years: 0.00     Types: Cigarettes     Smokeless tobacco: Never Used   Substance and Sexual Activity     Alcohol use: Yes     Alcohol/week: 4.0 standard drinks     Comment: social     Drug use: No     Comment: used in past any and all     Sexual activity: Yes     Partners: Male     Birth control/protection: Female Surgical     Comment: Hx: hysterectomy   Other Topics Concern      Service No     Blood Transfusions No     Caffeine Concern No     Occupational Exposure No     Hobby Hazards No     Sleep Concern Yes     Comment: Difficult with sleeping at night, sleeps most of the day     Stress Concern No     Weight Concern Yes      Comment: desire wt loss     Special Diet No     Back Care Yes     Comment: weight restrictions     Exercise Yes     Bike Helmet No     Seat Belt Yes     Self-Exams No     Parent/sibling w/ CABG, MI or angioplasty before 65F 55M? No   Social History Narrative     None     Social Determinants of Health     Financial Resource Strain: Not on file   Food Insecurity: Not on file   Transportation Needs: Not on file   Physical Activity: Not on file   Stress: Not on file   Social Connections: Not on file   Intimate Partner Violence: Not on file   Housing Stability: Not on file       Family History   Problem Relation Age of Onset     Cancer Mother         Uterine     Prostate Cancer Mother      Other Cancer Mother         First surgery uterine area. Then spread     Diabetes Father      Hypertension Father      Cancer Father         prostate cancer     Cerebrovascular Disease Father      Prostate Cancer Father      Psychotic Disorder Son         severe Add,      Asthma Son         exercised induced asthma     Depression Son      Substance Abuse Son      Asthma Son         ecxercise induced asthma     Cardiovascular Sister         recurrent blood clots     Cerebrovascular Disease Sister 51     Prostate Cancer Brother      Coronary Artery Disease Brother         Quadruple bypass     Cerebrovascular Disease Brother      Other Cancer Brother         Non-Hodgkin's lymphoma * 2     Diabetes Maternal Grandfather      Heart Disease Maternal Grandmother         Heart condition age 96     Diabetes Paternal Grandfather      Cancer Brother      Cerebrovascular Disease Paternal Grandmother      Coronary Artery Disease Brother         Quadruple bypass also     Hypertension Brother      Cerebrovascular Disease Brother      Prostate Cancer Brother      Cerebrovascular Disease Other         Uncle     Cerebrovascular Disease Other         Uncle     Colon Cancer Other      Genetic Disorder Cousin         Alpha-1 Antitrypsin Deficiency  Liver  transplant     Genetic Disorder Cousin         Idiopathic [place]             ROS: 10 point ROS neg other than the symptoms noted above in the HPI.    Vital Signs: /82   Temp (!) 96.4  F (35.8  C) (Temporal)   Wt 93 kg (205 lb)   LMP  (LMP Unknown)   BMI 30.27 kg/m      Examination:  Constitutional:  Alert, well nourished, NAD.  HEENT: Normocephalic, atraumatic.   Pulmonary:  Without shortness of breath, normal effort.   Lymph: no lymphadenopathy to low back or LE.   Integumentary: Skin is free of rashes or lesions.   Cardiovascular:  No pitting edema of BLE.    Psych: Normal affect, no apparent distress    Neurological:  Awake  Alert  Oriented x 3  Speech clear  Cranial nerves II - XII grossly intact  Motor exam   Shoulder Abduction:  Right:  5/5   Left:  5/5  Biceps:                      Right:  5/5   Left:  5/5  Triceps:                     Right:  5/5   Left:  5/5  Wrist Extensors:       Right:  5/5   Left:  5/5  Wrist Flexors:           Right:  5/5   Left:  5/5  Intrinsics:                   Right:  5/5   Left:  5/5  Sensation normal to bilateral upper and lower extremities.    Reflexes are 2+ in the brachial radialis and triceps. Negative Delta sign bilaterally.    Musculoskeletal:  Gait: Able to stand from a seated position. Normal non-antalgic, non-myelopathic gait.    Cervical examination reveals good range of motion.  No tenderness to palpation of the cervical spine or paraspinous muscles bilaterally.    Imaging:   MRI of the cervical spine from 2019 was reviewed in the office today.  She does have multilevel degeneration mainly at  C5-6 and C6-7, with reactive endplate changes and right worse than left foraminal narrowing at C6-7.    Assessment/Plan:     Cervicalgia  Intermittent upper extremity paresthesias      Nikki Morales is a 62 year old female.  I did have a discussion with the patient regarding her symptoms.  She has had gradually worsening neck pain, along  with paresthesias that radiate into her upper extremities without provocation or any predictability.  These are transient episode lasting 15 to 30 seconds, and resolved spontaneously.  Review of her cervical spine MRI does show some disc degeneration present at C5-6 and C6-7, along with foraminal narrowing most pronounced on the right at C6-7.  There is some moderate central stenosis as well.  This could be contributing to her episodes of upper extremity paresthesias.  At this point, I recommended obtaining an updated cervical spine MRI for better evaluation.  We will also include imaging on her low back, as she does have quite a lot of back pain and has also some pain that radiates into her thighs bilaterally.  She did wish to proceed with that option.  We will see her back in the office or via a telephone visit to review the results.  She voiced agreement and understanding.          Nathan Kilgore PA-C  [facility] Neurosurgery  [address] [address]    Tel [phone]  Pager [phone]      The use of Dragon/Coaxis dictation services may have been used to construct the content in this note; any grammatical or spelling errors are non-intentional. Please contact the author of this note directly if you are in need of any clarification.        Again, thank you for allowing me to participate in the care of your patient.        Sincerely,        Nathan Kilgore PA-C

## 2022-04-12 ENCOUNTER — MYC MEDICAL ADVICE (OUTPATIENT)
Dept: INTERNAL MEDICINE | Facility: CLINIC | Age: 62
End: 2022-04-12
Payer: COMMERCIAL

## 2022-04-12 DIAGNOSIS — Z78.0 POSTMENOPAUSAL STATUS: ICD-10-CM

## 2022-04-12 DIAGNOSIS — Z13.820 SCREENING FOR OSTEOPOROSIS: Primary | ICD-10-CM

## 2022-04-14 ENCOUNTER — HOSPITAL ENCOUNTER (OUTPATIENT)
Dept: MRI IMAGING | Facility: CLINIC | Age: 62
Discharge: HOME | End: 2022-04-14
Attending: PHYSICIAN ASSISTANT
Payer: MEDICARE

## 2022-04-14 DIAGNOSIS — M54.12 CERVICAL RADICULOPATHY: ICD-10-CM

## 2022-04-14 DIAGNOSIS — M54.16 LUMBAR RADICULOPATHY: ICD-10-CM

## 2022-04-14 PROCEDURE — 72141 MRI NECK SPINE W/O DYE: CPT

## 2022-04-14 PROCEDURE — 72148 MRI LUMBAR SPINE W/O DYE: CPT

## 2022-04-20 ENCOUNTER — HOSPITAL ENCOUNTER (OUTPATIENT)
Dept: BONE DENSITY | Facility: CLINIC | Age: 62
Discharge: HOME | End: 2022-04-20
Attending: INTERNAL MEDICINE | Admitting: INTERNAL MEDICINE
Payer: MEDICARE

## 2022-04-20 DIAGNOSIS — Z13.820 SCREENING FOR OSTEOPOROSIS: ICD-10-CM

## 2022-04-20 DIAGNOSIS — Z78.0 POSTMENOPAUSAL STATUS: ICD-10-CM

## 2022-04-20 PROCEDURE — 77080 DXA BONE DENSITY AXIAL: CPT

## 2022-04-21 ENCOUNTER — TELEPHONE (OUTPATIENT)
Dept: NEUROSURGERY | Facility: CLINIC | Age: 62
End: 2022-04-21
Payer: COMMERCIAL

## 2022-04-21 DIAGNOSIS — M47.816 ARTHROPATHY OF LUMBAR FACET JOINT: Primary | ICD-10-CM

## 2022-04-21 NOTE — TELEPHONE ENCOUNTER
Spoke with patient regarding MRI findings.  At this point, we recommend she start with some physical therapy and continue to monitor symptoms.  If her back pain does become more persistent, we discussed that she may be a good candidate to try some medial branch block/ablation.    Nathan Kilgore PA-C  [facility] Neurosurgery

## 2022-04-26 NOTE — TELEPHONE ENCOUNTER
RECORDS RECEIVED FROM: Internal   REASON FOR VISIT: Memory loss of unknown cause [R41.3]   Date of Appt: 04/27/2022   NOTES (FOR ALL VISITS) STATUS DETAILS   OFFICE NOTE from referring provider Internal 04/08/2022 Dr Kilgore [facility]    OFFICE NOTE from other specialist N/A    DISCHARGE SUMMARY from hospital N/A    DISCHARGE REPORT from the ER N/A    OPERATIVE REPORT N/A    MEDICATION LIST N/A    IMAGING  (FOR ALL VISITS)     EMG N/A    EEG N/A    LUMBAR PUNCTURE N/A    VANGIE SCAN N/A    ULTRASOUND (CAROTID BILAT) *VASCULAR* N/A    MRI (HEAD, NECK, SPINE) Internal 05/01/2019 MRI brain, MRA brain ([facility])    CT (HEAD, NECK, SPINE) N/A

## 2022-04-27 ENCOUNTER — OFFICE VISIT (OUTPATIENT)
Dept: NEUROLOGY | Facility: CLINIC | Age: 62
End: 2022-04-27
Attending: PHYSICIAN ASSISTANT
Payer: COMMERCIAL

## 2022-04-27 ENCOUNTER — PRE VISIT (OUTPATIENT)
Dept: NEUROLOGY | Facility: CLINIC | Age: 62
End: 2022-04-27
Payer: COMMERCIAL

## 2022-04-27 VITALS
BODY MASS INDEX: 29.53 KG/M2 | DIASTOLIC BLOOD PRESSURE: 80 MMHG | WEIGHT: 200 LBS | HEART RATE: 71 BPM | SYSTOLIC BLOOD PRESSURE: 124 MMHG

## 2022-04-27 DIAGNOSIS — R41.3 MEMORY LOSS OF UNKNOWN CAUSE: ICD-10-CM

## 2022-04-27 DIAGNOSIS — R41.3 MEMORY CHANGE: Primary | ICD-10-CM

## 2022-04-27 LAB — VIT B12 SERPL-MCNC: 664 PG/ML (ref 193–986)

## 2022-04-27 PROCEDURE — 82607 VITAMIN B-12: CPT | Performed by: INTERNAL MEDICINE

## 2022-04-27 PROCEDURE — 36415 COLL VENOUS BLD VENIPUNCTURE: CPT | Performed by: INTERNAL MEDICINE

## 2022-04-27 PROCEDURE — 99205 OFFICE O/P NEW HI 60 MIN: CPT | Performed by: INTERNAL MEDICINE

## 2022-04-27 NOTE — PROGRESS NOTES
"[facility] Neurology Consultation    Nikki Morales MRN# [id number]   Age: 62 year old YOB: 1960     Requesting physician: Cirilo Neville     Reason for Consultation: memory changes      History of Presenting Symptoms:   Nikki Morales is a 62 year old female who presents today for evaluation of memory changes.     Patient reports that to some degree she has been \"jumbling her words\" for a long time. She also has noticed difficulties with remembering peoples names for some time. In the last few years these symptoms have worsened. Her family has noticed concerns and have relayed them to her. She will often forget conversations that she had with friends and family. A few years ago she had an episode where she forgot she started her car and then her son found it an hour later. She had no memory of this. She is still doing most of the finances around the house. Her  recently stopped working. She denies any issues with driving or getting lost. She has no problems with cooking, cleaning, getting grocery.     She hasn't worked since her 40s due to her chronic fatigue. Symptoms aren't quite as bad now. She has been on B12 injections for many years. She also takes Zolpidem at night to help her sleep.     She has history of chronic anxiety and depression. She has done antidepressant previously with side effects. She is not taking an antidepressant currently. She takes ativan as needed to help her sleep at night.     She is trying to become POA over 37 y/o son who is an alcoholic. This has been stressful.     She denies any history of hallucinations or acting out her dreams at night. She has no balance problems.       Past Medical History:     Patient Active Problem List   Diagnosis     Chronic fatigue syndrome     Other and unspecified disc disorder of lumbar region     ESOPHAGEAL REFLUX     Female stress incontinence     Herpes simplex virus (HSV) infection     HYPERLIPIDEMIA LDL " GOAL <130     Eosinophilic esophagitis     Overweight     Adhesive capsulitis of shoulder     Degenerative arthritis of cervical spine with cord compression     Hypothyroidism due to acquired atrophy of thyroid     Attention deficit hyperactivity disorder, inattentive type     Mild persistent asthma without complication     KAILEE (generalized anxiety disorder)     Moderate recurrent major depression (H)     Impingement syndrome of left shoulder     Primary headache associated with sexual activity     Mixed incontinence     Morbid obesity (H)     Past Medical History:   Diagnosis Date     Chronic fatigue      Hyperlipidemia      Hypothyroid      New onset a-fib (H)      Other vitamin B12 deficiency anemia      Uncomplicated asthma         Past Surgical History:     Past Surgical History:   Procedure Laterality Date     ABDOMEN SURGERY  6/10/93    C section     COLONOSCOPY  10/06/09     COLONOSCOPY  7/2/2013    Procedure: COMBINED COLONOSCOPY, SINGLE BIOPSY/POLYPECTOMY BY BIOPSY;;  Surgeon: Cuate Miles MD;  Location: [facility]     COLONOSCOPY N/A 9/6/2018    Procedure: COLONOSCOPY;  Colonoscopy;  Surgeon: Hamzah Dudley DO;  Location:  GI     COMBINED REPAIR PTOSIS WITH BLEPHAROPLASTY BILATERAL Bilateral 9/24/2018    Procedure: COMBINED REPAIR PTOSIS WITH BLEPHAROPLASTY BILATERAL;  Bilateral upper eyelid Blepharoplasty and ptosis repair ;  Surgeon: Martina Andrade MD;  Location: [facility] OR     CONIZATION CERVIX,KNIFE/LASER       ESOPHAGOSCOPY, GASTROSCOPY, DUODENOSCOPY (EGD), COMBINED  7/2/2013    Procedure: COMBINED ESOPHAGOSCOPY, GASTROSCOPY, DUODENOSCOPY (EGD), BIOPSY SINGLE OR MULTIPLE;  egd with rabdain biopsies and colonoscopy with polypectomy by biopsy ;  Surgeon: Cuate Miles MD;  Location:  GI     GENITOURINARY SURGERY  ? 2008    I have a bladder sling     HC DILATION/CURETTAGE DIAG/THER NON OB      D & C     HC REMOVAL OF TONSILS,<9 Y/O      Tonsils <10y.o.     HC REMOVAL OF TONSILS,<10  Y/O      Description: Tonsillectomy;  Recorded: 2009;  Comments: in grade school     HC UGI ENDOSCOPY DIAG W BIOPSY  07     HYSTERECTOMY, PAP NO LONGER INDICATED       LAPAROSCOPIC CHOLECYSTECTOMY  10/12/13     REPAIR PTOSIS       TUBAL LIGATION       ZZC  DELIVERY ONLY      , Low Cervical     ZZC  DELIVERY ONLY      Description:  Section;  Recorded: 11/10/2009;  Comments: @ 29 weeks     ZZC LIGATE FALLOPIAN TUBE      Description: Tubal Ligation;  Recorded: 11/10/2009;     ZZC NONSPECIFIC PROCEDURE  's    sinus     ZZC NONSPECIFIC PROCEDURE      Esophgeal dilatation multiple     ZZC NONSPECIFIC PROCEDURE      sling     ZZC TOTAL ABDOM HYSTERECTOMY      Description: Hysterectomy;  Recorded: 11/10/2009;  Comments: due to cervical dysplasia     ZZC VAGINAL HYSTERECTOMY  1995    Hysterectomy, Vaginal     ZZHC UGI ENDOSCOPY, SIMPLE EXAM  09/10/99 & 98        Social History:     Social History     Tobacco Use     Smoking status: Former Smoker     Packs/day: 0.00     Years: 10.00     Pack years: 0.00     Types: Cigarettes     Smokeless tobacco: Never Used   Substance Use Topics     Alcohol use: Yes     Alcohol/week: 4.0 standard drinks     Comment: social     Drug use: No     Comment: used in past any and all        Family History:     Family History   Problem Relation Age of Onset     Cancer Mother         Uterine     Prostate Cancer Mother      Other Cancer Mother         First surgery uterine area. Then spread     Diabetes Father      Hypertension Father      Cancer Father         prostate cancer     Cerebrovascular Disease Father      Prostate Cancer Father      Psychotic Disorder Son         severe Add,      Asthma Son         exercised induced asthma     Depression Son      Substance Abuse Son      Asthma Son         ecxercise induced asthma     Cardiovascular Sister         recurrent blood clots     Cerebrovascular Disease Sister 51     Prostate  Cancer Brother      Coronary Artery Disease Brother         Quadruple bypass     Cerebrovascular Disease Brother      Other Cancer Brother         Non-Hodgkin's lymphoma * 2     Diabetes Maternal Grandfather      Heart Disease Maternal Grandmother         Heart condition age 96     Diabetes Paternal Grandfather      Cancer Brother      Cerebrovascular Disease Paternal Grandmother      Coronary Artery Disease Brother         Quadruple bypass also     Hypertension Brother      Cerebrovascular Disease Brother      Prostate Cancer Brother      Cerebrovascular Disease Other         Uncle     Cerebrovascular Disease Other         Uncle     Colon Cancer Other      Genetic Disorder Cousin         Alpha-1 Antitrypsin Deficiency  Liver transplant     Genetic Disorder Cousin         Idiopathic Trombosenia Purpla           Medications:     Current Outpatient Medications   Medication Sig     budesonide (PULMICORT) 0.5 MG/2ML neb solution Mix 2 vials with 1 oz coffee flavoring and drink twice a day and rinse mouth aftrerwards     calcium carbonate (TUMS) 500 MG chewable tablet Take 2 chew tab by mouth 3 times daily as needed for other (bloating/flatulence)     cyanocobalamin (CYANOCOBALAMIN) 1000 MCG/ML injection INJECT 1ML INTRAMUSCULARLY EVERY 30 DAYS     estradiol (VIVELLE-DOT) 0.05 MG/24HR patch Place 1 patch onto the skin twice a week     fluticasone (FLOVENT HFA) 220 MCG/ACT inhaler Inhale 1 puff into the lungs 2 times daily     levothyroxine (SYNTHROID/LEVOTHROID) 88 MCG tablet Take 1 tablet (88 mcg) by mouth daily     LORazepam (ATIVAN) 1 MG tablet TAKE ONE TABLET BY MOUTH TWICE A DAY AS NEEDED FOR ANXIETY     methylphenidate (RITALIN) 10 MG tablet Take 10 mg by mouth 2 times daily     mirabegron (MYRBETRIQ) 25 MG 24 hr tablet Take 1 tablet (25 mg) by mouth daily     multivitamin, therapeutic with minerals (MULTI-VITAMIN) TABS Take 1 tablet by mouth daily     pantoprazole (PROTONIX) 40 MG EC tablet Take 40 mg by  mouth daily     rosuvastatin (CRESTOR) 10 MG tablet 1 daily or as directed.     valACYclovir (VALTREX) 1000 mg tablet Take 1 tablet (1,000 mg) by mouth 2 times daily     zolpidem (AMBIEN) 5 MG tablet TAKE TWO TABLETS BY MOUTH EVERY EVENING AS NEEDED FOR SLEEP     Current Facility-Administered Medications   Medication     cyanocobalamin injection 1,000 mcg        Allergies:     Allergies   Allergen Reactions     Codeine Other (See Comments)     Causes agitation        Review of Systems:   As above     Physical Exam:   Vitals: /80 (BP Location: Right arm, Patient Position: Sitting, Cuff Size: Adult Regular)   Pulse 71   Wt 90.7 kg (200 lb)   LMP  (LMP Unknown)   BMI 29.53 kg/m     General: Seated comfortably in no acute distress.  HEENT: Optic discs sharp and vasculature normal on funduscopic exam.   Lungs: breathing comfortably  Extremities: no edema  Skin: No rashes  Neurologic:     Mental Status: MOCA 29/30 (-1 copy rectangle).      Cranial Nerves: Visual fields intact. PERRL. EOMI with normal smooth pursuit. Facial sensation intact/symmetric. Facial movements symmetric. Hearing not formally tested but intact to conversation. Palate elevation symmetric, uvula midline. No dysarthria. Shoulder shrug strong bilaterally. Tongue protrusion midline.     Motor: No tremors or other abnormal movements observed. Muscle tone normal throughout. Normal/symmetric rapid finger tapping. Strength 5/5 throughout upper and lower extremities.      Right Left   Shoulder abduction        5 5   Elbow extension 5 5   Elbow flexion 5 5   Wrist extension         5 5   Finger extension 5 5   ADM 5 5   FDI 5 5   APB 5 5   Hip flexion 5 5   Knee flexion 5 5   Knee extension 5 5   Dorsiflexion 5 5   Plantar flexion 5 5        Deep Tendon Reflexes: 2+/symmetric throughout upper and lower extremities. No clonus.      Sensory: Intact/symmetric to light touch throughout upper and lower extremities. Negative Romberg.      Coordination:  Finger-nose-finger and heel-shin intact without dysmetria. Rapid alternating movements intact/symmetric with normal speed and rhythm.     Gait: Normal, steady casual gait. Able to walk on toes, heels and tandem without difficulty.         Data: Pertinent prior to visit   Imaging:  MRI cervical 4/2022  IMPRESSION:  1.  No significant posterior disc bulge or spinal canal narrowing at  any level within the cervical spine.  2.  Uncovertebral joint disease and facet arthropathy contributes to  severe right and mild left neural from narrowing at the C6/C7 level  and mild bilateral neural from narrowing at the C5/C6 level.  3.  No significant neural foraminal narrowing at any other level  within the cervical spine.    MRI brain 5/2019  IMPRESSION: Normal brain MRI.    Procedures:  None    Laboratory:  TSH normal 2/2022         Assessment and Plan:   Assessment:  Nikki Morales is a 62 year old female who presents today for evaluation of memory changes. To some degree patient has had life long issues with word finding and remembering people's names. She has had worsening of short term memory in the last several years. She did quite well on MOCA today at 29/30 (-1 for rectangle copy). Prior MRI brain in 2019 was normal. Recent TSH was normal.     I suspect that at least some of symptoms are related to patient's underlying chronic fatigue, anxiety, depression, poor sleep. Neuropsychology testing ordered to establish cognitive baseline and look for any subtle organic deficits. She reportedly at testing approximately 20 years at outside institution during chronic fatigue work-up. Repeat MRI brain ordered as symptoms have worsened since last MRI brain. B12 level ordered. She is on chronic B12 supplementation.      Plan:  - MRI brain without contrast  - B12 level   - Neuropsychology testing    Follow up in Neurology clinic following neuropsychology testing    Montana Reed MD   of Neurology  [facility]      The total time of this encounter today amounted to 63 minutes. This time included time spent with the patient, prep work, ordering tests, and performing post visit documentation.

## 2022-04-27 NOTE — LETTER
"    4/27/2022         RE: Nikki Morales  [address]  [facility] [id number]        Dear Colleague,    Thank you for referring your patient, Nikki Morales, to the [facility] [place]. Please see a copy of my visit note below.    [facility] Neurology Consultation    Nikki Morales MRN# [id number]   Age: 62 year old YOB: 1960     Requesting physician: Cirilo Neville     Reason for Consultation: memory changes      History of Presenting Symptoms:   Nikki Morales is a 62 year old female who presents today for evaluation of memory changes.     Patient reports that to some degree she has been \"jumbling her words\" for a long time. She also has noticed difficulties with remembering peoples names for some time. In the last few years these symptoms have worsened. Her family has noticed concerns and have relayed them to her. She will often forget conversations that she had with friends and family. A few years ago she had an episode where she forgot she started her car and then her son found it an hour later. She had no memory of this. She is still doing most of the finances around the house. Her  recently stopped working. She denies any issues with driving or getting lost. She has no problems with cooking, cleaning, getting grocery.     She hasn't worked since her 40s due to her chronic fatigue. Symptoms aren't quite as bad now. She has been on B12 injections for many years. She also takes Zolpidem at night to help her sleep.     She has history of chronic anxiety and depression. She has done antidepressant previously with side effects. She is not taking an antidepressant currently. She takes ativan as needed to help her sleep at night.     She is trying to become POA over 37 y/o son who is an alcoholic. This has been stressful.     She denies any history of hallucinations or acting out her dreams at night. She has no balance problems. "       Past Medical History:     Patient Active Problem List   Diagnosis     Chronic fatigue syndrome     Other and unspecified disc disorder of lumbar region     ESOPHAGEAL REFLUX     Female stress incontinence     Herpes simplex virus (HSV) infection     HYPERLIPIDEMIA LDL GOAL <130     Eosinophilic esophagitis     Overweight     Adhesive capsulitis of shoulder     Degenerative arthritis of cervical spine with cord compression     Hypothyroidism due to acquired atrophy of thyroid     Attention deficit hyperactivity disorder, inattentive type     Mild persistent asthma without complication     KAILEE (generalized anxiety disorder)     Moderate recurrent major depression (H)     Impingement syndrome of left shoulder     Primary headache associated with sexual activity     Mixed incontinence     Morbid obesity (H)     Past Medical History:   Diagnosis Date     Chronic fatigue      Hyperlipidemia      Hypothyroid      New onset a-fib (H)      Other vitamin B12 deficiency anemia      Uncomplicated asthma         Past Surgical History:     Past Surgical History:   Procedure Laterality Date     ABDOMEN SURGERY  6/10/93    C section     COLONOSCOPY  10/06/09     COLONOSCOPY  7/2/2013    Procedure: COMBINED COLONOSCOPY, SINGLE BIOPSY/POLYPECTOMY BY BIOPSY;;  Surgeon: Cuate Miles MD;  Location: [facility]     COLONOSCOPY N/A 9/6/2018    Procedure: COLONOSCOPY;  Colonoscopy;  Surgeon: Hamzah Dudley DO;  Location: [facility]     COMBINED REPAIR PTOSIS WITH BLEPHAROPLASTY BILATERAL Bilateral 9/24/2018    Procedure: COMBINED REPAIR PTOSIS WITH BLEPHAROPLASTY BILATERAL;  Bilateral upper eyelid Blepharoplasty and ptosis repair ;  Surgeon: Martina Andrade MD;  Location: [facility] OR     CONIZATION CERVIX,KNIFE/LASER       ESOPHAGOSCOPY, GASTROSCOPY, DUODENOSCOPY (EGD), COMBINED  7/2/2013    Procedure: COMBINED ESOPHAGOSCOPY, GASTROSCOPY, DUODENOSCOPY (EGD), BIOPSY SINGLE OR MULTIPLE;  egd with rabdain biopsies and colonoscopy with  polypectomy by biopsy ;  Surgeon: Cuate Miles MD;  Location: [facility]     GENITOURINARY SURGERY  ?     I have a bladder sling     HC DILATION/CURETTAGE DIAG/THER NON OB      D & C     HC REMOVAL OF TONSILS,<11 Y/O      Tonsils <10y.o.     HC REMOVAL OF TONSILS,<11 Y/O      Description: Tonsillectomy;  Recorded: 2009;  Comments: in grade school     HC UGI ENDOSCOPY DIAG W BIOPSY  07     HYSTERECTOMY, PAP NO LONGER INDICATED       LAPAROSCOPIC CHOLECYSTECTOMY  10/12/13     REPAIR PTOSIS       TUBAL LIGATION       ZZC  DELIVERY ONLY      , Low Cervical     ZZC  DELIVERY ONLY      Description:  Section;  Recorded: 11/10/2009;  Comments: @ 29 weeks     ZZC LIGATE FALLOPIAN TUBE      Description: Tubal Ligation;  Recorded: 11/10/2009;     ZZC NONSPECIFIC PROCEDURE  's    sinus     ZZC NONSPECIFIC PROCEDURE      Esophgeal dilatation multiple     ZZC NONSPECIFIC PROCEDURE      sling     ZZC TOTAL ABDOM HYSTERECTOMY      Description: Hysterectomy;  Recorded: 11/10/2009;  Comments: due to cervical dysplasia     ZZC VAGINAL HYSTERECTOMY  1995    Hysterectomy, Vaginal     ZZHC UGI ENDOSCOPY, SIMPLE EXAM  09/10/99 & 98        Social History:     Social History     Tobacco Use     Smoking status: Former Smoker     Packs/day: 0.00     Years: 10.00     Pack years: 0.00     Types: Cigarettes     Smokeless tobacco: Never Used   Substance Use Topics     Alcohol use: Yes     Alcohol/week: 4.0 standard drinks     Comment: social     Drug use: No     Comment: used in past any and all        Family History:     Family History   Problem Relation Age of Onset     Cancer Mother         Uterine     Prostate Cancer Mother      Other Cancer Mother         First surgery uterine area. Then spread     Diabetes Father      Hypertension Father      Cancer Father         prostate cancer     Cerebrovascular Disease Father      Prostate Cancer Father      Psychotic Disorder Son          severe Add,      Asthma Son         exercised induced asthma     Depression Son      Substance Abuse Son      Asthma Son         ecxercise induced asthma     Cardiovascular Sister         recurrent blood clots     Cerebrovascular Disease Sister 51     Prostate Cancer Brother      Coronary Artery Disease Brother         Quadruple bypass     Cerebrovascular Disease Brother      Other Cancer Brother         Non-Hodgkin's lymphoma * 2     Diabetes Maternal Grandfather      Heart Disease Maternal Grandmother         Heart condition age 96     Diabetes Paternal Grandfather      Cancer Brother      Cerebrovascular Disease Paternal Grandmother      Coronary Artery Disease Brother         Quadruple bypass also     Hypertension Brother      Cerebrovascular Disease Brother      Prostate Cancer Brother      Cerebrovascular Disease Other         Uncle     Cerebrovascular Disease Other         Uncle     Colon Cancer Other      Genetic Disorder Cousin         Alpha-1 Antitrypsin Deficiency  Liver transplant     Genetic Disorder Cousin         Idiopathic Trombosenia Purpla           Medications:     Current Outpatient Medications   Medication Sig     budesonide (PULMICORT) 0.5 MG/2ML neb solution Mix 2 vials with 1 oz coffee flavoring and drink twice a day and rinse mouth aftrerwards     calcium carbonate (TUMS) 500 MG chewable tablet Take 2 chew tab by mouth 3 times daily as needed for other (bloating/flatulence)     cyanocobalamin (CYANOCOBALAMIN) 1000 MCG/ML injection INJECT 1ML INTRAMUSCULARLY EVERY 30 DAYS     estradiol (VIVELLE-DOT) 0.05 MG/24HR patch Place 1 patch onto the skin twice a week     fluticasone (FLOVENT HFA) 220 MCG/ACT inhaler Inhale 1 puff into the lungs 2 times daily     levothyroxine (SYNTHROID/LEVOTHROID) 88 MCG tablet Take 1 tablet (88 mcg) by mouth daily     LORazepam (ATIVAN) 1 MG tablet TAKE ONE TABLET BY MOUTH TWICE A DAY AS NEEDED FOR ANXIETY     methylphenidate (RITALIN) 10 MG tablet  Take 10 mg by mouth 2 times daily     mirabegron (MYRBETRIQ) 25 MG 24 hr tablet Take 1 tablet (25 mg) by mouth daily     multivitamin, therapeutic with minerals (MULTI-VITAMIN) TABS Take 1 tablet by mouth daily     pantoprazole (PROTONIX) 40 MG EC tablet Take 40 mg by mouth daily     rosuvastatin (CRESTOR) 10 MG tablet 1 daily or as directed.     valACYclovir (VALTREX) 1000 mg tablet Take 1 tablet (1,000 mg) by mouth 2 times daily     zolpidem (AMBIEN) 5 MG tablet TAKE TWO TABLETS BY MOUTH EVERY EVENING AS NEEDED FOR SLEEP     Current Facility-Administered Medications   Medication     cyanocobalamin injection 1,000 mcg        Allergies:     Allergies   Allergen Reactions     Codeine Other (See Comments)     Causes agitation        Review of Systems:   As above     Physical Exam:   Vitals: /80 (BP Location: Right arm, Patient Position: Sitting, Cuff Size: Adult Regular)   Pulse 71   Wt 90.7 kg (200 lb)   LMP  (LMP Unknown)   BMI 29.53 kg/m     General: Seated comfortably in no acute distress.  HEENT: Optic discs sharp and vasculature normal on funduscopic exam.   Lungs: breathing comfortably  Extremities: no edema  Skin: No rashes  Neurologic:     Mental Status: MOCA 29/30 (-1 copy rectangle).      Cranial Nerves: Visual fields intact. PERRL. EOMI with normal smooth pursuit. Facial sensation intact/symmetric. Facial movements symmetric. Hearing not formally tested but intact to conversation. Palate elevation symmetric, uvula midline. No dysarthria. Shoulder shrug strong bilaterally. Tongue protrusion midline.     Motor: No tremors or other abnormal movements observed. Muscle tone normal throughout. Normal/symmetric rapid finger tapping. Strength 5/5 throughout upper and lower extremities.      Right Left   Shoulder abduction        5 5   Elbow extension 5 5   Elbow flexion 5 5   Wrist extension         5 5   Finger extension 5 5   ADM 5 5   FDI 5 5   APB 5 5   Hip flexion 5 5   Knee flexion 5 5   Knee  extension 5 5   Dorsiflexion 5 5   Plantar flexion 5 5        Deep Tendon Reflexes: 2+/symmetric throughout upper and lower extremities. No clonus.      Sensory: Intact/symmetric to light touch throughout upper and lower extremities. Negative Romberg.      Coordination: Finger-nose-finger and heel-shin intact without dysmetria. Rapid alternating movements intact/symmetric with normal speed and rhythm.     Gait: Normal, steady casual gait. Able to walk on toes, heels and tandem without difficulty.         Data: Pertinent prior to visit   Imaging:  MRI cervical 4/2022  IMPRESSION:  1.  No significant posterior disc bulge or spinal canal narrowing at  any level within the cervical spine.  2.  Uncovertebral joint disease and facet arthropathy contributes to  severe right and mild left neural from narrowing at the C6/C7 level  and mild bilateral neural from narrowing at the C5/C6 level.  3.  No significant neural foraminal narrowing at any other level  within the cervical spine.    MRI brain 5/2019  IMPRESSION: Normal brain MRI.    Procedures:  None    Laboratory:  TSH normal 2/2022         Assessment and Plan:   Assessment:  Nikki Morales is a 62 year old female who presents today for evaluation of memory changes. To some degree patient has had life long issues with word finding and remembering people's names. She has had worsening of short term memory in the last several years. She did quite well on MOCA today at 29/30 (-1 for rectangle copy). Prior MRI brain in 2019 was normal. Recent TSH was normal.     I suspect that at least some of symptoms are related to patient's underlying chronic fatigue, anxiety, depression, poor sleep. Neuropsychology testing ordered to establish cognitive baseline and look for any subtle organic deficits. She reportedly at testing approximately 20 years at outside institution during chronic fatigue work-up. Repeat MRI brain ordered as symptoms have worsened since last MRI brain. B12  level ordered. She is on chronic B12 supplementation.      Plan:  - MRI brain without contrast  - B12 level   - Neuropsychology testing    Follow up in Neurology clinic following neuropsychology testing    Montana Reed MD   of Neurology  [facility]      The total time of this encounter today amounted to 63 minutes. This time included time spent with the patient, prep work, ordering tests, and performing post visit documentation.        Again, thank you for allowing me to participate in the care of your patient.        Sincerely,        Montana Reed MD

## 2022-04-28 ENCOUNTER — ALLIED HEALTH/NURSE VISIT (OUTPATIENT)
Dept: FAMILY MEDICINE | Facility: CLINIC | Age: 62
End: 2022-04-28
Payer: COMMERCIAL

## 2022-04-28 DIAGNOSIS — D51.8 VITAMIN B12 DEFICIENCY (DIETARY) ANEMIA: Primary | ICD-10-CM

## 2022-04-28 PROCEDURE — 96372 THER/PROPH/DIAG INJ SC/IM: CPT | Performed by: INTERNAL MEDICINE

## 2022-04-28 PROCEDURE — 99207 PR NO CHARGE NURSE ONLY: CPT

## 2022-04-28 RX ADMIN — CYANOCOBALAMIN 1000 MCG: 1000 INJECTION, SOLUTION INTRAMUSCULAR; SUBCUTANEOUS at 11:14

## 2022-04-28 NOTE — PROGRESS NOTES
Clinic Administered Medication Documentation    Administrations This Visit     cyanocobalamin injection 1,000 mcg     Admin Date  04/28/2022 Action  Given Dose  1,000 mcg Route  Intramuscular Site  Left Deltoid Administered By  Emi Brito    Ordering Provider: Cirilo Tadeo MD    NDC: 08301-433-71    Lot#: [id number]    : Quanti[employer]ense    Patient Supplied?: No                  Injectable Medication Documentation    Patient was given Cyanocobalamin (B-12). Prior to medication administration, verified patients identity using patient s name and date of birth. Please see MAR and medication order for additional information. Patient instructed to remain in clinic for 15 minutes and report any adverse reaction to staff immediately .      Was entire vial of medication used? Yes  Vial/Syringe: Single dose vial  Expiration Date:  12/22  Was this medication supplied by the patient? No

## 2022-05-05 ENCOUNTER — HOSPITAL ENCOUNTER (OUTPATIENT)
Dept: PHYSICAL THERAPY | Facility: CLINIC | Age: 62
Setting detail: THERAPIES SERIES
Discharge: HOME | End: 2022-05-05
Attending: PHYSICIAN ASSISTANT
Payer: MEDICARE

## 2022-05-05 DIAGNOSIS — M47.816 ARTHROPATHY OF LUMBAR FACET JOINT: ICD-10-CM

## 2022-05-05 PROCEDURE — 97110 THERAPEUTIC EXERCISES: CPT | Mod: GP | Performed by: PHYSICAL THERAPIST

## 2022-05-05 PROCEDURE — 97161 PT EVAL LOW COMPLEX 20 MIN: CPT | Mod: GP | Performed by: PHYSICAL THERAPIST

## 2022-05-05 NOTE — PROGRESS NOTES
[facility]    OUTPATIENT PHYSICAL THERAPY ORTHOPEDIC EVALUATION  PLAN OF TREATMENT FOR OUTPATIENT REHABILITATION  (COMPLETE FOR INITIAL CLAIMS ONLY)  Patient's Last Name, First Name, M.I.  YOB: 1960  Nikki Morales    Provider s Name:  [facility]   Medical Record No.  [id number]   Start of Care Date:  05/05/22   Onset Date:   5/1/90.   Type:     _X__PT   ___OT   ___SLP Medical Diagnosis:   Lumbar facet arthropathy     PT Diagnosis:  (P) Low back pain.   Visits from SOC:  1      _________________________________________________________________________________  Plan of Treatment/Functional Goals:  (P) joint mobilization, manual therapy, neuromuscular re-education, ROM, stretching, strengthening     (P) Cryotherapy, Electrical stimulation, Hot packs, TENS, Ultrasound  (P) as needed  Goals  Goal Identifier: (P) HEP  Goal Description: (P) Pt will be independent with HEP in order to improve lumbar motor control and postural stability.  Target Date: (P) 06/30/22       Therapy Frequency:  (P) 1 time/week  Predicted Duration of Therapy Intervention:  (P) 8 weeks    Candido Carlton, PT                 I CERTIFY THE NEED FOR THESE SERVICES FURNISHED UNDER        THIS PLAN OF TREATMENT AND WHILE UNDER MY CARE     (Physician co-signature of this document indicates review and certification of the therapy plan).                     Certification Date From:    5/5/22  Certification Date To:   6/30/22    Referring Provider:  Nathan Kilgore PA-C    Initial Assessment        See Epic Evaluation Start of Care Date: 05/05/22

## 2022-05-05 NOTE — PROGRESS NOTES
05/05/22 1400   General Information   Type of Visit Initial OP Ortho PT Evaluation   Start of Care Date 05/05/22   Referring Physician Nathan Kilgore PA-C   Patient/Family Goals Statement Reduce pain   Orders Evaluate and Treat   Date of Order 04/21/22   Certification Required? Yes   Medical Diagnosis Lumbar facet arthropathy   Surgical/Medical history reviewed Yes   Precautions/Limitations no known precautions/limitations   Weight-Bearing Status - LUE full weight-bearing   Weight-Bearing Status - RUE full weight-bearing   Weight-Bearing Status - LLE full weight-bearing   Weight-Bearing Status - RLE full weight-bearing   Body Part(s)   Body Part(s) Lumbar Spine/SI   Presentation and Etiology   Pertinent history of current problem (include personal factors and/or comorbidities that impact the POC) Pt reports low back has been giving her trouble for 30 years.  Pt reports low back pain is mainly in her lumbar region.  Pt reports her upper back and UE s have been more of a concern with going numb frequently.  Pt reports throbbing pain on B UE s through lateral shoulder and upper arms.  Pt reports numbness comes on somewhat unpredictably. Pt reports this occurs once every 6th months.  Pt reports symptoms in arms are short lived but severe.  PMH: hyperlipidemia, obesity, hypothyroid, ADHD, Anxiety, depression.   Impairments A. Pain;D. Decreased ROM;E. Decreased flexibility;F. Decreased strength and endurance;K. Numbness   Pain rating (0-10 point scale) Best (/10);Worst (/10)   Best (/10) 0/10   Worst (/10) 6/10   Pain quality C. Aching;B. Dull   Frequency of pain/symptoms C. With activity   Pain/symptoms are: The same all the time   Pain/symptoms exacerbated by B. Walking   Pain/symptoms eased by A. Sitting;E. Changing positions;F. Certain positions   Progression of symptoms since onset: Unchanged   Current / Previous Interventions   Diagnostic Tests: X-ray;MRI   MRI Results Results   Current Level of Function   Current  Community Support Family/friend caregiver   Patient role/employment history F. Retired   Living environment House/townhome   Home/community accessibility no concerns   Current equipment-Gait/Locomotion None   Current equipment-ADL None   Fall Risk Screen   Fall screen completed by PT   Have you fallen 2 or more times in the past year? No   Have you fallen and had an injury in the past year? No   Abuse Screen (yes response referral indicated)   Feels Unsafe at Home or Work/School no   Feels Threatened by Someone no   Does Anyone Try to Keep You From Having Contact with Others or Doing Things Outside Your Home? no   Physical Signs of Abuse Present no   Patient needs abuse support services and resources No   Lumbar Spine/SI Objective Findings   Flexion %   Extension ROM 20% painful   Right Side Bending ROM 50%   Left Side Bending ROM 50%   Repeated Extension-Standing ROM Increased pain   Repeated Flexion-Standing ROM No change   Hip Flexion (L2) Strength 5   Hip Abduction Strength 4   Hip Adduction Strength 5   Hip Extension Strength 4   Knee Flexion Strength 5   Knee Extension (L3) Strength 5   Ankle Dorsiflexion (L4) Strength 5   Great Toe Extension (L5) Strength 5   Ankle Plantar Flexion (S1) Strength 5   Planned Therapy Interventions   Planned Therapy Interventions joint mobilization;manual therapy;neuromuscular re-education;ROM;stretching;strengthening   Planned Modality Interventions   Planned Modality Interventions Cryotherapy;Electrical stimulation;Hot packs;TENS;Ultrasound   Planned Modality Interventions Comments as needed   Clinical Impression   Criteria for Skilled Therapeutic Interventions Met yes, treatment indicated   PT Diagnosis Low back pain.   Influenced by the following impairments Pain, decreased ROM, lumbar motor weakness.   Functional limitations due to impairments Walking, lifting, working in flexion   Clinical Presentation Stable/Uncomplicated   Clinical Presentation Rationale Clinical  judgement   Clinical Decision Making (Complexity) Low complexity   Therapy Frequency 1 time/week   Predicted Duration of Therapy Intervention (days/wks) 8 weeks   Risk & Benefits of therapy have been explained Yes   Patient, Family & other staff in agreement with plan of care Yes   Clinical Impression Comments Pt is a 62 y.o. female who presented to PT with symptoms of low back and mid back pain.  Pt will benefit from skilled PT to improve lumbar motor control and postural control.   Education Assessment   Preferred Learning Style Demonstration   Barriers to Learning No barriers   ORTHO GOALS   PT Ortho Eval Goals 1;2   Ortho Goal 1   Goal Identifier HEP   Goal Description Pt will be independent with HEP in order to improve lumbar motor control and postural stability.   Target Date 06/30/22   Total Evaluation Time   PT Yoli, Low Complexity Minutes (13106) 15

## 2022-05-07 ENCOUNTER — MYC MEDICAL ADVICE (OUTPATIENT)
Dept: INTERNAL MEDICINE | Facility: CLINIC | Age: 62
End: 2022-05-07

## 2022-05-07 ENCOUNTER — E-VISIT (OUTPATIENT)
Dept: INTERNAL MEDICINE | Facility: CLINIC | Age: 62
End: 2022-05-07
Payer: COMMERCIAL
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
## 2022-05-07 PROCEDURE — 99207 PR NON-BILLABLE SERV PER CHARTING: CPT | Performed by: INTERNAL MEDICINE

## 2022-05-09 RX ORDER — DOXYCYCLINE 100 MG/1
CAPSULE ORAL
Qty: 2 CAPSULE | Refills: 0 | Status: SHIPPED | OUTPATIENT
Start: 2022-05-09 | End: 2022-09-21

## 2022-05-15 DIAGNOSIS — J30.2 SEASONAL ALLERGIC RHINITIS, UNSPECIFIED TRIGGER: Primary | ICD-10-CM

## 2022-05-17 RX ORDER — FEXOFENADINE HCL 60 MG/1
TABLET, FILM COATED ORAL
Qty: 30 TABLET | Refills: 1 | Status: SHIPPED | OUTPATIENT
Start: 2022-05-17 | End: 2022-07-01

## 2022-05-17 NOTE — TELEPHONE ENCOUNTER
Routing refill request to provider for review/approval because:  Drug not active on patient's medication list      Arturo GarciaRN

## 2022-05-26 ENCOUNTER — TELEPHONE (OUTPATIENT)
Dept: PHARMACY | Facility: CLINIC | Age: 62
End: 2022-05-26
Payer: COMMERCIAL

## 2022-05-26 NOTE — TELEPHONE ENCOUNTER
This patient is due for MTM follow-up. I called the patient to schedule an appointment and left a message with the clinic phone number for the patient to call to schedule.    Will Cadet, StanD, [facility]  Medication Therapy Management Pharmacist  Pager: [phone]

## 2022-06-04 ENCOUNTER — HEALTH MAINTENANCE LETTER (OUTPATIENT)
Age: 62
End: 2022-06-04

## 2022-06-12 DIAGNOSIS — F41.1 GAD (GENERALIZED ANXIETY DISORDER): ICD-10-CM

## 2022-06-13 RX ORDER — LORAZEPAM 1 MG/1
TABLET ORAL
Qty: 20 TABLET | Refills: 1 | Status: SHIPPED | OUTPATIENT
Start: 2022-06-13 | End: 2022-11-09

## 2022-06-13 NOTE — TELEPHONE ENCOUNTER
Requested Prescriptions   Pending Prescriptions Disp Refills     LORazepam (ATIVAN) 1 MG tablet [Pharmacy Med Name: LORAZEPAM 1MG TABS] 20 tablet 1     Sig: TAKE ONE TABLET BY MOUTH TWICE A DAY AS NEEDED FOR ANXIETY       There is no refill protocol information for this order        Last Written Prescription Date:  1/18/2022  Last Fill Quantity: 20,  # refills: 1   Last office visit: 3/2/2022 with prescribing provider:     Future Office Visit:

## 2022-06-23 NOTE — PROGRESS NOTES
[facility]    Outpatient Physical Therapy Discharge Note  Patient: Nikki Morales  : 1960    Beginning/End Dates of Reporting Period:  22 to 22    Referring Provider: Nathan Kilgore PA-C    Therapy Diagnosis: Lumbar facet arthropathy.     Client Self Report: See eval.    Objective Measurements: See eval.     Outcome Measures (most recent score):  Anam STarT Sub-Score (Q5-9): 2  Anam STarT Total Score (all 9): 3  Oswestry Score (%): 17.78 %    Goals:  Goal Identifier HEP   Goal Description Pt will be independent with HEP in order to improve lumbar motor control and postural stability.   Target Date 22   Date Met      Progress (detail required for progress note):           Plan:  Discharge from therapy.    Discharge:    Reason for Discharge: Patient has failed to schedule further appointments.    Equipment Issued: none.    Discharge Plan: Patient to continue home program.

## 2022-06-28 DIAGNOSIS — B00.9 HSV (HERPES SIMPLEX VIRUS) INFECTION: ICD-10-CM

## 2022-06-30 ENCOUNTER — ALLIED HEALTH/NURSE VISIT (OUTPATIENT)
Dept: FAMILY MEDICINE | Facility: CLINIC | Age: 62
End: 2022-06-30
Payer: COMMERCIAL

## 2022-06-30 DIAGNOSIS — J30.2 SEASONAL ALLERGIC RHINITIS, UNSPECIFIED TRIGGER: ICD-10-CM

## 2022-06-30 DIAGNOSIS — D51.8 VITAMIN B12 DEFICIENCY (DIETARY) ANEMIA: Primary | ICD-10-CM

## 2022-06-30 PROCEDURE — 96372 THER/PROPH/DIAG INJ SC/IM: CPT | Performed by: INTERNAL MEDICINE

## 2022-06-30 RX ADMIN — CYANOCOBALAMIN 1000 MCG: 1000 INJECTION, SOLUTION INTRAMUSCULAR; SUBCUTANEOUS at 14:51

## 2022-07-01 RX ORDER — VALACYCLOVIR HYDROCHLORIDE 1 G/1
1000 TABLET, FILM COATED ORAL 2 TIMES DAILY
Qty: 20 TABLET | Refills: 3 | Status: SHIPPED | OUTPATIENT
Start: 2022-07-01 | End: 2022-10-10

## 2022-07-01 RX ORDER — FEXOFENADINE HCL 60 MG/1
TABLET, FILM COATED ORAL
Qty: 30 TABLET | Refills: 1 | Status: SHIPPED | OUTPATIENT
Start: 2022-07-01 | End: 2022-09-06

## 2022-07-01 NOTE — TELEPHONE ENCOUNTER
Valtrex  Prescription approved per [facility] Refill Protocol.    Mariela Srinivasan RN

## 2022-08-08 ENCOUNTER — ALLIED HEALTH/NURSE VISIT (OUTPATIENT)
Dept: FAMILY MEDICINE | Facility: CLINIC | Age: 62
End: 2022-08-08
Payer: COMMERCIAL

## 2022-08-08 DIAGNOSIS — D51.8 VITAMIN B12 DEFICIENCY (DIETARY) ANEMIA: Primary | ICD-10-CM

## 2022-08-08 PROCEDURE — 99207 PR NO CHARGE NURSE ONLY: CPT

## 2022-08-08 PROCEDURE — 96372 THER/PROPH/DIAG INJ SC/IM: CPT | Performed by: INTERNAL MEDICINE

## 2022-08-08 RX ADMIN — CYANOCOBALAMIN 1000 MCG: 1000 INJECTION, SOLUTION INTRAMUSCULAR; SUBCUTANEOUS at 13:06

## 2022-08-08 NOTE — PROGRESS NOTES
Clinic Administered Medication Documentation    Administrations This Visit     cyanocobalamin injection 1,000 mcg     Admin Date  08/08/2022 Action  Given Dose  1,000 mcg Route  Intramuscular Site  Left Deltoid Administered By  Liliane Wood CMA    Ordering Provider: Cirilo Tadeo MD    Patient Supplied?: No                  Injectable Medication Documentation    Patient was given Cyanocobalamin (B-12). Prior to medication administration, verified patients identity using patient s name and date of birth. Please see MAR and medication order for additional information. Patient instructed to remain in clinic for 15 minutes and report any adverse reaction to staff immediately .      Was entire vial of medication used? Yes  Vial/Syringe: Single dose vial  Expiration Date:  1/31/2024  Was this medication supplied by the patient? No   Liliane Wood CMA

## 2022-08-15 ENCOUNTER — OFFICE VISIT (OUTPATIENT)
Dept: ORTHOPEDICS | Facility: CLINIC | Age: 62
End: 2022-08-15
Payer: COMMERCIAL

## 2022-08-15 VITALS
BODY MASS INDEX: 29.62 KG/M2 | DIASTOLIC BLOOD PRESSURE: 81 MMHG | SYSTOLIC BLOOD PRESSURE: 148 MMHG | HEIGHT: 69 IN | HEART RATE: 91 BPM | RESPIRATION RATE: 20 BRPM | WEIGHT: 200 LBS

## 2022-08-15 DIAGNOSIS — M25.561 RIGHT KNEE PAIN, UNSPECIFIED CHRONICITY: Primary | ICD-10-CM

## 2022-08-15 DIAGNOSIS — M17.11 PRIMARY OSTEOARTHRITIS OF RIGHT KNEE: ICD-10-CM

## 2022-08-15 PROCEDURE — 20610 DRAIN/INJ JOINT/BURSA W/O US: CPT | Mod: RT | Performed by: ORTHOPAEDIC SURGERY

## 2022-08-15 PROCEDURE — 99214 OFFICE O/P EST MOD 30 MIN: CPT | Mod: 25 | Performed by: ORTHOPAEDIC SURGERY

## 2022-08-15 RX ORDER — LIDOCAINE HYDROCHLORIDE 10 MG/ML
5 INJECTION, SOLUTION INFILTRATION; PERINEURAL
Status: SHIPPED | OUTPATIENT
Start: 2022-08-15

## 2022-08-15 RX ORDER — METHYLPREDNISOLONE ACETATE 80 MG/ML
80 INJECTION, SUSPENSION INTRA-ARTICULAR; INTRALESIONAL; INTRAMUSCULAR; SOFT TISSUE
Status: SHIPPED | OUTPATIENT
Start: 2022-08-15

## 2022-08-15 RX ADMIN — LIDOCAINE HYDROCHLORIDE 5 ML: 10 INJECTION, SOLUTION INFILTRATION; PERINEURAL at 14:52

## 2022-08-15 RX ADMIN — METHYLPREDNISOLONE ACETATE 80 MG: 80 INJECTION, SUSPENSION INTRA-ARTICULAR; INTRALESIONAL; INTRAMUSCULAR; SOFT TISSUE at 14:52

## 2022-08-15 NOTE — PROGRESS NOTES
Nikki Morales is a 62 year old female who is seen as self referral for right knee pain.  She has had pain off and on for years.  It is gradually getting worse.  Most of her pain is when she tries to sleep.  She has trouble getting her knee in a comfortable position.  It seems to be better if she gets up and moves around.  She has had steroid injections in the past.  She does not use anti-inflammatories.    X-ray shows mild to moderate arthritis with medial joint narrowing, sclerosis, and spurs.  There is still good joint space preserved however.    Past Medical History:   Diagnosis Date     Chronic fatigue      Hyperlipidemia      Hypothyroid      New onset a-fib (H)      Other vitamin B12 deficiency anemia      Primary osteoarthritis of right knee 8/15/2022     Uncomplicated asthma        Past Surgical History:   Procedure Laterality Date     ABDOMEN SURGERY  6/10/93    C section     COLONOSCOPY  10/06/09     COLONOSCOPY  7/2/2013    Procedure: COMBINED COLONOSCOPY, SINGLE BIOPSY/POLYPECTOMY BY BIOPSY;;  Surgeon: Cuate Miles MD;  Location:  GI     COLONOSCOPY N/A 9/6/2018    Procedure: COLONOSCOPY;  Colonoscopy;  Surgeon: Hamzah Dudley DO;  Location:  GI     COMBINED REPAIR PTOSIS WITH BLEPHAROPLASTY BILATERAL Bilateral 9/24/2018    Procedure: COMBINED REPAIR PTOSIS WITH BLEPHAROPLASTY BILATERAL;  Bilateral upper eyelid Blepharoplasty and ptosis repair ;  Surgeon: Martina Andrade MD;  Location: [facility] OR     CONIZATION CERVIX,KNIFE/LASER       ESOPHAGOSCOPY, GASTROSCOPY, DUODENOSCOPY (EGD), COMBINED  7/2/2013    Procedure: COMBINED ESOPHAGOSCOPY, GASTROSCOPY, DUODENOSCOPY (EGD), BIOPSY SINGLE OR MULTIPLE;  egd with rabdain biopsies and colonoscopy with polypectomy by biopsy ;  Surgeon: Cuate Miles MD;  Location:  GI     GENITOURINARY SURGERY  ? 2008    I have a bladder sling     HC DILATION/CURETTAGE DIAG/THER NON OB      D & C     HC REMOVAL OF TONSILS,<11 Y/O      Tonsils  <10y.o.     HC REMOVAL OF TONSILS,<9 Y/O      Description: Tonsillectomy;  Recorded: 2009;  Comments: in grade school     HC UGI ENDOSCOPY DIAG W BIOPSY  07     HYSTERECTOMY, PAP NO LONGER INDICATED       LAPAROSCOPIC CHOLECYSTECTOMY  10/12/13     REPAIR PTOSIS       TUBAL LIGATION       Z  DELIVERY ONLY      , Low Cervical     ZZC  DELIVERY ONLY      Description:  Section;  Recorded: 11/10/2009;  Comments: @ 29 weeks     Z LIGATE FALLOPIAN TUBE      Description: Tubal Ligation;  Recorded: 11/10/2009;     ZZC NONSPECIFIC PROCEDURE  's    sinus     ZZC NONSPECIFIC PROCEDURE      Esophgeal dilatation multiple     ZZC NONSPECIFIC PROCEDURE      sling     ZZC TOTAL ABDOM HYSTERECTOMY      Description: Hysterectomy;  Recorded: 11/10/2009;  Comments: due to cervical dysplasia     Z VAGINAL HYSTERECTOMY  1995    Hysterectomy, Vaginal     ZZHC UGI ENDOSCOPY, SIMPLE EXAM  09/10/99 & 98       Family History   Problem Relation Age of Onset     Cancer Mother         Uterine     Prostate Cancer Mother      Other Cancer Mother         First surgery uterine area. Then spread     Diabetes Father      Hypertension Father      Cancer Father         prostate cancer     Cerebrovascular Disease Father      Prostate Cancer Father      Psychotic Disorder Son         severe Add,      Asthma Son         exercised induced asthma     Depression Son      Substance Abuse Son      Asthma Son         ecxercise induced asthma     Cardiovascular Sister         recurrent blood clots     Cerebrovascular Disease Sister 51     Prostate Cancer Brother      Coronary Artery Disease Brother         Quadruple bypass     Cerebrovascular Disease Brother      Other Cancer Brother         Non-Hodgkin's lymphoma * 2     Diabetes Maternal Grandfather      Heart Disease Maternal Grandmother         Heart condition age 96     Diabetes Paternal Grandfather      Cancer Brother       Cerebrovascular Disease Paternal Grandmother      Coronary Artery Disease Brother         Quadruple bypass also     Hypertension Brother      Cerebrovascular Disease Brother      Prostate Cancer Brother      Cerebrovascular Disease Other         Uncle     Cerebrovascular Disease Other         Uncle     Colon Cancer Other      Genetic Disorder Cousin         Alpha-1 Antitrypsin Deficiency  Liver transplant     Genetic Disorder Cousin         Idiopathic Trombosenia Purpla          Social History     Socioeconomic History     Marital status:      Spouse name: Jared     Number of children: 2     Years of education: 12     Highest education level: Not on file   Occupational History     Occupation: HomeMaker     Employer: [employer]   Tobacco Use     Smoking status: Former Smoker     Packs/day: 0.00     Years: 10.00     Pack years: 0.00     Types: Cigarettes     Smokeless tobacco: Never Used     Tobacco comment: social smoking   Substance and Sexual Activity     Alcohol use: Yes     Alcohol/week: 4.0 standard drinks     Comment: social     Drug use: No     Comment: used in past any and all     Sexual activity: Yes     Partners: Male     Birth control/protection: Female Surgical     Comment: Hx: hysterectomy   Other Topics Concern      Service No     Blood Transfusions No     Caffeine Concern No     Occupational Exposure No     Hobby Hazards No     Sleep Concern Yes     Comment: Difficult with sleeping at night, sleeps most of the day     Stress Concern No     Weight Concern Yes     Comment: desire wt loss     Special Diet No     Back Care Yes     Comment: weight restrictions     Exercise Yes     Bike Helmet No     Seat Belt Yes     Self-Exams No     Parent/sibling w/ CABG, MI or angioplasty before 65F 55M? No   Social History Narrative     Not on file     Social Determinants of Health     Financial Resource Strain: Not on file   Food Insecurity: Not on file   Transportation Needs: Not on file    Physical Activity: Not on file   Stress: Not on file   Social Connections: Not on file   Intimate Partner Violence: Not on file   Housing Stability: Not on file       Current Outpatient Medications   Medication Sig Dispense Refill     budesonide (PULMICORT) 0.5 MG/2ML neb solution Mix 2 vials with 1 oz coffee flavoring and drink twice a day and rinse mouth aftrerwards 60 mL 1     calcium carbonate (TUMS) 500 MG chewable tablet Take 2 chew tab by mouth 3 times daily as needed for other (bloating/flatulence)       cyanocobalamin (CYANOCOBALAMIN) 1000 MCG/ML injection INJECT 1ML INTRAMUSCULARLY EVERY 30 DAYS 1 mL 11     doxycycline hyclate (VIBRAMYCIN) 100 MG capsule Take two pills now once. 2 capsule 0     estradiol (VIVELLE-DOT) 0.05 MG/24HR patch Place 1 patch onto the skin twice a week       fexofenadine (ALLEGRA) 60 MG tablet TAKE ONE TABLET BY MOUTH TWICE A DAY 30 tablet 1     fluticasone (FLOVENT HFA) 220 MCG/ACT inhaler Inhale 1 puff into the lungs 2 times daily 36 g 1     levothyroxine (SYNTHROID/LEVOTHROID) 88 MCG tablet Take 1 tablet (88 mcg) by mouth daily 90 tablet 1     LORazepam (ATIVAN) 1 MG tablet TAKE ONE TABLET BY MOUTH TWICE A DAY AS NEEDED FOR ANXIETY 20 tablet 1     methylphenidate (RITALIN) 10 MG tablet Take 10 mg by mouth 2 times daily Takes as needed       mirabegron (MYRBETRIQ) 25 MG 24 hr tablet Take 1 tablet (25 mg) by mouth daily 90 tablet 1     multivitamin w/minerals (THERA-VIT-M) tablet Take 1 tablet by mouth daily 100 tablet 3     pantoprazole (PROTONIX) 40 MG EC tablet Take 40 mg by mouth daily       rosuvastatin (CRESTOR) 10 MG tablet 1 daily or as directed. 90 tablet 3     valACYclovir (VALTREX) 1000 mg tablet Take 1 tablet (1,000 mg) by mouth 2 times daily 20 tablet 3     zolpidem (AMBIEN) 5 MG tablet TAKE TWO TABLETS BY MOUTH EVERY EVENING AS NEEDED FOR SLEEP 60 tablet 5       Allergies   Allergen Reactions     Codeine Other (See Comments)     Causes agitation       REVIEW OF  "SYSTEMS:  CONSTITUTIONAL:  NEGATIVE for fever, chills, change in weight, not feeling tired  SKIN:  NEGATIVE for worrisome rashes, no skin lumps, no skin ulcers and no non-healing wounds  EYES:  NEGATIVE for vision changes or irritation.  ENT/MOUTH:  NEGATIVE.  No hearing loss, no hoarseness, no difficulty swallowing.  RESP:  NEGATIVE. No cough or shortness of breath.  CV:  NEGATIVE for chest pain, palpitations or peripheral edema  GI:  NEGATIVE for nausea, abdominal pain, heartburn, or change in bowel habits  :  Negative. No dysuria, no hematuria  MUSCULOSKELETAL:  See HPI above  NEURO:  NEGATIVE . No headaches, no dizziness,  no numbness  ENDOCRINE:  NEGATIVE for temperature intolerance, skin/hair changes  HEME/ALLERGY/IMMUNE:  NEGATIVE for bleeding problems  PSYCHIATRIC:  NEGATIVE. no anxiety, no depression.      Exam:  Vitals: BP (!) 148/81   Pulse 91   Resp 20   Ht 1.753 m (5' 9\")   Wt 90.7 kg (200 lb)   LMP  (LMP Unknown)   BMI 29.53 kg/m    BMI= Body mass index is 29.53 kg/m .  Constitutional:  healthy, alert and no distress  Neuro: Alert and Oriented x 3, Sensation grossly WNL.  HEENT:  Atraumatic, EOMI  Neck:  Neck supple with no tenderness.  Psych: Affect normal   Respiratory: Breathing not labored.  Cardiovascular: normal peripheral pulses  Lymph: no adenopathy  Skin: No rashes,worrisome lesions or skin problems  Spine: straight, no straight leg raising pain.  Hips show full range of motion.  There is no tenderness over the sacro-iliac joints, sciatic notch, or greater trochanters.   She has good flexion to 125 degrees.  There is medial joint tenderness centrally on the right knee.  No lateral tenderness.  No tenderness on the left knee.  She had no ligamentous laxity of MCL, LCL, or cruciates.  There is no pain with stress testing.  She had medial pain with lateral Chaparro, negative medial Chaparro.  Sensation, motor and circulation are intact.    Assessment:  Right knee osteoarthritis -mild to " moderate.    Plan: We discussed anti-inflammatories, steroid injection, Voltaren gel, and other conservative and surgical treatments.  She  desires injection today of right knee(s).  Risks, benefits, potential complications and alternatives were discussed.   With the patient's consent, sterile prep was performed of right knee(s).  Right knee was injected with Depo Medrol 80 mg and lidocaine at anteromedial site.  Return to clinic as needed.  She will also think about trying Voltaren gel.

## 2022-08-15 NOTE — LETTER
8/15/2022         RE: Nikki Morales  [address]  [facility] [address]        Dear Colleague,    Thank you for referring your patient, Nikki Morales, to the [facility]. Please see a copy of my visit note below.    Large Joint Injection/Arthocentesis: R knee joint    Date/Time: 8/15/2022 2:52 PM  Performed by: Farhat Montanez MD  Authorized by: Farhat Montanez MD     Indications:  Pain  Needle Size:  22 G  Guidance: landmark guided    Approach:  Anteromedial  Location:  Knee      Medications:  5 mL lidocaine 1 %; 80 mg methylPREDNISolone 80 MG/ML  Outcome:  Tolerated well, no immediate complications  Procedure discussed: discussed risks, benefits, and alternatives    Consent Given by:  Patient  Timeout: timeout called immediately prior to procedure    Prep: patient was prepped and draped in usual sterile fashion            Nikki Morales is a 62 year old female who is seen as self referral for right knee pain.  She has had pain off and on for years.  It is gradually getting worse.  Most of her pain is when she tries to sleep.  She has trouble getting her knee in a comfortable position.  It seems to be better if she gets up and moves around.  She has had steroid injections in the past.  She does not use anti-inflammatories.    X-ray shows mild to moderate arthritis with medial joint narrowing, sclerosis, and spurs.  There is still good joint space preserved however.    Past Medical History:   Diagnosis Date     Chronic fatigue      Hyperlipidemia      Hypothyroid      New onset a-fib (H)      Other vitamin B12 deficiency anemia      Primary osteoarthritis of right knee 8/15/2022     Uncomplicated asthma        Past Surgical History:   Procedure Laterality Date     ABDOMEN SURGERY  6/10/93    C section     COLONOSCOPY  10/06/09     COLONOSCOPY  7/2/2013    Procedure: COMBINED COLONOSCOPY, SINGLE BIOPSY/POLYPECTOMY BY BIOPSY;;  Surgeon: Cuate Miles  MD BRADY;  Location: [facility]     COLONOSCOPY N/A 2018    Procedure: COLONOSCOPY;  Colonoscopy;  Surgeon: Hamzah Dudley DO;  Location: [facility]     COMBINED REPAIR PTOSIS WITH BLEPHAROPLASTY BILATERAL Bilateral 2018    Procedure: COMBINED REPAIR PTOSIS WITH BLEPHAROPLASTY BILATERAL;  Bilateral upper eyelid Blepharoplasty and ptosis repair ;  Surgeon: Martina Andrade MD;  Location: [facility]     CONIZATION CERVIX,KNIFE/LASER       ESOPHAGOSCOPY, GASTROSCOPY, DUODENOSCOPY (EGD), COMBINED  2013    Procedure: COMBINED ESOPHAGOSCOPY, GASTROSCOPY, DUODENOSCOPY (EGD), BIOPSY SINGLE OR MULTIPLE;  egd with rabdain biopsies and colonoscopy with polypectomy by biopsy ;  Surgeon: Cuate Miles MD;  Location:  GI     GENITOURINARY SURGERY  ?     I have a bladder sling     HC DILATION/CURETTAGE DIAG/THER NON OB      D & C     HC REMOVAL OF TONSILS,<11 Y/O      Tonsils <10y.o.     HC REMOVAL OF TONSILS,<9 Y/O      Description: Tonsillectomy;  Recorded: 2009;  Comments: in grade school     HC UGI ENDOSCOPY DIAG W BIOPSY  07     HYSTERECTOMY, PAP NO LONGER INDICATED       LAPAROSCOPIC CHOLECYSTECTOMY  10/12/13     REPAIR PTOSIS       TUBAL LIGATION       Z  DELIVERY ONLY      , Low Cervical     ZZC  DELIVERY ONLY      Description:  Section;  Recorded: 11/10/2009;  Comments: @ 29 weeks     ZZ LIGATE FALLOPIAN TUBE      Description: Tubal Ligation;  Recorded: 11/10/2009;     ZZC NONSPECIFIC PROCEDURE  's    sinus     ZZC NONSPECIFIC PROCEDURE      Esophgeal dilatation multiple     ZZC NONSPECIFIC PROCEDURE      sling     ZZC TOTAL ABDOM HYSTERECTOMY      Description: Hysterectomy;  Recorded: 11/10/2009;  Comments: due to cervical dysplasia     ZZC VAGINAL HYSTERECTOMY  1995    Hysterectomy, Vaginal     ZZHC UGI ENDOSCOPY, SIMPLE EXAM  09/10/99 & 98       Family History   Problem Relation Age of Onset     Cancer Mother         Uterine      Prostate Cancer Mother      Other Cancer Mother         First surgery uterine area. Then spread     Diabetes Father      Hypertension Father      Cancer Father         prostate cancer     Cerebrovascular Disease Father      Prostate Cancer Father      Psychotic Disorder Son         severe Add,      Asthma Son         exercised induced asthma     Depression Son      Substance Abuse Son      Asthma Son         ecxercise induced asthma     Cardiovascular Sister         recurrent blood clots     Cerebrovascular Disease Sister 51     Prostate Cancer Brother      Coronary Artery Disease Brother         Quadruple bypass     Cerebrovascular Disease Brother      Other Cancer Brother         Non-Hodgkin's lymphoma * 2     Diabetes Maternal Grandfather      Heart Disease Maternal Grandmother         Heart condition age 96     Diabetes Paternal Grandfather      Cancer Brother      Cerebrovascular Disease Paternal Grandmother      Coronary Artery Disease Brother         Quadruple bypass also     Hypertension Brother      Cerebrovascular Disease Brother      Prostate Cancer Brother      Cerebrovascular Disease Other         Uncle     Cerebrovascular Disease Other         Uncle     Colon Cancer Other      Genetic Disorder Cousin         Alpha-1 Antitrypsin Deficiency  Liver transplant     Genetic Disorder Cousin         Idiopathic Trombosenia Purpla          Social History     Socioeconomic History     Marital status:      Spouse name: Jared     Number of children: 2     Years of education: 12     Highest education level: Not on file   Occupational History     Occupation: HomeMaker     Employer: [employer]   Tobacco Use     Smoking status: Former Smoker     Packs/day: 0.00     Years: 10.00     Pack years: 0.00     Types: Cigarettes     Smokeless tobacco: Never Used     Tobacco comment: social smoking   Substance and Sexual Activity     Alcohol use: Yes     Alcohol/week: 4.0 standard drinks     Comment: social     Drug  use: No     Comment: used in past any and all     Sexual activity: Yes     Partners: Male     Birth control/protection: Female Surgical     Comment: Hx: hysterectomy   Other Topics Concern      Service No     Blood Transfusions No     Caffeine Concern No     Occupational Exposure No     Hobby Hazards No     Sleep Concern Yes     Comment: Difficult with sleeping at night, sleeps most of the day     Stress Concern No     Weight Concern Yes     Comment: desire wt loss     Special Diet No     Back Care Yes     Comment: weight restrictions     Exercise Yes     Bike Helmet No     Seat Belt Yes     Self-Exams No     Parent/sibling w/ CABG, MI or angioplasty before 65F 55M? No   Social History Narrative     Not on file     Social Determinants of Health     Financial Resource Strain: Not on file   Food Insecurity: Not on file   Transportation Needs: Not on file   Physical Activity: Not on file   Stress: Not on file   Social Connections: Not on file   Intimate Partner Violence: Not on file   Housing Stability: Not on file       Current Outpatient Medications   Medication Sig Dispense Refill     budesonide (PULMICORT) 0.5 MG/2ML neb solution Mix 2 vials with 1 oz coffee flavoring and drink twice a day and rinse mouth aftrerwards 60 mL 1     calcium carbonate (TUMS) 500 MG chewable tablet Take 2 chew tab by mouth 3 times daily as needed for other (bloating/flatulence)       cyanocobalamin (CYANOCOBALAMIN) 1000 MCG/ML injection INJECT 1ML INTRAMUSCULARLY EVERY 30 DAYS 1 mL 11     doxycycline hyclate (VIBRAMYCIN) 100 MG capsule Take two pills now once. 2 capsule 0     estradiol (VIVELLE-DOT) 0.05 MG/24HR patch Place 1 patch onto the skin twice a week       fexofenadine (ALLEGRA) 60 MG tablet TAKE ONE TABLET BY MOUTH TWICE A DAY 30 tablet 1     fluticasone (FLOVENT HFA) 220 MCG/ACT inhaler Inhale 1 puff into the lungs 2 times daily 36 g 1     levothyroxine (SYNTHROID/LEVOTHROID) 88 MCG tablet Take 1 tablet (88 mcg) by  "mouth daily 90 tablet 1     LORazepam (ATIVAN) 1 MG tablet TAKE ONE TABLET BY MOUTH TWICE A DAY AS NEEDED FOR ANXIETY 20 tablet 1     methylphenidate (RITALIN) 10 MG tablet Take 10 mg by mouth 2 times daily Takes as needed       mirabegron (MYRBETRIQ) 25 MG 24 hr tablet Take 1 tablet (25 mg) by mouth daily 90 tablet 1     multivitamin w/minerals (THERA-VIT-M) tablet Take 1 tablet by mouth daily 100 tablet 3     pantoprazole (PROTONIX) 40 MG EC tablet Take 40 mg by mouth daily       rosuvastatin (CRESTOR) 10 MG tablet 1 daily or as directed. 90 tablet 3     valACYclovir (VALTREX) 1000 mg tablet Take 1 tablet (1,000 mg) by mouth 2 times daily 20 tablet 3     zolpidem (AMBIEN) 5 MG tablet TAKE TWO TABLETS BY MOUTH EVERY EVENING AS NEEDED FOR SLEEP 60 tablet 5       Allergies   Allergen Reactions     Codeine Other (See Comments)     Causes agitation       REVIEW OF SYSTEMS:  CONSTITUTIONAL:  NEGATIVE for fever, chills, change in weight, not feeling tired  SKIN:  NEGATIVE for worrisome rashes, no skin lumps, no skin ulcers and no non-healing wounds  EYES:  NEGATIVE for vision changes or irritation.  ENT/MOUTH:  NEGATIVE.  No hearing loss, no hoarseness, no difficulty swallowing.  RESP:  NEGATIVE. No cough or shortness of breath.  CV:  NEGATIVE for chest pain, palpitations or peripheral edema  GI:  NEGATIVE for nausea, abdominal pain, heartburn, or change in bowel habits  :  Negative. No dysuria, no hematuria  MUSCULOSKELETAL:  See HPI above  NEURO:  NEGATIVE . No headaches, no dizziness,  no numbness  ENDOCRINE:  NEGATIVE for temperature intolerance, skin/hair changes  HEME/ALLERGY/IMMUNE:  NEGATIVE for bleeding problems  PSYCHIATRIC:  NEGATIVE. no anxiety, no depression.      Exam:  Vitals: BP (!) 148/81   Pulse 91   Resp 20   Ht 1.753 m (5' 9\")   Wt 90.7 kg (200 lb)   LMP  (LMP Unknown)   BMI 29.53 kg/m    BMI= Body mass index is 29.53 kg/m .  Constitutional:  healthy, alert and no distress  Neuro: Alert and " Oriented x 3, Sensation grossly WNL.  HEENT:  Atraumatic, EOMI  Neck:  Neck supple with no tenderness.  Psych: Affect normal   Respiratory: Breathing not labored.  Cardiovascular: normal peripheral pulses  Lymph: no adenopathy  Skin: No rashes,worrisome lesions or skin problems  Spine: straight, no straight leg raising pain.  Hips show full range of motion.  There is no tenderness over the sacro-iliac joints, sciatic notch, or greater trochanters.   She has good flexion to 125 degrees.  There is medial joint tenderness centrally on the right knee.  No lateral tenderness.  No tenderness on the left knee.  She had no ligamentous laxity of MCL, LCL, or cruciates.  There is no pain with stress testing.  She had medial pain with lateral Chaparro, negative medial Chaparro.  Sensation, motor and circulation are intact.    Assessment:  Right knee osteoarthritis -mild to moderate.    Plan: We discussed anti-inflammatories, steroid injection, Voltaren gel, and other conservative and surgical treatments.  She  desires injection today of right knee(s).  Risks, benefits, potential complications and alternatives were discussed.   With the patient's consent, sterile prep was performed of right knee(s).  Right knee was injected with Depo Medrol 80 mg and lidocaine at anteromedial site.  Return to clinic as needed.  She will also think about trying Voltaren gel.        Again, thank you for allowing me to participate in the care of your patient.        Sincerely,        Fahrat Montanez MD

## 2022-08-15 NOTE — PROGRESS NOTES
Large Joint Injection/Arthocentesis: R knee joint    Date/Time: 8/15/2022 2:52 PM  Performed by: Farhat Montanez MD  Authorized by: Farhat Montanez MD     Indications:  Pain  Needle Size:  22 G  Guidance: landmark guided    Approach:  Anteromedial  Location:  Knee      Medications:  5 mL lidocaine 1 %; 80 mg methylPREDNISolone 80 MG/ML  Outcome:  Tolerated well, no immediate complications  Procedure discussed: discussed risks, benefits, and alternatives    Consent Given by:  Patient  Timeout: timeout called immediately prior to procedure    Prep: patient was prepped and draped in usual sterile fashion

## 2022-08-15 NOTE — PATIENT INSTRUCTIONS
Options for osteoarthritis.    Weight loss.  Legs feel better the lighter you are.  Stay active - walking, bicycle, swimming.  Avoid high impact.  Vitamins - Glucosamine 1500 mg/day and chondroitin sulfate 1200 mg/day. It works for dogs and horses, no proof it helps people.  Non-medical options (other things I've heard of):  * tart cherry juice is thought to be a natural anti-inflammatory.    *1 tablespoon of apple cider vinegar daily  *Turmeric is thought to be a natural anti-inflammatory.  *1 tablespoon of unflavored gelatin daily, [place] Gelatin or [place] Nutrajoint  *Ointments on joints - Icy Hot, BenGay, Capsaicin cream, Mineral Ice, Flexall, Voltaren gel.  Tylenol up to 3000 mg / day.  NSAIDs - Aleve up to 4 tablets per day or ibuprofen up to 12 tablets per day.   Prescription forms.  Steroid injection - cortisone.  Lubricant injection.   brace.  Surgery.  Usually joint replacement.    You have had a steroid injection today.  For the first 2 hours there will likely be some numbing in the joint from the lidocaine.  This is a good sign, indicating that the injection is in the right place.  In 2 hours the lidocaine will wear off, and the joint will hurt like you had a shot.  Each day the cortisone makes it feel better.  It reaches peak effect in 2 weeks.  We expect it to last for 3 months.  You may resume regular activity when you feel ready.  If you are diabetic, your glucoses will be quite high for several days.

## 2022-08-24 ENCOUNTER — VIRTUAL VISIT (OUTPATIENT)
Dept: FAMILY MEDICINE | Facility: CLINIC | Age: 62
End: 2022-08-24

## 2022-08-24 DIAGNOSIS — U07.1 INFECTION DUE TO 2019 NOVEL CORONAVIRUS: Primary | ICD-10-CM

## 2022-08-24 PROCEDURE — 99214 OFFICE O/P EST MOD 30 MIN: CPT | Mod: 95 | Performed by: FAMILY MEDICINE

## 2022-08-24 ASSESSMENT — ENCOUNTER SYMPTOMS: CONSTITUTIONAL NEGATIVE: 1

## 2022-08-24 NOTE — PROGRESS NOTES
Margarita is a 62 year old who is being evaluated via a billable video visit.      How would you like to obtain your AVS? MyChart  If the video visit is dropped, the invitation should be resent by: Send to e-mail at: [email or web address]  Will anyone else be joining your video visit? No    Video-Visit Details    Video Start Time: 10:22 AM    Type of service:  Video Visit    Video End Time:10:38 AM    Originating Location (pt. Location): Home    Distant Location (provider location):  [facility]     Platform used for Video Visit: [employer]    Problem List Items Addressed This Visit     Infection due to 2019 novel coronavirus - Primary     62-year-old woman with positive COVID-19 test.  Symptoms of congestion, relative temperature increased and overall sense of feeling sick.  She is a candidate for paxlovid as her symptoms started 4 days ago.  No history of renal dysfunction.  Discussed taking half a dose of rosuvastatin while on this therapy.  We also discussed slight dose decrease of zolpidem while on this therapy.  Medication sent to pharmacy.  Discussed signs/symptoms that should prompt face-to-face evaluation.           Relevant Medications    nirmatrelvir and ritonavir (PAXLOVID) therapy pack          Subjective   Margarita is a 62 year old who presents for the following health issues   Chief Complaint   Patient presents with     COVID Treatment     COVID test was positive on Monday with fever, cough and congestions started on Saturday or Sunday      Covid +   - symtoms starting ~4 days    - Tmax: 99.x   - cough, headache, congestion.   - O2 sat: okay.  HR high for her.        Review of Systems   Constitutional: Negative.    All other systems reviewed and are negative.           Objective    Vitals - Patient Reported  Temperature (Patient Reported): 97.1  F (36.2  C)      Vitals:  No vitals were obtained today due to virtual visit.    Physical Exam  Nursing note reviewed.   Constitutional:        General: She is not in acute distress.     Appearance: Normal appearance. She is not ill-appearing.   HENT:      Head: Normocephalic and atraumatic.   Eyes:      Extraocular Movements: Extraocular movements intact.      Conjunctiva/sclera: Conjunctivae normal.   Pulmonary:      Effort: Pulmonary effort is normal.   Neurological:      Mental Status: She is alert and oriented to person, place, and time.   Psychiatric:         Attention and Perception: Attention normal.         Mood and Affect: Mood normal.         Speech: Speech normal.         Thought Content: Thought content normal.             This note has been dictated using voice recognition software. Any grammatical or context distortions are unintentional and inherent to the software

## 2022-08-24 NOTE — ASSESSMENT & PLAN NOTE
62-year-old woman with positive COVID-19 test.  Symptoms of congestion, relative temperature increased and overall sense of feeling sick.  She is a candidate for paxlovid as her symptoms started 4 days ago.  No history of renal dysfunction.  Discussed taking half a dose of rosuvastatin while on this therapy.  We also discussed slight dose decrease of zolpidem while on this therapy.  Medication sent to pharmacy.  Discussed signs/symptoms that should prompt face-to-face evaluation.

## 2022-08-31 DIAGNOSIS — J30.2 SEASONAL ALLERGIC RHINITIS, UNSPECIFIED TRIGGER: ICD-10-CM

## 2022-08-31 DIAGNOSIS — G47.00 INSOMNIA, UNSPECIFIED TYPE: ICD-10-CM

## 2022-09-06 ENCOUNTER — OFFICE VISIT (OUTPATIENT)
Dept: NEUROPSYCHOLOGY | Facility: CLINIC | Age: 62
End: 2022-09-06
Attending: INTERNAL MEDICINE
Payer: COMMERCIAL

## 2022-09-06 DIAGNOSIS — R41.3 COMPLAINTS OF MEMORY DISTURBANCE: ICD-10-CM

## 2022-09-06 DIAGNOSIS — F33.1 MODERATE RECURRENT MAJOR DEPRESSION (H): Primary | ICD-10-CM

## 2022-09-06 DIAGNOSIS — R47.89 WORD FINDING DIFFICULTY: ICD-10-CM

## 2022-09-06 PROCEDURE — 96133 NRPSYC TST EVAL PHYS/QHP EA: CPT

## 2022-09-06 PROCEDURE — 96139 PSYCL/NRPSYC TST TECH EA: CPT

## 2022-09-06 PROCEDURE — 96132 NRPSYC TST EVAL PHYS/QHP 1ST: CPT

## 2022-09-06 PROCEDURE — 96138 PSYCL/NRPSYC TECH 1ST: CPT

## 2022-09-06 PROCEDURE — 90791 PSYCH DIAGNOSTIC EVALUATION: CPT

## 2022-09-06 RX ORDER — ZOLPIDEM TARTRATE 5 MG/1
TABLET ORAL
Qty: 60 TABLET | Refills: 5 | Status: SHIPPED | OUTPATIENT
Start: 2022-09-06 | End: 2023-03-14

## 2022-09-06 RX ORDER — FEXOFENADINE HCL 60 MG/1
TABLET, FILM COATED ORAL
Qty: 30 TABLET | Refills: 2 | Status: SHIPPED | OUTPATIENT
Start: 2022-09-06 | End: 2023-10-26

## 2022-09-06 NOTE — LETTER
2022       RE: Nikki Morales  [address]  [facility] [address]     Dear Colleague,    Thank you for referring your patient, Nikki Morales, to the [facility] NEUROPSYCHOLOGY [facility]ARY JANE MYLES at [facility]. Please see a copy of my visit note below.    NEUROPSYCHOLOGICAL EVALUATION  **CONFIDENTIAL**    Name: Nikki Morales Education: 12 years    (age): 1960 (62 years) ALVARADO:  2022   Referral: Montana Reed MD  Department of Neurology Handedness:  MRN (Epic): Right  [id number]     IDENTIFYING INFORMATION AND REASON FOR REFERRAL   Ms. Morales is a 62-year-old, right-handed, White female with a history of acquired hypothyroidism, depression, and anxiety. This evaluation was requested to provide a comprehensive assessment of her current neuropsychological status to inform treatment planning.     IMPRESSION  See below for relevant background information and detailed test results. See separate abstract for supporting documentation including a list of neuropsychological measures and test scores.    Ms. Morales s neuropsychological profile revealed select weaknesses on tests of processing speed and nonverbal abstract reasoning. Performance was within expectation across other cognitive tests including immediate auditory attention and working memory, language, visuospatial processing, verbal abstract reasoning, cognitive inhibition, planning/organization, and mental flexibility. Memory and semantic verbal fluency represented strengths with performances in the above average range (>90th %ile). Assessment of current psychological and emotional status revealed moderate symptoms of anxiety and severe symptoms of depression. She also reported significant psychosocial stress.     Overall, Ms. Morales is functioning quite well from a cognitive standpoint and does not evidence any objective cognitive deficits. Select  weaknesses on tests of processing speed and nonverbal abstract reasoning, as well as her subjective cognitive complaints, are likely related to cognitive lapses due to significant psychological distress and sleep disturbance. Psychological symptoms (e.g., depression, anxiety, stress) and lack of refreshing sleep may intermittently interfere with cognitive effectiveness, typically through interfering with normal attention and efficiency of information processing. These difficulties can compromise other aspects of cognition such as remembering conversations, attending to multiple tasks at once, and organizing thoughts and speech. Addressing mental health symptoms and stress may not only aid in improvement of her mood and daily functioning but will also likely improve her daily cognitive efficiency.    RECOMMENDATIONS  1. Ms. Morales reported severe symptoms of depression, moderate anxiety, and significant psychosocial stress. She is strongly encouraged to continue regular engagement in individual psychotherapy. She would likely benefit from engagement in evidence-based psychotherapies (e.g., cognitive behavioral therapy (CBT), acceptance and commitment therapy(ACT)) to address her mental health symptoms.   2. Ms. Morlaes is encouraged to live a healthy lifestyle that promotes brain health including getting appropriate exercise, as advised by her healthcare providers, eating healthy, and limiting alcohol, tobacco and cannabis use.   3. Ms. Morales is encouraged to utilize the following behavioral strategies to maximize cognitive functioning in her daily life:   -Eliminate distraction. Eliminating environmental distracters can facilitate attention and memory performance. For example, turning off music or the television while having a conversation, so that all of your attention is focused on the task at hand.  -Work on decreasing interference from intrusive thoughts. When intrusive thoughts begin to  interfere with your thinking, do not concentrate on them. Instead, observe them passively and calmly redirect your attention back to task.   -Engage in calming activities that increase focus on the present. Incorporating mindfulness techniques such as meditation, relaxation, yoga, and moderate cardiovascular exercise, into your daily routine will help keep you present-oriented and consequently improve attention and memory.  -Pay mindful attention. You may find that you need to make a conscious effort to pay attention to conversations or when learning new information. When attention is not focused, it is difficult for the brain to learn new information. Thus, making a mindful effort to pay attention as you go through daily tasks will assist and facilitate memory recall later on.  -Allow for time. Take the time needed to learn and recall information. Some people tend to worry if they can't immediately recall something. Instead, you should take time to express a thought or complete a task rather than be critical or harsh on yourself.  -Use external aids to increase structure in daily life. Ongoing use of compensatory strategies such as lists, notes, and a centralized calendar are supported. Tools such as smart phones with alarms and reminders are helpful in bringing structure to daily activities.  -Practice good sleep hygiene. Poor sleep can exacerbate cognitive difficulties and interfere with attention and memory. The sleep foundation has several recommendations for improving sleep quality including:  -Set a sleep schedule with a nightly routine and fixed wakeup time. Soft music, light stretching, reading and/or relaxation exercises (e.g., meditation, deep breathing) can be helpful to wind down before bed. Avoid bright lights and electronics at least 30 minutes before bed.  -Keep naps short and limited to the early afternoon.  -Avoid caffeine in the afternoon or evening. Avoid eating dinner late.  -Get daylight  exposure (sunlight) during the day to encourage quality sleep at night  -Optimize your bedroom with a comfortable mattress, pillow, and bedding; use heavy curtains or an eye mask to prevent light from interrupting your sleep. Light smells, such as lavender, can induce a calmer state of mind and cultivate a positive space for sleep.    -For more information, a simple set of guidelines can be found here: https://www.sleepfoundation.org/sleep-topics/sleep-hygiene  4. The current evaluation will serve as an objective cognitive baseline against which results of future evaluations may be compared.  No follow-up is currently indicated; however, Ms. Morales may be referred for a repeat neuropsychological evaluation if there is significant change in cognitive status in the future.     Thank you for the opportunity to participate in Ms. Morales s care.  These findings and recommendations were reviewed with the patient in a separate feedback session on 9/9/2022 and all her questions were answered. If you have any questions regarding this evaluation, please do not hesitate to contact me.       Lani Briones, Ph.D.,   Clinical Neuropsychologist    RELEVANT BACKGROUND INFORMATION AND SUPPORTING DOCUMENTATION  Gathered from a clinical interview with the patient and reviews of electronic medical record.    History of Presenting Problem(s)  Ms. Morales presented with a history of acquired hypothyroidism, depression, and anxiety. She reported cognitive complaints over the past couple of years in the context of significant stress. She stated she was unsure if her thinking has been worsening over time. Specifically, she described trouble remembering names of people, word-finding difficulties, and repeating questions. She reported longstanding difficulties with attention in childhood with more difficulty with focus and concentration now. EMR is notable for a diagnosis of ADHD. She reported longstanding difficulties  with organizing, planning, and multitasking, without notable change. Functionally, she described longstanding trouble with managing medications ( never been a good steady pill taker ), but she denied any change in her ability to manage this independently. She is reportedly independent with other activities of daily living without difficulty including managing medical appointments, finances, meal preparation, driving, and basic self-care. She denied physical complaints or sensorimotor disturbances. Emotionally, she reported a  high mood  today but endorsed having more bad days lately in the context of significant stress. She described a longstanding history of depression and anxiety, as well as possible history of bipolar 2 disorder. She is currently engaged in individual psychotherapy. She denied behavioral/personality changes.     Neurodiagnostic Findings   ? Brain MRI w/wo contrast (5/1/2019) IMPRESSION: Normal brain MRI    Medical History  ? Acquired hypothyroidism 2/2 atrophy of thyroid  ? COVID-19 infection (8/22/22)  ? History of 2 remote head injuries with brief LOC without post-concussive symptoms     Health Behaviors   ? Sleep: Delay in onset despite use of melatonin and Ambien nightly and she will occasionally also take lorazepam if struggling to sleep; recent sleep study (10/20/2021) ruled out CHASE; she denied parasomnic behaviors  ? Exercise: Minimal; walking  ? Pain: Occasional knee pain; denied pain at the time of this evaluation    Psychiatric History  ? Ms. Morales reported she is currently in a  high mood   ? She reported longstanding depression off-and-on and stated she has  more badder days  lately in the context of significant stress.   ? She reported longstanding anxiety and frequent worries about finances and her family.  ? She reported a history of bipolar 2 disorder.   ? She otherwise denied history of alteration of sensory perceptual processes or disordered thought.  ? Suicidality/  Self-harm: She reported a history of suicide attempt when she was in her 20s; she endorsed occasional passive death wishes recently but adamantly denied suicidal ideation, plan, or intent.  ? Psychiatric treatment: Currently engaged in individual psychotherapy; was prescribed medications in the past.    Substance Use History  ? Alcohol: Drinking up to 6 drinks at a time with friends approximately once per month  ? Nicotine: Smokes cigarettes when consuming alcohol  ? Cannabis: Occasional use approximately once every 2 months  ? Problematic Substance Use: Denied    Current Medications (per EMR)    budesonide (PULMICORT) 0.5 MG/2ML neb solution     calcium carbonate (TUMS) 500 MG chewable tablet     cyanocobalamin (CYANOCOBALAMIN) 1000 MCG/ML injection     doxycycline hyclate (VIBRAMYCIN) 100 MG capsule     estradiol (VIVELLE-DOT) 0.05 MG/24HR patch     fexofenadine (ALLEGRA) 60 MG tablet     fluticasone (FLOVENT HFA) 220 MCG/ACT inhaler     levothyroxine (SYNTHROID/LEVOTHROID) 88 MCG tablet     LORazepam (ATIVAN) 1 MG tablet     methylphenidate (RITALIN) 10 MG tablet     mirabegron (MYRBETRIQ) 25 MG 24 hr tablet     multivitamin w/minerals (THERA-VIT-M) tablet     pantoprazole (PROTONIX) 40 MG EC tablet     rosuvastatin (CRESTOR) 10 MG tablet     valACYclovir (VALTREX) 1000 mg tablet     zolpidem (AMBIEN) 5 MG tablet     Developmental, Educational, & Occupational History  ? Gestational: Full-term  ?  complications: None reported  ? Developmental milestones: Met at expected ages  ? Childhood behavioral / emotional / academic problems: Struggled with spelling; EMR notes a diagnosis of ADHD; however, she reported she has not been diagnosed with ADHD but  should have been  as she  struggled a lot  with attention in childhood  ? Native Language: English  ? Education: Graduated high school on time with B, C, and D grades  ? Occupation: Nurses aid, ; stopped working due to chronic fatigue    Psychosocial  History  ? Born and raised in [place], [place]  ? Marital:  to spouse of 30 years  ? Children: 2 adult children  ? Housing: Lives with spouse and son  ? Psychosocial support: Strong social support network of friends and nieces    Family History  ? Strokes  ? No known family history of dementia    RESULTS  See separate abstract for list of neuropsychological measures and test scores. Descriptive ranges are based on American Academy of Clinical Neuropsychology (2020) consensus guidelines, or test manuals where appropriate. All standardized scores are adjusted for age and additional adjustments for demographic factors such as education were performed where applicable.    PRE-MORBID ABILITY: Premorbid abilities were estimated within the low average to average range based on single word reading ability and demographic factors including sex, ethnicity, education, occupation, and geographic region.  GENERAL COGNITIVE STATUS: Orientation was within expectation for general personal information, time, place, and cultural information.   ATTENTION/EXECUTIVE FUNCTIONS: Immediate auditory attention and working memory performances were average. Verbal abstract reasoning performance was low average. Visual reasoning through pattern identification was below average. Performance on a test of set-shifting/cognitive flexibility was average. Copy of a complex figure requiring planning and organization was within normal limits. Response inhibition performance was average. Psychomotor processing speed performances were low average to below average.  LANGUAGE: Confrontation naming performance was average. Letter-cue verbal fluency performance was average Semantic verbal fluency performance was above average   VISUOSPATIAL PROCESSING: Performance on a spatial perception task requiring judgement of angled lines was high average. Copy of increasingly intricate figures was high average. Copy of a complex figure was within normal limits.     LEARNING AND MEMORY: Initial encoding of a word list over multiple learning trials was high average. Delayed recall of the list was above average as she was able to recall all words from the list. Recognition of the word list was high average and without error. Initial encoding of contextualized verbal information in the form of short stories was average and delayed retrieval was average. Recognition of story details was high average. Encoding of visual information was average and retrieval was above average as she was able to recall even more details following a delay. Recognition among distractors was high average and without error.   PSYCHOLOGICAL AND BEHAVIORAL: She endorsed severe symptoms of depression and moderate symptoms of anxiety including severe cognitive symptoms and mild affective and somatic symptoms of anxiety.     PERFORMANCE VALIDITY: Performance on neuropsychological tests is dependent upon a number of factors, including sufficient engagement and motivation, to reliably establish an examinee s level of cognitive functioning.  Based upon observations made throughout the evaluation, the patient did not appear to deliberately perform in a suboptimal manner and demonstrated good frustration tolerance on cognitively challenging tasks. Score on an imbedded index of performance validity was in the valid range. Overall, test results are believed to accurately represent the patient s current neurocognitive status.    BEHAVIORAL OBSERVATIONS  ? Presented on time and was unaccompanied  ? Alert, oriented to self, time, place, and circumstance; attentive and focused while undergoing testing  ? Appearance: Well-groomed, casually dressed  ? Gait/Posture: No abnormalities noted  ? Sensorimotor: No abnormalities noted  ? Behavior: Cooperative, pleasant, no behavioral abnormalities noted  ? Speech: Fluent, articulate, normal rate, prosody, and volume; no conversational word finding difficulty  ? Thought process:  Logical, linear, and goal-directed  ? Thought content: Logical, appropriate   ? Affect: Broad, responsive, consistent with reported mood; good eye contact  ? Mood: Depressed, anxious  ? Insight and Judgment: Intact  ? Approach to testing: Efficient, deliberate; good frustration on cognitively demanding tasks  ? Rapport: Easily established and maintained        Activities included in this evaluation: CPT Code #Units Time   Psychiatric diagnostic interview 78631 1 --   Test evaluation services by professional; first hour. 80791 1 2:09   Test evaluation services by professional, additional hour (+) 46043 1    Test administration and scoring by technician, first 30 mins 10483 1 3:05   Test administration and scoring by technician, additional 30 mins (+) [id number] 5      Dx: F33.1; R47.89; R41.3        Sincerely,    LESLIE RAYMOND, PhD

## 2022-09-06 NOTE — TELEPHONE ENCOUNTER
"Allegra  Prescription approved per [facility] Refill Protocol.    Ambien  Routing refill request to provider for review/approval because:  Drug not on the [facility] refill protocol     Requested Prescriptions   Pending Prescriptions Disp Refills     zolpidem (AMBIEN) 5 MG tablet [Pharmacy Med Name: ZOLPIDEM TARTRATE 5MG TABS] 60 tablet 5     Sig: TAKE TWO TABLETS BY MOUTH IN THE EVENING AS NEEDED FOR SLEEP       There is no refill protocol information for this order        fexofenadine (ALLEGRA) 60 MG tablet [Pharmacy Med Name: FEXOFENADINE HCL 60MG TABS] 30 tablet 1     Sig: TAKE ONE TABLET BY MOUTH TWICE A DAY       Antihistamines Protocol Passed - 8/31/2022 12:23 PM        Passed - Patient is 3-64 years of age     Apply weight-based dosing for peds patients age 3 - 12 years of age.    Forward request to provider for patients under the age of 3 or over the age of 64.          Passed - Recent (12 mo) or future (30 days) visit within the authorizing provider's specialty     Patient has had an office visit with the authorizing provider or a provider within the authorizing providers department within the previous 12 mos or has a future within next 30 days. See \"Patient Info\" tab in inbasket, or \"Choose Columns\" in Meds & Orders section of the refill encounter.              Passed - Medication is active on med list         Mariela Srinivasan RN    "

## 2022-09-07 ENCOUNTER — ALLIED HEALTH/NURSE VISIT (OUTPATIENT)
Dept: FAMILY MEDICINE | Facility: CLINIC | Age: 62
End: 2022-09-07
Payer: COMMERCIAL

## 2022-09-07 DIAGNOSIS — D51.8 VITAMIN B12 DEFICIENCY (DIETARY) ANEMIA: Primary | ICD-10-CM

## 2022-09-07 PROCEDURE — 96372 THER/PROPH/DIAG INJ SC/IM: CPT | Performed by: INTERNAL MEDICINE

## 2022-09-07 PROCEDURE — 99207 PR NO CHARGE NURSE ONLY: CPT

## 2022-09-07 RX ADMIN — CYANOCOBALAMIN 1000 MCG: 1000 INJECTION, SOLUTION INTRAMUSCULAR; SUBCUTANEOUS at 13:12

## 2022-09-07 NOTE — PROGRESS NOTES
Clinic Administered Medication Documentation    Administrations This Visit     cyanocobalamin injection 1,000 mcg     Admin Date  09/07/2022 Action  Given Dose  1,000 mcg Route  Intramuscular Site  Right Deltoid Administered By  Emi Brito    Ordering Provider: Cirilo Tadeo MD    Patient Supplied?: No                  Injectable Medication Documentation    Patient was given Cyanocobalamin (B-12). Prior to medication administration, verified patients identity using patient s name and date of birth. Please see MAR and medication order for additional information. Patient instructed to remain in clinic for 15 minutes and report any adverse reaction to staff immediately .      Was entire vial of medication used? Yes  Vial/Syringe: Single dose vial  Expiration Date:  3/24  Was this medication supplied by the patient? No

## 2022-09-07 NOTE — NURSING NOTE
The patient was seen for neuropsychological evaluation at the request of Dr. Montana Reed, for the purposes of diagnostic clarification and treatment planning. 185 minutes of test administration and scoring were provided by this writer, Skip Myers. Please see Dr. Lani Briones's report for a full interpretation of the findings.

## 2022-09-09 NOTE — PROGRESS NOTES
NEUROPSYCHOLOGICAL EVALUATION  **CONFIDENTIAL**    Name: Nikki Morales Education: 12 years    (age): 1960 (62 years) ALVARADO:  2022   Referral: Montana Reed MD  Department of Neurology Handedness:  MRN (Epic): Right  [id number]     IDENTIFYING INFORMATION AND REASON FOR REFERRAL   Ms. Morales is a 62-year-old, right-handed, White female with a history of acquired hypothyroidism, depression, and anxiety. This evaluation was requested to provide a comprehensive assessment of her current neuropsychological status to inform treatment planning.     IMPRESSION  See below for relevant background information and detailed test results. See separate abstract for supporting documentation including a list of neuropsychological measures and test scores.    Ms. Morales s neuropsychological profile revealed select weaknesses on tests of processing speed and nonverbal abstract reasoning. Performance was within expectation across other cognitive tests including immediate auditory attention and working memory, language, visuospatial processing, verbal abstract reasoning, cognitive inhibition, planning/organization, and mental flexibility. Memory and semantic verbal fluency represented strengths with performances in the above average range (>90th %ile). Assessment of current psychological and emotional status revealed moderate symptoms of anxiety and severe symptoms of depression. She also reported significant psychosocial stress.     Overall, Ms. Morales is functioning quite well from a cognitive standpoint and does not evidence any objective cognitive deficits. Select weaknesses on tests of processing speed and nonverbal abstract reasoning, as well as her subjective cognitive complaints, are likely related to cognitive lapses due to significant psychological distress and sleep disturbance. Psychological symptoms (e.g., depression, anxiety, stress) and lack of refreshing sleep may intermittently  interfere with cognitive effectiveness, typically through interfering with normal attention and efficiency of information processing. These difficulties can compromise other aspects of cognition such as remembering conversations, attending to multiple tasks at once, and organizing thoughts and speech. Addressing mental health symptoms and stress may not only aid in improvement of her mood and daily functioning but will also likely improve her daily cognitive efficiency.    RECOMMENDATIONS  1. Ms. Morales reported severe symptoms of depression, moderate anxiety, and significant psychosocial stress. She is strongly encouraged to continue regular engagement in individual psychotherapy. She would likely benefit from engagement in evidence-based psychotherapies (e.g., cognitive behavioral therapy (CBT), acceptance and commitment therapy(ACT)) to address her mental health symptoms.   2. Ms. Morales is encouraged to live a healthy lifestyle that promotes brain health including getting appropriate exercise, as advised by her healthcare providers, eating healthy, and limiting alcohol, tobacco and cannabis use.   3. Ms. Morales is encouraged to utilize the following behavioral strategies to maximize cognitive functioning in her daily life:   -Eliminate distraction. Eliminating environmental distracters can facilitate attention and memory performance. For example, turning off music or the television while having a conversation, so that all of your attention is focused on the task at hand.  -Work on decreasing interference from intrusive thoughts. When intrusive thoughts begin to interfere with your thinking, do not concentrate on them. Instead, observe them passively and calmly redirect your attention back to task.   -Engage in calming activities that increase focus on the present. Incorporating mindfulness techniques such as meditation, relaxation, yoga, and moderate cardiovascular exercise, into your daily  routine will help keep you present-oriented and consequently improve attention and memory.  -Pay mindful attention. You may find that you need to make a conscious effort to pay attention to conversations or when learning new information. When attention is not focused, it is difficult for the brain to learn new information. Thus, making a mindful effort to pay attention as you go through daily tasks will assist and facilitate memory recall later on.  -Allow for time. Take the time needed to learn and recall information. Some people tend to worry if they can't immediately recall something. Instead, you should take time to express a thought or complete a task rather than be critical or harsh on yourself.  -Use external aids to increase structure in daily life. Ongoing use of compensatory strategies such as lists, notes, and a centralized calendar are supported. Tools such as smart phones with alarms and reminders are helpful in bringing structure to daily activities.  -Practice good sleep hygiene. Poor sleep can exacerbate cognitive difficulties and interfere with attention and memory. The sleep foundation has several recommendations for improving sleep quality including:  -Set a sleep schedule with a nightly routine and fixed wakeup time. Soft music, light stretching, reading and/or relaxation exercises (e.g., meditation, deep breathing) can be helpful to wind down before bed. Avoid bright lights and electronics at least 30 minutes before bed.  -Keep naps short and limited to the early afternoon.  -Avoid caffeine in the afternoon or evening. Avoid eating dinner late.  -Get daylight exposure (sunlight) during the day to encourage quality sleep at night  -Optimize your bedroom with a comfortable mattress, pillow, and bedding; use heavy curtains or an eye mask to prevent light from interrupting your sleep. Light smells, such as lavender, can induce a calmer state of mind and cultivate a positive space for sleep.    -For  more information, a simple set of guidelines can be found here: https://www.sleepfoundation.org/sleep-topics/sleep-hygiene  4. The current evaluation will serve as an objective cognitive baseline against which results of future evaluations may be compared.  No follow-up is currently indicated; however, Ms. Morales may be referred for a repeat neuropsychological evaluation if there is significant change in cognitive status in the future.     Thank you for the opportunity to participate in Ms. Morales s care.  These findings and recommendations were reviewed with the patient in a separate feedback session on 9/9/2022 and all her questions were answered. If you have any questions regarding this evaluation, please do not hesitate to contact me.       Lani Briones, Ph.D.,   Clinical Neuropsychologist    RELEVANT BACKGROUND INFORMATION AND SUPPORTING DOCUMENTATION  Gathered from a clinical interview with the patient and reviews of electronic medical record.    History of Presenting Problem(s)  Ms. Morales presented with a history of acquired hypothyroidism, depression, and anxiety. She reported cognitive complaints over the past couple of years in the context of significant stress. She stated she was unsure if her thinking has been worsening over time. Specifically, she described trouble remembering names of people, word-finding difficulties, and repeating questions. She reported longstanding difficulties with attention in childhood with more difficulty with focus and concentration now. EMR is notable for a diagnosis of ADHD. She reported longstanding difficulties with organizing, planning, and multitasking, without notable change. Functionally, she described longstanding trouble with managing medications ( never been a good steady pill taker ), but she denied any change in her ability to manage this independently. She is reportedly independent with other activities of daily living without difficulty  including managing medical appointments, finances, meal preparation, driving, and basic self-care. She denied physical complaints or sensorimotor disturbances. Emotionally, she reported a  high mood  today but endorsed having more bad days lately in the context of significant stress. She described a longstanding history of depression and anxiety, as well as possible history of bipolar 2 disorder. She is currently engaged in individual psychotherapy. She denied behavioral/personality changes.     Neurodiagnostic Findings   ? Brain MRI w/wo contrast (5/1/2019) IMPRESSION: Normal brain MRI    Medical History  ? Acquired hypothyroidism 2/2 atrophy of thyroid  ? COVID-19 infection (8/22/22)  ? History of 2 remote head injuries with brief LOC without post-concussive symptoms     Health Behaviors   ? Sleep: Delay in onset despite use of melatonin and Ambien nightly and she will occasionally also take lorazepam if struggling to sleep; recent sleep study (10/20/2021) ruled out CHASE; she denied parasomnic behaviors  ? Exercise: Minimal; walking  ? Pain: Occasional knee pain; denied pain at the time of this evaluation    Psychiatric History  ? Ms. Morales reported she is currently in a  high mood   ? She reported longstanding depression off-and-on and stated she has  more badder days  lately in the context of significant stress.   ? She reported longstanding anxiety and frequent worries about finances and her family.  ? She reported a history of bipolar 2 disorder.   ? She otherwise denied history of alteration of sensory perceptual processes or disordered thought.  ? Suicidality/ Self-harm: She reported a history of suicide attempt when she was in her 20s; she endorsed occasional passive death wishes recently but adamantly denied suicidal ideation, plan, or intent.  ? Psychiatric treatment: Currently engaged in individual psychotherapy; was prescribed medications in the past.    Substance Use History  ? Alcohol: Drinking  up to 6 drinks at a time with friends approximately once per month  ? Nicotine: Smokes cigarettes when consuming alcohol  ? Cannabis: Occasional use approximately once every 2 months  ? Problematic Substance Use: Denied    Current Medications (per EMR)    budesonide (PULMICORT) 0.5 MG/2ML neb solution     calcium carbonate (TUMS) 500 MG chewable tablet     cyanocobalamin (CYANOCOBALAMIN) 1000 MCG/ML injection     doxycycline hyclate (VIBRAMYCIN) 100 MG capsule     estradiol (VIVELLE-DOT) 0.05 MG/24HR patch     fexofenadine (ALLEGRA) 60 MG tablet     fluticasone (FLOVENT HFA) 220 MCG/ACT inhaler     levothyroxine (SYNTHROID/LEVOTHROID) 88 MCG tablet     LORazepam (ATIVAN) 1 MG tablet     methylphenidate (RITALIN) 10 MG tablet     mirabegron (MYRBETRIQ) 25 MG 24 hr tablet     multivitamin w/minerals (THERA-VIT-M) tablet     pantoprazole (PROTONIX) 40 MG EC tablet     rosuvastatin (CRESTOR) 10 MG tablet     valACYclovir (VALTREX) 1000 mg tablet     zolpidem (AMBIEN) 5 MG tablet     Developmental, Educational, & Occupational History  ? Gestational: Full-term  ?  complications: None reported  ? Developmental milestones: Met at expected ages  ? Childhood behavioral / emotional / academic problems: Struggled with spelling; EMR notes a diagnosis of ADHD; however, she reported she has not been diagnosed with ADHD but  should have been  as she  struggled a lot  with attention in childhood  ? Native Language: English  ? Education: Graduated high school on time with B, C, and D grades  ? Occupation: Nurses aid, ; stopped working due to chronic fatigue    Psychosocial History  ? Born and raised in [place], [place]  ? Marital:  to spouse of 30 years  ? Children: 2 adult children  ? Housing: Lives with spouse and son  ? Psychosocial support: Strong social support network of friends and nieces    Family History  ? Strokes  ? No known family history of dementia    RESULTS  See separate abstract for list of  neuropsychological measures and test scores. Descriptive ranges are based on American Academy of Clinical Neuropsychology (2020) consensus guidelines, or test manuals where appropriate. All standardized scores are adjusted for age and additional adjustments for demographic factors such as education were performed where applicable.    PRE-MORBID ABILITY: Premorbid abilities were estimated within the low average to average range based on single word reading ability and demographic factors including sex, ethnicity, education, occupation, and geographic region.  GENERAL COGNITIVE STATUS: Orientation was within expectation for general personal information, time, place, and cultural information.   ATTENTION/EXECUTIVE FUNCTIONS: Immediate auditory attention and working memory performances were average. Verbal abstract reasoning performance was low average. Visual reasoning through pattern identification was below average. Performance on a test of set-shifting/cognitive flexibility was average. Copy of a complex figure requiring planning and organization was within normal limits. Response inhibition performance was average. Psychomotor processing speed performances were low average to below average.  LANGUAGE: Confrontation naming performance was average. Letter-cue verbal fluency performance was average Semantic verbal fluency performance was above average   VISUOSPATIAL PROCESSING: Performance on a spatial perception task requiring judgement of angled lines was high average. Copy of increasingly intricate figures was high average. Copy of a complex figure was within normal limits.    LEARNING AND MEMORY: Initial encoding of a word list over multiple learning trials was high average. Delayed recall of the list was above average as she was able to recall all words from the list. Recognition of the word list was high average and without error. Initial encoding of contextualized verbal information in the form of short stories  was average and delayed retrieval was average. Recognition of story details was high average. Encoding of visual information was average and retrieval was above average as she was able to recall even more details following a delay. Recognition among distractors was high average and without error.   PSYCHOLOGICAL AND BEHAVIORAL: She endorsed severe symptoms of depression and moderate symptoms of anxiety including severe cognitive symptoms and mild affective and somatic symptoms of anxiety.     PERFORMANCE VALIDITY: Performance on neuropsychological tests is dependent upon a number of factors, including sufficient engagement and motivation, to reliably establish an examinee s level of cognitive functioning.  Based upon observations made throughout the evaluation, the patient did not appear to deliberately perform in a suboptimal manner and demonstrated good frustration tolerance on cognitively challenging tasks. Score on an imbedded index of performance validity was in the valid range. Overall, test results are believed to accurately represent the patient s current neurocognitive status.    BEHAVIORAL OBSERVATIONS  ? Presented on time and was unaccompanied  ? Alert, oriented to self, time, place, and circumstance; attentive and focused while undergoing testing  ? Appearance: Well-groomed, casually dressed  ? Gait/Posture: No abnormalities noted  ? Sensorimotor: No abnormalities noted  ? Behavior: Cooperative, pleasant, no behavioral abnormalities noted  ? Speech: Fluent, articulate, normal rate, prosody, and volume; no conversational word finding difficulty  ? Thought process: Logical, linear, and goal-directed  ? Thought content: Logical, appropriate   ? Affect: Broad, responsive, consistent with reported mood; good eye contact  ? Mood: Depressed, anxious  ? Insight and Judgment: Intact  ? Approach to testing: Efficient, deliberate; good frustration on cognitively demanding tasks  ? Rapport: Easily established and  maintained        Activities included in this evaluation: CPT Code #Units Time   Psychiatric diagnostic interview [id number] 1 --   Test evaluation services by professional; first hour. 76619 1 2:09   Test evaluation services by professional, additional hour (+) [id number] 1    Test administration and scoring by technician, first 30 mins 94852 1 3:05   Test administration and scoring by technician, additional 30 mins (+) [id number]      Dx: F33.1; R47.89; R41.3

## 2022-09-09 NOTE — CONFIDENTIAL NOTE
Provider: CE      Tech: RICARDO      Patient Name: Nikki Morales     : 56      MRN: [id number]      ALVARADO: 22      Age: 62      Education: 12      Ethnicity: C      Handedness: Right      Station: OP             NEUROPSYCHOLOGICAL TESTS RAW SCORE STANDARDIZED SCORE* DESCRIPTIVE RANGE**   PREMORBID ABILITY       WAIS-IV ACS Test of Premorbid Functioning (Estimated FSIQ) 28 SS= 87 Low Average     Estimated FSIQ = 93 Average          ATTENTION AND EXECUTIVE FUNCTIONS RAW SCORE STANDARDIZED SCORE* DESCRIPTIVE RANGE**   Wechsler Adult Intelligence Scale - 4th Edition (WAIS-IV)     Digit Span Forward 8 ss= 8 Average   Digit Span Backward 11 ss= 13 High Average   Digit Span Sequencing 8 ss= 10 Average   Digit Span Total 27 ss= 10 Average     TSs,r,e = 49 Average   Coding 52 ss= 8 Average     TSs,r,e = 38 Low Average   Similarities 22 ss= 8 Average     TSs,r,e = 42 Low Average   Matrix Reasoning 9 ss= 6 Low Average     TSs,r,e = 35 Below Average   Trail Making Test (Time/errors)        Part A s,r,e 58/0 TS= 34 Below Average   Part B s,r,e 84/0 TS= 49 Average   Stroop       Word e 72 TS= 32 Below Average   Color e 63 TS= 42 Low Average   Color/Word e 26 TS= 41 Low Average   Interference e -7 TS= 43 Average          LANGUAGE RAW SCORE STANDARDIZED SCORE* DESCRIPTIVE RANGE**   Mobile Naming Test s,r,e 53 TS= 44 Average   Letter-Cue Verbal Fluency (FAS) s,r,e 12-12-14 (38) TS= 49 Average   Animal Fluency s,r,e 27 TS=  64 Above Average          VISUOSPATIAL PROCESSING RAW SCORE STANDARDIZED SCORE* DESCRIPTIVE RANGE**   Rik Complex Figure Test (RCFT) Copy 33 %ile= >16 WNL   RBANS       Line Orientation 20 %ile= >75 High Average   WMS-IV Visual Reproduction Copy 43 %= >75 High Average          LEARNING & MEMORY RAW SCORE STANDARDIZED SCORE* DESCRIPTIVE RANGE**   Wechsler Memory Scale-4th Edition (WMS-IV)      Logical Memory I 31 ss= 12 High Average     TSs,r,e = 55 Average   Logical Memory II 25 ss= 12 High Average      TSs,r,e = 55 Average   Logical Memory Recognition 28 %= >75 High Average   Visual Reproduction I 35 ss= 11 Average     TSs,r,e = 53 Average   Visual Reproduction II 36 ss= 14 Above Average     TSs,r,e = 65 Above Average   Visual Reproduction Recognition 7 %= >75 High Average   Stinson Verbal Learning Test - Revised (HVLT-R; Form 1)    Total Trials 1-3 9-9-12 (30) TS= 58 High Average   Delayed Recall 12 TS= 63 Above Average   Retention (%) 100% TS= 57 High Average   Recognition Hits 12 --     Recognition False Alarms 0 --     Recognition Discriminability 12 TS= 59 High Average          PSYCHOLOGICAL & BEHAVIORAL SYMPTOMS RAW SCORE STANDARDIZED SCORE* DESCRIPTIVE RANGE**   Geriatric Depression Scale (GDS) 23 --  Severe   Geriatric Anxiey Scale (GAS)       Somatic 9 --  Mild   Cognitive 9 --  Severe   Affective 5 --  Mild   Total 23 --  Moderate          PERFORMANCE VALIDITY RAW SCORE   DESCRIPTIVE RANGE**   RDS 10 --  Valid Range          * All standardized scores are adjusted for age. Superscripts denote additional adjustment for (s)ex, (r)ace/ethnicity, (l)anguage, and/or (e)ducation.   ** Descriptive ranges are based on American Academy of Clinical Neuropsychology (2020) consensus guidelines, or test manuals where appropriate.    WNL = within normal limits. DC = discontinued due to patient s inability to complete the test.    Standardized scores: TS = T-score; mean = 50, standard deviation =10; z = z-score; mean = 0, standard deviation = 1; ss = scaled score; mean = 10, standard deviation = 3; MAS = [place] Older Adult Normative Study age adjusted scaled score; mean = 10, standard deviation = 3; SS = standard score; mean = 100, standard deviation = 15.

## 2022-09-13 ENCOUNTER — HOSPITAL ENCOUNTER (OUTPATIENT)
Dept: MRI IMAGING | Facility: CLINIC | Age: 62
Discharge: HOME | End: 2022-09-13
Attending: INTERNAL MEDICINE | Admitting: INTERNAL MEDICINE
Payer: MEDICARE

## 2022-09-13 DIAGNOSIS — R41.3 MEMORY CHANGE: ICD-10-CM

## 2022-09-13 PROCEDURE — A9585 GADOBUTROL INJECTION: HCPCS | Performed by: INTERNAL MEDICINE

## 2022-09-13 PROCEDURE — 255N000002 HC RX 255 OP 636: Performed by: INTERNAL MEDICINE

## 2022-09-13 PROCEDURE — 70553 MRI BRAIN STEM W/O & W/DYE: CPT | Mod: MG

## 2022-09-13 RX ORDER — GADOBUTROL 604.72 MG/ML
10 INJECTION INTRAVENOUS ONCE
Status: COMPLETED | OUTPATIENT
Start: 2022-09-13 | End: 2022-09-13

## 2022-09-13 RX ADMIN — GADOBUTROL 9 ML: 604.72 INJECTION INTRAVENOUS at 11:24

## 2022-09-21 ENCOUNTER — OFFICE VISIT (OUTPATIENT)
Dept: NEUROLOGY | Facility: CLINIC | Age: 62
End: 2022-09-21
Payer: COMMERCIAL

## 2022-09-21 VITALS
SYSTOLIC BLOOD PRESSURE: 131 MMHG | HEART RATE: 68 BPM | WEIGHT: 195 LBS | BODY MASS INDEX: 28.8 KG/M2 | DIASTOLIC BLOOD PRESSURE: 78 MMHG

## 2022-09-21 DIAGNOSIS — R41.3 MEMORY CHANGE: Primary | ICD-10-CM

## 2022-09-21 PROCEDURE — 99214 OFFICE O/P EST MOD 30 MIN: CPT | Performed by: INTERNAL MEDICINE

## 2022-09-21 NOTE — LETTER
9/21/2022         RE: Nikki Morales  [address]  [facility] [address]        Dear Colleague,    Thank you for referring your patient, Nikki Morales, to the [facility] NEUROLOGY CLINIC [place]. Please see a copy of my visit note below.    [facility] Neurology Follow Up Visit    Nikki Morales MRN# [id number]   Age: 62 year old YOB: 1960     Brief history of symptoms: The patient was initially seen in neurologic consultation on 4/27/2022 for evaluation of memory changes. Please see the comprehensive neurologic consultation note from that date in the Epic records for details.     Interval history:   Patient recently had neuropsychology testing with Dr Thompson.    She reports that she continues to have good days and bad days. She frequently has problems coming up with certain names. Her short term memory is poor.     She has high levels of stress, but doesn't think it is different than it has ever been. She sees a psychologist, a new one she started with recently. She takes ativan as needed for anxiety. She frequently struggles with repetitive thoughts that make sleep difficult at times.       Past Medical History:     Patient Active Problem List   Diagnosis     Chronic fatigue syndrome     Other and unspecified disc disorder of lumbar region     ESOPHAGEAL REFLUX     Female stress incontinence     Herpes simplex virus (HSV) infection     HYPERLIPIDEMIA LDL GOAL <130     Eosinophilic esophagitis     Overweight     Adhesive capsulitis of shoulder     Degenerative arthritis of cervical spine with cord compression     Hypothyroidism due to acquired atrophy of thyroid     Attention deficit hyperactivity disorder, inattentive type     Mild persistent asthma without complication     KAILEE (generalized anxiety disorder)     Moderate recurrent major depression (H)     Impingement syndrome of left shoulder     Primary headache associated with sexual activity     Mixed  incontinence     Morbid obesity (H)     Primary osteoarthritis of right knee     Infection due to 2019 novel coronavirus     Past Medical History:   Diagnosis Date     Chronic fatigue      Hyperlipidemia      Hypothyroid      New onset a-fib (H)      Other vitamin B12 deficiency anemia      Primary osteoarthritis of right knee 8/15/2022     Uncomplicated asthma         Past Surgical History:     Past Surgical History:   Procedure Laterality Date     ABDOMEN SURGERY  6/10/93    C section     COLONOSCOPY  10/06/09     COLONOSCOPY  2013    Procedure: COMBINED COLONOSCOPY, SINGLE BIOPSY/POLYPECTOMY BY BIOPSY;;  Surgeon: Cuate Miles MD;  Location: [facility]     COLONOSCOPY N/A 2018    Procedure: COLONOSCOPY;  Colonoscopy;  Surgeon: Hamzah Dudley DO;  Location: [facility]     COMBINED REPAIR PTOSIS WITH BLEPHAROPLASTY BILATERAL Bilateral 2018    Procedure: COMBINED REPAIR PTOSIS WITH BLEPHAROPLASTY BILATERAL;  Bilateral upper eyelid Blepharoplasty and ptosis repair ;  Surgeon: Martina Andrade MD;  Location: [facility] OR     CONIZATION CERVIX,KNIFE/LASER       ESOPHAGOSCOPY, GASTROSCOPY, DUODENOSCOPY (EGD), COMBINED  2013    Procedure: COMBINED ESOPHAGOSCOPY, GASTROSCOPY, DUODENOSCOPY (EGD), BIOPSY SINGLE OR MULTIPLE;  egd with rabdain biopsies and colonoscopy with polypectomy by biopsy ;  Surgeon: Cuate Miles MD;  Location:  GI     GENITOURINARY SURGERY  ?     I have a bladder sling     HC DILATION/CURETTAGE DIAG/THER NON OB      D & C     HC REMOVAL OF TONSILS,<13 Y/O      Tonsils <10y.o.     HC REMOVAL OF TONSILS,<11 Y/O      Description: Tonsillectomy;  Recorded: 2009;  Comments: in grade school     HC UGI ENDOSCOPY DIAG W BIOPSY  07     HYSTERECTOMY, PAP NO LONGER INDICATED       LAPAROSCOPIC CHOLECYSTECTOMY  10/12/13     REPAIR PTOSIS       TUBAL LIGATION       ZZC  DELIVERY ONLY      , Low Cervical     ZZC  DELIVERY ONLY      Description:   Section;  Recorded: 11/10/2009;  Comments: @ 29 weeks     Z LIGATE FALLOPIAN TUBE      Description: Tubal Ligation;  Recorded: 11/10/2009;     ZZ NONSPECIFIC PROCEDURE  's    sinus     ZZC NONSPECIFIC PROCEDURE      Esophgeal dilatation multiple     ZZC NONSPECIFIC PROCEDURE  2008    sling     ZC TOTAL ABDOM HYSTERECTOMY      Description: Hysterectomy;  Recorded: 11/10/2009;  Comments: due to cervical dysplasia     Z VAGINAL HYSTERECTOMY  1995    Hysterectomy, Vaginal     [facility]  [facility] & 98        Social History:     Social History     Tobacco Use     Smoking status: Former Smoker     Packs/day: 0.00     Years: 10.00     Pack years: 0.00     Types: Cigarettes     Smokeless tobacco: Never Used     Tobacco comment: social smoking   Substance Use Topics     Alcohol use: Yes     Alcohol/week: 4.0 standard drinks     Comment: social     Drug use: No     Comment: used in past any and all        Family History:     Family History   Problem Relation Age of Onset     Cancer Mother         Uterine     Prostate Cancer Mother      Other Cancer Mother         First surgery uterine area. Then spread     Diabetes Father      Hypertension Father      Cancer Father         prostate cancer     Cerebrovascular Disease Father      Prostate Cancer Father      Psychotic Disorder Son         severe Add,      Asthma Son         exercised induced asthma     Depression Son      Substance Abuse Son      Asthma Son         ecxercise induced asthma     Cardiovascular Sister         recurrent blood clots     Cerebrovascular Disease Sister 51     Prostate Cancer Brother      Coronary Artery Disease Brother         Quadruple bypass     Cerebrovascular Disease Brother      Other Cancer Brother         Non-Hodgkin's lymphoma * 2     Diabetes Maternal Grandfather      Heart Disease Maternal Grandmother         Heart condition age 96     Diabetes Paternal Grandfather      Cancer Brother       Cerebrovascular Disease Paternal Grandmother      Coronary Artery Disease Brother         Quadruple bypass also     Hypertension Brother      Cerebrovascular Disease Brother      Prostate Cancer Brother      Cerebrovascular Disease Other         Uncle     Cerebrovascular Disease Other         Uncle     Colon Cancer Other      Genetic Disorder Cousin         Alpha-1 Antitrypsin Deficiency  Liver transplant     Genetic Disorder Cousin         Idiopathic Trombosenia Purpla           Medications:     Current Outpatient Medications   Medication Sig     budesonide (PULMICORT) 0.5 MG/2ML neb solution Mix 2 vials with 1 oz coffee flavoring and drink twice a day and rinse mouth aftrerwards     calcium carbonate (TUMS) 500 MG chewable tablet Take 2 chew tab by mouth 3 times daily as needed for other (bloating/flatulence)     cyanocobalamin (CYANOCOBALAMIN) 1000 MCG/ML injection INJECT 1ML INTRAMUSCULARLY EVERY 30 DAYS     doxycycline hyclate (VIBRAMYCIN) 100 MG capsule Take two pills now once.     estradiol (VIVELLE-DOT) 0.05 MG/24HR patch Place 1 patch onto the skin twice a week     fexofenadine (ALLEGRA) 60 MG tablet TAKE ONE TABLET BY MOUTH TWICE A DAY     fluticasone (FLOVENT HFA) 220 MCG/ACT inhaler Inhale 1 puff into the lungs 2 times daily     levothyroxine (SYNTHROID/LEVOTHROID) 88 MCG tablet Take 1 tablet (88 mcg) by mouth daily     LORazepam (ATIVAN) 1 MG tablet TAKE ONE TABLET BY MOUTH TWICE A DAY AS NEEDED FOR ANXIETY     methylphenidate (RITALIN) 10 MG tablet Take 10 mg by mouth 2 times daily Takes as needed     mirabegron (MYRBETRIQ) 25 MG 24 hr tablet Take 1 tablet (25 mg) by mouth daily     multivitamin w/minerals (THERA-VIT-M) tablet Take 1 tablet by mouth daily     pantoprazole (PROTONIX) 40 MG EC tablet Take 40 mg by mouth daily     rosuvastatin (CRESTOR) 10 MG tablet 1 daily or as directed.     valACYclovir (VALTREX) 1000 mg tablet Take 1 tablet (1,000 mg) by mouth 2 times daily     zolpidem  (AMBIEN) 5 MG tablet TAKE TWO TABLETS BY MOUTH IN THE EVENING AS NEEDED FOR SLEEP     Current Facility-Administered Medications   Medication     cyanocobalamin injection 1,000 mcg     lidocaine 1 % injection 5 mL     methylPREDNISolone (DEPO-MEDROL) injection 80 mg        Allergies:     Allergies   Allergen Reactions     Codeine Other (See Comments)     Causes agitation        Review of Systems:   As above     Physical Exam:   Vitals: /78 (BP Location: Right arm, Patient Position: Sitting, Cuff Size: Adult Regular)   Pulse 68   Wt 88.5 kg (195 lb)   LMP  (LMP Unknown)   BMI 28.80 kg/m     General: Seated comfortably in no acute distress.  Lungs: breathing comfortably  Neurologic:     Mental Status: Fully alert, attentive. Language normal, speech clear and fluent, no paraphasic errors.      Cranial Nerves: Hearing not formally tested but intact to conversation. No dysarthria.      Motor: No tremors or other abnormal movements observed.        Gait: Normal, steady casual gait.      Data reviewed on previous visits    Imaging:  MRI cervical 4/2022  IMPRESSION:  1.  No significant posterior disc bulge or spinal canal narrowing at  any level within the cervical spine.  2.  Uncovertebral joint disease and facet arthropathy contributes to  severe right and mild left neural from narrowing at the C6/C7 level  and mild bilateral neural from narrowing at the C5/C6 level.  3.  No significant neural foraminal narrowing at any other level  within the cervical spine.     MRI brain 5/2019  IMPRESSION: Normal brain MRI.     Laboratory:  TSH normal 2/2022    Pertinent Investigations since last visit:   MRI brain 9/2022  IMPRESSION: Unremarkable brain MRI.      B12 - 664 (4/2022)         Assessment and Plan:   Nikki Morales is a 62 year old female who presents today for follow-up of memory changes. Patient reports some life long issues, which have become more prominent in recent years. MoCA at initial visit was  29/30. MRI brain was normal and B12/TSH were normal. Patient had recent neuropsychological testing, which did not show evidence of significant objective neurological dysfunction. I suspect symptoms are primarily related to chronic fatigue, anxiety/depression, and poor sleep. She is working with psychologist and I encouraged her to continue this and discuss with her PCP other options for treatment of anxiety/depression. We discussed possible referral to occupational therapy for cognitive rehab, and she will let us know if interested in this in the future.      Follow up in Neurology clinic as needed should new concerns arise.    Montana Reed MD   of Neurology  [facility]    The total time of this encounter today amounted to 37 minutes. This time included time spent with the patient, prep work, ordering tests, and performing post visit documentation.        Again, thank you for allowing me to participate in the care of your patient.        Sincerely,        Montana Reed MD

## 2022-09-21 NOTE — PROGRESS NOTES
[facility] Neurology Follow Up Visit    Nikki Morales MRN# [id number]   Age: 62 year old YOB: 1960     Brief history of symptoms: The patient was initially seen in neurologic consultation on 4/27/2022 for evaluation of memory changes. Please see the comprehensive neurologic consultation note from that date in the Epic records for details.     Interval history:   Patient recently had neuropsychology testing with Dr Thompson.    She reports that she continues to have good days and bad days. She frequently has problems coming up with certain names. Her short term memory is poor.     She has high levels of stress, but doesn't think it is different than it has ever been. She sees a psychologist, a new one she started with recently. She takes ativan as needed for anxiety. She frequently struggles with repetitive thoughts that make sleep difficult at times.       Past Medical History:     Patient Active Problem List   Diagnosis     Chronic fatigue syndrome     Other and unspecified disc disorder of lumbar region     ESOPHAGEAL REFLUX     Female stress incontinence     Herpes simplex virus (HSV) infection     HYPERLIPIDEMIA LDL GOAL <130     Eosinophilic esophagitis     Overweight     Adhesive capsulitis of shoulder     Degenerative arthritis of cervical spine with cord compression     Hypothyroidism due to acquired atrophy of thyroid     Attention deficit hyperactivity disorder, inattentive type     Mild persistent asthma without complication     KAILEE (generalized anxiety disorder)     Moderate recurrent major depression (H)     Impingement syndrome of left shoulder     Primary headache associated with sexual activity     Mixed incontinence     Morbid obesity (H)     Primary osteoarthritis of right knee     Infection due to 2019 novel coronavirus     Past Medical History:   Diagnosis Date     Chronic fatigue      Hyperlipidemia      Hypothyroid      New onset a-fib (H)      Other vitamin B12 deficiency  anemia      Primary osteoarthritis of right knee 8/15/2022     Uncomplicated asthma         Past Surgical History:     Past Surgical History:   Procedure Laterality Date     ABDOMEN SURGERY  6/10/93    C section     COLONOSCOPY  10/06/09     COLONOSCOPY  2013    Procedure: COMBINED COLONOSCOPY, SINGLE BIOPSY/POLYPECTOMY BY BIOPSY;;  Surgeon: Cuate Miles MD;  Location:  GI     COLONOSCOPY N/A 2018    Procedure: COLONOSCOPY;  Colonoscopy;  Surgeon: Hamzah Dudley DO;  Location: [facility]     COMBINED REPAIR PTOSIS WITH BLEPHAROPLASTY BILATERAL Bilateral 2018    Procedure: COMBINED REPAIR PTOSIS WITH BLEPHAROPLASTY BILATERAL;  Bilateral upper eyelid Blepharoplasty and ptosis repair ;  Surgeon: Martina Andrade MD;  Location: [facility] OR     CONIZATION CERVIX,KNIFE/LASER       ESOPHAGOSCOPY, GASTROSCOPY, DUODENOSCOPY (EGD), COMBINED  2013    Procedure: COMBINED ESOPHAGOSCOPY, GASTROSCOPY, DUODENOSCOPY (EGD), BIOPSY SINGLE OR MULTIPLE;  egd with rabdain biopsies and colonoscopy with polypectomy by biopsy ;  Surgeon: Cuate Miles MD;  Location:  GI     GENITOURINARY SURGERY  ?     I have a bladder sling     HC DILATION/CURETTAGE DIAG/THER NON OB      D & C     HC REMOVAL OF TONSILS,<13 Y/O      Tonsils <10y.o.     HC REMOVAL OF TONSILS,<11 Y/O      Description: Tonsillectomy;  Recorded: 2009;  Comments: in grade school     HC UGI ENDOSCOPY DIAG W BIOPSY  07     HYSTERECTOMY, PAP NO LONGER INDICATED       LAPAROSCOPIC CHOLECYSTECTOMY  10/12/13     REPAIR PTOSIS       TUBAL LIGATION       Z  DELIVERY ONLY      , Low Cervical     ZZC  DELIVERY ONLY      Description:  Section;  Recorded: 11/10/2009;  Comments: @ 29 weeks     ZZC LIGATE FALLOPIAN TUBE      Description: Tubal Ligation;  Recorded: 11/10/2009;     ZZC NONSPECIFIC PROCEDURE  's    sinus     ZZC NONSPECIFIC PROCEDURE      Esophgeal dilatation multiple     ZZC NONSPECIFIC  PROCEDURE  2008    sling     [facility] TOTAL ABDOM HYSTERECTOMY      Description: Hysterectomy;  Recorded: 11/10/2009;  Comments: due to cervical dysplasia     Z VAGINAL HYSTERECTOMY  09/1995    Hysterectomy, Vaginal     [facility] UGI ENDOSCOPY, SIMPLE EXAM  09/10/99 & 04/14/98        Social History:     Social History     Tobacco Use     Smoking status: Former Smoker     Packs/day: 0.00     Years: 10.00     Pack years: 0.00     Types: Cigarettes     Smokeless tobacco: Never Used     Tobacco comment: social smoking   Substance Use Topics     Alcohol use: Yes     Alcohol/week: 4.0 standard drinks     Comment: social     Drug use: No     Comment: used in past any and all        Family History:     Family History   Problem Relation Age of Onset     Cancer Mother         Uterine     Prostate Cancer Mother      Other Cancer Mother         First surgery uterine area. Then spread     Diabetes Father      Hypertension Father      Cancer Father         prostate cancer     Cerebrovascular Disease Father      Prostate Cancer Father      Psychotic Disorder Son         severe Add,      Asthma Son         exercised induced asthma     Depression Son      Substance Abuse Son      Asthma Son         ecxercise induced asthma     Cardiovascular Sister         recurrent blood clots     Cerebrovascular Disease Sister 51     Prostate Cancer Brother      Coronary Artery Disease Brother         Quadruple bypass     Cerebrovascular Disease Brother      Other Cancer Brother         Non-Hodgkin's lymphoma * 2     Diabetes Maternal Grandfather      Heart Disease Maternal Grandmother         Heart condition age 96     Diabetes Paternal Grandfather      Cancer Brother      Cerebrovascular Disease Paternal Grandmother      Coronary Artery Disease Brother         Quadruple bypass also     Hypertension Brother      Cerebrovascular Disease Brother      Prostate Cancer Brother      Cerebrovascular Disease Other         Uncle     Cerebrovascular Disease Other          Uncle     Colon Cancer Other      Genetic Disorder Cousin         Alpha-1 Antitrypsin Deficiency  Liver transplant     Genetic Disorder Cousin         Idiopathic Trombosenia Purpla           Medications:     Current Outpatient Medications   Medication Sig     budesonide (PULMICORT) 0.5 MG/2ML neb solution Mix 2 vials with 1 oz coffee flavoring and drink twice a day and rinse mouth aftrerwards     calcium carbonate (TUMS) 500 MG chewable tablet Take 2 chew tab by mouth 3 times daily as needed for other (bloating/flatulence)     cyanocobalamin (CYANOCOBALAMIN) 1000 MCG/ML injection INJECT 1ML INTRAMUSCULARLY EVERY 30 DAYS     doxycycline hyclate (VIBRAMYCIN) 100 MG capsule Take two pills now once.     estradiol (VIVELLE-DOT) 0.05 MG/24HR patch Place 1 patch onto the skin twice a week     fexofenadine (ALLEGRA) 60 MG tablet TAKE ONE TABLET BY MOUTH TWICE A DAY     fluticasone (FLOVENT HFA) 220 MCG/ACT inhaler Inhale 1 puff into the lungs 2 times daily     levothyroxine (SYNTHROID/LEVOTHROID) 88 MCG tablet Take 1 tablet (88 mcg) by mouth daily     LORazepam (ATIVAN) 1 MG tablet TAKE ONE TABLET BY MOUTH TWICE A DAY AS NEEDED FOR ANXIETY     methylphenidate (RITALIN) 10 MG tablet Take 10 mg by mouth 2 times daily Takes as needed     mirabegron (MYRBETRIQ) 25 MG 24 hr tablet Take 1 tablet (25 mg) by mouth daily     multivitamin w/minerals (THERA-VIT-M) tablet Take 1 tablet by mouth daily     pantoprazole (PROTONIX) 40 MG EC tablet Take 40 mg by mouth daily     rosuvastatin (CRESTOR) 10 MG tablet 1 daily or as directed.     valACYclovir (VALTREX) 1000 mg tablet Take 1 tablet (1,000 mg) by mouth 2 times daily     zolpidem (AMBIEN) 5 MG tablet TAKE TWO TABLETS BY MOUTH IN THE EVENING AS NEEDED FOR SLEEP     Current Facility-Administered Medications   Medication     cyanocobalamin injection 1,000 mcg     lidocaine 1 % injection 5 mL     methylPREDNISolone (DEPO-MEDROL) injection 80 mg        Allergies:      Allergies   Allergen Reactions     Codeine Other (See Comments)     Causes agitation        Review of Systems:   As above     Physical Exam:   Vitals: /78 (BP Location: Right arm, Patient Position: Sitting, Cuff Size: Adult Regular)   Pulse 68   Wt 88.5 kg (195 lb)   LMP  (LMP Unknown)   BMI 28.80 kg/m     General: Seated comfortably in no acute distress.  Lungs: breathing comfortably  Neurologic:     Mental Status: Fully alert, attentive. Language normal, speech clear and fluent, no paraphasic errors.      Cranial Nerves: Hearing not formally tested but intact to conversation. No dysarthria.      Motor: No tremors or other abnormal movements observed.        Gait: Normal, steady casual gait.      Data reviewed on previous visits    Imaging:  MRI cervical 4/2022  IMPRESSION:  1.  No significant posterior disc bulge or spinal canal narrowing at  any level within the cervical spine.  2.  Uncovertebral joint disease and facet arthropathy contributes to  severe right and mild left neural from narrowing at the C6/C7 level  and mild bilateral neural from narrowing at the C5/C6 level.  3.  No significant neural foraminal narrowing at any other level  within the cervical spine.     MRI brain 5/2019  IMPRESSION: Normal brain MRI.     Laboratory:  TSH normal 2/2022    Pertinent Investigations since last visit:   MRI brain 9/2022  IMPRESSION: Unremarkable brain MRI.      B12 - 664 (4/2022)         Assessment and Plan:   Nikki Morales is a 62 year old female who presents today for follow-up of memory changes. Patient reports some life long issues, which have become more prominent in recent years. MoCA at initial visit was 29/30. MRI brain was normal and B12/TSH were normal. Patient had recent neuropsychological testing, which did not show evidence of significant objective neurological dysfunction. I suspect symptoms are primarily related to chronic fatigue, anxiety/depression, and poor sleep. She is  working with psychologist and I encouraged her to continue this and discuss with her PCP other options for treatment of anxiety/depression. We discussed possible referral to occupational therapy for cognitive rehab, and she will let us know if interested in this in the future.      Follow up in Neurology clinic as needed should new concerns arise.    Montana Reed MD   of Neurology  [facility]    The total time of this encounter today amounted to 37 minutes. This time included time spent with the patient, prep work, ordering tests, and performing post visit documentation.

## 2022-10-07 DIAGNOSIS — B00.9 HSV (HERPES SIMPLEX VIRUS) INFECTION: ICD-10-CM

## 2022-10-09 ENCOUNTER — HEALTH MAINTENANCE LETTER (OUTPATIENT)
Age: 62
End: 2022-10-09

## 2022-10-10 RX ORDER — VALACYCLOVIR HYDROCHLORIDE 1 G/1
1000 TABLET, FILM COATED ORAL 2 TIMES DAILY
Qty: 20 TABLET | Refills: 3 | Status: SHIPPED | OUTPATIENT
Start: 2022-10-10 | End: 2022-11-09

## 2022-10-10 NOTE — TELEPHONE ENCOUNTER
Pending Prescriptions:                       Disp   Refills    valACYclovir (VALTREX) 1000 mg tablet      20 tab*3        Sig: Take 1 tablet (1,000 mg) by mouth 2 times daily      Routing refill request to provider for review/approval because:  Labs out of date:    Creatinine   Date Value Ref Range Status   03/24/2021 0.75 0.52 - 1.04 mg/dL Final         Karina Dickinson RN

## 2022-10-12 ENCOUNTER — ALLIED HEALTH/NURSE VISIT (OUTPATIENT)
Dept: FAMILY MEDICINE | Facility: CLINIC | Age: 62
End: 2022-10-12
Payer: COMMERCIAL

## 2022-10-12 DIAGNOSIS — D51.8 VITAMIN B12 DEFICIENCY (DIETARY) ANEMIA: Primary | ICD-10-CM

## 2022-10-12 PROCEDURE — 96372 THER/PROPH/DIAG INJ SC/IM: CPT | Performed by: INTERNAL MEDICINE

## 2022-10-12 PROCEDURE — 99207 PR NO CHARGE NURSE ONLY: CPT

## 2022-10-12 RX ADMIN — CYANOCOBALAMIN 1000 MCG: 1000 INJECTION, SOLUTION INTRAMUSCULAR; SUBCUTANEOUS at 09:09

## 2022-10-12 NOTE — PROGRESS NOTES
Clinic Administered Medication Documentation    Administrations This Visit     cyanocobalamin injection 1,000 mcg     Admin Date  10/12/2022 Action  Given Dose  1,000 mcg Route  Intramuscular Site  Left Deltoid Administered By  Emi Brito    Ordering Provider: Cirilo Tadeo MD    Patient Supplied?: No                  Injectable Medication Documentation    Patient was given Cyanocobalamin (B-12). Prior to medication administration, verified patients identity using patient s name and date of birth. Please see MAR and medication order for additional information. Patient instructed to remain in clinic for 15 minutes and report any adverse reaction to staff immediately .      Was entire vial of medication used? Yes  Vial/Syringe: Single dose vial  Expiration Date:  4/24  Was this medication supplied by the patient? No

## 2022-11-09 ENCOUNTER — OFFICE VISIT (OUTPATIENT)
Dept: INTERNAL MEDICINE | Facility: CLINIC | Age: 62
End: 2022-11-09
Payer: COMMERCIAL

## 2022-11-09 VITALS
TEMPERATURE: 97.4 F | WEIGHT: 205.3 LBS | DIASTOLIC BLOOD PRESSURE: 74 MMHG | OXYGEN SATURATION: 97 % | HEIGHT: 69 IN | BODY MASS INDEX: 30.41 KG/M2 | RESPIRATION RATE: 16 BRPM | SYSTOLIC BLOOD PRESSURE: 118 MMHG | HEART RATE: 79 BPM

## 2022-11-09 DIAGNOSIS — E03.4 HYPOTHYROIDISM DUE TO ACQUIRED ATROPHY OF THYROID: ICD-10-CM

## 2022-11-09 DIAGNOSIS — Z00.00 MEDICARE ANNUAL WELLNESS VISIT, SUBSEQUENT: Primary | ICD-10-CM

## 2022-11-09 DIAGNOSIS — F41.1 GAD (GENERALIZED ANXIETY DISORDER): ICD-10-CM

## 2022-11-09 DIAGNOSIS — B00.9 HSV (HERPES SIMPLEX VIRUS) INFECTION: ICD-10-CM

## 2022-11-09 DIAGNOSIS — G93.32 CHRONIC FATIGUE SYNDROME: ICD-10-CM

## 2022-11-09 DIAGNOSIS — E78.5 HYPERLIPIDEMIA LDL GOAL <130: ICD-10-CM

## 2022-11-09 PROBLEM — E66.01 MORBID OBESITY (H): Status: RESOLVED | Noted: 2021-09-10 | Resolved: 2022-11-09

## 2022-11-09 LAB
ALBUMIN SERPL-MCNC: 3.8 G/DL (ref 3.4–5)
ALP SERPL-CCNC: 66 U/L (ref 40–150)
ALT SERPL W P-5'-P-CCNC: 40 U/L (ref 0–50)
ANION GAP SERPL CALCULATED.3IONS-SCNC: 4 MMOL/L (ref 3–14)
AST SERPL W P-5'-P-CCNC: 21 U/L (ref 0–45)
BILIRUB SERPL-MCNC: 0.4 MG/DL (ref 0.2–1.3)
BUN SERPL-MCNC: 23 MG/DL (ref 7–30)
CALCIUM SERPL-MCNC: 9.1 MG/DL (ref 8.5–10.1)
CHLORIDE BLD-SCNC: 107 MMOL/L (ref 94–109)
CHOLEST SERPL-MCNC: 342 MG/DL
CO2 SERPL-SCNC: 29 MMOL/L (ref 20–32)
CREAT SERPL-MCNC: 0.82 MG/DL (ref 0.52–1.04)
FASTING STATUS PATIENT QL REPORTED: NO
GFR SERPL CREATININE-BSD FRML MDRD: 79 ML/MIN/1.73M2
GLUCOSE BLD-MCNC: 83 MG/DL (ref 70–99)
HDLC SERPL-MCNC: 57 MG/DL
LDLC SERPL CALC-MCNC: 220 MG/DL
NONHDLC SERPL-MCNC: 285 MG/DL
POTASSIUM BLD-SCNC: 4.1 MMOL/L (ref 3.4–5.3)
PROT SERPL-MCNC: 7.4 G/DL (ref 6.8–8.8)
SODIUM SERPL-SCNC: 140 MMOL/L (ref 133–144)
T4 FREE SERPL-MCNC: 0.81 NG/DL (ref 0.76–1.46)
TRIGL SERPL-MCNC: 325 MG/DL
TSH SERPL DL<=0.005 MIU/L-ACNC: 6.38 MU/L (ref 0.4–4)

## 2022-11-09 PROCEDURE — 36415 COLL VENOUS BLD VENIPUNCTURE: CPT | Performed by: INTERNAL MEDICINE

## 2022-11-09 PROCEDURE — 99214 OFFICE O/P EST MOD 30 MIN: CPT | Mod: 25 | Performed by: INTERNAL MEDICINE

## 2022-11-09 PROCEDURE — G0439 PPPS, SUBSEQ VISIT: HCPCS | Performed by: INTERNAL MEDICINE

## 2022-11-09 PROCEDURE — 0124A COVID-19,PF,PFIZER BOOSTER BIVALENT: CPT | Performed by: INTERNAL MEDICINE

## 2022-11-09 PROCEDURE — 84443 ASSAY THYROID STIM HORMONE: CPT | Performed by: INTERNAL MEDICINE

## 2022-11-09 PROCEDURE — 80061 LIPID PANEL: CPT | Performed by: INTERNAL MEDICINE

## 2022-11-09 PROCEDURE — 80053 COMPREHEN METABOLIC PANEL: CPT | Performed by: INTERNAL MEDICINE

## 2022-11-09 PROCEDURE — 84439 ASSAY OF FREE THYROXINE: CPT | Performed by: INTERNAL MEDICINE

## 2022-11-09 PROCEDURE — 91312 COVID-19,PF,PFIZER BOOSTER BIVALENT: CPT | Performed by: INTERNAL MEDICINE

## 2022-11-09 RX ORDER — METHYLPHENIDATE HYDROCHLORIDE 10 MG/1
10 TABLET ORAL 2 TIMES DAILY
Qty: 30 TABLET | Refills: 0 | Status: SHIPPED | OUTPATIENT
Start: 2022-11-09

## 2022-11-09 RX ORDER — LORAZEPAM 1 MG/1
1 TABLET ORAL 2 TIMES DAILY PRN
Qty: 20 TABLET | Refills: 0 | Status: SHIPPED | OUTPATIENT
Start: 2022-11-09 | End: 2023-06-08

## 2022-11-09 RX ORDER — CYANOCOBALAMIN 1000 UG/ML
1 INJECTION, SOLUTION INTRAMUSCULAR; SUBCUTANEOUS
Qty: 10 ML | Refills: 1 | Status: SHIPPED | OUTPATIENT
Start: 2022-11-09 | End: 2024-05-17

## 2022-11-09 RX ORDER — VALACYCLOVIR HYDROCHLORIDE 1 G/1
1000 TABLET, FILM COATED ORAL 2 TIMES DAILY
Qty: 20 TABLET | Refills: 3 | Status: SHIPPED | OUTPATIENT
Start: 2022-11-09 | End: 2023-10-26

## 2022-11-09 RX ORDER — METHYLPHENIDATE HYDROCHLORIDE 10 MG/1
10 TABLET ORAL 2 TIMES DAILY
Status: CANCELLED | OUTPATIENT
Start: 2022-11-09

## 2022-11-09 ASSESSMENT — ASTHMA QUESTIONNAIRES
QUESTION_1 LAST FOUR WEEKS HOW MUCH OF THE TIME DID YOUR ASTHMA KEEP YOU FROM GETTING AS MUCH DONE AT WORK, SCHOOL OR AT HOME: A LITTLE OF THE TIME
QUESTION_4 LAST FOUR WEEKS HOW OFTEN HAVE YOU USED YOUR RESCUE INHALER OR NEBULIZER MEDICATION (SUCH AS ALBUTEROL): ONCE A WEEK OR LESS
ACT_TOTALSCORE: 21
QUESTION_2 LAST FOUR WEEKS HOW OFTEN HAVE YOU HAD SHORTNESS OF BREATH: ONCE OR TWICE A WEEK
QUESTION_5 LAST FOUR WEEKS HOW WOULD YOU RATE YOUR ASTHMA CONTROL: WELL CONTROLLED
QUESTION_3 LAST FOUR WEEKS HOW OFTEN DID YOUR ASTHMA SYMPTOMS (WHEEZING, COUGHING, SHORTNESS OF BREATH, CHEST TIGHTNESS OR PAIN) WAKE YOU UP AT NIGHT OR EARLIER THAN USUAL IN THE MORNING: NOT AT ALL
ACT_TOTALSCORE: 21

## 2022-11-09 ASSESSMENT — ENCOUNTER SYMPTOMS
COUGH: 1
CONSTIPATION: 1
DYSURIA: 0
EYE PAIN: 0
HEARTBURN: 1
DIARRHEA: 1
WEAKNESS: 1
HEADACHES: 0
ARTHRALGIAS: 1
HEMATOCHEZIA: 0
PALPITATIONS: 0
FREQUENCY: 1
PARESTHESIAS: 0
SORE THROAT: 0
ABDOMINAL PAIN: 0
SHORTNESS OF BREATH: 1
MYALGIAS: 0
HEMATURIA: 0
CHILLS: 1
BREAST MASS: 0
NERVOUS/ANXIOUS: 1
DIZZINESS: 0
JOINT SWELLING: 0
FEVER: 1
NAUSEA: 0

## 2022-11-09 ASSESSMENT — PATIENT HEALTH QUESTIONNAIRE - PHQ9
10. IF YOU CHECKED OFF ANY PROBLEMS, HOW DIFFICULT HAVE THESE PROBLEMS MADE IT FOR YOU TO DO YOUR WORK, TAKE CARE OF THINGS AT HOME, OR GET ALONG WITH OTHER PEOPLE: SOMEWHAT DIFFICULT
SUM OF ALL RESPONSES TO PHQ QUESTIONS 1-9: 10
SUM OF ALL RESPONSES TO PHQ QUESTIONS 1-9: 10

## 2022-11-09 ASSESSMENT — PAIN SCALES - GENERAL: PAINLEVEL: NO PAIN (0)

## 2022-11-09 ASSESSMENT — ACTIVITIES OF DAILY LIVING (ADL): CURRENT_FUNCTION: NO ASSISTANCE NEEDED

## 2022-11-09 NOTE — PROGRESS NOTES
"SUBJECTIVE:   Margarita is a 62 year old who presents for Preventive Visit.      Patient has been advised of split billing requirements and indicates understanding: Yes  Are you in the first 12 months of your Medicare coverage?  No    Healthy Habits:     In general, how would you rate your overall health?  Fair    Frequency of exercise:  None    Do you usually eat at least 4 servings of fruit and vegetables a day, include whole grains    & fiber and avoid regularly eating high fat or \"junk\" foods?  No    Taking medications regularly:  No    Barriers to taking medications:  Problems remembering to take them and Other    Medication side effects:  None    Ability to successfully perform activities of daily living:  No assistance needed    Home Safety:  Lack of grab bars in the bathroom    Hearing Impairment:  Difficulty following a conversation in a noisy restaurant or crowded room and difficulty understanding soft or whispered speech    In the past 6 months, have you been bothered by leaking of urine? Yes    In general, how would you rate your overall mental or emotional health?  Fair      PHQ-2 Total Score: 2    Additional concerns today:  Yes  Answers for HPI/ROS submitted by the patient on 11/9/2022  If you checked off any problems, how difficult have these problems made it for you to do your work, take care of things at home, or get along with other people?: Somewhat difficult  PHQ9 TOTAL SCORE: 10      Do you feel safe in your environment? { :694050}    Have you ever done Advance Care Planning? (For example, a Health Directive, POLST, or a discussion with a medical provider or your loved ones about your wishes): { :217292}    {Hearing Test Done (Optional):290450}   Fall risk  { :006651}  {If any of the above assessments are answered yes, consider ordering appropriate referrals (Optional):669732::\"click delete button to remove this line now\"}  Cognitive Screening { :426520}    {Do you have sleep apnea, excessive snoring " or daytime drowsiness? (Optional):724941}    Reviewed and updated as needed this visit by clinical staff                  Reviewed and updated as needed this visit by Provider                 Social History     Tobacco Use     Smoking status: Former     Packs/day: 0.00     Years: 10.00     Pack years: 0.00     Types: Cigarettes     Smokeless tobacco: Never     Tobacco comments:     social smoking   Substance Use Topics     Alcohol use: Yes     Alcohol/week: 4.0 standard drinks     Comment: social         Alcohol Use 11/9/2022   Prescreen: >3 drinks/day or >7 drinks/week? No   Prescreen: >3 drinks/day or >7 drinks/week? -           {additional problems to add (Optional):219096}    Current providers sharing in care for this patient include: {Rooming staff:  Please update Care Team in Rooming Activity, refresh this note and then delete this statement}  Patient Care Team:  Cirilo Tadeo MD as PCP - General (Internal Medicine)  Aditya Gamez MD as MD (Dermatology)  Martina Andrade MD as MD (Ophthalmology)  Anselmo Cadet [facility] as Pharmacist (Pharmacist Clinician- Clinical Pharmacy Specialist)  Cirilo Tadeo MD as Assigned PCP  Geoffrey Loving PA-C as Assigned Sleep Provider  Montana Reed MD as MD (Neurology)  Montana Reed MD as MD (Neurology)  Montana Reed MD as Assigned Neuroscience Provider  Farhat Montanez MD as Assigned Musculoskeletal Provider  Lani Briones, PhD as Assigned Behavioral Health Provider    The following health maintenance items are reviewed in Epic and correct as of today:  Health Maintenance   Topic Date Due     ANNUAL REVIEW OF HM ORDERS  Never done     HIV SCREENING  Never done     ZOSTER IMMUNIZATION (1 of 2) Never done     ASTHMA ACTION PLAN  11/25/2020     LUNG CANCER SCREENING  02/11/2021     COVID-19 Vaccine (4 - Booster for Pfizer series) 02/09/2022     MEDICARE ANNUAL WELLNESS VISIT  03/24/2022     INFLUENZA VACCINE (1) 09/01/2022     TSH W/FREE T4  "REFLEX  02/21/2023     ASTHMA CONTROL TEST  05/09/2023     COLORECTAL CANCER SCREENING  09/06/2023     MAMMO SCREENING  02/25/2024     LIPID  03/24/2026     ADVANCE CARE PLANNING  03/24/2026     DTAP/TDAP/TD IMMUNIZATION (8 - Td or Tdap) 08/23/2027     HEPATITIS C SCREENING  Completed     Pneumococcal Vaccine: Pediatrics (0 to 5 Years) and At-Risk Patients (6 to 64 Years)  Aged Out     IPV IMMUNIZATION  Aged Out     MENINGITIS IMMUNIZATION  Aged Out     PAP  Discontinued     {Chronicprobdata (optional):999773}  {Decision Support (Optional):279741}    FHS-7:   Breast CA Risk Assessment (FHS-7) 2/25/2022 11/9/2022   Did any of your first-degree relatives have breast or ovarian cancer? No Unknown   Did any of your relatives have bilateral breast cancer? No No   Did any man in your family have breast cancer? No No   Did any woman in your family have breast and ovarian cancer? No No   Did any woman in your family have breast cancer before age 50 y? No No   Do you have 2 or more relatives with breast and/or ovarian cancer? No No   Do you have 2 or more relatives with breast and/or bowel cancer? No Yes     {If any of the questions to the BCRA (FHS-7) are answered yes, consider ordering referral for genetic counseling (Optional) :046250::\"click delete button to remove this line now\"}  {AMB Mammogram Decision Support (Optional) :116497}  Pertinent mammograms are reviewed under the imaging tab.    Review of Systems   Constitutional: Positive for chills and fever.   HENT: Positive for congestion. Negative for ear pain, hearing loss and sore throat.    Eyes: Negative for pain and visual disturbance.   Respiratory: Positive for cough and shortness of breath.    Cardiovascular: Negative for chest pain, palpitations and peripheral edema.   Gastrointestinal: Positive for constipation, diarrhea and heartburn. Negative for abdominal pain, hematochezia and nausea.   Breasts:  Negative for tenderness, breast mass and discharge. " "  Genitourinary: Positive for frequency and urgency. Negative for dysuria, genital sores, hematuria, pelvic pain, vaginal bleeding and vaginal discharge.   Musculoskeletal: Positive for arthralgias. Negative for joint swelling and myalgias.   Skin: Negative for rash.   Neurological: Positive for weakness. Negative for dizziness, headaches and paresthesias.   Psychiatric/Behavioral: Positive for mood changes. The patient is nervous/anxious.    Answers for HPI/ROS submitted by the patient on 2022  If you checked off any problems, how difficult have these problems made it for you to do your work, take care of things at home, or get along with other people?: Somewhat difficult  PHQ9 TOTAL SCORE: 10      {ROS COMP (Optional):578778}    OBJECTIVE:   LMP  (LMP Unknown)  Estimated body mass index is 28.8 kg/m  as calculated from the following:    Height as of 8/15/22: 1.753 m (5' 9\").    Weight as of 22: 88.5 kg (195 lb).  Physical Exam  {Exam (Optional) :951242}    {Diagnostic Test Results (Optional):756453::\"Diagnostic Test Results:\",\"Labs reviewed in Epic\"}    ASSESSMENT / PLAN:   {Dia Picklist:984390}    {Patient advised of split billing (Optional):419616}      COUNSELING:  {Medicare Counselin}    Estimated body mass index is 28.8 kg/m  as calculated from the following:    Height as of 8/15/22: 1.753 m (5' 9\").    Weight as of 22: 88.5 kg (195 lb).    {Weight Management Plan (ACO) Complete if BMI is abnormal-  Ages 18-64  BMI >24.9.  Age 65+ with BMI <23 or >30 (Optional):509345}    She reports that she has quit smoking. Her smoking use included cigarettes. She has never used smokeless tobacco.      Appropriate preventive services were discussed with this patient, including applicable screening as appropriate for cardiovascular disease, diabetes, osteopenia/osteoporosis, and glaucoma.  As appropriate for age/gender, discussed screening for colorectal cancer, prostate cancer, breast cancer, and " cervical cancer. Checklist reviewing preventive services available has been given to the patient.    Reviewed patients plan of care and provided an AVS. The {CarePlan:355452} for Nikki meets the Care Plan requirement. This Care Plan has been established and reviewed with the {PATIENT, FAMILY MEMBER, CAREGIVER:994651}.    Counseling Resources:  ATP IV Guidelines  Pooled Cohorts Equation Calculator  Breast Cancer Risk Calculator  Breast Cancer: Medication to Reduce Risk  FRAX Risk Assessment  ICSI Preventive Guidelines  Dietary Guidelines for Americans, 2010  USDA's MyPlate  ASA Prophylaxis  Lung CA Screening    Cirilo Tadeo MD  [facility]    Identified Health Risks:

## 2022-11-09 NOTE — PROGRESS NOTES
"SUBJECTIVE:   Margarita is a 62 year old who presents for Preventive Visit.    Patient has been advised of split billing requirements and indicates understanding: Yes  Are you in the first 12 months of your Medicare coverage?  No    Healthy Habits:     In general, how would you rate your overall health?  Fair    Frequency of exercise:  None    Do you usually eat at least 4 servings of fruit and vegetables a day, include whole grains    & fiber and avoid regularly eating high fat or \"junk\" foods?  No    Taking medications regularly:  No    Barriers to taking medications:  Problems remembering to take them and Other    Medication side effects:  None    Ability to successfully perform activities of daily living:  No assistance needed    Home Safety:  Lack of grab bars in the bathroom    Hearing Impairment:  Difficulty following a conversation in a noisy restaurant or crowded room and difficulty understanding soft or whispered speech    In the past 6 months, have you been bothered by leaking of urine? Yes    In general, how would you rate your overall mental or emotional health?  Fair      PHQ-2 Total Score: 2    Additional concerns today:  Yes    Has chronic fatigue and still bothers her more now then in the past.      Sees dermatology yearly.      Not exercising, used to swim but stopped during covid.      Saw neurology and thought memory might be related to stress and depression.     Use a quarter or lorazepam for anxiety, used to have psychologist but not now.     Do you feel safe in your environment? Yes    Have you ever done Advance Care Planning? (For example, a Health Directive, POLST, or a discussion with a medical provider or your loved ones about your wishes): No, advance care planning information given to patient to review.  Patient plans to discuss their wishes with loved ones or provider.      Right Ear:      1000 Hz RESPONSE- on Level: 40 db (Conditioning sound)   1000 Hz: RESPONSE- on Level:   20 db    2000 " Hz: RESPONSE- on Level:   20 db    4000 Hz: RESPONSE- on Level: 35 db     Left Ear:      4000 Hz: RESPONSE- on Level: 30 db   2000 Hz: RESPONSE- on Level: 30 db   1000 Hz: RESPONSE- on Level: 25 db    500 Hz: RESPONSE- on Level:   20 db     Right Ear:    500 Hz: RESPONSE- on Level:   20 db     Hearing Acuity: REFER    Hearing Assessment: normal   Fall risk  Fallen 2 or more times in the past year?: No  Any fall with injury in the past year?: No    Cognitive Screening:  Patient declined states she just had full Neurological testing done.    Do you have sleep apnea, excessive snoring or daytime drowsiness?: no    Reviewed and updated as needed this visit by clinical staff                  Reviewed and updated as needed this visit by Provider                 Social History     Tobacco Use     Smoking status: Former     Packs/day: 0.00     Years: 10.00     Pack years: 0.00     Types: Cigarettes     Smokeless tobacco: Never     Tobacco comments:     social smoking   Substance Use Topics     Alcohol use: Yes     Alcohol/week: 4.0 standard drinks     Comment: social     Alcohol Use 11/9/2022   Prescreen: >3 drinks/day or >7 drinks/week? No   Prescreen: >3 drinks/day or >7 drinks/week? -     Current providers sharing in care for this patient include:   Patient Care Team:  Cirilo Tadeo MD as PCP - General (Internal Medicine)  Aditya Gamez MD as MD (Dermatology)  Martina Andrade MD as MD (Ophthalmology)  Anselmo Cadet [facility] as Pharmacist (Pharmacist Clinician- Clinical Pharmacy Specialist)  Cirilo Tadeo MD as Assigned PCP  Geoffrey Loving PA-C as Assigned Sleep Provider  Montana Reed MD as MD (Neurology)  Montana Reed MD as MD (Neurology)  Montana Reed MD as Assigned Neuroscience Provider  Farhat Montanez MD as Assigned Musculoskeletal Provider  Lani Briones, PhD as Assigned Behavioral Health Provider    The following health maintenance items are reviewed in Epic and correct as  of today:  Health Maintenance   Topic Date Due     ANNUAL REVIEW OF HM ORDERS  Never done     HIV SCREENING  Never done     ZOSTER IMMUNIZATION (1 of 2) Never done     ASTHMA ACTION PLAN  11/25/2020     LUNG CANCER SCREENING  02/11/2021     COVID-19 Vaccine (4 - Booster for Pfizer series) 02/09/2022     MEDICARE ANNUAL WELLNESS VISIT  03/24/2022     INFLUENZA VACCINE (1) 09/01/2022     TSH W/FREE T4 REFLEX  02/21/2023     ASTHMA CONTROL TEST  05/09/2023     COLORECTAL CANCER SCREENING  09/06/2023     MAMMO SCREENING  02/25/2024     LIPID  03/24/2026     ADVANCE CARE PLANNING  03/24/2026     DTAP/TDAP/TD IMMUNIZATION (8 - Td or Tdap) 08/23/2027     HEPATITIS C SCREENING  Completed     Pneumococcal Vaccine: Pediatrics (0 to 5 Years) and At-Risk Patients (6 to 64 Years)  Aged Out     IPV IMMUNIZATION  Aged Out     MENINGITIS IMMUNIZATION  Aged Out     PAP  Discontinued     Lab work is in process  Pneumonia Vaccine:due for them next year.       FHS-7:   Breast CA Risk Assessment (FHS-7) 2/25/2022 11/9/2022   Did any of your first-degree relatives have breast or ovarian cancer? No Unknown   Did any of your relatives have bilateral breast cancer? No No   Did any man in your family have breast cancer? No No   Did any woman in your family have breast and ovarian cancer? No No   Did any woman in your family have breast cancer before age 50 y? No No   Do you have 2 or more relatives with breast and/or ovarian cancer? No No   Do you have 2 or more relatives with breast and/or bowel cancer? No Yes     click delete button to remove this line now  Mammogram Screening: Recommended mammography every 1-2 years with patient discussion and risk factor consideration  Pertinent mammograms are reviewed under the imaging tab.    Review of Systems   Constitutional: Positive for chills and fever.   HENT: Positive for congestion. Negative for ear pain, hearing loss and sore throat.    Eyes: Negative for pain and visual disturbance.  "  Respiratory: Positive for cough and shortness of breath.    Cardiovascular: Negative for chest pain, palpitations and peripheral edema.   Gastrointestinal: Positive for constipation, diarrhea and heartburn. Negative for abdominal pain, hematochezia and nausea.   Breasts:  Negative for tenderness, breast mass and discharge.   Genitourinary: Positive for frequency and urgency. Negative for dysuria, genital sores, hematuria, pelvic pain, vaginal bleeding and vaginal discharge.   Musculoskeletal: Positive for arthralgias. Negative for joint swelling and myalgias.   Skin: Negative for rash.   Neurological: Positive for weakness. Negative for dizziness, headaches and paresthesias.   Psychiatric/Behavioral: Positive for mood changes. The patient is nervous/anxious.    Answers for HPI/ROS submitted by the patient on 11/9/2022  If you checked off any problems, how difficult have these problems made it for you to do your work, take care of things at home, or get along with other people?: Somewhat difficult  PHQ9 TOTAL SCORE: 10          OBJECTIVE:   LMP  (LMP Unknown)  Estimated body mass index is 28.8 kg/m  as calculated from the following:    Height as of 8/15/22: 1.753 m (5' 9\").    Weight as of 9/21/22: 88.5 kg (195 lb).  Physical Exam  GENERAL: healthy, alert and no distress  EYES: Eyes grossly normal to inspection, PERRL and conjunctivae and sclerae normal  HENT: ear canals and TM's normal, nose and mouth without ulcers or lesions  NECK: no adenopathy, no asymmetry, masses, or scars and thyroid normal to palpation  RESP: lungs clear to auscultation - no rales, rhonchi or wheezes  CV: regular rate and rhythm, normal S1 S2, no S3 or S4, no murmur, click or rub, no peripheral edema and peripheral pulses strong  ABDOMEN: soft, nontender, no hepatosplenomegaly, no masses and bowel sounds normal  MS: no gross musculoskeletal defects noted, no edema  SKIN: no suspicious lesions or rashes  NEURO: Normal strength and tone, " mentation intact and speech normal  PSYCH: mentation appears normal, affect normal/bright        ASSESSMENT / PLAN:       ICD-10-CM    1. Medicare annual wellness visit, subsequent  Z00.00       2. KAILEE (generalized anxiety disorder)  F41.1 LORazepam (ATIVAN) 1 MG tablet      3. HSV (herpes simplex virus) infection  B00.9 valACYclovir (VALTREX) 1000 mg tablet      4. Hypothyroidism due to acquired atrophy of thyroid  E03.4 TSH with free T4 reflex      5. Hyperlipidemia LDL goal <130  E78.5 Lipid panel reflex to direct LDL Non-fasting     Comprehensive metabolic panel (BMP + Alb, Alk Phos, ALT, AST, Total. Bili, TP)      6. Chronic fatigue syndrome  G93.32 methylphenidate (RITALIN) 10 MG tablet     cyanocobalamin (CYANOCOBALAMIN) 1000 MCG/ML injection        Patient here for Medicare wellness check.  She is stable but has some concerns.  Colonoscopy is due next year  Mammogram is been done  We recommended a COVID-vaccine, flu shot.  She will just do the COVID vaccination today.        Memory loss, the patient has seen neuropsych with a normal 29 out of 30 test.  She saw neurology who thought this was related to stress and anxiety.  However the patient does not want antidepressant or antianxiety medications.  She does not want to see a psychologist now she used to see 1 before.  I will continue her lorazepam to use and very low-dose even though I told her this is not good for her fatigue or long-term for her memory.    Chronic fatigue syndrome she takes Ritalin once in a while I did refill this but we will not do this very often.    B12 shots to help with her energy she can take these at home.    Will do labs for her thyroid to make sure it is appropriate check her liver and kidneys and her cholesterol.          Patient has been advised of split billing requirements and indicates understanding: Yes      COUNSELING:  Reviewed preventive health counseling, as reflected in patient instructions       Regular exercise        "Healthy diet/nutrition       Immunizations    covid booster           Estimated body mass index is 28.8 kg/m  as calculated from the following:    Height as of 8/15/22: 1.753 m (5' 9\").    Weight as of 9/21/22: 88.5 kg (195 lb).    Weight management plan: Discussed healthy diet and exercise guidelines    She reports that she has quit smoking. Her smoking use included cigarettes. She has never used smokeless tobacco.      Appropriate preventive services were discussed with this patient, including applicable screening as appropriate for cardiovascular disease, diabetes, osteopenia/osteoporosis, and glaucoma.  As appropriate for age/gender, discussed screening for colorectal cancer, prostate cancer, breast cancer, and cervical cancer. Checklist reviewing preventive services available has been given to the patient.    Reviewed patients plan of care and provided an AVS. The Basic Care Plan (routine screening as documented in Health Maintenance) for Nikki meets the Care Plan requirement. This Care Plan has been established and reviewed with the Patient.    Counseling Resources:  ATP IV Guidelines  Pooled Cohorts Equation Calculator  Breast Cancer Risk Calculator  Breast Cancer: Medication to Reduce Risk  FRAX Risk Assessment  ICSI Preventive Guidelines  Dietary Guidelines for Americans, 2010  USDA's MyPlate  ASA Prophylaxis  Lung CA Screening    Cirilo Tadeo MD  [facility]    Identified Health Risks:  "

## 2022-11-10 RX ORDER — LEVOTHYROXINE SODIUM 100 UG/1
100 TABLET ORAL DAILY
Qty: 90 TABLET | Refills: 3 | Status: SHIPPED | OUTPATIENT
Start: 2022-11-10 | End: 2024-02-12

## 2022-11-11 ENCOUNTER — E-VISIT (OUTPATIENT)
Dept: INTERNAL MEDICINE | Facility: CLINIC | Age: 62
End: 2022-11-11
Payer: COMMERCIAL

## 2022-11-11 DIAGNOSIS — K57.32 DIVERTICULITIS OF COLON: Primary | ICD-10-CM

## 2022-11-11 PROCEDURE — 99421 OL DIG E/M SVC 5-10 MIN: CPT | Performed by: INTERNAL MEDICINE

## 2022-11-11 RX ORDER — METRONIDAZOLE 500 MG/1
500 TABLET ORAL 2 TIMES DAILY
Qty: 20 TABLET | Refills: 0 | Status: SHIPPED | OUTPATIENT
Start: 2022-11-11 | End: 2022-11-18

## 2022-11-11 RX ORDER — CIPROFLOXACIN 500 MG/1
500 TABLET, FILM COATED ORAL 2 TIMES DAILY
Qty: 20 TABLET | Refills: 0 | Status: SHIPPED | OUTPATIENT
Start: 2022-11-11 | End: 2023-03-14

## 2022-11-11 NOTE — PATIENT INSTRUCTIONS
Thank you for choosing us for your care. I have placed an order for a prescription so that you can start treatment. View your full visit summary for details by clicking on the link below. Your pharmacist will able to address any questions you may have about the medication.     If you're not feeling better within 5-7 days, please schedule an appointment.  You can schedule an appointment right here in [facility], or call [phone]  If the visit is for the same symptoms as your eVisit, we'll refund the cost of your eVisit if seen within seven days.

## 2022-11-14 ENCOUNTER — ALLIED HEALTH/NURSE VISIT (OUTPATIENT)
Dept: FAMILY MEDICINE | Facility: CLINIC | Age: 62
End: 2022-11-14
Payer: COMMERCIAL

## 2022-11-14 DIAGNOSIS — D51.8 VITAMIN B12 DEFICIENCY (DIETARY) ANEMIA: Primary | ICD-10-CM

## 2022-11-14 PROCEDURE — 99207 PR NO CHARGE NURSE ONLY: CPT

## 2022-11-14 PROCEDURE — 96372 THER/PROPH/DIAG INJ SC/IM: CPT | Performed by: INTERNAL MEDICINE

## 2022-11-14 RX ADMIN — CYANOCOBALAMIN 1000 MCG: 1000 INJECTION, SOLUTION INTRAMUSCULAR; SUBCUTANEOUS at 14:19

## 2023-02-07 ENCOUNTER — E-VISIT (OUTPATIENT)
Dept: URGENT CARE | Facility: CLINIC | Age: 63
End: 2023-02-07
Payer: COMMERCIAL

## 2023-02-07 DIAGNOSIS — B96.89 ACUTE BACTERIAL SINUSITIS: Primary | ICD-10-CM

## 2023-02-07 DIAGNOSIS — J01.90 ACUTE BACTERIAL SINUSITIS: Primary | ICD-10-CM

## 2023-02-07 PROCEDURE — 99421 OL DIG E/M SVC 5-10 MIN: CPT | Performed by: EMERGENCY MEDICINE

## 2023-02-07 NOTE — PATIENT INSTRUCTIONS
Sinusitis (Antibiotic Treatment)    The sinuses are air-filled spaces within the bones of the face. They connect to the inside of the nose. Sinusitis is an inflammation of the tissue that lines the sinuses. Sinusitis can occur during a cold. It can also happen due to allergies to pollens and other particles in the air. Sinusitis can cause symptoms of sinus congestion and a feeling of fullness. A sinus infection causes fever, headache, and facial pain. There is often green or yellow fluid draining from the nose or into the back of the throat (post-nasal drip). You have been given antibiotics to treat this condition.   Home care    Take the full course of antibiotics as instructed. Don't stop taking them, even when you feel better.    Drink plenty of water, hot tea, and other liquids as directed by the healthcare provider. This may help thin nasal mucus. It also may help your sinuses drain fluids.    Heat may help soothe painful areas of your face. Use a towel soaked in hot water. Or,  the shower and direct the warm spray onto your face. Using a vaporizer along with a menthol rub at night may also help soothe symptoms.     An expectorant with guaifenesin may help thin nasal mucus and help your sinuses drain fluids. Talk with your provider or pharmacists before taking an over-the-counter (OTC) medicine if you have any questions about it or its side effects..    You can use an OTC decongestant, unless a similar medicine was prescribed to you. Nasal sprays work the fastest. Use one that contains phenylephrine or oxymetazoline. First blow your nose gently. Then use the spray. Don't use these medicines more often than directed on the label. If you do, your symptoms may get worse. You may also take pills that contain pseudoephedrine. Don t use products that combine multiple medicines. This is because side effects may be increased. Read labels. You can also ask the pharmacist for help. (People with high blood  pressure should not use decongestants. They can raise blood pressure.) Talk with your provider or pharmacist if you have any questions about the medicine..    OTC antihistamines may help if allergies contributed to your sinusitis. Talk with your provider or pharmacist if you have any questions about the medicine..    Don't use nasal rinses or irrigation during an acute sinus infection, unless your healthcare provider tells you to. Rinsing may spread the infection to other areas in your sinuses.    Use acetaminophen or ibuprofen to control pain, unless another pain medicine was prescribed to you. If you have chronic liver or kidney disease or ever had a stomach ulcer, talk with your healthcare provider before using these medicines. Never give aspirin to anyone under age 18 who is ill with a fever. It may cause severe liver damage.    Don't smoke. This can make symptoms worse.    Follow-up care  Follow up with your healthcare provider, or as advised.   When to seek medical advice  Call your healthcare provider if any of these occur:     Facial pain or headache that gets worse    Stiff neck    Unusual drowsiness or confusion    Swelling of your forehead or eyelids    Symptoms don't go away in 10 days    Vision problems, such as blurred or double vision    Fever of 100.4 F (38 C) or higher, or as directed by your healthcare provider  Call 911  Call 911 if any of these occur:     Seizure    Trouble breathing    Feeling dizzy or faint    Fingernails, skin or lips look blue, purple , or gray  Prevention  Here are steps you can take to help prevent an infection:     Keep good hand washing habits.    Don t have close contact with people who have sore throats, colds, or other upper respiratory infections.    Don t smoke, and stay away from secondhand smoke.    Stay up to date with of your vaccines.  [employer] last reviewed this educational content on 12/1/2019 2000-2021 [employer]. All rights reserved. This  information is not intended as a substitute for professional medical care. Always follow your healthcare professional's instructions.        Dear Nikki Morales          Based on your responses and diagnosis, I have prescribed Augmentin to treat your symptoms. I have sent this to your pharmacy.?     It is also important to stay well hydrated, get lots of rest and take over-the-counter decongestants,?tylenol?or ibuprofen if you?are able to?take those medications per your primary care provider to help relieve discomfort.?     It is important that you take?all of?your prescribed medication even if your symptoms are improving after a few doses.? Taking?all of?your medicine helps prevent the symptoms from returning.?     If your symptoms worsen, you develop severe headache, vomiting, high fever (>102), or are not improving in 7 days, please contact your primary care provider for an appointment or visit any of our convenient Walk-in Care or Urgent Care Centers to be seen which can be found on our website?here.?     Thanks again for choosing?us?as your health care partner,?   ?  Hamzah Armstrong MD?

## 2023-02-16 ENCOUNTER — VIRTUAL VISIT (OUTPATIENT)
Dept: INTERNAL MEDICINE | Facility: CLINIC | Age: 63
End: 2023-02-16
Payer: COMMERCIAL

## 2023-02-16 DIAGNOSIS — F33.1 MODERATE RECURRENT MAJOR DEPRESSION (H): ICD-10-CM

## 2023-02-16 DIAGNOSIS — E66.01 MORBID OBESITY (H): Primary | ICD-10-CM

## 2023-02-16 PROCEDURE — 99213 OFFICE O/P EST LOW 20 MIN: CPT | Mod: VID | Performed by: INTERNAL MEDICINE

## 2023-02-16 RX ORDER — SEMAGLUTIDE 1.34 MG/ML
0.25 INJECTION, SOLUTION SUBCUTANEOUS
Qty: 9 ML | Refills: 0 | Status: SHIPPED | OUTPATIENT
Start: 2023-02-16 | End: 2023-09-28

## 2023-02-16 ASSESSMENT — PATIENT HEALTH QUESTIONNAIRE - PHQ9
SUM OF ALL RESPONSES TO PHQ QUESTIONS 1-9: 3
SUM OF ALL RESPONSES TO PHQ QUESTIONS 1-9: 3
10. IF YOU CHECKED OFF ANY PROBLEMS, HOW DIFFICULT HAVE THESE PROBLEMS MADE IT FOR YOU TO DO YOUR WORK, TAKE CARE OF THINGS AT HOME, OR GET ALONG WITH OTHER PEOPLE: NOT DIFFICULT AT ALL

## 2023-02-16 NOTE — PROGRESS NOTES
Margarita is a 63 year old who is being evaluated via a billable video visit.      How would you like to obtain your AVS? MyChart  If the video visit is dropped, the invitation should be resent by: Text to cell phone: [phone]  Will anyone else be joining your video visit? No            Subjective   Margarita is a 63 year old, presenting for the following health issues:  Weight Loss      History of Present Illness       Reason for visit:  Weight loss    She eats 0-1 servings of fruits and vegetables daily.She consumes 0 sweetened beverage(s) daily.She exercises with enough effort to increase her heart rate 20 to 29 minutes per day.  She exercises with enough effort to increase her heart rate 3 or less days per week.   She is taking medications regularly.    Today's PHQ-9         PHQ-9 Total Score: 3    PHQ-9 Q9 Thoughts of better off dead/self-harm past 2 weeks :   Not at all    How difficult have these problems made it for you to do your work, take care of things at home, or get along with other people: Not difficult at all        EMR reviewed including:             Complaint, History of Chief Complaint, Corresponding Review of Systems, and Complaint Specific Physical Examination.    #1   Obesity  Patient previously prescribed Ozempic to assist in weight loss.  Patient cannot afford Ozempic in the [place].  Patient notes that she is going to be going to [place] and would like a written prescription for Ozempic.  Patient does not have diabetes.  Patient would also like to have phentermine.        Patient has been interviewed, applicable history and applied review of systems have been performed.    Vital Signs:   LMP  (LMP Unknown)       Decision Making    Problem and Complexity     1. Morbid obesity (H)  As the patient's primary care provider has previously recommended Ozempic, I will get her a written prescription for this medication.  Have informed the patient that occasionally [place] medication is not what it says  "it it is.  Absolutely refused to give patient phentermine.   She is 63 years old and notes that she is already taking occasional Ritalin for her \"tiredness\".  Instructed to be on the alert for possible hypoglycemia.          2. Moderate recurrent major depression (H)  Currently stable without the need for medical intervention.                                FOLLOW UP   I have asked the patient to make an appointment for followup with either:  2.  The patient's preferred provider,  3.  Any available provider   as scheduled         video information:  Start time:   8:04 AM  End time:   8:10 AM  Duration:   6 minutes  Origination:   By patient at her home  Format: [employer]  Provider location:     Onsite, in my office, at the clinic, in [place].        I have carefully explained the diagnosis and treatment options to the patient.  The patient has displayed an understanding of the above, and all subsequent questions were answered.      DO VIKKI Pinedo    Portions of this note were produced using [employer]  Although every attempt at real-time proof reading has been made, occasional grammar/syntax errors may have been missed.    "

## 2023-03-13 DIAGNOSIS — G47.00 INSOMNIA, UNSPECIFIED TYPE: ICD-10-CM

## 2023-03-14 ENCOUNTER — OFFICE VISIT (OUTPATIENT)
Dept: CARDIOLOGY | Facility: CLINIC | Age: 63
End: 2023-03-14
Payer: COMMERCIAL

## 2023-03-14 ENCOUNTER — LAB (OUTPATIENT)
Dept: LAB | Facility: CLINIC | Age: 63
End: 2023-03-14
Payer: COMMERCIAL

## 2023-03-14 VITALS
WEIGHT: 209.8 LBS | HEART RATE: 68 BPM | DIASTOLIC BLOOD PRESSURE: 86 MMHG | SYSTOLIC BLOOD PRESSURE: 138 MMHG | OXYGEN SATURATION: 97 % | BODY MASS INDEX: 31.07 KG/M2 | HEIGHT: 69 IN

## 2023-03-14 DIAGNOSIS — Z78.9 STATIN INTOLERANCE: ICD-10-CM

## 2023-03-14 DIAGNOSIS — R00.0 TACHYCARDIA: ICD-10-CM

## 2023-03-14 DIAGNOSIS — R06.09 EXERTIONAL DYSPNEA: ICD-10-CM

## 2023-03-14 DIAGNOSIS — R94.31 ABNORMAL EKG: ICD-10-CM

## 2023-03-14 DIAGNOSIS — R00.2 PALPITATIONS: ICD-10-CM

## 2023-03-14 DIAGNOSIS — E78.5 HYPERLIPIDEMIA LDL GOAL <130: Primary | ICD-10-CM

## 2023-03-14 DIAGNOSIS — R60.0 PERIPHERAL EDEMA: ICD-10-CM

## 2023-03-14 DIAGNOSIS — I20.89 EXERTIONAL ANGINA (H): ICD-10-CM

## 2023-03-14 LAB
ALBUMIN SERPL BCG-MCNC: 4 G/DL (ref 3.5–5.2)
ALP SERPL-CCNC: 60 U/L (ref 35–104)
ALT SERPL W P-5'-P-CCNC: 15 U/L (ref 10–35)
ANION GAP SERPL CALCULATED.3IONS-SCNC: 9 MMOL/L (ref 7–15)
AST SERPL W P-5'-P-CCNC: 20 U/L (ref 10–35)
BILIRUB SERPL-MCNC: 0.4 MG/DL
BUN SERPL-MCNC: 21.7 MG/DL (ref 8–23)
CALCIUM SERPL-MCNC: 9.5 MG/DL (ref 8.8–10.2)
CHLORIDE SERPL-SCNC: 101 MMOL/L (ref 98–107)
CREAT SERPL-MCNC: 0.82 MG/DL (ref 0.51–0.95)
DEPRECATED HCO3 PLAS-SCNC: 28 MMOL/L (ref 22–29)
ERYTHROCYTE [DISTWIDTH] IN BLOOD BY AUTOMATED COUNT: 13 % (ref 10–15)
GFR SERPL CREATININE-BSD FRML MDRD: 79 ML/MIN/1.73M2
GLUCOSE SERPL-MCNC: 105 MG/DL (ref 70–99)
HCT VFR BLD AUTO: 44.4 % (ref 35–47)
HGB BLD-MCNC: 14.4 G/DL (ref 11.7–15.7)
MCH RBC QN AUTO: 29.4 PG (ref 26.5–33)
MCHC RBC AUTO-ENTMCNC: 32.4 G/DL (ref 31.5–36.5)
MCV RBC AUTO: 91 FL (ref 78–100)
PLATELET # BLD AUTO: 266 10E3/UL (ref 150–450)
POTASSIUM SERPL-SCNC: 4.8 MMOL/L (ref 3.4–5.3)
PROT SERPL-MCNC: 7.1 G/DL (ref 6.4–8.3)
RBC # BLD AUTO: 4.89 10E6/UL (ref 3.8–5.2)
SODIUM SERPL-SCNC: 138 MMOL/L (ref 136–145)
T4 FREE SERPL-MCNC: 0.94 NG/DL (ref 0.9–1.7)
TSH SERPL DL<=0.005 MIU/L-ACNC: 6.33 UIU/ML (ref 0.3–4.2)
WBC # BLD AUTO: 11 10E3/UL (ref 4–11)

## 2023-03-14 PROCEDURE — 80053 COMPREHEN METABOLIC PANEL: CPT | Performed by: INTERNAL MEDICINE

## 2023-03-14 PROCEDURE — 93000 ELECTROCARDIOGRAM COMPLETE: CPT | Performed by: INTERNAL MEDICINE

## 2023-03-14 PROCEDURE — 84443 ASSAY THYROID STIM HORMONE: CPT | Performed by: INTERNAL MEDICINE

## 2023-03-14 PROCEDURE — 36415 COLL VENOUS BLD VENIPUNCTURE: CPT | Performed by: INTERNAL MEDICINE

## 2023-03-14 PROCEDURE — 99204 OFFICE O/P NEW MOD 45 MIN: CPT | Performed by: INTERNAL MEDICINE

## 2023-03-14 PROCEDURE — 84439 ASSAY OF FREE THYROXINE: CPT | Performed by: INTERNAL MEDICINE

## 2023-03-14 PROCEDURE — 85027 COMPLETE CBC AUTOMATED: CPT | Performed by: INTERNAL MEDICINE

## 2023-03-14 RX ORDER — NITROGLYCERIN 0.4 MG/1
TABLET SUBLINGUAL
Qty: 25 TABLET | Refills: 11 | Status: SHIPPED | OUTPATIENT
Start: 2023-03-14

## 2023-03-14 RX ORDER — ZOLPIDEM TARTRATE 5 MG/1
TABLET ORAL
Qty: 60 TABLET | Refills: 5 | Status: SHIPPED | OUTPATIENT
Start: 2023-03-14 | End: 2023-09-26

## 2023-03-14 NOTE — LETTER
3/14/2023    Cirilo Tadeo MD  [address] [facility] [address]    RE: Nikki Morales       Dear Colleague,     I had the pleasure of seeing Nikki Morales in the [facility].  [facility] Progress note    Assessment:  1. Exertional dyspnea and angina  2. History of diastolic dysfunction  3. Dyslipidemia  on 11/9/2020  4. History of statin intolerance. States she has tried several statins and had severe myalgias.  5. History of paroxysmal atrial fibrillation, no known recurrence.  She is not anticoagulated.  6. History of short run of PSVT and NSVT on a Zio patch monitor in 2018    Plan:  1. Echocardiogram  2. Exercise nuclear stress test to evaluate for ischemia  3. Continue daily aspirin  4. Prescription for nitroglycerin sublingual with instructions for use discussed in clinic today.  5. CBC, CMP, TSH  6. Initiate prior authorization for PCSK9 inhibitor    7. Arrange for cardiology [facility] follow-up to discuss test results.  She lives in [facility] and would prefer to have follow-up there though she may be willing to come to [place] for MD follow-ups in the future.  8. She was instructed to call 911 for any dyspnea or angina but does not resolve with rest or nitroglycerin.  9. Low threshold to consider invasive angiogram.      HPI:   Nikki Morales is a very nice 63-year-old woman with history of paroxysmal atrial fibrillation requiring cardioversion in the past.  She has previously been seen in cardiology clinic by Dr. Arrieta for paroxysmal atrial fibrillation and history of social alcohol and tobacco use she had a Zio patch monitor in 2018 which showed 8 beats of NSVT and 30 beats of PSVT.  She also had an echocardiogram in 2018 which showed normal EF 55 to 60% with grade 1 diastolic dysfunction.  She has not been seen in cardiology clinic for 5 years.  I am happy to meet her today.    For the past 3 weeks she has noticed a marked decrease in activity  tolerance. It is difficult to walk up the two flights of stairs in her home due to exertional dyspnea.  She does not have chest pain but her chest does feel somewhat tight with exertion. The other day she was cleaning out her closet and her heart rate was 128 and she felt short of breath and was sweaty. She has also noticed intermittent swelling in her ankles. Some days she feels totally exhausted and has pain everywhere. Sometimes she notes shooting pain in both jaws but points to her temporomandibular joints and this is not necessarily an exertional symptom.    EKG today shows sinus rhythm low voltage QRS    I personally reviewed her previous cardiac test results as summarized above.      Encounter Diagnosis   Name Primary?     Hyperlipidemia LDL goal <130 Yes       CURRENT MEDICATIONS:  Current Outpatient Medications   Medication Sig Dispense Refill     budesonide (PULMICORT) 0.5 MG/2ML neb solution Mix 2 vials with 1 oz coffee flavoring and drink twice a day and rinse mouth aftrerwards 60 mL 1     calcium carbonate (TUMS) 500 MG chewable tablet Take 2 chew tab by mouth 3 times daily as needed for other (bloating/flatulence)       cyanocobalamin (CYANOCOBALAMIN) 1000 MCG/ML injection Inject 1 mL (1,000 mcg) into the muscle every 30 days 10 mL 1     estradiol (VIVELLE-DOT) 0.05 MG/24HR patch Place 1 patch onto the skin twice a week       fexofenadine (ALLEGRA) 60 MG tablet TAKE ONE TABLET BY MOUTH TWICE A DAY 30 tablet 2     fluticasone (FLOVENT HFA) 220 MCG/ACT inhaler Inhale 1 puff into the lungs 2 times daily 36 g 1     levothyroxine (SYNTHROID/LEVOTHROID) 100 MCG tablet Take 1 tablet (100 mcg) by mouth daily 90 tablet 3     LORazepam (ATIVAN) 1 MG tablet Take 1 tablet (1 mg) by mouth 2 times daily as needed for anxiety 20 tablet 0     methylphenidate (RITALIN) 10 MG tablet Take 1 tablet (10 mg) by mouth 2 times daily Takes as needed 30 tablet 0     mirabegron (MYRBETRIQ) 25 MG 24 hr tablet Take 1 tablet (25  mg) by mouth daily 90 tablet 1     multivitamin w/minerals (THERA-VIT-M) tablet Take 1 tablet by mouth daily 100 tablet 3     pantoprazole (PROTONIX) 40 MG EC tablet Take 40 mg by mouth daily       semaglutide (OZEMPIC, 0.25 OR 0.5 MG/DOSE,) 2 MG/1.5ML SOPN pen Inject 0.25 mg Subcutaneous every 7 days 9 mL 0     valACYclovir (VALTREX) 1000 mg tablet Take 1 tablet (1,000 mg) by mouth 2 times daily 20 tablet 3     zolpidem (AMBIEN) 5 MG tablet TAKE TWO TABLETS BY MOUTH IN THE EVENING AS NEEDED FOR SLEEP 60 tablet 5     Has not taken ozempic due to cost    ALLERGIES     Allergies   Allergen Reactions     Codeine Other (See Comments)     Causes agitation       PAST MEDICAL, SURGICAL, FAMILY, SOCIAL HISTORY:  History was reviewed and updated as needed, see medical record.    Both brothers had Quad CABG (ages 60s-70s)  Dad had 3 strokes  Sister  of a stroke at age 51    She is mostly a nonsmoker, she had two cigarettes when she was deerhunting last .   Drinks EtOH less than once a week on average, maybe once/month    REVIEW OF SYSTEMS:  Skin:  Negative     Eyes:  Positive for glasses  ENT:  Negative    Respiratory:  Positive for dyspnea on exertion  Cardiovascular:    palpitations;Positive for;edema;fatigue;lightheadedness  Gastroenterology: Positive for heartburn  Genitourinary:  Negative    Musculoskeletal:  Positive for arthritis  Neurologic:  Negative    Psychiatric:  Positive for anxiety;depression  Heme/Lymph/Imm:  Positive for allergies  Endocrine:  Positive for thyroid disorder    PHYSICAL EXAM:      BP: 138/86 Pulse: 68     SpO2: 97 %      Vital Signs with Ranges  Pulse:  [68] 68  BP: (138)/(86) 138/86  SpO2:  [97 %] 97 %  209 lbs 12.8 oz    Constitutional: awake, alert, no distress  Eyes: sclera nonicteric  ENT: trachea midline  Respiratory: Clear to auscultation bilaterally  Cardiovascular: Somewhat distant heart tones no murmur appreciated no rub or gallop  GI: nondistended, nontender, bowel sounds  present  Skin: dry, no rash no edema during visit today  Musculoskeletal: grossly normal muscle bulk and tone  Neurologic: no focal deficits  Neuropsychiatric: Normal affect    Recent Lab Results:  LIPID RESULTS:  Lab Results   Component Value Date    CHOL 342 (H) 11/09/2022    CHOL 190 03/24/2021    HDL 57 11/09/2022    HDL 59 03/24/2021     (H) 11/09/2022     (H) 03/24/2021    TRIG 325 (H) 11/09/2022    TRIG 114 03/24/2021    CHOLHDLRATIO 6.6 (H) 09/23/2015       LIVER ENZYME RESULTS:  Lab Results   Component Value Date    AST 21 11/09/2022    AST 14 03/24/2021    ALT 40 11/09/2022    ALT 25 03/24/2021       CBC RESULTS:  Lab Results   Component Value Date    WBC 6.5 03/15/2022    WBC 7.3 03/24/2021    RBC 4.44 02/21/2022    RBC 4.62 03/24/2021    HGB 13.3 02/21/2022    HGB 13.7 03/24/2021    HCT 40.4 02/21/2022    HCT 41.1 03/24/2021    MCV 91 02/21/2022    MCV 89 03/24/2021    MCH 30.0 02/21/2022    MCH 29.7 03/24/2021    MCHC 32.9 02/21/2022    MCHC 33.3 03/24/2021    RDW 12.9 02/21/2022    RDW 12.5 03/24/2021     02/21/2022     03/24/2021       BMP RESULTS:  Lab Results   Component Value Date     11/09/2022     03/24/2021    POTASSIUM 4.1 11/09/2022    POTASSIUM 4.5 03/24/2021    CHLORIDE 107 11/09/2022    CHLORIDE 108 03/24/2021    CO2 29 11/09/2022    CO2 28 03/24/2021    ANIONGAP 4 11/09/2022    ANIONGAP 4 03/24/2021    GLC 83 11/09/2022     (H) 03/24/2021    BUN 23 11/09/2022    BUN 33 (H) 03/24/2021    CR 0.82 11/09/2022    CR 0.75 03/24/2021    GFRESTIMATED 79 11/09/2022    GFRESTIMATED 85 03/24/2021    GFRESTBLACK >90 03/24/2021    ROLANDA 9.1 11/09/2022    ROLANDA 9.3 03/24/2021        A1C RESULTS:  Lab Results   Component Value Date    A1C 5.6 01/26/2018       INR RESULTS:  Lab Results   Component Value Date    INR 0.95 11/12/2018    INR 0.96 01/15/2018             Thank you for allowing me to participate in the care of your patient.      Sincerely,     Lisa  MD Brain     [facility]  cc:   No referring provider defined for this encounter.

## 2023-03-14 NOTE — PROGRESS NOTES
[facility] Progress note    Assessment:  1. Exertional dyspnea and angina  2. History of diastolic dysfunction  3. Dyslipidemia  on 11/9/2020  4. History of statin intolerance. States she has tried several statins and had severe myalgias.  5. History of paroxysmal atrial fibrillation, no known recurrence.  She is not anticoagulated.  6. History of short run of PSVT and NSVT on a Zio patch monitor in 2018    Plan:  1. Echocardiogram  2. Exercise nuclear stress test to evaluate for ischemia  3. Continue daily aspirin  4. Prescription for nitroglycerin sublingual with instructions for use discussed in clinic today.  5. CBC, CMP, TSH  6. Initiate prior authorization for PCSK9 inhibitor    7. Arrange for cardiology [facility] follow-up to discuss test results.  She lives in [place] and would prefer to have follow-up there though she may be willing to come to [place] for MD follow-ups in the future.  8. She was instructed to call 911 for any dyspnea or angina but does not resolve with rest or nitroglycerin.  9. Low threshold to consider invasive angiogram.      HPI:   Nikki Morales is a very nice 63-year-old woman with history of paroxysmal atrial fibrillation requiring cardioversion in the past.  She has previously been seen in cardiology clinic by Dr. Arrieta for paroxysmal atrial fibrillation and history of social alcohol and tobacco use she had a Zio patch monitor in 2018 which showed 8 beats of NSVT and 30 beats of PSVT.  She also had an echocardiogram in 2018 which showed normal EF 55 to 60% with grade 1 diastolic dysfunction.  She has not been seen in cardiology clinic for 5 years.  I am happy to meet her today.    For the past 3 weeks she has noticed a marked decrease in activity tolerance. It is difficult to walk up the two flights of stairs in her home due to exertional dyspnea.  She does not have chest pain but her chest does feel somewhat tight with exertion. The other day she was cleaning  out her closet and her heart rate was 128 and she felt short of breath and was sweaty. She has also noticed intermittent swelling in her ankles. Some days she feels totally exhausted and has pain everywhere. Sometimes she notes shooting pain in both jaws but points to her temporomandibular joints and this is not necessarily an exertional symptom.    EKG today shows sinus rhythm low voltage QRS    I personally reviewed her previous cardiac test results as summarized above.      Encounter Diagnosis   Name Primary?     Hyperlipidemia LDL goal <130 Yes       CURRENT MEDICATIONS:  Current Outpatient Medications   Medication Sig Dispense Refill     budesonide (PULMICORT) 0.5 MG/2ML neb solution Mix 2 vials with 1 oz coffee flavoring and drink twice a day and rinse mouth aftrerwards 60 mL 1     calcium carbonate (TUMS) 500 MG chewable tablet Take 2 chew tab by mouth 3 times daily as needed for other (bloating/flatulence)       cyanocobalamin (CYANOCOBALAMIN) 1000 MCG/ML injection Inject 1 mL (1,000 mcg) into the muscle every 30 days 10 mL 1     estradiol (VIVELLE-DOT) 0.05 MG/24HR patch Place 1 patch onto the skin twice a week       fexofenadine (ALLEGRA) 60 MG tablet TAKE ONE TABLET BY MOUTH TWICE A DAY 30 tablet 2     fluticasone (FLOVENT HFA) 220 MCG/ACT inhaler Inhale 1 puff into the lungs 2 times daily 36 g 1     levothyroxine (SYNTHROID/LEVOTHROID) 100 MCG tablet Take 1 tablet (100 mcg) by mouth daily 90 tablet 3     LORazepam (ATIVAN) 1 MG tablet Take 1 tablet (1 mg) by mouth 2 times daily as needed for anxiety 20 tablet 0     methylphenidate (RITALIN) 10 MG tablet Take 1 tablet (10 mg) by mouth 2 times daily Takes as needed 30 tablet 0     mirabegron (MYRBETRIQ) 25 MG 24 hr tablet Take 1 tablet (25 mg) by mouth daily 90 tablet 1     multivitamin w/minerals (THERA-VIT-M) tablet Take 1 tablet by mouth daily 100 tablet 3     pantoprazole (PROTONIX) 40 MG EC tablet Take 40 mg by mouth daily       semaglutide (OZEMPIC,  0.25 OR 0.5 MG/DOSE,) 2 MG/1.5ML SOPN pen Inject 0.25 mg Subcutaneous every 7 days 9 mL 0     valACYclovir (VALTREX) 1000 mg tablet Take 1 tablet (1,000 mg) by mouth 2 times daily 20 tablet 3     zolpidem (AMBIEN) 5 MG tablet TAKE TWO TABLETS BY MOUTH IN THE EVENING AS NEEDED FOR SLEEP 60 tablet 5     Has not taken ozempic due to cost    ALLERGIES     Allergies   Allergen Reactions     Codeine Other (See Comments)     Causes agitation       PAST MEDICAL, SURGICAL, FAMILY, SOCIAL HISTORY:  History was reviewed and updated as needed, see medical record.    Both brothers had Quad CABG (ages 60s-70s)  Dad had 3 strokes  Sister  of a stroke at age 51    She is mostly a nonsmoker, she had two cigarettes when she was deerhunting last .   Drinks EtOH less than once a week on average, maybe once/month    REVIEW OF SYSTEMS:  Skin:  Negative     Eyes:  Positive for glasses  ENT:  Negative    Respiratory:  Positive for dyspnea on exertion  Cardiovascular:    palpitations;Positive for;edema;fatigue;lightheadedness  Gastroenterology: Positive for heartburn  Genitourinary:  Negative    Musculoskeletal:  Positive for arthritis  Neurologic:  Negative    Psychiatric:  Positive for anxiety;depression  Heme/Lymph/Imm:  Positive for allergies  Endocrine:  Positive for thyroid disorder    PHYSICAL EXAM:      BP: 138/86 Pulse: 68     SpO2: 97 %      Vital Signs with Ranges  Pulse:  [68] 68  BP: (138)/(86) 138/86  SpO2:  [97 %] 97 %  209 lbs 12.8 oz    Constitutional: awake, alert, no distress  Eyes: sclera nonicteric  ENT: trachea midline  Respiratory: Clear to auscultation bilaterally  Cardiovascular: Somewhat distant heart tones no murmur appreciated no rub or gallop  GI: nondistended, nontender, bowel sounds present  Skin: dry, no rash no edema during visit today  Musculoskeletal: grossly normal muscle bulk and tone  Neurologic: no focal deficits  Neuropsychiatric: Normal affect    Recent Lab Results:  LIPID RESULTS:  Lab  Results   Component Value Date    CHOL 342 (H) 11/09/2022    CHOL 190 03/24/2021    HDL 57 11/09/2022    HDL 59 03/24/2021     (H) 11/09/2022     (H) 03/24/2021    TRIG 325 (H) 11/09/2022    TRIG 114 03/24/2021    CHOLHDLRATIO 6.6 (H) 09/23/2015       LIVER ENZYME RESULTS:  Lab Results   Component Value Date    AST 21 11/09/2022    AST 14 03/24/2021    ALT 40 11/09/2022    ALT 25 03/24/2021       CBC RESULTS:  Lab Results   Component Value Date    WBC 6.5 03/15/2022    WBC 7.3 03/24/2021    RBC 4.44 02/21/2022    RBC 4.62 03/24/2021    HGB 13.3 02/21/2022    HGB 13.7 03/24/2021    HCT 40.4 02/21/2022    HCT 41.1 03/24/2021    MCV 91 02/21/2022    MCV 89 03/24/2021    MCH 30.0 02/21/2022    MCH 29.7 03/24/2021    MCHC 32.9 02/21/2022    MCHC 33.3 03/24/2021    RDW 12.9 02/21/2022    RDW 12.5 03/24/2021     02/21/2022     03/24/2021       BMP RESULTS:  Lab Results   Component Value Date     11/09/2022     03/24/2021    POTASSIUM 4.1 11/09/2022    POTASSIUM 4.5 03/24/2021    CHLORIDE 107 11/09/2022    CHLORIDE 108 03/24/2021    CO2 29 11/09/2022    CO2 28 03/24/2021    ANIONGAP 4 11/09/2022    ANIONGAP 4 03/24/2021    GLC 83 11/09/2022     (H) 03/24/2021    BUN 23 11/09/2022    BUN 33 (H) 03/24/2021    CR 0.82 11/09/2022    CR 0.75 03/24/2021    GFRESTIMATED 79 11/09/2022    GFRESTIMATED 85 03/24/2021    GFRESTBLACK >90 03/24/2021    ROLANDA 9.1 11/09/2022    ROLANDA 9.3 03/24/2021        A1C RESULTS:  Lab Results   Component Value Date    A1C 5.6 01/26/2018       INR RESULTS:  Lab Results   Component Value Date    INR 0.95 11/12/2018    INR 0.96 01/15/2018

## 2023-03-15 ENCOUNTER — TELEPHONE (OUTPATIENT)
Dept: CARDIOLOGY | Facility: CLINIC | Age: 63
End: 2023-03-15

## 2023-03-15 NOTE — TELEPHONE ENCOUNTER
[facility]    Phone Message    May a detailed message be left on voicemail: no     Reason for Call: Other: Margarita called to schedule a NM Exercise stress test (nuc card) and Echocardiogram Complete. Please reach out to Margarita at [phone].     Action Taken: Other: [facility]    Travel Screening: Not Applicable

## 2023-03-17 ENCOUNTER — TELEPHONE (OUTPATIENT)
Dept: CARDIOLOGY | Facility: CLINIC | Age: 63
End: 2023-03-17
Payer: COMMERCIAL

## 2023-03-17 DIAGNOSIS — Z78.9 STATIN INTOLERANCE: ICD-10-CM

## 2023-03-17 DIAGNOSIS — E78.5 HYPERLIPIDEMIA LDL GOAL <130: Primary | ICD-10-CM

## 2023-03-17 NOTE — TELEPHONE ENCOUNTER
Central Prior Authorization Team   Phone: [phone]    PA Initiation    Medication: ocumab (REPATHA) 140 MG/ML prefilled autoinjector  Insurance Company: [employer] - Phone [phone] Fax [phone]  Pharmacy Filling the Rx: [facility] - [place], [place] - [address]  Filling Pharmacy Phone: [phone]  Filling Pharmacy Fax:    Start Date: 3/17/2023    Finished through EPA

## 2023-03-17 NOTE — TELEPHONE ENCOUNTER
Received message from Dr. De La Paz:     Lisa De La Paz MD  [facility] Heart Team 1  Please initiate process for PCSK-9 inhibitor. She is intolerant of multiple statins.     Lisa De La Paz MD on 3/14/2023 at 1:16 PM    Called pt and advised will start PA process for Repatha. [employer] message sent to pt as well. Routing to PA team.

## 2023-03-20 NOTE — TELEPHONE ENCOUNTER
Prior Authorization Approval    Authorization Effective Date: 1/1/2023  Authorization Expiration Date: 3/20/2024  Medication: (REPATHA) 140 MG/ML prefilled autoinjector  Approved Dose/Quantity:   Reference #:     Insurance Company: [employer] - Phone [phone] Fax [phone]  Expected CoPay:       CoPay Card Available:      Foundation Assistance Needed:    Which Pharmacy is filling the prescription (Not needed for infusion/clinic administered): [facility] - [place], [place] - [address]  Pharmacy Notified: Yes  Patient Notified: Yes

## 2023-03-21 ENCOUNTER — HOSPITAL ENCOUNTER (OUTPATIENT)
Dept: MAMMOGRAPHY | Facility: CLINIC | Age: 63
Discharge: HOME | End: 2023-03-21
Attending: INTERNAL MEDICINE | Admitting: INTERNAL MEDICINE
Payer: MEDICARE

## 2023-03-21 DIAGNOSIS — Z12.31 VISIT FOR SCREENING MAMMOGRAM: ICD-10-CM

## 2023-03-21 PROCEDURE — 77067 SCR MAMMO BI INCL CAD: CPT

## 2023-03-23 ENCOUNTER — HOSPITAL ENCOUNTER (OUTPATIENT)
Dept: CARDIOLOGY | Facility: CLINIC | Age: 63
Discharge: HOME | End: 2023-03-23
Attending: INTERNAL MEDICINE
Payer: MEDICARE

## 2023-03-23 ENCOUNTER — HOSPITAL ENCOUNTER (OUTPATIENT)
Dept: NUCLEAR MEDICINE | Facility: CLINIC | Age: 63
Setting detail: NUCLEAR MEDICINE
Discharge: HOME | End: 2023-03-23
Attending: INTERNAL MEDICINE
Payer: MEDICARE

## 2023-03-23 DIAGNOSIS — R06.09 EXERTIONAL DYSPNEA: ICD-10-CM

## 2023-03-23 DIAGNOSIS — R00.2 PALPITATIONS: ICD-10-CM

## 2023-03-23 DIAGNOSIS — I20.89 EXERTIONAL ANGINA (H): ICD-10-CM

## 2023-03-23 DIAGNOSIS — R94.31 ABNORMAL EKG: ICD-10-CM

## 2023-03-23 LAB
CV STRESS MAX HR HE: 140
LVEF ECHO: NORMAL
NUC STRESS EJECTION FRACTION: 76 %
RATE PRESSURE PRODUCT: NORMAL
STRESS ANGINA INDEX: 0
STRESS DUKE TREADMILL SCORE: 1
STRESS ECHO BASELINE DIASTOLIC HE: 76
STRESS ECHO BASELINE HR: 63 BPM
STRESS ECHO BASELINE SYSTOLIC BP: 144
STRESS ECHO CALCULATED PERCENT HR: 90 %
STRESS ECHO LAST STRESS DIASTOLIC BP: 76
STRESS ECHO LAST STRESS SYSTOLIC BP: 200
STRESS ECHO POST ESTIMATED WORKLOAD: 8 METS
STRESS ECHO POST EXERCISE DUR MIN: 6 MIN
STRESS ECHO POST EXERCISE DUR SEC: 0 SEC
STRESS ECHO TARGET HR: 155
STRESS ST DEPRESSION: 1 MM

## 2023-03-23 PROCEDURE — 78452 HT MUSCLE IMAGE SPECT MULT: CPT | Mod: 26 | Performed by: INTERNAL MEDICINE

## 2023-03-23 PROCEDURE — 93016 CV STRESS TEST SUPVJ ONLY: CPT | Performed by: INTERNAL MEDICINE

## 2023-03-23 PROCEDURE — 343N000001 HC RX 343: Performed by: INTERNAL MEDICINE

## 2023-03-23 PROCEDURE — A9502 TC99M TETROFOSMIN: HCPCS | Performed by: INTERNAL MEDICINE

## 2023-03-23 PROCEDURE — 93306 TTE W/DOPPLER COMPLETE: CPT | Mod: 26 | Performed by: INTERNAL MEDICINE

## 2023-03-23 PROCEDURE — 93017 CV STRESS TEST TRACING ONLY: CPT

## 2023-03-23 PROCEDURE — 93018 CV STRESS TEST I&R ONLY: CPT | Performed by: INTERNAL MEDICINE

## 2023-03-23 PROCEDURE — 93017 CV STRESS TEST TRACING ONLY: CPT | Performed by: REHABILITATION PRACTITIONER

## 2023-03-23 PROCEDURE — G1010 CDSM STANSON: HCPCS

## 2023-03-23 PROCEDURE — G1010 CDSM STANSON: HCPCS | Performed by: INTERNAL MEDICINE

## 2023-03-23 PROCEDURE — 93306 TTE W/DOPPLER COMPLETE: CPT

## 2023-03-23 RX ADMIN — TETROFOSMIN 10.5 MILLICURIE: 1.38 INJECTION, POWDER, LYOPHILIZED, FOR SOLUTION INTRAVENOUS at 11:55

## 2023-03-23 RX ADMIN — TETROFOSMIN 32.3 MILLICURIE: 1.38 INJECTION, POWDER, LYOPHILIZED, FOR SOLUTION INTRAVENOUS at 13:15

## 2023-03-30 NOTE — TELEPHONE ENCOUNTER
Per message from clinic-   Patient was called by  mailorder pharmacy and copay is over $700 for 84 days supply through Part D insurance.     With the copay now being known (couldn't run a test claim earlier b/c Rx was call patient queue at the pharmacy)   Will route to liaison team to see if patient is eligible for yenny assistance or free drug program.

## 2023-03-31 NOTE — TELEPHONE ENCOUNTER
Enrolled the patient in a yenny through the [employer] and added the processing information to her profile at [facility].         Thank you,    Puja Singer [facility]  Specialty Pharmacy Clinic Liaison - CardiologyNeurologyMultiple Sclerosis  [address] [address]  Ph: [phone] Fax: [phone]  [email or web address]

## 2023-03-31 NOTE — TELEPHONE ENCOUNTER
Left VM for pt with information from pharmacy liaison and requested call back to Team 1 to discuss starting Repatha.

## 2023-04-01 NOTE — TELEPHONE ENCOUNTER
Order placed and pended. Please sign order if appropriate and route back to me..     Patient/Caregiver provided printed discharge information.

## 2023-04-04 ENCOUNTER — E-VISIT (OUTPATIENT)
Dept: FAMILY MEDICINE | Facility: CLINIC | Age: 63
End: 2023-04-04
Payer: COMMERCIAL

## 2023-04-04 DIAGNOSIS — J45.30 MILD PERSISTENT ASTHMA WITHOUT COMPLICATION: Primary | ICD-10-CM

## 2023-04-04 DIAGNOSIS — J21.9 ACUTE BRONCHIOLITIS, UNSPECIFIED: ICD-10-CM

## 2023-04-04 PROCEDURE — 99421 OL DIG E/M SVC 5-10 MIN: CPT | Performed by: FAMILY MEDICINE

## 2023-04-05 RX ORDER — AZITHROMYCIN 250 MG/1
TABLET, FILM COATED ORAL
Qty: 6 TABLET | Refills: 0 | Status: SHIPPED | OUTPATIENT
Start: 2023-04-05 | End: 2023-04-10

## 2023-04-05 RX ORDER — PREDNISONE 20 MG/1
20 TABLET ORAL DAILY
Qty: 5 TABLET | Refills: 0 | Status: SHIPPED | OUTPATIENT
Start: 2023-04-05 | End: 2023-04-10

## 2023-04-05 NOTE — PATIENT INSTRUCTIONS
"    Dear Nikki Morales    After reviewing your responses, I've been able to diagnose you with \"Bronchitis\" which is a common infection of your lungs. While this is most commonly caused by a virus, the symptoms you have given suggest you should be treated with antibiotics.     I have sent zpak and prednisone to your pharmacy to treat this infection.     It is important that you take all of your prescribed medication even if your symptoms are improving after a few doses. Taking all of your medicine helps prevent the symptoms from returning.     If your symptoms worsen, you develop chest pain or shortness of breath, fevers over 101, or are not improving in 5 days, please contact your primary care provider for an appointment or visit any of our convenient Walk-in Care or Urgent Care Centers to be seen which can be found on our website here.    Thanks again for choosing us as your health care partner,    Stefano Vasquez MD    "

## 2023-04-17 NOTE — PROGRESS NOTES
~Cardiology Clinic Visit~    Primary Cardiologist: Dr. De La Paz  Reason for visit: Testing review follow up    History of Present Illness    Nikki Morales is a very nice 63-year-old woman with history of paroxysmal atrial fibrillation requiring cardioversion in the past, history of social alcohol and tobacco use.      She has previously been seen in cardiology clinic by Dr. Rogers for paroxysmal atrial fibrillation.  She had a Zio patch monitor in 2018 which showed 8 beats of NSVT and 30 beats of PSVT.  She also had an echocardiogram in 2018 which showed normal EF 55 to 60% with grade 1 diastolic dysfunction.  She has not been seen in cardiology clinic for 5 years up until she re-established care with Dr. De La Paz this past March.     In summary, she noticed 3-weeks of marked decrease in activity tolerance.   She had difficulty walking the two flights of stairs in her home due to exertional dyspnea.  She does not have chest pain but her chest does feel somewhat tight with exertion.  For example, she was cleaning out her closet and her heart rate was 128 and she felt short of breath and was sweaty. She has also noticed intermittent swelling in her ankles. Some days she feels totally exhausted and has pain everywhere. Sometimes she notes shooting pain in both jaws but points to her temporomandibular joints and this is not necessarily an exertional symptom.     EKG showed sinus rhythm low voltage QRS.    Based on these symptoms, she was recommended an echocardiogram and exercise stress test.  Echocardiogram completed on 3/23/2023 showed normal EF at 60-65% with no WMA; there is 1+ TR, and overall, no significant changes compared to previous study.      Exercise stress test completed the same day was negative for inducible myocardial ischemia or infarct.  LV function is hyperdynamic, and EF at stress is 76%.  Baseline EKG showed sinus rhythm, and with stress, there was 1.0 mm of upsloping ST segment  depression in II, III, aVF, V4, V5 and V6 leads.      Primary cardiologist reviewed results of above testing, reults are reassuring and no further recommendations from an ischemic evaluation at this point.    Interval 04/18/23    Patient's symptoms are stable today.  She continues to have some ALVARADO with activity, unchanged from prior visits.  In fact, on review of previous testing, she had PFTs completed in 2012 and had concerns of dyspnea on exertion at that time as well.  We reviewed the results of her tests in detail today, all questions answered.  She was somewhat reluctant to accept the results of the tests because she had a grandmother with Hx of CTOs with collaterals, yet all of her non-invasive cardiac testing was normal per her reacall.  We discussed the pathophysiology of progressive heart disease and utilization of non-invasive cardiac testing to guide our diagnosis and need for further testing.  She is understanding.  __________________________________________________________________         Assessment and Impression:     1. Exertional dyspnea and angina  2. History of diastolic dysfunction  3. Dyslipidemia  on 11/9/2020  4. History of statin intolerance. States she has tried several statins and had severe myalgias.  1. Fortunately, she has been approved to start Repatha.  Has not started yet.  5. History of paroxysmal atrial fibrillation, no known recurrence.  She is not anticoagulated.  6. History of short run of PSVT and NSVT on a Zio patch monitor in 2018         Recommendations and Plan:     1. Continue current medications without changes today.  Cardiac testing reassuring.  2. Counseled patient to try taking PRN NTG for ALVARADO to see if this helps symptoms.  This can further guide our management and decision-making for Margarita.  If she really doesn't notice any symptom improvement with this, may need to consider repeat pulmonary testing.  3. Start Repatha as ordered.  4. Recheck lipid profile in  ~3-months, and follow up in clinic with [facility] at that time as well.  __________________________________________________________________    Thank you for the opportunity to participate in this pleasant patient's care.    We would be happy to see this patient sooner for any concerns in the meantime.    JOSEFINA Novoa, CNP   Nurse Practitioner  [facility]    Today's clinic visit entailed:  Review of the result(s) of each unique test - labs, echo, stress test, pfts  The following tests were independently interpreted by me as noted in my documentation: echo, stress test  Ordering of each unique test  Prescription drug management  The level of medical decision making during this visit was of moderate complexity.    Orders this Visit:  Orders Placed This Encounter   Procedures     Lipid panel reflex to direct LDL Fasting     Follow-Up with Cardiology- [facility]     No orders of the defined types were placed in this encounter.    There are no discontinued medications.  Encounter Diagnoses   Name Primary?     Hyperlipidemia LDL goal <130      Exertional dyspnea Yes     Peripheral edema      Palpitations      Tachycardia      Exertional angina (H)      CURRENT MEDICATIONS:  Current Outpatient Medications   Medication Sig Dispense Refill     budesonide (PULMICORT) 0.5 MG/2ML neb solution Mix 2 vials with 1 oz coffee flavoring and drink twice a day and rinse mouth aftrerwards 60 mL 1     calcium carbonate (TUMS) 500 MG chewable tablet Take 2 chew tab by mouth 3 times daily as needed for other (bloating/flatulence)       cyanocobalamin (CYANOCOBALAMIN) 1000 MCG/ML injection Inject 1 mL (1,000 mcg) into the muscle every 30 days 10 mL 1     estradiol (VIVELLE-DOT) 0.05 MG/24HR patch Place 1 patch onto the skin twice a week       evolocumab (REPATHA) 140 MG/ML prefilled autoinjector Inject 1 mL (140 mg) Subcutaneous every 14 days 6 mL 3     fexofenadine (ALLEGRA) 60 MG tablet TAKE ONE TABLET BY MOUTH TWICE A  "DAY 30 tablet 2     fluticasone (FLOVENT HFA) 220 MCG/ACT inhaler Inhale 1 puff into the lungs 2 times daily 36 g 1     levothyroxine (SYNTHROID/LEVOTHROID) 100 MCG tablet Take 1 tablet (100 mcg) by mouth daily 90 tablet 3     LORazepam (ATIVAN) 1 MG tablet Take 1 tablet (1 mg) by mouth 2 times daily as needed for anxiety 20 tablet 0     methylphenidate (RITALIN) 10 MG tablet Take 1 tablet (10 mg) by mouth 2 times daily Takes as needed 30 tablet 0     mirabegron (MYRBETRIQ) 25 MG 24 hr tablet Take 1 tablet (25 mg) by mouth daily 90 tablet 1     multivitamin w/minerals (THERA-VIT-M) tablet Take 1 tablet by mouth daily 100 tablet 3     pantoprazole (PROTONIX) 40 MG EC tablet Take 40 mg by mouth daily       semaglutide (OZEMPIC, 0.25 OR 0.5 MG/DOSE,) 2 MG/1.5ML SOPN pen Inject 0.25 mg Subcutaneous every 7 days 9 mL 0     STATIN NOT PRESCRIBED (INTENTIONAL) Please choose reason not prescribed from choices below.       valACYclovir (VALTREX) 1000 mg tablet Take 1 tablet (1,000 mg) by mouth 2 times daily 20 tablet 3     zolpidem (AMBIEN) 5 MG tablet TAKE TWO TABLETS BY MOUTH IN THE EVENING AS NEEDED FOR SLEEP 60 tablet 5     nitroGLYcerin (NITROSTAT) 0.4 MG sublingual tablet For chest pain place 1 tablet under the tongue every 5 minutes for 3 doses. If symptoms persist 5 minutes after 1st dose call 911. (Patient not taking: Reported on 4/18/2023) 25 tablet 11     ALLERGIES     Allergies   Allergen Reactions     Codeine Other (See Comments)     Causes agitation     PAST MEDICAL, SURGICAL, FAMILY, SOCIAL HISTORY:  History was reviewed and updated as needed, see medical record.    Review of Systems:  A 10-point Review Of Systems is otherwise normal except for that which is noted in the HPI and interval summary.    Physical Exam:    Vitals: /72 (BP Location: Right arm, Patient Position: Sitting, Cuff Size: Adult Large)   Pulse 92   Ht 1.76 m (5' 9.29\")   Wt 94.5 kg (208 lb 4.8 oz)   LMP  (LMP Unknown)   SpO2 96%   " BMI 30.50 kg/m    Constitutional: Appears stated age, well nourished, NAD.  Eyes: Pupils equal, round. Sclerae anicteric.   HEENT: Normocephalic, atraumatic.   Neck: Supple. Carotid pulses full and equal. No carotid bruit.  No JVD appreciated.  Respiratory: Non-labored. Lungs CTAB.  Cardiovascular: RRR, normal S1 and S2. No M/G/R.  No edema.  GI: Soft, non-distended, non-tender.  Skin: Warm and dry. No rashes, cyanosis, edema.  Musculoskeletal/Extremities: Symmetrical movement to all extremities.  Neurologic: No gross focal deficits. Alert, awake, and oriented to all spheres.  Psychiatric: Affect appropriate. Mentation normal.    Recent Lab Results:  LIPID RESULTS:  Lab Results   Component Value Date    CHOL 342 (H) 11/09/2022    CHOL 190 03/24/2021    HDL 57 11/09/2022    HDL 59 03/24/2021     (H) 11/09/2022     (H) 03/24/2021    TRIG 325 (H) 11/09/2022    TRIG 114 03/24/2021    CHOLHDLRATIO 6.6 (H) 09/23/2015     LIVER ENZYME RESULTS:  Lab Results   Component Value Date    AST 20 03/14/2023    AST 14 03/24/2021    ALT 15 03/14/2023    ALT 25 03/24/2021     CBC RESULTS:  Lab Results   Component Value Date    WBC 11.0 03/14/2023    WBC 7.3 03/24/2021    RBC 4.89 03/14/2023    RBC 4.62 03/24/2021    HGB 14.4 03/14/2023    HGB 13.7 03/24/2021    HCT 44.4 03/14/2023    HCT 41.1 03/24/2021    MCV 91 03/14/2023    MCV 89 03/24/2021    MCH 29.4 03/14/2023    MCH 29.7 03/24/2021    MCHC 32.4 03/14/2023    MCHC 33.3 03/24/2021    RDW 13.0 03/14/2023    RDW 12.5 03/24/2021     03/14/2023     03/24/2021     BMP RESULTS:  Lab Results   Component Value Date     03/14/2023     03/24/2021    POTASSIUM 4.8 03/14/2023    POTASSIUM 4.1 11/09/2022    POTASSIUM 4.5 03/24/2021    CHLORIDE 101 03/14/2023    CHLORIDE 107 11/09/2022    CHLORIDE 108 03/24/2021    CO2 28 03/14/2023    CO2 29 11/09/2022    CO2 28 03/24/2021    ANIONGAP 9 03/14/2023    ANIONGAP 4 11/09/2022    ANIONGAP 4 03/24/2021      (H) 03/14/2023    GLC 83 11/09/2022     (H) 03/24/2021    BUN 21.7 03/14/2023    BUN 23 11/09/2022    BUN 33 (H) 03/24/2021    CR 0.82 03/14/2023    CR 0.75 03/24/2021    GFRESTIMATED 79 03/14/2023    GFRESTIMATED 85 03/24/2021    GFRESTBLACK >90 03/24/2021    ROLANDA 9.5 03/14/2023    ROLANDA 9.3 03/24/2021      A1C RESULTS:  Lab Results   Component Value Date    A1C 5.6 01/26/2018     INR RESULTS:  Lab Results   Component Value Date    INR 0.95 11/12/2018    INR 0.96 01/15/2018

## 2023-04-18 ENCOUNTER — OFFICE VISIT (OUTPATIENT)
Dept: CARDIOLOGY | Facility: CLINIC | Age: 63
End: 2023-04-18
Attending: INTERNAL MEDICINE
Payer: COMMERCIAL

## 2023-04-18 VITALS
DIASTOLIC BLOOD PRESSURE: 72 MMHG | WEIGHT: 208.3 LBS | SYSTOLIC BLOOD PRESSURE: 132 MMHG | HEIGHT: 69 IN | OXYGEN SATURATION: 96 % | BODY MASS INDEX: 30.85 KG/M2 | HEART RATE: 92 BPM

## 2023-04-18 DIAGNOSIS — I20.89 EXERTIONAL ANGINA (H): ICD-10-CM

## 2023-04-18 DIAGNOSIS — R60.0 PERIPHERAL EDEMA: ICD-10-CM

## 2023-04-18 DIAGNOSIS — R00.2 PALPITATIONS: ICD-10-CM

## 2023-04-18 DIAGNOSIS — R00.0 TACHYCARDIA: ICD-10-CM

## 2023-04-18 DIAGNOSIS — R06.09 EXERTIONAL DYSPNEA: Primary | ICD-10-CM

## 2023-04-18 DIAGNOSIS — E78.5 HYPERLIPIDEMIA LDL GOAL <130: ICD-10-CM

## 2023-04-18 PROCEDURE — 99214 OFFICE O/P EST MOD 30 MIN: CPT | Performed by: NURSE PRACTITIONER

## 2023-04-18 NOTE — PATIENT INSTRUCTIONS
Thank you for your visit with the [facility] today.    Today's Summary     The results of your heart testing are reassuring based on what the reports are showing us.  However, you are still having symptoms of shortness of breath.    You can try taking a nitroglycerin when you have shortness of breath with activity.  If you notice a big improvement in your breathing after taking a nitroglycerin, then please notify us at the clinic.  If you don't notice any change at all, then you might want to consider looking into lung causes of your breathing difficulty.    Attached is a link to a video for you to watch about Repatha injections: [email or web address]-[email or web address].    Recheck cholesterol levels in 3-months.  Follow up in about 3-months to reassess symptoms.    If you have questions or concerns, please do not hesitate to call my nursing support team at [phone].    Scheduling phone number: [phone]  [facility] Clinic Number: [phone]    It was a pleasure seeing you today.     JOSEFINA Novoa, CNP  Nurse Practitioner  [facility]  April 18, 2023  ________________________________________________________

## 2023-04-18 NOTE — LETTER
4/18/2023    Cirilo Tadeo MD  [address] [facility] [address]    RE: Nikki Morales       Dear Colleague,     I had the pleasure of seeing Nikki Morales in the [facility].              ~Cardiology Clinic Visit~    Primary Cardiologist: Dr. De La Paz  Reason for visit: Testing review follow up    History of Present Illness    Nikki Morales is a very nice 63-year-old woman with history of paroxysmal atrial fibrillation requiring cardioversion in the past, history of social alcohol and tobacco use.      She has previously been seen in cardiology clinic by Dr. Rogers for paroxysmal atrial fibrillation.  She had a Zio patch monitor in 2018 which showed 8 beats of NSVT and 30 beats of PSVT.  She also had an echocardiogram in 2018 which showed normal EF 55 to 60% with grade 1 diastolic dysfunction.  She has not been seen in cardiology clinic for 5 years up until she re-established care with Dr. De La Paz this past March.     In summary, she noticed 3-weeks of marked decrease in activity tolerance.   She had difficulty walking the two flights of stairs in her home due to exertional dyspnea.  She does not have chest pain but her chest does feel somewhat tight with exertion.  For example, she was cleaning out her closet and her heart rate was 128 and she felt short of breath and was sweaty. She has also noticed intermittent swelling in her ankles. Some days she feels totally exhausted and has pain everywhere. Sometimes she notes shooting pain in both jaws but points to her temporomandibular joints and this is not necessarily an exertional symptom.     EKG showed sinus rhythm low voltage QRS.    Based on these symptoms, she was recommended an echocardiogram and exercise stress test.  Echocardiogram completed on 3/23/2023 showed normal EF at 60-65% with no WMA; there is 1+ TR, and overall, no significant changes compared to previous study.      Exercise stress test completed the  same day was negative for inducible myocardial ischemia or infarct.  LV function is hyperdynamic, and EF at stress is 76%.  Baseline EKG showed sinus rhythm, and with stress, there was 1.0 mm of upsloping ST segment depression in II, III, aVF, V4, V5 and V6 leads.      Primary cardiologist reviewed results of above testing, reults are reassuring and no further recommendations from an ischemic evaluation at this point.    Interval 04/18/23    Patient's symptoms are stable today.  She continues to have some ALVARADO with activity, unchanged from prior visits.  In fact, on review of previous testing, she had PFTs completed in 2012 and had concerns of dyspnea on exertion at that time as well.  We reviewed the results of her tests in detail today, all questions answered.  She was somewhat reluctant to accept the results of the tests because she had a grandmother with Hx of CTOs with collaterals, yet all of her non-invasive cardiac testing was normal per her reacall.  We discussed the pathophysiology of progressive heart disease and utilization of non-invasive cardiac testing to guide our diagnosis and need for further testing.  She is understanding.  __________________________________________________________________         Assessment and Impression:     Exertional dyspnea and angina  History of diastolic dysfunction  Dyslipidemia  on 11/9/2020  History of statin intolerance. States she has tried several statins and had severe myalgias.  Fortunately, she has been approved to start Repatha.  Has not started yet.  History of paroxysmal atrial fibrillation, no known recurrence.  She is not anticoagulated.  History of short run of PSVT and NSVT on a Zio patch monitor in 2018         Recommendations and Plan:     Continue current medications without changes today.  Cardiac testing reassuring.  Counseled patient to try taking PRN NTG for ALVARADO to see if this helps symptoms.  This can further guide our management and  decision-making for Margarita.  If she really doesn't notice any symptom improvement with this, may need to consider repeat pulmonary testing.  Start Repatha as ordered.  Recheck lipid profile in ~3-months, and follow up in clinic with [facility] at that time as well.  __________________________________________________________________    Thank you for the opportunity to participate in this pleasant patient's care.    We would be happy to see this patient sooner for any concerns in the meantime.    JOSEFINA Novoa, CNP   Nurse Practitioner  [facility]    Today's clinic visit entailed:  Review of the result(s) of each unique test - labs, echo, stress test, pfts  The following tests were independently interpreted by me as noted in my documentation: echo, stress test  Ordering of each unique test  Prescription drug management  The level of medical decision making during this visit was of moderate complexity.    Orders this Visit:  Orders Placed This Encounter   Procedures    Lipid panel reflex to direct LDL Fasting    Follow-Up with Cardiology- [facility]     No orders of the defined types were placed in this encounter.    There are no discontinued medications.  Encounter Diagnoses   Name Primary?    Hyperlipidemia LDL goal <130     Exertional dyspnea Yes    Peripheral edema     Palpitations     Tachycardia     Exertional angina (H)      CURRENT MEDICATIONS:  Current Outpatient Medications   Medication Sig Dispense Refill    budesonide (PULMICORT) 0.5 MG/2ML neb solution Mix 2 vials with 1 oz coffee flavoring and drink twice a day and rinse mouth aftrerwards 60 mL 1    calcium carbonate (TUMS) 500 MG chewable tablet Take 2 chew tab by mouth 3 times daily as needed for other (bloating/flatulence)      cyanocobalamin (CYANOCOBALAMIN) 1000 MCG/ML injection Inject 1 mL (1,000 mcg) into the muscle every 30 days 10 mL 1    estradiol (VIVELLE-DOT) 0.05 MG/24HR patch Place 1 patch onto the skin twice a week       evolocumab (REPATHA) 140 MG/ML prefilled autoinjector Inject 1 mL (140 mg) Subcutaneous every 14 days 6 mL 3    fexofenadine (ALLEGRA) 60 MG tablet TAKE ONE TABLET BY MOUTH TWICE A DAY 30 tablet 2    fluticasone (FLOVENT HFA) 220 MCG/ACT inhaler Inhale 1 puff into the lungs 2 times daily 36 g 1    levothyroxine (SYNTHROID/LEVOTHROID) 100 MCG tablet Take 1 tablet (100 mcg) by mouth daily 90 tablet 3    LORazepam (ATIVAN) 1 MG tablet Take 1 tablet (1 mg) by mouth 2 times daily as needed for anxiety 20 tablet 0    methylphenidate (RITALIN) 10 MG tablet Take 1 tablet (10 mg) by mouth 2 times daily Takes as needed 30 tablet 0    mirabegron (MYRBETRIQ) 25 MG 24 hr tablet Take 1 tablet (25 mg) by mouth daily 90 tablet 1    multivitamin w/minerals (THERA-VIT-M) tablet Take 1 tablet by mouth daily 100 tablet 3    pantoprazole (PROTONIX) 40 MG EC tablet Take 40 mg by mouth daily      semaglutide (OZEMPIC, 0.25 OR 0.5 MG/DOSE,) 2 MG/1.5ML SOPN pen Inject 0.25 mg Subcutaneous every 7 days 9 mL 0    STATIN NOT PRESCRIBED (INTENTIONAL) Please choose reason not prescribed from choices below.      valACYclovir (VALTREX) 1000 mg tablet Take 1 tablet (1,000 mg) by mouth 2 times daily 20 tablet 3    zolpidem (AMBIEN) 5 MG tablet TAKE TWO TABLETS BY MOUTH IN THE EVENING AS NEEDED FOR SLEEP 60 tablet 5    nitroGLYcerin (NITROSTAT) 0.4 MG sublingual tablet For chest pain place 1 tablet under the tongue every 5 minutes for 3 doses. If symptoms persist 5 minutes after 1st dose call 911. (Patient not taking: Reported on 4/18/2023) 25 tablet 11     ALLERGIES     Allergies   Allergen Reactions    Codeine Other (See Comments)     Causes agitation     PAST MEDICAL, SURGICAL, FAMILY, SOCIAL HISTORY:  History was reviewed and updated as needed, see medical record.    Review of Systems:  A 10-point Review Of Systems is otherwise normal except for that which is noted in the HPI and interval summary.    Physical Exam:    Vitals: /72 (BP  "Location: Right arm, Patient Position: Sitting, Cuff Size: Adult Large)   Pulse 92   Ht 1.76 m (5' 9.29\")   Wt 94.5 kg (208 lb 4.8 oz)   LMP  (LMP Unknown)   SpO2 96%   BMI 30.50 kg/m    Constitutional: Appears stated age, well nourished, NAD.  Eyes: Pupils equal, round. Sclerae anicteric.   HEENT: Normocephalic, atraumatic.   Neck: Supple. Carotid pulses full and equal. No carotid bruit.  No JVD appreciated.  Respiratory: Non-labored. Lungs CTAB.  Cardiovascular: RRR, normal S1 and S2. No M/G/R.  No edema.  GI: Soft, non-distended, non-tender.  Skin: Warm and dry. No rashes, cyanosis, edema.  Musculoskeletal/Extremities: Symmetrical movement to all extremities.  Neurologic: No gross focal deficits. Alert, awake, and oriented to all spheres.  Psychiatric: Affect appropriate. Mentation normal.    Recent Lab Results:  LIPID RESULTS:  Lab Results   Component Value Date    CHOL 342 (H) 11/09/2022    CHOL 190 03/24/2021    HDL 57 11/09/2022    HDL 59 03/24/2021     (H) 11/09/2022     (H) 03/24/2021    TRIG 325 (H) 11/09/2022    TRIG 114 03/24/2021    CHOLHDLRATIO 6.6 (H) 09/23/2015     LIVER ENZYME RESULTS:  Lab Results   Component Value Date    AST 20 03/14/2023    AST 14 03/24/2021    ALT 15 03/14/2023    ALT 25 03/24/2021     CBC RESULTS:  Lab Results   Component Value Date    WBC 11.0 03/14/2023    WBC 7.3 03/24/2021    RBC 4.89 03/14/2023    RBC 4.62 03/24/2021    HGB 14.4 03/14/2023    HGB 13.7 03/24/2021    HCT 44.4 03/14/2023    HCT 41.1 03/24/2021    MCV 91 03/14/2023    MCV 89 03/24/2021    MCH 29.4 03/14/2023    MCH 29.7 03/24/2021    MCHC 32.4 03/14/2023    MCHC 33.3 03/24/2021    RDW 13.0 03/14/2023    RDW 12.5 03/24/2021     03/14/2023     03/24/2021     BMP RESULTS:  Lab Results   Component Value Date     03/14/2023     03/24/2021    POTASSIUM 4.8 03/14/2023    POTASSIUM 4.1 11/09/2022    POTASSIUM 4.5 03/24/2021    CHLORIDE 101 03/14/2023    CHLORIDE 107 " 11/09/2022    CHLORIDE 108 03/24/2021    CO2 28 03/14/2023    CO2 29 11/09/2022    CO2 28 03/24/2021    ANIONGAP 9 03/14/2023    ANIONGAP 4 11/09/2022    ANIONGAP 4 03/24/2021     (H) 03/14/2023    GLC 83 11/09/2022     (H) 03/24/2021    BUN 21.7 03/14/2023    BUN 23 11/09/2022    BUN 33 (H) 03/24/2021    CR 0.82 03/14/2023    CR 0.75 03/24/2021    GFRESTIMATED 79 03/14/2023    GFRESTIMATED 85 03/24/2021    GFRESTBLACK >90 03/24/2021    ROLANDA 9.5 03/14/2023    ROLANDA 9.3 03/24/2021      A1C RESULTS:  Lab Results   Component Value Date    A1C 5.6 01/26/2018     INR RESULTS:  Lab Results   Component Value Date    INR 0.95 11/12/2018    INR 0.96 01/15/2018     Thank you for allowing me to participate in the care of your patient.      Sincerely,     JOSEFINA Novoa [facility]  cc:   Lisa De La Paz MD  [address] [address]

## 2023-05-22 NOTE — PROGRESS NOTES
[facility] Dermatology Note  Encounter Date: May 24, 2023  Office Visit     Dermatology Problem List:  1. Verruca - R 4th finger - cryo  2. Notalgia Paresthetica - sarna    Social:   ____________________________________________    Assessment & Plan:    # Notalgia paraesthetica. Described the etiology of the condition. Recommended OTC treatment options for treating itching sensation.   -Recommended Sarna Sensitive BID  -Recommended CeraVe with pramoxine BID as an alternative    #Verruca Vulgaris- right fourth finger. Recommended OTC topical treatment and duct tape as this has helped her in the past.   -See cryo procedure.   -Recommended OTC topical treatment following this, RTC for subsequent cryo treatments    # Multiple clinically benign nevi on the trunk and extremities. No treatment is necessary at this time unless the lesion changes or becomes symptomatic.    - ABCDEs of melanoma were discussed and self skin checks were advised.    # Seborrheic keratosis, non irritated on the trunk and extremities. Explained to patient benign nature of lesion. No treatment is necessary at this time unless the lesion changes or becomes symptomatic.     - Monitor for changes.    # Cherry Angiomas - trunk and extremities. Explained to patient benign nature of lesion. No treatment is necessary at this time unless the lesion changes or becomes symptomatic.      # Solar lentigines on the sun exposed skin areas. Benign nature was discussed. No further intervention required at this time.    - Sun precaution was advised including the use of sun screens of SPF 30 or higher, sun protective clothing, and avoidance of tanning beds.      Procedures Performed:   - Cryotherapy procedure note, location(s): right fourth finger. After verbal consent and discussion of risks and benefits including, but not limited to, dyspigmentation/scar, blister, and pain, 1 verruca was(were) treated with 1-2 mm freeze border for 1-2  cycles with liquid nitrogen. Post cryotherapy instructions were provided.     Follow-up: 1 year(s) in-person, or earlier for new or changing lesions    Staff and Scribe:     Scribe Disclosure:   I, Elizabeth Garry, am serving as a scribe to document services personally performed by this physician, Veronika Johnson PA-C, based on data collection and the provider's statements to me.   Provider Disclosure:   The documentation recorded by the scribe accurately reflects the services I personally performed and the decisions made by me.    All risks, benefits and alternatives were discussed with patient.  Patient is in agreement and understands the assessment and plan.  All questions were answered.    Veronika Johnson PA-C, [facility]  [facility]: Phone: [phone], Fax: [phone]  [facility]: Phone: [phone],  Fax: [phone]  [facility]: Phone: [phone], Fax: [phone]  ____________________________________________    CC: Skin Check (Patient is here for a full body skin check, areas of concern itchy back, wart on R ring finger spot on cheek, also moles on feet. )    HPI:  Ms. Nikki Morales is a(n) 63 year old female who presents today as a new patient for a skin check. Pt reported she has spot on her back that itches. Notes she has a wart lesion on her finger. No hx skin cancer.     Self-referred.     Patient is otherwise feeling well, without additional skin concerns.    Labs Reviewed:  N/A    Physical Exam:  Vitals: LMP  (LMP Unknown)   SKIN: Total skin excluding the undergarment areas was performed. The exam included the head/face, neck, both arms, chest, back, abdomen, both legs, digits and/or nails.   - Easton type II.  - There are verrucous papules with thrombosed capillaries interrupting dermatoglyphics on the right fourth finger.  - There are tan to brown waxy stuck on papules with  surrounding erythema on the back.   - There are dome shaped bright red papules on the trunk and extremities.   - Multiple regular brown pigmented macules and papules are identified on the trunk and extremities, <100.  - Scattered brown macules on sun exposed areas.  - There are waxy stuck on tan to brown papules on the trunk and extremities.    - No other lesions of concern on areas examined.     Medications:  Current Outpatient Medications   Medication     budesonide (PULMICORT) 0.5 MG/2ML neb solution     calcium carbonate (TUMS) 500 MG chewable tablet     cyanocobalamin (CYANOCOBALAMIN) 1000 MCG/ML injection     estradiol (VIVELLE-DOT) 0.05 MG/24HR patch     evolocumab (REPATHA) 140 MG/ML prefilled autoinjector     fexofenadine (ALLEGRA) 60 MG tablet     fluticasone (FLOVENT HFA) 220 MCG/ACT inhaler     levothyroxine (SYNTHROID/LEVOTHROID) 100 MCG tablet     LORazepam (ATIVAN) 1 MG tablet     methylphenidate (RITALIN) 10 MG tablet     mirabegron (MYRBETRIQ) 25 MG 24 hr tablet     valACYclovir (VALTREX) 1000 mg tablet     zolpidem (AMBIEN) 5 MG tablet     multivitamin w/minerals (THERA-VIT-M) tablet     nitroGLYcerin (NITROSTAT) 0.4 MG sublingual tablet     pantoprazole (PROTONIX) 40 MG EC tablet     semaglutide (OZEMPIC, 0.25 OR 0.5 MG/DOSE,) 2 MG/1.5ML SOPN pen     STATIN NOT PRESCRIBED (INTENTIONAL)     Current Facility-Administered Medications   Medication     lidocaine 1 % injection 5 mL     methylPREDNISolone (DEPO-MEDROL) injection 80 mg      Past Medical History:   Patient Active Problem List   Diagnosis     Chronic fatigue syndrome     Other and unspecified disc disorder of lumbar region     ESOPHAGEAL REFLUX     Female stress incontinence     Herpes simplex virus (HSV) infection     HYPERLIPIDEMIA LDL GOAL <130     Eosinophilic esophagitis     Overweight     Adhesive capsulitis of shoulder     Degenerative arthritis of cervical spine with cord compression     Hypothyroidism due to acquired atrophy of  thyroid     Attention deficit hyperactivity disorder, inattentive type     Mild persistent asthma without complication     KAILEE (generalized anxiety disorder)     Moderate recurrent major depression (H)     Impingement syndrome of left shoulder     Primary headache associated with sexual activity     Mixed incontinence     Primary osteoarthritis of right knee     Infection due to 2019 novel coronavirus     Past Medical History:   Diagnosis Date     Chronic fatigue      Hyperlipidemia      Hypothyroid      New onset a-fib (H)      Other vitamin B12 deficiency anemia      Primary osteoarthritis of right knee 8/15/2022     Uncomplicated asthma

## 2023-05-24 ENCOUNTER — OFFICE VISIT (OUTPATIENT)
Dept: DERMATOLOGY | Facility: CLINIC | Age: 63
End: 2023-05-24
Payer: COMMERCIAL

## 2023-05-24 DIAGNOSIS — R20.2 NOTALGIA PARESTHETICA: ICD-10-CM

## 2023-05-24 DIAGNOSIS — D22.9 MULTIPLE BENIGN NEVI: ICD-10-CM

## 2023-05-24 DIAGNOSIS — L81.4 SOLAR LENTIGO: ICD-10-CM

## 2023-05-24 DIAGNOSIS — B07.9 VERRUCA VULGARIS: ICD-10-CM

## 2023-05-24 DIAGNOSIS — D18.01 CHERRY ANGIOMA: Primary | ICD-10-CM

## 2023-05-24 DIAGNOSIS — L82.1 SEBORRHEIC KERATOSIS: ICD-10-CM

## 2023-05-24 PROCEDURE — 17110 DESTRUCTION B9 LES UP TO 14: CPT | Performed by: PHYSICIAN ASSISTANT

## 2023-05-24 PROCEDURE — 99213 OFFICE O/P EST LOW 20 MIN: CPT | Mod: 25 | Performed by: PHYSICIAN ASSISTANT

## 2023-05-24 NOTE — LETTER
5/24/2023         RE: Nikki Morales  [address]  [facility] [address]        Dear Colleague,    Thank you for referring your patient, Nikki Morales, to the [facility]. Please see a copy of my visit note below.    [facility] Dermatology Note  Encounter Date: May 24, 2023  Office Visit     Dermatology Problem List:  1. Verruca - R 4th finger - cryo  2. Notalgia Paresthetica - sarna    Social:   ____________________________________________    Assessment & Plan:    # Notalgia paraesthetica. Described the etiology of the condition. Recommended OTC treatment options for treating itching sensation.   -Recommended Sarna Sensitive BID  -Recommended CeraVe with pramoxine BID as an alternative    #Verruca Vulgaris- right fourth finger. Recommended OTC topical treatment and duct tape as this has helped her in the past.   -See cryo procedure.   -Recommended OTC topical treatment following this, RTC for subsequent cryo treatments    # Multiple clinically benign nevi on the trunk and extremities. No treatment is necessary at this time unless the lesion changes or becomes symptomatic.    - ABCDEs of melanoma were discussed and self skin checks were advised.    # Seborrheic keratosis, non irritated on the trunk and extremities. Explained to patient benign nature of lesion. No treatment is necessary at this time unless the lesion changes or becomes symptomatic.     - Monitor for changes.    # Cherry Angiomas - trunk and extremities. Explained to patient benign nature of lesion. No treatment is necessary at this time unless the lesion changes or becomes symptomatic.      # Solar lentigines on the sun exposed skin areas. Benign nature was discussed. No further intervention required at this time.    - Sun precaution was advised including the use of sun screens of SPF 30 or higher, sun protective clothing, and avoidance of tanning beds.      Procedures  Performed:   - Cryotherapy procedure note, location(s): right fourth finger. After verbal consent and discussion of risks and benefits including, but not limited to, dyspigmentation/scar, blister, and pain, 1 verruca was(were) treated with 1-2 mm freeze border for 1-2 cycles with liquid nitrogen. Post cryotherapy instructions were provided.     Follow-up: 1 year(s) in-person, or earlier for new or changing lesions    Staff and Scribe:     Scribe Disclosure:   I, Elizabeth Castañeda, am serving as a scribe to document services personally performed by this physician, Veronika Johnson PA-C, based on data collection and the provider's statements to me.   Provider Disclosure:   The documentation recorded by the scribe accurately reflects the services I personally performed and the decisions made by me.    All risks, benefits and alternatives were discussed with patient.  Patient is in agreement and understands the assessment and plan.  All questions were answered.    Veronika Johnson PA-C, [facility]  [facility] [place]: Phone: [phone], Fax: [phone]  [facility]: Phone: [phone],  Fax: [phone]  [facility]: Phone: [phone], Fax: [phone]  ____________________________________________    CC: Skin Check (Patient is here for a full body skin check, areas of concern itchy back, wart on R ring finger spot on cheek, also moles on feet. )    HPI:  Ms. Nikki Morales is a(n) 63 year old female who presents today as a new patient for a skin check. Pt reported she has spot on her back that itches. Notes she has a wart lesion on her finger. No hx skin cancer.     Self-referred.     Patient is otherwise feeling well, without additional skin concerns.    Labs Reviewed:  N/A    Physical Exam:  Vitals: LMP  (LMP Unknown)   SKIN: Total skin excluding the undergarment areas was performed. The exam included the  head/face, neck, both arms, chest, back, abdomen, both legs, digits and/or nails.   - Easton type II.  - There are verrucous papules with thrombosed capillaries interrupting dermatoglyphics on the right fourth finger.  - There are tan to brown waxy stuck on papules with surrounding erythema on the back.   - There are dome shaped bright red papules on the trunk and extremities.   - Multiple regular brown pigmented macules and papules are identified on the trunk and extremities, <100.  - Scattered brown macules on sun exposed areas.  - There are waxy stuck on tan to brown papules on the trunk and extremities.    - No other lesions of concern on areas examined.     Medications:  Current Outpatient Medications   Medication     budesonide (PULMICORT) 0.5 MG/2ML neb solution     calcium carbonate (TUMS) 500 MG chewable tablet     cyanocobalamin (CYANOCOBALAMIN) 1000 MCG/ML injection     estradiol (VIVELLE-DOT) 0.05 MG/24HR patch     evolocumab (REPATHA) 140 MG/ML prefilled autoinjector     fexofenadine (ALLEGRA) 60 MG tablet     fluticasone (FLOVENT HFA) 220 MCG/ACT inhaler     levothyroxine (SYNTHROID/LEVOTHROID) 100 MCG tablet     LORazepam (ATIVAN) 1 MG tablet     methylphenidate (RITALIN) 10 MG tablet     mirabegron (MYRBETRIQ) 25 MG 24 hr tablet     valACYclovir (VALTREX) 1000 mg tablet     zolpidem (AMBIEN) 5 MG tablet     multivitamin w/minerals (THERA-VIT-M) tablet     nitroGLYcerin (NITROSTAT) 0.4 MG sublingual tablet     pantoprazole (PROTONIX) 40 MG EC tablet     semaglutide (OZEMPIC, 0.25 OR 0.5 MG/DOSE,) 2 MG/1.5ML SOPN pen     STATIN NOT PRESCRIBED (INTENTIONAL)     Current Facility-Administered Medications   Medication     lidocaine 1 % injection 5 mL     methylPREDNISolone (DEPO-MEDROL) injection 80 mg      Past Medical History:   Patient Active Problem List   Diagnosis     Chronic fatigue syndrome     Other and unspecified disc disorder of lumbar region     ESOPHAGEAL REFLUX     Female stress  incontinence     Herpes simplex virus (HSV) infection     HYPERLIPIDEMIA LDL GOAL <130     Eosinophilic esophagitis     Overweight     Adhesive capsulitis of shoulder     Degenerative arthritis of cervical spine with cord compression     Hypothyroidism due to acquired atrophy of thyroid     Attention deficit hyperactivity disorder, inattentive type     Mild persistent asthma without complication     KAILEE (generalized anxiety disorder)     Moderate recurrent major depression (H)     Impingement syndrome of left shoulder     Primary headache associated with sexual activity     Mixed incontinence     Primary osteoarthritis of right knee     Infection due to 2019 novel coronavirus     Past Medical History:   Diagnosis Date     Chronic fatigue      Hyperlipidemia      Hypothyroid      New onset a-fib (H)      Other vitamin B12 deficiency anemia      Primary osteoarthritis of right knee 8/15/2022     Uncomplicated asthma        Again, thank you for allowing me to participate in the care of your patient.        Sincerely,        Veronika Johnson PA-C

## 2023-05-24 NOTE — NURSING NOTE
Nikki Morales's goals for this visit include:   Chief Complaint   Patient presents with     Skin Check     Patient is here for a full body skin check, areas of concern itchy back, wart on R ring finger spot on cheek, also moles on feet.        She requests these members of her care team be copied on today's visit information:     PCP: Cirilo Tadeo    Referring Provider:  No referring provider defined for this encounter.    LMP  (LMP Unknown)     Do you need any medication refills at today's visit? No         Miya Owens EMT

## 2023-05-24 NOTE — PATIENT INSTRUCTIONS
Patient Education     Checking for Skin Cancer  You can find cancer early by checking your skin each month. There are 3 kinds of skin cancer. They are melanoma, basal cell carcinoma, and squamous cell carcinoma. Doing monthly skin checks is the best way to find new marks or skin changes. Follow the instructions below for checking your skin.   The ABCDEs of checking moles for melanoma   Check your moles or growths for signs of melanoma using ABCDE:   Asymmetry: the sides of the mole or growth don t match  Border: the edges are ragged, notched, or blurred  Color: the color within the mole or growth varies  Diameter: the mole or growth is larger than 6 mm (size of a pencil eraser)  Evolving: the size, shape, or color of the mole or growth is changing (evolving is not shown in the images below)    Checking for other types of skin cancer  Basal cell carcinoma or squamous cell carcinoma have symptoms such as:     A spot or mole that looks different from all other marks on your skin  Changes in how an area feels, such as itching, tenderness, or pain  Changes in the skin's surface, such as oozing, bleeding, or scaliness  A sore that does not heal  New swelling or redness beyond the border of a mole    Who s at risk?  Anyone can get skin cancer. But you are at greater risk if you have:   Fair skin, light-colored hair, or light-colored eyes  Many moles or abnormal moles on your skin  A history of sunburns from sunlight or tanning beds  A family history of skin cancer  A history of exposure to radiation or chemicals  A weakened immune system  If you have had skin cancer in the past, you are at risk for recurring skin cancer.   How to check your skin  Do your monthly skin checkups in front of a full-length mirror. Check all parts of your body, including your:   Head (ears, face, neck, and scalp)  Torso (front, back, and sides)  Arms (tops, undersides, upper, and lower armpits)  Hands (palms, backs, and fingers, including  under the nails)  Buttocks and genitals  Legs (front, back, and sides)  Feet (tops, soles, toes, including under the nails, and between toes)  If you have a lot of moles, take digital photos of them each month. Make sure to take photos both up close and from a distance. These can help you see if any moles change over time.   Most skin changes are not cancer. But if you see any changes in your skin, call your doctor right away. Only he or she can diagnose a problem. If you have skin cancer, seeing your doctor can be the first step toward getting the treatment that could save your life.   [employer] last reviewed this educational content on 4/1/2019 2000-2020 [employer]. [address] [place] [address], [place] [address]. All rights reserved. This information is not intended as a substitute for professional medical care. Always follow your healthcare professional's instructions.       When should I call my doctor?  If you are worsening or not improving, please, contact us or seek urgent care as noted below.     Who should I call with questions (adults)?  [facility] (adult and pediatric): [phone]  [facility] (adult): [phone]  For urgent needs outside of business hours call the [facility] at [phone] and ask for the dermatology resident on call to be paged  If this is a medical emergency and you are unable to reach an ER, Call [phone]    Who should I call with questions (pediatric)?  [facility]- Pediatric Dermatology  Dr. Yajaira Amaya, Dr. Staecy Kraft, Dr. Gail Lorenzo, NELA Alvares, Dr. Sofia Castillo, Dr. Trina Lopez & Dr. Farhat Nelson  Non-urgent nurse triage line; [phone]- Shelby and Jessika POOL Care Coordinatordot Dietz (/Complex ) [phone]    If you need a prescription refill, please contact your pharmacy. Refills are approved or denied by our  Physicians during normal business hours, Monday through Fridays  Per office policy, refills will not be granted if you have not been seen within the past year (or sooner depending on your child's condition)    Scheduling Information:  Pediatric Appointment Scheduling and Call Center [phone]  Radiology Scheduling- [phone]  Sedation Unit Scheduling- [phone]  [facility] Scheduling- General [phone]; Pediatric Dermatology [phone]  Main  Services: [phone]  Romanian: [phone]  Cape Verdean: [phone]  Hmong/Pakistani/Romansh: [phone]  Preadmission Nursing Department Fax Number: [phone] (Fax all pre-operative paperwork to this number)    For urgent matters arising during evenings, weekends, or holidays that cannot wait for normal business hours please call [phone] and ask for the dermatology resident on call to be paged.                            Cryotherapy    What is it?  Use of a very cold liquid, such as liquid nitrogen, to freeze and destroy abnormal skin cells that need to be removed    What should I expect?  Tenderness and redness  A small blister that might grow and fill with dark purple blood. There may be crusting.  More than one treatment may be needed if the lesions do not go away.    How do I care for the treated area?  Gently wash the area with your hands when bathing.  Use a thin layer of Vaseline to help with healing. You may use a Band-Aid.   The area should heal within 7-10 days and may leave behind a pink or lighter color.   Do not use an antibiotic or Neosporin ointment.   You may take acetaminophen (Tylenol) for pain.     Call your doctor if you have:  Severe pain  Signs of infection (warmth, redness, cloudy yellow drainage, and or a bad smell)  Questions or concerns    Who should I call with questions?      [facility]: [phone]      [facility]: [phone]      For urgent needs  outside of business hours call the [facility] at [phone] and ask for the dermatology resident on call

## 2023-06-08 DIAGNOSIS — F41.1 GAD (GENERALIZED ANXIETY DISORDER): ICD-10-CM

## 2023-06-08 RX ORDER — LORAZEPAM 1 MG/1
1 TABLET ORAL 2 TIMES DAILY PRN
Qty: 20 TABLET | Refills: 0 | Status: SHIPPED | OUTPATIENT
Start: 2023-06-08 | End: 2023-10-26

## 2023-06-08 NOTE — TELEPHONE ENCOUNTER
Lorazepam      Last Written Prescription Date:  11-9-2022  Last Fill Quantity: 20,   # refills: 2  Last Office Visit: 2-  Future Office visit:       Routing refill request to provider for review/approval because:  Drug not on the G, P or [facility] refill protocol or controlled substance

## 2023-06-19 NOTE — TELEPHONE ENCOUNTER
Pt was scheduled for Hos F/U and advised of the recommendations from Dr. Peraza via [employer]. Routed to Triage.    no

## 2023-09-22 ENCOUNTER — E-VISIT (OUTPATIENT)
Dept: URGENT CARE | Facility: CLINIC | Age: 63
End: 2023-09-22
Payer: COMMERCIAL

## 2023-09-22 DIAGNOSIS — H10.509 BLEPHAROCONJUNCTIVITIS, UNSPECIFIED BLEPHAROCONJUNCTIVITIS TYPE, UNSPECIFIED LATERALITY: Primary | ICD-10-CM

## 2023-09-22 DIAGNOSIS — B30.9 VIRAL CONJUNCTIVITIS: ICD-10-CM

## 2023-09-22 PROCEDURE — 99421 OL DIG E/M SVC 5-10 MIN: CPT | Performed by: PREVENTIVE MEDICINE

## 2023-09-22 RX ORDER — ERYTHROMYCIN 5 MG/G
OINTMENT OPHTHALMIC
Qty: 3.5 G | Refills: 0 | Status: SHIPPED | OUTPATIENT
Start: 2023-09-22 | End: 2023-09-29

## 2023-09-22 NOTE — PATIENT INSTRUCTIONS
Thank you for choosing us for your care. I have placed an order for a prescription so that you can start treatment. View your full visit summary for details by clicking on the link below. Your pharmacist will able to address any questions you may have about the medication.     If you re not feeling better within 2-3 days, please schedule an appointment.  You can schedule an appointment right here in [facility], or call [phone]  If the visit is for the same symptoms as your eVisit, we ll refund the cost of your eVisit if seen within seven days.    Pinkeye: Care Instructions  Overview     Pinkeye is redness and swelling of the eye surface and the conjunctiva (the lining of the eyelid and the covering of the white part of the eye). Pinkeye is also called conjunctivitis. Pinkeye is often caused by infection with bacteria or a virus. Dry air, allergies, smoke, and chemicals are other common causes.  Pinkeye often gets better on its own in 7 to 10 days. Antibiotics only help if the pinkeye is caused by bacteria. Pinkeye caused by infection spreads easily. If an allergy or chemical is causing pinkeye, it will not go away unless you can avoid whatever is causing it.  Follow-up care is a key part of your treatment and safety. Be sure to make and go to all appointments, and call your doctor if you are having problems. It's also a good idea to know your test results and keep a list of the medicines you take.  How can you care for yourself at home?  Wash your hands often. Always wash them before and after you treat pinkeye or touch your eyes or face.  Use moist cotton or a clean, wet cloth to remove crust. Wipe from the inside corner of the eye to the outside. Use a clean part of the cloth for each wipe.  Put cold or warm wet cloths on your eye a few times a day if the eye hurts.  Do not wear contact lenses or eye makeup until the pinkeye is gone. Throw away any eye makeup you were using when you got pinkeye. Clean your  "contacts and storage case. If you wear disposable contacts, use a new pair when your eye has cleared and it is safe to wear contacts again.  If the doctor gave you antibiotic ointment or eyedrops, use them as directed. Use the medicine for as long as instructed, even if your eye starts looking better soon. Keep the bottle tip clean, and do not let it touch the eye area.  To put in eyedrops or ointment:  Tilt your head back, and pull your lower eyelid down with one finger.  Drop or squirt the medicine inside the lower lid.  Close your eye for 30 to 60 seconds to let the drops or ointment move around.  Do not touch the ointment or dropper tip to your eyelashes or any other surface.  Do not share towels, pillows, or washcloths while you have pinkeye.  When should you call for help?   Call your doctor now or seek immediate medical care if:    You have pain in your eye, not just irritation on the surface.     You have a change in vision or loss of vision.     You have an increase in discharge from the eye.     Your eye has not started to improve or begins to get worse within 48 hours after you start using antibiotics.     Pinkeye lasts longer than 7 days.   Watch closely for changes in your health, and be sure to contact your doctor if you have any problems.  Where can you learn more?  Go to https://www.[employer].net/patiented  Enter Y392 in the search box to learn more about \"Pinkeye: Care Instructions.\"  Current as of: October 12, 2022               Content Version: 13.7    2101-7016 [employer].   Care instructions adapted under license by your healthcare professional. If you have questions about a medical condition or this instruction, always ask your healthcare professional. [employer] disclaims any warranty or liability for your use of this information.      "

## 2023-09-25 DIAGNOSIS — G47.00 INSOMNIA, UNSPECIFIED TYPE: ICD-10-CM

## 2023-09-26 RX ORDER — ZOLPIDEM TARTRATE 5 MG/1
TABLET ORAL
Qty: 60 TABLET | Refills: 5 | Status: SHIPPED | OUTPATIENT
Start: 2023-09-26 | End: 2024-03-26

## 2023-09-26 NOTE — PROGRESS NOTES
~Cardiology Clinic Visit~    Primary Cardiologist: Dr. De La Paz  Reason for visit: 3-month follow up with labs    History of Present Illness  Nikki Morales is a very nice 63-year-old woman with history of paroxysmal atrial fibrillation requiring cardioversion in the past, history of social alcohol and tobacco use.       She has previously been seen in cardiology clinic by Dr. Rogers for paroxysmal atrial fibrillation.  A 2018 Zio patch showed 8 beats of NSVT and 30 beats of PSVT.  An echocardiogram in 2018 showed normal EF 55 to 60% with grade 1 diastolic dysfunction.  She has not been seen in cardiology clinic for 5 years up until she re-established care with Dr. De La Paz this past March 2023.     In summary, she was experiencing 3-weeks of marked decrease in activity tolerance.   She had difficulty walking the two flights of stairs in her home due to exertional dyspnea.  She does not have chest pain but her chest does feel somewhat tight with exertion.  Sometimes she notes shooting pain in both jaws but points to her temporomandibular joints and this is not necessarily an exertional symptom.  Of note, she had PFTs completed in 2012 and had concerns of dyspnea on exertion at that time as well.     EKG showed sinus rhythm low voltage QRS.  Echocardiogram completed on 3/23/2023 showed normal EF at 60-65% with no WMA; there is 1+ TR, and overall, no significant changes compared to previous study.    Exercise stress test was negative for inducible myocardial ischemia or infarct.  LV function is hyperdynamic, and EF at stress is 76%.  Baseline EKG showed sinus rhythm, and with stress, there was 1.0 mm of upsloping ST segment depression in II, III, aVF, V4, V5 and V6 leads.       Primary cardiologist reviewed results of above testing, reults are reassuring and no further recommendations from an ischemic evaluation at this point.     In follow up today, she reports stable symptoms with ongoing ALVARADO with  activity that was largely unchanged.  She is concerned about acute, possibly viral, illness she has been suffering from the past few weeks.   She is planning an E-visit with her PCP for this.  Aside from that, she has no new cardiac concerns and is tolerating Repatha without any side effects.  Lung exam stable today.  She has mild, upper mid-sternal discomfort that is exaggerated with palpation.    FLP 09/28/23 with excellent improvement -  (previously 342), HGL 59, LDL 98 (previously 220),  (previously 325).  CMP and CBC unremarkable, WBC stable.  Hgb A1c was checked and is elevated at 6.1, placing her in the pre-diabetic range.  __________________________________________________________________         Assessment and Impression:     Exertional dyspnea and angina  History of diastolic dysfunction  Dyslipidemia  on 11/9/2020  History of statin intolerance, on Repatha.  History of paroxysmal atrial fibrillation, no known recurrence.  She is not anticoagulated.  History of short run of PSVT and NSVT on a Zio patch monitor in 2018  Pre-diabetes, A1c 6.1; follow up with PCP.         Recommendations and Plan:     Continue Repatha for lipid management.  Encouraged close follow up with PCP for diabetes and weight management.  Prescriptions renewed and sent to patient's preferred pharmacy.  Follow up with Dr. De La Paz in 1-year for annual visit.  __________________________________________________________________    Thank you for the opportunity to participate in this pleasant patient's care.    We would be happy to see this patient sooner for any concerns in the meantime.    JOSEFINA Novoa, CNP   Nurse Practitioner  [facility]    Today's clinic visit entailed:  Review of the result(s) of each unique test - cardiac testing and imaging, labs  The following tests were independently interpreted by me as noted in my documentation: labs  Ordering of each unique test  Prescription  drug management    The level of medical decision making during this visit was of moderate complexity.    Orders this Visit:  Orders Placed This Encounter   Procedures    Hemoglobin A1c    Follow-Up with Cardiology     Orders Placed This Encounter   Medications    evolocumab (REPATHA) 140 MG/ML prefilled autoinjector     Sig: Inject 1 mL (140 mg) Subcutaneous every 14 days     Dispense:  6 mL     Refill:  4     Medications Discontinued During This Encounter   Medication Reason    semaglutide (OZEMPIC, 0.25 OR 0.5 MG/DOSE,) 2 MG/1.5ML SOPN pen     evolocumab (REPATHA) 140 MG/ML prefilled autoinjector Reorder (No AVS)     Encounter Diagnoses   Name Primary?    Hyperlipidemia LDL goal <130     Exertional dyspnea Yes    Palpitations     Exertional angina (H)     Statin intolerance     Elevated fasting blood sugar      CURRENT MEDICATIONS:  Current Outpatient Medications   Medication Sig Dispense Refill    budesonide (PULMICORT) 0.5 MG/2ML neb solution Mix 2 vials with 1 oz coffee flavoring and drink twice a day and rinse mouth aftrerwards 60 mL 1    calcium carbonate (TUMS) 500 MG chewable tablet Take 2 chew tab by mouth 3 times daily as needed for other (bloating/flatulence)      cyanocobalamin (CYANOCOBALAMIN) 1000 MCG/ML injection Inject 1 mL (1,000 mcg) into the muscle every 30 days 10 mL 1    erythromycin (ROMYCIN) 5 MG/GM ophthalmic ointment Apply 1 cm ribbon in affected eye(s) four times a day. 3.5 g 0    estradiol (VIVELLE-DOT) 0.05 MG/24HR patch Place 1 patch onto the skin twice a week      evolocumab (REPATHA) 140 MG/ML prefilled autoinjector Inject 1 mL (140 mg) Subcutaneous every 14 days 6 mL 4    fexofenadine (ALLEGRA) 60 MG tablet TAKE ONE TABLET BY MOUTH TWICE A DAY 30 tablet 2    fluticasone (FLOVENT HFA) 220 MCG/ACT inhaler Inhale 1 puff into the lungs 2 times daily 36 g 1    levothyroxine (SYNTHROID/LEVOTHROID) 100 MCG tablet Take 1 tablet (100 mcg) by mouth daily 90 tablet 3    LORazepam (ATIVAN) 1 MG  "tablet Take 1 tablet (1 mg) by mouth 2 times daily as needed for anxiety 20 tablet 0    methylphenidate (RITALIN) 10 MG tablet Take 1 tablet (10 mg) by mouth 2 times daily Takes as needed 30 tablet 0    mirabegron (MYRBETRIQ) 25 MG 24 hr tablet Take 1 tablet (25 mg) by mouth daily 90 tablet 1    pantoprazole (PROTONIX) 40 MG EC tablet Take 40 mg by mouth daily      valACYclovir (VALTREX) 1000 mg tablet Take 1 tablet (1,000 mg) by mouth 2 times daily 20 tablet 3    zolpidem (AMBIEN) 5 MG tablet TAKE 2 TABLETS BY MOUTH IN THE EVENING AS NEEDED FOR SLEEP 60 tablet 5    multivitamin w/minerals (THERA-VIT-M) tablet Take 1 tablet by mouth daily (Patient not taking: Reported on 5/24/2023) 100 tablet 3    nitroGLYcerin (NITROSTAT) 0.4 MG sublingual tablet For chest pain place 1 tablet under the tongue every 5 minutes for 3 doses. If symptoms persist 5 minutes after 1st dose call 911. (Patient not taking: Reported on 4/18/2023) 25 tablet 11    STATIN NOT PRESCRIBED (INTENTIONAL) Please choose reason not prescribed from choices below. (Patient not taking: Reported on 9/28/2023)       ALLERGIES     Allergies   Allergen Reactions    Morphine And Related Other (See Comments)     Causes agitation     PAST MEDICAL, SURGICAL, FAMILY, SOCIAL HISTORY:  History was reviewed and updated as needed, see medical record.    Review of Systems:  A 10-point Review Of Systems is otherwise normal except for that which is noted in the HPI and interval summary.    Physical Exam:    Vitals: /74 (BP Location: Right arm, Patient Position: Sitting, Cuff Size: Adult Large)   Pulse 78   Temp 96.9  F (36.1  C) (Temporal)   Resp 18   Ht 1.76 m (5' 9.29\")   Wt 93.7 kg (206 lb 8 oz)   LMP  (LMP Unknown)   SpO2 95%   BMI 30.24 kg/m    Constitutional: Appears stated age, well nourished, NAD.  Neck: Supple. Carotid pulses full and equal.   Respiratory: Non-labored. Lungs CTAB.  No wheeze.  Cardiovascular: RRR, normal S1 and S2. No M/G/R.  No " edema.  GI: Soft, non-distended, non-tender.  Musculoskeletal/Extremities: Symmetrical movement to all extremities.  Neurologic: No gross focal deficits. Alert, awake, and oriented to all spheres.  Psychiatric: Affect appropriate. Mentation normal.    Recent Lab Results:  LIPID RESULTS:  Lab Results   Component Value Date    CHOL 189 09/28/2023    CHOL 190 03/24/2021    HDL 59 09/28/2023    HDL 59 03/24/2021    LDL 98 09/28/2023     (H) 03/24/2021    TRIG 159 (H) 09/28/2023    TRIG 114 03/24/2021    CHOLHDLRATIO 6.6 (H) 09/23/2015     LIVER ENZYME RESULTS:  Lab Results   Component Value Date    AST 20 09/28/2023    AST 14 03/24/2021    ALT 17 09/28/2023    ALT 25 03/24/2021     CBC RESULTS:  Lab Results   Component Value Date    WBC 9.3 09/28/2023    WBC 7.3 03/24/2021    RBC 4.89 09/28/2023    RBC 4.62 03/24/2021    HGB 14.3 09/28/2023    HGB 13.7 03/24/2021    HCT 42.5 09/28/2023    HCT 41.1 03/24/2021    MCV 87 09/28/2023    MCV 89 03/24/2021    MCH 29.2 09/28/2023    MCH 29.7 03/24/2021    MCHC 33.6 09/28/2023    MCHC 33.3 03/24/2021    RDW 12.8 09/28/2023    RDW 12.5 03/24/2021     09/28/2023     03/24/2021     BMP RESULTS:  Lab Results   Component Value Date     09/28/2023     03/24/2021    POTASSIUM 4.3 09/28/2023    POTASSIUM 4.1 11/09/2022    POTASSIUM 4.5 03/24/2021    CHLORIDE 104 09/28/2023    CHLORIDE 107 11/09/2022    CHLORIDE 108 03/24/2021    CO2 21 (L) 09/28/2023    CO2 29 11/09/2022    CO2 28 03/24/2021    ANIONGAP 15 09/28/2023    ANIONGAP 4 11/09/2022    ANIONGAP 4 03/24/2021     (H) 09/28/2023    GLC 83 11/09/2022     (H) 03/24/2021    BUN 13.1 09/28/2023    BUN 23 11/09/2022    BUN 33 (H) 03/24/2021    CR 0.85 09/28/2023    CR 0.75 03/24/2021    GFRESTIMATED 76 09/28/2023    GFRESTIMATED 85 03/24/2021    GFRESTBLACK >90 03/24/2021    ROLANDA 9.9 09/28/2023    ROLANDA 9.3 03/24/2021      A1C RESULTS:  Lab Results   Component Value Date    A1C 6.1 (H)  09/28/2023    A1C 5.6 01/26/2018     INR RESULTS:  Lab Results   Component Value Date    INR 0.95 11/12/2018    INR 0.96 01/15/2018

## 2023-09-28 ENCOUNTER — OFFICE VISIT (OUTPATIENT)
Dept: CARDIOLOGY | Facility: CLINIC | Age: 63
End: 2023-09-28
Attending: NURSE PRACTITIONER
Payer: COMMERCIAL

## 2023-09-28 VITALS
WEIGHT: 206.5 LBS | RESPIRATION RATE: 18 BRPM | BODY MASS INDEX: 30.59 KG/M2 | TEMPERATURE: 96.9 F | SYSTOLIC BLOOD PRESSURE: 128 MMHG | OXYGEN SATURATION: 95 % | HEART RATE: 78 BPM | HEIGHT: 69 IN | DIASTOLIC BLOOD PRESSURE: 74 MMHG

## 2023-09-28 DIAGNOSIS — E78.5 HYPERLIPIDEMIA LDL GOAL <130: ICD-10-CM

## 2023-09-28 DIAGNOSIS — I20.89 EXERTIONAL ANGINA (H): ICD-10-CM

## 2023-09-28 DIAGNOSIS — R00.2 PALPITATIONS: ICD-10-CM

## 2023-09-28 DIAGNOSIS — R06.09 EXERTIONAL DYSPNEA: Primary | ICD-10-CM

## 2023-09-28 DIAGNOSIS — R73.01 ELEVATED FASTING BLOOD SUGAR: ICD-10-CM

## 2023-09-28 DIAGNOSIS — Z78.9 STATIN INTOLERANCE: ICD-10-CM

## 2023-09-28 LAB
ALBUMIN SERPL BCG-MCNC: 4.1 G/DL (ref 3.5–5.2)
ALP SERPL-CCNC: 68 U/L (ref 35–104)
ALT SERPL W P-5'-P-CCNC: 17 U/L (ref 0–50)
ANION GAP SERPL CALCULATED.3IONS-SCNC: 15 MMOL/L (ref 7–15)
AST SERPL W P-5'-P-CCNC: 20 U/L (ref 0–45)
BILIRUB SERPL-MCNC: 0.8 MG/DL
BUN SERPL-MCNC: 13.1 MG/DL (ref 8–23)
CALCIUM SERPL-MCNC: 9.9 MG/DL (ref 8.8–10.2)
CHLORIDE SERPL-SCNC: 104 MMOL/L (ref 98–107)
CHOLEST SERPL-MCNC: 189 MG/DL
CREAT SERPL-MCNC: 0.85 MG/DL (ref 0.51–0.95)
EGFRCR SERPLBLD CKD-EPI 2021: 76 ML/MIN/1.73M2
ERYTHROCYTE [DISTWIDTH] IN BLOOD BY AUTOMATED COUNT: 12.8 % (ref 10–15)
GLUCOSE SERPL-MCNC: 125 MG/DL (ref 70–99)
HBA1C MFR BLD: 6.1 %
HCO3 SERPL-SCNC: 21 MMOL/L (ref 22–29)
HCT VFR BLD AUTO: 42.5 % (ref 35–47)
HDLC SERPL-MCNC: 59 MG/DL
HGB BLD-MCNC: 14.3 G/DL (ref 11.7–15.7)
LDLC SERPL CALC-MCNC: 98 MG/DL
MCH RBC QN AUTO: 29.2 PG (ref 26.5–33)
MCHC RBC AUTO-ENTMCNC: 33.6 G/DL (ref 31.5–36.5)
MCV RBC AUTO: 87 FL (ref 78–100)
NONHDLC SERPL-MCNC: 130 MG/DL
PLATELET # BLD AUTO: 265 10E3/UL (ref 150–450)
POTASSIUM SERPL-SCNC: 4.3 MMOL/L (ref 3.4–5.3)
PROT SERPL-MCNC: 7 G/DL (ref 6.4–8.3)
RBC # BLD AUTO: 4.89 10E6/UL (ref 3.8–5.2)
SODIUM SERPL-SCNC: 140 MMOL/L (ref 135–145)
TRIGL SERPL-MCNC: 159 MG/DL
WBC # BLD AUTO: 9.3 10E3/UL (ref 4–11)

## 2023-09-28 PROCEDURE — 99214 OFFICE O/P EST MOD 30 MIN: CPT | Performed by: NURSE PRACTITIONER

## 2023-09-28 PROCEDURE — 83036 HEMOGLOBIN GLYCOSYLATED A1C: CPT | Performed by: NURSE PRACTITIONER

## 2023-09-28 PROCEDURE — 36415 COLL VENOUS BLD VENIPUNCTURE: CPT | Performed by: NURSE PRACTITIONER

## 2023-09-28 PROCEDURE — 85027 COMPLETE CBC AUTOMATED: CPT | Performed by: NURSE PRACTITIONER

## 2023-09-28 PROCEDURE — 80053 COMPREHEN METABOLIC PANEL: CPT | Performed by: NURSE PRACTITIONER

## 2023-09-28 PROCEDURE — 80061 LIPID PANEL: CPT | Performed by: NURSE PRACTITIONER

## 2023-09-28 ASSESSMENT — PAIN SCALES - GENERAL: PAINLEVEL: MILD PAIN (2)

## 2023-09-28 NOTE — PATIENT INSTRUCTIONS
Thank you for your visit with the [facility] today.    Today's Summary     Continue the Repatha.  Your choelsterol panel looks excellent today.  Follow up in 1-year with Dr. De La Paz.    If you have questions or concerns, please do not hesitate to call my nursing support team at [phone].    Scheduling phone number: [phone]  [facility] Clinic Number: [phone]    It was a pleasure seeing you today.     JOSEFINA Novoa, CNP  Nurse Practitioner  [facility]  September 28, 2023  ________________________________________________________

## 2023-09-28 NOTE — LETTER
9/28/2023    Cirilo Tadeo MD  [address] [facility] [address]    RE: Nikki Mckeonlino       Dear Colleague,     I had the pleasure of seeing Nikki Morales in the [facility].              ~Cardiology Clinic Visit~    Primary Cardiologist: Dr. De La Paz  Reason for visit: 3-month follow up with labs    History of Present Illness  Nikki Morales is a very nice 63-year-old woman with history of paroxysmal atrial fibrillation requiring cardioversion in the past, history of social alcohol and tobacco use.       She has previously been seen in cardiology clinic by Dr. Rogers for paroxysmal atrial fibrillation.  A 2018 Zio patch showed 8 beats of NSVT and 30 beats of PSVT.  An echocardiogram in 2018 showed normal EF 55 to 60% with grade 1 diastolic dysfunction.  She has not been seen in cardiology clinic for 5 years up until she re-established care with Dr. De La Paz this past March 2023.     In summary, she was experiencing 3-weeks of marked decrease in activity tolerance.   She had difficulty walking the two flights of stairs in her home due to exertional dyspnea.  She does not have chest pain but her chest does feel somewhat tight with exertion.  Sometimes she notes shooting pain in both jaws but points to her temporomandibular joints and this is not necessarily an exertional symptom.  Of note, she had PFTs completed in 2012 and had concerns of dyspnea on exertion at that time as well.     EKG showed sinus rhythm low voltage QRS.  Echocardiogram completed on 3/23/2023 showed normal EF at 60-65% with no WMA; there is 1+ TR, and overall, no significant changes compared to previous study.    Exercise stress test was negative for inducible myocardial ischemia or infarct.  LV function is hyperdynamic, and EF at stress is 76%.  Baseline EKG showed sinus rhythm, and with stress, there was 1.0 mm of upsloping ST segment depression in II, III, aVF, V4, V5 and V6 leads.       Primary  cardiologist reviewed results of above testing, reults are reassuring and no further recommendations from an ischemic evaluation at this point.     In follow up today, she reports stable symptoms with ongoing ALVARADO with activity that was largely unchanged.  She is concerned about acute, possibly viral, illness she has been suffering from the past few weeks.   She is planning an E-visit with her PCP for this.  Aside from that, she has no new cardiac concerns and is tolerating Repatha without any side effects.  Lung exam stable today.  She has mild, upper mid-sternal discomfort that is exaggerated with palpation.    FLP 09/28/23 with excellent improvement -  (previously 342), HGL 59, LDL 98 (previously 220),  (previously 325).  CMP and CBC unremarkable, WBC stable.  Hgb A1c was checked and is elevated at 6.1, placing her in the pre-diabetic range.  __________________________________________________________________         Assessment and Impression:     Exertional dyspnea and angina  History of diastolic dysfunction  Dyslipidemia  on 11/9/2020  History of statin intolerance, on Repatha.  History of paroxysmal atrial fibrillation, no known recurrence.  She is not anticoagulated.  History of short run of PSVT and NSVT on a Zio patch monitor in 2018  Pre-diabetes, A1c 6.1; follow up with PCP.         Recommendations and Plan:     Continue Repatha for lipid management.  Encouraged close follow up with PCP for diabetes and weight management.  Prescriptions renewed and sent to patient's preferred pharmacy.  Follow up with Dr. De La Paz in 1-year for annual visit.  __________________________________________________________________    Thank you for the opportunity to participate in this pleasant patient's care.    We would be happy to see this patient sooner for any concerns in the meantime.    JOSEFINA Novoa, CNP   Nurse Practitioner  [facility]    Today's clinic visit  entailed:  Review of the result(s) of each unique test - cardiac testing and imaging, labs  The following tests were independently interpreted by me as noted in my documentation: labs  Ordering of each unique test  Prescription drug management    The level of medical decision making during this visit was of moderate complexity.    Orders this Visit:  Orders Placed This Encounter   Procedures    Hemoglobin A1c    Follow-Up with Cardiology     Orders Placed This Encounter   Medications    evolocumab (REPATHA) 140 MG/ML prefilled autoinjector     Sig: Inject 1 mL (140 mg) Subcutaneous every 14 days     Dispense:  6 mL     Refill:  4     Medications Discontinued During This Encounter   Medication Reason    semaglutide (OZEMPIC, 0.25 OR 0.5 MG/DOSE,) 2 MG/1.5ML SOPN pen     evolocumab (REPATHA) 140 MG/ML prefilled autoinjector Reorder (No AVS)     Encounter Diagnoses   Name Primary?    Hyperlipidemia LDL goal <130     Exertional dyspnea Yes    Palpitations     Exertional angina (H)     Statin intolerance     Elevated fasting blood sugar      CURRENT MEDICATIONS:  Current Outpatient Medications   Medication Sig Dispense Refill    budesonide (PULMICORT) 0.5 MG/2ML neb solution Mix 2 vials with 1 oz coffee flavoring and drink twice a day and rinse mouth aftrerwards 60 mL 1    calcium carbonate (TUMS) 500 MG chewable tablet Take 2 chew tab by mouth 3 times daily as needed for other (bloating/flatulence)      cyanocobalamin (CYANOCOBALAMIN) 1000 MCG/ML injection Inject 1 mL (1,000 mcg) into the muscle every 30 days 10 mL 1    erythromycin (ROMYCIN) 5 MG/GM ophthalmic ointment Apply 1 cm ribbon in affected eye(s) four times a day. 3.5 g 0    estradiol (VIVELLE-DOT) 0.05 MG/24HR patch Place 1 patch onto the skin twice a week      evolocumab (REPATHA) 140 MG/ML prefilled autoinjector Inject 1 mL (140 mg) Subcutaneous every 14 days 6 mL 4    fexofenadine (ALLEGRA) 60 MG tablet TAKE ONE TABLET BY MOUTH TWICE A DAY 30 tablet 2     "fluticasone (FLOVENT HFA) 220 MCG/ACT inhaler Inhale 1 puff into the lungs 2 times daily 36 g 1    levothyroxine (SYNTHROID/LEVOTHROID) 100 MCG tablet Take 1 tablet (100 mcg) by mouth daily 90 tablet 3    LORazepam (ATIVAN) 1 MG tablet Take 1 tablet (1 mg) by mouth 2 times daily as needed for anxiety 20 tablet 0    methylphenidate (RITALIN) 10 MG tablet Take 1 tablet (10 mg) by mouth 2 times daily Takes as needed 30 tablet 0    mirabegron (MYRBETRIQ) 25 MG 24 hr tablet Take 1 tablet (25 mg) by mouth daily 90 tablet 1    pantoprazole (PROTONIX) 40 MG EC tablet Take 40 mg by mouth daily      valACYclovir (VALTREX) 1000 mg tablet Take 1 tablet (1,000 mg) by mouth 2 times daily 20 tablet 3    zolpidem (AMBIEN) 5 MG tablet TAKE 2 TABLETS BY MOUTH IN THE EVENING AS NEEDED FOR SLEEP 60 tablet 5    multivitamin w/minerals (THERA-VIT-M) tablet Take 1 tablet by mouth daily (Patient not taking: Reported on 5/24/2023) 100 tablet 3    nitroGLYcerin (NITROSTAT) 0.4 MG sublingual tablet For chest pain place 1 tablet under the tongue every 5 minutes for 3 doses. If symptoms persist 5 minutes after 1st dose call 911. (Patient not taking: Reported on 4/18/2023) 25 tablet 11    STATIN NOT PRESCRIBED (INTENTIONAL) Please choose reason not prescribed from choices below. (Patient not taking: Reported on 9/28/2023)       ALLERGIES     Allergies   Allergen Reactions    Morphine And Related Other (See Comments)     Causes agitation     PAST MEDICAL, SURGICAL, FAMILY, SOCIAL HISTORY:  History was reviewed and updated as needed, see medical record.    Review of Systems:  A 10-point Review Of Systems is otherwise normal except for that which is noted in the HPI and interval summary.    Physical Exam:    Vitals: /74 (BP Location: Right arm, Patient Position: Sitting, Cuff Size: Adult Large)   Pulse 78   Temp 96.9  F (36.1  C) (Temporal)   Resp 18   Ht 1.76 m (5' 9.29\")   Wt 93.7 kg (206 lb 8 oz)   LMP  (LMP Unknown)   SpO2 95%   " BMI 30.24 kg/m    Constitutional: Appears stated age, well nourished, NAD.  Neck: Supple. Carotid pulses full and equal.   Respiratory: Non-labored. Lungs CTAB.  No wheeze.  Cardiovascular: RRR, normal S1 and S2. No M/G/R.  No edema.  GI: Soft, non-distended, non-tender.  Musculoskeletal/Extremities: Symmetrical movement to all extremities.  Neurologic: No gross focal deficits. Alert, awake, and oriented to all spheres.  Psychiatric: Affect appropriate. Mentation normal.    Recent Lab Results:  LIPID RESULTS:  Lab Results   Component Value Date    CHOL 189 09/28/2023    CHOL 190 03/24/2021    HDL 59 09/28/2023    HDL 59 03/24/2021    LDL 98 09/28/2023     (H) 03/24/2021    TRIG 159 (H) 09/28/2023    TRIG 114 03/24/2021    CHOLHDLRATIO 6.6 (H) 09/23/2015     LIVER ENZYME RESULTS:  Lab Results   Component Value Date    AST 20 09/28/2023    AST 14 03/24/2021    ALT 17 09/28/2023    ALT 25 03/24/2021     CBC RESULTS:  Lab Results   Component Value Date    WBC 9.3 09/28/2023    WBC 7.3 03/24/2021    RBC 4.89 09/28/2023    RBC 4.62 03/24/2021    HGB 14.3 09/28/2023    HGB 13.7 03/24/2021    HCT 42.5 09/28/2023    HCT 41.1 03/24/2021    MCV 87 09/28/2023    MCV 89 03/24/2021    MCH 29.2 09/28/2023    MCH 29.7 03/24/2021    MCHC 33.6 09/28/2023    MCHC 33.3 03/24/2021    RDW 12.8 09/28/2023    RDW 12.5 03/24/2021     09/28/2023     03/24/2021     BMP RESULTS:  Lab Results   Component Value Date     09/28/2023     03/24/2021    POTASSIUM 4.3 09/28/2023    POTASSIUM 4.1 11/09/2022    POTASSIUM 4.5 03/24/2021    CHLORIDE 104 09/28/2023    CHLORIDE 107 11/09/2022    CHLORIDE 108 03/24/2021    CO2 21 (L) 09/28/2023    CO2 29 11/09/2022    CO2 28 03/24/2021    ANIONGAP 15 09/28/2023    ANIONGAP 4 11/09/2022    ANIONGAP 4 03/24/2021     (H) 09/28/2023    GLC 83 11/09/2022     (H) 03/24/2021    BUN 13.1 09/28/2023    BUN 23 11/09/2022    BUN 33 (H) 03/24/2021    CR 0.85 09/28/2023     CR 0.75 03/24/2021    GFRESTIMATED 76 09/28/2023    GFRESTIMATED 85 03/24/2021    GFRESTBLACK >90 03/24/2021    ROLANDA 9.9 09/28/2023    ROLANDA 9.3 03/24/2021      A1C RESULTS:  Lab Results   Component Value Date    A1C 6.1 (H) 09/28/2023    A1C 5.6 01/26/2018     INR RESULTS:  Lab Results   Component Value Date    INR 0.95 11/12/2018    INR 0.96 01/15/2018       Thank you for allowing me to participate in the care of your patient.      Sincerely,     JOSEFINA Novoa CNP     [facility]  cc:   JOSEFINA Novoa CNP  [address]  [place] [address]

## 2023-10-06 ENCOUNTER — E-VISIT (OUTPATIENT)
Dept: URGENT CARE | Facility: CLINIC | Age: 63
End: 2023-10-06
Payer: COMMERCIAL

## 2023-10-06 DIAGNOSIS — R05.1 ACUTE COUGH: Primary | ICD-10-CM

## 2023-10-06 PROCEDURE — 99207 PR NON-BILLABLE SERV PER CHARTING: CPT | Performed by: EMERGENCY MEDICINE

## 2023-10-06 RX ORDER — AZITHROMYCIN 250 MG/1
TABLET, FILM COATED ORAL
Qty: 6 TABLET | Refills: 0 | Status: SHIPPED | OUTPATIENT
Start: 2023-10-06 | End: 2023-10-11

## 2023-10-06 RX ORDER — PREDNISONE 20 MG/1
20 TABLET ORAL 2 TIMES DAILY
Qty: 10 TABLET | Refills: 0 | Status: SHIPPED | OUTPATIENT
Start: 2023-10-06 | End: 2023-10-11

## 2023-10-26 ENCOUNTER — OFFICE VISIT (OUTPATIENT)
Dept: FAMILY MEDICINE | Facility: OTHER | Age: 63
End: 2023-10-26
Payer: COMMERCIAL

## 2023-10-26 ENCOUNTER — HOSPITAL ENCOUNTER (OUTPATIENT)
Dept: CT IMAGING | Facility: CLINIC | Age: 63
Discharge: HOME | End: 2023-10-26
Attending: PHYSICIAN ASSISTANT | Admitting: PHYSICIAN ASSISTANT
Payer: MEDICARE

## 2023-10-26 VITALS
DIASTOLIC BLOOD PRESSURE: 78 MMHG | OXYGEN SATURATION: 93 % | TEMPERATURE: 97.6 F | RESPIRATION RATE: 20 BRPM | SYSTOLIC BLOOD PRESSURE: 138 MMHG | HEIGHT: 68 IN | BODY MASS INDEX: 31.37 KG/M2 | WEIGHT: 207 LBS | HEART RATE: 63 BPM

## 2023-10-26 DIAGNOSIS — R06.02 SOB (SHORTNESS OF BREATH): ICD-10-CM

## 2023-10-26 DIAGNOSIS — R05.3 CHRONIC COUGH: ICD-10-CM

## 2023-10-26 DIAGNOSIS — J45.21 INTERMITTENT ASTHMA, WITH ACUTE EXACERBATION: ICD-10-CM

## 2023-10-26 DIAGNOSIS — G93.32 CHRONIC FATIGUE SYNDROME: ICD-10-CM

## 2023-10-26 DIAGNOSIS — B00.9 HSV (HERPES SIMPLEX VIRUS) INFECTION: ICD-10-CM

## 2023-10-26 DIAGNOSIS — J20.9 ACUTE BRONCHITIS WITH SYMPTOMS > 10 DAYS: Primary | ICD-10-CM

## 2023-10-26 DIAGNOSIS — F41.1 GAD (GENERALIZED ANXIETY DISORDER): ICD-10-CM

## 2023-10-26 PROCEDURE — 71250 CT THORAX DX C-: CPT | Mod: MG

## 2023-10-26 PROCEDURE — 99214 OFFICE O/P EST MOD 30 MIN: CPT | Performed by: PHYSICIAN ASSISTANT

## 2023-10-26 RX ORDER — LORAZEPAM 1 MG/1
1 TABLET ORAL 2 TIMES DAILY PRN
Qty: 20 TABLET | Refills: 0 | Status: SHIPPED | OUTPATIENT
Start: 2023-10-26

## 2023-10-26 RX ORDER — VALACYCLOVIR HYDROCHLORIDE 1 G/1
1000 TABLET, FILM COATED ORAL 2 TIMES DAILY
Qty: 20 TABLET | Refills: 3 | Status: SHIPPED | OUTPATIENT
Start: 2023-10-26

## 2023-10-26 RX ORDER — ALBUTEROL SULFATE 90 UG/1
2 AEROSOL, METERED RESPIRATORY (INHALATION) EVERY 6 HOURS PRN
Qty: 18 G | Refills: 4 | Status: SHIPPED | OUTPATIENT
Start: 2023-10-26

## 2023-10-26 RX ORDER — RESPIRATORY SYNCYTIAL VIRUS VACCINE 120MCG/0.5
0.5 KIT INTRAMUSCULAR ONCE
Qty: 1 EACH | Refills: 0 | Status: CANCELLED | OUTPATIENT
Start: 2023-10-26 | End: 2023-10-26

## 2023-10-26 RX ORDER — FLUCONAZOLE 150 MG/1
150 TABLET ORAL
Qty: 4 TABLET | Refills: 0 | Status: SHIPPED | OUTPATIENT
Start: 2023-10-26 | End: 2023-11-05

## 2023-10-26 RX ORDER — BUDESONIDE 0.5 MG/2ML
INHALANT ORAL
Qty: 60 ML | Refills: 6 | Status: SHIPPED | OUTPATIENT
Start: 2023-10-26

## 2023-10-26 RX ORDER — DOXYCYCLINE 100 MG/1
100 CAPSULE ORAL 2 TIMES DAILY
Qty: 20 CAPSULE | Refills: 0 | Status: SHIPPED | OUTPATIENT
Start: 2023-10-26 | End: 2023-11-05

## 2023-10-26 RX ORDER — FLUTICASONE PROPIONATE 220 UG/1
1 AEROSOL, METERED RESPIRATORY (INHALATION) 2 TIMES DAILY
Qty: 36 G | Refills: 3 | Status: SHIPPED | OUTPATIENT
Start: 2023-10-26 | End: 2023-11-29

## 2023-10-26 ASSESSMENT — ANXIETY QUESTIONNAIRES
4. TROUBLE RELAXING: SEVERAL DAYS
7. FEELING AFRAID AS IF SOMETHING AWFUL MIGHT HAPPEN: NOT AT ALL
3. WORRYING TOO MUCH ABOUT DIFFERENT THINGS: NOT AT ALL
GAD7 TOTAL SCORE: 2
6. BECOMING EASILY ANNOYED OR IRRITABLE: SEVERAL DAYS
1. FEELING NERVOUS, ANXIOUS, OR ON EDGE: NOT AT ALL
IF YOU CHECKED OFF ANY PROBLEMS ON THIS QUESTIONNAIRE, HOW DIFFICULT HAVE THESE PROBLEMS MADE IT FOR YOU TO DO YOUR WORK, TAKE CARE OF THINGS AT HOME, OR GET ALONG WITH OTHER PEOPLE: SOMEWHAT DIFFICULT
5. BEING SO RESTLESS THAT IT IS HARD TO SIT STILL: NOT AT ALL
GAD7 TOTAL SCORE: 2
2. NOT BEING ABLE TO STOP OR CONTROL WORRYING: NOT AT ALL

## 2023-10-26 ASSESSMENT — ASTHMA QUESTIONNAIRES: ACT_TOTALSCORE: 21

## 2023-10-26 ASSESSMENT — PATIENT HEALTH QUESTIONNAIRE - PHQ9
SUM OF ALL RESPONSES TO PHQ QUESTIONS 1-9: 9
10. IF YOU CHECKED OFF ANY PROBLEMS, HOW DIFFICULT HAVE THESE PROBLEMS MADE IT FOR YOU TO DO YOUR WORK, TAKE CARE OF THINGS AT HOME, OR GET ALONG WITH OTHER PEOPLE: SOMEWHAT DIFFICULT
SUM OF ALL RESPONSES TO PHQ QUESTIONS 1-9: 9

## 2023-10-26 ASSESSMENT — PAIN SCALES - GENERAL: PAINLEVEL: NO PAIN (1)

## 2023-10-26 NOTE — PATIENT INSTRUCTIONS
- Call to schedule CT chest     - Start antibiotic - Doxycycline     Start antifungal - Diflucan (Fluconazole)     Start Flovent inhaler     - Albuterol as needed     - Refills on Pulmicort, Ativan

## 2023-10-26 NOTE — PROGRESS NOTES
Assessment & Plan     ICD-10-CM    1. Acute bronchitis with symptoms > 10 days  J20.9 doxycycline hyclate (VIBRAMYCIN) 100 MG capsule     fluconazole (DIFLUCAN) 150 MG tablet      2. Intermittent asthma, with acute exacerbation  J45.21 budesonide (PULMICORT) 0.5 MG/2ML neb solution     fluticasone (FLOVENT HFA) 220 MCG/ACT inhaler     CT Chest w/o Contrast     albuterol (PROAIR HFA/PROVENTIL HFA/VENTOLIN HFA) 108 (90 Base) MCG/ACT inhaler      3. Chronic cough  R05.3 CT Chest w/o Contrast     fluconazole (DIFLUCAN) 150 MG tablet      4. Chronic fatigue syndrome  G93.32       5. SOB (shortness of breath)  R06.02 CT Chest w/o Contrast      6. KAILEE (generalized anxiety disorder)  F41.1 LORazepam (ATIVAN) 1 MG tablet      7. HSV (herpes simplex virus) infection  B00.9 valACYclovir (VALTREX) 1000 mg tablet        1-5. Breathing issues   - Complex, worked up many times in the past including testing and specialists     Gets this 3-4x/year   - Currently in a flare      Did Prednisone 20 mg for 5 days and Zpack, didn't improved      Recommend we do Doxycycline as she has done in the past  - Discussed antibiotic use, duration, and side effects     She has also done Diflucan for this issue for concern mold in lungs, has helped      Will repeat this as well      Discussed medication use and side effects   - She is just frustrated with how often this happens. Reviewed her chart. Recommend we get a CT scan of her chest. Has small smoking history but did have CT chest that had a few nodules, recommend recheck   - She just had a full cardiac work up including stress test which is reassuring  - If symptoms do not go away, we will get pulmonary function testing  - Refilled inhalers and neb solutions     6. Anxiety   - Stable with PRN use of Ativan   -  reviewed, last fill #20 was 6/12/23   - OK for refill. Reviewed use and side effects including sedation, addiction, and no driving on this medication     7. Reviewed medication use  and side effects, refilled       Review of the result(s) of each unique test - See list         3/23/23 - stress test        2/11/20 - CT chest   Diagnosis or treatment significantly limited by social determinants of health - None     35 minutes spent on the date of the encounter doing chart review, history and exam, documentation and further activities as noted above    The patient indicates understanding of these issues and agrees with the plan.    Emily Cortes PA-C  [facility] MARI Avila is a 63 year old, presenting for the following health issues:  Consult (Bronchial issues)        10/26/2023     9:54 AM   Additional Questions   Roomed by Jessica   Accompanied by Self         10/26/2023     9:54 AM   Patient Reported Additional Medications   Patient reports taking the following new medications NA       History of Present Illness     Asthma:  She presents for follow up of asthma.  She has some cough, some wheezing, and some shortness of breath.  She is using a relief medication a few times a week. She does not miss any doses of her controller medication throughout the week. Patient is aware of the following triggers: smoke, upper respiratory infections, mold and humidity. The patient has not had a visit to the Emergency Room, Urgent Care or Hospital due to asthma since the last clinic visit. She has been to the Emergency Room or Urgent Care 0 times.She has had a Hospitalization 0 times.    Reason for visit:  Continuing problems in the center of my chest    She eats 0-1 servings of fruits and vegetables daily.She consumes 1 sweetened beverage(s) daily.She exercises with enough effort to increase her heart rate 9 or less minutes per day.  She exercises with enough effort to increase her heart rate 3 or less days per week. She is missing 1 dose(s) of medications per week.  She is not taking prescribed medications regularly due to remembering to take.       The  patient is a 63-year-old female who is here to discuss some breathing issues.    She has inflammation in the lungs. She usually starts having swallowing problems, breathing problems and pain in the center of her chest. She coughs up a lot of phlegm. She is tired. She has been sick since the beginning of 09/2023. She called in to get a Z-Jimmy. Usually, she takes Z-Jimmy and steroids, but sometimes she has to use steroid inhalers. She has tried antifungal. She took 1 pill to kill the yeast out, but it did not work. She has not taken that for years. She is wondering what can be done to get rid of the mold in her lungs. It happens a couple of times a year. She had blood work done by her cardiologist. She had similar symptoms 15 years ago and was given 2 Z-Paks and told to take them at the same time. Her inhalers are old. She had pulmonary function testing a long time ago. She is short of breath. She climbed up a mountain, but she had to stop 6 times to get her air and keep going. Earlier this year, her pulse was jumping to 120 going up the steps. She took a COVID-19 test, which was negative. Last winter in [place], she could not move and was in bed. She was visiting a friend. She had body aches every morning. She had 4 episodes in 2 weeks for a day. She has had chronic fatigue syndrome her whole life. She has had her throat done 4 times. She has Pulmicort if she needs it. She has Flovent. She has ProAir. She has shortness of breath when she is sick. She has seen a pulmonologist a long time ago.    She has shortness of breath. She thinks it is due to her weight and her age.    She takes lorazepam as needed. If she drinks too much caffeine and she is jittering at night, she takes an 8. She has lived with alcoholics all her life.    She has inflammation in the lungs. She has chest pressure in the center of her chest. She had COVID-19 test, which was negative.    She has tried Augmentin and doxycycline a couple of times, which  "help.    She has had her throat done 4 times. She has had the Pulmicort surgically removed. She has been to the ER. It took 45 minutes to get rice out of her throat the other night.    She has shortness of breath. She thinks it is due to her weight and her age.    She is on Valtrex.    She is scheduled for a physical on 11/08/2023 with Dr. Rodríguez.    She is a nonsmoker.          Review of Systems   Constitutional, HEENT, cardiovascular, pulmonary, gi and gu systems are negative, except as otherwise noted.      Objective    /78   Pulse 63   Temp 97.6  F (36.4  C)   Resp 20   Ht 1.725 m (5' 7.91\")   Wt 93.9 kg (207 lb)   LMP  (LMP Unknown)   SpO2 93%   BMI 31.55 kg/m    Body mass index is 31.55 kg/m .  Physical Exam   GENERAL APPEARANCE: healthy, alert and no distress  EYES: Eyes grossly normal to inspection, PERRLA, conjunctivae and sclerae without injection or discharge, EOM intact   RESP: Lungs clear to auscultation - no rales, rhonchi or wheezes    CV: Regular rates and rhythm, normal S1 S2, no S3 or S4, no murmur, click or rub, no peripheral edema and peripheral pulses strong and symmetric bilaterally   MS: No musculoskeletal defects are noted and gait is age appropriate without ataxia   SKIN: No suspicious lesions or rashes, hydration status appears adeuqate with normal skin turgor   PSYCH: Alert and oriented x3; speech- coherent , normal rate and volume; able to articulate logical thoughts, able to abstract reason, no tangential thoughts, no hallucinations or delusions, mentation appears normal, Mood is euthymic. Affect is appropriate for this mood state and bright. Thought content is free of suicidal ideation, hallucinations, and delusions. Dress is adequate and upkept. Eye contact is good during conversation.       Diagnostics; none           "

## 2023-10-27 NOTE — RESULT ENCOUNTER NOTE
Kirill Jordan    Your results were normal.  You do have a few lung nodules, these are common in the lungs. However, they do need to be followed to assure stability.    The results are attached for your review.       Bennett Cortes PA-C

## 2023-11-29 ENCOUNTER — MYC MEDICAL ADVICE (OUTPATIENT)
Dept: FAMILY MEDICINE | Facility: CLINIC | Age: 63
End: 2023-11-29

## 2023-11-29 ENCOUNTER — HOSPITAL ENCOUNTER (OUTPATIENT)
Dept: ULTRASOUND IMAGING | Facility: CLINIC | Age: 63
Discharge: HOME | End: 2023-11-29
Attending: FAMILY MEDICINE | Admitting: FAMILY MEDICINE
Payer: MEDICARE

## 2023-11-29 ENCOUNTER — OFFICE VISIT (OUTPATIENT)
Dept: FAMILY MEDICINE | Facility: CLINIC | Age: 63
End: 2023-11-29
Payer: COMMERCIAL

## 2023-11-29 VITALS
BODY MASS INDEX: 31.22 KG/M2 | OXYGEN SATURATION: 97 % | WEIGHT: 206 LBS | HEIGHT: 68 IN | TEMPERATURE: 97.4 F | HEART RATE: 75 BPM | SYSTOLIC BLOOD PRESSURE: 130 MMHG | DIASTOLIC BLOOD PRESSURE: 80 MMHG | RESPIRATION RATE: 12 BRPM

## 2023-11-29 DIAGNOSIS — E03.4 HYPOTHYROIDISM DUE TO ACQUIRED ATROPHY OF THYROID: ICD-10-CM

## 2023-11-29 DIAGNOSIS — E78.5 HYPERLIPIDEMIA LDL GOAL <130: ICD-10-CM

## 2023-11-29 DIAGNOSIS — E55.9 VITAMIN D DEFICIENCY: ICD-10-CM

## 2023-11-29 DIAGNOSIS — K21.00 GASTROESOPHAGEAL REFLUX DISEASE WITH ESOPHAGITIS WITHOUT HEMORRHAGE: ICD-10-CM

## 2023-11-29 DIAGNOSIS — Z12.11 SCREEN FOR COLON CANCER: ICD-10-CM

## 2023-11-29 DIAGNOSIS — G93.32 CHRONIC FATIGUE SYNDROME: ICD-10-CM

## 2023-11-29 DIAGNOSIS — K20.0 EOSINOPHILIC ESOPHAGITIS: ICD-10-CM

## 2023-11-29 DIAGNOSIS — J45.30 MILD PERSISTENT ASTHMA WITHOUT COMPLICATION: ICD-10-CM

## 2023-11-29 DIAGNOSIS — Z79.899 CURRENT USE OF ESTROGEN THERAPY: ICD-10-CM

## 2023-11-29 DIAGNOSIS — F33.1 MODERATE RECURRENT MAJOR DEPRESSION (H): ICD-10-CM

## 2023-11-29 DIAGNOSIS — F41.1 GAD (GENERALIZED ANXIETY DISORDER): ICD-10-CM

## 2023-11-29 DIAGNOSIS — M79.605 PAIN OF LEFT LOWER EXTREMITY: ICD-10-CM

## 2023-11-29 DIAGNOSIS — R73.03 PREDIABETES: ICD-10-CM

## 2023-11-29 DIAGNOSIS — M79.7 FIBROMYALGIA: ICD-10-CM

## 2023-11-29 DIAGNOSIS — K20.0 EOSINOPHILIC ESOPHAGITIS: Primary | ICD-10-CM

## 2023-11-29 DIAGNOSIS — E66.811 OBESITY (BMI 30.0-34.9): ICD-10-CM

## 2023-11-29 DIAGNOSIS — Z00.00 ENCOUNTER FOR MEDICARE ANNUAL WELLNESS EXAM: Primary | ICD-10-CM

## 2023-11-29 DIAGNOSIS — Z82.3 FH: CVA (CEREBROVASCULAR ACCIDENT): ICD-10-CM

## 2023-11-29 PROBLEM — M51.379 DEGENERATION OF LUMBAR OR LUMBOSACRAL INTERVERTEBRAL DISC: Status: ACTIVE | Noted: 2023-11-29

## 2023-11-29 PROBLEM — M75.42 IMPINGEMENT SYNDROME OF LEFT SHOULDER: Status: RESOLVED | Noted: 2019-04-01 | Resolved: 2023-11-29

## 2023-11-29 PROBLEM — E53.8 VITAMIN B12 DEFICIENCY: Status: ACTIVE | Noted: 2023-11-29

## 2023-11-29 PROBLEM — G44.82 PRIMARY HEADACHE ASSOCIATED WITH SEXUAL ACTIVITY: Status: RESOLVED | Noted: 2019-05-16 | Resolved: 2023-11-29

## 2023-11-29 PROBLEM — U07.1 INFECTION DUE TO 2019 NOVEL CORONAVIRUS: Status: RESOLVED | Noted: 2022-08-24 | Resolved: 2023-11-29

## 2023-11-29 LAB
TSH SERPL DL<=0.005 MIU/L-ACNC: 1.58 UIU/ML (ref 0.3–4.2)
VIT D+METAB SERPL-MCNC: 36 NG/ML (ref 20–50)

## 2023-11-29 PROCEDURE — 36415 COLL VENOUS BLD VENIPUNCTURE: CPT | Performed by: FAMILY MEDICINE

## 2023-11-29 PROCEDURE — 84443 ASSAY THYROID STIM HORMONE: CPT | Performed by: FAMILY MEDICINE

## 2023-11-29 PROCEDURE — 90677 PCV20 VACCINE IM: CPT | Performed by: FAMILY MEDICINE

## 2023-11-29 PROCEDURE — G0439 PPPS, SUBSEQ VISIT: HCPCS | Performed by: FAMILY MEDICINE

## 2023-11-29 PROCEDURE — 83735 ASSAY OF MAGNESIUM: CPT | Performed by: FAMILY MEDICINE

## 2023-11-29 PROCEDURE — 99214 OFFICE O/P EST MOD 30 MIN: CPT | Mod: 25 | Performed by: FAMILY MEDICINE

## 2023-11-29 PROCEDURE — 90662 IIV NO PRSV INCREASED AG IM: CPT | Performed by: FAMILY MEDICINE

## 2023-11-29 PROCEDURE — G0008 ADMIN INFLUENZA VIRUS VAC: HCPCS | Performed by: FAMILY MEDICINE

## 2023-11-29 PROCEDURE — G0009 ADMIN PNEUMOCOCCAL VACCINE: HCPCS | Performed by: FAMILY MEDICINE

## 2023-11-29 PROCEDURE — 82306 VITAMIN D 25 HYDROXY: CPT | Performed by: FAMILY MEDICINE

## 2023-11-29 PROCEDURE — 93971 EXTREMITY STUDY: CPT | Mod: LT

## 2023-11-29 RX ORDER — RESPIRATORY SYNCYTIAL VIRUS VACCINE 120MCG/0.5
0.5 KIT INTRAMUSCULAR ONCE
Qty: 1 EACH | Refills: 0 | Status: CANCELLED | OUTPATIENT
Start: 2023-11-29 | End: 2023-11-29

## 2023-11-29 RX ORDER — BUDESONIDE AND FORMOTEROL FUMARATE DIHYDRATE 160; 4.5 UG/1; UG/1
2 AEROSOL RESPIRATORY (INHALATION) 2 TIMES DAILY
Qty: 6 G | Refills: 4 | Status: SHIPPED | OUTPATIENT
Start: 2023-11-29

## 2023-11-29 ASSESSMENT — ENCOUNTER SYMPTOMS
PALPITATIONS: 0
HEADACHES: 0
JOINT SWELLING: 0
NAUSEA: 0
FREQUENCY: 1
HEARTBURN: 1
NERVOUS/ANXIOUS: 0
COUGH: 1
MYALGIAS: 1
DYSURIA: 0
HEMATOCHEZIA: 0
SHORTNESS OF BREATH: 1
EYE PAIN: 0
CHILLS: 1
WEAKNESS: 1
ABDOMINAL PAIN: 0
DIZZINESS: 0
DIARRHEA: 0
PARESTHESIAS: 0
HEMATURIA: 0
SORE THROAT: 0
FEVER: 0
ARTHRALGIAS: 1
BREAST MASS: 0
CONSTIPATION: 0

## 2023-11-29 ASSESSMENT — ASTHMA QUESTIONNAIRES
QUESTION_3 LAST FOUR WEEKS HOW OFTEN DID YOUR ASTHMA SYMPTOMS (WHEEZING, COUGHING, SHORTNESS OF BREATH, CHEST TIGHTNESS OR PAIN) WAKE YOU UP AT NIGHT OR EARLIER THAN USUAL IN THE MORNING: NOT AT ALL
QUESTION_2 LAST FOUR WEEKS HOW OFTEN HAVE YOU HAD SHORTNESS OF BREATH: ONCE OR TWICE A WEEK
ACT_TOTALSCORE: 21
ACT_TOTALSCORE: 21
QUESTION_5 LAST FOUR WEEKS HOW WOULD YOU RATE YOUR ASTHMA CONTROL: WELL CONTROLLED
QUESTION_1 LAST FOUR WEEKS HOW MUCH OF THE TIME DID YOUR ASTHMA KEEP YOU FROM GETTING AS MUCH DONE AT WORK, SCHOOL OR AT HOME: A LITTLE OF THE TIME
QUESTION_4 LAST FOUR WEEKS HOW OFTEN HAVE YOU USED YOUR RESCUE INHALER OR NEBULIZER MEDICATION (SUCH AS ALBUTEROL): ONCE A WEEK OR LESS

## 2023-11-29 ASSESSMENT — PAIN SCALES - GENERAL: PAINLEVEL: NO PAIN (0)

## 2023-11-29 ASSESSMENT — ACTIVITIES OF DAILY LIVING (ADL): CURRENT_FUNCTION: NO ASSISTANCE NEEDED

## 2023-11-29 NOTE — PROGRESS NOTES
"SUBJECTIVE:   Margarita is a 63 year old, presenting for the following:  Wellness Visit        11/29/2023    10:44 AM   Additional Questions   Roomed by Maurice Natarajan CMA       Are you in the first 12 months of your Medicare coverage?  No    Healthy Habits:     In general, how would you rate your overall health?  Fair    Frequency of exercise:  None    Do you usually eat at least 4 servings of fruit and vegetables a day, include whole grains    & fiber and avoid regularly eating high fat or \"junk\" foods?  No    Taking medications regularly:  Yes    Barriers to taking medications:  None    Medication side effects:  None    Ability to successfully perform activities of daily living:  No assistance needed    Home Safety:  No safety concerns identified    Hearing Impairment:  Difficulty following a conversation in a noisy restaurant or crowded room    In the past 6 months, have you been bothered by leaking of urine? Yes    In general, how would you rate your overall mental or emotional health?  Fair    Additional concerns today:  Yes      Today's PHQ-9 Score:       11/29/2023    10:05 AM   PHQ-9 SCORE   PHQ-9 Total Score MyChart 9 (Mild depression)   PHQ-9 Total Score 9           Have you ever done Advance Care Planning? (For example, a Health Directive, POLST, or a discussion with a medical provider or your loved ones about your wishes): No, advance care planning information given to patient to review.  Advanced care planning was discussed at today's visit.       Fall risk  Fallen 2 or more times in the past year?: No  Any fall with injury in the past year?: No    Cognitive Screening   1) Repeat 3 items (Leader, Season, Table)    2) Clock draw: NORMAL  3) 3 item recall: Recalls 1 object   Results: NORMAL clock, 1-2 items recalled: COGNITIVE IMPAIRMENT LESS LIKELY    Mini-CogTM Copyright JOHN Hays. Licensed by the author for use in [facility]; reprinted with permission (loco@.[facility]). All rights reserved.      Do " you have sleep apnea, excessive snoring or daytime drowsiness? : no    Reviewed and updated as needed this visit by clinical staff   Tobacco  Allergies  Meds  Problems  Med Hx  Surg Hx  Fam Hx  Soc   Hx        Reviewed and updated as needed this visit by Provider   Tobacco  Allergies  Meds  Problems  Med Hx  Surg Hx  Fam Hx  Soc   Hx       Social History     Tobacco Use    Smoking status: Former     Packs/day: 0.00     Years: 10.00     Additional pack years: 0.00     Total pack years: 0.00     Types: Cigarettes    Smokeless tobacco: Never    Tobacco comments:     social smoking   Substance Use Topics    Alcohol use: Yes     Alcohol/week: 4.0 standard drinks of alcohol     Comment: social             11/29/2023    10:10 AM   Alcohol Use   Prescreen: >3 drinks/day or >7 drinks/week? No     Do you have a current opioid prescription? No  Do you use any other controlled substances or medications that are not prescribed by a provider? None      Nikki is here for general physical and general follow-up with several concerns.  Stated that she been having the left pain on her leg in the last 9 days and is not getting better.  It started right after she gets up to 3-hour flight.  It is mainly behind her thigh with a lot of cramping and spasm on the calves.  No swelling.  No hip or knee pain.  No lower back pain.  Never had it before.  She is on estrogen for years for her menopausal symptoms.  Does not smoke.  Sister had a DVT and there is a strong family history of stroke.    Also has asthma which has been controlled for the most part.  She was prescribed a Flovent but has not picked it up because of the cost.  She is wondering if anything else can be cheaper.  Uses the rescue inhaler rarely.  No coughing.  No chest pain or shortness of breath.  No wheezing.    She is being treated for eosinophilic esophagitis with the Pulmicort neb solution but she uses only as needed.  She is taking Protonix daily.  No acid  reflux symptoms.  No dysphagia or globus sensation.  No choking.    She also had depression and anxiety with chronic fatigue syndrome.  She has had it for years.  Her previous PCP has started her on Ritalin and B12 for chronic fatigue syndrome and they have been working well.  Been on them for years and they have been working well.  She is taking the Ritalin only as needed and rarely.  She also takes Ativan for anxiety as needed and rarely.  Not taking medication daily for her mental health.  She feels her depression and anxiety are overall stable/tolerable.  No suicidal or homicidal ideation.  No hallucination.  Sleeping okay.    She has been on the estrogen therapy for years for her hot flashes.  It has been working well.  It is prescribed by her OB/GYN.  She has no concern about it.    Otherwise, she is doing well.  No major medical care or procedure done since the last physical couple years ago. No headache, dizziness, or acute visual change. No runny nose, nasal congestion, coughing, fever or chill. No CP/SOB. No N/V/D/C.  She has urinary incontinence which be managed by the urologist.  No abdominal pain.  She had a hysterectomy for menorrhagia, Pap smear was no longer indicated.  No history of abnormal Pap smear.  Denied of STD risks.  No leg swelling, orthopnea or dyspnea.  Not exercising. Social drinker but no drug use.  Quit smoking years ago. Feels safe.  No weight change. No other concern today       Current providers sharing in care for this patient include:   Patient Care Team:  Cirilo Tadeo MD as PCP - General (Internal Medicine)  Aditya Gamez MD as MD (Dermatology)  Martina Andrade MD as MD (Ophthalmology)  Anselmo Cadet [facility] as Pharmacist (Pharmacist Clinician- Clinical Pharmacy Specialist)  Cirilo Tadeo MD as Assigned PCP  Montana Reed MD as MD (Neurology)  Montana Reed MD as MD (Neurology)  Montana Reed MD as Assigned Neuroscience Provider  Lani Briones, PhD as  Assigned Behavioral Health Provider  Lisa De La Paz MD as MD (Cardiovascular Disease)  Lisa De La Paz MD as Assigned Heart and Vascular Provider  Farhat Montanez MD as Assigned Musculoskeletal Provider  Veronika Johnson PA-C as Assigned Surgical Provider    The following health maintenance items are reviewed in Epic and correct as of today:  Health Maintenance   Topic Date Due    ZOSTER IMMUNIZATION (1 of 2) Never done    RSV VACCINE (Pregnancy & 60+) (1 - 1-dose 60+ series) Never done    COVID-19 Vaccine (5 - 2023-24 season) 09/01/2023    COLORECTAL CANCER SCREENING  09/06/2023    DEPRESSION 12 MO INDEX REPEAT PHQ-9  12/13/2023    ASTHMA CONTROL TEST  05/29/2024    PHQ-9  05/29/2024    ANNUAL REVIEW OF HM ORDERS  10/26/2024    LUNG CANCER SCREENING  10/26/2024    MEDICARE ANNUAL WELLNESS VISIT  11/29/2024    TSH W/FREE T4 REFLEX  11/29/2024    FALL RISK ASSESSMENT  11/29/2024    ASTHMA ACTION PLAN  11/30/2024    MAMMO SCREENING  03/21/2025    DTAP/TDAP/TD IMMUNIZATION (6 - Td or Tdap) 08/23/2027    LIPID  09/28/2028    ADVANCE CARE PLANNING  11/29/2028    DEXA  04/20/2037    HEPATITIS C SCREENING  Completed    DEPRESSION ACTION PLAN  Completed    INFLUENZA VACCINE  Completed    Pneumococcal Vaccine: 65+ Years  Completed    HPV IMMUNIZATION  Aged Out    MENINGITIS IMMUNIZATION  Aged Out    RSV MONOCLONAL ANTIBODY  Aged Out    HIV SCREENING  Discontinued    PAP  Discontinued    IPV IMMUNIZATION  Discontinued     BP Readings from Last 3 Encounters:   11/29/23 130/80   10/26/23 138/78   09/28/23 128/74    Wt Readings from Last 3 Encounters:   11/29/23 93.4 kg (206 lb)   10/26/23 93.9 kg (207 lb)   09/28/23 93.7 kg (206 lb 8 oz)                  Patient Active Problem List   Diagnosis    Chronic fatigue syndrome    ESOPHAGEAL REFLUX    Herpes simplex virus (HSV) infection    HYPERLIPIDEMIA LDL GOAL <130    Eosinophilic esophagitis    Overweight    Degenerative arthritis of cervical spine with cord  compression    Hypothyroidism due to acquired atrophy of thyroid    Attention deficit hyperactivity disorder, inattentive type    Mild persistent asthma without complication    KAILEE (generalized anxiety disorder)    Moderate recurrent major depression (H)    Mixed incontinence    Primary osteoarthritis of right knee    Fibromyalgia    Degeneration of lumbar or lumbosacral intervertebral disc    Vitamin B12 deficiency    Vitamin D deficiency     Past Surgical History:   Procedure Laterality Date    ABDOMEN SURGERY  06/10/1993    C section    COLONOSCOPY  10/06/2009    COLONOSCOPY  2013    Procedure: COMBINED COLONOSCOPY, SINGLE BIOPSY/POLYPECTOMY BY BIOPSY;;  Surgeon: Cuate Miles MD;  Location:  GI    COLONOSCOPY N/A 2018    Procedure: COLONOSCOPY;  Colonoscopy;  Surgeon: Hamzah Dudley DO;  Location:  GI    COMBINED REPAIR PTOSIS WITH BLEPHAROPLASTY BILATERAL Bilateral 2018    Procedure: COMBINED REPAIR PTOSIS WITH BLEPHAROPLASTY BILATERAL;  Bilateral upper eyelid Blepharoplasty and ptosis repair ;  Surgeon: Martina Andrade MD;  Location: [facility] OR    CONIZATION CERVIX,KNIFE/LASER      ESOPHAGOSCOPY, GASTROSCOPY, DUODENOSCOPY (EGD), COMBINED  2013    Procedure: COMBINED ESOPHAGOSCOPY, GASTROSCOPY, DUODENOSCOPY (EGD), BIOPSY SINGLE OR MULTIPLE;  egd with rabdain biopsies and colonoscopy with polypectomy by biopsy ;  Surgeon: Cuate Miles MD;  Location:  GI    GENITOURINARY SURGERY  ?     I have a bladder sling    HC DILATION/CURETTAGE DIAG/THER NON OB      D & C    HC REMOVAL OF TONSILS,<11 Y/O      Tonsils <10y.o.    [facility] UGI ENDOSCOPY DIAG W BIOPSY  2007    HYSTERECTOMY, PAP NO LONGER INDICATED      LAPAROSCOPIC CHOLECYSTECTOMY  10/12/2013    REPAIR PTOSIS      TUBAL LIGATION      [facility]  DELIVERY ONLY      , Low Cervical    [facility]  DELIVERY ONLY      Description:  Section;  Recorded: 11/10/2009;  Comments: @ 29 weeks    [facility]  LIGATE FALLOPIAN TUBE      Description: Tubal Ligation;  Recorded: 11/10/2009;    [facility] NONSPECIFIC PROCEDURE  2000's    sinus    [facility] NONSPECIFIC PROCEDURE      Esophgeal dilatation multiple    [facility] NONSPECIFIC PROCEDURE  2008    sling    [facility] TOTAL ABDOM HYSTERECTOMY      Description: Hysterectomy;  Recorded: 11/10/2009;  Comments: due to cervical dysplasia    [facility] VAGINAL HYSTERECTOMY  09/1995    Hysterectomy, Vaginal    [facility] UGI ENDOSCOPY, SIMPLE EXAM  09/10/99 & 04/14/98       Social History     Tobacco Use    Smoking status: Former     Packs/day: 0.00     Years: 10.00     Additional pack years: 0.00     Total pack years: 0.00     Types: Cigarettes    Smokeless tobacco: Never    Tobacco comments:     social smoking   Substance Use Topics    Alcohol use: Yes     Alcohol/week: 4.0 standard drinks of alcohol     Comment: social     Family History   Problem Relation Age of Onset    Cancer Mother         Uterine    Diabetes Father     Hypertension Father     Cerebrovascular Disease Father     Prostate Cancer Father     Cardiovascular Sister         recurrent blood clots    Cerebrovascular Disease Sister 51    Cancer Brother         leukmemia    Coronary Artery Disease Brother         Quadruple bypass    Cerebrovascular Disease Brother     Other Cancer Brother         Non-Hodgkin's lymphoma * 2    Coronary Artery Disease Brother         Quadruple bypass also    Hypertension Brother     Cerebrovascular Disease Brother     Prostate Cancer Brother     Heart Disease Maternal Grandmother         Heart condition age 96    Diabetes Maternal Grandfather     Cerebrovascular Disease Paternal Grandmother     Diabetes Paternal Grandfather     Psychotic Disorder Son         severe Add,     Asthma Son         exercised induced asthma    Depression Son     Substance Abuse Son     Asthma Son         ecxercise induced asthma    Genetic Disorder Cousin         Alpha-1 Antitrypsin Deficiency  Liver transplant    Genetic Disorder Cousin          Idiopathic Young Cintron   1977    Cerebrovascular Disease Other         Uncle    Cerebrovascular Disease Other         Uncle    Colon Cancer Other          Current Outpatient Medications   Medication Sig Dispense Refill    albuterol (PROAIR HFA/PROVENTIL HFA/VENTOLIN HFA) 108 (90 Base) MCG/ACT inhaler Inhale 2 puffs into the lungs every 6 hours as needed for shortness of breath, wheezing or cough 18 g 4    budesonide (PULMICORT) 0.5 MG/2ML neb solution Mix 2 vials with 1 oz coffee flavoring and drink twice a day and rinse mouth aftrerwards 60 mL 6    budesonide-formoterol (SYMBICORT) 160-4.5 MCG/ACT Inhaler Inhale 2 puffs into the lungs 2 times daily Stop the flovent when using this inhaler 6 g 4    calcium carbonate (TUMS) 500 MG chewable tablet Take 2 chew tab by mouth 3 times daily as needed for other (bloating/flatulence)      cyanocobalamin (CYANOCOBALAMIN) 1000 MCG/ML injection Inject 1 mL (1,000 mcg) into the muscle every 30 days 10 mL 1    estradiol (VIVELLE-DOT) 0.05 MG/24HR patch Place 1 patch onto the skin twice a week      evolocumab (REPATHA) 140 MG/ML prefilled autoinjector Inject 1 mL (140 mg) Subcutaneous every 14 days 6 mL 4    levothyroxine (SYNTHROID/LEVOTHROID) 100 MCG tablet Take 1 tablet (100 mcg) by mouth daily 90 tablet 3    LORazepam (ATIVAN) 1 MG tablet Take 1 tablet (1 mg) by mouth 2 times daily as needed for anxiety 20 tablet 0    methylphenidate (RITALIN) 10 MG tablet Take 1 tablet (10 mg) by mouth 2 times daily Takes as needed 30 tablet 0    mirabegron (MYRBETRIQ) 25 MG 24 hr tablet Take 1 tablet (25 mg) by mouth daily 90 tablet 1    pantoprazole (PROTONIX) 40 MG EC tablet Take 40 mg by mouth daily      valACYclovir (VALTREX) 1000 mg tablet Take 1 tablet (1,000 mg) by mouth 2 times daily 20 tablet 3    zolpidem (AMBIEN) 5 MG tablet TAKE 2 TABLETS BY MOUTH IN THE EVENING AS NEEDED FOR SLEEP 60 tablet 5    nitroGLYcerin (NITROSTAT) 0.4 MG sublingual tablet For chest pain place  1 tablet under the tongue every 5 minutes for 3 doses. If symptoms persist 5 minutes after 1st dose call 911. (Patient not taking: Reported on 4/18/2023) 25 tablet 11    STATIN NOT PRESCRIBED (INTENTIONAL) Please choose reason not prescribed from choices below. (Patient not taking: Reported on 9/28/2023)       Allergies   Allergen Reactions    Morphine And Related Other (See Comments)     Causes agitation     Recent Labs   Lab Test 11/29/23  1151 09/28/23  0830 03/14/23  1335 11/09/22  1435 02/21/22  1417 03/24/21  1033 06/23/20  1053 11/12/18  0259 01/26/18  1018   A1C  --  6.1*  --   --   --   --   --   --  5.6   LDL  --  98  --  220*  --  108* 220*  --   --    HDL  --  59  --  57  --  59 55  --   --    TRIG  --  159*  --  325*  --  114 125  --   --    ALT  --  17 15 40  --  25 21   < >  --    CR  --  0.85 0.82 0.82   < > 0.75 0.82   < >  --    GFRESTIMATED  --  76 79 79   < > 85 77   < >  --    GFRESTBLACK  --   --   --   --   --  >90 89   < >  --    POTASSIUM  --  4.3 4.8 4.1  --  4.5 4.1   < >  --    TSH 1.58  --  6.33* 6.38*   < > 0.33* 0.27*   < >  --     < > = values in this interval not displayed.      Mammogram Screening: Mammogram Screening: Recommended mammography every 1-2 years with patient discussion and risk factor consideration    Last 3 Pap and HPV Results:       2/2/2007    12:00 AM 6/20/2005    12:00 AM   PAP / HPV   PAP (Historical) NIL  NIL        FHS-7:       2/25/2022    12:41 PM 11/9/2022    12:07 PM 3/21/2023    10:49 AM 11/29/2023    10:15 AM   Breast CA Risk Assessment (FHS-7)   Did any of your first-degree relatives have breast or ovarian cancer? No Unknown No Unknown   Did any of your relatives have bilateral breast cancer? No No No No   Did any man in your family have breast cancer? No No No No   Did any woman in your family have breast and ovarian cancer? No No No Unknown   Did any woman in your family have breast cancer before age 50 y? No No No Yes   Do you have 2 or more relatives  "with breast and/or ovarian cancer? No No No Unknown   Do you have 2 or more relatives with breast and/or bowel cancer? No Yes No Unknown     Pertinent mammograms are reviewed under the imaging tab.    Review of Systems   Constitutional:  Positive for chills. Negative for fever.   HENT:  Negative for congestion, ear pain, hearing loss and sore throat.    Eyes:  Positive for visual disturbance. Negative for pain.   Respiratory:  Positive for cough and shortness of breath.    Cardiovascular:  Positive for peripheral edema. Negative for chest pain and palpitations.   Gastrointestinal:  Positive for heartburn. Negative for abdominal pain, constipation, diarrhea, hematochezia and nausea.   Breasts:  Negative for tenderness, breast mass and discharge.   Genitourinary:  Positive for frequency and urgency. Negative for dysuria, genital sores, hematuria, pelvic pain, vaginal bleeding and vaginal discharge.   Musculoskeletal:  Positive for arthralgias and myalgias. Negative for joint swelling.   Skin:  Negative for rash.   Neurological:  Positive for weakness. Negative for dizziness, headaches and paresthesias.   Psychiatric/Behavioral:  Positive for mood changes. The patient is not nervous/anxious.      Please see subjective otherwise the complete review of system was negative     OBJECTIVE:   /80 (BP Location: Right arm, Patient Position: Chair, Cuff Size: Adult Large)   Pulse 75   Temp 97.4  F (36.3  C) (Temporal)   Resp 12   Ht 1.725 m (5' 7.9\")   Wt 93.4 kg (206 lb)   LMP  (LMP Unknown)   SpO2 97%   BMI 31.41 kg/m   Estimated body mass index is 31.41 kg/m  as calculated from the following:    Height as of this encounter: 1.725 m (5' 7.9\").    Weight as of this encounter: 93.4 kg (206 lb).  Physical Exam    GENERAL: healthy, alert and no distress.  Speaking in full sentences.  EYES: Eyes grossly normal to inspection, PERRL and conjunctivae and sclerae normal.  No nystagmus.  All 4 visual fields intact.  HENT: " ear canals and TM's normal.  Nares are non-congested. Oropharynx is pink and moist. No tender with palpation to the sinuses.   NECK: no adenopathy, supple, no lymphadenopathy or thyromegaly.  No tender with palpation to the cervical spine or its paraspinous muscle.  No JV distention or carotid bruits  RESP: lungs clear to auscultation - no rales, rhonchi or wheezes.  Good respiratory effort throughout.  BREAST: Offered but she declined.  She has no concern about it.  ABDOMEN: soft, nontender, nondistended, no palpable masses organomegaly with normal culture.  No CVA tenderness.   (female): Offered but she declined.  She has no concern about it  MS: no gross musculoskeletal defects noted, no edema. All joints are in the normal range of motion and 4 extremities were equally in strength. Hips, knees, ankles, shoulders, elbows, wrists exams were normal. Fine motor skills of the fingers are intact. Back is straight, no tender with palpation to the spine.  Tender with palpation behind the left thigh and the left calf.  Negative Homans' sign.  SKIN: no suspicious lesions or rashes.  No petechia or ecchymosis.  NEURO: Normal strength and tone, mentation intact and speech normal.  Cranial nerve II through XII intact.  DTRs +2 throughout.  No focal neurological deficit.  PSYCH: mentation appears normal, affect normal/bright.  Thoughts intact, suicidal/homicidal ideation.  No hallucination.       Diagnostic Test Results:  Labs reviewed in Epic  Results for orders placed or performed in visit on 11/29/23   US Lower Extremity Venous Duplex Left     Status: None    Narrative    US LOWER EXTREMITY VENOUS DUPLEX LEFT 11/29/2023 12:10 PM    CLINICAL HISTORY: acute left leg pain - calf and behind the thigh -  start after 3 hrs of flight.  On estrogen therapy and sister with DVT;  Current use of estrogen therapy; Pain of left lower extremity  TECHNIQUE: Venous Duplex ultrasound of the left lower extremity with  and without  compression, augmentation and duplex. Color flow and  spectral Doppler with waveform analysis performed.    COMPARISON: None.    FINDINGS: Exam includes the common femoral, femoral, popliteal, and  contralateral common femoral veins as well as segmentally visualized  deep calf veins and greater saphenous vein.     LEFT: No deep vein thrombosis. No superficial thrombophlebitis. No  popliteal cyst.      Impression    IMPRESSION:  1.  No deep venous thrombosis in the left lower extremity.    MAI ALTAMIRANO MD         SYSTEM ID:  [id number]   TSH with free T4 reflex     Status: Normal   Result Value Ref Range    TSH 1.58 0.30 - 4.20 uIU/mL   Vitamin D Deficiency     Status: Normal   Result Value Ref Range    Vitamin D, Total (25-Hydroxy) 36 20 - 50 ng/mL    Narrative    Season, race, dietary intake, and treatment affect the concentration of 25-hydroxy-Vitamin D. Values may decrease during winter months and increase during summer months.    Vitamin D determination is routinely performed by an immunoassay specific for 25 hydroxyvitamin D3.  If an individual is on vitamin D2(ergocalciferol) supplementation, please specify 25 OH vitamin D2 and D3 level determination by LCMSMS test [id number].         ASSESSMENT / PLAN:   (Z00.00) Encounter for Medicare annual wellness exam  (primary encounter diagnosis)  Comment: Overall Nikki doing well.  Her chronic medical conditions are stable.  UTD for immunization except of the single, RSV and COVID vaccination which were offered and recommended but she declined.  Risks and benefits discussed and she is willing to take the risks.  Discussed about safety issue, healthy diet, exercising, weight management, good sleeping hygiene, advanced directive care, falling prevention, and depression prevention. Recommended daily exercise for at least 30 minutes, more as tolerated.  Emphasized on healthy diet with adequate fluid intake and resting.  No safety or mental health concerns.  Follow in 1  year for physical, earlier as needed.  Labs as ordered -results reviewed.      She had labs done on 9/8/2023: Hemoglobin A1c was 6.1, CBC and CMP were normal and lipid panel showed LDL of 98, HDL 59 and triglyceride of 159.  Discussed results with patient and explained to her about the significance.  All of her questions were answered.    Colon cancer screening: Last colonoscopy was in 2018, was normal but recommended to follow-up in 5 years.  Discussed with her about the recommendation.  She is would like to pursue colonoscopy and therefore it was ordered.  Encouraged her to get it done as soon as possible.     Breast cancer screening: Up-to-date.  Last mammogram was 9 months ago and it was normal.  She was recommended mammogram annually.    Cervical cancer screening: She had a hysterectomy many years ago for benign reason.  Per chart documentation, Pap smear is no longer indicated unless concerns arise.  She has no concern.    Bone density: She had a DEXA scan in 2022 which was normal.  She has been on estrogen supplement for years.     (E78.5) HYPERLIPIDEMIA LDL GOAL <130  Comment: Not on medication.  Recent cholesterol level was normal.  She has a strong family history of stroke.  Preferably her cholesterol with LDL to be less than 130.  Emphasized on exercise and healthy diet which also discussed in details.  Continue to screen her cholesterol level at least annually and need to be treated aggressively.      (J45.30) Mild persistent asthma without complication  Comment: Overall stable.  Could not afford the Flovent's co-pay.  Will have her try the Symbicort.  She is also on Pulmicort neb for eosinophilic esophagitis but she does not use it frequently.  She was informed that if she uses the Pulmicort regularly as prescribed, she does not need to be on either the Flovent or the Symbicort.  The Symbicort inhaler is easier for her to use and therefore she preferred to be on it instead.  Symptoms that need to be  seen to call in discussed.  Continue with the albuterol inhaler as needed.  She is no longer smoking.    Plan: budesonide-formoterol (SYMBICORT) 160-4.5         MCG/ACT Inhaler            (E03.4) Hypothyroidism due to acquired atrophy of thyroid  Comment: Stable and no symptoms.  TSH level today was therapeutic.  Continue with current dose of Levothyroxine.  Recheck the TSH level in a year.    Plan: TSH with free T4 reflex            (K20.0) Eosinophilic esophagitis  Comment: Overall stable.  She has the Pulmicort neb to use as needed.  She does not use it daily as prescribed.  Reviewed the medical record, last endoscopy was in 2018 and endoscopy and was recommended a follow-up endoscopy in 8 weeks.  No documentation of having a follow-up endoscopy since then.  Will check with patient to see if they have any follow-up endoscopy, if not, will offer to get one done soon.  In the meantime, continue with the Protonix.  Symptoms that need to be seen or call in discussed.    (K21.00) Gastroesophageal reflux disease with esophagitis without hemorrhage  Comment: Chronic and controlled with the Protonix.  Also has eosinophilic esophagitis as above encouraged to avoid any known triggers, especially with greasy or spicy food.  Also encouraged to avoid late meals as well as excessive alcohol or NSAID intake.  Denied of drinking alcohol or taking NSAID excessively.  No history of GI bleeding. Symptoms the need to be seen or call in discussed.       (E55.9) Vitamin D deficiency  Comment: Continue with vitamin D supplement.  Food with high source of vitamin D discussed and recommended.  Vitamin D level today is therapeutic/normal.    Plan: Vitamin D Deficiency            (F41.1) KAILEE (generalized anxiety disorder)  (F33.1) Moderate recurrent major depression (H)  Comment: Overall stable, she has no concern about her mental health today.  Her PHQ-9 score however was 9 today which was slightly elevated.  Denies of being depressed, no  suicidal homicidal ideation.  No hallucination.  Not on medication except Ativan which she takes rarely.  No misuse concerns identified.  Will continue with Ativan as needed.  Symptoms that need to be seen or call in also discussed.  Healthy lifestyle modification discussed as above.      (Z79.176) Current use of estrogen therapy  Comment: I had a long conversation with her about potential complications associated with chronic, prolonged use of estrogen therapy in postmenopausal patient.  The risks include but not limited to increased risk for stroke, heart disease, and/or breast cancer.  Patient stated that she is aware of these risks as her OB/GYN has informed her multiple times.  She has been very hesitant to taper it off as it has helped with her mood.  I informed that she has a very strong family history of stroke and I am very concerned.  I strongly recommend her to taper off slowly and let me know when she is ready.  She has been on it more than 15 years.  I also discussed with her about the options for managing her moods concerned.  She will think about it.  The estrogens is typically prescribed by her OB/GYN.      (M79.475) Pain of left lower extremity  Comment: Exam was pretty normal except significant tenderness to palpation in the inner thigh.  Knee ankle and hip exam was normal.  No significant swelling.  Due to strong family history of stroke, her sister had DVT, her being on estrogen therapy and that the symptoms started right after the 3-hour flight, and concern of DVT.  She was sent for ultrasound and it was negative today.  She was reassured.  Will continue to monitor.  Normal activity as tolerated.  Will check the magnesium level today.  Recent CMP showed normal electrolytes and kidney function.  Follow-up if it gets worse.      Plan: US Lower Extremity Venous Duplex Left            (Z12.11) Screen for colon cancer  Comment: Please see #1 above for further details.  Plan: Colonoscopy Screening   Referral            (Z82.3) FH: CVA - parents and several siblings  Comment: Strong family history of CVA significantly to her father, 1 sister, 2 brothers and multiples aunties and uncles.  Discussed with her about the risk stratifications.  Emphasized the importance of screening and treated aggressively for all modifiable risks.  Her blood pressure and cholesterol level are normal.  She is now prediabetic; will need to monitor and treat aggressively if develop diabetes.  She is slightly overweight, encouraged to work on weight loss as below.  She no longer smoking encouraged to keep the good work.  No drugs or alcohol.  Emphasized on the importance preventive care at least preventive care at least annually.  Continue with the low-dose aspirin daily.  Also recommend exercising daily.      (E66.9) Obesity  Comment: Today's BMI was 31.  She also has prediabetic with strong family history of stroke.  Discussed about its potential long and short-term complications.  Also educated herabout the goal for her weight and BMI.  Exercising and healthy/portioned diet discussed and encouraged.  Her thyroid level is therapeutic.      (G93.32) Chronic fatigue syndrome  (M79.7) Fibromyalgia  Comment: Overall stable and is doing well.  She takes Ritalin as needed.  She is also on B12 injection. Been on these treatment for years and has been working well.  I told her typically I do not use B12 for chronic fatigue syndrome.  Since he is doing well, will continue with it.  Emphasized on stretching exercise and reducing stress as well as adequate water intake and resting.  Symptoms that need to be seen or call in discussed.    (R73.03) Prediabetes  Hemoglobin A1c last month was 6.1.  Discussed with her about it significant.  Will continue to monitor closely, recheck hemoglobin A1c in 6 months.  Emphasized on healthy lifestyle modification with exercising, healthy diet and weight management as discussed above.    Patient has  "been advised of split billing requirements and indicates understanding: Yes      COUNSELING:  Reviewed preventive health counseling, as reflected in patient instructions       Regular exercise       Healthy diet/nutrition       Vision screening       Hearing screening       Dental care       Bladder control       Fall risk prevention       Immunizations  Declined: Covid-19 and Zoster due to Conscientious objector             Aspirin prophylaxis        Alcohol Use        Osteoporosis prevention/bone health       Colon cancer screening       (Sonja)menopause management       Advanced Planning       BMI:   Estimated body mass index is 31.41 kg/m  as calculated from the following:    Height as of this encounter: 1.725 m (5' 7.9\").    Weight as of this encounter: 93.4 kg (206 lb).   Weight management plan: Discussed healthy diet and exercise guidelines      She reports that she has quit smoking. Her smoking use included cigarettes. She has never used smokeless tobacco.      Appropriate preventive services were discussed with this patient, including applicable screening as appropriate for fall prevention, nutrition, physical activity, Tobacco-use cessation, weight loss and cognition.  Checklist reviewing preventive services available has been given to the patient.    Reviewed patients plan of care and provided an AVS. The Basic Care Plan (routine screening as documented in Health Maintenance) for Nikki meets the Care Plan requirement. This Care Plan has been established and reviewed with the Patient.          Toribio Perez Mai, MD  [facility]      Identified Health Risks:  I have reviewed Opioid Use Disorder and Substance Use Disorder risk factors and made any needed referrals.   Answers submitted by the patient for this visit:  Patient Health Questionnaire (Submitted on 11/29/2023)  If you checked off any problems, how difficult have these problems made it for you to do your work, take care of things at " home, or get along with other people?: Somewhat difficult  PHQ9 TOTAL SCORE: 9

## 2023-11-29 NOTE — PROGRESS NOTES
"The patient's PHQ-9 score is consistent with mild depression. She was provided with information regarding depression and was advised to schedule a follow up appointment as needed to further address this issue.  Answers submitted by the patient for this visit:  Patient Health Questionnaire (Submitted on 11/29/2023)  If you checked off any problems, how difficult have these problems made it for you to do your work, take care of things at home, or get along with other people?: Somewhat difficult  PHQ9 TOTAL SCORE: 9  Annual Preventive Visit (Submitted on 11/29/2023)  Chief Complaint: Annual Exam:  In general, how would you rate your overall physical health?: fair  Frequency of exercise:: None  Do you usually eat at least 4 servings of fruit and vegetables a day, include whole grains & fiber, and avoid regularly eating high fat or \"junk\" foods? : No  Taking medications regularly:: Yes  Activities of Daily Living: no assistance needed  Home safety: no safety concerns identified  Hearing Impairment:: difficulty following a conversation in a noisy restaurant or crowded room  In the past 6 months, have you been bothered by leaking of urine?: Yes  abdominal pain: No  Blood in stool: No  Blood in urine: No  chest pain: No  chills: Yes  congestion: No  constipation: No  cough: Yes  diarrhea: No  dizziness: No  ear pain: No  eye pain: No  nervous/anxious: No  fever: No  frequency: Yes  genital sores: No  headaches: No  hearing loss: No  heartburn: Yes  arthralgias: Yes  joint swelling: No  peripheral edema: Yes  mood changes: Yes  myalgias: Yes  nausea: No  dysuria: No  palpitations: No  Skin sensation changes: No  sore throat: No  urgency: Yes  rash: No  shortness of breath: Yes  visual disturbance: Yes  weakness: Yes  pelvic pain: No  vaginal bleeding: No  vaginal discharge: No  tenderness: No  breast mass: No  breast discharge: No  In general, how would you rate your overall mental or emotional health?: fair  Additional " concerns today:: Yes

## 2023-11-29 NOTE — COMMUNITY RESOURCES LIST (ENGLISH)
11/29/2023   [facility]  N/A  For questions about this resource list or additional care needs, please contact your primary care clinic or care manager.  Phone: [phone]   Email: N/A   Address: [address] [place] [address]   Hours: N/A        Housing       Housing search assistance  1  [place] \[facility]\"" and [facility] Distance: 23.84 miles      In-Person   [address] [facility] [place] [address]  Language: English  Hours: Mon - Fri 8:00 AM - 4:00 PM  Fees: Free   Phone: (405) 488-9290 Email: Oumar@[employer] Website: https://[employer].org/          Important Numbers & Websites       Emergency Services   911  City Services   311  Poison Control   (638) 576-3202  Suicide Prevention Lifeline   (259) 757-5478 (TALK)  Child Abuse Hotline   (275) 716-4592 (4-A-Child)  Sexual Assault Hotline   (859) 368-4982 (HOPE)  National Runaway Safeline   (421) 272-5012 (RUNAWAY)  All-Options Talkline   (247) 825-5223  Substance Abuse Referral   (874) 397-5519 (HELP)

## 2023-11-29 NOTE — COMMUNITY RESOURCES LIST (ENGLISH)
11/29/2023   [facility]  N/A  For additional resource needs, please contact your health insurance member services or your primary care team.  Phone: [phone]   Email: N/A   Address: [address] [place] [address]   Hours: N/A        Housing       Housing search assistance  [facility] [place] [facility] and [facility] Distance: 23.84 miles      In-Person   [address] [facility] [place] [address]  Language: English  Hours: Mon - Fri 8:00 AM - 4:00 PM  Fees: Free   Phone: (238) 422-1484 Email: Oumar@[employer] Website: https://[employer]/          Important Numbers & Websites       [facility]   211 211itedway.org  Poison Control   (306) 758-8298 [place]  Suicide and Crisis Lifeline   [address] [place], [place] [address]line.org  Childhelp [place] Child Abuse Hotline   226.801.1684 Childhelphotline.org  [place] Sexual Assault Hotline   (106) 225-9736 (HOPE) [facility]  [place] Runaway Safeline   (898) 658-1229 (RUNAWAY) [facility]  Pregnancy & Postpartum Support [place]   Call/text 083-585-8257 Ppsupport[place].org  Substance Abuse National Helpline ([facility]   621-404-HELP (6735) Findtreatment.gov  Emergency Services   911

## 2023-11-29 NOTE — PATIENT INSTRUCTIONS
Patient Education   Personalized Prevention Plan  You are due for the preventive services outlined below.  Your care team is available to assist you in scheduling these services.  If you have already completed any of these items, please share that information with your care team to update in your medical record.  Health Maintenance Due   Topic Date Due     Polio Vaccine (4 of 4 - 4-dose series) 02/16/1964     Pneumococcal Vaccine (1 - PCV) Never done     Zoster (Shingles) Vaccine (1 of 2) Never done     RSV VACCINE (Pregnancy & 60+) (1 - 1-dose 60+ series) Never done     Asthma Action Plan - yearly  11/25/2020     Flu Vaccine (1) 09/01/2023     COVID-19 Vaccine (5 - 2023-24 season) 09/01/2023     Colorectal Cancer Screening  09/06/2023     Learning About Depression Screening  What is depression screening?  Depression screening is a way to see if you have depression symptoms. It may be done by a doctor or counselor. It's often part of a routine checkup. That's because your mental health is just as important as your physical health.  Depression is a mental health condition that affects how you feel, think, and act. You may:  Have less energy.  Lose interest in your daily activities.  Feel sad and grouchy for a long time.  Depression is very common. It affects people of all ages.  Many things can lead to depression. Some people become depressed after they have a stroke or find out they have a major illness like cancer or heart disease. The death of a loved one or a breakup may lead to depression. It can run in families. Most experts believe that a combination of inherited genes and stressful life events can cause it.  What happens during screening?  You may be asked to fill out a form about your depression symptoms. You and the doctor will discuss your answers. The doctor may ask you more questions to learn more about how you think, act, and feel.  What happens after screening?  If you have symptoms of depression, your  "doctor will talk to you about your options.  Doctors usually treat depression with medicines or counseling. Often, combining the two works best. Many people don't get help because they think that they'll get over the depression on their own. But people with depression may not get better unless they get treatment.  The cause of depression is not well understood. There may be many factors involved. But if you have depression, it's not your fault.  A serious symptom of depression is thinking about death or suicide. If you or someone you care about talks about this or about feeling hopeless, get help right away.  It's important to know that depression can be treated. Medicine, counseling, and self-care may help.  Where can you learn more?  Go to https://www.[employer].net/patiented  Enter T185 in the search box to learn more about \"Learning About Depression Screening.\"  Current as of: June 25, 2023               Content Version: 13.8    7538-2882 [employer].   Care instructions adapted under license by your healthcare professional. If you have questions about a medical condition or this instruction, always ask your healthcare professional. [employer] disclaims any warranty or liability for your use of this information.         "

## 2023-11-29 NOTE — PROGRESS NOTES
Prior to immunization administration, verified patients identity using patient s name and date of birth. Please see Immunization Activity for additional information.     Screening Questionnaire for Adult Immunization    Are you sick today?   No   Do you have allergies to medications, food, a vaccine component or latex?   No   Have you ever had a serious reaction after receiving a vaccination?   No   Do you have a long-term health problem with heart, lung, kidney, or metabolic disease (e.g., diabetes), asthma, a blood disorder, no spleen, complement component deficiency, a cochlear implant, or a spinal fluid leak?  Are you on long-term aspirin therapy?   No   Do you have cancer, leukemia, HIV/AIDS, or any other immune system problem?   No   Do you have a parent, brother, or sister with an immune system problem?   Yes   In the past 3 months, have you taken medications that affect  your immune system, such as prednisone, other steroids, or anticancer drugs; drugs for the treatment of rheumatoid arthritis, Crohn s disease, or psoriasis; or have you had radiation treatments?   No   Have you had a seizure, or a brain or other nervous system problem?   No   During the past year, have you received a transfusion of blood or blood    products, or been given immune (gamma) globulin or antiviral drug?   No   For women: Are you pregnant or is there a chance you could become       pregnant during the next month?   No   Have you received any vaccinations in the past 4 weeks?   No     Immunization questionnaire answers were all negative.      Patient instructed to remain in clinic for 15 minutes afterwards, and to report any adverse reactions.     Screening performed by Gail Smart MA on 11/29/2023 at 11:33 AM.

## 2023-11-29 NOTE — LETTER
My Asthma Action Plan    Name: Nikki Morales   YOB: 1960  Date: 11/30/2023   My doctor: Toribio Perez Mai, MD   My clinic: [facility]        My Control Medicine: Budesonide + formoterol (Symbicort HFA) -  160/4.5 mcg    My Rescue Medicine: Albuterol (Proair/Ventolin/Proventil HFA) 2-4 puffs EVERY 4 HOURS as needed. Use a spacer if recommended by your provider.   My Asthma Severity:   Mild Persistent  Know your asthma triggers: upper respiratory infections and exercise or sports  humidity  cold air            GREEN ZONE   Good Control  I feel good  No cough or wheeze  Can work, sleep and play without asthma symptoms       Take your asthma control medicine every day.     If exercise triggers your asthma, take your rescue medication  15 minutes before exercise or sports, and  During exercise if you have asthma symptoms  Spacer to use with inhaler: If you have a spacer, make sure to use it with your inhaler             YELLOW ZONE Getting Worse  I have ANY of these:  I do not feel good  Cough or wheeze  Chest feels tight  Wake up at night   Keep taking your Green Zone medications  Start taking your rescue medicine:  every 20 minutes for up to 1 hour. Then every 4 hours for 24-48 hours.  If you stay in the Yellow Zone for more than 12-24 hours, contact your doctor.  If you do not return to the Green Zone in 12-24 hours or you get worse, start taking your oral steroid medicine if prescribed by your provider.           RED ZONE Medical Alert - Get Help  I have ANY of these:  I feel awful  Medicine is not helping  Breathing getting harder  Trouble walking or talking  Nose opens wide to breathe       Take your rescue medicine NOW  If your provider has prescribed an oral steroid medicine, start taking it NOW  Call your doctor NOW  If you are still in the Red Zone after 20 minutes and you have not reached your doctor:  Take your rescue medicine again and  Call 911 or go to the  emergency room right away    See your regular doctor within 2 weeks of an Emergency Room or Urgent Care visit for follow-up treatment.          Annual Reminders:  Meet with Asthma Educator,  Flu Shot in the Fall, consider Pneumonia Vaccination for patients with asthma (aged 19 and older).    Pharmacy:    [id number] - [place], [place] - [address] [facility] [address]  [facility] - [place], [place] - [address]  [facility] [place] [place] - [address] [facility] - [place], [place] - [address]    Electronically signed by Toribio Perez Mai, MD   Date: 11/30/23                      Asthma Triggers  How To Control Things That Make Your Asthma Worse    Triggers are things that make your asthma worse.  Look at the list below to help you find your triggers and what you can do about them.  You can help prevent asthma flare-ups by staying away from your triggers.      Trigger                                                          What you can do   Cigarette Smoke  Tobacco smoke can make asthma worse. Do not allow smoking in your home, car or around you.  Be sure no one smokes at a child s day care or school.  If you smoke, ask your health care provider for ways to help you quit.  Ask family members to quit too.  Ask your health care provider for a referral to Quit Plan to help you quit smoking, or call 2-471-688-PLAN.     Colds, Flu, Bronchitis  These are common triggers of asthma. Wash your hands often.  Don t touch your eyes, nose or mouth.  Get a flu shot every year.     Dust Mites  These are tiny bugs that live in cloth or carpet. They are too small to see. Wash sheets and blankets in hot water every week.   Encase pillows and mattress in dust mite proof covers.  Avoid having carpet if you can. If you have carpet, vacuum weekly.   Use a dust mask and HEPA vacuum.   Pollen and Outdoor Mold  Some people are allergic to trees, grass, or weed pollen, or molds. Try to keep  your windows closed.  Limit time out doors when pollen count is high.   Ask you health care provider about taking medicine during allergy season.     Animal Dander  Some people are allergic to skin flakes, urine or saliva from pets with fur or feathers. Keep pets with fur or feathers out of your home.    If you can t keep the pet outdoors, then keep the pet out of your bedroom.  Keep the bedroom door closed.  Keep pets off cloth furniture and away from stuffed toys.     Mice, Rats, and Cockroaches   Some people are allergic to the waste from these pests.   Cover food and garbage.  Clean up spills and food crumbs.  Store grease in the refrigerator.   Keep food out of the bedroom.   Indoor Mold  This can be a trigger if your home has high moisture. Fix leaking faucets, pipes, or other sources of water.   Clean moldy surfaces.  Dehumidify basement if it is damp and smelly.   Smoke, Strong Odors, and Sprays  These can reduce air quality. Stay away from strong odors and sprays, such as perfume, powder, hair spray, paints, smoke incense, paint, cleaning products, candles and new carpet.   Exercise or Sports  Some people with asthma have this trigger. Be active!  Ask your doctor about taking medicine before sports or exercise to prevent symptoms.    Warm up for 5-10 minutes before and after sports or exercise.     Other Triggers of Asthma  Cold air:  Cover your nose and mouth with a scarf.  Sometimes laughing or crying can be a trigger.  Some medicines and food can trigger asthma.

## 2023-11-30 PROBLEM — Z82.3 FH: CVA (CEREBROVASCULAR ACCIDENT): Status: ACTIVE | Noted: 2023-11-30

## 2023-11-30 PROBLEM — R73.03 PREDIABETES: Status: ACTIVE | Noted: 2023-11-30

## 2023-11-30 LAB — MAGNESIUM SERPL-MCNC: 2 MG/DL (ref 1.7–2.3)

## 2023-12-05 NOTE — TELEPHONE ENCOUNTER
Patient would like the order for the endoscopy follow up? Can they do the colonoscopy and the endoscopy at the same time? Patient knows to see him in 1 year.     Inez Brito MA 12/4/2023    
The endoscopy was ordered, she should hear about it soon.  Be sure to have it done at the same time with the colonoscopy.  
Spouse

## 2023-12-06 ENCOUNTER — TELEPHONE (OUTPATIENT)
Dept: GASTROENTEROLOGY | Facility: CLINIC | Age: 63
End: 2023-12-06
Payer: COMMERCIAL

## 2023-12-06 NOTE — TELEPHONE ENCOUNTER
"Endoscopy Scheduling Screen    Have you had a positive Covid test in the last 14 days?  No    Are you active on MyChart?   Yes    What insurance is in the chart?  Other:  [employer] medicare    Ordering/Referring Provider:Anne   (If ordering provider performs procedure, schedule with ordering provider unless otherwise instructed. )    BMI: Estimated body mass index is 31.41 kg/m  as calculated from the following:    Height as of 11/29/23: 1.725 m (5' 7.9\").    Weight as of 11/29/23: 93.4 kg (206 lb).     Sedation Ordered  moderate sedation.   If patient BMI > 50 do not schedule in [facility].    If patient BMI > 45 do not schedule at [facility].    Are you taking methadone or Suboxone?  No    Are you taking any prescription medications for pain 3 or more times per week?   No    Do you have a history of malignant hyperthermia or adverse reaction to anesthesia?  No    (Females) Are you currently pregnant?   No     Have you been diagnosed or told you have pulmonary hypertension?   No    Do you have an LVAD?  No    Have you been told you have moderate to severe sleep apnea?  No    Have you been told you have COPD, asthma, or any other lung disease?  Yes     What breathing problems do you have?  Asthma     Do you use home oxygen?  No    Have your breathing problems required an ED visit or hospitalization in the last year?  No    Do you have any heart conditions?  No     Have you ever had an organ transplant?   No    Have you ever had or are you awaiting a heart or lung transplant?   No    Have you had a stroke or transient ischemic attack (TIA aka \"mini stroke\" in the last 6 months?   No    Have you been diagnosed with or been told you have cirrhosis of the liver?   No    Are you currently on dialysis?   No    Do you need assistance transferring?   No    BMI: Estimated body mass index is 31.41 kg/m  as calculated from the following:    Height as of 11/29/23: 1.725 m (5' 7.9\").    Weight as of 11/29/23: 93.4 kg (206 lb).     Is patients BMI " > 40 and scheduling location [facility]?  No    Do you take an injectable medication for weight loss or diabetes (excluding insulin)?  No    Do you take the medication Naltrexone?  No    Do you take blood thinners?  No       Prep   Are you currently on dialysis or do you have chronic kidney disease?  No    Do you have a diagnosis of diabetes?  No    Do you have a diagnosis of cystic fibrosis (CF)?  No    On a regular basis do you go 3 -5 days between bowel movements?  No    BMI > 40?  No    Preferred Pharmacy:        [facility] - [place], [place] - [address]  [address]  [facility] [address]  Phone: [phone] Fax: [phone]          Final Scheduling Details   Colonoscopy prep sent?  Standard MiraLAX    Procedure scheduled  Colonoscopy / Upper endoscopy (EGD)    Surgeon:  Dima     Date of procedure:  1/23/24     Pre-OP / PAC:   No - Not required for this site.    Location   [facility]    Sedation   MAC/Deep Sedation  on block      Patient Reminders:   You will receive a call from a Nurse to review instructions and health history.  This assessment must be completed prior to your procedure.  Failure to complete the Nurse assessment may result in the procedure being cancelled.      On the day of your procedure, please designate an adult(s) who can drive you home stay with you for the next 24 hours. The medicines used in the exam will make you sleepy. You will not be able to drive.      You cannot take public transportation, ride share services, or non-medical taxi service without a responsible caregiver.  Medical transport services are allowed with the requirement that a responsible caregiver will receive you at your destination.  We require that drivers and caregivers are confirmed prior to your procedure.

## 2023-12-13 ENCOUNTER — HOSPITAL ENCOUNTER (OUTPATIENT)
Dept: GENERAL RADIOLOGY | Facility: CLINIC | Age: 63
Discharge: HOME | End: 2023-12-13
Attending: FAMILY MEDICINE | Admitting: FAMILY MEDICINE
Payer: MEDICARE

## 2023-12-13 ENCOUNTER — OFFICE VISIT (OUTPATIENT)
Dept: FAMILY MEDICINE | Facility: CLINIC | Age: 63
End: 2023-12-13
Payer: COMMERCIAL

## 2023-12-13 VITALS
DIASTOLIC BLOOD PRESSURE: 68 MMHG | TEMPERATURE: 97.4 F | OXYGEN SATURATION: 96 % | RESPIRATION RATE: 16 BRPM | SYSTOLIC BLOOD PRESSURE: 136 MMHG | HEIGHT: 68 IN | HEART RATE: 90 BPM | WEIGHT: 206.6 LBS | BODY MASS INDEX: 31.31 KG/M2

## 2023-12-13 DIAGNOSIS — M25.562 ACUTE PAIN OF LEFT KNEE: Primary | ICD-10-CM

## 2023-12-13 DIAGNOSIS — M17.10 ARTHRITIS OF KNEE: ICD-10-CM

## 2023-12-13 DIAGNOSIS — M25.562 ACUTE PAIN OF LEFT KNEE: ICD-10-CM

## 2023-12-13 PROCEDURE — 20610 DRAIN/INJ JOINT/BURSA W/O US: CPT | Mod: LT | Performed by: FAMILY MEDICINE

## 2023-12-13 PROCEDURE — 73562 X-RAY EXAM OF KNEE 3: CPT | Mod: LT

## 2023-12-13 PROCEDURE — 99213 OFFICE O/P EST LOW 20 MIN: CPT | Mod: 25 | Performed by: FAMILY MEDICINE

## 2023-12-13 RX ORDER — LIDOCAINE HYDROCHLORIDE 10 MG/ML
5 INJECTION, SOLUTION INFILTRATION; PERINEURAL ONCE
Status: ACTIVE | OUTPATIENT
Start: 2023-12-13

## 2023-12-13 RX ORDER — TRIAMCINOLONE ACETONIDE 40 MG/ML
40 INJECTION, SUSPENSION INTRA-ARTICULAR; INTRAMUSCULAR ONCE
Status: COMPLETED | OUTPATIENT
Start: 2023-12-13 | End: 2023-12-13

## 2023-12-13 RX ORDER — MELOXICAM 7.5 MG/1
7.5 TABLET ORAL DAILY PRN
Qty: 30 TABLET | Refills: 0 | Status: SHIPPED | OUTPATIENT
Start: 2023-12-13 | End: 2024-04-23

## 2023-12-13 RX ADMIN — TRIAMCINOLONE ACETONIDE 40 MG: 40 INJECTION, SUSPENSION INTRA-ARTICULAR; INTRAMUSCULAR at 15:56

## 2023-12-13 ASSESSMENT — ENCOUNTER SYMPTOMS
HEMATOCHEZIA: 0
CHILLS: 1
HEARTBURN: 1
NERVOUS/ANXIOUS: 0
HEADACHES: 0
MYALGIAS: 1
DIZZINESS: 0
PALPITATIONS: 0
WEAKNESS: 1
DIARRHEA: 0
FEVER: 0
PARESTHESIAS: 0
NAUSEA: 0
COUGH: 0
SHORTNESS OF BREATH: 1
BREAST MASS: 0
ABDOMINAL PAIN: 0
FREQUENCY: 0
CONSTIPATION: 0
ARTHRALGIAS: 1
JOINT SWELLING: 1
HEMATURIA: 0
SORE THROAT: 0
DYSURIA: 0

## 2023-12-13 ASSESSMENT — PATIENT HEALTH QUESTIONNAIRE - PHQ9
SUM OF ALL RESPONSES TO PHQ QUESTIONS 1-9: 8
10. IF YOU CHECKED OFF ANY PROBLEMS, HOW DIFFICULT HAVE THESE PROBLEMS MADE IT FOR YOU TO DO YOUR WORK, TAKE CARE OF THINGS AT HOME, OR GET ALONG WITH OTHER PEOPLE: NOT DIFFICULT AT ALL
SUM OF ALL RESPONSES TO PHQ QUESTIONS 1-9: 8

## 2023-12-13 ASSESSMENT — ACTIVITIES OF DAILY LIVING (ADL): CURRENT_FUNCTION: NO ASSISTANCE NEEDED

## 2023-12-13 ASSESSMENT — PAIN SCALES - GENERAL: PAINLEVEL: MILD PAIN (3)

## 2023-12-13 NOTE — PROGRESS NOTES
Assessment & Plan       ICD-10-CM    1. Acute pain of left knee  M25.562 XR Knee Left 3 Views     Large Joint/Bursa injection and/or drainage (Shoulder, Knee)     triamcinolone (KENALOG-40) injection 40 mg     lidocaine 1 % injection 5 mL     meloxicam (MOBIC) 7.5 MG tablet      2. Arthritis of knee  M17.10 Large Joint/Bursa injection and/or drainage (Shoulder, Knee)     triamcinolone (KENALOG-40) injection 40 mg     lidocaine 1 % injection 5 mL     meloxicam (MOBIC) 7.5 MG tablet          Start having pain behind her left knee and inner thigh right after 3-hr flight about a month ago.  Ultrasound couple weeks ago was negative for DVT.  The pain persists and is affecting her daily activities.  She can't bend her knee but has no problem with walking or standing.  Exam was significant for tenderness to palpation behind the knee and on the lateral aspect of the knee joint.  No effusion or redness.  Concern of Baker's cyst and/or arthritis flareup.    Discussed with her about the negative condition.  She was sent for x-ray which showed arthritis.  Reviewed x-ray with the patient.  Treatment options also discussed.  She would like to try the steroid injection, please see my procedure note for further detail.  Declined physical therapy.    In the meantime, continue her normal activities tolerated.  Will also start her on meloxicam as needed for pain.  She was instructed to take it with food.  Continue her normal activities as tolerated.    Call in or follow-up if symptoms persist or are worsening.  She felt comfortable with the plan and all of her questions were answered.         MD ANGELA Young Mai [facility] is a 63 year old, presenting for the following health issues:  RECHECK        12/13/2023     2:48 PM   Additional Questions   Roomed by Alanna GUTIERRES       Healthy Habits:     In general, how would you rate your overall health?  Fair    Frequency of exercise:  1 day/week     "Duration of exercise:  15-30 minutes    Do you usually eat at least 4 servings of fruit and vegetables a day, include whole grains    & fiber and avoid regularly eating high fat or \"junk\" foods?  No    Taking medications regularly:  No    Barriers to taking medications:  Problems remembering to take them    Medication side effects:  None    Ability to successfully perform activities of daily living:  No assistance needed    Home Safety:  No safety concerns identified    Hearing Impairment:  Difficulty following a conversation in a noisy restaurant or crowded room    In the past 6 months, have you been bothered by leaking of urine? Yes    In general, how would you rate your overall mental or emotional health?  Fair    Additional concerns today:  No         Margarita is here today for follow-up on the left knee pain.  She was seen a month ago and please see my last dictation for further details.  Been having the left knee and leg pain for about 3 weeks and is not getting better.  Started right after getting off the 3-hour flight.  It is mainly behind her knee and inside of her thigh.  Normal cramping or spasm feeling on the calf.  No swelling.  No hip or lower back pain.  Never had it before.  She is on estrogen for years for her menopausal symptoms.  Does not smoke.  Sister had a DVT and there is a strong family history of stroke.  Ultrasound at the last visit was negative for DVT.  Overall her pain is about the same.  No fever or chills.        Review of Systems   Constitutional:  Positive for chills. Negative for fever.   HENT:  Negative for congestion, ear pain, hearing loss and sore throat.    Eyes:  Negative for visual disturbance.   Respiratory:  Positive for shortness of breath. Negative for cough.    Cardiovascular:  Negative for chest pain, palpitations and peripheral edema.   Gastrointestinal:  Positive for heartburn. Negative for abdominal pain, constipation, diarrhea, hematochezia and nausea.   Breasts:  " "Negative for tenderness, breast mass and discharge.   Genitourinary:  Positive for urgency. Negative for dysuria, frequency, genital sores, hematuria, pelvic pain, vaginal bleeding and vaginal discharge.   Musculoskeletal:  Positive for arthralgias, joint swelling and myalgias.   Skin:  Negative for rash.   Neurological:  Positive for weakness. Negative for dizziness, headaches and paresthesias.   Psychiatric/Behavioral:  Negative for mood changes. The patient is not nervous/anxious.             Objective    /68   Pulse 90   Temp 97.4  F (36.3  C) (Temporal)   Resp 16   Ht 1.725 m (5' 7.9\")   Wt 93.7 kg (206 lb 9.6 oz)   LMP  (LMP Unknown)   SpO2 96%   BMI 31.51 kg/m    Body mass index is 31.51 kg/m .  Physical Exam   GENERAL: healthy, alert and no distress  RESP: lungs clear to auscultation - no rales, rhonchi or wheezes  CV: regular rate and rhythm, no murmur.  MS: no gross musculoskeletal defects noted, no edema.  Walk without limping.  Hips and ankle exams were normal. Knee with no effusion or redness.  Tender with palpation the knee laterally and behind the knee. No crepitus. Tender with palpation to lateral condyle on left knee.  Negative anterior and posterior draw test.  Negative Latchmen's test. Collateral ligamen exam was stable.       Results for orders placed or performed during the hospital encounter of 12/13/23   XR Knee Left 3 Views     Status: None    Narrative    XR KNEE LEFT 3 VIEWS  12/13/2023 3:39 PM     HISTORY: Acute pain of left knee    COMPARISON: None.      Impression    IMPRESSION:  Small knee joint effusion. No fractures are evident. Mild  to moderate degenerative changes in the medial compartment. Mild  degenerative changes in the lateral and patellofemoral compartments.     PRABHJOT HENDERSON MD         SYSTEM ID:  [id number]                     "

## 2023-12-21 RX ADMIN — LIDOCAINE HYDROCHLORIDE 5 ML: 10 INJECTION, SOLUTION INFILTRATION; PERINEURAL at 13:29

## 2023-12-21 NOTE — PROCEDURES
Procedure:  Knee Kenolog injection  Indication:  Knee pain with arthritis.    The whole precedure discussed with the patient.  Risks associated with the procedure was also discussed, which include but not limited pain, bleeding and infection.  Alternatives were also discussed.  Patient requested to have the injection on his left knee today.  Consent was obtained.    Time out performed - he was identified times three.    The whole procedure was done in an usual sterile maner.  The inserted site was cleaned with Iodine solution.  A mixture of  4 ml of 1% Lidocain and 1 cc of Kenolog (40 mg) inject directly into the right knee, entering at the upper outer quadrant.  A lot of arthritis change, succeeded with second attempt.  Patient tolerates well; some relief from the injection appreciated.  Post procedure care discussed and educate about symptoms that need to be seen or call in.     Toribio Rodríguez MD.

## 2024-01-02 ENCOUNTER — E-VISIT (OUTPATIENT)
Dept: FAMILY MEDICINE | Facility: CLINIC | Age: 64
End: 2024-01-02
Payer: COMMERCIAL

## 2024-01-02 DIAGNOSIS — R10.32 ABDOMINAL PAIN, LEFT LOWER QUADRANT: Primary | ICD-10-CM

## 2024-01-02 PROCEDURE — 99207 PR NON-BILLABLE SERV PER CHARTING: CPT | Performed by: FAMILY MEDICINE

## 2024-01-04 RX ORDER — CIPROFLOXACIN 500 MG/1
500 TABLET, FILM COATED ORAL 2 TIMES DAILY
Qty: 20 TABLET | Refills: 0 | Status: SHIPPED | OUTPATIENT
Start: 2024-01-04 | End: 2024-01-14

## 2024-01-04 RX ORDER — METRONIDAZOLE 500 MG/1
500 TABLET ORAL 3 TIMES DAILY
Qty: 30 TABLET | Refills: 0 | Status: SHIPPED | OUTPATIENT
Start: 2024-01-04 | End: 2024-01-14

## 2024-01-22 ENCOUNTER — ANESTHESIA EVENT (OUTPATIENT)
Dept: GASTROENTEROLOGY | Facility: CLINIC | Age: 64
End: 2024-01-22
Payer: MEDICARE

## 2024-01-22 ASSESSMENT — LIFESTYLE VARIABLES: TOBACCO_USE: 1

## 2024-01-22 NOTE — H&P
[facility] Anesthesia Pre-op History and Physical    Nikki Morales MRN# [id number]   Age: 63 year old YOB: 1960      Date of Surgery: 1/23/2024 Location [facility]      Date of Exam 1/23/2024 Facility (In hospital)       Home clinic: [facility]  Primary care provider: Torbiio Rodríguez         Chief Complaint and/or Reason for Procedure:   No chief complaint on file.  EGD. Hx dysphagia, 2013 EGD bx compatible EoE. FB 2015 and EGD 2021 no report.  Colonoscopy. Exam 2018 hx polyps, hyperplastic 2013       Active problem list:     Patient Active Problem List    Diagnosis Date Noted    FH: CVA - parents and several siblings 11/30/2023     Priority: Medium    Prediabetes 11/30/2023     Priority: Medium    Fibromyalgia 11/29/2023     Priority: Medium     Formatting of this note might be different from the original. Created by Conversion      Degeneration of lumbar or lumbosacral intervertebral disc 11/29/2023     Priority: Medium     Formatting of this note might be different from the original. Created by Conversion      Vitamin B12 deficiency 11/29/2023     Priority: Medium     Formatting of this note might be different from the original. Created by Conversion      Vitamin D deficiency 11/29/2023     Priority: Medium     Formatting of this note might be different from the original. Created by Conversion Replacement Utility updated for latest IMO load      Primary osteoarthritis of right knee 08/15/2022     Priority: Medium    Mixed incontinence 12/07/2020     Priority: Medium    KAILEE (generalized anxiety disorder) 12/13/2018     Priority: Medium    Moderate recurrent major depression (H) 12/13/2018     Priority: Medium    Mild persistent asthma without complication 09/14/2017     Priority: Medium    Hypothyroidism due to acquired atrophy of thyroid 09/23/2015     Priority: Medium    Degenerative arthritis of cervical spine with cord compression  01/23/2015     Priority: Medium    Eosinophilic esophagitis 08/04/2014     Priority: Medium    Obesity (BMI 30.0-34.9) 08/04/2014     Priority: Medium     Problem list name updated by automated process. Provider to review      HYPERLIPIDEMIA LDL GOAL <130 10/31/2010     Priority: Medium    Herpes simplex virus (HSV) infection 05/05/2007     Priority: Medium     Problem list name updated by automated process. Provider to review      ESOPHAGEAL REFLUX 11/27/2006     Priority: Medium    Chronic fatigue syndrome      Priority: Medium            Medications (include herbals and vitamins):   Any Plavix use in the last 7 days? No     Current Facility-Administered Medications   Medication    lidocaine 1 % injection 5 mL    lidocaine 1 % injection 5 mL    methylPREDNISolone (DEPO-MEDROL) injection 80 mg     Current Outpatient Medications   Medication Sig    albuterol (PROAIR HFA/PROVENTIL HFA/VENTOLIN HFA) 108 (90 Base) MCG/ACT inhaler Inhale 2 puffs into the lungs every 6 hours as needed for shortness of breath, wheezing or cough (Patient not taking: Reported on 12/13/2023)    budesonide (PULMICORT) 0.5 MG/2ML neb solution Mix 2 vials with 1 oz coffee flavoring and drink twice a day and rinse mouth aftrerwards (Patient not taking: Reported on 12/13/2023)    budesonide-formoterol (SYMBICORT) 160-4.5 MCG/ACT Inhaler Inhale 2 puffs into the lungs 2 times daily Stop the flovent when using this inhaler (Patient not taking: Reported on 12/13/2023)    calcium carbonate (TUMS) 500 MG chewable tablet Take 2 chew tab by mouth 3 times daily as needed for other (bloating/flatulence)    cyanocobalamin (CYANOCOBALAMIN) 1000 MCG/ML injection Inject 1 mL (1,000 mcg) into the muscle every 30 days    estradiol (VIVELLE-DOT) 0.05 MG/24HR patch Place 1 patch onto the skin twice a week    evolocumab (REPATHA) 140 MG/ML prefilled autoinjector Inject 1 mL (140 mg) Subcutaneous every 14 days (Patient not taking: Reported on 12/13/2023)     levothyroxine (SYNTHROID/LEVOTHROID) 100 MCG tablet Take 1 tablet (100 mcg) by mouth daily    LORazepam (ATIVAN) 1 MG tablet Take 1 tablet (1 mg) by mouth 2 times daily as needed for anxiety    meloxicam (MOBIC) 7.5 MG tablet Take 1 tablet (7.5 mg) by mouth daily as needed    methylphenidate (RITALIN) 10 MG tablet Take 1 tablet (10 mg) by mouth 2 times daily Takes as needed (Patient not taking: Reported on 12/13/2023)    mirabegron (MYRBETRIQ) 25 MG 24 hr tablet Take 1 tablet (25 mg) by mouth daily (Patient not taking: Reported on 12/13/2023)    nitroGLYcerin (NITROSTAT) 0.4 MG sublingual tablet For chest pain place 1 tablet under the tongue every 5 minutes for 3 doses. If symptoms persist 5 minutes after 1st dose call 911. (Patient not taking: Reported on 4/18/2023)    pantoprazole (PROTONIX) 40 MG EC tablet Take 40 mg by mouth daily    STATIN NOT PRESCRIBED (INTENTIONAL) Please choose reason not prescribed from choices below. (Patient not taking: Reported on 9/28/2023)    valACYclovir (VALTREX) 1000 mg tablet Take 1 tablet (1,000 mg) by mouth 2 times daily (Patient not taking: Reported on 12/13/2023)    zolpidem (AMBIEN) 5 MG tablet TAKE 2 TABLETS BY MOUTH IN THE EVENING AS NEEDED FOR SLEEP             Allergies:      Allergies   Allergen Reactions    Morphine And Related Other (See Comments)     Causes agitation     Allergy to Latex? No  Allergy to tape?   No  Intolerances:             Physical Exam:   All vitals have been reviewed  No data found.  No intake/output data recorded.  Lungs:   No increased work of breathing, good air exchange, clear to auscultation bilaterally, no crackles or wheezing     Cardiovascular:   Normal apical impulse, regular rate and rhythm, normal S1 and S2, no S3 or S4, and no murmur noted             Lab / Radiology Results:            Anesthetic risk and/or ASA classification:       Felipe Ch MD

## 2024-01-22 NOTE — ANESTHESIA PREPROCEDURE EVALUATION
Anesthesia Pre-Procedure Evaluation    Patient: Nikki Morales   MRN: [id number] : 1960        Procedure : Procedure(s):  Colonoscopy  Esophagoscopy, gastroscopy, duodenoscopy (EGD), combined          Past Medical History:   Diagnosis Date    Attention deficit hyperactivity disorder, inattentive type     Chronic fatigue     Hyperlipidemia     Hypothyroid     New onset a-fib (H)     Other vitamin B12 deficiency anemia     Primary osteoarthritis of right knee 08/15/2022    Uncomplicated asthma       Past Surgical History:   Procedure Laterality Date    ABDOMEN SURGERY  06/10/1993    C section    COLONOSCOPY  10/06/2009    COLONOSCOPY  2013    Procedure: COMBINED COLONOSCOPY, SINGLE BIOPSY/POLYPECTOMY BY BIOPSY;;  Surgeon: Cuate Miles MD;  Location:  GI    COLONOSCOPY N/A 2018    Procedure: COLONOSCOPY;  Colonoscopy;  Surgeon: Hamzah Dudley DO;  Location:  GI    COMBINED REPAIR PTOSIS WITH BLEPHAROPLASTY BILATERAL Bilateral 2018    Procedure: COMBINED REPAIR PTOSIS WITH BLEPHAROPLASTY BILATERAL;  Bilateral upper eyelid Blepharoplasty and ptosis repair ;  Surgeon: Martina Andrade MD;  Location: [facility] OR    CONIZATION CERVIX,KNIFE/LASER      ESOPHAGOSCOPY, GASTROSCOPY, DUODENOSCOPY (EGD), COMBINED  2013    Procedure: COMBINED ESOPHAGOSCOPY, GASTROSCOPY, DUODENOSCOPY (EGD), BIOPSY SINGLE OR MULTIPLE;  egd with rabdain biopsies and colonoscopy with polypectomy by biopsy ;  Surgeon: Cuate Miles MD;  Location:  GI    GENITOURINARY SURGERY  ?     I have a bladder sling    HC DILATION/CURETTAGE DIAG/THER NON OB      D & C    HC REMOVAL OF TONSILS,<11 Y/O      Tonsils <10y.o.    [facility] UGI ENDOSCOPY DIAG W BIOPSY  2007    HYSTERECTOMY, PAP NO LONGER INDICATED      LAPAROSCOPIC CHOLECYSTECTOMY  10/12/2013    REPAIR PTOSIS      TUBAL LIGATION      ZZC  DELIVERY ONLY      , Low Cervical    ZZC  DELIVERY ONLY      Description:   Section;  Recorded: 11/10/2009;  Comments: @ 29 weeks    [facility] LIGATE FALLOPIAN TUBE      Description: Tubal Ligation;  Recorded: 11/10/2009;    [facility] NONSPECIFIC PROCEDURE  's    sinus    Z NONSPECIFIC PROCEDURE      Esophgeal dilatation multiple    Z NONSPECIFIC PROCEDURE  2008    sling    [facility] TOTAL ABDOM HYSTERECTOMY      Description: Hysterectomy;  Recorded: 11/10/2009;  Comments: due to cervical dysplasia    [facility] VAGINAL HYSTERECTOMY  1995    Hysterectomy, Vaginal    [facility]  09/10/99 & 98      Allergies   Allergen Reactions    Morphine And Related Other (See Comments)     Causes agitation      Social History     Tobacco Use    Smoking status: Former     Packs/day: 0.00     Years: 10.00     Additional pack years: 0.00     Total pack years: 0.00     Types: Cigarettes    Smokeless tobacco: Never    Tobacco comments:     social smoking   Substance Use Topics    Alcohol use: Yes     Alcohol/week: 4.0 standard drinks of alcohol     Comment: social      Wt Readings from Last 1 Encounters:   23 93.7 kg (206 lb 9.6 oz)        Anesthesia Evaluation   Pt has had prior anesthetic. Type: MAC and General.    No history of anesthetic complications       ROS/MED HX  ENT/Pulmonary:     (+)                tobacco use, Past use,    Mild Persistent, asthma  Treatment: Inhaler prn,                 Neurologic:  - neg neurologic ROS     Cardiovascular:     (+) Dyslipidemia - -   -  - -                                 Previous cardiac testing   Echo: Date: 3/23/23 Results:  ______________________________________________________________________________  Interpretation Summary     1. Normal biventricular size and function. Left ventricular ejection fraction  of 60-65%.  2. No segmental wall motion abnormalities noted.  3. There is mild- moderate (1+) tricuspid regurgitation.  Compared to the previous echocardiogram, there are no significant  changes.  ____________________________________________________________________    Stress Test:  Date: 3/23/23 Results:  No CP, neg stress test  ECG Reviewed:  Date: 3/14/23 Results:  SR  Cath:  Date: Results:      METS/Exercise Tolerance:     Hematologic:  - neg hematologic  ROS     Musculoskeletal: Comment: Fibromyalgia  (+)  arthritis,             GI/Hepatic:     (+) GERD, Asymptomatic on medication,                  Renal/Genitourinary:  - neg Renal ROS     Endo:     (+)          thyroid problem, hypothyroidism,           Psychiatric/Substance Use:     (+) psychiatric history anxiety       Infectious Disease:  - neg infectious disease ROS     Malignancy:  - neg malignancy ROS     Other:  - neg other ROS    (+)  , H/O Chronic Pain,         Physical Exam    Airway  airway exam normal      Mallampati: II   TM distance: > 3 FB   Neck ROM: full   Mouth opening: > 3 cm    Respiratory Devices and Support         Dental       (+) Minor Abnormalities - some fillings, tiny chips      Cardiovascular   cardiovascular exam normal       Rhythm and rate: regular and normal     Pulmonary   pulmonary exam normal        breath sounds clear to auscultation           OUTSIDE LABS:  CBC:   Lab Results   Component Value Date    WBC 9.3 09/28/2023    WBC 11.0 03/14/2023    HGB 14.3 09/28/2023    HGB 14.4 03/14/2023    HCT 42.5 09/28/2023    HCT 44.4 03/14/2023     09/28/2023     03/14/2023     BMP:   Lab Results   Component Value Date     09/28/2023     03/14/2023    POTASSIUM 4.3 09/28/2023    POTASSIUM 4.8 03/14/2023    CHLORIDE 104 09/28/2023    CHLORIDE 101 03/14/2023    CO2 21 (L) 09/28/2023    CO2 28 03/14/2023    BUN 13.1 09/28/2023    BUN 21.7 03/14/2023    CR 0.85 09/28/2023    CR 0.82 03/14/2023     (H) 09/28/2023     (H) 03/14/2023     COAGS:   Lab Results   Component Value Date    PTT 29 01/15/2018    INR 0.95 11/12/2018     POC:   Lab Results   Component Value Date    HCG Negative  12/04/2009     HEPATIC:   Lab Results   Component Value Date    ALBUMIN 4.1 09/28/2023    PROTTOTAL 7.0 09/28/2023    ALT 17 09/28/2023    AST 20 09/28/2023    ALKPHOS 68 09/28/2023    BILITOTAL 0.8 09/28/2023    BILIDIRECT 0.1 05/01/2002     OTHER:   Lab Results   Component Value Date    PH 5.5 09/12/2001    A1C 6.1 (H) 09/28/2023    ROLANDA 9.9 09/28/2023    MAG 2.0 11/29/2023    LIPASE 89 07/27/2004    TSH 1.58 11/29/2023    T4 0.94 03/14/2023    T3 102 09/19/2007    CRP 13.2 (H) 03/15/2022    SED 12 06/23/2020       Anesthesia Plan    ASA Status:  2    NPO Status:  NPO Appropriate    Anesthesia Type: MAC.     - Reason for MAC: straight local not clinically adequate      Maintenance: TIVA.        Consents    Anesthesia Plan(s) and associated risks, benefits, and realistic alternatives discussed. Questions answered and patient/representative(s) expressed understanding.     - Discussed:     - Discussed with:  Patient       Use of blood products discussed: No .     Postoperative Care            Comments:    Other Comments: The risks and benefits of anesthesia, and the alternatives where applicable, have been discussed with the patient, and they wish to proceed.           JOSEFINA Lewis CRNA    I have reviewed the pertinent notes and labs in the chart from the past 30 days and (re)examined the patient.  Any updates or changes from those notes are reflected in this note.

## 2024-01-23 ENCOUNTER — ANESTHESIA (OUTPATIENT)
Dept: GASTROENTEROLOGY | Facility: CLINIC | Age: 64
End: 2024-01-23
Payer: MEDICARE

## 2024-01-23 ENCOUNTER — HOSPITAL ENCOUNTER (OUTPATIENT)
Facility: CLINIC | Age: 64
Discharge: HOME | End: 2024-01-23
Attending: INTERNAL MEDICINE | Admitting: INTERNAL MEDICINE
Payer: MEDICARE

## 2024-01-23 VITALS
RESPIRATION RATE: 16 BRPM | HEIGHT: 68 IN | TEMPERATURE: 97.6 F | DIASTOLIC BLOOD PRESSURE: 68 MMHG | HEART RATE: 63 BPM | OXYGEN SATURATION: 97 % | BODY MASS INDEX: 31.22 KG/M2 | SYSTOLIC BLOOD PRESSURE: 165 MMHG | WEIGHT: 206 LBS

## 2024-01-23 LAB
COLONOSCOPY: NORMAL
UPPER GI ENDOSCOPY: NORMAL

## 2024-01-23 PROCEDURE — 250N000011 HC RX IP 250 OP 636: Performed by: NURSE ANESTHETIST, CERTIFIED REGISTERED

## 2024-01-23 PROCEDURE — 258N000003 HC RX IP 258 OP 636: Performed by: NURSE ANESTHETIST, CERTIFIED REGISTERED

## 2024-01-23 PROCEDURE — 250N000009 HC RX 250: Performed by: NURSE ANESTHETIST, CERTIFIED REGISTERED

## 2024-01-23 PROCEDURE — 45378 DIAGNOSTIC COLONOSCOPY: CPT | Performed by: INTERNAL MEDICINE

## 2024-01-23 PROCEDURE — 370N000017 HC ANESTHESIA TECHNICAL FEE, PER MIN: Performed by: INTERNAL MEDICINE

## 2024-01-23 PROCEDURE — 43235 EGD DIAGNOSTIC BRUSH WASH: CPT | Performed by: INTERNAL MEDICINE

## 2024-01-23 RX ORDER — LIDOCAINE HYDROCHLORIDE 20 MG/ML
INJECTION, SOLUTION INFILTRATION; PERINEURAL PRN
Status: DISCONTINUED | OUTPATIENT
Start: 2024-01-23 | End: 2024-01-23

## 2024-01-23 RX ORDER — PROPOFOL 10 MG/ML
INJECTION, EMULSION INTRAVENOUS PRN
Status: DISCONTINUED | OUTPATIENT
Start: 2024-01-23 | End: 2024-01-23

## 2024-01-23 RX ORDER — SODIUM CHLORIDE, SODIUM LACTATE, POTASSIUM CHLORIDE, CALCIUM CHLORIDE 600; 310; 30; 20 MG/100ML; MG/100ML; MG/100ML; MG/100ML
INJECTION, SOLUTION INTRAVENOUS CONTINUOUS PRN
Status: DISCONTINUED | OUTPATIENT
Start: 2024-01-23 | End: 2024-01-23

## 2024-01-23 RX ORDER — ONDANSETRON 2 MG/ML
4 INJECTION INTRAMUSCULAR; INTRAVENOUS ONCE
Status: COMPLETED | OUTPATIENT
Start: 2024-01-23 | End: 2024-01-23

## 2024-01-23 RX ORDER — GLYCOPYRROLATE 0.2 MG/ML
INJECTION, SOLUTION INTRAMUSCULAR; INTRAVENOUS PRN
Status: DISCONTINUED | OUTPATIENT
Start: 2024-01-23 | End: 2024-01-23

## 2024-01-23 RX ORDER — PROPOFOL 10 MG/ML
INJECTION, EMULSION INTRAVENOUS CONTINUOUS PRN
Status: DISCONTINUED | OUTPATIENT
Start: 2024-01-23 | End: 2024-01-23

## 2024-01-23 RX ADMIN — SODIUM CHLORIDE, POTASSIUM CHLORIDE, SODIUM LACTATE AND CALCIUM CHLORIDE: 600; 310; 30; 20 INJECTION, SOLUTION INTRAVENOUS at 11:51

## 2024-01-23 RX ADMIN — PROPOFOL 200 MCG/KG/MIN: 10 INJECTION, EMULSION INTRAVENOUS at 12:18

## 2024-01-23 RX ADMIN — LIDOCAINE HYDROCHLORIDE 100 MG: 20 INJECTION, SOLUTION INFILTRATION; PERINEURAL at 12:13

## 2024-01-23 RX ADMIN — ONDANSETRON 4 MG: 2 INJECTION INTRAMUSCULAR; INTRAVENOUS at 12:56

## 2024-01-23 RX ADMIN — GLYCOPYRROLATE 0.2 MG: 0.2 INJECTION, SOLUTION INTRAMUSCULAR; INTRAVENOUS at 12:28

## 2024-01-23 RX ADMIN — PROPOFOL 100 MG: 10 INJECTION, EMULSION INTRAVENOUS at 12:14

## 2024-01-23 ASSESSMENT — ACTIVITIES OF DAILY LIVING (ADL): ADLS_ACUITY_SCORE: 35

## 2024-01-23 NOTE — ANESTHESIA CARE TRANSFER NOTE
Patient: Nikki Morales    Procedure: Procedure(s):  Colonoscopy  Esophagoscopy, gastroscopy, duodenoscopy (EGD), combined       Diagnosis: Gastroesophageal reflux disease with esophagitis without hemorrhage [K21.00]  Screen for colon cancer [Z12.11]  Eosinophilic esophagitis [K20.0]  Diagnosis Additional Information: No value filed.    Anesthesia Type:   MAC     Note:    Oropharynx: oropharynx clear of all foreign objects and spontaneously breathing  Level of Consciousness: drowsy  Oxygen Supplementation: room air    Independent Airway: airway patency satisfactory and stable  Dentition: dentition unchanged  Vital Signs Stable: post-procedure vital signs reviewed and stable  Report to RN Given: handoff report given  Patient transferred to: Phase II    Handoff Report: Identifed the Patient, Identified the Reponsible Provider, Reviewed the pertinent medical history, Discussed the surgical course, Reviewed Intra-OP anesthesia mangement and issues during anesthesia, Set expectations for post-procedure period and Allowed opportunity for questions and acknowledgement of understanding      Vitals:  Vitals Value Taken Time   /90 01/23/24 1250   Temp     Pulse 71 01/23/24 1250   Resp 14 01/23/24 1250   SpO2 96 % 01/23/24 1250   Vitals shown include unfiled device data.    Electronically Signed By: JOSEFINA Lewis CRNA  January 23, 2024  12:51 PM

## 2024-01-23 NOTE — DISCHARGE INSTRUCTIONS
[facility]    Home Care Following Endoscopy          Activity:  You have just undergone an endoscopic procedure performed with Monitored Anesthesia Care (MAC).    Do not work or operate machinery (including a car) OR drink alcohol (including beer) OR sign legal papers for at least 12 hours.    I encourage you to walk and attempt to pass this air as soon as possible.    Diet:  Return to the diet you were on before your procedure but eat lightly for the first 12-24 hours.  Drink plenty of water.  Resume any regular medications unless otherwise advised by your physician.    Pain:  You may take Tylenol or Ibuprofen as needed for pain.  Expected Recovery:  You can expect some mild abdominal fullness and/or discomfort due to the air used to inflate your intestinal tract. It is also normal to have a mild sore throat after upper endoscopy.    Call Your Physician if You Have:  After Upper Endoscopy:  Shoulder, back or chest pain.  Difficulty breathing or swallowing.  Vomiting blood.  After Colonoscopy:  Worsening persisting abdominal pain which is worse with activity.  Fevers (>101 degrees F), chills or shakes.  Passage of continued blood with bowel movements.     Any questions or concerns about your recovery, please call [phone] or after hours [phone] ([phone]) [place] Nurse Advice Line.    Follow-up Care:       You should Follow up with your Primary provider as needed.

## 2024-01-23 NOTE — ANESTHESIA POSTPROCEDURE EVALUATION
Patient: Nikki Morales    Procedure: Procedure(s):  Colonoscopy  Esophagoscopy, gastroscopy, duodenoscopy (EGD), combined       Anesthesia Type:  MAC    Note:  Disposition: Outpatient   Postop Pain Control: Uneventful            Sign Out: Well controlled pain   PONV: No   Neuro/Psych: Uneventful            Sign Out: Acceptable/Baseline neuro status   Airway/Respiratory: Uneventful            Sign Out: Acceptable/Baseline resp. status   CV/Hemodynamics: Uneventful            Sign Out: Acceptable CV status   Other NRE: NONE   DID A NON-ROUTINE EVENT OCCUR? No    Event details/Postop Comments:  Pt was happy with anesthesia care.  No complications.  I will follow up with the pt if needed.           Last vitals:  Vitals Value Taken Time   /68 01/23/24 1305   Temp     Pulse 63 01/23/24 1305   Resp 14 01/23/24 1250   SpO2 97 % 01/23/24 1308   Vitals shown include unfiled device data.    Electronically Signed By: JOSEFINA Lewis CRNA  January 23, 2024  1:26 PM

## 2024-02-11 DIAGNOSIS — E03.4 HYPOTHYROIDISM DUE TO ACQUIRED ATROPHY OF THYROID: ICD-10-CM

## 2024-02-12 RX ORDER — LEVOTHYROXINE SODIUM 100 UG/1
100 TABLET ORAL DAILY
Qty: 90 TABLET | Refills: 2 | Status: SHIPPED | OUTPATIENT
Start: 2024-02-12

## 2024-03-04 ASSESSMENT — PATIENT HEALTH QUESTIONNAIRE - PHQ9
SUM OF ALL RESPONSES TO PHQ QUESTIONS 1-9: 7
SUM OF ALL RESPONSES TO PHQ QUESTIONS 1-9: 7
10. IF YOU CHECKED OFF ANY PROBLEMS, HOW DIFFICULT HAVE THESE PROBLEMS MADE IT FOR YOU TO DO YOUR WORK, TAKE CARE OF THINGS AT HOME, OR GET ALONG WITH OTHER PEOPLE: SOMEWHAT DIFFICULT

## 2024-03-05 ENCOUNTER — OFFICE VISIT (OUTPATIENT)
Dept: FAMILY MEDICINE | Facility: OTHER | Age: 64
End: 2024-03-05
Payer: COMMERCIAL

## 2024-03-05 VITALS
BODY MASS INDEX: 31.32 KG/M2 | OXYGEN SATURATION: 96 % | DIASTOLIC BLOOD PRESSURE: 78 MMHG | SYSTOLIC BLOOD PRESSURE: 124 MMHG | TEMPERATURE: 97.6 F | HEART RATE: 77 BPM | WEIGHT: 206 LBS | RESPIRATION RATE: 16 BRPM

## 2024-03-05 DIAGNOSIS — G93.32 CHRONIC FATIGUE SYNDROME: ICD-10-CM

## 2024-03-05 DIAGNOSIS — M79.10 MYALGIA: Primary | ICD-10-CM

## 2024-03-05 DIAGNOSIS — M17.11 PRIMARY OSTEOARTHRITIS OF RIGHT KNEE: ICD-10-CM

## 2024-03-05 LAB
ALBUMIN UR-MCNC: NEGATIVE MG/DL
APPEARANCE UR: CLEAR
BACTERIA #/AREA URNS HPF: ABNORMAL /HPF
BILIRUB UR QL STRIP: NEGATIVE
CK SERPL-CCNC: 68 U/L (ref 26–192)
COLOR UR AUTO: YELLOW
CRP SERPL-MCNC: 10.86 MG/L
ERYTHROCYTE [SEDIMENTATION RATE] IN BLOOD BY WESTERGREN METHOD: 10 MM/HR (ref 0–30)
GLUCOSE UR STRIP-MCNC: NEGATIVE MG/DL
HGB UR QL STRIP: ABNORMAL
KETONES UR STRIP-MCNC: NEGATIVE MG/DL
LDH SERPL L TO P-CCNC: 237 U/L (ref 0–250)
LEUKOCYTE ESTERASE UR QL STRIP: NEGATIVE
MUCOUS THREADS #/AREA URNS LPF: PRESENT /LPF
NITRATE UR QL: NEGATIVE
PH UR STRIP: 5 [PH] (ref 5–7)
RBC #/AREA URNS AUTO: ABNORMAL /HPF
SP GR UR STRIP: 1.02 (ref 1–1.03)
SQUAMOUS #/AREA URNS AUTO: ABNORMAL /LPF
TSH SERPL DL<=0.005 MIU/L-ACNC: 1.97 UIU/ML (ref 0.3–4.2)
URATE SERPL-MCNC: 5 MG/DL (ref 2.4–5.7)
UROBILINOGEN UR STRIP-ACNC: 0.2 E.U./DL
WBC #/AREA URNS AUTO: ABNORMAL /HPF

## 2024-03-05 PROCEDURE — 82550 ASSAY OF CK (CPK): CPT | Performed by: PHYSICIAN ASSISTANT

## 2024-03-05 PROCEDURE — 84443 ASSAY THYROID STIM HORMONE: CPT | Performed by: PHYSICIAN ASSISTANT

## 2024-03-05 PROCEDURE — 99000 SPECIMEN HANDLING OFFICE-LAB: CPT | Performed by: PHYSICIAN ASSISTANT

## 2024-03-05 PROCEDURE — 86200 CCP ANTIBODY: CPT | Performed by: PHYSICIAN ASSISTANT

## 2024-03-05 PROCEDURE — 87484 EHRLICHA CHAFFEENSIS AMP PRB: CPT | Mod: 90 | Performed by: PHYSICIAN ASSISTANT

## 2024-03-05 PROCEDURE — 87469 BABESIA MICROTI AMP PRB: CPT | Mod: 90 | Performed by: PHYSICIAN ASSISTANT

## 2024-03-05 PROCEDURE — 87798 DETECT AGENT NOS DNA AMP: CPT | Mod: 90 | Performed by: PHYSICIAN ASSISTANT

## 2024-03-05 PROCEDURE — 85652 RBC SED RATE AUTOMATED: CPT | Performed by: PHYSICIAN ASSISTANT

## 2024-03-05 PROCEDURE — 86038 ANTINUCLEAR ANTIBODIES: CPT | Performed by: PHYSICIAN ASSISTANT

## 2024-03-05 PROCEDURE — 84550 ASSAY OF BLOOD/URIC ACID: CPT | Performed by: PHYSICIAN ASSISTANT

## 2024-03-05 PROCEDURE — 87468 ANAPLSMA PHGCYTOPHLM AMP PRB: CPT | Mod: 90 | Performed by: PHYSICIAN ASSISTANT

## 2024-03-05 PROCEDURE — 36415 COLL VENOUS BLD VENIPUNCTURE: CPT | Performed by: PHYSICIAN ASSISTANT

## 2024-03-05 PROCEDURE — 83615 LACTATE (LD) (LDH) ENZYME: CPT | Performed by: PHYSICIAN ASSISTANT

## 2024-03-05 PROCEDURE — 99214 OFFICE O/P EST MOD 30 MIN: CPT | Performed by: PHYSICIAN ASSISTANT

## 2024-03-05 PROCEDURE — 81001 URINALYSIS AUTO W/SCOPE: CPT | Performed by: PHYSICIAN ASSISTANT

## 2024-03-05 PROCEDURE — 86140 C-REACTIVE PROTEIN: CPT | Performed by: PHYSICIAN ASSISTANT

## 2024-03-05 PROCEDURE — 86618 LYME DISEASE ANTIBODY: CPT | Performed by: PHYSICIAN ASSISTANT

## 2024-03-05 ASSESSMENT — PAIN SCALES - GENERAL: PAINLEVEL: MODERATE PAIN (4)

## 2024-03-05 NOTE — PROGRESS NOTES
Assessment & Plan     Myalgia  Chronic fatigue syndrome  Her muscle aches, weakness and fatigue are not something that would be correlating with her bilateral knee osteoarthritis. Rheumatoid is also odd given that it is larger joint involvement, not smaller joint. She is having more just generalized muscle aches with weakness.  Her exam did show no signs of muscle weakness. She did have difficult to find reflexes but no signs of neuromuscular concerns at this time.    Recommended lab work up to evaluate for infectious, inflammatory.    Consider re-visiting with Rheumatology and neurology in the future.   Consider medications like gabapentin, pregabalin, duloxetine.   Did question menopause/postmenopause but she had hysterectomy in her 30's and has been on hormone replacement since. She is also already on Meloxicam.    Did recheck her TSH levels to make sure it isn't her thyroid that is off.   - Lyme Disease Total Abs Bld with Reflex to Confirm CLIA; Future  - Babesia Species by PCR; Future  - Ehrlichia Anaplasma Sp by PCR; Future  - ESR: Erythrocyte sedimentation rate; Future  - CRP, inflammation; Future  - Uric acid; Future  - Anti Nuclear Kristen IgG by IFA with Reflex; Future  - Cyclic Citrullinated Peptide Antibody IgG; Future  - TSH with free T4 reflex; Future  - UA Macroscopic with reflex to Microscopic and Culture - Lab Collect; Future  - CK total; Future  - Lactate Dehydrogenase; Future  - Lyme Disease Total Abs Bld with Reflex to Confirm CLIA  - Babesia Species by PCR  - Ehrlichia Anaplasma Sp by PCR  - ESR: Erythrocyte sedimentation rate  - CRP, inflammation  - Uric acid  - Anti Nuclear Kristen IgG by IFA with Reflex  - Cyclic Citrullinated Peptide Antibody IgG  - TSH with free T4 reflex  - UA Macroscopic with reflex to Microscopic and Culture - Lab Collect  - CK total  - Lactate Dehydrogenase  - UA Microscopic with Reflex to Culture          Primary osteoarthritis of right knee  Her knee pain is separate from  the myalgas, she did have response with use of the steroid injections. Will refer back to Ortho/Sports medicine.   - Orthopedic  Referral; Future      Options for treatment and follow-up care were reviewed with the patient and/or guardian. Patient and/or guardian engaged in the decision making process and verbalized understanding of the options discussed and agreed with the final plan.      Cherise Avila is a 64 year old, presenting for the following health issues:  No chief complaint on file.    History of Present Illness       Reason for visit:  Burning muscles inflamed knee joints    She eats 0-1 servings of fruits and vegetables daily.She consumes 0 sweetened beverage(s) daily.She exercises with enough effort to increase her heart rate 9 or less minutes per day.  She exercises with enough effort to increase her heart rate 3 or less days per week.   She is taking medications regularly.         Pain History:  When did you first notice your pain? December last year her right knee. Then January her left knee started.   Have you seen this provider for your pain in the past? Yes   Where in your body do you have pain? All over   Are you seeing anyone else for your pain? No        11/29/2023    10:05 AM 12/13/2023     1:21 PM 3/4/2024     2:17 PM   PHQ-9 SCORE   PHQ-9 Total Score MyChart 9 (Mild depression) 8 (Mild depression) 7 (Mild depression)   PHQ-9 Total Score 9 8 7           11/25/2019     3:09 PM 8/31/2020     1:41 PM 10/26/2023     9:43 AM   KAILEE-7 SCORE   Total Score   2 (minimal anxiety)   Total Score 9 6 2               11/29/2023    10:05 AM 12/13/2023     1:21 PM 3/4/2024     2:17 PM   PHQ-9 SCORE   PHQ-9 Total Score MyChart 9 (Mild depression) 8 (Mild depression) 7 (Mild depression)   PHQ-9 Total Score 9 8 7           11/25/2019     3:09 PM 8/31/2020     1:41 PM 10/26/2023     9:43 AM   KAILEE-7 SCORE   Total Score   2 (minimal anxiety)   Total Score 9 6 2            No data to display                 Chronic Pain Follow Up:    Location of pain: Knees   Analgesia/pain control:    - Recent changes:  None, she states after she got her flu and pnuemonia shot in November     - Overall control: Tolerable with discomfort    - Current treatments: No    Adherence:     - Do you ever take more pain medicine than prescribed? No    - When did you take your last dose of pain medicine?  She has not taken anything    Adverse effects: No   PDMP Review         Value Time User    State PDMP site checked  Yes 10/30/2023 10:44 AM Emily Cortes PA-C          Last CSA Agreement:   CSA -- Patient Level:    CSA: None found at the patient level.           - Cortisone injections in December on the left and it felt good and even the right was better. She was doing more walking.   - middle of January she started to have more pain throughout the whole body. Started in the legs and now into the arms.  She is feeling weak. She struggles to get up the stairs.   - She had a rash on the chest.  Chiropractor was concerned. Rash lasted for a couple of days, she used some topical steroid creams she had that are outdated. This occurred in February around the 12th.    - Has a hx of chronic lyme disease - was in bed for 7 years. She quit working at age 43. She thinks this maybe started in her mid 20's. Was dealing with chronic fatigue. She said that it did improve but now her symptoms seem to be coming back.  She did have full evaluation in the past seeing rheumatology, neurology and they found nothing. They did have concern for potential autoimmune rheumatoid but by the time she saw rheumatology they didn't have concern for RA and they told her that it could possibly be related to chronic lyme disease  - She did see a specialist with Chronic lyme but it sounds like nothing really came from that other than knowing that she won't test acutely positive for lyme disease with chronic disease.   - She has met resistance with those  who believe in chronic lyme disease   - She wants to keep up with her grandkids and she feels like her quality of life is limited.     - no fevers, chills, chest pain, shortness of breath, arrhythmias, bowel/bladder changes.     Review of Systems  Constitutional, neuro, ENT, endocrine, pulmonary, cardiac, gastrointestinal, genitourinary, musculoskeletal, integument and psychiatric systems are negative, except as otherwise noted.      Objective    LMP  (LMP Unknown)   There is no height or weight on file to calculate BMI.  Physical Exam   GENERAL: alert and no distress  EYES: Eyes grossly normal to inspection, PERRL and conjunctivae and sclerae normal  HENT: ear canals and TM's normal, nose and mouth without ulcers or lesions  NECK: no adenopathy, no asymmetry, masses, or scars  RESP: lungs clear to auscultation - no rales, rhonchi or wheezes  CV: regular rate and rhythm, normal S1 S2, no S3 or S4, no murmur, click or rub, no peripheral edema  MS: normal muscle tone, normal range of motion, no edema, and 5/5 upper and lower extremity strength bilaterally.   SKIN: no suspicious lesions or rashes  NEURO: Normal strength and tone, sensory exam grossly normal, mentation intact, and DTR biceps 1+ symmetric, DTR patellar 1+ symmetric, DTR achilles right is 1+, minimal on the left.   PSYCH: mentation appears normal, affect normal/bright, tearful, judgement and insight intact, and appearance well groomed    Results for orders placed or performed in visit on 03/05/24   ESR: Erythrocyte sedimentation rate     Status: Normal   Result Value Ref Range    Erythrocyte Sedimentation Rate 10 0 - 30 mm/hr   UA Macroscopic with reflex to Microscopic and Culture - Lab Collect     Status: Abnormal    Specimen: Urine, NOS   Result Value Ref Range    Color Urine Yellow Colorless, Straw, Light Yellow, Yellow    Appearance Urine Clear Clear    Glucose Urine Negative Negative mg/dL    Bilirubin Urine Negative Negative    Ketones Urine  Negative Negative mg/dL    Specific Gravity Urine 1.025 1.003 - 1.035    Blood Urine Trace (A) Negative    pH Urine 5.0 5.0 - 7.0    Protein Albumin Urine Negative Negative mg/dL    Urobilinogen Urine 0.2 0.2, 1.0 E.U./dL    Nitrite Urine Negative Negative    Leukocyte Esterase Urine Negative Negative   UA Microscopic with Reflex to Culture     Status: Abnormal   Result Value Ref Range    Bacteria Urine Few (A) None Seen /HPF    RBC Urine 0-2 0-2 /HPF /HPF    WBC Urine 0-5 0-5 /HPF /HPF    Squamous Epithelials Urine Few (A) None Seen /LPF    Mucus Urine Present (A) None Seen /LPF    Narrative    Urine Culture not indicated           Signed Electronically by: Duncan Burgess PA-C

## 2024-03-06 LAB
ANA SER QL IF: NEGATIVE
B BURGDOR IGG+IGM SER QL: 0.12
CCP AB SER IA-ACNC: 1.2 U/ML

## 2024-03-08 LAB
A PHAGOCYTOPH DNA BLD QL NAA+PROBE: NOT DETECTED
B MICROTI DNA BLD QL NAA+PROBE: NOT DETECTED
BABESIA DNA BLD QL NAA+PROBE: NOT DETECTED
E CHAFFEENSIS DNA BLD QL NAA+PROBE: NOT DETECTED
E EWINGII DNA SPEC QL NAA+PROBE: NOT DETECTED
EHRLICHIA DNA SPEC QL NAA+PROBE: NOT DETECTED

## 2024-03-25 DIAGNOSIS — G47.00 INSOMNIA, UNSPECIFIED TYPE: ICD-10-CM

## 2024-03-26 RX ORDER — ZOLPIDEM TARTRATE 5 MG/1
TABLET ORAL
Qty: 60 TABLET | Refills: 5 | Status: SHIPPED | OUTPATIENT
Start: 2024-03-26 | End: 2024-09-25

## 2024-04-09 ENCOUNTER — VIRTUAL VISIT (OUTPATIENT)
Dept: FAMILY MEDICINE | Facility: OTHER | Age: 64
End: 2024-04-09
Payer: COMMERCIAL

## 2024-04-09 DIAGNOSIS — M79.605 BILATERAL LOWER EXTREMITY PAIN: Primary | ICD-10-CM

## 2024-04-09 DIAGNOSIS — Z82.49 FAMILY HISTORY OF ISCHEMIC HEART DISEASE: ICD-10-CM

## 2024-04-09 DIAGNOSIS — M79.604 BILATERAL LOWER EXTREMITY PAIN: Primary | ICD-10-CM

## 2024-04-09 DIAGNOSIS — E78.5 HYPERLIPIDEMIA LDL GOAL <130: ICD-10-CM

## 2024-04-09 DIAGNOSIS — G62.9 PERIPHERAL POLYNEUROPATHY: ICD-10-CM

## 2024-04-09 PROCEDURE — 99214 OFFICE O/P EST MOD 30 MIN: CPT | Mod: 95 | Performed by: PHYSICIAN ASSISTANT

## 2024-04-09 RX ORDER — GABAPENTIN 100 MG/1
100 CAPSULE ORAL 3 TIMES DAILY
Qty: 90 CAPSULE | Refills: 0 | Status: SHIPPED | OUTPATIENT
Start: 2024-04-09 | End: 2024-07-29

## 2024-04-09 ASSESSMENT — PATIENT HEALTH QUESTIONNAIRE - PHQ9
SUM OF ALL RESPONSES TO PHQ QUESTIONS 1-9: 9
SUM OF ALL RESPONSES TO PHQ QUESTIONS 1-9: 9
10. IF YOU CHECKED OFF ANY PROBLEMS, HOW DIFFICULT HAVE THESE PROBLEMS MADE IT FOR YOU TO DO YOUR WORK, TAKE CARE OF THINGS AT HOME, OR GET ALONG WITH OTHER PEOPLE: SOMEWHAT DIFFICULT

## 2024-04-09 NOTE — PROGRESS NOTES
"    Instructions Relayed to Patient by Virtual Roomer:     Patient is active on [facility]:   Relayed following to patient: \"It looks like you are active on [facility], are you able to join the visit this way? If not, do you need us to send you a link now or would you like your provider to send a link via text or email when they are ready to initiate the visit?\"    Reminded patient to ensure they were logged on to virtual visit by arrival time listed. Documented in appointment notes if patient had flexibility to initiate visit sooner than arrival time. If pediatric virtual visit, ensured pediatric patient along with parent/guardian will be present for video visit.     Patient offered the website [email or web address] and/or phone number to [facility] Help line: [phone]    Margarita is a 64 year old who is being evaluated via a billable video visit.    How would you like to obtain your AVS? [employer]  If the video visit is dropped, the invitation should be resent by: Text to cell phone: [phone]  Will anyone else be joining your video visit? No      Assessment & Plan     Bilateral lower extremity pain  At this point question if her lower leg pain is due to her knee arthritis, muscle weakness/deconditioning, venous or arterial concerns, neuropathic.  She has an appt with Dr. Colón coming up, she has had relief with injections in the past, we discussed how coupling injections with physical therapy can be so beneficial as we want to strengthen her muscles to help offload her joints.  She is more open to physical therapy so referral was placed.  She has had MRI lumbar spine in 2022 which showed no major signs of nerve compression so less likely but consider repeat MRI if the burning continues. Also if mild nerve compression from the spine physical therapy is likely to be helpful as well.  Also question venous insufficiency, she has not had mapping done so we will get that done to see if there are significant " incompetent veins causing problems. Less likely arterial as she has no claudication symptoms but given hx of HLD and FH arterial disease consider evaluation in the future.  Sent Gabapentin to try and see if this helps. Discussed potential side effects. If tolerating at 100 mg dose can titrate upwards slowly.  She can continue on Meloxicam. Consider discussion with Dr. Colón about if any additional imaging required or if surgical options are next step.  She is going to be getting back into swimming.  Her biggest issues are getting up and down the stairs, going from sitting to standing.    - US Venous Competency Bilateral; Future  - gabapentin (NEURONTIN) 100 MG capsule; Take 1 capsule (100 mg) by mouth 3 times daily  - Physical Therapy  Referral; Future    Peripheral polyneuropathy  See above.   - gabapentin (NEURONTIN) 100 MG capsule; Take 1 capsule (100 mg) by mouth 3 times daily  - Physical Therapy  Referral; Future    Family history of ischemic heart disease  HYPERLIPIDEMIA LDL GOAL <130  Significant number of family members who have had cardiac bypasses.  She has not had a scan.  She is on medication to lower cholesterol.  She is aware of potential out of pocket cost.   - CT Coronary Calcium Scan; Future    Follow-up as needed right now as she has a lot of different evaluations and others to visit with, she can keep in touch via [employer] for med adjustments or other concerns.     Options for treatment and follow-up care were reviewed with the patient and/or guardian. Patient and/or guardian engaged in the decision making process and verbalized understanding of the options discussed and agreed with the final plan.     Subjective   Margarita is a 64 year old, presenting for the following health issues:  Follow Up (Pain), Care Plan, and Referral (Cardiac CT)        4/9/2024     9:00 AM   Additional Questions   Roomed by Grabiel STAFFORD     Video Start Time: 9:15 AM    History of Present Illness       Reason  for visit:  Knee Pain Follow up        Pain History:  When did you first notice your pain?  Knee pain started back in December 2023. After she had some immunizations her knee got really bad. She is able to walk 2 miles a day but just can not bend her knees.   Have you seen this provider for your pain in the past? Yes   Where in your body do you have pain? Knees - Feels that her pain is due to swelling. Throbbing throughout her body. Legs were burning and on fire, burning on upper thighs which is new. Used to have it occasionally in the past and now at night from her knee up it burns  Are you seeing anyone else for your pain? Yes - Sports therapy         12/13/2023     1:21 PM 3/4/2024     2:17 PM 4/9/2024     9:10 AM   PHQ-9 SCORE   PHQ-9 Total Score MyChart 8 (Mild depression) 7 (Mild depression) 9 (Mild depression)   PHQ-9 Total Score 8 7 9           11/25/2019     3:09 PM 8/31/2020     1:41 PM 10/26/2023     9:43 AM   KAILEE-7 SCORE   Total Score   2 (minimal anxiety)   Total Score 9 6 2             12/13/2023     1:21 PM 3/4/2024     2:17 PM 4/9/2024     9:10 AM   PHQ-9 SCORE   PHQ-9 Total Score MyChart 8 (Mild depression) 7 (Mild depression) 9 (Mild depression)   PHQ-9 Total Score 8 7 9           4/9/2024     9:02 AM   PEG Score   PEG Total Score 3.67       PDMP Review         Value Time User    State PDMP site checked  Yes 10/30/2023 10:44 AM Emily Cortes PA-C          Last CSA Agreement:   CSA -- Patient Level:    CSA: None found at the patient level.           Review of Systems  Constitutional,  cardiovascular, pulmonary, musculoskeletal, neuro, skin systems are negative, except as otherwise noted.      Objective           Vitals:  No vitals were obtained today due to virtual visit.    Physical Exam   GENERAL: alert and no distress  EYES: Eyes grossly normal to inspection.  No discharge or erythema, or obvious scleral/conjunctival abnormalities.  RESP: No audible wheeze, cough, or visible  cyanosis.    SKIN: Visible skin clear. No significant rash, abnormal pigmentation or lesions.  NEURO: Cranial nerves grossly intact.  Mentation and speech appropriate for age.  PSYCH: Appropriate affect, tone, and pace of words          Video-Visit Details    Type of service:  Video Visit   Video End Time:9:36 AM  Originating Location (pt. Location): Home    Distant Location (provider location):  Off-site  Platform used for Video Visit: [employer]  Signed Electronically by: Duncan Burgess PA-C

## 2024-04-16 NOTE — PROGRESS NOTES
"Nikki Morales  :  1960  DOS: 2024  MRN: [id number]  PCP: Toribio Rodríguez    Sports Medicine Clinic Visit      HPI  Nikki Morales is a 64 year old female who is seen in consultation at the request of  Duncan Burgess PA-C presenting with left knee pain.    - Mechanism of Injury:    - No inciting injury  - Pertinent history and prior evaluations:    - 2023 with Dr. Rodríguez: XR L knee shows mild to moderate tricompartmental degenerative changes, worse medially.  No acute fractures or dislocations. Left knee CSI was given. CSI was very helpful with relief lasting until Feb.   - XR IMPRESSION:  Small knee joint effusion. No fractures are evident. Mild to moderate degenerative changes in the medial compartment. Mild degenerative changes in the lateral and patellofemoral compartments.\" Left knee   corticosteroid injection completed.  - US LLE negative for DVT  - Return of left knee pain noted in 2024.    - Virtual visit with Kellen Burgess on 2024 where she discussed bilateral lateral lower extremity pain and left knee arthritis.  Prior good relief from right knee corticosteroid injections for arthritis in the right knee with Dr. Montanez in the past.  Did not seem radicular in nature.  Given a referral for physical therapy for the knees.  Also started gabapentin to see if it helps with neuropathic pain for suspected PPN.  Also given meloxicam, which has helped greatly.  Orthopedic referral for consideration of any surgical options need to be considered.    - Pain Character:    - Location:  anteromedial knee normally but also posterior pain with swelling  - Character:  feeling of a needle prick  - Duration:  4 months  - Course:  improving  - Endorses:    - Swelling that has since gone away, feeling of going out, crunching, pin-point pain  - Denies:    - numbness, tingling, radicular shooting pain, mechanical locking symptoms  - Alleviating factors:    - Meloxicam (helpful), corticosteroid " injection in December 2023 (helpful for 2 months)  - Aggravating factors:    - going to sit, walking, stairs  - Other treatments tried:    - Meloxicam (helpful), has not started Gabapentin (has not started), corticosteroid injection in December 2023 (helpful for 2 months)    - Patient Goals:    - discuss treatment options  - Social History:   - Retired.  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    Past Medical History:   Diagnosis Date    Attention deficit hyperactivity disorder, inattentive type     Chronic fatigue     Hyperlipidemia     Hypothyroid     New onset a-fib (H)     Other vitamin B12 deficiency anemia     Primary osteoarthritis of right knee 08/15/2022    Uncomplicated asthma      Past Surgical History:   Procedure Laterality Date    ABDOMEN SURGERY  06/10/1993    C section    COLONOSCOPY  10/06/2009    COLONOSCOPY  07/02/2013    Procedure: COMBINED COLONOSCOPY, SINGLE BIOPSY/POLYPECTOMY BY BIOPSY;;  Surgeon: Cuate Miles MD;  Location:  GI    COLONOSCOPY N/A 09/06/2018    Procedure: COLONOSCOPY;  Colonoscopy;  Surgeon: Hamzah Dudley DO;  Location:  GI    COLONOSCOPY N/A 1/23/2024    Procedure: Colonoscopy;  Surgeon: Felipe Ch MD;  Location:  GI    COMBINED REPAIR PTOSIS WITH BLEPHAROPLASTY BILATERAL Bilateral 09/24/2018    Procedure: COMBINED REPAIR PTOSIS WITH BLEPHAROPLASTY BILATERAL;  Bilateral upper eyelid Blepharoplasty and ptosis repair ;  Surgeon: Martina Andrade MD;  Location: [facility]    CONIZATION CERVIX,KNIFE/LASER      ESOPHAGOSCOPY, GASTROSCOPY, DUODENOSCOPY (EGD), COMBINED  07/02/2013    Procedure: COMBINED ESOPHAGOSCOPY, GASTROSCOPY, DUODENOSCOPY (EGD), BIOPSY SINGLE OR MULTIPLE;  egd with rabdain biopsies and colonoscopy with polypectomy by  biopsy ;  Surgeon: Cuate Miles MD;  Location: [facility]    ESOPHAGOSCOPY, GASTROSCOPY, DUODENOSCOPY (EGD), COMBINED N/A 2024    Procedure: Esophagoscopy, gastroscopy, duodenoscopy, combined;  Surgeon: Felipe Ch MD;  Location:  GI    GENITOURINARY SURGERY  ?     I have a bladder sling    HC DILATION/CURETTAGE DIAG/THER NON OB      D & C    HC REMOVAL OF TONSILS,<9 Y/O      Tonsils <10y.o.    HC UGI ENDOSCOPY DIAG W BIOPSY  2007    HYSTERECTOMY, PAP NO LONGER INDICATED      LAPAROSCOPIC CHOLECYSTECTOMY  10/12/2013    REPAIR PTOSIS      TUBAL LIGATION      ZZC  DELIVERY ONLY      , Low Cervical    ZZC  DELIVERY ONLY      Description:  Section;  Recorded: 11/10/2009;  Comments: @ 29 weeks    ZZC LIGATE FALLOPIAN TUBE      Description: Tubal Ligation;  Recorded: 11/10/2009;    ZZ[facility] NONSPECIFIC PROCEDURE  's    sinus    ZZC NONSPECIFIC PROCEDURE      Esophgeal dilatation multiple    ZZC NONSPECIFIC PROCEDURE      sling    ZZC TOTAL ABDOM HYSTERECTOMY      Description: Hysterectomy;  Recorded: 11/10/2009;  Comments: due to cervical dysplasia    ZZ[facility] VAGINAL HYSTERECTOMY  1995    Hysterectomy, Vaginal    [facility] UGI ENDOSCOPY, SIMPLE EXAM  09/10/99 & 98     Family History   Problem Relation Age of Onset    Cancer Mother         Uterine    Diabetes Father     Hypertension Father     Cerebrovascular Disease Father     Prostate Cancer Father     Cardiovascular Sister         recurrent blood clots    Cerebrovascular Disease Sister 51    Cancer Brother         leukmemia    Coronary Artery Disease Brother         Quadruple bypass    Cerebrovascular Disease Brother     Other Cancer Brother         Non-Hodgkin's lymphoma * 2    Coronary Artery Disease Brother         Quadruple bypass also    Hypertension Brother     Cerebrovascular Disease Brother     Prostate Cancer Brother     Heart Disease Maternal Grandmother         Heart condition age 96    Diabetes  Maternal Grandfather     Cerebrovascular Disease Paternal Grandmother     Diabetes Paternal Grandfather     Psychotic Disorder Son         severe Add,     Asthma Son         exercised induced asthma    Depression Son     Substance Abuse Son     Asthma Son         ecxercise induced asthma    Genetic Disorder Cousin         Alpha-1 Antitrypsin Deficiency  Liver transplant    Genetic Disorder Cousin         Idiopathic Trombosenia Purpla   1977    Cerebrovascular Disease Other         Uncle    Cerebrovascular Disease Other         Uncle    Colon Cancer Other          Objective  /82   Wt 93.4 kg (206 lb)   LMP  (LMP Unknown)   BMI 31.32 kg/m      General: healthy, alert and in no acute distress.    HEENT: no scleral icterus or conjunctival erythema.   Skin: no suspicious lesions or rash. No jaundice.   CV: regular rhythm by palpation, 2+ distal pulses.  Resp: normal respiratory effort without conversational dyspnea.   Psych: normal mood and affect.    Gait: nonantalgic, appropriate coordination and balance.     Neuro:        - Sensation to light touch:    - Intact throughout the BLE including all peripheral nerve distributions.        - MSR:      RLE  LLE  - Patella 2+ 2+  - Achilles 2+ 2+       - Special tests:   - Slump/SLR:  Neg    MSK - Knee:       - Inspection:    -Mild swelling without significant erythema, warmth, ecchymosis, lesion.        - ROM:    - Full AROM/PROM with knee flexion/extension       - Palpation:    - TTP at the  medial joint line.   - NTTP elsewhere.        - Strength:  (*antalgic)  - Hip Flexion  5    - Hip Abduction 5    - Hip Adduction 5   - Knee Flexion  5   - Knee Extension 5   - Dorsiflexion  5   - Plantarflexion 5   - Ext. Rex. Longus 5   - Inversion  5   - Eversion  5        - Special tests:        - Lachman:  Neg        - A/P drawer:  Neg        - Pivot shift:  Neg    - Chaparro:  Neg     - Varus stress:  Neg for laxity or pain     - Valgus stress:  Neg for laxity or pain     - Patellar grind:  Positive      Radiology  I independently reviewed the available relevant imaging in the chart with my interpretations as above in HPI.       Procedure  Large Joint Injection/Arthocentesis: L knee joint    Date/Time: 4/17/2024 12:08 PM    Performed by: Montana Colón DO  Authorized by: Montana Colón DO    Indications:  Osteoarthritis  Needle Size:  22 G  Guidance: ultrasound    Approach:  Anterolateral  Location:  Knee      Medications:  40 mg triamcinolone 40 MG/ML; 2 mL lidocaine 1 %; 2 mL BUPivacaine 0.5 %  Outcome:  Tolerated well, no immediate complications  Procedure discussed: discussed risks, benefits, and alternatives    Consent Given by:  Patient  Prep: patient was prepped and draped in usual sterile fashion     Ultrasound images of procedure were permanently stored.     Ultrasound Guided Intra-articular Knee Injection  The knee was prepped and draped in a sterile manner.  Ultrasound identification of the patella, suprapatellar pouch, quadriceps tendon and femur in both long and short axis was obtained. The probe was placed in short axis to the femur. A 1.5 inch 22 gauge needle was placed under ultrasound guidance into the superior knee joint using a lateral approach.  A mixture of 2 mL of 1% lidocaine, 2 mL of 0.5% bupivacaine and 1 ml kenalog (40mg/ml) was injected without difficulty. The needle was removed and there was good hemostasis without complications.  Patient tolerated procedure well.  There was ultrasound documentation of needle placement and injection.        Assessment  1. Primary osteoarthritis of left knee        Plan  Nikki Morales is a pleasant 64 year old female that presents with chronic left knee pain secondary to primary osteoarthritis of the knee with good relief from prior corticosteroid injections and interested in discussing injections and other options for pain relief and optimization of function.    We discussed the nature of the condition and  available treatment options, and mutually agreed upon the following plan:    - Imaging:          - Reviewed and independently interpreted the relevant imaging in the chart, including any imaging ordered for today's clinic.  - Reviewed results and images with patient.   - Medications:          - Discussed pharmacologic options for pain relief.    -Good relief from meloxicam in the past, added a prescription for a refill and instructed to not take other NSAIDs (ibuprofen, naproxen, aspirin) while taking meloxicam.  May use Tylenol as needed for pain control in between meloxicam doses.  - May also use topical medications such as lidocaine, IcyHot, BioFreeze, or Voltaren gel as needed for pain control.    - Voltaren gel is an anti-inflammatory cream that may be used up to 4 times per day over the painful area.   - Injections:          - Discussed possible injection options and alternatives.    - Injection options include: Intra-articular knee joint injection with corticosteroid, viscosupplementation, or PRP.  - Performed a corticosteroid injection of the left intra-articular knee joint with ultrasound guidance today in clinic. Patient tolerated the procedure well without complications.     - Post-procedure instructions:    - Keep the injection site clean and dry.   - Do not submerge the injection site for 24 hours (no baths, pools). Showers are ok.   - Rest the area for 24-48 hours before resuming normal activities. Avoid overexerting the area for the first few weeks.   - It may take 2-3 days to start noticing the effects of the injection and up to 3-4 weeks to feel significant benefits.   - Therapy:          - Discussed the benefits of therapy vs home exercise program for optimization of range of motion, flexibility, strength, stability and function.   - Preference is for a home exercise program.   - Home Exercise Program given today in clinic and recommendation given to perform HEP daily and after exacerbations.  -  Modalities:          - May use ice, heat, massage or other modalities as needed.   - Bracing:          - Discussed bracing options and may consider using knee stability bracing for stability, comfort, proprioceptive benefits as needed during walking and other exacerbating activities.    - Surgery:          - Discussed non-operative and operative treatment options for the patient's condition.   - Goal is to continue conservative care for as long as possible before surgical intervention would need to be considered.  - Activity:          - Encouraged to remain active and participate in regular activities as symptoms allow.   Avoid or modify exacerbating activities as needed.  - Follow up:          - As needed for re-evaluation and update to treatment plan.  - May follow up sooner for new/worsening symptoms.  - May contact clinic by phone or MyChart for questions or concerns.       Montana Colón DO, LELIA  [facility] [place] - Sports Medicine  [facility] - Department of Orthopedic Surgery       Disclaimer:  This note was prepared and written using Dragon Medical dictation software. As a result, there may be errors in the script that have gone undetected. Please consider this when interpreting the information in this note.

## 2024-04-17 ENCOUNTER — OFFICE VISIT (OUTPATIENT)
Dept: ORTHOPEDICS | Facility: CLINIC | Age: 64
End: 2024-04-17
Attending: PHYSICIAN ASSISTANT
Payer: COMMERCIAL

## 2024-04-17 VITALS — WEIGHT: 206 LBS | BODY MASS INDEX: 31.32 KG/M2 | DIASTOLIC BLOOD PRESSURE: 82 MMHG | SYSTOLIC BLOOD PRESSURE: 130 MMHG

## 2024-04-17 DIAGNOSIS — M17.12 PRIMARY OSTEOARTHRITIS OF LEFT KNEE: Primary | ICD-10-CM

## 2024-04-17 PROCEDURE — 99204 OFFICE O/P NEW MOD 45 MIN: CPT | Mod: 25 | Performed by: STUDENT IN AN ORGANIZED HEALTH CARE EDUCATION/TRAINING PROGRAM

## 2024-04-17 PROCEDURE — 20611 DRAIN/INJ JOINT/BURSA W/US: CPT | Mod: LT | Performed by: STUDENT IN AN ORGANIZED HEALTH CARE EDUCATION/TRAINING PROGRAM

## 2024-04-17 RX ADMIN — TRIAMCINOLONE ACETONIDE 40 MG: 40 INJECTION, SUSPENSION INTRA-ARTICULAR; INTRAMUSCULAR at 12:08

## 2024-04-17 RX ADMIN — LIDOCAINE HYDROCHLORIDE 2 ML: 10 INJECTION, SOLUTION INFILTRATION; PERINEURAL at 12:08

## 2024-04-17 RX ADMIN — BUPIVACAINE HYDROCHLORIDE 2 ML: 5 INJECTION, SOLUTION PERINEURAL at 12:08

## 2024-04-17 SDOH — HEALTH STABILITY: PHYSICAL HEALTH: ON AVERAGE, HOW MANY DAYS PER WEEK DO YOU ENGAGE IN MODERATE TO STRENUOUS EXERCISE (LIKE A BRISK WALK)?: 3 DAYS

## 2024-04-17 SDOH — HEALTH STABILITY: PHYSICAL HEALTH: ON AVERAGE, HOW MANY MINUTES DO YOU ENGAGE IN EXERCISE AT THIS LEVEL?: 30 MIN

## 2024-04-17 ASSESSMENT — PAIN SCALES - GENERAL: PAINLEVEL: MILD PAIN (2)

## 2024-04-17 NOTE — LETTER
"    2024         RE: Nikki Morales  [address]  [facility] [address]        Dear Colleague,    Thank you for referring your patient, Nikki Morales, to the [facility] [place]. Please see a copy of my visit note below.    Nikki Morales  :  1960  DOS: 2024  MRN: [id number]  PCP: Toribio Rodríguez    Sports Medicine Clinic Visit      HPI  Nikki Morales is a 64 year old female who is seen in consultation at the request of  Duncan Burgess PA-C presenting with left knee pain.    - Mechanism of Injury:    - No inciting injury  - Pertinent history and prior evaluations:    - 2023 with Dr. Rodríguez: XR L knee shows mild to moderate tricompartmental degenerative changes, worse medially.  No acute fractures or dislocations. Left knee CSI was given. CSI was very helpful with relief lasting until Feb.   - XR IMPRESSION:  Small knee joint effusion. No fractures are evident. Mild to moderate degenerative changes in the medial compartment. Mild degenerative changes in the lateral and patellofemoral compartments.\" Left knee   corticosteroid injection completed.  - US LLE negative for DVT  - Return of left knee pain noted in 2024.    - Virtual visit with Kellen Burgess on 2024 where she discussed bilateral lateral lower extremity pain and left knee arthritis.  Prior good relief from right knee corticosteroid injections for arthritis in the right knee with Dr. Montanez in the past.  Did not seem radicular in nature.  Given a referral for physical therapy for the knees.  Also started gabapentin to see if it helps with neuropathic pain for suspected PPN.  Also given meloxicam, which has helped greatly.  Orthopedic referral for consideration of any surgical options need to be considered.    - Pain Character:    - Location:  anteromedial knee normally but also posterior pain with swelling  - Character:  feeling of a needle prick  - Duration:  4 " months  - Course:  improving  - Endorses:    - Swelling that has since gone away, feeling of going out, crunching, pin-point pain  - Denies:    - numbness, tingling, radicular shooting pain, mechanical locking symptoms  - Alleviating factors:    - Meloxicam (helpful), corticosteroid injection in December 2023 (helpful for 2 months)  - Aggravating factors:    - going to sit, walking, stairs  - Other treatments tried:    - Meloxicam (helpful), has not started Gabapentin (has not started), corticosteroid injection in December 2023 (helpful for 2 months)    - Patient Goals:    - discuss treatment options  - Social History:   - Retired.  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    Past Medical History:   Diagnosis Date     Attention deficit hyperactivity disorder, inattentive type      Chronic fatigue      Hyperlipidemia      Hypothyroid      New onset a-fib (H)      Other vitamin B12 deficiency anemia      Primary osteoarthritis of right knee 08/15/2022     Uncomplicated asthma      Past Surgical History:   Procedure Laterality Date     ABDOMEN SURGERY  06/10/1993    C section     COLONOSCOPY  10/06/2009     COLONOSCOPY  07/02/2013    Procedure: COMBINED COLONOSCOPY, SINGLE BIOPSY/POLYPECTOMY BY BIOPSY;;  Surgeon: Cuate Miles MD;  Location:  GI     COLONOSCOPY N/A 09/06/2018    Procedure: COLONOSCOPY;  Colonoscopy;  Surgeon: Hamzah Dudley DO;  Location:  GI     COLONOSCOPY N/A 1/23/2024    Procedure: Colonoscopy;  Surgeon: Felipe Ch MD;  Location:  GI     COMBINED REPAIR PTOSIS WITH BLEPHAROPLASTY BILATERAL Bilateral 09/24/2018    Procedure: COMBINED REPAIR PTOSIS WITH BLEPHAROPLASTY BILATERAL;  Bilateral upper eyelid Blepharoplasty and ptosis repair ;  Surgeon:  Martina Andrade MD;  Location: [facility] OR     CONIZATION CERVIX,KNIFE/LASER       ESOPHAGOSCOPY, GASTROSCOPY, DUODENOSCOPY (EGD), COMBINED  2013    Procedure: COMBINED ESOPHAGOSCOPY, GASTROSCOPY, DUODENOSCOPY (EGD), BIOPSY SINGLE OR MULTIPLE;  egd with rabdain biopsies and colonoscopy with polypectomy by biopsy ;  Surgeon: Cuate Miles MD;  Location: [facility] GI     ESOPHAGOSCOPY, GASTROSCOPY, DUODENOSCOPY (EGD), COMBINED N/A 2024    Procedure: Esophagoscopy, gastroscopy, duodenoscopy, combined;  Surgeon: Felipe Ch MD;  Location:  GI     GENITOURINARY SURGERY  ?     I have a bladder sling     HC DILATION/CURETTAGE DIAG/THER NON OB      D & C     HC REMOVAL OF TONSILS,<9 Y/O      Tonsils <10y.o.     HC UGI ENDOSCOPY DIAG W BIOPSY  2007     HYSTERECTOMY, PAP NO LONGER INDICATED       LAPAROSCOPIC CHOLECYSTECTOMY  10/12/2013     REPAIR PTOSIS       TUBAL LIGATION       Z  DELIVERY ONLY      , Low Cervical     ZZC  DELIVERY ONLY      Description:  Section;  Recorded: 11/10/2009;  Comments: @ 29 weeks     Z LIGATE FALLOPIAN TUBE      Description: Tubal Ligation;  Recorded: 11/10/2009;     ZZ NONSPECIFIC PROCEDURE  's    sinus     ZZ NONSPECIFIC PROCEDURE      Esophgeal dilatation multiple     ZZC NONSPECIFIC PROCEDURE  2008    sling     Z TOTAL ABDOM HYSTERECTOMY      Description: Hysterectomy;  Recorded: 11/10/2009;  Comments: due to cervical dysplasia     Z VAGINAL HYSTERECTOMY  1995    Hysterectomy, Vaginal     ZZHC UGI ENDOSCOPY, SIMPLE EXAM  09/10/99 & 98     Family History   Problem Relation Age of Onset     Cancer Mother         Uterine     Diabetes Father      Hypertension Father      Cerebrovascular Disease Father      Prostate Cancer Father      Cardiovascular Sister         recurrent blood clots     Cerebrovascular Disease Sister 51     Cancer Brother         leukmemia     Coronary Artery Disease Brother         Quadruple  bypass     Cerebrovascular Disease Brother      Other Cancer Brother         Non-Hodgkin's lymphoma * 2     Coronary Artery Disease Brother         Quadruple bypass also     Hypertension Brother      Cerebrovascular Disease Brother      Prostate Cancer Brother      Heart Disease Maternal Grandmother         Heart condition age 96     Diabetes Maternal Grandfather      Cerebrovascular Disease Paternal Grandmother      Diabetes Paternal Grandfather      Psychotic Disorder Son         severe Add,      Asthma Son         exercised induced asthma     Depression Son      Substance Abuse Son      Asthma Son         ecxercise induced asthma     Genetic Disorder Cousin         Alpha-1 Antitrypsin Deficiency  Liver transplant     Genetic Disorder Cousin         Idiopathic Trombosenia Purpla        Cerebrovascular Disease Other         Uncle     Cerebrovascular Disease Other         Uncle     Colon Cancer Other          Objective  /82   Wt 93.4 kg (206 lb)   LMP  (LMP Unknown)   BMI 31.32 kg/m      General: healthy, alert and in no acute distress.    HEENT: no scleral icterus or conjunctival erythema.   Skin: no suspicious lesions or rash. No jaundice.   CV: regular rhythm by palpation, 2+ distal pulses.  Resp: normal respiratory effort without conversational dyspnea.   Psych: normal mood and affect.    Gait: nonantalgic, appropriate coordination and balance.     Neuro:        - Sensation to light touch:    - Intact throughout the BLE including all peripheral nerve distributions.        - MSR:      RLE  LLE  - Patella 2+ 2+  - Achilles 2+ 2+       - Special tests:   - Slump/SLR:  Neg    MSK - Knee:       - Inspection:    -Mild swelling without significant erythema, warmth, ecchymosis, lesion.        - ROM:    - Full AROM/PROM with knee flexion/extension       - Palpation:    - TTP at the  medial joint line.   - NTTP elsewhere.        - Strength:  (*antalgic)  - Hip Flexion  5    - Hip Abduction 5    - Hip  Adduction 5   - Knee Flexion  5   - Knee Extension 5   - Dorsiflexion  5   - Plantarflexion 5   - Ext. Rex. Longus 5   - Inversion  5   - Eversion  5        - Special tests:        - Lachman:  Neg        - A/P drawer:  Neg        - Pivot shift:  Neg    - Chaparro:  Neg     - Varus stress:  Neg for laxity or pain     - Valgus stress:  Neg for laxity or pain    - Patellar grind:  Positive      Radiology  I independently reviewed the available relevant imaging in the chart with my interpretations as above in HPI.       Procedure  Large Joint Injection/Arthocentesis: L knee joint    Date/Time: 4/17/2024 12:08 PM    Performed by: Montana Colón DO  Authorized by: Montana Colón DO    Indications:  Osteoarthritis  Needle Size:  22 G  Guidance: ultrasound    Approach:  Anterolateral  Location:  Knee      Medications:  40 mg triamcinolone 40 MG/ML; 2 mL lidocaine 1 %; 2 mL BUPivacaine 0.5 %  Outcome:  Tolerated well, no immediate complications  Procedure discussed: discussed risks, benefits, and alternatives    Consent Given by:  Patient  Prep: patient was prepped and draped in usual sterile fashion     Ultrasound images of procedure were permanently stored.     Ultrasound Guided Intra-articular Knee Injection  The knee was prepped and draped in a sterile manner.  Ultrasound identification of the patella, suprapatellar pouch, quadriceps tendon and femur in both long and short axis was obtained. The probe was placed in short axis to the femur. A 1.5 inch 22 gauge needle was placed under ultrasound guidance into the superior knee joint using a lateral approach.  A mixture of 2 mL of 1% lidocaine, 2 mL of 0.5% bupivacaine and 1 ml kenalog (40mg/ml) was injected without difficulty. The needle was removed and there was good hemostasis without complications.  Patient tolerated procedure well.  There was ultrasound documentation of needle placement and injection.        Assessment  1. Primary osteoarthritis of left knee         Plan  Nikki Morales is a pleasant 64 year old female that presents with chronic left knee pain secondary to primary osteoarthritis of the knee with good relief from prior corticosteroid injections and interested in discussing injections and other options for pain relief and optimization of function.    We discussed the nature of the condition and available treatment options, and mutually agreed upon the following plan:    - Imaging:          - Reviewed and independently interpreted the relevant imaging in the chart, including any imaging ordered for today's clinic.  - Reviewed results and images with patient.   - Medications:          - Discussed pharmacologic options for pain relief.    -Good relief from meloxicam in the past, added a prescription for a refill and instructed to not take other NSAIDs (ibuprofen, naproxen, aspirin) while taking meloxicam.  May use Tylenol as needed for pain control in between meloxicam doses.  - May also use topical medications such as lidocaine, IcyHot, BioFreeze, or Voltaren gel as needed for pain control.    - Voltaren gel is an anti-inflammatory cream that may be used up to 4 times per day over the painful area.   - Injections:          - Discussed possible injection options and alternatives.    - Injection options include: Intra-articular knee joint injection with corticosteroid, viscosupplementation, or PRP.  - Performed a corticosteroid injection of the left intra-articular knee joint with ultrasound guidance today in clinic. Patient tolerated the procedure well without complications.     - Post-procedure instructions:    - Keep the injection site clean and dry.   - Do not submerge the injection site for 24 hours (no baths, pools). Showers are ok.   - Rest the area for 24-48 hours before resuming normal activities. Avoid overexerting the area for the first few weeks.   - It may take 2-3 days to start noticing the effects of the injection and up to 3-4 weeks to  feel significant benefits.   - Therapy:          - Discussed the benefits of therapy vs home exercise program for optimization of range of motion, flexibility, strength, stability and function.   - Preference is for a home exercise program.   - Home Exercise Program given today in clinic and recommendation given to perform HEP daily and after exacerbations.  - Modalities:          - May use ice, heat, massage or other modalities as needed.   - Bracing:          - Discussed bracing options and may consider using knee stability bracing for stability, comfort, proprioceptive benefits as needed during walking and other exacerbating activities.    - Surgery:          - Discussed non-operative and operative treatment options for the patient's condition.   - Goal is to continue conservative care for as long as possible before surgical intervention would need to be considered.  - Activity:          - Encouraged to remain active and participate in regular activities as symptoms allow.   Avoid or modify exacerbating activities as needed.  - Follow up:          - As needed for re-evaluation and update to treatment plan.  - May follow up sooner for new/worsening symptoms.  - May contact clinic by phone or MyChart for questions or concerns.       Montana Colón DO, CAQSM  [facility]  [facility] - Department of Orthopedic Surgery       Disclaimer:  This note was prepared and written using Dragon Medical dictation software. As a result, there may be errors in the script that have gone undetected. Please consider this when interpreting the information in this note.       Again, thank you for allowing me to participate in the care of your patient.        Sincerely,        Montana Colón DO

## 2024-04-19 ENCOUNTER — ANCILLARY PROCEDURE (OUTPATIENT)
Dept: CT IMAGING | Facility: CLINIC | Age: 64
End: 2024-04-19
Attending: PHYSICIAN ASSISTANT
Payer: COMMERCIAL

## 2024-04-19 DIAGNOSIS — Z82.49 FAMILY HISTORY OF ISCHEMIC HEART DISEASE: ICD-10-CM

## 2024-04-19 DIAGNOSIS — E78.5 HYPERLIPIDEMIA LDL GOAL <130: ICD-10-CM

## 2024-04-19 PROCEDURE — 75571 CT HRT W/O DYE W/CA TEST: CPT | Performed by: INTERNAL MEDICINE

## 2024-04-23 ENCOUNTER — MYC MEDICAL ADVICE (OUTPATIENT)
Dept: ORTHOPEDICS | Facility: CLINIC | Age: 64
End: 2024-04-23
Payer: COMMERCIAL

## 2024-04-23 RX ORDER — BUPIVACAINE HYDROCHLORIDE 5 MG/ML
2 INJECTION, SOLUTION PERINEURAL
Status: SHIPPED | OUTPATIENT
Start: 2024-04-17

## 2024-04-23 RX ORDER — MELOXICAM 7.5 MG/1
7.5 TABLET ORAL DAILY PRN
Qty: 30 TABLET | Refills: 0 | Status: SHIPPED | OUTPATIENT
Start: 2024-04-23 | End: 2024-06-17

## 2024-04-23 RX ORDER — TRIAMCINOLONE ACETONIDE 40 MG/ML
40 INJECTION, SUSPENSION INTRA-ARTICULAR; INTRAMUSCULAR
Status: SHIPPED | OUTPATIENT
Start: 2024-04-17

## 2024-04-23 RX ORDER — LIDOCAINE HYDROCHLORIDE 10 MG/ML
2 INJECTION, SOLUTION INFILTRATION; PERINEURAL
Status: SHIPPED | OUTPATIENT
Start: 2024-04-17

## 2024-04-30 ENCOUNTER — NURSE TRIAGE (OUTPATIENT)
Dept: FAMILY MEDICINE | Facility: OTHER | Age: 64
End: 2024-04-30

## 2024-04-30 ENCOUNTER — TELEPHONE (OUTPATIENT)
Dept: FAMILY MEDICINE | Facility: OTHER | Age: 64
End: 2024-04-30

## 2024-04-30 ENCOUNTER — VIRTUAL VISIT (OUTPATIENT)
Dept: FAMILY MEDICINE | Facility: OTHER | Age: 64
End: 2024-04-30
Payer: COMMERCIAL

## 2024-04-30 DIAGNOSIS — J45.31 MILD PERSISTENT ASTHMA WITH ACUTE EXACERBATION: ICD-10-CM

## 2024-04-30 DIAGNOSIS — K57.92 ACUTE DIVERTICULITIS: Primary | ICD-10-CM

## 2024-04-30 PROCEDURE — 99214 OFFICE O/P EST MOD 30 MIN: CPT | Mod: 95 | Performed by: PHYSICIAN ASSISTANT

## 2024-04-30 RX ORDER — CIPROFLOXACIN 500 MG/1
500 TABLET, FILM COATED ORAL 2 TIMES DAILY
Qty: 20 TABLET | Refills: 0 | Status: SHIPPED | OUTPATIENT
Start: 2024-04-30 | End: 2024-05-10

## 2024-04-30 RX ORDER — PREDNISONE 20 MG/1
40 TABLET ORAL DAILY
Qty: 10 TABLET | Refills: 0 | Status: SHIPPED | OUTPATIENT
Start: 2024-04-30 | End: 2024-05-05

## 2024-04-30 RX ORDER — IPRATROPIUM BROMIDE AND ALBUTEROL SULFATE 2.5; .5 MG/3ML; MG/3ML
1 SOLUTION RESPIRATORY (INHALATION) EVERY 6 HOURS PRN
Qty: 90 ML | Refills: 0 | Status: SHIPPED | OUTPATIENT
Start: 2024-04-30

## 2024-04-30 RX ORDER — METRONIDAZOLE 500 MG/1
500 TABLET ORAL 3 TIMES DAILY
Qty: 30 TABLET | Refills: 0 | Status: SHIPPED | OUTPATIENT
Start: 2024-04-30 | End: 2024-05-10

## 2024-04-30 ASSESSMENT — ASTHMA QUESTIONNAIRES
QUESTION_3 LAST FOUR WEEKS HOW OFTEN DID YOUR ASTHMA SYMPTOMS (WHEEZING, COUGHING, SHORTNESS OF BREATH, CHEST TIGHTNESS OR PAIN) WAKE YOU UP AT NIGHT OR EARLIER THAN USUAL IN THE MORNING: NOT AT ALL
QUESTION_2 LAST FOUR WEEKS HOW OFTEN HAVE YOU HAD SHORTNESS OF BREATH: THREE TO SIX TIMES A WEEK
QUESTION_1 LAST FOUR WEEKS HOW MUCH OF THE TIME DID YOUR ASTHMA KEEP YOU FROM GETTING AS MUCH DONE AT WORK, SCHOOL OR AT HOME: SOME OF THE TIME
QUESTION_5 LAST FOUR WEEKS HOW WOULD YOU RATE YOUR ASTHMA CONTROL: SOMEWHAT CONTROLLED
QUESTION_4 LAST FOUR WEEKS HOW OFTEN HAVE YOU USED YOUR RESCUE INHALER OR NEBULIZER MEDICATION (SUCH AS ALBUTEROL): TWO OR THREE TIMES PER WEEK
ACT_TOTALSCORE: 17
ACT_TOTALSCORE: 17

## 2024-04-30 NOTE — TELEPHONE ENCOUNTER
"Please call and triage her.  Is on my virtual but not until end of the day for \"Diverticulitis\". May need imaging so would like to possibly do a visit earlier in the day depending on what is going on.     Duncan Burgess PA-C    "

## 2024-04-30 NOTE — TELEPHONE ENCOUNTER
Nurse Triage SBAR    Is this a 2nd Level Triage? NO    Situation: Diverticulitis symptoms    Background: Patient states she has reoccurring episodes of diverticulitis. Last colonoscopy showed three pockets in colon. Patient is leaving for vacation and will not return until May 13th, 2024. Patient is in need of medication for her trip.     Assessment: Patient states diverticulitis is reoccurring for her and would like medication to enjoy her upcoming vacation which she leave on 05/01/2024. Patient states she has chest congestion and would like medication. Patient denies fever; reports 96.6 F her normal is 97.2 F.  -Diverticulitis       - medication refill  -chest congestion on left side rib/lung        - would like steroids and a Z-Pack    Protocol Recommended Disposition:   See in Office Today Appointment today with Duncan Burgess PA-C patient is available earlier then 1700; can call patient any time.     Recommendation:  RN advised care at home - drink plenty of water and adjust diet as needed. Advised patient to seek emergency care if symptoms worsen per RN protocol. Patient verbalized understanding and no further questions at this time.    Routed to provider    Terese Godoy RN on 4/30/2024 at 8:47 AM      Reason for Disposition   MODERATE pain (e.g., interferes with normal activities that comes and goes (cramps) lasts > 24 hours  (Exception: Pain with Vomiting or Diarrhea - see that Protocol.)    Additional Information   Negative: Passed out (i.e., fainted, collapsed and was not responding)   Negative: Shock suspected (e.g., cold/pale/clammy skin, too weak to stand, low BP, rapid pulse)   Negative: Sounds like a life-threatening emergency to the triager   Negative: Followed an abdomen (stomach) injury   Negative: Chest pain   Negative: Abdominal pain and pregnant < 20 weeks   Negative: Abdominal pain and pregnant 20 or more weeks   Negative: Pain is mainly in upper abdomen (if needed ask: 'is it mainly above  "the belly button?')   Negative: Abdomen bloating or swelling are main symptoms   Negative: SEVERE abdominal pain (e.g., excruciating)   Negative: Vomiting red blood or black (coffee ground) material   Negative: Blood in bowel movements  (Exception: Blood on surface of BM with constipation.)   Negative: Black or tarry bowel movements  (Exception: Chronic-unchanged black-grey BMs AND is taking iron pills or Pepto-Bismol.)   Negative: MILD TO MODERATE constant pain lasting > 2 hours   Negative: Vomiting bile (green color)   Negative: Patient sounds very sick or weak to the triager   Negative: Vomiting and abdomen looks much more swollen than usual   Negative: White of the eyes have turned yellow (i.e., jaundice)   Negative: Blood in urine (red, pink, or tea-colored)   Negative: Fever > 103 F (39.4 C)   Negative: Fever > 101 F (38.3 C) and over 60 years of age   Negative: Fever > 100.0 F (37.8 C) and has diabetes mellitus or a weak immune system (e.g., HIV positive, cancer chemotherapy, organ transplant, splenectomy, chronic steroids)   Negative: Fever > 100.0 F (37.8 C) and bedridden (e.g., CVA, chronic illness, recovering from surgery)   Negative: Pregnant or could be pregnant (i.e., missed last menstrual period)    Answer Assessment - Initial Assessment Questions  1. LOCATION: \"Where does it hurt?\"       Lower left side  2. RADIATION: \"Does the pain shoot anywhere else?\" (e.g., chest, back)      No  3. ONSET: \"When did the pain begin?\" (e.g., minutes, hours or days ago)       4 days ago  4. SUDDEN: \"Gradual or sudden onset?\"      Gradual   5. PATTERN \"Does the pain come and go, or is it constant?\"     - If it comes and goes: \"How long does it last?\" \"Do you have pain now?\"      (Note: Comes and goes means the pain is intermittent. It goes away completely between bouts.)    6. SEVERITY: \"How bad is the pain?\"  (e.g., Scale 1-10; mild, moderate, or severe)       - MODERATE (4-7): Interferes with normal activities " "    7. RECURRENT SYMPTOM: \"Have you ever had this type of stomach pain before?\" If Yes, ask: \"When was the last time?\" and \"What happened that time?\"       Reoccurring/History of Diverticulitis   8. CAUSE: \"What do you think is causing the stomach pain?\"      Diverticulitis   9. RELIEVING/AGGRAVATING FACTORS: \"What makes it better or worse?\" (e.g., antacids, bending or twisting motion, bowel movement)      Diet, medications  10. OTHER SYMPTOMS: \"Do you have any other symptoms?\" (e.g., back pain, diarrhea, fever, urination pain, vomiting)        No  11. PREGNANCY: \"Is there any chance you are pregnant?\" \"When was your last menstrual period?\"        No    Protocols used: Abdominal Pain - Female-A-[place]    "

## 2024-04-30 NOTE — TELEPHONE ENCOUNTER
RN called patient and confirmed plan. No further questions or concerns at this time.  Closing encounter.

## 2024-04-30 NOTE — PROGRESS NOTES
"Mercedez is a 64 year old who is being evaluated via a billable video visit.        Instructions Relayed to Patient by Virtual Roomer:     Patient is active on [employer]:   Relayed following to patient: \"It looks like you are active on [employer], are you able to join the visit this way? If not, do you need us to send you a link now or would you like your provider to send a link via text or email when they are ready to initiate the visit?\"      Patient Confirmed they will join visit via: [employer]  Reminded patient to ensure they were logged on to virtual visit by arrival time listed.   Asked if patient has flexibility to initiate visit sooner than arrival time: patient is unable to initiate visit earlier than arrival time     If pediatric virtual visit, ensured pediatric patient along with parent/guardian will be present for video visit.     Patient offered the website [email or web address] and/or phone number to [employer] Help line: [phone] How would you like to obtain your AVS? [employer]  If the video visit is dropped, the invitation should be resent by: Text to cell phone: [phone]  Will anyone else be joining your video visit? No      Assessment & Plan     Acute diverticulitis  Discussed that if she is having frequent flares of diverticulitis and it is truly diverticulitis causing symptoms and not issues like IBS then she should really meet with General Surgery to discuss options if surgical hemicolectomy is appropriate.  Did also discuss newer guidelines recommending avoidance of antibiotics and instead bowel rest.  Right now she is starting to have mild symptoms and understand that she is worried about going out of town but recommended bowel rest, can do some clear liquids for the next 24-48 hours and then if worsening and escalating symptoms she can start antibiotics and she is aware of foods/drink to avoid while on Metronidazole.    - ciprofloxacin (CIPRO) 500 MG tablet; Take 1 tablet (500 mg) by " mouth 2 times daily for 10 days  - metroNIDAZOLE (FLAGYL) 500 MG tablet; Take 1 tablet (500 mg) by mouth 3 times daily for 10 days    Mild persistent asthma with acute exacerbation  She is short of breath when moving around the house during the visit, she notes that she only has this very specific localized chest pain that she can palpate and that is the only time she'll get short of breath. She really doesn't have any coughing or other cold symptoms. Not sure if this is pleurisy, early viral infection, asthma that is uncontrolled, cardiac in nature. She reports in the past her PCP gave her Azithromycin and Prednisone which seems to play into the asthma exacerbation diagnosis.  Right now recommended avoidance of even more antibiotics especially in absence of other symptoms. Encouraged her to make sure she is using her Symbiort inhaler regularly. She has a neb machine so will see if Duoneb can give her quicker relief and then prednisone.  Did advise that Prednisone + Ciprofloxacin can increase risk of tendon rupture and to use caution.  Did feel that if it was palpable pain in the chest wall centrally that costochondritis is possible which would explain why steroids might help. Can use topical diclofenac and ice to see if this helps. This would not explain her shortness of breath.   - predniSONE (DELTASONE) 20 MG tablet; Take 2 tablets (40 mg) by mouth daily for 5 days  - ipratropium - albuterol 0.5 mg/2.5 mg/3 mL (DUONEB) 0.5-2.5 (3) MG/3ML neb solution; Take 1 vial (3 mLs) by nebulization every 6 hours as needed for shortness of breath, wheezing or cough    Follow-up if symptoms are not improving, sooner if worse or new concerns.     Options for treatment and follow-up care were reviewed with the patient and/or guardian. Patient and/or guardian engaged in the decision making process and verbalized understanding of the options discussed and agreed with the final plan.     Cherise Avila is a 64 year old,  presenting for the following health issues:  Diverticulitis and Medication Request        4/30/2024     4:53 PM   Additional Questions   Roomed by Francis GUILLAUME     Video Start Time: 5:23 PM    History of Present Illness       Reason for visit:  Diverticulitis    She eats 0-1 servings of fruits and vegetables daily.She consumes 0 sweetened beverage(s) daily.She exercises with enough effort to increase her heart rate 9 or less minutes per day.  She is missing 1 dose(s) of medications per week.  She is not taking prescribed medications regularly due to remembering to take.     - Diverticulitis has started to act up. Normally she wouldn't do anything about it but she is leaving tomorrow for [place] and then next Monday for [place]  - Flare frequency: 4 times per year.   - Has chills chronically.    - Has pain over the chest wall. This happens on for many years, when it occurs she'll have pain, shortness of breath.         Review of Systems  Constitutional, HEENT, cardiovascular, pulmonary, gi and gu systems are negative, except as otherwise noted.      Objective           Vitals:  No vitals were obtained today due to virtual visit.    Physical Exam   GENERAL: alert and no distress  EYES: Eyes grossly normal to inspection.  No discharge or erythema, or obvious scleral/conjunctival abnormalities.  RESP: No audible wheeze, cough, or visible cyanosis.    SKIN: Visible skin clear. No significant rash, abnormal pigmentation or lesions.  NEURO: Cranial nerves grossly intact.  Mentation and speech appropriate for age.  PSYCH: Appropriate affect, tone, and pace of words          Video-Visit Details    Type of service:  Video Visit   Video End Time:5:42 PM  Originating Location (pt. Location): Home    Distant Location (provider location):  Off-site  Platform used for Video Visit: Adela[employer]  Signed Electronically by: Duncan Burgess PA-C

## 2024-05-16 ENCOUNTER — THERAPY VISIT (OUTPATIENT)
Dept: PHYSICAL THERAPY | Facility: CLINIC | Age: 64
End: 2024-05-16
Attending: PHYSICIAN ASSISTANT
Payer: MEDICARE

## 2024-05-16 DIAGNOSIS — M79.604 BILATERAL LOWER EXTREMITY PAIN: ICD-10-CM

## 2024-05-16 DIAGNOSIS — G62.9 PERIPHERAL POLYNEUROPATHY: ICD-10-CM

## 2024-05-16 DIAGNOSIS — M79.605 BILATERAL LOWER EXTREMITY PAIN: ICD-10-CM

## 2024-05-16 PROCEDURE — 97110 THERAPEUTIC EXERCISES: CPT | Mod: GP | Performed by: PHYSICAL THERAPIST

## 2024-05-16 PROCEDURE — 97161 PT EVAL LOW COMPLEX 20 MIN: CPT | Mod: GP | Performed by: PHYSICAL THERAPIST

## 2024-05-16 ASSESSMENT — ACTIVITIES OF DAILY LIVING (ADL)
KNEE_ACTIVITY_OF_DAILY_LIVING_SUM: 43
RAW_SCORE: 43
RISE FROM A CHAIR: ACTIVITY IS MINIMALLY DIFFICULT
LIMPING: THE SYMPTOM AFFECTS MY ACTIVITY MODERATELY
HOW_WOULD_YOU_RATE_THE_CURRENT_FUNCTION_OF_YOUR_KNEE_DURING_YOUR_USUAL_DAILY_ACTIVITIES_ON_A_SCALE_FROM_0_TO_100_WITH_100_BEING_YOUR_LEVEL_OF_KNEE_FUNCTION_PRIOR_TO_YOUR_INJURY_AND_0_BEING_THE_INABILITY_TO_PERFORM_ANY_OF_YOUR_USUAL_DAILY_ACTIVITIES?: 50
HOW_WOULD_YOU_RATE_THE_OVERALL_FUNCTION_OF_YOUR_KNEE_DURING_YOUR_USUAL_DAILY_ACTIVITIES?: ABNORMAL
KNEE_ACTIVITY_OF_DAILY_LIVING_SCORE: 61.43
GIVING WAY, BUCKLING OR SHIFTING OF KNEE: I HAVE THE SYMPTOM BUT IT DOES NOT AFFECT MY ACTIVITY
GO UP STAIRS: ACTIVITY IS MINIMALLY DIFFICULT
STAND: ACTIVITY IS NOT DIFFICULT
AS_A_RESULT_OF_YOUR_KNEE_INJURY,_HOW_WOULD_YOU_RATE_YOUR_CURRENT_LEVEL_OF_DAILY_ACTIVITY?: NEARLY NORMAL
WALK: ACTIVITY IS MINIMALLY DIFFICULT
SQUAT: ACTIVITY IS VERY DIFFICULT
STIFFNESS: THE SYMPTOM AFFECTS MY ACTIVITY MODERATELY
SIT WITH YOUR KNEE BENT: ACTIVITY IS NOT DIFFICULT
PAIN: THE SYMPTOM AFFECTS MY ACTIVITY MODERATELY
WEAKNESS: THE SYMPTOM AFFECTS MY ACTIVITY SLIGHTLY
SWELLING: THE SYMPTOM AFFECTS MY ACTIVITY MODERATELY
GO DOWN STAIRS: ACTIVITY IS MINIMALLY DIFFICULT
KNEEL ON THE FRONT OF YOUR KNEE: ACTIVITY IS VERY DIFFICULT

## 2024-05-16 NOTE — PROGRESS NOTES
PHYSICAL THERAPY EVALUATION  Type of Visit: Evaluation    See electronic medical record for Abuse and Falls Screening details.    Subjective       Presenting condition or subjective complaint: Knee  Pt is a 63 yo female who presents to PT with c/o pain in L knee. Pt has received corticosteroid injections in B knees, most recently in L knee. Pt is having difficulty with swelling her knee and reports that the pain limits her walking.  Date of onset: 04/09/24    Relevant medical history: Asthma; Bladder or bowel problems; Fibromyalgia; Overweight; Thyroid problems   Dates & types of surgery:      Prior diagnostic imaging/testing results: X-ray     Prior therapy history for the same diagnosis, illness or injury: No      Prior Level of Function  Transfers:   Ambulation:   ADL:   IADL:     Living Environment  Social support: With a significant other or spouse   Type of home: House; 1 level; Basement   Stairs to enter the home: Yes 3 Is there a railing: Yes   Ramp: No   Stairs inside the home: Yes 12 Is there a railing: Yes   Help at home: None  Equipment owned:       Employment: No    Hobbies/Interests:      Patient goals for therapy: No pain    Pain assessment: Pain present     Objective   KNEE EVALUATION  PAIN: Pain Level at Rest: 0/10  Pain Level with Use: 8/10  INTEGUMENTARY (edema, incisions):   POSTURE:   GAIT:  Weightbearing Status: WBAT  Assistive Device(s): None  Gait Deviations: WFL  BALANCE/PROPRIOCEPTION: Single Leg Stance Eyes Open (seconds): B = 15 sec  WEIGHTBEARING ALIGNMENT:   NON-WEIGHTBEARING ALIGNMENT:   ROM: AROM WFL    STRENGTH:   FLEXIBILITY:   SPECIAL TESTS:   FUNCTIONAL TESTS:   PALPATION:   JOINT MOBILITY:     Assessment & Plan   CLINICAL IMPRESSIONS  Medical Diagnosis: Bilateral lower extremity pain (M79.604, M79.605)   Peripheral polyneuropathy (G62.9)    Treatment Diagnosis: LE Pain   Impression/Assessment:  Pt presents to PT with weakness in B hips and LE, has occ jolts of pain with some  movements, no consistency in onset, but overall tolerating activity well, and impaired balance as a result of her weakness. Pt would benefit from skilled PT interventions in order to address her pain as needed, create an appropriate HEP to address her weakness and balance impairments so she may progress her functional mobility and tolerance to activity for greater participation in preferred recreational activities.    Clinical Decision Making (Complexity):  Clinical Presentation: Stable/Uncomplicated  Clinical Presentation Rationale: based on medical and personal factors listed in PT evaluation  Clinical Decision Making (Complexity): Low complexity    PLAN OF CARE  Treatment Interventions:  Interventions: Gait Training, Manual Therapy, Neuromuscular Re-education, Therapeutic Activity, Therapeutic Exercise    Long Term Goals     PT Goal 1  Goal Identifier: HEP  Goal Description: Pt will demo independence in performance and progression of strengthening and balance HEP in order to improve CLOF.  Target Date:  (Ongoing, updates as needed)  PT Goal 2  Goal Identifier: KOS  Goal Description: Pt will demo improved knee pain ratings and function as shown by increased KOS score of at least 7 points in order to show significant improvement and greater return to previous function.  Goal Progress: 43/70 (eval)  Target Date: 07/11/24  PT Goal 3  Goal Identifier: Pain  Goal Description: Pt will demo improved pain ratings no greater than 4/10 with activity allowing her greater participation in recreational mobility activities and activities with her grandchildren.  Goal Progress: Pt rating pain up to 8/10 (eval)  Target Date: 07/11/24      Frequency of Treatment: 1x/week  Duration of Treatment: 8 weeks    Recommended Referrals to Other Professionals:   Education Assessment:   Learner/Method: Patient;Listening;Demonstration    Risks and benefits of evaluation/treatment have been explained.   Patient/Family/caregiver agrees with  Plan of Care.     Evaluation Time:     PT Eval, Low Complexity Minutes (96702): 15       Signing Clinician: SILVIA Montoya [facility]                                                                                   OUTPATIENT PHYSICAL THERAPY      PLAN OF TREATMENT FOR OUTPATIENT REHABILITATION   Patient's Last Name, First Name, Nikki Crum YOB: 1960   Provider's Name   [facility]   Medical Record No.  [id number]     Onset Date: 04/09/24  Start of Care Date: 05/16/24     Medical Diagnosis:  Bilateral lower extremity pain (M79.604, M79.605)   Peripheral polyneuropathy (G62.9)      PT Treatment Diagnosis:  LE Pain Plan of Treatment  Frequency/Duration: 1x/week/ 8 weeks    Certification date from 05/16/24 to 07/11/24         See note for plan of treatment details and functional goals     Jesus Alberto Leary, PT                         I CERTIFY THE NEED FOR THESE SERVICES FURNISHED UNDER        THIS PLAN OF TREATMENT AND WHILE UNDER MY CARE     (Physician attestation of this document indicates review and certification of the therapy plan).              Referring Provider:  Duncan Burgess PA-C    Initial Assessment  See Epic Evaluation- Start of Care Date: 05/16/24

## 2024-05-17 DIAGNOSIS — G93.32 CHRONIC FATIGUE SYNDROME: ICD-10-CM

## 2024-05-18 RX ORDER — CYANOCOBALAMIN 1000 UG/ML
1 INJECTION, SOLUTION INTRAMUSCULAR; SUBCUTANEOUS
Qty: 1 ML | Refills: 1 | Status: SHIPPED | OUTPATIENT
Start: 2024-05-18

## 2024-05-20 NOTE — TELEPHONE ENCOUNTER
Workshare message sent to patient regarding information below. Will need to call after 5/23/24 if message is not read by then.    Janie Roberts LPN

## 2024-05-23 ENCOUNTER — THERAPY VISIT (OUTPATIENT)
Dept: PHYSICAL THERAPY | Facility: CLINIC | Age: 64
End: 2024-05-23
Attending: PHYSICIAN ASSISTANT
Payer: MEDICARE

## 2024-05-23 DIAGNOSIS — M79.604 BILATERAL LOWER EXTREMITY PAIN: Primary | ICD-10-CM

## 2024-05-23 DIAGNOSIS — G62.9 PERIPHERAL POLYNEUROPATHY: ICD-10-CM

## 2024-05-23 DIAGNOSIS — M79.605 BILATERAL LOWER EXTREMITY PAIN: Primary | ICD-10-CM

## 2024-05-23 PROCEDURE — 97112 NEUROMUSCULAR REEDUCATION: CPT | Mod: GP | Performed by: PHYSICAL THERAPIST

## 2024-05-23 PROCEDURE — 97110 THERAPEUTIC EXERCISES: CPT | Mod: GP | Performed by: PHYSICAL THERAPIST

## 2024-05-30 ENCOUNTER — THERAPY VISIT (OUTPATIENT)
Dept: PHYSICAL THERAPY | Facility: CLINIC | Age: 64
End: 2024-05-30
Attending: PHYSICIAN ASSISTANT
Payer: MEDICARE

## 2024-05-30 DIAGNOSIS — M79.605 BILATERAL LOWER EXTREMITY PAIN: Primary | ICD-10-CM

## 2024-05-30 DIAGNOSIS — G62.9 PERIPHERAL POLYNEUROPATHY: ICD-10-CM

## 2024-05-30 DIAGNOSIS — M79.604 BILATERAL LOWER EXTREMITY PAIN: Primary | ICD-10-CM

## 2024-05-30 PROCEDURE — 97110 THERAPEUTIC EXERCISES: CPT | Mod: GP | Performed by: PHYSICAL THERAPIST

## 2024-06-04 ENCOUNTER — THERAPY VISIT (OUTPATIENT)
Dept: PHYSICAL THERAPY | Facility: CLINIC | Age: 64
End: 2024-06-04
Attending: PHYSICIAN ASSISTANT
Payer: MEDICARE

## 2024-06-04 DIAGNOSIS — G62.9 PERIPHERAL POLYNEUROPATHY: ICD-10-CM

## 2024-06-04 DIAGNOSIS — M79.605 BILATERAL LOWER EXTREMITY PAIN: Primary | ICD-10-CM

## 2024-06-04 DIAGNOSIS — M79.604 BILATERAL LOWER EXTREMITY PAIN: Primary | ICD-10-CM

## 2024-06-04 PROCEDURE — 97530 THERAPEUTIC ACTIVITIES: CPT | Mod: GP | Performed by: PHYSICAL THERAPIST

## 2024-06-04 PROCEDURE — 97110 THERAPEUTIC EXERCISES: CPT | Mod: GP | Performed by: PHYSICAL THERAPIST

## 2024-06-04 PROCEDURE — 97112 NEUROMUSCULAR REEDUCATION: CPT | Mod: GP | Performed by: PHYSICAL THERAPIST

## 2024-06-11 ENCOUNTER — MYC MEDICAL ADVICE (OUTPATIENT)
Dept: ORTHOPEDICS | Facility: CLINIC | Age: 64
End: 2024-06-11
Payer: COMMERCIAL

## 2024-06-11 DIAGNOSIS — M17.12 PRIMARY OSTEOARTHRITIS OF LEFT KNEE: Primary | ICD-10-CM

## 2024-06-17 ENCOUNTER — MYC MEDICAL ADVICE (OUTPATIENT)
Dept: ORTHOPEDICS | Facility: CLINIC | Age: 64
End: 2024-06-17
Payer: COMMERCIAL

## 2024-06-17 DIAGNOSIS — M17.12 PRIMARY OSTEOARTHRITIS OF LEFT KNEE: Primary | ICD-10-CM

## 2024-06-17 RX ORDER — MELOXICAM 7.5 MG/1
TABLET ORAL
Qty: 30 TABLET | Refills: 0 | OUTPATIENT
Start: 2024-06-17

## 2024-06-17 RX ORDER — MELOXICAM 7.5 MG/1
7.5 TABLET ORAL DAILY PRN
Qty: 30 TABLET | Refills: 1 | Status: SHIPPED | OUTPATIENT
Start: 2024-06-17 | End: 2024-09-09

## 2024-06-18 NOTE — TELEPHONE ENCOUNTER
Patient had less than 1 month of relief from corticosteroid injection of left knee. Please advise on pended SynviscOne injection. Patient has appointment scheduled for 7/3/2024. Thank you. Karina Ni ATC      Patient scheduled for appointment on 7/3/2024 @ [facility] for discussion of viscosupplementation injection vs steroid injection of left knee.        Steroid  injection last completed 4/17/2024.  Patient reports relief for less than 1 month.        Patient has failed trial of OTC NSAIDs/Pain Medication (ibuprofen, tylenol, naproxen):  Yes       Patient has completed trial of physical therapy: Yes         Prior authorization referral for SynviscOne injection pended.    Please advise

## 2024-06-18 NOTE — TELEPHONE ENCOUNTER
She can certainly try viscosupplementation injections or PRP injections as the next interventional step.  I signed the pended viscosupplementation authorization order so we can get these approved for her upcoming visit if possible.  We can discuss all available options at that time.      Thank you,   Montana Colón DO, LELIA  [facility]  [facility] - Department of Orthopedic Surgery

## 2024-06-24 DIAGNOSIS — J30.2 SEASONAL ALLERGIC RHINITIS, UNSPECIFIED TRIGGER: ICD-10-CM

## 2024-06-25 NOTE — TELEPHONE ENCOUNTER
Looks like this medication was stopped about a year ago.  Please find out if this is an error in refill request.  It is also rather low-dose.  Please find out why is he requesting it to be refilled.  Thank you

## 2024-07-01 ENCOUNTER — APPOINTMENT (OUTPATIENT)
Dept: LAB | Facility: CLINIC | Age: 64
End: 2024-07-01
Payer: COMMERCIAL

## 2024-07-01 ENCOUNTER — VIRTUAL VISIT (OUTPATIENT)
Dept: FAMILY MEDICINE | Facility: CLINIC | Age: 64
End: 2024-07-01
Payer: COMMERCIAL

## 2024-07-01 ENCOUNTER — MYC MEDICAL ADVICE (OUTPATIENT)
Dept: FAMILY MEDICINE | Facility: CLINIC | Age: 64
End: 2024-07-01

## 2024-07-01 DIAGNOSIS — J02.9 SORE THROAT: ICD-10-CM

## 2024-07-01 DIAGNOSIS — J01.00 ACUTE NON-RECURRENT MAXILLARY SINUSITIS: ICD-10-CM

## 2024-07-01 DIAGNOSIS — R05.1 ACUTE COUGH: Primary | ICD-10-CM

## 2024-07-01 LAB
DEPRECATED S PYO AG THROAT QL EIA: NEGATIVE
FLUAV AG SPEC QL IA: NEGATIVE
FLUBV AG SPEC QL IA: NEGATIVE
GROUP A STREP BY PCR: NOT DETECTED

## 2024-07-01 PROCEDURE — 87651 STREP A DNA AMP PROBE: CPT

## 2024-07-01 PROCEDURE — 87635 SARS-COV-2 COVID-19 AMP PRB: CPT

## 2024-07-01 PROCEDURE — 99213 OFFICE O/P EST LOW 20 MIN: CPT | Mod: 95

## 2024-07-01 PROCEDURE — 87804 INFLUENZA ASSAY W/OPTIC: CPT

## 2024-07-01 RX ORDER — FEXOFENADINE HCL 60 MG/1
TABLET, FILM COATED ORAL
Qty: 180 TABLET | Refills: 1 | Status: SHIPPED | OUTPATIENT
Start: 2024-07-01

## 2024-07-01 ASSESSMENT — ASTHMA QUESTIONNAIRES
QUESTION_4 LAST FOUR WEEKS HOW OFTEN HAVE YOU USED YOUR RESCUE INHALER OR NEBULIZER MEDICATION (SUCH AS ALBUTEROL): TWO OR THREE TIMES PER WEEK
QUESTION_3 LAST FOUR WEEKS HOW OFTEN DID YOUR ASTHMA SYMPTOMS (WHEEZING, COUGHING, SHORTNESS OF BREATH, CHEST TIGHTNESS OR PAIN) WAKE YOU UP AT NIGHT OR EARLIER THAN USUAL IN THE MORNING: NOT AT ALL
QUESTION_1 LAST FOUR WEEKS HOW MUCH OF THE TIME DID YOUR ASTHMA KEEP YOU FROM GETTING AS MUCH DONE AT WORK, SCHOOL OR AT HOME: SOME OF THE TIME
QUESTION_5 LAST FOUR WEEKS HOW WOULD YOU RATE YOUR ASTHMA CONTROL: SOMEWHAT CONTROLLED
QUESTION_2 LAST FOUR WEEKS HOW OFTEN HAVE YOU HAD SHORTNESS OF BREATH: MORE THAN ONCE A DAY
ACT_TOTALSCORE: 15
ACT_TOTALSCORE: 15

## 2024-07-01 ASSESSMENT — PATIENT HEALTH QUESTIONNAIRE - PHQ9
10. IF YOU CHECKED OFF ANY PROBLEMS, HOW DIFFICULT HAVE THESE PROBLEMS MADE IT FOR YOU TO DO YOUR WORK, TAKE CARE OF THINGS AT HOME, OR GET ALONG WITH OTHER PEOPLE: NOT DIFFICULT AT ALL
SUM OF ALL RESPONSES TO PHQ QUESTIONS 1-9: 9
SUM OF ALL RESPONSES TO PHQ QUESTIONS 1-9: 9

## 2024-07-01 NOTE — PATIENT INSTRUCTIONS
Recommend testing for strep, influenza, and COVID    Symptoms are consistent with sinus infection.  Recommend Augmentin twice a day for 10 days    Offered Tessalon, patient declines as she has a son at home    Continue over-the-counter medications as needed for symptoms    Follow-up with any new or worsening symptoms    Offered chest x-ray to investigate for pneumonia, patient declined    Follow up with primary care doctor in 1 week if symptoms are not improving.  Sooner if symptoms worsen.    Worrisome symptoms please go to the emergency department.      GENERAL   [] Drink extra water and other fluids (water being the best)   [] For sore throats in adults and children over 4 years old, use sore throat spray or lozenges   [] Menthol rub (such as Mikal's, very helpful in kids)   [] LOTS of hand washing (or hand ) and covering coughs   [] Zinc acetate/gluconate lozenges can shorten course of cold symptoms (no benefit for kids)   [] Stop smoking or being around those who do smoke     SPECIFIC MEDICATIONS (Over the Counter)      [] Fever, aches, ear pain, throat pain (adults):?   [] Tylenol every 6-8 hours as needed while awake AND/OR  ibuprofen every 6-8 hours while awake (take with food and large glass of water). Read bottle for dosing instructions.    **Avoid Ibuprofen/Aleve/Motrin if you have kidney disease or gastric ulcer history. Please ask your doctor if you have questions/concerns about this.**      [] Congestion:?   [] Nasal Saline (Simply Saline), mist vaporizer, steam, humidifier, and Neti Pot   [] Atrovent (ipratropium) spray (can be used 4 times a day and use for 3 weeks), or Afrin (oxymetazoline) 2 sprays each nostril, 1-2 times a day for 3 days (understanding the potential of rebound congestion).?   [] Considering bulb suctioning and NoseFrida for kids.   [] Phenylephrine (avoid in kids under 5, spray examples are Little Remedies and? Anam-Synephrine, oral examples are Sudafed)?      [] Cough   []  Use honey in coffee or tea to relieve cough (do not give honey to an infant under 1 year old)   [] Throat lozenges and cough drops   [] Steam, humidifier, etc...     [] Mucous production:?   [] Salt water gargles   [] Mucinex, Robitussin (for both do not use if under the age of 5, minimal help in kids)   [] Mucinex D (pseudoephedrine/guaifenesin - for mucous and congestion) OR Mucinex DM (dextromethorphan/guaifenesin - for mucous and cough) - both minimal/no help in kids      [] Ear Pain/Pressure   [] Antihistamine (Zyrtec/Cetirizine, Allegra/fexofenadine, Claritin/loratadine)? also for itch, sneeze, runny nose, watery eyes, itchy throat, or postnasal drip).  For best results use in combination with nasal sprays (Flonase or Nasocort):   [] Flonase (fluticasone) 2 sprays in each nostril daily for at least 10-14 days, then 1 spray in each nostril per day afterwards for best results (dont use in kids younger than 5).??      Children (up to age 18):   [] Mainstays treatment are analgesics (tylenol and ibuprofen - do NOT use ibuprofen in children under 6 months, contact your doctor if you have concerns with fever in this age) and nasal saline irrigation (6 times a day for up to 3 weeks).?   [] Honey (over 12 months of age) before bedtime   [] Menthol rub - for congestion, nighttime cough, and sleep (age 2 and older)   [] Warm baths or showered. Humidified air in general.   [] Warm/cool liquids for comfort (even liquids you typically wouldn't give your child I.e. juice or Gatorade)     Resources:   - [employer]icians   - [facility] Reference (AskMayoExpert)   - Viral Prescription Pad Template from the [facility]       Patient understood and verbally consented to the treatment plan. Discussed symptoms that would warrant an urgent or emergent visit. All of the patients' questions were answered. Patient was instructed to contact the clinic if questions or concerns arise. Recommend follow up  appointments if symptoms worsen or fail to improve. Recommend follow up as needed. Recommend ER in the case of an emergency.    Heydi Gaston PA-C

## 2024-07-01 NOTE — TELEPHONE ENCOUNTER
I spoke with thew patient and she didn't need the medication last year the season was nice. This year has been different and she needs the medication.   As far as the dose, If one doesn't work she will take more than one.

## 2024-07-01 NOTE — PROGRESS NOTES
"Margarita is a 64 year old who is being evaluated via a billable video visit.    How would you like to obtain your AVS? MyChart  If the video visit is dropped, the invitation should be resent by: Text to cell phone: [phone]  Will anyone else be joining your video visit? No      Assessment & Plan     Acute cough  - Streptococcus A Rapid Screen w/Reflex to PCR - Clinic Collect  - Symptomatic COVID-19 Virus (Coronavirus) by PCR  - Influenza A & B Antigen - Clinic Collect    Acute non-recurrent maxillary sinusitis  - amoxicillin-clavulanate (AUGMENTIN) 875-125 MG tablet; Take 1 tablet by mouth 2 times daily for 10 days  - Streptococcus A Rapid Screen w/Reflex to PCR - Clinic Collect  - Symptomatic COVID-19 Virus (Coronavirus) by PCR  - Influenza A & B Antigen - Clinic Collect    Sore throat  - Streptococcus A Rapid Screen w/Reflex to PCR - Clinic Collect  - Symptomatic COVID-19 Virus (Coronavirus) by PCR  - Influenza A & B Antigen - Clinic Collect          BMI  Estimated body mass index is 31.32 kg/m  as calculated from the following:    Height as of 1/23/24: 1.727 m (5' 8\").    Weight as of 4/17/24: 93.4 kg (206 lb).         See Patient Instructions  Patient Instructions   Recommend testing for strep, influenza, and COVID    Symptoms are consistent with sinus infection.  Recommend Augmentin twice a day for 10 days    Offered Tessalon, patient declines as she has a son at home    Continue over-the-counter medications as needed for symptoms    Follow-up with any new or worsening symptoms    Offered chest x-ray to investigate for pneumonia, patient declined    Follow up with primary care doctor in 1 week if symptoms are not improving.  Sooner if symptoms worsen.    Worrisome symptoms please go to the emergency department.      GENERAL   [] Drink extra water and other fluids (water being the best)   [] For sore throats in adults and children over 4 years old, use sore throat spray or lozenges   [] Menthol rub (such as " Mikal's, very helpful in kids)   [] LOTS of hand washing (or hand ) and covering coughs   [] Zinc acetate/gluconate lozenges can shorten course of cold symptoms (no benefit for kids)   [] Stop smoking or being around those who do smoke     SPECIFIC MEDICATIONS (Over the Counter)      [] Fever, aches, ear pain, throat pain (adults):?   [] Tylenol every 6-8 hours as needed while awake AND/OR  ibuprofen every 6-8 hours while awake (take with food and large glass of water). Read bottle for dosing instructions.    **Avoid Ibuprofen/Aleve/Motrin if you have kidney disease or gastric ulcer history. Please ask your doctor if you have questions/concerns about this.**      [] Congestion:?   [] Nasal Saline (Simply Saline), mist vaporizer, steam, humidifier, and Neti Pot   [] Atrovent (ipratropium) spray (can be used 4 times a day and use for 3 weeks), or Afrin (oxymetazoline) 2 sprays each nostril, 1-2 times a day for 3 days (understanding the potential of rebound congestion).?   [] Considering bulb suctioning and NoseFrida for kids.   [] Phenylephrine (avoid in kids under 5, spray examples are Little Remedies and? Anam-Synephrine, oral examples are Sudafed)?      [] Cough   [] Use honey in coffee or tea to relieve cough (do not give honey to an infant under 1 year old)   [] Throat lozenges and cough drops   [] Steam, humidifier, etc...     [] Mucous production:?   [] Salt water gargles   [] Mucinex, Robitussin (for both do not use if under the age of 5, minimal help in kids)   [] Mucinex D (pseudoephedrine/guaifenesin - for mucous and congestion) OR Mucinex DM (dextromethorphan/guaifenesin - for mucous and cough) - both minimal/no help in kids      [] Ear Pain/Pressure   [] Antihistamine (Zyrtec/Cetirizine, Allegra/fexofenadine, Claritin/loratadine)? also for itch, sneeze, runny nose, watery eyes, itchy throat, or postnasal drip).  For best results use in combination with nasal sprays (Flonase or Nasocort):   []  Flonase (fluticasone) 2 sprays in each nostril daily for at least 10-14 days, then 1 spray in each nostril per day afterwards for best results (dont use in kids younger than 5).??      Children (up to age 18):   [] Mainstays treatment are analgesics (tylenol and ibuprofen - do NOT use ibuprofen in children under 6 months, contact your doctor if you have concerns with fever in this age) and nasal saline irrigation (6 times a day for up to 3 weeks).?   [] Honey (over 12 months of age) before bedtime   [] Menthol rub - for congestion, nighttime cough, and sleep (age 2 and older)   [] Warm baths or showered. Humidified air in general.   [] Warm/cool liquids for comfort (even liquids you typically wouldn't give your child I.e. juice or Gatorade)     Resources:   - American [employer]icians   - [facility] Reference (AskMayoExpert)   - Viral Prescription Pad Template from the [facility]       Patient understood and verbally consented to the treatment plan. Discussed symptoms that would warrant an urgent or emergent visit. All of the patients' questions were answered. Patient was instructed to contact the clinic if questions or concerns arise. Recommend follow up appointments if symptoms worsen or fail to improve. Recommend follow up as needed. Recommend ER in the case of an emergency.    Heydi Gaston PA-C      Subjective   Margarita is a 64 year old, presenting for the following health issues:  URI        7/1/2024    10:53 AM   Additional Questions   Roomed by Trice KEITH     Video Start Time: 11:40am    History of Present Illness       Reason for visit:  I've been having problems for a month. No, it's turned into lots of white pass on my throat and a sore throat planning on leaving a vacation for weeks next Sunday. Something needs to be done    She eats 0-1 servings of fruits and vegetables daily.She consumes 0 sweetened beverage(s) daily.She exercises with enough effort to increase her heart rate 10 to  19 minutes per day.  She exercises with enough effort to increase her heart rate 3 or less days per week.   She is taking medications regularly.     June 27th  From cough and sinus.   Let's all started shortly after Memorial weekend.  I am leaving on Sunday for a two-week trip. And whatever I have has got me exhausted and it seems to be getting worse now that it's moved into my throat also    Symptoms for three weeks. Constantly coughing. Have been a social smoker. No wheezing or SOB. Oximeter is okay. No fevers. Chills. Sore throat. Not feeling stuffy. Has post nasal drip. Spitting up phlem. No headaches or vision changes. Did nebulizer and inhailer with small amount of releif. Using albuterol.  She has been using several over-the-counter medications without relief.      I spent a total of 20 minutes on the day of the visit.   Time spent by me doing chart review, history and exam, documentation and further activities per the note                  Objective           Vitals:  No vitals were obtained today due to virtual visit.    Physical Exam   GENERAL: alert and no distress  EYES: Eyes grossly normal to inspection.  No discharge or erythema, or obvious scleral/conjunctival abnormalities.  RESP: No audible wheeze, cough, or visible cyanosis.    SKIN: Visible skin clear. No significant rash, abnormal pigmentation or lesions.  NEURO: Cranial nerves grossly intact.  Mentation and speech appropriate for age.  PSYCH: Appropriate affect, tone, and pace of words    Office Visit on 03/05/2024   Component Date Value Ref Range Status    Lyme Disease Antibodies Total 03/05/2024 0.12  <0.90 Final    Non-reactive, Absence of detectable Borrelia burgdorferi antibodies. A non-reactive result does not exclude the possibility of Borrelia burgdorferi infection. If early Lyme disease is suspected, a second sample should be collected and tested 2 to 4 weeks later.    Babesia Species by PCR 03/05/2024 Not Detected   Final    Babesia  Microti by PCR 03/05/2024 Not Detected   Final    INTERPRETIVE INFORMATION: Babesia Species by PCR    A negative result does not rule out the presence of PCR   inhibitors in the patient specimen or test-specific nucleic   acid in concentrations below the level of detection by this   test.    This test was developed and its performance characteristics   determined by [facility]. It has not been cleared or   approved by the US Food and Drug Administration. This test   was performed in a CLIA certified laboratory and is   intended for clinical purposes.  Performed By: [facility]  [address] [place] [address]  : Marcial Finnegan MD, PhD  CLIA Number: [id number]    Anaplasma phagocytophilum 03/05/2024 Not Detected   Final    Ehrlichia chaffeensis 03/05/2024 Not Detected   Final    Ehrlichia ewingii/canis 03/05/2024 Not Detected   Final    Ehrlichia muris-like 03/05/2024 Not Detected   Final    INTERPRETIVE INFORMATION:  Ehrlichia and Anaplasma Species   by PCR      A negative result does not rule out the presence of PCR   inhibitors in the patient specimen or test-specific nucleic   acid in concentrations below the level of detection by this   assay.    This test was developed and its performance characteristics   determined by [facility]. It has not been cleared or   approved by the US Food and Drug Administration. This test   was performed in a CLIA certified laboratory and is   intended for clinical purposes.  Performed By: [facility]  [address] [place] [address]  : Marcial Finnegan MD, PhD  CLIA Number: [id number]    Erythrocyte Sedimentation Rate 03/05/2024 10  0 - 30 mm/hr Final    CRP Inflammation 03/05/2024 10.86 (H)  <5.00 mg/L Final    Uric Acid 03/05/2024 5.0  2.4 - 5.7 mg/dL Final    PORFIRIO interpretation 03/05/2024 Negative  Negative Final      Negative:              <1:40  Borderline Positive:   1:40 -  1:80  Positive:              >1:80    Cyclic Citrullinated Peptide Antib* 03/05/2024 1.2  <7.0 U/mL Final    Negative    TSH 03/05/2024 1.97  0.30 - 4.20 uIU/mL Final    Color Urine 03/05/2024 Yellow  Colorless, Straw, Light Yellow, Yellow Final    Appearance Urine 03/05/2024 Clear  Clear Final    Glucose Urine 03/05/2024 Negative  Negative mg/dL Final    Bilirubin Urine 03/05/2024 Negative  Negative Final    Ketones Urine 03/05/2024 Negative  Negative mg/dL Final    Specific Gravity Urine 03/05/2024 1.025  1.003 - 1.035 Final    Blood Urine 03/05/2024 Trace (A)  Negative Final    pH Urine 03/05/2024 5.0  5.0 - 7.0 Final    Protein Albumin Urine 03/05/2024 Negative  Negative mg/dL Final    Urobilinogen Urine 03/05/2024 0.2  0.2, 1.0 E.U./dL Final    Nitrite Urine 03/05/2024 Negative  Negative Final    Leukocyte Esterase Urine 03/05/2024 Negative  Negative Final    CK 03/05/2024 68  26 - 192 U/L Final    Lactate Dehydrogenase 03/05/2024 237  0 - 250 U/L Final    Bacteria Urine 03/05/2024 Few (A)  None Seen /HPF Final    RBC Urine 03/05/2024 0-2  0-2 /HPF /HPF Final    WBC Urine 03/05/2024 0-5  0-5 /HPF /HPF Final    Squamous Epithelials Urine 03/05/2024 Few (A)  None Seen /LPF Final    Mucus Urine 03/05/2024 Present (A)  None Seen /LPF Final     No results found for any visits on 07/01/24.  No results found.      Video-Visit Details    Type of service:  Video Visit   Video End Time:12:04 PM  Originating Location (pt. Location): Home    Distant Location (provider location):  Off-site  Platform used for Video Visit: Adelaida  Signed Electronically by: Heydi Gaston PA-C

## 2024-07-02 LAB — SARS-COV-2 RNA RESP QL NAA+PROBE: NEGATIVE

## 2024-07-29 ENCOUNTER — VIRTUAL VISIT (OUTPATIENT)
Dept: FAMILY MEDICINE | Facility: OTHER | Age: 64
End: 2024-07-29
Payer: COMMERCIAL

## 2024-07-29 DIAGNOSIS — R53.83 OTHER FATIGUE: ICD-10-CM

## 2024-07-29 DIAGNOSIS — K57.32 DIVERTICULITIS OF COLON: Primary | ICD-10-CM

## 2024-07-29 PROCEDURE — 99214 OFFICE O/P EST MOD 30 MIN: CPT | Mod: 95 | Performed by: PHYSICIAN ASSISTANT

## 2024-07-29 RX ORDER — ESTRADIOL 0.07 MG/D
FILM, EXTENDED RELEASE TRANSDERMAL
COMMUNITY
Start: 2024-07-03

## 2024-07-29 NOTE — PROGRESS NOTES
Margarita is a 64 year old who is being evaluated via a billable video visit.    How would you like to obtain your AVS? MyChart  If the video visit is dropped, the invitation should be resent by: Text to cell phone: [phone]  Will anyone else be joining your video visit? No      Assessment & Plan     Diverticulitis of colon  - At this point recommend bowel rest since her symptoms are improving, Monitor for severe symptoms. Will refer to general surgery to have evaluation for the recurrent symptoms and if colectomy would be appropriate or not.   - Adult Gen Surg  Referral; Future    Other fatigue  Her  recently tested positive for EBV, she had sore throat and swelling and exudates but was on Augmentin and Azithromycin and symptoms improved, Suspect with her new fatigue she also had EBV, will test to evaluate.    - EBV Capsid Antibody IgM; Future  - EBV Capsid Antibody IgG; Future        Subjective   Margarita is a 64 year old, presenting for the following health issues:  No chief complaint on file.      Video Start Time:  10:20 AM    History of Present Illness       Reason for visit:  Diverticulitis and other health concerns    She eats 0-1 servings of fruits and vegetables daily.She consumes 1 sweetened beverage(s) daily.She exercises with enough effort to increase her heart rate 9 or less minutes per day.  She exercises with enough effort to increase her heart rate 3 or less days per week.   She is taking medications regularly.     Friday night into Saturday was having chills and temp up around 98 which is high for her. She was having mucus and blood out of the rectum just on Saturday Night.  That just happened 3 times and then stopped. She has had mild cramping still but no more changes in stools.  Yesterday was ok.      Was put on Antibiotics beginning of July.  Did 10 days of Augmentin and Azithromycin.  She is still tired but she had resolution of the sore throat and the coughing and mucus and sinus  symptoms.  She still has the fatigue.     Knee was doing well after injection but then she had a fall and injured her bad knee again.     Review of Systems  Constitutional, HEENT, cardiovascular, pulmonary, gi and gu systems are negative, except as otherwise noted.      Objective           Vitals:  No vitals were obtained today due to virtual visit.    Physical Exam   GENERAL: alert and no distress  EYES: Eyes grossly normal to inspection.  No discharge or erythema, or obvious scleral/conjunctival abnormalities.  RESP: No audible wheeze, cough, or visible cyanosis.    SKIN: Visible skin clear. No significant rash, abnormal pigmentation or lesions.  NEURO: Cranial nerves grossly intact.  Mentation and speech appropriate for age.  PSYCH: Appropriate affect, tone, and pace of words          Video-Visit Details    Type of service:  Video Visit   Video End Time:10:41 AM  Originating Location (pt. Location): Home    Distant Location (provider location):  Off-site  Platform used for Video Visit: Adelaida  Signed Electronically by: Duncan Burgess PA-C

## 2024-07-30 NOTE — PROGRESS NOTES
Nikki Morales  :  1960  DOS: 2024  MRN: [id number]    Sports Medicine Clinic Procedure    Ultrasound Guided Left Intra-Articular Knee SynviscOne Injection, +/- Aspiration    Clinical History: Primary osteoarthritis of the left knee.  Some good improvement from the corticosteroid injection that provided 100% relief of her symptoms but only for a short period of time.  Interested in trying viscosupplementation injection today in hopes of longer lasting relief of her pain.    Diagnosis:   1. Primary osteoarthritis of left knee        Large Joint Injection/Arthocentesis: L knee joint    Date/Time: 2024 3:40 PM    Performed by: Montana Colón DO  Authorized by: Montana Colón DO    Indications:  Osteoarthritis  Needle Size:  22 G  Guidance: ultrasound    Approach:  Anterolateral  Location:  Knee      Medications:  48 mg hylan 48 MG/6ML  Outcome:  Tolerated well, no immediate complications  Procedure discussed: discussed risks, benefits, and alternatives    Consent Given by:  Patient  Prep: patient was prepped and draped in usual sterile fashion     Ultrasound images of procedure were permanently stored.         Ultrasound Guided Intra-articular Knee Viscosupplementation Injection - Left  The knee was prepped and draped in a sterile manner.  Ultrasound identification of the patella, suprapatellar pouch, quadriceps tendon and femur in both long and short axis was obtained. The probe was placed in short axis to the femur.  A 1.5 inch 22 gauge needle was placed under ultrasound guidance into the superior knee joint using a lateral approach.  A prefilled syringe of SynviscOne solution was injected without difficulty. The needle was removed and there was good hemostasis without complications.  Patient tolerated procedure well.  There was ultrasound documentation of needle placement and injection.         Impression:  Successful ultrasound-guided left knee joint viscosupplementation  injection.    Plan:  - Injection:    - Expectations and goals of the injection were discussed and verbal and written consent was obtained.  - Performed the above procedure today in clinic. Patient tolerated the procedure well without complications.    - Post-procedure instructions:    - Keep the injection site clean and dry.   - Do not submerge the injection site for 24 hours (no baths, pools). Showers are ok.   - Rest the area for 24-48 hours before resuming normal activities. Avoid overexerting the area for the first few weeks.   - It may take up to 3-4 weeks to feel significant benefits.   - Follow up:          - In 3+ months as needed for updates to treatment plan, or sooner for new/worsening symptoms.  - Patient has clinic contact information for questions or concerns.      Montana Colón DO, LELIA  [facility] - Sports Medicine  [facility] - Department of Orthopedic Surgery     Disclaimer:  This note was prepared and written using Dragon Medical dictation software. As a result, there may be errors in the script that have gone undetected. Please consider this when interpreting the information in this note.

## 2024-07-31 ENCOUNTER — LAB (OUTPATIENT)
Dept: LAB | Facility: CLINIC | Age: 64
End: 2024-07-31
Payer: COMMERCIAL

## 2024-07-31 DIAGNOSIS — R53.83 OTHER FATIGUE: ICD-10-CM

## 2024-07-31 PROCEDURE — 86665 EPSTEIN-BARR CAPSID VCA: CPT

## 2024-07-31 PROCEDURE — 36415 COLL VENOUS BLD VENIPUNCTURE: CPT

## 2024-08-01 ENCOUNTER — OFFICE VISIT (OUTPATIENT)
Dept: ORTHOPEDICS | Facility: CLINIC | Age: 64
End: 2024-08-01
Payer: COMMERCIAL

## 2024-08-01 DIAGNOSIS — M17.12 PRIMARY OSTEOARTHRITIS OF LEFT KNEE: Primary | ICD-10-CM

## 2024-08-01 PROCEDURE — 20611 DRAIN/INJ JOINT/BURSA W/US: CPT | Mod: LT | Performed by: STUDENT IN AN ORGANIZED HEALTH CARE EDUCATION/TRAINING PROGRAM

## 2024-08-01 NOTE — LETTER
2024      Nikki Morales  [address]  [facility] [address]      Dear Colleague,    Thank you for referring your patient, Nikki Morales, to the [facility] [place]. Please see a copy of my visit note below.    Nikki Morales  :  1960  DOS: 2024  MRN: [id number]    Sports Medicine Clinic Procedure    Ultrasound Guided Left Intra-Articular Knee SynviscOne Injection, +/- Aspiration    Clinical History: Primary osteoarthritis of the left knee.  Some good improvement from the corticosteroid injection that provided 100% relief of her symptoms but only for a short period of time.  Interested in trying viscosupplementation injection today in hopes of longer lasting relief of her pain.    Diagnosis:   1. Primary osteoarthritis of left knee        Large Joint Injection/Arthocentesis: L knee joint    Date/Time: 2024 3:40 PM    Performed by: Montana Colón DO  Authorized by: Montana Colón DO    Indications:  Osteoarthritis  Needle Size:  22 G  Guidance: ultrasound    Approach:  Anterolateral  Location:  Knee      Medications:  48 mg hylan 48 MG/6ML  Outcome:  Tolerated well, no immediate complications  Procedure discussed: discussed risks, benefits, and alternatives    Consent Given by:  Patient  Prep: patient was prepped and draped in usual sterile fashion     Ultrasound images of procedure were permanently stored.         Ultrasound Guided Intra-articular Knee Viscosupplementation Injection - Left  The knee was prepped and draped in a sterile manner.  Ultrasound identification of the patella, suprapatellar pouch, quadriceps tendon and femur in both long and short axis was obtained. The probe was placed in short axis to the femur.  A 1.5 inch 22 gauge needle was placed under ultrasound guidance into the superior knee joint using a lateral approach.  A prefilled syringe of SynviscOne solution was injected without difficulty. The needle was  removed and there was good hemostasis without complications.  Patient tolerated procedure well.  There was ultrasound documentation of needle placement and injection.         Impression:  Successful ultrasound-guided left knee joint viscosupplementation injection.    Plan:  - Injection:    - Expectations and goals of the injection were discussed and verbal and written consent was obtained.  - Performed the above procedure today in clinic. Patient tolerated the procedure well without complications.    - Post-procedure instructions:    - Keep the injection site clean and dry.   - Do not submerge the injection site for 24 hours (no baths, pools). Showers are ok.   - Rest the area for 24-48 hours before resuming normal activities. Avoid overexerting the area for the first few weeks.   - It may take up to 3-4 weeks to feel significant benefits.   - Follow up:          - In 3+ months as needed for updates to treatment plan, or sooner for new/worsening symptoms.  - Patient has clinic contact information for questions or concerns.      Montana Colón DO CAQSM  [facility] - Sports Medicine  [facility] - Department of Orthopedic Surgery     Disclaimer:  This note was prepared and written using Dragon Medical dictation software. As a result, there may be errors in the script that have gone undetected. Please consider this when interpreting the information in this note.       Again, thank you for allowing me to participate in the care of your patient.        Sincerely,        Montana Colón DO

## 2024-08-02 LAB
EBV VCA IGG SER IA-ACNC: 153 U/ML
EBV VCA IGG SER IA-ACNC: POSITIVE
EBV VCA IGM SER IA-ACNC: <10 U/ML
EBV VCA IGM SER IA-ACNC: NORMAL

## 2024-08-12 ENCOUNTER — OFFICE VISIT (OUTPATIENT)
Dept: SURGERY | Facility: CLINIC | Age: 64
End: 2024-08-12
Attending: PHYSICIAN ASSISTANT
Payer: COMMERCIAL

## 2024-08-12 VITALS
BODY MASS INDEX: 31.07 KG/M2 | SYSTOLIC BLOOD PRESSURE: 130 MMHG | WEIGHT: 205 LBS | DIASTOLIC BLOOD PRESSURE: 78 MMHG | HEIGHT: 68 IN | TEMPERATURE: 96.4 F

## 2024-08-12 DIAGNOSIS — K57.32 DIVERTICULITIS OF COLON: Primary | ICD-10-CM

## 2024-08-12 PROCEDURE — 99244 OFF/OP CNSLTJ NEW/EST MOD 40: CPT | Performed by: SPECIALIST

## 2024-08-12 ASSESSMENT — PAIN SCALES - GENERAL: PAINLEVEL: NO PAIN (0)

## 2024-08-12 NOTE — PROGRESS NOTES
Consult requested by Kellen Burgess    Reason consultation diverticulitis.      HPI:  Patient is a 64-year-old white female with multiple episodes of diverticulitis going back about 2-3 years.  States she gets cramping and occasional diarrhea.  It usually resolves with doing nothing or antibiotics.  She is not fully compliant on her diet.  Her last colonoscopy was in January and just revealed diverticulosis.  Her last CT was in 2022 and revealed diverticulitis.  Currently she denies any symptoms.  She saw her PCP who suggested she should have a colon resection for which she is now referred.  She presents with a lot of misconceptions for colon resection.  She has a lot of questions.    Past Medical History:   Diagnosis Date    Attention deficit hyperactivity disorder, inattentive type     Chronic fatigue     Hyperlipidemia     Hypothyroid     New onset a-fib (H)     Other vitamin B12 deficiency anemia     Primary osteoarthritis of right knee 08/15/2022    Uncomplicated asthma      Past Surgical History:   Procedure Laterality Date    ABDOMEN SURGERY  06/10/1993    C section    COLONOSCOPY  10/06/2009    COLONOSCOPY  07/02/2013    Procedure: COMBINED COLONOSCOPY, SINGLE BIOPSY/POLYPECTOMY BY BIOPSY;;  Surgeon: Cuate Miles MD;  Location:  GI    COLONOSCOPY N/A 09/06/2018    Procedure: COLONOSCOPY;  Colonoscopy;  Surgeon: Hamzah Dudley DO;  Location:  GI    COLONOSCOPY N/A 1/23/2024    Procedure: Colonoscopy;  Surgeon: Felipe Ch MD;  Location:  GI    COMBINED REPAIR PTOSIS WITH BLEPHAROPLASTY BILATERAL Bilateral 09/24/2018    Procedure: COMBINED REPAIR PTOSIS WITH BLEPHAROPLASTY BILATERAL;  Bilateral upper eyelid Blepharoplasty and ptosis repair ;  Surgeon: Martina Andrade MD;  Location: [facility]    CONIZATION CERVIX,KNIFE/LASER      ESOPHAGOSCOPY, GASTROSCOPY, DUODENOSCOPY (EGD), COMBINED  07/02/2013    Procedure: COMBINED ESOPHAGOSCOPY, GASTROSCOPY, DUODENOSCOPY (EGD), BIOPSY SINGLE OR  MULTIPLE;  egd with rabdain biopsies and colonoscopy with polypectomy by biopsy ;  Surgeon: Cuate Miles MD;  Location: [facility]    ESOPHAGOSCOPY, GASTROSCOPY, DUODENOSCOPY (EGD), COMBINED N/A 2024    Procedure: Esophagoscopy, gastroscopy, duodenoscopy, combined;  Surgeon: Felipe Ch MD;  Location:  GI    GENITOURINARY SURGERY  ?     I have a bladder sling    HC DILATION/CURETTAGE DIAG/THER NON OB      D & C    HC REMOVAL OF TONSILS,<9 Y/O      Tonsils <10y.o.    HC UGI ENDOSCOPY DIAG W BIOPSY  2007    HYSTERECTOMY, PAP NO LONGER INDICATED      LAPAROSCOPIC CHOLECYSTECTOMY  10/12/2013    REPAIR PTOSIS      TUBAL LIGATION      Z  DELIVERY ONLY      , Low Cervical    ZZC  DELIVERY ONLY      Description:  Section;  Recorded: 11/10/2009;  Comments: @ 29 weeks    ZZ LIGATE FALLOPIAN TUBE      Description: Tubal Ligation;  Recorded: 11/10/2009;    ZZC NONSPECIFIC PROCEDURE  's    sinus    ZZC NONSPECIFIC PROCEDURE      Esophgeal dilatation multiple    ZZC NONSPECIFIC PROCEDURE  2008    sling    ZZC TOTAL ABDOM HYSTERECTOMY      Description: Hysterectomy;  Recorded: 11/10/2009;  Comments: due to cervical dysplasia    ZZC VAGINAL HYSTERECTOMY  1995    Hysterectomy, Vaginal    ZZHC UGI ENDOSCOPY, SIMPLE EXAM  09/10/99 & 98   \  Current Outpatient Medications   Medication Sig Dispense Refill    albuterol (PROAIR HFA/PROVENTIL HFA/VENTOLIN HFA) 108 (90 Base) MCG/ACT inhaler Inhale 2 puffs into the lungs every 6 hours as needed for shortness of breath, wheezing or cough 18 g 4    calcium carbonate (TUMS) 500 MG chewable tablet Take 2 chew tab by mouth 3 times daily as needed for other (bloating/flatulence)      cyanocobalamin (CYANOCOBALAMIN) 1000 MCG/ML injection Inject 1 mL (1,000 mcg) into the muscle every 30 days 1 mL 1    estradiol (VIVELLE-DOT) 0.075 MG/24HR BIW patch APPLY 1 PATCH TWICE WEEKLY AS DIRECTED      levothyroxine  (SYNTHROID/LEVOTHROID) 100 MCG tablet TAKE ONE TABLET BY MOUTH ONCE DAILY 90 tablet 2    LORazepam (ATIVAN) 1 MG tablet Take 1 tablet (1 mg) by mouth 2 times daily as needed for anxiety 20 tablet 0    meloxicam (MOBIC) 7.5 MG tablet Take 1 tablet (7.5 mg) by mouth daily as needed for moderate pain 30 tablet 1    methylphenidate (RITALIN) 10 MG tablet Take 1 tablet (10 mg) by mouth 2 times daily Takes as needed 30 tablet 0    mirabegron (MYRBETRIQ) 25 MG 24 hr tablet Take 1 tablet (25 mg) by mouth daily 90 tablet 1    pantoprazole (PROTONIX) 40 MG EC tablet Take 40 mg by mouth daily      valACYclovir (VALTREX) 1000 mg tablet Take 1 tablet (1,000 mg) by mouth 2 times daily 20 tablet 3    zolpidem (AMBIEN) 5 MG tablet TAKE TWO TABLETS BY MOUTH IN THE EVENING AS NEEDED FOR SLEEP 60 tablet 5    budesonide (PULMICORT) 0.5 MG/2ML neb solution Mix 2 vials with 1 oz coffee flavoring and drink twice a day and rinse mouth aftrerwards (Patient not taking: Reported on 8/12/2024) 60 mL 6    budesonide-formoterol (SYMBICORT) 160-4.5 MCG/ACT Inhaler Inhale 2 puffs into the lungs 2 times daily Stop the flovent when using this inhaler (Patient not taking: Reported on 8/12/2024) 6 g 4    evolocumab (REPATHA) 140 MG/ML prefilled autoinjector Inject 1 mL (140 mg) Subcutaneous every 14 days (Patient not taking: Reported on 8/12/2024) 6 mL 4    fexofenadine (ALLEGRA) 60 MG tablet TAKE ONE TABLET BY MOUTH TWICE A DAY (Patient not taking: Reported on 8/12/2024) 180 tablet 1    ipratropium - albuterol 0.5 mg/2.5 mg/3 mL (DUONEB) 0.5-2.5 (3) MG/3ML neb solution Take 1 vial (3 mLs) by nebulization every 6 hours as needed for shortness of breath, wheezing or cough (Patient not taking: Reported on 8/12/2024) 90 mL 0    nitroGLYcerin (NITROSTAT) 0.4 MG sublingual tablet For chest pain place 1 tablet under the tongue every 5 minutes for 3 doses. If symptoms persist 5 minutes after 1st dose call 911. (Patient not taking: Reported on 8/12/2024) 25  tablet 11    STATIN NOT PRESCRIBED (INTENTIONAL) Please choose reason not prescribed from choices below. (Patient not taking: Reported on 8/12/2024)       Current Facility-Administered Medications   Medication Dose Route Frequency Provider Last Rate Last Admin    2.0 mL bupivacaine (MARCAINE) 0.5% injection (50 mL vial)  2 mL      2 mL at 04/17/24 1208    hylan (SYNVISC ONE) injection 48 mg  48 mg      48 mg at 08/01/24 1540    lidocaine 1 % injection 2 mL  2 mL      2 mL at 04/17/24 1208    lidocaine 1 % injection 5 mL  5 mL INTRA-ARTICULAR Once Toribio Rodríguez MD        lidocaine 1 % injection 5 mL  5 mL   Farhat Montanez MD   5 mL at 12/21/23 1329    methylPREDNISolone (DEPO-MEDROL) injection 80 mg  80 mg   Farhat Montanez MD   80 mg at 08/15/22 1452    triamcinolone (KENALOG-40) injection 40 mg  40 mg      40 mg at 04/17/24 1208        Allergies   Allergen Reactions    Morphine And Codeine Other (See Comments)     Causes agitation     Social History     Socioeconomic History    Marital status:      Spouse name: Jared    Number of children: 2    Years of education: 12    Highest education level: Not on file   Occupational History    Occupation: HomeMaker     Employer: [employer]   Tobacco Use    Smoking status: Former     Current packs/day: 0.00     Types: Cigarettes    Smokeless tobacco: Never    Tobacco comments:     social smoking   Vaping Use    Vaping status: Never Used   Substance and Sexual Activity    Alcohol use: Yes     Alcohol/week: 4.0 standard drinks of alcohol     Comment: social    Drug use: No     Comment: used in past any and all    Sexual activity: Yes     Partners: Male     Birth control/protection: Female Surgical     Comment: Hx: hysterectomy   Other Topics Concern     Service No    Blood Transfusions No    Caffeine Concern No    Occupational Exposure No    Hobby Hazards No    Sleep Concern Yes     Comment: Difficult with sleeping at night, sleeps most of the day     Stress Concern No    Weight Concern Yes     Comment: desire wt loss    Special Diet No    Back Care Yes     Comment: weight restrictions    Exercise Yes    Bike Helmet No    Seat Belt Yes    Self-Exams No    Parent/sibling w/ CABG, MI or angioplasty before 65F 55M? No   Social History Narrative    Not on file     Social Determinants of Health     Financial Resource Strain: Low Risk  (11/29/2023)    Financial Resource Strain     Within the past 12 months, have you or your family members you live with been unable to get utilities (heat, electricity) when it was really needed?: No   Food Insecurity: Low Risk  (11/29/2023)    Food Insecurity     Within the past 12 months, did you worry that your food would run out before you got money to buy more?: No     Within the past 12 months, did the food you bought just not last and you didn t have money to get more?: No   Transportation Needs: Low Risk  (11/29/2023)    Transportation Needs     Within the past 12 months, has lack of transportation kept you from medical appointments, getting your medicines, non-medical meetings or appointments, work, or from getting things that you need?: No   Physical Activity: Insufficiently Active (4/17/2024)    Exercise Vital Sign     Days of Exercise per Week: 3 days     Minutes of Exercise per Session: 30 min   Stress: Not on file   Social Connections: Not on file   Interpersonal Safety: Low Risk  (12/13/2023)    Interpersonal Safety     Do you feel physically and emotionally safe where you currently live?: Yes     Within the past 12 months, have you been hit, slapped, kicked or otherwise physically hurt by someone?: No     Within the past 12 months, have you been humiliated or emotionally abused in other ways by your partner or ex-partner?: No   Housing Stability: High Risk (11/29/2023)    Housing Stability     Do you have housing? : No     Are you worried about losing your housing?: No     Family History   Problem Relation Age of Onset     Cancer Mother         Uterine    Diabetes Father     Hypertension Father     Cerebrovascular Disease Father     Prostate Cancer Father     Cardiovascular Sister         recurrent blood clots    Cerebrovascular Disease Sister 51    Cancer Brother         leukmemia    Coronary Artery Disease Brother         Quadruple bypass    Cerebrovascular Disease Brother     Other Cancer Brother         Non-Hodgkin's lymphoma * 2    Coronary Artery Disease Brother         Quadruple bypass also    Hypertension Brother     Cerebrovascular Disease Brother     Prostate Cancer Brother     Heart Disease Maternal Grandmother         Heart condition age 96    Diabetes Maternal Grandfather     Cerebrovascular Disease Paternal Grandmother     Diabetes Paternal Grandfather     Psychotic Disorder Son         severe Add,     Asthma Son         exercised induced asthma    Depression Son     Substance Abuse Son     Asthma Son         ecxercise induced asthma    Genetic Disorder Cousin         Alpha-1 Antitrypsin Deficiency  Liver transplant    Genetic Disorder Cousin         Idiopathic Trombosenia Purpla       Cerebrovascular Disease Other         Uncle    Cerebrovascular Disease Other         Uncle    Colon Cancer Other       ROS: 10 point ROS neg other than the symptoms noted above in the HPI.    PE:  B/P: 130/78, T: 96.4, P: Data Unavailable, R: Data Unavailable  General: well developed, well nourished WF who appears their stated age  HEENT: NC/AT, EOMI, (-)icterus, (-)injection  Neck: Supple, No JVD  Chest: CTA  Heart: S1, S2, (-)m/r/g  Abd: Soft, non tender, non distended, non tender, no masses  Ext; Warm, no edema  Psych: AAOx3  Neuro: No focal deficits    Colonoscopy 2024 reviewed    No recent CT abdomen pelvis        Impression/plan:  This is a 64-year lady presenting with multiple episodes of diverticulitis.  Currently she is asymptomatic.  He does have a recent colonoscopy that revealed diverticulosis.  I believe  reviewed the etiology of diverticulitis as well as medical versus surgical management.  I also reviewed the importance of diet.  She is admits she has not been compliant on diet.  I did explain that if she continues to a poor diet and we do do a colon resection she is at risk for recurrent diverticulitis.  She expressed understanding.  I reviewed open and laparoscopic section with the patient again she expressed understanding.  She is not sure how much her symptoms are affecting her daily life.  After discussion with the patient the plan at this time is to get a CT scan of her abdomen pelvis as there has not been one recently and give her information on colon resection.  Will follow-up me after the CT.    Andrzej Alvarado MD, FACS

## 2024-08-12 NOTE — LETTER
8/12/2024      Nikki Morales  [address]  [facility] [address]      Dear Colleague,    Thank you for referring your patient, Nikki Morales, to the [facility]. Please see a copy of my visit note below.    Consult requested by Kellen Burgess    Reason consultation diverticulitis.      HPI:  Patient is a 64-year-old white female with multiple episodes of diverticulitis going back about 2-3 years.  States she gets cramping and occasional diarrhea.  It usually resolves with doing nothing or antibiotics.  She is not fully compliant on her diet.  Her last colonoscopy was in January and just revealed diverticulosis.  Her last CT was in 2022 and revealed diverticulitis.  Currently she denies any symptoms.  She saw her PCP who suggested she should have a colon resection for which she is now referred.  She presents with a lot of misconceptions for colon resection.  She has a lot of questions.    Past Medical History:   Diagnosis Date     Attention deficit hyperactivity disorder, inattentive type      Chronic fatigue      Hyperlipidemia      Hypothyroid      New onset a-fib (H)      Other vitamin B12 deficiency anemia      Primary osteoarthritis of right knee 08/15/2022     Uncomplicated asthma      Past Surgical History:   Procedure Laterality Date     ABDOMEN SURGERY  06/10/1993    C section     COLONOSCOPY  10/06/2009     COLONOSCOPY  07/02/2013    Procedure: COMBINED COLONOSCOPY, SINGLE BIOPSY/POLYPECTOMY BY BIOPSY;;  Surgeon: Cuate Miles MD;  Location:  GI     COLONOSCOPY N/A 09/06/2018    Procedure: COLONOSCOPY;  Colonoscopy;  Surgeon: Hamzah Dudley, ;  Location:  GI     COLONOSCOPY N/A 1/23/2024    Procedure: Colonoscopy;  Surgeon: Felipe Ch MD;  Location:  GI     COMBINED REPAIR PTOSIS WITH BLEPHAROPLASTY BILATERAL Bilateral 09/24/2018    Procedure: COMBINED REPAIR PTOSIS WITH BLEPHAROPLASTY BILATERAL;  Bilateral upper eyelid Blepharoplasty and  ptosis repair ;  Surgeon: Martina Andrade MD;  Location: [facility] OR     CONIZATION CERVIX,KNIFE/LASER       ESOPHAGOSCOPY, GASTROSCOPY, DUODENOSCOPY (EGD), COMBINED  2013    Procedure: COMBINED ESOPHAGOSCOPY, GASTROSCOPY, DUODENOSCOPY (EGD), BIOPSY SINGLE OR MULTIPLE;  egd with rabdain biopsies and colonoscopy with polypectomy by biopsy ;  Surgeon: Cuate Miles MD;  Location: [facility]     ESOPHAGOSCOPY, GASTROSCOPY, DUODENOSCOPY (EGD), COMBINED N/A 2024    Procedure: Esophagoscopy, gastroscopy, duodenoscopy, combined;  Surgeon: Felipe Ch MD;  Location: [facility]     GENITOURINARY SURGERY  ?     I have a bladder sling     HC DILATION/CURETTAGE DIAG/THER NON OB      D & C     HC REMOVAL OF TONSILS,<11 Y/O      Tonsils <10y.o.     [facility] UGI ENDOSCOPY DIAG W BIOPSY  2007     HYSTERECTOMY, PAP NO LONGER INDICATED       LAPAROSCOPIC CHOLECYSTECTOMY  10/12/2013     REPAIR PTOSIS       TUBAL LIGATION       [facility]  DELIVERY ONLY      , Low Cervical     [facility]  DELIVERY ONLY      Description:  Section;  Recorded: 11/10/2009;  Comments: @ 29 weeks     [facility] LIGATE FALLOPIAN TUBE      Description: Tubal Ligation;  Recorded: 11/10/2009;     [facility] NONSPECIFIC PROCEDURE  's    sinus     [facility] NONSPECIFIC PROCEDURE      Esophgeal dilatation multiple     Z NONSPECIFIC PROCEDURE  2008    sling     [facility] TOTAL ABDOM HYSTERECTOMY      Description: Hysterectomy;  Recorded: 11/10/2009;  Comments: due to cervical dysplasia     [facility] VAGINAL HYSTERECTOMY  1995    Hysterectomy, Vaginal     [facility] UGI ENDOSCOPY, SIMPLE EXAM  09/10/99 & 98   \  Current Outpatient Medications   Medication Sig Dispense Refill     albuterol (PROAIR HFA/PROVENTIL HFA/VENTOLIN HFA) 108 (90 Base) MCG/ACT inhaler Inhale 2 puffs into the lungs every 6 hours as needed for shortness of breath, wheezing or cough 18 g 4     calcium carbonate (TUMS) 500 MG chewable tablet Take 2 chew tab by mouth 3 times daily as  needed for other (bloating/flatulence)       cyanocobalamin (CYANOCOBALAMIN) 1000 MCG/ML injection Inject 1 mL (1,000 mcg) into the muscle every 30 days 1 mL 1     estradiol (VIVELLE-DOT) 0.075 MG/24HR BIW patch APPLY 1 PATCH TWICE WEEKLY AS DIRECTED       levothyroxine (SYNTHROID/LEVOTHROID) 100 MCG tablet TAKE ONE TABLET BY MOUTH ONCE DAILY 90 tablet 2     LORazepam (ATIVAN) 1 MG tablet Take 1 tablet (1 mg) by mouth 2 times daily as needed for anxiety 20 tablet 0     meloxicam (MOBIC) 7.5 MG tablet Take 1 tablet (7.5 mg) by mouth daily as needed for moderate pain 30 tablet 1     methylphenidate (RITALIN) 10 MG tablet Take 1 tablet (10 mg) by mouth 2 times daily Takes as needed 30 tablet 0     mirabegron (MYRBETRIQ) 25 MG 24 hr tablet Take 1 tablet (25 mg) by mouth daily 90 tablet 1     pantoprazole (PROTONIX) 40 MG EC tablet Take 40 mg by mouth daily       valACYclovir (VALTREX) 1000 mg tablet Take 1 tablet (1,000 mg) by mouth 2 times daily 20 tablet 3     zolpidem (AMBIEN) 5 MG tablet TAKE TWO TABLETS BY MOUTH IN THE EVENING AS NEEDED FOR SLEEP 60 tablet 5     budesonide (PULMICORT) 0.5 MG/2ML neb solution Mix 2 vials with 1 oz coffee flavoring and drink twice a day and rinse mouth aftrerwards (Patient not taking: Reported on 8/12/2024) 60 mL 6     budesonide-formoterol (SYMBICORT) 160-4.5 MCG/ACT Inhaler Inhale 2 puffs into the lungs 2 times daily Stop the flovent when using this inhaler (Patient not taking: Reported on 8/12/2024) 6 g 4     evolocumab (REPATHA) 140 MG/ML prefilled autoinjector Inject 1 mL (140 mg) Subcutaneous every 14 days (Patient not taking: Reported on 8/12/2024) 6 mL 4     fexofenadine (ALLEGRA) 60 MG tablet TAKE ONE TABLET BY MOUTH TWICE A DAY (Patient not taking: Reported on 8/12/2024) 180 tablet 1     ipratropium - albuterol 0.5 mg/2.5 mg/3 mL (DUONEB) 0.5-2.5 (3) MG/3ML neb solution Take 1 vial (3 mLs) by nebulization every 6 hours as needed for shortness of breath, wheezing or cough  (Patient not taking: Reported on 8/12/2024) 90 mL 0     nitroGLYcerin (NITROSTAT) 0.4 MG sublingual tablet For chest pain place 1 tablet under the tongue every 5 minutes for 3 doses. If symptoms persist 5 minutes after 1st dose call 911. (Patient not taking: Reported on 8/12/2024) 25 tablet 11     STATIN NOT PRESCRIBED (INTENTIONAL) Please choose reason not prescribed from choices below. (Patient not taking: Reported on 8/12/2024)       Current Facility-Administered Medications   Medication Dose Route Frequency Provider Last Rate Last Admin     2.0 mL bupivacaine (MARCAINE) 0.5% injection (50 mL vial)  2 mL      2 mL at 04/17/24 1208     hylan (SYNVISC ONE) injection 48 mg  48 mg      48 mg at 08/01/24 1540     lidocaine 1 % injection 2 mL  2 mL      2 mL at 04/17/24 1208     lidocaine 1 % injection 5 mL  5 mL INTRA-ARTICULAR Once Toribio Rodríguez MD         lidocaine 1 % injection 5 mL  5 mL   Farhat Montanez MD   5 mL at 12/21/23 1329     methylPREDNISolone (DEPO-MEDROL) injection 80 mg  80 mg   Farhat Montanez MD   80 mg at 08/15/22 1452     triamcinolone (KENALOG-40) injection 40 mg  40 mg      40 mg at 04/17/24 1208        Allergies   Allergen Reactions     Morphine And Codeine Other (See Comments)     Causes agitation     Social History     Socioeconomic History     Marital status:      Spouse name: Jared     Number of children: 2     Years of education: 12     Highest education level: Not on file   Occupational History     Occupation: HomeMaker     Employer: [employer]   Tobacco Use     Smoking status: Former     Current packs/day: 0.00     Types: Cigarettes     Smokeless tobacco: Never     Tobacco comments:     social smoking   Vaping Use     Vaping status: Never Used   Substance and Sexual Activity     Alcohol use: Yes     Alcohol/week: 4.0 standard drinks of alcohol     Comment: social     Drug use: No     Comment: used in past any and all     Sexual activity: Yes     Partners: Male     Birth  control/protection: Female Surgical     Comment: Hx: hysterectomy   Other Topics Concern      Service No     Blood Transfusions No     Caffeine Concern No     Occupational Exposure No     Hobby Hazards No     Sleep Concern Yes     Comment: Difficult with sleeping at night, sleeps most of the day     Stress Concern No     Weight Concern Yes     Comment: desire wt loss     Special Diet No     Back Care Yes     Comment: weight restrictions     Exercise Yes     Bike Helmet No     Seat Belt Yes     Self-Exams No     Parent/sibling w/ CABG, MI or angioplasty before 65F 55M? No   Social History Narrative     Not on file     Social Determinants of Health     Financial Resource Strain: Low Risk  (11/29/2023)    Financial Resource Strain      Within the past 12 months, have you or your family members you live with been unable to get utilities (heat, electricity) when it was really needed?: No   Food Insecurity: Low Risk  (11/29/2023)    Food Insecurity      Within the past 12 months, did you worry that your food would run out before you got money to buy more?: No      Within the past 12 months, did the food you bought just not last and you didn t have money to get more?: No   Transportation Needs: Low Risk  (11/29/2023)    Transportation Needs      Within the past 12 months, has lack of transportation kept you from medical appointments, getting your medicines, non-medical meetings or appointments, work, or from getting things that you need?: No   Physical Activity: Insufficiently Active (4/17/2024)    Exercise Vital Sign      Days of Exercise per Week: 3 days      Minutes of Exercise per Session: 30 min   Stress: Not on file   Social Connections: Not on file   Interpersonal Safety: Low Risk  (12/13/2023)    Interpersonal Safety      Do you feel physically and emotionally safe where you currently live?: Yes      Within the past 12 months, have you been hit, slapped, kicked or otherwise physically hurt by someone?: No       Within the past 12 months, have you been humiliated or emotionally abused in other ways by your partner or ex-partner?: No   Housing Stability: High Risk (2023)    Housing Stability      Do you have housing? : No      Are you worried about losing your housing?: No     Family History   Problem Relation Age of Onset     Cancer Mother         Uterine     Diabetes Father      Hypertension Father      Cerebrovascular Disease Father      Prostate Cancer Father      Cardiovascular Sister         recurrent blood clots     Cerebrovascular Disease Sister 51     Cancer Brother         leukmemia     Coronary Artery Disease Brother         Quadruple bypass     Cerebrovascular Disease Brother      Other Cancer Brother         Non-Hodgkin's lymphoma * 2     Coronary Artery Disease Brother         Quadruple bypass also     Hypertension Brother      Cerebrovascular Disease Brother      Prostate Cancer Brother      Heart Disease Maternal Grandmother         Heart condition age 96     Diabetes Maternal Grandfather      Cerebrovascular Disease Paternal Grandmother      Diabetes Paternal Grandfather      Psychotic Disorder Son         severe Add,      Asthma Son         exercised induced asthma     Depression Son      Substance Abuse Son      Asthma Son         ecxercise induced asthma     Genetic Disorder Cousin         Alpha-1 Antitrypsin Deficiency  Liver transplant     Genetic Disorder Cousin         Idiopathic Trombosenia Purpla        Cerebrovascular Disease Other         Uncle     Cerebrovascular Disease Other         Uncle     Colon Cancer Other       ROS: 10 point ROS neg other than the symptoms noted above in the HPI.    PE:  B/P: 130/78, T: 96.4, P: Data Unavailable, R: Data Unavailable  General: well developed, well nourished WF who appears their stated age  HEENT: NC/AT, EOMI, (-)icterus, (-)injection  Neck: Supple, No JVD  Chest: CTA  Heart: S1, S2, (-)m/r/g  Abd: Soft, non tender, non distended, non  tender, no masses  Ext; Warm, no edema  Psych: AAOx3  Neuro: No focal deficits    Colonoscopy January 2024 reviewed    No recent CT abdomen pelvis        Impression/plan:  This is a 64-year lady presenting with multiple episodes of diverticulitis.  Currently she is asymptomatic.  He does have a recent colonoscopy that revealed diverticulosis.  I believe reviewed the etiology of diverticulitis as well as medical versus surgical management.  I also reviewed the importance of diet.  She is admits she has not been compliant on diet.  I did explain that if she continues to a poor diet and we do do a colon resection she is at risk for recurrent diverticulitis.  She expressed understanding.  I reviewed open and laparoscopic section with the patient again she expressed understanding.  She is not sure how much her symptoms are affecting her daily life.  After discussion with the patient the plan at this time is to get a CT scan of her abdomen pelvis as there has not been one recently and give her information on colon resection.  Will follow-up me after the CT.    Andrzej Alvarado MD, FACS    Again, thank you for allowing me to participate in the care of your patient.        Sincerely,        Andrzej Alvarado MD

## 2024-08-19 ENCOUNTER — HOSPITAL ENCOUNTER (OUTPATIENT)
Dept: CT IMAGING | Facility: CLINIC | Age: 64
Discharge: HOME | End: 2024-08-19
Attending: SPECIALIST | Admitting: SPECIALIST
Payer: MEDICARE

## 2024-08-19 DIAGNOSIS — K57.32 DIVERTICULITIS OF COLON: ICD-10-CM

## 2024-08-19 PROCEDURE — G1010 CDSM STANSON: HCPCS

## 2024-08-19 PROCEDURE — 250N000009 HC RX 250: Performed by: SPECIALIST

## 2024-08-19 PROCEDURE — 74177 CT ABD & PELVIS W/CONTRAST: CPT | Mod: MG

## 2024-08-19 PROCEDURE — 250N000011 HC RX IP 250 OP 636: Performed by: SPECIALIST

## 2024-08-19 RX ORDER — IOPAMIDOL 755 MG/ML
500 INJECTION, SOLUTION INTRAVASCULAR ONCE
Status: COMPLETED | OUTPATIENT
Start: 2024-08-19 | End: 2024-08-19

## 2024-08-19 RX ADMIN — IOPAMIDOL 100 ML: 755 INJECTION, SOLUTION INTRAVENOUS at 13:51

## 2024-08-19 RX ADMIN — SODIUM CHLORIDE 60 ML: 9 INJECTION, SOLUTION INTRAVENOUS at 13:51

## 2024-08-21 NOTE — PROGRESS NOTES
Nikki Morales  :  1960  DOS: 2024  MRN: [id number]    Sports Medicine Clinic Procedure    Ultrasound Guided Left Intra-Articular Knee Injection, +/- Aspiration    Clinical History:  SynviscOne completed on 2024 has yet to offer relief in pain.  Good responses to corticosteroid injections in the past, most recently on 2024.  Discussed expected course for gel injections and that relief may still be possible up to 4-6 weeks.  Patient is requesting corticosteroid injection today since the Synvisc has not seemed to provide much relief within these 3 weeks. She notes that her knee has been more painful then ever since her recent SynviscOne injection. Interested in corticosteroid injection today.    Diagnosis:   1. Primary osteoarthritis of left knee        Large Joint Injection/Arthocentesis: L knee joint    Date/Time: 2024 4:09 PM    Performed by: Montana Colón DO  Authorized by: Montana Colón DO    Indications:  Osteoarthritis  Needle Size:  22 G  Guidance: ultrasound    Approach:  Anterolateral  Location:  Knee      Medications:  40 mg triamcinolone 40 MG/ML; 2 mL lidocaine 1 %; 2 mL BUPivacaine 0.5 %  Outcome:  Tolerated well, no immediate complications  Procedure discussed: discussed risks, benefits, and alternatives    Consent Given by:  Patient  Prep: patient was prepped and draped in usual sterile fashion     Ultrasound images of procedure were permanently stored.     Ultrasound Guided Intra-articular Knee Injection - Left  The knee was prepped and draped in a sterile manner.  Ultrasound identification of the patella, suprapatellar pouch, quadriceps tendon and femur in both long and short axis was obtained. The probe was placed in short axis to the femur. A 1.5 inch 22 gauge needle was placed under ultrasound guidance into the superior knee joint using a lateral approach.  A mixture of 2 mL of 1% lidocaine, 2 mL of 0.5% bupivacaine and 1 ml kenalog (40mg/ml) was injected  without difficulty. The needle was removed and there was good hemostasis without complications.  Patient tolerated procedure well.  There was ultrasound documentation of needle placement and injection.      Impression:  Successful ultrasound-guided intra-articular left knee joint corticosteroid injection.    Plan:  - Injection:    - Expectations and goals of the injection were discussed and verbal and written consent was obtained.  - Performed a corticosteroid injection of the intra-articular left knee joint under ultrasound guidance today in clinic. Patient tolerated the procedure well without complications.    - Post-procedure instructions:    - Keep the injection site clean and dry.   - Do not submerge the injection site for 24 hours (no baths, pools). Showers are ok.   - Rest the area for 24-48 hours before resuming normal activities. Avoid overexerting the area for the first few weeks.   - It may take 2-3 days to start noticing the effects of the injection and up to 3-4 weeks to feel significant benefits.   - Follow up:          -In 3+ months as needed for updates to treatment plan, or sooner for new/worsening symptoms.  - Patient has clinic contact information for questions or concerns.      Montana Colón DO, LELIA  [facility]  [facility] - Department of Orthopedic Surgery     Disclaimer:  This note was prepared and written using Dragon Medical dictation software. As a result, there may be errors in the script that have gone undetected. Please consider this when interpreting the information in this note.

## 2024-08-22 ENCOUNTER — OFFICE VISIT (OUTPATIENT)
Dept: ORTHOPEDICS | Facility: CLINIC | Age: 64
End: 2024-08-22
Payer: COMMERCIAL

## 2024-08-22 DIAGNOSIS — M17.12 PRIMARY OSTEOARTHRITIS OF LEFT KNEE: Primary | ICD-10-CM

## 2024-08-22 PROCEDURE — 20611 DRAIN/INJ JOINT/BURSA W/US: CPT | Mod: LT | Performed by: STUDENT IN AN ORGANIZED HEALTH CARE EDUCATION/TRAINING PROGRAM

## 2024-08-22 RX ADMIN — BUPIVACAINE HYDROCHLORIDE 2 ML: 5 INJECTION, SOLUTION PERINEURAL at 16:09

## 2024-08-22 RX ADMIN — LIDOCAINE HYDROCHLORIDE 2 ML: 10 INJECTION, SOLUTION INFILTRATION; PERINEURAL at 16:09

## 2024-08-22 RX ADMIN — TRIAMCINOLONE ACETONIDE 40 MG: 40 INJECTION, SUSPENSION INTRA-ARTICULAR; INTRAMUSCULAR at 16:09

## 2024-08-22 NOTE — PATIENT INSTRUCTIONS
- Shockwave therapy, red light therapy.     For questions about the cost of your visit, or the cost of any procedure, lab or imaging study, please contact our CONSUMER PRICE LINE at [phone] for an estimate.  You may also contact them at [email or web address]    You will be asked to provide your name, birthdate, address, phone number, and insurance information.  You will also need to know the name of any specific test or procedure which is planned.  This often requires the CPT (common procedural terminology) code for the test or procedure.  Our clinic staff can help you get that information, if needed.    For information about how your insurance will cover your clinic visit, please call the customer service number on your insurance card.

## 2024-08-22 NOTE — LETTER
2024      Nikki Morales  [address]  [facility] [address]      Dear Colleague,    Thank you for referring your patient, Nikki Morales, to the [facility] [place]. Please see a copy of my visit note below.    Nikki Morales  :  1960  DOS: 2024  MRN: [id number]    Sports Medicine Clinic Procedure    Ultrasound Guided Left Intra-Articular Knee Injection, +/- Aspiration    Clinical History:  SynviscOne completed on 2024 has yet to offer relief in pain.  Good responses to corticosteroid injections in the past, most recently on 2024.  Discussed expected course for gel injections and that relief may still be possible up to 4-6 weeks.  Patient is requesting corticosteroid injection today since the Synvisc has not seemed to provide much relief within these 3 weeks. She notes that her knee has been more painful then ever since her recent SynviscOne injection. Interested in corticosteroid injection today.    Diagnosis:   1. Primary osteoarthritis of left knee        Large Joint Injection/Arthocentesis: L knee joint    Date/Time: 2024 4:09 PM    Performed by: Montana Colón DO  Authorized by: Montana Colón DO    Indications:  Osteoarthritis  Needle Size:  22 G  Guidance: ultrasound    Approach:  Anterolateral  Location:  Knee      Medications:  40 mg triamcinolone 40 MG/ML; 2 mL lidocaine 1 %; 2 mL BUPivacaine 0.5 %  Outcome:  Tolerated well, no immediate complications  Procedure discussed: discussed risks, benefits, and alternatives    Consent Given by:  Patient  Prep: patient was prepped and draped in usual sterile fashion     Ultrasound images of procedure were permanently stored.     Ultrasound Guided Intra-articular Knee Injection - Left  The knee was prepped and draped in a sterile manner.  Ultrasound identification of the patella, suprapatellar pouch, quadriceps tendon and femur in both long and short axis was obtained. The  probe was placed in short axis to the femur. A 1.5 inch 22 gauge needle was placed under ultrasound guidance into the superior knee joint using a lateral approach.  A mixture of 2 mL of 1% lidocaine, 2 mL of 0.5% bupivacaine and 1 ml kenalog (40mg/ml) was injected without difficulty. The needle was removed and there was good hemostasis without complications.  Patient tolerated procedure well.  There was ultrasound documentation of needle placement and injection.      Impression:  Successful ultrasound-guided intra-articular left knee joint corticosteroid injection.    Plan:  - Injection:    - Expectations and goals of the injection were discussed and verbal and written consent was obtained.  - Performed a corticosteroid injection of the intra-articular left knee joint under ultrasound guidance today in clinic. Patient tolerated the procedure well without complications.    - Post-procedure instructions:    - Keep the injection site clean and dry.   - Do not submerge the injection site for 24 hours (no baths, pools). Showers are ok.   - Rest the area for 24-48 hours before resuming normal activities. Avoid overexerting the area for the first few weeks.   - It may take 2-3 days to start noticing the effects of the injection and up to 3-4 weeks to feel significant benefits.   - Follow up:          -In 3+ months as needed for updates to treatment plan, or sooner for new/worsening symptoms.  - Patient has clinic contact information for questions or concerns.      Montana Colón DO, LELIA  [facility]  [facility] - Department of Orthopedic Surgery     Disclaimer:  This note was prepared and written using Dragon Medical dictation software. As a result, there may be errors in the script that have gone undetected. Please consider this when interpreting the information in this note.       Again, thank you for allowing me to participate in the care of your patient.         Sincerely,        Montana Colón, DO

## 2024-08-27 RX ORDER — TRIAMCINOLONE ACETONIDE 40 MG/ML
40 INJECTION, SUSPENSION INTRA-ARTICULAR; INTRAMUSCULAR
Status: SHIPPED | OUTPATIENT
Start: 2024-08-22

## 2024-08-27 RX ORDER — LIDOCAINE HYDROCHLORIDE 10 MG/ML
2 INJECTION, SOLUTION INFILTRATION; PERINEURAL
Status: SHIPPED | OUTPATIENT
Start: 2024-08-22

## 2024-08-27 RX ORDER — BUPIVACAINE HYDROCHLORIDE 5 MG/ML
2 INJECTION, SOLUTION PERINEURAL
Status: SHIPPED | OUTPATIENT
Start: 2024-08-22

## 2024-08-29 NOTE — PROGRESS NOTES
DISCHARGE  Reason for Discharge: Patient has failed to schedule further appointments.  Pt making some progress, able to manage HEP independently.    Equipment Issued:     Discharge Plan: Patient to continue home program.    Referring Provider:  Duncan Burgess PA-C       06/04/24 0500   Appointment Info   Signing clinician's name / credentials Deven Trevino PT   Total/Authorized Visits 4/10   Visits Used 4   Medical Diagnosis Bilateral lower extremity pain (M79.604, M79.605)   Peripheral polyneuropathy (G62.9)   PT Tx Diagnosis LE Pain   Quick Adds Certification   Progress Note/Certification   Start of Care Date 05/16/24   Onset of illness/injury or Date of Surgery 04/09/24   Therapy Frequency 1x/week   Predicted Duration 8 weeks   Certification date from 05/16/24   Certification date to 07/11/24   Progress Note Due Date 07/11/24   Progress Note Completed Date 05/16/24   GOALS   PT Goals 2;3   PT Goal 1   Goal Identifier HEP   Goal Description Pt will demo independence in performance and progression of strengthening and balance HEP in order to improve CLOF.   Goal Progress pt with good understanding of current home program   Target Date   (Ongoing, updates as needed)   PT Goal 2   Goal Identifier KOS   Goal Description Pt will demo improved knee pain ratings and function as shown by increased KOS score of at least 7 points in order to show significant improvement and greater return to previous function.   Goal Progress 43/70 (eval), not done today   Target Date 07/11/24   PT Goal 3   Goal Identifier Pain   Goal Description Pt will demo improved pain ratings no greater than 4/10 with activity allowing her greater participation in recreational mobility activities and activities with her grandchildren.   Target Date 07/11/24   Subjective Report   Subjective Report Wearing L knee sleeve which helps baker's cyst pain. L knee still clicks and sometimes more painful than others. Pt thinks she needs to see the specialist and  "consider the next step. Pain is over the L infrapatellar area/patellar tendon area. Overall not much change in sx. Can have good moments then other times the knee can wake her at night and or difficulty straightening knee at times when walking. In sitting today no L knee sx but has LBP.   Treatment Interventions (PT)   Interventions Therapeutic Procedure/Exercise;Neuromuscular Re-education;Therapeutic Activity   Therapeutic Procedure/Exercise   Therapeutic Procedures: strength, endurance, ROM, flexibility minutes (10095) 20   Ther Proc 1 - Details No sx in L knee in sitting, has LBP. Notes tenderness ant/medial knee. Supine for L QS for VMO 5\"x10, L SLR for VMO x 10 working on good eccentric control, seated L QS for VMO, sit to stands keeping quad/VMO engaged x 10. Pt to continue with core strengthening and hip strengthening exercises   Skilled Intervention instruction   Patient Response/Progress Pt receptive to education, needing redirection with frequent lack of focus.   Therapeutic Activity   Therapeutic Activities: dynamic activities to improve functional performance minutes (95720) 15   Ther Act 1 - Details at end of session did KT with 1 3 square size piece over infrapatellar area 100% pull, 1 4 1/2 sq length split starting distal quad to medial patella and lat patella 40% pull. Pt to avoid twisting on L knee, pt to keep knee cap aligned with toes with adls, turning, etc.   Skilled Intervention instruction   Patient Response/Progress reports understanding   Neuromuscular Re-education   Neuromuscular re-ed of mvmt, balance, coord, kinesthetic sense, posture, proprioception minutes (56621) 10   Neuro Re-ed 1 - Details re ed with VCs, audio, visual cues for L VMO activaiton to keep better patellar stability with QS supine and sitting, SLR, and with sit to stands and carry over to adls, transfers, stairs, etc. Pt tends to move quickly so may not be getting good quad activation   Skilled Intervention instruction "   Patient Response/Progress reports understanding   Education   Learner/Method Patient;Listening;Demonstration   Education Comments home program   Plan   Home program Hip strength, balance, QS/VMO activation   Plan for next session keep open x 1 month, pt plans to see specialist   Comments   Comments not meeting all goal, will DC if no more PT scheduled the next month or so   Total Session Time   Timed Code Treatment Minutes 45   Total Treatment Time (sum of timed and untimed services) 45

## 2024-09-09 ENCOUNTER — OFFICE VISIT (OUTPATIENT)
Dept: CARDIOLOGY | Facility: CLINIC | Age: 64
End: 2024-09-09
Payer: COMMERCIAL

## 2024-09-09 ENCOUNTER — HOSPITAL ENCOUNTER (OUTPATIENT)
Dept: CARDIOLOGY | Facility: CLINIC | Age: 64
Discharge: HOME | End: 2024-09-09
Attending: INTERNAL MEDICINE | Admitting: INTERNAL MEDICINE
Payer: MEDICARE

## 2024-09-09 VITALS
HEART RATE: 89 BPM | SYSTOLIC BLOOD PRESSURE: 118 MMHG | DIASTOLIC BLOOD PRESSURE: 82 MMHG | RESPIRATION RATE: 18 BRPM | OXYGEN SATURATION: 92 % | BODY MASS INDEX: 31.02 KG/M2 | WEIGHT: 204 LBS

## 2024-09-09 DIAGNOSIS — E78.5 HYPERLIPIDEMIA LDL GOAL <130: ICD-10-CM

## 2024-09-09 DIAGNOSIS — I48.0 PAROXYSMAL ATRIAL FIBRILLATION (H): ICD-10-CM

## 2024-09-09 DIAGNOSIS — I48.0 PAROXYSMAL ATRIAL FIBRILLATION (H): Primary | ICD-10-CM

## 2024-09-09 PROCEDURE — 93270 REMOTE 30 DAY ECG REV/REPORT: CPT

## 2024-09-09 PROCEDURE — 99215 OFFICE O/P EST HI 40 MIN: CPT | Mod: GC | Performed by: INTERNAL MEDICINE

## 2024-09-09 PROCEDURE — G2211 COMPLEX E/M VISIT ADD ON: HCPCS | Performed by: INTERNAL MEDICINE

## 2024-09-09 RX ORDER — ROSUVASTATIN CALCIUM 5 MG/1
5 TABLET, COATED ORAL DAILY
Qty: 90 TABLET | Refills: 3 | Status: SHIPPED | OUTPATIENT
Start: 2024-09-09

## 2024-09-09 ASSESSMENT — PAIN SCALES - GENERAL: PAINLEVEL: NO PAIN (0)

## 2024-09-09 NOTE — PROGRESS NOTES
HISTORY:    Nikki Morales is a pleasant 64-year-old female whom I am seeing for the first time today.  She has a history of paroxysmal atrial fibrillation requiring cardioversion in 2018 (occurring the night after drinking significant alcohol while watching a football game at a party).  She reports that she generally uses little alcohol.  In March 2023 she was seen again in cardiology with complaints of increasing dyspnea and underwent a nuclear stress test showing no ischemia.  He has a history of hyperlipidemia and a strong family history of strokes in multiple first-degree relatives.  She is here today for a routine annual visit.    Nikki was started on Repatha and obtain financial assistance for this but there was some confusion and apparently her financial assistance ran out so she stopped using it.  She found out that she still has access to that but has not resumed the medication yet.  Her total cholesterol dropped from 342 down to 189 using Repatha.  She has used multiple statins in the past with muscle aches, although it is a little unclear how much of a role the statins played since that she also has fibromyalgia and had significant symptoms even when not using a statin.    Recently Margarita had a calcium scoring exam done and was found to have a calcium score of 0.  She denies exertional chest pain, PND/orthopnea, syncope or near syncope.  She does have some very brief momentary episodes of palpitations and previous monitors have shown these to be brief episodes of SVT without atrial fibs.  She received a smart watch as a present and that device monitors for atrial fibrillation.  The device has documented atrial fibrillation twice in the last 2 years.  In late April she had 2 brief episodes recorded by her watch and on May 12 there were 8 episodes recorded, all within a 3-hour and 15-minute timeframe.  All of these episodes occurred while she was asleep and she never had any symptoms.  She did not  notice the warning until many days later she did not recall feeling anything out of the ordinary.    The chart comments on diastolic dysfunction but only grade 1 diastolic dysfunction is seen on echo (normal for age), and previous proBNP measurements have been normal.      ASSESSMENT/PLAN:    1.  Paroxysmal atrial fibrillation.  She had documented atrial fibrillation requiring cardioversion in 2018 and now her smart watch suggest that she had atrial fibrillation twice in the last 2 years, both episodes a few weeks apart in late April and early May.  She has a [id number] score of 2 and atrial fibrillation should be strongly considered.  She would prefer not to use anticoagulation unless absolutely necessary so we compromised and decided to do a 30-day event monitor watching for atrial fibs.  If that shows unrecognized atrial fibrillation we will initiate coagulation.  If no atrial fibs seen she will continue to use her watch to monitor and if that starts to show frequent episodes we again need to reconsider.  2.  Hyperlipidemia.  She has high enough cholesterol that treatment is recommended, but she also has a calcium score of 0 indicating that she likely has minimal atherosclerosis so far.  Today we will initiate Crestor 5 mg 2 days out of the week.  If she develops symptoms from that she will stop.  If she does not develop symptoms she will go up to 3 days a week in 2 months and will be will recheck cholesterol in about 4 months and decide where to go from there.  3.  Prediabetes  4.  Asthma  5.  Fibromyalgia and chronic fatigue      Thank you for inviting me to participate in the care of your patient.  Please don't hesitate to call if I can be of further assistance.  45 minutes were spent today reviewing the chart and other records, interviewing and examining the patient, and documenting our visit.    The longitudinal plan of care for the diagnosis(es)/condition(s) as documented were addressed during this visit.  Due to the added complexity in care, I will continue to support Margarita in the subsequent management and with ongoing continuity of care.     Chart documentation was completed, in part, with [employer] voice-recognition software. Even though reviewed, some grammatical, spelling, and word errors may remain.       Orders Placed This Encounter   Procedures    Lipid Profile    ALT    Follow-Up with Cardiology [facility]    Cardiac Event Monitor Adult Pediatric     Orders Placed This Encounter   Medications    rosuvastatin (CRESTOR) 5 MG tablet     Sig: Take 1 tablet (5 mg) by mouth daily.     Dispense:  90 tablet     Refill:  3     Medications Discontinued During This Encounter   Medication Reason    evolocumab (REPATHA) 140 MG/ML prefilled autoinjector Cost/Formulary change       10 year ASCVD risk: The 10-year ASCVD risk score (Ronnie SMITH, et al., 2019) is: 5%    Values used to calculate the score:      Age: 64 years      Sex: Female      Is Non- : No      Diabetic: No      Tobacco smoker: No      Systolic Blood Pressure: 118 mmHg      Is BP treated: No      HDL Cholesterol: 59 mg/dL      Total Cholesterol: 189 mg/dL    Encounter Diagnoses   Name Primary?    Hyperlipidemia LDL goal <130     Paroxysmal atrial fibrillation (H) Yes       CURRENT MEDICATIONS:  Current Outpatient Medications   Medication Sig Dispense Refill    albuterol (PROAIR HFA/PROVENTIL HFA/VENTOLIN HFA) 108 (90 Base) MCG/ACT inhaler Inhale 2 puffs into the lungs every 6 hours as needed for shortness of breath, wheezing or cough 18 g 4    calcium carbonate (TUMS) 500 MG chewable tablet Take 2 chew tab by mouth 3 times daily as needed for other (bloating/flatulence)      cyanocobalamin (CYANOCOBALAMIN) 1000 MCG/ML injection Inject 1 mL (1,000 mcg) into the muscle every 30 days 1 mL 1    estradiol (VIVELLE-DOT) 0.075 MG/24HR BIW patch APPLY 1 PATCH TWICE WEEKLY AS DIRECTED      fexofenadine (ALLEGRA) 60 MG tablet TAKE ONE TABLET BY MOUTH TWICE A   tablet 1    levothyroxine (SYNTHROID/LEVOTHROID) 100 MCG tablet TAKE ONE TABLET BY MOUTH ONCE DAILY 90 tablet 2    LORazepam (ATIVAN) 1 MG tablet Take 1 tablet (1 mg) by mouth 2 times daily as needed for anxiety 20 tablet 0    meloxicam (MOBIC) 7.5 MG tablet Take 1 tablet (7.5 mg) by mouth daily as needed for moderate pain 30 tablet 1    methylphenidate (RITALIN) 10 MG tablet Take 1 tablet (10 mg) by mouth 2 times daily Takes as needed 30 tablet 0    mirabegron (MYRBETRIQ) 25 MG 24 hr tablet Take 1 tablet (25 mg) by mouth daily 90 tablet 1    pantoprazole (PROTONIX) 40 MG EC tablet Take 40 mg by mouth daily      rosuvastatin (CRESTOR) 5 MG tablet Take 1 tablet (5 mg) by mouth daily. 90 tablet 3    valACYclovir (VALTREX) 1000 mg tablet Take 1 tablet (1,000 mg) by mouth 2 times daily 20 tablet 3    zolpidem (AMBIEN) 5 MG tablet TAKE TWO TABLETS BY MOUTH IN THE EVENING AS NEEDED FOR SLEEP 60 tablet 5    budesonide (PULMICORT) 0.5 MG/2ML neb solution Mix 2 vials with 1 oz coffee flavoring and drink twice a day and rinse mouth aftrerwards (Patient not taking: Reported on 8/12/2024) 60 mL 6    budesonide-formoterol (SYMBICORT) 160-4.5 MCG/ACT Inhaler Inhale 2 puffs into the lungs 2 times daily Stop the flovent when using this inhaler (Patient not taking: Reported on 8/12/2024) 6 g 4    ipratropium - albuterol 0.5 mg/2.5 mg/3 mL (DUONEB) 0.5-2.5 (3) MG/3ML neb solution Take 1 vial (3 mLs) by nebulization every 6 hours as needed for shortness of breath, wheezing or cough (Patient not taking: Reported on 8/12/2024) 90 mL 0    nitroGLYcerin (NITROSTAT) 0.4 MG sublingual tablet For chest pain place 1 tablet under the tongue every 5 minutes for 3 doses. If symptoms persist 5 minutes after 1st dose call 911. (Patient not taking: Reported on 8/12/2024) 25 tablet 11    STATIN NOT PRESCRIBED (INTENTIONAL) Please choose reason not prescribed from choices below. (Patient not taking: Reported on 8/12/2024)         ALLERGIES      Allergies   Allergen Reactions    Morphine And Codeine Other (See Comments)     Causes agitation       PAST MEDICAL HISTORY:  Past Medical History:   Diagnosis Date    Attention deficit hyperactivity disorder, inattentive type     Chronic fatigue     Hyperlipidemia     Hypothyroid     New onset a-fib (H)     Other vitamin B12 deficiency anemia     Primary osteoarthritis of right knee 08/15/2022    Uncomplicated asthma        PAST SURGICAL HISTORY:  Past Surgical History:   Procedure Laterality Date    ABDOMEN SURGERY  06/10/1993    C section    COLONOSCOPY  10/06/2009    COLONOSCOPY  07/02/2013    Procedure: COMBINED COLONOSCOPY, SINGLE BIOPSY/POLYPECTOMY BY BIOPSY;;  Surgeon: Cuate Miles MD;  Location:  GI    COLONOSCOPY N/A 09/06/2018    Procedure: COLONOSCOPY;  Colonoscopy;  Surgeon: Hamzah Dudley DO;  Location:  GI    COLONOSCOPY N/A 1/23/2024    Procedure: Colonoscopy;  Surgeon: Felipe Ch MD;  Location:  GI    COMBINED REPAIR PTOSIS WITH BLEPHAROPLASTY BILATERAL Bilateral 09/24/2018    Procedure: COMBINED REPAIR PTOSIS WITH BLEPHAROPLASTY BILATERAL;  Bilateral upper eyelid Blepharoplasty and ptosis repair ;  Surgeon: Martina Andrade MD;  Location: [facility] OR    CONIZATION CERVIX,KNIFE/LASER      ESOPHAGOSCOPY, GASTROSCOPY, DUODENOSCOPY (EGD), COMBINED  07/02/2013    Procedure: COMBINED ESOPHAGOSCOPY, GASTROSCOPY, DUODENOSCOPY (EGD), BIOPSY SINGLE OR MULTIPLE;  egd with rabdain biopsies and colonoscopy with polypectomy by biopsy ;  Surgeon: Cuate Miles MD;  Location:  GI    ESOPHAGOSCOPY, GASTROSCOPY, DUODENOSCOPY (EGD), COMBINED N/A 1/23/2024    Procedure: Esophagoscopy, gastroscopy, duodenoscopy, combined;  Surgeon: Felipe Ch MD;  Location:  GI    GENITOURINARY SURGERY  ? 2008    I have a bladder sling    HC DILATION/CURETTAGE DIAG/THER NON OB      D & C    HC REMOVAL OF TONSILS,<11 Y/O      Tonsils <10y.o.    HC UGI ENDOSCOPY DIAG W BIOPSY  12/05/2007     HYSTERECTOMY, PAP NO LONGER INDICATED      LAPAROSCOPIC CHOLECYSTECTOMY  10/12/2013    REPAIR PTOSIS      TUBAL LIGATION      Z  DELIVERY ONLY      , Low Cervical    Z  DELIVERY ONLY      Description:  Section;  Recorded: 11/10/2009;  Comments: @ 29 weeks    [facility] LIGATE FALLOPIAN TUBE      Description: Tubal Ligation;  Recorded: 11/10/2009;    Z NONSPECIFIC PROCEDURE  2000's    sinus    ZZC NONSPECIFIC PROCEDURE      Esophgeal dilatation multiple    ZZC NONSPECIFIC PROCEDURE      sling    Z TOTAL ABDOM HYSTERECTOMY      Description: Hysterectomy;  Recorded: 11/10/2009;  Comments: due to cervical dysplasia    Z VAGINAL HYSTERECTOMY  1995    Hysterectomy, Vaginal    ZZ UGI ENDOSCOPY, SIMPLE EXAM  09/10/99 & 98       FAMILY HISTORY:  Family History   Problem Relation Age of Onset    Cancer Mother         Uterine    Diabetes Father     Hypertension Father     Cerebrovascular Disease Father     Prostate Cancer Father     Cardiovascular Sister         recurrent blood clots    Cerebrovascular Disease Sister 51    Cancer Brother         leukmemia    Coronary Artery Disease Brother         Quadruple bypass    Cerebrovascular Disease Brother     Other Cancer Brother         Non-Hodgkin's lymphoma * 2    Coronary Artery Disease Brother         Quadruple bypass also    Hypertension Brother     Cerebrovascular Disease Brother     Prostate Cancer Brother     Heart Disease Maternal Grandmother         Heart condition age 96    Diabetes Maternal Grandfather     Cerebrovascular Disease Paternal Grandmother     Diabetes Paternal Grandfather     Psychotic Disorder Son         severe Add,     Asthma Son         exercised induced asthma    Depression Son     Substance Abuse Son     Asthma Son         ecxercise induced asthma    Genetic Disorder Cousin         Alpha-1 Antitrypsin Deficiency  Liver transplant    Genetic Disorder Cousin         Idiopathic Trombosenia Purpla    1977    Cerebrovascular Disease Other         Uncle    Cerebrovascular Disease Other         Uncle    Colon Cancer Other        SOCIAL HISTORY:  Social History     Socioeconomic History    Marital status:      Spouse name: Jared    Number of children: 2    Years of education: 12    Highest education level: None   Occupational History    Occupation: HomeMaker     Employer: [employer]   Tobacco Use    Smoking status: Former     Current packs/day: 0.00     Types: Cigarettes    Smokeless tobacco: Never    Tobacco comments:     social smoking   Vaping Use    Vaping status: Never Used   Substance and Sexual Activity    Alcohol use: Yes     Alcohol/week: 4.0 standard drinks of alcohol     Comment: social    Drug use: No     Comment: used in past any and all    Sexual activity: Yes     Partners: Male     Birth control/protection: Female Surgical     Comment: Hx: hysterectomy   Other Topics Concern     Service No    Blood Transfusions No    Caffeine Concern No    Occupational Exposure No    Hobby Hazards No    Sleep Concern Yes     Comment: Difficult with sleeping at night, sleeps most of the day    Stress Concern No    Weight Concern Yes     Comment: desire wt loss    Special Diet No    Back Care Yes     Comment: weight restrictions    Exercise Yes    Bike Helmet No    Seat Belt Yes    Self-Exams No    Parent/sibling w/ CABG, MI or angioplasty before 65F 55M? No     Social Determinants of Health     Financial Resource Strain: Low Risk  (11/29/2023)    Financial Resource Strain     Within the past 12 months, have you or your family members you live with been unable to get utilities (heat, electricity) when it was really needed?: No   Food Insecurity: Low Risk  (11/29/2023)    Food Insecurity     Within the past 12 months, did you worry that your food would run out before you got money to buy more?: No     Within the past 12 months, did the food you bought just not last and you didn t have money to get more?: No    Transportation Needs: Low Risk  (11/29/2023)    Transportation Needs     Within the past 12 months, has lack of transportation kept you from medical appointments, getting your medicines, non-medical meetings or appointments, work, or from getting things that you need?: No   Physical Activity: Insufficiently Active (4/17/2024)    Exercise Vital Sign     Days of Exercise per Week: 3 days     Minutes of Exercise per Session: 30 min   Interpersonal Safety: Low Risk  (12/13/2023)    Interpersonal Safety     Do you feel physically and emotionally safe where you currently live?: Yes     Within the past 12 months, have you been hit, slapped, kicked or otherwise physically hurt by someone?: No     Within the past 12 months, have you been humiliated or emotionally abused in other ways by your partner or ex-partner?: No   Housing Stability: High Risk (11/29/2023)    Housing Stability     Do you have housing? : No     Are you worried about losing your housing?: No       Review of Systems:  Skin:        Eyes:       ENT:       Respiratory:  Negative shortness of breath;dyspnea on exertion;cough;hemoptysis;wheezing;mucoid expectorant;purulent expectorant;clear expectorant;dyspnea at rest;sleep apnea;CPAP  Cardiovascular:  Negative;chest pain;syncope or near-syncope;cyanosis;exercise intolerance;lightheadedness;dizziness Positive for;palpitations;lower extremity symptoms;edema;fatigue  Gastroenterology:      Genitourinary:       Musculoskeletal:       Neurologic:       Psychiatric:       Heme/Lymph/Imm:       Endocrine:         Physical Exam:  Vitals: /82   Pulse 89   Resp 18   Wt 92.5 kg (204 lb)   LMP  (LMP Unknown)   SpO2 92%   BMI 31.02 kg/m      Constitutional:  cooperative, alert and oriented, well developed, well nourished, in no acute distress        Skin:  warm and dry to the touch, no apparent skin lesions or masses noted        Head:  normocephalic, no masses or lesions        Eyes:  pupils equal and round,  conjunctivae and lids unremarkable, sclera white, no xanthalasma, EOMS intact, no nystagmus        ENT:  no pallor or cyanosis, dentition good        Neck:  carotid pulses are full and equal bilaterally, JVP normal, no carotid bruit        Chest:  normal breath sounds, clear to auscultation, normal A-P diameter, normal symmetry, normal respiratory excursion, no use of accessory muscles        Cardiac: regular rhythm, normal S1/S2, no S3 or S4, apical impulse not displaced, no murmurs, gallops or rubs                  Abdomen:  abdomen soft;BS normoactive        Vascular: pulses full and equal                                      Extremities and Muscular Skeletal:  no edema           Neurological:  no gross motor deficits        Psych:  affect appropriate, oriented to time, person and place     Recent Lab Results:  LIPID RESULTS:  Lab Results   Component Value Date    CHOL 189 09/28/2023    CHOL 190 03/24/2021    HDL 59 09/28/2023    HDL 59 03/24/2021    LDL 98 09/28/2023     (H) 03/24/2021    TRIG 159 (H) 09/28/2023    TRIG 114 03/24/2021    CHOLHDLRATIO 6.6 (H) 09/23/2015       LIVER ENZYME RESULTS:  Lab Results   Component Value Date    AST 20 09/28/2023    AST 14 03/24/2021    ALT 17 09/28/2023    ALT 25 03/24/2021       CBC RESULTS:  Lab Results   Component Value Date    WBC 9.3 09/28/2023    WBC 7.3 03/24/2021    RBC 4.89 09/28/2023    RBC 4.62 03/24/2021    HGB 14.3 09/28/2023    HGB 13.7 03/24/2021    HCT 42.5 09/28/2023    HCT 41.1 03/24/2021    MCV 87 09/28/2023    MCV 89 03/24/2021    MCH 29.2 09/28/2023    MCH 29.7 03/24/2021    MCHC 33.6 09/28/2023    MCHC 33.3 03/24/2021    RDW 12.8 09/28/2023    RDW 12.5 03/24/2021     09/28/2023     03/24/2021       BMP RESULTS:  Lab Results   Component Value Date     09/28/2023     03/24/2021    POTASSIUM 4.3 09/28/2023    POTASSIUM 4.1 11/09/2022    POTASSIUM 4.5 03/24/2021    CHLORIDE 104 09/28/2023    CHLORIDE 107 11/09/2022     CHLORIDE 108 03/24/2021    CO2 21 (L) 09/28/2023    CO2 29 11/09/2022    CO2 28 03/24/2021    ANIONGAP 15 09/28/2023    ANIONGAP 4 11/09/2022    ANIONGAP 4 03/24/2021     (H) 09/28/2023    GLC 83 11/09/2022     (H) 03/24/2021    BUN 13.1 09/28/2023    BUN 23 11/09/2022    BUN 33 (H) 03/24/2021    CR 0.85 09/28/2023    CR 0.75 03/24/2021    GFRESTIMATED 76 09/28/2023    GFRESTIMATED 85 03/24/2021    GFRESTBLACK >90 03/24/2021    ROLANDA 9.9 09/28/2023    ROLANDA 9.3 03/24/2021        A1C RESULTS:  Lab Results   Component Value Date    A1C 6.1 (H) 09/28/2023    A1C 5.6 01/26/2018       INR RESULTS:  Lab Results   Component Value Date    INR 0.95 11/12/2018    INR 0.96 01/15/2018         Felipe Jamison MD, [facility]    CC  Referred Self, MD  No address on file

## 2024-09-09 NOTE — LETTER
9/9/2024    Toribio Perez Mai, MD  [address] [place] [address]    RE: Nikki Morales       Dear Colleague,     I had the pleasure of seeing Nikki Morales in the [facility].  HISTORY:    Nikki Morales is a pleasant 64-year-old female whom I am seeing for the first time today.  She has a history of paroxysmal atrial fibrillation requiring cardioversion in 2018 (occurring the night after drinking significant alcohol while watching a football game at a party).  She reports that she generally uses little alcohol.  In March 2023 she was seen again in cardiology with complaints of increasing dyspnea and underwent a nuclear stress test showing no ischemia.  He has a history of hyperlipidemia and a strong family history of strokes in multiple first-degree relatives.  She is here today for a routine annual visit.    Nikki was started on Repatha and obtain financial assistance for this but there was some confusion and apparently her financial assistance ran out so she stopped using it.  She found out that she still has access to that but has not resumed the medication yet.  Her total cholesterol dropped from 342 down to 189 using Repatha.  She has used multiple statins in the past with muscle aches, although it is a little unclear how much of a role the statins played since that she also has fibromyalgia and had significant symptoms even when not using a statin.    Recently Margarita had a calcium scoring exam done and was found to have a calcium score of 0.  She denies exertional chest pain, PND/orthopnea, syncope or near syncope.  She does have some very brief momentary episodes of palpitations and previous monitors have shown these to be brief episodes of SVT without atrial fibs.  She received a smart watch as a present and that device monitors for atrial fibrillation.  The device has documented atrial fibrillation twice in the last 2 years.  In late April she had 2 brief episodes  recorded by her watch and on May 12 there were 8 episodes recorded, all within a 3-hour and 15-minute timeframe.  All of these episodes occurred while she was asleep and she never had any symptoms.  She did not notice the warning until many days later she did not recall feeling anything out of the ordinary.    The chart comments on diastolic dysfunction but only grade 1 diastolic dysfunction is seen on echo (normal for age), and previous proBNP measurements have been normal.      ASSESSMENT/PLAN:    1.  Paroxysmal atrial fibrillation.  She had documented atrial fibrillation requiring cardioversion in 2018 and now her smart watch suggest that she had atrial fibrillation twice in the last 2 years, both episodes a few weeks apart in late April and early May.  She has a [id number] score of 2 and atrial fibrillation should be strongly considered.  She would prefer not to use anticoagulation unless absolutely necessary so we compromised and decided to do a 30-day event monitor watching for atrial fibs.  If that shows unrecognized atrial fibrillation we will initiate coagulation.  If no atrial fibs seen she will continue to use her watch to monitor and if that starts to show frequent episodes we again need to reconsider.  2.  Hyperlipidemia.  She has high enough cholesterol that treatment is recommended, but she also has a calcium score of 0 indicating that she likely has minimal atherosclerosis so far.  Today we will initiate Crestor 5 mg 2 days out of the week.  If she develops symptoms from that she will stop.  If she does not develop symptoms she will go up to 3 days a week in 2 months and will be will recheck cholesterol in about 4 months and decide where to go from there.  3.  Prediabetes  4.  Asthma  5.  Fibromyalgia and chronic fatigue      Thank you for inviting me to participate in the care of your patient.  Please don't hesitate to call if I can be of further assistance.  45 minutes were spent today reviewing  the chart and other records, interviewing and examining the patient, and documenting our visit.    The longitudinal plan of care for the diagnosis(es)/condition(s) as documented were addressed during this visit. Due to the added complexity in care, I will continue to support Margarita in the subsequent management and with ongoing continuity of care.     Chart documentation was completed, in part, with [employer] voice-recognition software. Even though reviewed, some grammatical, spelling, and word errors may remain.       Orders Placed This Encounter   Procedures     Lipid Profile     ALT     Follow-Up with Cardiology REMY     Cardiac Event Monitor Adult Pediatric     Orders Placed This Encounter   Medications     rosuvastatin (CRESTOR) 5 MG tablet     Sig: Take 1 tablet (5 mg) by mouth daily.     Dispense:  90 tablet     Refill:  3     Medications Discontinued During This Encounter   Medication Reason     evolocumab (REPATHA) 140 MG/ML prefilled autoinjector Cost/Formulary change       10 year ASCVD risk: The 10-year ASCVD risk score (Ronnie SMITH, et al., 2019) is: 5%    Values used to calculate the score:      Age: 64 years      Sex: Female      Is Non- : No      Diabetic: No      Tobacco smoker: No      Systolic Blood Pressure: 118 mmHg      Is BP treated: No      HDL Cholesterol: 59 mg/dL      Total Cholesterol: 189 mg/dL    Encounter Diagnoses   Name Primary?     Hyperlipidemia LDL goal <130      Paroxysmal atrial fibrillation (H) Yes       CURRENT MEDICATIONS:  Current Outpatient Medications   Medication Sig Dispense Refill     albuterol (PROAIR HFA/PROVENTIL HFA/VENTOLIN HFA) 108 (90 Base) MCG/ACT inhaler Inhale 2 puffs into the lungs every 6 hours as needed for shortness of breath, wheezing or cough 18 g 4     calcium carbonate (TUMS) 500 MG chewable tablet Take 2 chew tab by mouth 3 times daily as needed for other (bloating/flatulence)       cyanocobalamin (CYANOCOBALAMIN) 1000 MCG/ML injection  Inject 1 mL (1,000 mcg) into the muscle every 30 days 1 mL 1     estradiol (VIVELLE-DOT) 0.075 MG/24HR BIW patch APPLY 1 PATCH TWICE WEEKLY AS DIRECTED       fexofenadine (ALLEGRA) 60 MG tablet TAKE ONE TABLET BY MOUTH TWICE A  tablet 1     levothyroxine (SYNTHROID/LEVOTHROID) 100 MCG tablet TAKE ONE TABLET BY MOUTH ONCE DAILY 90 tablet 2     LORazepam (ATIVAN) 1 MG tablet Take 1 tablet (1 mg) by mouth 2 times daily as needed for anxiety 20 tablet 0     meloxicam (MOBIC) 7.5 MG tablet Take 1 tablet (7.5 mg) by mouth daily as needed for moderate pain 30 tablet 1     methylphenidate (RITALIN) 10 MG tablet Take 1 tablet (10 mg) by mouth 2 times daily Takes as needed 30 tablet 0     mirabegron (MYRBETRIQ) 25 MG 24 hr tablet Take 1 tablet (25 mg) by mouth daily 90 tablet 1     pantoprazole (PROTONIX) 40 MG EC tablet Take 40 mg by mouth daily       rosuvastatin (CRESTOR) 5 MG tablet Take 1 tablet (5 mg) by mouth daily. 90 tablet 3     valACYclovir (VALTREX) 1000 mg tablet Take 1 tablet (1,000 mg) by mouth 2 times daily 20 tablet 3     zolpidem (AMBIEN) 5 MG tablet TAKE TWO TABLETS BY MOUTH IN THE EVENING AS NEEDED FOR SLEEP 60 tablet 5     budesonide (PULMICORT) 0.5 MG/2ML neb solution Mix 2 vials with 1 oz coffee flavoring and drink twice a day and rinse mouth aftrerwards (Patient not taking: Reported on 8/12/2024) 60 mL 6     budesonide-formoterol (SYMBICORT) 160-4.5 MCG/ACT Inhaler Inhale 2 puffs into the lungs 2 times daily Stop the flovent when using this inhaler (Patient not taking: Reported on 8/12/2024) 6 g 4     ipratropium - albuterol 0.5 mg/2.5 mg/3 mL (DUONEB) 0.5-2.5 (3) MG/3ML neb solution Take 1 vial (3 mLs) by nebulization every 6 hours as needed for shortness of breath, wheezing or cough (Patient not taking: Reported on 8/12/2024) 90 mL 0     nitroGLYcerin (NITROSTAT) 0.4 MG sublingual tablet For chest pain place 1 tablet under the tongue every 5 minutes for 3 doses. If symptoms persist 5 minutes  after 1st dose call 911. (Patient not taking: Reported on 8/12/2024) 25 tablet 11     STATIN NOT PRESCRIBED (INTENTIONAL) Please choose reason not prescribed from choices below. (Patient not taking: Reported on 8/12/2024)         ALLERGIES     Allergies   Allergen Reactions     Morphine And Codeine Other (See Comments)     Causes agitation       PAST MEDICAL HISTORY:  Past Medical History:   Diagnosis Date     Attention deficit hyperactivity disorder, inattentive type      Chronic fatigue      Hyperlipidemia      Hypothyroid      New onset a-fib (H)      Other vitamin B12 deficiency anemia      Primary osteoarthritis of right knee 08/15/2022     Uncomplicated asthma        PAST SURGICAL HISTORY:  Past Surgical History:   Procedure Laterality Date     ABDOMEN SURGERY  06/10/1993    C section     COLONOSCOPY  10/06/2009     COLONOSCOPY  07/02/2013    Procedure: COMBINED COLONOSCOPY, SINGLE BIOPSY/POLYPECTOMY BY BIOPSY;;  Surgeon: Cuate Miles MD;  Location: [facility]     COLONOSCOPY N/A 09/06/2018    Procedure: COLONOSCOPY;  Colonoscopy;  Surgeon: Hamzah Dudley DO;  Location:  GI     COLONOSCOPY N/A 1/23/2024    Procedure: Colonoscopy;  Surgeon: Felipe Ch MD;  Location:  GI     COMBINED REPAIR PTOSIS WITH BLEPHAROPLASTY BILATERAL Bilateral 09/24/2018    Procedure: COMBINED REPAIR PTOSIS WITH BLEPHAROPLASTY BILATERAL;  Bilateral upper eyelid Blepharoplasty and ptosis repair ;  Surgeon: Martina Andrade MD;  Location: [facility]     CONIZATION CERVIX,KNIFE/LASER       ESOPHAGOSCOPY, GASTROSCOPY, DUODENOSCOPY (EGD), COMBINED  07/02/2013    Procedure: COMBINED ESOPHAGOSCOPY, GASTROSCOPY, DUODENOSCOPY (EGD), BIOPSY SINGLE OR MULTIPLE;  egd with rabdain biopsies and colonoscopy with polypectomy by biopsy ;  Surgeon: Cuate Miles MD;  Location:  GI     ESOPHAGOSCOPY, GASTROSCOPY, DUODENOSCOPY (EGD), COMBINED N/A 1/23/2024    Procedure: Esophagoscopy, gastroscopy, duodenoscopy, combined;  Surgeon:  Felipe Ch MD;  Location:  GI     GENITOURINARY SURGERY  ?     I have a bladder sling     HC DILATION/CURETTAGE DIAG/THER NON OB      D & C     HC REMOVAL OF TONSILS,<11 Y/O      Tonsils <10y.o.     HC UGI ENDOSCOPY DIAG W BIOPSY  2007     HYSTERECTOMY, PAP NO LONGER INDICATED       LAPAROSCOPIC CHOLECYSTECTOMY  10/12/2013     REPAIR PTOSIS       TUBAL LIGATION       ZZC  DELIVERY ONLY      , Low Cervical     ZZC  DELIVERY ONLY      Description:  Section;  Recorded: 11/10/2009;  Comments: @ 29 weeks     ZZC LIGATE FALLOPIAN TUBE      Description: Tubal Ligation;  Recorded: 11/10/2009;     ZZC NONSPECIFIC PROCEDURE  's    sinus     ZZC NONSPECIFIC PROCEDURE      Esophgeal dilatation multiple     ZZC NONSPECIFIC PROCEDURE      sling     ZZC TOTAL ABDOM HYSTERECTOMY      Description: Hysterectomy;  Recorded: 11/10/2009;  Comments: due to cervical dysplasia     ZZC VAGINAL HYSTERECTOMY  1995    Hysterectomy, Vaginal     ZZHC UGI ENDOSCOPY, SIMPLE EXAM  09/10/99 & 98       FAMILY HISTORY:  Family History   Problem Relation Age of Onset     Cancer Mother         Uterine     Diabetes Father      Hypertension Father      Cerebrovascular Disease Father      Prostate Cancer Father      Cardiovascular Sister         recurrent blood clots     Cerebrovascular Disease Sister 51     Cancer Brother         leukmemia     Coronary Artery Disease Brother         Quadruple bypass     Cerebrovascular Disease Brother      Other Cancer Brother         Non-Hodgkin's lymphoma * 2     Coronary Artery Disease Brother         Quadruple bypass also     Hypertension Brother      Cerebrovascular Disease Brother      Prostate Cancer Brother      Heart Disease Maternal Grandmother         Heart condition age 96     Diabetes Maternal Grandfather      Cerebrovascular Disease Paternal Grandmother      Diabetes Paternal Grandfather      Psychotic Disorder Son         severe Add,       Asthma Son         exercised induced asthma     Depression Son      Substance Abuse Son      Asthma Son         ecxercise induced asthma     Genetic Disorder Cousin         Alpha-1 Antitrypsin Deficiency  Liver transplant     Genetic Disorder Cousin         Idiopathic Trombosenia Purpla        Cerebrovascular Disease Other         Uncle     Cerebrovascular Disease Other         Uncle     Colon Cancer Other        SOCIAL HISTORY:  Social History     Socioeconomic History     Marital status:      Spouse name: Jared     Number of children: 2     Years of education: 12     Highest education level: None   Occupational History     Occupation: HomeMaker     Employer: [employer]   Tobacco Use     Smoking status: Former     Current packs/day: 0.00     Types: Cigarettes     Smokeless tobacco: Never     Tobacco comments:     social smoking   Vaping Use     Vaping status: Never Used   Substance and Sexual Activity     Alcohol use: Yes     Alcohol/week: 4.0 standard drinks of alcohol     Comment: social     Drug use: No     Comment: used in past any and all     Sexual activity: Yes     Partners: Male     Birth control/protection: Female Surgical     Comment: Hx: hysterectomy   Other Topics Concern      Service No     Blood Transfusions No     Caffeine Concern No     Occupational Exposure No     Hobby Hazards No     Sleep Concern Yes     Comment: Difficult with sleeping at night, sleeps most of the day     Stress Concern No     Weight Concern Yes     Comment: desire wt loss     Special Diet No     Back Care Yes     Comment: weight restrictions     Exercise Yes     Bike Helmet No     Seat Belt Yes     Self-Exams No     Parent/sibling w/ CABG, MI or angioplasty before 65F 55M? No     Social Determinants of Health     Financial Resource Strain: Low Risk  (2023)    Financial Resource Strain      Within the past 12 months, have you or your family members you live with been unable to get utilities (heat,  electricity) when it was really needed?: No   Food Insecurity: Low Risk  (11/29/2023)    Food Insecurity      Within the past 12 months, did you worry that your food would run out before you got money to buy more?: No      Within the past 12 months, did the food you bought just not last and you didn t have money to get more?: No   Transportation Needs: Low Risk  (11/29/2023)    Transportation Needs      Within the past 12 months, has lack of transportation kept you from medical appointments, getting your medicines, non-medical meetings or appointments, work, or from getting things that you need?: No   Physical Activity: Insufficiently Active (4/17/2024)    Exercise Vital Sign      Days of Exercise per Week: 3 days      Minutes of Exercise per Session: 30 min   Interpersonal Safety: Low Risk  (12/13/2023)    Interpersonal Safety      Do you feel physically and emotionally safe where you currently live?: Yes      Within the past 12 months, have you been hit, slapped, kicked or otherwise physically hurt by someone?: No      Within the past 12 months, have you been humiliated or emotionally abused in other ways by your partner or ex-partner?: No   Housing Stability: High Risk (11/29/2023)    Housing Stability      Do you have housing? : No      Are you worried about losing your housing?: No       Review of Systems:  Skin:        Eyes:       ENT:       Respiratory:  Negative shortness of breath;dyspnea on exertion;cough;hemoptysis;wheezing;mucoid expectorant;purulent expectorant;clear expectorant;dyspnea at rest;sleep apnea;CPAP  Cardiovascular:  Negative;chest pain;syncope or near-syncope;cyanosis;exercise intolerance;lightheadedness;dizziness Positive for;palpitations;lower extremity symptoms;edema;fatigue  Gastroenterology:      Genitourinary:       Musculoskeletal:       Neurologic:       Psychiatric:       Heme/Lymph/Imm:       Endocrine:         Physical Exam:  Vitals: /82   Pulse 89   Resp 18   Wt 92.5 kg  (204 lb)   LMP  (LMP Unknown)   SpO2 92%   BMI 31.02 kg/m      Constitutional:  cooperative, alert and oriented, well developed, well nourished, in no acute distress        Skin:  warm and dry to the touch, no apparent skin lesions or masses noted        Head:  normocephalic, no masses or lesions        Eyes:  pupils equal and round, conjunctivae and lids unremarkable, sclera white, no xanthalasma, EOMS intact, no nystagmus        ENT:  no pallor or cyanosis, dentition good        Neck:  carotid pulses are full and equal bilaterally, JVP normal, no carotid bruit        Chest:  normal breath sounds, clear to auscultation, normal A-P diameter, normal symmetry, normal respiratory excursion, no use of accessory muscles        Cardiac: regular rhythm, normal S1/S2, no S3 or S4, apical impulse not displaced, no murmurs, gallops or rubs                  Abdomen:  abdomen soft;BS normoactive        Vascular: pulses full and equal                                      Extremities and Muscular Skeletal:  no edema           Neurological:  no gross motor deficits        Psych:  affect appropriate, oriented to time, person and place     Recent Lab Results:  LIPID RESULTS:  Lab Results   Component Value Date    CHOL 189 09/28/2023    CHOL 190 03/24/2021    HDL 59 09/28/2023    HDL 59 03/24/2021    LDL 98 09/28/2023     (H) 03/24/2021    TRIG 159 (H) 09/28/2023    TRIG 114 03/24/2021    CHOLHDLRATIO 6.6 (H) 09/23/2015       LIVER ENZYME RESULTS:  Lab Results   Component Value Date    AST 20 09/28/2023    AST 14 03/24/2021    ALT 17 09/28/2023    ALT 25 03/24/2021       CBC RESULTS:  Lab Results   Component Value Date    WBC 9.3 09/28/2023    WBC 7.3 03/24/2021    RBC 4.89 09/28/2023    RBC 4.62 03/24/2021    HGB 14.3 09/28/2023    HGB 13.7 03/24/2021    HCT 42.5 09/28/2023    HCT 41.1 03/24/2021    MCV 87 09/28/2023    MCV 89 03/24/2021    MCH 29.2 09/28/2023    MCH 29.7 03/24/2021    MCHC 33.6 09/28/2023    [facility] 33.3  03/24/2021    RDW 12.8 09/28/2023    RDW 12.5 03/24/2021     09/28/2023     03/24/2021       BMP RESULTS:  Lab Results   Component Value Date     09/28/2023     03/24/2021    POTASSIUM 4.3 09/28/2023    POTASSIUM 4.1 11/09/2022    POTASSIUM 4.5 03/24/2021    CHLORIDE 104 09/28/2023    CHLORIDE 107 11/09/2022    CHLORIDE 108 03/24/2021    CO2 21 (L) 09/28/2023    CO2 29 11/09/2022    CO2 28 03/24/2021    ANIONGAP 15 09/28/2023    ANIONGAP 4 11/09/2022    ANIONGAP 4 03/24/2021     (H) 09/28/2023    GLC 83 11/09/2022     (H) 03/24/2021    BUN 13.1 09/28/2023    BUN 23 11/09/2022    BUN 33 (H) 03/24/2021    CR 0.85 09/28/2023    CR 0.75 03/24/2021    GFRESTIMATED 76 09/28/2023    GFRESTIMATED 85 03/24/2021    GFRESTBLACK >90 03/24/2021    ROLANDA 9.9 09/28/2023    ROLADNA 9.3 03/24/2021        A1C RESULTS:  Lab Results   Component Value Date    A1C 6.1 (H) 09/28/2023    A1C 5.6 01/26/2018       INR RESULTS:  Lab Results   Component Value Date    INR 0.95 11/12/2018    INR 0.96 01/15/2018         Felipe Jamison MD, [facility]    CC  Referred Self, MD  No address on file                    Thank you for allowing me to participate in the care of your patient.      Sincerely,     Felipe Jamison MD     [facility]  cc:   Fabiano Altman MD  No address on file

## 2024-09-30 ENCOUNTER — MYC MEDICAL ADVICE (OUTPATIENT)
Dept: ORTHOPEDICS | Facility: CLINIC | Age: 64
End: 2024-09-30
Payer: COMMERCIAL

## 2024-09-30 DIAGNOSIS — M17.12 PRIMARY OSTEOARTHRITIS OF LEFT KNEE: Primary | ICD-10-CM

## 2024-09-30 NOTE — TELEPHONE ENCOUNTER
Per chart review:  -left knee Synvisc injection 8/1/24 did not provide relief  -left knee steroid injection 4/17/24 provided 100% relief for less than 1 month; steroid injection was repeated 8/22/24  -Patient is also taking Mobic for pain    Patient is interested in further imaging/referral to orthopedic surgery. Please advise.  Tatiana Silva, LAT, ATC

## 2024-09-30 NOTE — TELEPHONE ENCOUNTER
Addressed in direct message. Thank you.     [place] Colón DO, CAQ[facility] - [facility] Medicine  [facility] - Department of Orthopedic Surgery

## 2024-11-06 ENCOUNTER — TELEPHONE (OUTPATIENT)
Dept: CARDIOLOGY | Facility: CLINIC | Age: 64
End: 2024-11-06
Payer: COMMERCIAL

## 2024-11-12 NOTE — TELEPHONE ENCOUNTER
"Called patient to review. No answer. Left a VM for callback.       ----- Message from Felipe Jamison sent at 11/5/2024  5:42 PM CST   \"The 30 day event monitor showed no afib. I advised anticoagulation at our visit, but she declined.  No AC at this time but she has committed to continuing to monitor for future afib episodes and let us know if they occur.\"    Padmini POOL  [facility]    "
Sent Dr. Jamison review via My Chart.  Mily Horner RN on 11/12/2024 at 12:34 PM    
English

## 2024-11-20 ENCOUNTER — E-VISIT (OUTPATIENT)
Dept: FAMILY MEDICINE | Facility: OTHER | Age: 64
End: 2024-11-20
Payer: COMMERCIAL

## 2024-11-20 ENCOUNTER — VIRTUAL VISIT (OUTPATIENT)
Dept: FAMILY MEDICINE | Facility: OTHER | Age: 64
End: 2024-11-20
Payer: COMMERCIAL

## 2024-11-20 DIAGNOSIS — F41.1 GAD (GENERALIZED ANXIETY DISORDER): ICD-10-CM

## 2024-11-20 DIAGNOSIS — F90.0 ATTENTION DEFICIT HYPERACTIVITY DISORDER (ADHD), PREDOMINANTLY INATTENTIVE TYPE: Primary | ICD-10-CM

## 2024-11-20 DIAGNOSIS — F33.1 MODERATE RECURRENT MAJOR DEPRESSION (H): Primary | ICD-10-CM

## 2024-11-20 DIAGNOSIS — G93.32 CHRONIC FATIGUE SYNDROME: ICD-10-CM

## 2024-11-20 DIAGNOSIS — F33.1 MODERATE RECURRENT MAJOR DEPRESSION (H): ICD-10-CM

## 2024-11-20 PROCEDURE — 99207 PR NON-BILLABLE SERV PER CHARTING: CPT | Performed by: PHYSICIAN ASSISTANT

## 2024-11-20 PROCEDURE — 99214 OFFICE O/P EST MOD 30 MIN: CPT | Mod: 95 | Performed by: PHYSICIAN ASSISTANT

## 2024-11-20 RX ORDER — LISDEXAMFETAMINE DIMESYLATE 10 MG/1
10 CAPSULE ORAL EVERY MORNING
Qty: 30 CAPSULE | Refills: 0 | Status: SHIPPED | OUTPATIENT
Start: 2024-11-20

## 2024-11-20 RX ORDER — CYANOCOBALAMIN 1000 UG/ML
1 INJECTION, SOLUTION INTRAMUSCULAR; SUBCUTANEOUS
Qty: 1 ML | Refills: 1 | Status: SHIPPED | OUTPATIENT
Start: 2024-11-20

## 2024-11-20 RX ORDER — LORAZEPAM 1 MG/1
1 TABLET ORAL 2 TIMES DAILY PRN
Qty: 20 TABLET | Refills: 0 | Status: SHIPPED | OUTPATIENT
Start: 2024-11-20

## 2024-11-20 ASSESSMENT — ANXIETY QUESTIONNAIRES
3. WORRYING TOO MUCH ABOUT DIFFERENT THINGS: NOT AT ALL
IF YOU CHECKED OFF ANY PROBLEMS ON THIS QUESTIONNAIRE, HOW DIFFICULT HAVE THESE PROBLEMS MADE IT FOR YOU TO DO YOUR WORK, TAKE CARE OF THINGS AT HOME, OR GET ALONG WITH OTHER PEOPLE: NOT DIFFICULT AT ALL
7. FEELING AFRAID AS IF SOMETHING AWFUL MIGHT HAPPEN: NOT AT ALL
GAD7 TOTAL SCORE: 4
5. BEING SO RESTLESS THAT IT IS HARD TO SIT STILL: NOT AT ALL
4. TROUBLE RELAXING: SEVERAL DAYS
6. BECOMING EASILY ANNOYED OR IRRITABLE: NOT AT ALL
1. FEELING NERVOUS, ANXIOUS, OR ON EDGE: SEVERAL DAYS
7. FEELING AFRAID AS IF SOMETHING AWFUL MIGHT HAPPEN: NOT AT ALL
3. WORRYING TOO MUCH ABOUT DIFFERENT THINGS: SEVERAL DAYS
4. TROUBLE RELAXING: NOT AT ALL
6. BECOMING EASILY ANNOYED OR IRRITABLE: SEVERAL DAYS
7. FEELING AFRAID AS IF SOMETHING AWFUL MIGHT HAPPEN: SEVERAL DAYS
7. FEELING AFRAID AS IF SOMETHING AWFUL MIGHT HAPPEN: SEVERAL DAYS
GAD7 TOTAL SCORE: 4
GAD7 TOTAL SCORE: 4
8. IF YOU CHECKED OFF ANY PROBLEMS, HOW DIFFICULT HAVE THESE MADE IT FOR YOU TO DO YOUR WORK, TAKE CARE OF THINGS AT HOME, OR GET ALONG WITH OTHER PEOPLE?: SOMEWHAT DIFFICULT
GAD7 TOTAL SCORE: 4
IF YOU CHECKED OFF ANY PROBLEMS ON THIS QUESTIONNAIRE, HOW DIFFICULT HAVE THESE PROBLEMS MADE IT FOR YOU TO DO YOUR WORK, TAKE CARE OF THINGS AT HOME, OR GET ALONG WITH OTHER PEOPLE: SOMEWHAT DIFFICULT
2. NOT BEING ABLE TO STOP OR CONTROL WORRYING: SEVERAL DAYS
5. BEING SO RESTLESS THAT IT IS HARD TO SIT STILL: SEVERAL DAYS
8. IF YOU CHECKED OFF ANY PROBLEMS, HOW DIFFICULT HAVE THESE MADE IT FOR YOU TO DO YOUR WORK, TAKE CARE OF THINGS AT HOME, OR GET ALONG WITH OTHER PEOPLE?: NOT DIFFICULT AT ALL
1. FEELING NERVOUS, ANXIOUS, OR ON EDGE: SEVERAL DAYS
2. NOT BEING ABLE TO STOP OR CONTROL WORRYING: NOT AT ALL
GAD7 TOTAL SCORE: 4

## 2024-11-20 ASSESSMENT — ASTHMA QUESTIONNAIRES
QUESTION_3 LAST FOUR WEEKS HOW OFTEN DID YOUR ASTHMA SYMPTOMS (WHEEZING, COUGHING, SHORTNESS OF BREATH, CHEST TIGHTNESS OR PAIN) WAKE YOU UP AT NIGHT OR EARLIER THAN USUAL IN THE MORNING: NOT AT ALL
QUESTION_5 LAST FOUR WEEKS HOW WOULD YOU RATE YOUR ASTHMA CONTROL: WELL CONTROLLED
QUESTION_2 LAST FOUR WEEKS HOW OFTEN HAVE YOU HAD SHORTNESS OF BREATH: ONCE OR TWICE A WEEK
QUESTION_4 LAST FOUR WEEKS HOW OFTEN HAVE YOU USED YOUR RESCUE INHALER OR NEBULIZER MEDICATION (SUCH AS ALBUTEROL): ONCE A WEEK OR LESS
ACT_TOTALSCORE: 22
ACT_TOTALSCORE: 22
QUESTION_1 LAST FOUR WEEKS HOW MUCH OF THE TIME DID YOUR ASTHMA KEEP YOU FROM GETTING AS MUCH DONE AT WORK, SCHOOL OR AT HOME: NONE OF THE TIME

## 2024-11-20 ASSESSMENT — PATIENT HEALTH QUESTIONNAIRE - PHQ9
10. IF YOU CHECKED OFF ANY PROBLEMS, HOW DIFFICULT HAVE THESE PROBLEMS MADE IT FOR YOU TO DO YOUR WORK, TAKE CARE OF THINGS AT HOME, OR GET ALONG WITH OTHER PEOPLE: VERY DIFFICULT
SUM OF ALL RESPONSES TO PHQ QUESTIONS 1-9: 9
SUM OF ALL RESPONSES TO PHQ QUESTIONS 1-9: 9

## 2024-11-20 NOTE — PROGRESS NOTES
Margarita is a 64 year old who is being evaluated via a billable video visit.    How would you like to obtain your AVS? MyChart  If the video visit is dropped, the invitation should be resent by: Text to cell phone: [phone]  Will anyone else be joining your video visit? No      Assessment & Plan     Attention deficit hyperactivity disorder (ADHD), predominantly inattentive type  She is interested in treating her ADHD again, it seems like it is worsening her mental health. We discussed potential side effects to the medication, will adjust dose as tolerating.  This may also help with weight management   - lisdexamfetamine (VYVANSE) 10 MG capsule; Take 1 capsule (10 mg) by mouth every morning.    KAILEE (generalized anxiety disorder)  Moderate recurrent major depression (H)  Uses Lorazepam sparingly, will refill as she uses it very infrequently.  Did discuss if her ADHD is not improving on medication or her depression/anxiety isn't improving despite ADHD improvement we need to readdress this and consider alternative management, has been on numerous anti-depressants in the past.   - LORazepam (ATIVAN) 1 MG tablet; Take 1 tablet (1 mg) by mouth 2 times daily as needed for anxiety.    Chronic fatigue syndrome  Had a PSG in 2021, showing need for dental device, however she notes that it is helpful if she uses her husbands CPAP machine.  Consider meeting with Dr. Stephens to discuss in the future.   - cyanocobalamin (CYANOCOBALAMIN) 1000 mcg/mL injection; Inject 1 mL (1,000 mcg) into the muscle every 30 days.          Options for treatment and follow-up care were reviewed with the patient and/or guardian. Patient and/or guardian engaged in the decision making process and verbalized understanding of the options discussed and agreed with the final plan.      Subjective   Margarita is a 64 year old, presenting for the following health issues:  Depression      11/20/2024     2:01 PM   Additional Questions   Roomed by sai    Accompanied by self     Video Start Time: 2:32 PM    History of Present Illness       Mental Health Follow-up:  Patient presents to follow-up on Depression & Anxiety.Patient's depression since last visit has been:  Worse  The patient is not having other symptoms associated with depression.  Patient's anxiety since last visit has been:  Medium  The patient is having other symptoms associated with anxiety.  Any significant life events: grief or loss  Patient is not feeling anxious or having panic attacks.  Patient has no concerns about alcohol or drug use.    She eats 0-1 servings of fruits and vegetables daily.She consumes 1 sweetened beverage(s) daily.She exercises with enough effort to increase her heart rate 9 or less minutes per day.  She exercises with enough effort to increase her heart rate 3 or less days per week. She is missing 1 dose(s) of medications per week.  She is not taking prescribed medications regularly due to remembering to take.     - She is on Lorazepam. Last Rx refill was 10/2023. She takes about 1/4 tablet at a time, uses it so infrequently.  She will take it when she feels like she is crawling out of her skin.    - She feels she is dealing with a mixture of attention deficit and depression.   - She has been on other medications in the past for ADHD in the past.   - The depression/anxiety she marked down because she wants to refill the Lorazepam.    - Currently has a her son who is an alcoholic, currently stable right now.  Marriage was tense through that.    - Brother has recently passed so depression has been more.  Brother was 10 years older, more of a parent figure to her.    - Takes Vitamin B12 intermittently when she needs it for fatigue.  With a naturopath she was doing compounded vitamin B12 injections daily when dealing with chronic Lyme. She has the B12 injections to use if needed, uses it only when she is super fatigued, typically not monthly.       Review of  Systems  Constitutional, HEENT, cardiovascular, pulmonary, GI, , musculoskeletal, neuro, skin, endocrine and psych systems are negative, except as otherwise noted.      Objective           Vitals:  No vitals were obtained today due to virtual visit.    Physical Exam   GENERAL: alert and no distress  EYES: Eyes grossly normal to inspection.  No discharge or erythema, or obvious scleral/conjunctival abnormalities.  RESP: No audible wheeze, cough, or visible cyanosis.    SKIN: Visible skin clear. No significant rash, abnormal pigmentation or lesions.  NEURO: Cranial nerves grossly intact.  Mentation and speech appropriate for age.  PSYCH: Appropriate affect, tone, and pace of words          Video-Visit Details    Type of service:  Video Visit   Video End Time:2:52 PM  Originating Location (pt. Location): Home    Distant Location (provider location):  Off-site  Platform used for Video Visit: Adelaida  Signed Electronically by: Duncan Burgess PA-C

## 2024-12-03 ENCOUNTER — OFFICE VISIT (OUTPATIENT)
Dept: FAMILY MEDICINE | Facility: OTHER | Age: 64
End: 2024-12-03
Payer: COMMERCIAL

## 2024-12-03 VITALS
DIASTOLIC BLOOD PRESSURE: 66 MMHG | BODY MASS INDEX: 31.52 KG/M2 | HEART RATE: 62 BPM | HEIGHT: 68 IN | OXYGEN SATURATION: 94 % | SYSTOLIC BLOOD PRESSURE: 114 MMHG | WEIGHT: 208 LBS | TEMPERATURE: 97 F | RESPIRATION RATE: 18 BRPM

## 2024-12-03 DIAGNOSIS — J45.21 INTERMITTENT ASTHMA, WITH ACUTE EXACERBATION: ICD-10-CM

## 2024-12-03 DIAGNOSIS — R73.03 PREDIABETES: ICD-10-CM

## 2024-12-03 DIAGNOSIS — E66.01 MORBID OBESITY (H): ICD-10-CM

## 2024-12-03 DIAGNOSIS — J45.30 MILD PERSISTENT ASTHMA WITHOUT COMPLICATION: ICD-10-CM

## 2024-12-03 DIAGNOSIS — I20.89 EXERTIONAL ANGINA (H): ICD-10-CM

## 2024-12-03 DIAGNOSIS — Z00.00 ENCOUNTER FOR MEDICARE ANNUAL WELLNESS EXAM: Primary | ICD-10-CM

## 2024-12-03 DIAGNOSIS — R91.8 PULMONARY NODULES: ICD-10-CM

## 2024-12-03 DIAGNOSIS — Z12.31 VISIT FOR SCREENING MAMMOGRAM: ICD-10-CM

## 2024-12-03 DIAGNOSIS — F33.1 MODERATE RECURRENT MAJOR DEPRESSION (H): ICD-10-CM

## 2024-12-03 DIAGNOSIS — E78.5 HYPERLIPIDEMIA LDL GOAL <130: ICD-10-CM

## 2024-12-03 LAB
ALBUMIN SERPL BCG-MCNC: 4.2 G/DL (ref 3.5–5.2)
ALP SERPL-CCNC: 73 U/L (ref 40–150)
ALT SERPL W P-5'-P-CCNC: 25 U/L (ref 0–50)
ANION GAP SERPL CALCULATED.3IONS-SCNC: 11 MMOL/L (ref 7–15)
AST SERPL W P-5'-P-CCNC: 19 U/L (ref 0–45)
BILIRUB SERPL-MCNC: 0.5 MG/DL
BUN SERPL-MCNC: 18.3 MG/DL (ref 8–23)
CALCIUM SERPL-MCNC: 9 MG/DL (ref 8.8–10.4)
CHLORIDE SERPL-SCNC: 105 MMOL/L (ref 98–107)
CHOLEST SERPL-MCNC: 266 MG/DL
CREAT SERPL-MCNC: 0.95 MG/DL (ref 0.51–0.95)
EGFRCR SERPLBLD CKD-EPI 2021: 66 ML/MIN/1.73M2
EST. AVERAGE GLUCOSE BLD GHB EST-MCNC: 126 MG/DL
FASTING STATUS PATIENT QL REPORTED: YES
FASTING STATUS PATIENT QL REPORTED: YES
GLUCOSE SERPL-MCNC: 107 MG/DL (ref 70–99)
HBA1C MFR BLD: 6 % (ref 0–5.6)
HCO3 SERPL-SCNC: 22 MMOL/L (ref 22–29)
HDLC SERPL-MCNC: 57 MG/DL
LDLC SERPL CALC-MCNC: 176 MG/DL
NONHDLC SERPL-MCNC: 209 MG/DL
POTASSIUM SERPL-SCNC: 4.3 MMOL/L (ref 3.4–5.3)
PROT SERPL-MCNC: 6.9 G/DL (ref 6.4–8.3)
SODIUM SERPL-SCNC: 138 MMOL/L (ref 135–145)
TRIGL SERPL-MCNC: 167 MG/DL

## 2024-12-03 PROCEDURE — G0439 PPPS, SUBSEQ VISIT: HCPCS | Performed by: PHYSICIAN ASSISTANT

## 2024-12-03 PROCEDURE — 83036 HEMOGLOBIN GLYCOSYLATED A1C: CPT | Performed by: PHYSICIAN ASSISTANT

## 2024-12-03 PROCEDURE — 80061 LIPID PANEL: CPT | Performed by: PHYSICIAN ASSISTANT

## 2024-12-03 PROCEDURE — 99214 OFFICE O/P EST MOD 30 MIN: CPT | Mod: 25 | Performed by: PHYSICIAN ASSISTANT

## 2024-12-03 PROCEDURE — 80053 COMPREHEN METABOLIC PANEL: CPT | Performed by: PHYSICIAN ASSISTANT

## 2024-12-03 PROCEDURE — 36415 COLL VENOUS BLD VENIPUNCTURE: CPT | Performed by: PHYSICIAN ASSISTANT

## 2024-12-03 RX ORDER — ALBUTEROL SULFATE 90 UG/1
2 INHALANT RESPIRATORY (INHALATION) EVERY 6 HOURS PRN
Qty: 18 G | Refills: 4 | Status: SHIPPED | OUTPATIENT
Start: 2024-12-03

## 2024-12-03 RX ORDER — BUDESONIDE AND FORMOTEROL FUMARATE DIHYDRATE 160; 4.5 UG/1; UG/1
2 AEROSOL RESPIRATORY (INHALATION) 2 TIMES DAILY
Qty: 6 G | Refills: 4 | Status: SHIPPED | OUTPATIENT
Start: 2024-12-03

## 2024-12-03 SDOH — HEALTH STABILITY: PHYSICAL HEALTH: ON AVERAGE, HOW MANY DAYS PER WEEK DO YOU ENGAGE IN MODERATE TO STRENUOUS EXERCISE (LIKE A BRISK WALK)?: 1 DAY

## 2024-12-03 SDOH — HEALTH STABILITY: PHYSICAL HEALTH: ON AVERAGE, HOW MANY MINUTES DO YOU ENGAGE IN EXERCISE AT THIS LEVEL?: 10 MIN

## 2024-12-03 ASSESSMENT — PATIENT HEALTH QUESTIONNAIRE - PHQ9
10. IF YOU CHECKED OFF ANY PROBLEMS, HOW DIFFICULT HAVE THESE PROBLEMS MADE IT FOR YOU TO DO YOUR WORK, TAKE CARE OF THINGS AT HOME, OR GET ALONG WITH OTHER PEOPLE: SOMEWHAT DIFFICULT
SUM OF ALL RESPONSES TO PHQ QUESTIONS 1-9: 8
SUM OF ALL RESPONSES TO PHQ QUESTIONS 1-9: 8

## 2024-12-03 ASSESSMENT — ASTHMA QUESTIONNAIRES
ACT_TOTALSCORE: 22
ACT_TOTALSCORE: 22
QUESTION_5 LAST FOUR WEEKS HOW WOULD YOU RATE YOUR ASTHMA CONTROL: WELL CONTROLLED
QUESTION_1 LAST FOUR WEEKS HOW MUCH OF THE TIME DID YOUR ASTHMA KEEP YOU FROM GETTING AS MUCH DONE AT WORK, SCHOOL OR AT HOME: NONE OF THE TIME
QUESTION_2 LAST FOUR WEEKS HOW OFTEN HAVE YOU HAD SHORTNESS OF BREATH: ONCE OR TWICE A WEEK
QUESTION_3 LAST FOUR WEEKS HOW OFTEN DID YOUR ASTHMA SYMPTOMS (WHEEZING, COUGHING, SHORTNESS OF BREATH, CHEST TIGHTNESS OR PAIN) WAKE YOU UP AT NIGHT OR EARLIER THAN USUAL IN THE MORNING: NOT AT ALL
QUESTION_4 LAST FOUR WEEKS HOW OFTEN HAVE YOU USED YOUR RESCUE INHALER OR NEBULIZER MEDICATION (SUCH AS ALBUTEROL): ONCE A WEEK OR LESS

## 2024-12-03 ASSESSMENT — SOCIAL DETERMINANTS OF HEALTH (SDOH): HOW OFTEN DO YOU GET TOGETHER WITH FRIENDS OR RELATIVES?: TWICE A WEEK

## 2024-12-03 ASSESSMENT — PAIN SCALES - GENERAL: PAINLEVEL_OUTOF10: MILD PAIN (3)

## 2024-12-03 NOTE — PROGRESS NOTES
Preventive Care Visit  [facility]  Duncan Burgess PA-C, Family Medicine  Dec 3, 2024      Assessment & Plan     Encounter for Medicare annual wellness exam  Vaccines discussed. Declined influenza and Covid vaccine at this time. Will obtain RSV and shingles vaccines from the pharmacy. Recently was started on Vyvanse for ADHD which she reports no improvement in symptoms. Recommend increasing the dose to 2 tablets per day. Patient will follow up if dose is effective in a couple weeks.    Exertional angina (H)  Stable no concerns at this time.    Moderate recurrent major depression (H)  Stable no concerns at this time.    Morbid obesity (H)  Discussed exercise and healthy diet.    Mild persistent asthma without complication  Intermittent asthma, with acute exacerbation  Reports having symptoms daily for which she uses her albuterol inhaler. Does not currently use daily maintenance Symbicort inhaler. When she becomes ill she reports taking azithromycin and prednisone to improve breathing and chest discomfort. Reports tenderness on left costal joints attached to the sternum which could be due to excessive coughing.  This is tender to palpation on exam, suspect costochondritis based on location and palpable pain related to coughing and viral infection.  Advised to take Symbicort inhaler daily to prevent symptom exacerbation. Can use albuterol inhaler as needed. Recommend using mucinex as needed when sick to help loosen secretions and to clear up the lungs.   - albuterol (PROAIR HFA/PROVENTIL HFA/VENTOLIN HFA) 108 (90 Base) MCG/ACT inhaler; Inhale 2 puffs into the lungs every 6 hours as needed for shortness of breath, wheezing or cough.  - budesonide-formoterol (SYMBICORT) 160-4.5 MCG/ACT Inhaler; Inhale 2 puffs into the lungs 2 times daily. Stop the flovent when using this inhaler    Pulmonary nodules  Order chest CT for annual follow up on pulmonary nodules.  - CT Chest w/o Contrast;  "Future    Hyperlipidemia LDL goal <130  Screen  - Lipid panel reflex to direct LDL Fasting; Future  - Comprehensive metabolic panel (BMP + Alb, Alk Phos, ALT, AST, Total. Bili, TP); Future  - Lipid panel reflex to direct LDL Fasting  - Comprehensive metabolic panel (BMP + Alb, Alk Phos, ALT, AST, Total. Bili, TP)    Visit for screening mammogram  Screen  - MA Screen Bilateral w/Kam; Future    Prediabetes  Slight improvement, still elevated, needs to work on diet/exercise to improve.   - Hemoglobin A1c; Future  - Comprehensive metabolic panel (BMP + Alb, Alk Phos, ALT, AST, Total. Bili, TP); Future  - Hemoglobin A1c  - Comprehensive metabolic panel (BMP + Alb, Alk Phos, ALT, AST, Total. Bili, TP)    Patient has been advised of split billing requirements and indicates understanding: Yes    BMI  Estimated body mass index is 31.63 kg/m  as calculated from the following:    Height as of this encounter: 1.727 m (5' 8\").    Weight as of this encounter: 94.3 kg (208 lb).   Weight management plan: Discussed healthy diet and exercise guidelines    Counseling  Appropriate preventive services were addressed with this patient via screening, questionnaire, or discussion as appropriate for fall prevention, nutrition, physical activity, Tobacco-use cessation, social engagement, weight loss and cognition.  Checklist reviewing preventive services available has been given to the patient.  Reviewed patient's diet, addressing concerns and/or questions.   She is at risk for lack of exercise and has been provided with information to increase physical activity for the benefit of her well-being.   Discussed possible causes of fatigue. Patient reported safety concerns were addressed today.The patient was provided with written information regarding signs of hearing loss.   Information on urinary incontinence and treatment options given to patient.   The patient's PHQ-9 score is consistent with mild depression. She was provided with " information regarding depression.     Subjective   Margarita is a 64 year old, presenting for the following:  Physical        12/3/2024     9:17 AM   Additional Questions   Roomed by AJ         HPI  - Reports chest pain to palpation over the sternum which is made worse when sick, has coughing and mucus  - Takes azithromycin and prednisone when this symptom occurs. Does not use nitroglycerin.  - Does no regularly use Symbicort inhaler. Reports using the albuterol inhaler daily when she feels the symptoms act up  - Denies chest pain, sweating, nausea, vomiting, abdominal pain, or changes in urinary or bowel function    Health Care Directive  Patient does not have a Health Care Directive: Discussed advance care planning with patient; however, patient declined at this time.      12/3/2024   General Health   How would you rate your overall physical health? Good   Feel stress (tense, anxious, or unable to sleep) Only a little      (!) STRESS CONCERN      12/3/2024   Nutrition   Diet: Regular (no restrictions)            12/3/2024   Exercise   Days per week of moderate/strenous exercise 1 day   Average minutes spent exercising at this level 10 min      (!) EXERCISE CONCERN      12/3/2024   Social Factors   Frequency of gathering with friends or relatives Twice a week   Worry food won't last until get money to buy more No   Food not last or not have enough money for food? No   Do you have housing? (Housing is defined as stable permanent housing and does not include staying ouside in a car, in a tent, in an abandoned building, in an overnight shelter, or couch-surfing.) No   Are you worried about losing your housing? No   Lack of transportation? No   Unable to get utilities (heat,electricity)? No   Want help with housing or utility concern? No      (!) HOUSING CONCERN PRESENT      12/3/2024   Fall Risk   Fallen 2 or more times in the past year? No    Trouble with walking or balance? No        Patient-reported          12/3/2024    Activities of Daily Living- Home Safety   Needs help with the following daily activites None of the above   Safety concerns in the home No grab bars in the bathroom            12/3/2024   Dental   Dentist two times every year? Yes            12/3/2024   Hearing Screening   Hearing concerns? (!) IT'S HARD TO FOLLOW A CONVERSATION IN A NOISY RESTAURANT OR CROWDED ROOM.    (!) TROUBLE UNDERSTANDING SOFT OR WHISPERED SPEECH.       Multiple values from one day are sorted in reverse-chronological order         12/3/2024   Driving Risk Screening   Patient/family members have concerns about driving No            12/3/2024   General Alertness/Fatigue Screening   Have you been more tired than usual lately? (!) YES            12/3/2024   Urinary Incontinence Screening   Bothered by leaking urine in past 6 months Yes            12/3/2024   TB Screening   Were you born outside of the [place]? No      Today's PHQ-9 Score:       12/3/2024     8:39 AM   PHQ-9 SCORE   PHQ-9 Total Score MyChart 8 (Mild depression)   PHQ-9 Total Score 8        Patient-reported         12/3/2024   Substance Use   Alcohol more than 3/day or more than 7/wk No   Do you have a current opioid prescription? No   How severe/bad is pain from 1 to 10? 3/10   Do you use any other substances recreationally? No        Social History     Tobacco Use    Smoking status: Former     Current packs/day: 0.00     Types: Cigarettes    Smokeless tobacco: Never    Tobacco comments:     social smoking   Vaping Use    Vaping status: Never Used   Substance Use Topics    Alcohol use: Yes     Alcohol/week: 4.0 standard drinks of alcohol     Comment: social    Drug use: No     Comment: used in past any and all           11/29/2023   LAST FHS-7 RESULTS   1st degree relative breast or ovarian cancer Unknown    Any relative bilateral breast cancer No    Any male have breast cancer No    Any ONE woman have BOTH breast AND ovarian cancer Unknown    Any woman with breast cancer before  50yrs Yes    2 or more relatives with breast AND/OR ovarian cancer Unknown    2 or more relatives with breast AND/OR bowel cancer Unknown        Patient-reported        Mammogram Screening - Mammogram every 1-2 years updated in Health Maintenance based on mutual decision making    History of abnormal Pap smear: No - age 65 or older with adequate negative prior screening test results (3 consecutive negative cytology results, 2 consecutive negative cotesting results, or 2 consecutive negative HrHPV test results within 10 years, with the most recent test occurring within the recommended screening interval for the test used)        2007    12:00 AM 2005    12:00 AM   PAP / HPV   PAP (Historical) NIL  NIL      ASCVD Risk   The 10-year ASCVD risk score (Ronnie SMITH, et al., 2019) is: 4.7%    Values used to calculate the score:      Age: 64 years      Sex: Female      Is Non- : No      Diabetic: No      Tobacco smoker: No      Systolic Blood Pressure: 114 mmHg      Is BP treated: No      HDL Cholesterol: 59 mg/dL      Total Cholesterol: 189 mg/dL    Fracture Risk Assessment Tool  Link to Frax Calculator  Use the information below to complete the Frax calculator  : 1960  Sex: female  Weight (kg): 94.3 kg (actual weight)  Height (cm): 172.7 cm  Previous Fragility Fracture:  No  History of parent with fractured hip:  No  Current Smoking:  No  Patient has been on glucocorticoids for more than 3 months (5mg/day or more): No  Rheumatoid Arthritis on Problem List:  No  Secondary Osteoporosis on Problem List:  No  Consumes 3 or more units of alcohol per day: No  Femoral Neck BMD (g/cm2)            Reviewed and updated as needed this visit by Provider                  Past Medical History:   Diagnosis Date    Attention deficit hyperactivity disorder, inattentive type     Chronic fatigue     COPD (chronic obstructive pulmonary disease) (H)     Depressive disorder     Hyperlipidemia      Hypothyroid     New onset a-fib (H)     Other vitamin B12 deficiency anemia     Primary osteoarthritis of right knee 08/15/2022    Uncomplicated asthma      Past Surgical History:   Procedure Laterality Date    ABDOMEN SURGERY  06/10/1993    C section    COLONOSCOPY  10/06/2009    COLONOSCOPY  2013    Procedure: COMBINED COLONOSCOPY, SINGLE BIOPSY/POLYPECTOMY BY BIOPSY;;  Surgeon: Cuate Miles MD;  Location: [facility]    COLONOSCOPY N/A 2018    Procedure: COLONOSCOPY;  Colonoscopy;  Surgeon: Hamzah Dudley DO;  Location: [facility]    COLONOSCOPY N/A 2024    Procedure: Colonoscopy;  Surgeon: Felipe Ch MD;  Location: [facility]    COMBINED REPAIR PTOSIS WITH BLEPHAROPLASTY BILATERAL Bilateral 2018    Procedure: COMBINED REPAIR PTOSIS WITH BLEPHAROPLASTY BILATERAL;  Bilateral upper eyelid Blepharoplasty and ptosis repair ;  Surgeon: Martina Andrade MD;  Location: [facility]    CONIZATION CERVIX,KNIFE/LASER      ESOPHAGOSCOPY, GASTROSCOPY, DUODENOSCOPY (EGD), COMBINED  2013    Procedure: COMBINED ESOPHAGOSCOPY, GASTROSCOPY, DUODENOSCOPY (EGD), BIOPSY SINGLE OR MULTIPLE;  egd with rabdain biopsies and colonoscopy with polypectomy by biopsy ;  Surgeon: Cuate Miles MD;  Location:  GI    ESOPHAGOSCOPY, GASTROSCOPY, DUODENOSCOPY (EGD), COMBINED N/A 2024    Procedure: Esophagoscopy, gastroscopy, duodenoscopy, combined;  Surgeon: Felipe Ch MD;  Location:  GI    GENITOURINARY SURGERY  ?     I have a bladder sling    HC DILATION/CURETTAGE DIAG/THER NON OB      D & C    HC REMOVAL OF TONSILS,<11 Y/O      Tonsils <10y.o.    HC UGI ENDOSCOPY DIAG W BIOPSY  2007    HYSTERECTOMY, PAP NO LONGER INDICATED      LAPAROSCOPIC CHOLECYSTECTOMY  10/12/2013    REPAIR PTOSIS      TUBAL LIGATION      [facility]  DELIVERY ONLY      , Low Cervical    [facility]  DELIVERY ONLY      Description:  Section;  Recorded: 11/10/2009;  Comments: @ 29 weeks     ZZ[facility] LIGATE FALLOPIAN TUBE      Description: Tubal Ligation;  Recorded: 11/10/2009;    ZZC NONSPECIFIC PROCEDURE  2000's    sinus    ZZC NONSPECIFIC PROCEDURE      Esophgeal dilatation multiple    ZZC NONSPECIFIC PROCEDURE  2008    sling    ZZ[facility] TOTAL ABDOM HYSTERECTOMY      Description: Hysterectomy;  Recorded: 11/10/2009;  Comments: due to cervical dysplasia    ZZ[facility] VAGINAL HYSTERECTOMY  09/1995    Hysterectomy, Vaginal    [facility] UGI ENDOSCOPY, SIMPLE EXAM  09/10/99 & 04/14/98     BP Readings from Last 3 Encounters:   12/03/24 114/66   09/09/24 118/82   08/12/24 130/78    Wt Readings from Last 3 Encounters:   12/03/24 94.3 kg (208 lb)   09/09/24 92.5 kg (204 lb)   08/12/24 93 kg (205 lb)         Patient Active Problem List   Diagnosis    Chronic fatigue syndrome    ESOPHAGEAL REFLUX    Herpes simplex virus (HSV) infection    HYPERLIPIDEMIA LDL GOAL <130    Eosinophilic esophagitis    Obesity (BMI 30.0-34.9)    Degenerative arthritis of cervical spine with cord compression    Hypothyroidism due to acquired atrophy of thyroid    Mild persistent asthma without complication    KAILEE (generalized anxiety disorder)    Moderate recurrent major depression (H)    Mixed incontinence    Primary osteoarthritis of right knee    Fibromyalgia    Degeneration of lumbar or lumbosacral intervertebral disc    Vitamin B12 deficiency    Vitamin D deficiency    FH: CVA - parents and several siblings    Prediabetes    Bilateral lower extremity pain    Peripheral polyneuropathy    Exertional angina (H)     Past Surgical History:   Procedure Laterality Date    ABDOMEN SURGERY  06/10/1993    C section    COLONOSCOPY  10/06/2009    COLONOSCOPY  07/02/2013    Procedure: COMBINED COLONOSCOPY, SINGLE BIOPSY/POLYPECTOMY BY BIOPSY;;  Surgeon: Cuate Miles MD;  [facility]    COLONOSCOPY N/A 09/06/2018    Procedure: COLONOSCOPY;  Colonoscopy;  Surgeon: Hamzah Dudley DO;  Location:  GI    COLONOSCOPY N/A 1/23/2024    Procedure:  Colonoscopy;  Surgeon: Felipe Ch MD;  [facility]    COMBINED REPAIR PTOSIS WITH BLEPHAROPLASTY BILATERAL Bilateral 2018    Procedure: COMBINED REPAIR PTOSIS WITH BLEPHAROPLASTY BILATERAL;  Bilateral upper eyelid Blepharoplasty and ptosis repair ;  Surgeon: Martina Andrade MD;  Location: [facility]    CONIZATION CERVIX,KNIFE/LASER      ESOPHAGOSCOPY, GASTROSCOPY, DUODENOSCOPY (EGD), COMBINED  2013    Procedure: COMBINED ESOPHAGOSCOPY, GASTROSCOPY, DUODENOSCOPY (EGD), BIOPSY SINGLE OR MULTIPLE;  egd with rabdain biopsies and colonoscopy with polypectomy by biopsy ;  Surgeon: Cuate Miles MD;  [facility]    ESOPHAGOSCOPY, GASTROSCOPY, DUODENOSCOPY (EGD), COMBINED N/A 2024    Procedure: Esophagoscopy, gastroscopy, duodenoscopy, combined;  Surgeon: Felipe Ch MD;  [facility]: [facility]    GENITOURINARY SURGERY  ?     I have a bladder sling    HC DILATION/CURETTAGE DIAG/THER NON OB      D & C    HC REMOVAL OF TONSILS,<11 Y/O      Tonsils <10y.o.    HC UGI ENDOSCOPY DIAG W BIOPSY  2007    HYSTERECTOMY, PAP NO LONGER INDICATED      LAPAROSCOPIC CHOLECYSTECTOMY  10/12/2013    REPAIR PTOSIS      TUBAL LIGATION      [facility]  DELIVERY ONLY      , Low Cervical    [facility]  DELIVERY ONLY      Description:  Section;  Recorded: 11/10/2009;  Comments: @ 29 weeks    [facility] LIGATE FALLOPIAN TUBE      Description: Tubal Ligation;  Recorded: 11/10/2009;    ZZ NONSPECIFIC PROCEDURE  's    sinus    ZZC NONSPECIFIC PROCEDURE      Esophgeal dilatation multiple    ZZC NONSPECIFIC PROCEDURE  2008    sling    [facility] TOTAL ABDOM HYSTERECTOMY      Description: Hysterectomy;  Recorded: 11/10/2009;  Comments: due to cervical dysplasia    Z VAGINAL HYSTERECTOMY  1995    Hysterectomy, Vaginal    [facility] UGI ENDOSCOPY, SIMPLE EXAM  09/10/99 & 98       Social History     Tobacco Use    Smoking status: Former     Current packs/day: 0.00     Average packs/day: 0.1  packs/day for 40.0 years (4.0 ttl pk-yrs)     Types: Cigarettes     Start date: 1981     Quit date: 2021     Years since quitting: 3.9    Smokeless tobacco: Never    Tobacco comments:     social smoking   Substance Use Topics    Alcohol use: Yes     Alcohol/week: 4.0 standard drinks of alcohol     Comment: social     Family History   Problem Relation Age of Onset    Cancer Mother         Uterine    Other Cancer Mother         First surgery uterine area. Then spread    Diabetes Father     Hypertension Father     Cerebrovascular Disease Father         Age about 63    Prostate Cancer Father     Cardiovascular Sister         recurrent blood clots    Cerebrovascular Disease Sister 51         age 51    Cancer Brother         leukmemia    Coronary Artery Disease Brother         Quadruple bypass    Cerebrovascular Disease Brother         62    Other Cancer Brother         Non-Hodgkin's lymphoma * 2 now lykemia    Coronary Artery Disease Brother         Quadruple bypass also    Hypertension Brother     Cerebrovascular Disease Brother         65    Prostate Cancer Brother     Heart Disease Maternal Grandmother         Heart condition age 96    Diabetes Maternal Grandfather     Cerebrovascular Disease Paternal Grandmother          early 60s    Diabetes Paternal Grandfather     Psychotic Disorder Son         severe Add,     Asthma Son         exercised induced asthma    Depression Son     Substance Abuse Son     Asthma Son         ecxercise induced asthma    Genetic Disorder Cousin         Alpha-1 Antitrypsin Deficiency  Liver transplant    Genetic Disorder Cousin         Idiopathic Trombosenia Purpla       Cerebrovascular Disease Other         Uncle    Cerebrovascular Disease Other         Uncle age? In his 50s    Colon Cancer Other          Current Outpatient Medications   Medication Sig Dispense Refill    albuterol (PROAIR HFA/PROVENTIL HFA/VENTOLIN HFA) 108 (90 Base) MCG/ACT inhaler Inhale 2 puffs  into the lungs every 6 hours as needed for shortness of breath, wheezing or cough. 18 g 4    budesonide (PULMICORT) 0.5 MG/2ML neb solution Mix 2 vials with 1 oz coffee flavoring and drink twice a day and rinse mouth aftrerwards 60 mL 6    budesonide-formoterol (SYMBICORT) 160-4.5 MCG/ACT Inhaler Inhale 2 puffs into the lungs 2 times daily. Stop the flovent when using this inhaler 6 g 4    calcium carbonate (TUMS) 500 MG chewable tablet Take 2 chew tab by mouth 3 times daily as needed for other (bloating/flatulence)      cyanocobalamin (CYANOCOBALAMIN) 1000 mcg/mL injection Inject 1 mL (1,000 mcg) into the muscle every 30 days. 1 mL 1    estradiol (VIVELLE-DOT) 0.075 MG/24HR BIW patch APPLY 1 PATCH TWICE WEEKLY AS DIRECTED      fexofenadine (ALLEGRA) 60 MG tablet TAKE ONE TABLET BY MOUTH TWICE A  tablet 1    ipratropium - albuterol 0.5 mg/2.5 mg/3 mL (DUONEB) 0.5-2.5 (3) MG/3ML neb solution Take 1 vial (3 mLs) by nebulization every 6 hours as needed for shortness of breath, wheezing or cough 90 mL 0    levothyroxine (SYNTHROID/LEVOTHROID) 100 MCG tablet TAKE ONE TABLET BY MOUTH ONCE DAILY 90 tablet 2    lisdexamfetamine (VYVANSE) 10 MG capsule Take 1 capsule (10 mg) by mouth every morning. 30 capsule 0    LORazepam (ATIVAN) 1 MG tablet Take 1 tablet (1 mg) by mouth 2 times daily as needed for anxiety. 20 tablet 0    methylphenidate (RITALIN) 10 MG tablet Take 1 tablet (10 mg) by mouth 2 times daily Takes as needed 30 tablet 0    mirabegron (MYRBETRIQ) 25 MG 24 hr tablet Take 1 tablet (25 mg) by mouth daily 90 tablet 1    nitroGLYcerin (NITROSTAT) 0.4 MG sublingual tablet For chest pain place 1 tablet under the tongue every 5 minutes for 3 doses. If symptoms persist 5 minutes after 1st dose call 911. 25 tablet 11    pantoprazole (PROTONIX) 40 MG EC tablet Take 40 mg by mouth daily      rosuvastatin (CRESTOR) 5 MG tablet Take 1 tablet (5 mg) by mouth daily. 90 tablet 3    STATIN NOT PRESCRIBED (INTENTIONAL)  Please choose reason not prescribed from choices below.      valACYclovir (VALTREX) 1000 mg tablet Take 1 tablet (1,000 mg) by mouth 2 times daily 20 tablet 3    zolpidem (AMBIEN) 5 MG tablet TAKE TWO TABLETS BY MOUTH EVERY EVENING AS NEEDED FOR SLEEP 60 tablet 5    meloxicam (MOBIC) 7.5 MG tablet Take 1 tablet (7.5 mg) by mouth daily as needed for moderate pain 30 tablet 1     Allergies   Allergen Reactions    Morphine And Codeine Other (See Comments)     Causes agitation     Current providers sharing in care for this patient include:  Patient Care Team:  Toribio Rodríguez MD as PCP - General (Family Medicine)  Aditya Gamez MD as MD (Dermatology)  Martina Andrade MD as MD (Ophthalmology)  Anselmo Cadet [facility] as Pharmacist (Pharmacist Clinician- Clinical Pharmacy Specialist)  Montana Reed MD as MD (Neurology)  Montana Reed MD as MD (Neurology)  Lisa De La Paz MD as MD (Cardiovascular Disease)  Veronika Johnson PA-C as Assigned Surgical Provider  Montana Colón DO as Assigned Neuroscience Provider  Andrzej Alvarado MD as MD (Vascular Surgery)  Duncan Burgess PA-C as Assigned PCP  Felipe Jamison MD as Assigned Heart and Vascular Provider    The following health maintenance items are reviewed in Epic and correct as of today:  Health Maintenance   Topic Date Due    ZOSTER IMMUNIZATION (1 of 2) Never done    RSV VACCINE (1 - Risk 60-74 years 1-dose series) Never done    INFLUENZA VACCINE (1) 09/01/2024    COVID-19 Vaccine (5 - 2024-25 season) 09/01/2024    LIPID  09/28/2024    ANNUAL REVIEW OF HM ORDERS  10/26/2024    LUNG CANCER SCREENING  10/26/2024    ASTHMA ACTION PLAN  11/30/2024    TSH W/FREE T4 REFLEX  03/05/2025    MAMMO SCREENING  03/21/2025    ASTHMA CONTROL TEST  06/03/2025    PHQ-9  06/03/2025    MEDICARE ANNUAL WELLNESS VISIT  12/03/2025    FALL RISK ASSESSMENT  12/03/2025    GLUCOSE  09/28/2026    DTAP/TDAP/TD IMMUNIZATION (6 - Td or Tdap) 08/23/2027    ADVANCE CARE PLANNING   "11/30/2028    COLORECTAL CANCER SCREENING  01/23/2034    DEXA  04/20/2037    HEPATITIS C SCREENING  Completed    DEPRESSION ACTION PLAN  Completed    Pneumococcal Vaccine: 65+ Years  Completed    HPV IMMUNIZATION  Aged Out    MENINGITIS IMMUNIZATION  Aged Out    RSV MONOCLONAL ANTIBODY  Aged Out    PAP  Discontinued     Review of Systems  Constitutional, HEENT, cardiovascular, pulmonary, GI, , musculoskeletal, neuro, skin, endocrine and psych systems are negative, except as otherwise noted.     Objective    Exam  /66   Pulse 62   Temp 97  F (36.1  C) (Temporal)   Resp 18   Ht 1.727 m (5' 8\")   Wt 94.3 kg (208 lb)   LMP  (LMP Unknown)   SpO2 94%   BMI 31.63 kg/m     Estimated body mass index is 31.63 kg/m  as calculated from the following:    Height as of this encounter: 1.727 m (5' 8\").    Weight as of this encounter: 94.3 kg (208 lb).    Physical Exam  GENERAL: alert and no distress  HENT: ear canals and TM's normal, nose and mouth without ulcers or lesions  NECK: no adenopathy, no asymmetry, masses, or scars  RESP: lungs clear to auscultation - no rales, rhonchi or wheezes  CV: regular rate and rhythm, normal S1 S2, no S3 or S4, no murmur, click or rub, no peripheral edema  ABDOMEN: soft, nontender, no hepatosplenomegaly, no masses and bowel sounds normal  MS: no gross musculoskeletal defects noted, no edema, tenderness of left costal joints attached to sternum, no other tenderness noted  SKIN: no suspicious lesions or rashes  NEURO: Normal strength and tone, mentation intact and speech normal  PSYCH: mentation appears normal, affect normal/bright        12/3/2024   Mini Cog   Clock Draw Score 2 Normal   3 Item Recall 3 objects recalled   Mini Cog Total Score 5               Signed Electronically by: Duncan Burgess PA-C    IDuncan PA-C, was present with the Physician Assistant student who participated in the service and in the documentation of the note.  I have verified the history and " personally performed the physical exam and medical decision making.  I agree with the assessment and plan of care as documented in the note.     Charted by: LEO Diaz    Answers submitted by the patient for this visit:  Patient Health Questionnaire (Submitted on 12/3/2024)  If you checked off any problems, how difficult have these problems made it for you to do your work, take care of things at home, or get along with other people?: Somewhat difficult  PHQ9 TOTAL SCORE: 8

## 2024-12-03 NOTE — PATIENT INSTRUCTIONS
Vyvanse - increase to 2 tablets daily.   Symbicort -Restart this 2 puffs twice per day.         Patient Education   Preventive Care Advice   This is general advice given by our system to help you stay healthy. However, your care team may have specific advice just for you. Please talk to your care team about your preventive care needs.  Nutrition  Eat 5 or more servings of fruits and vegetables each day.  Try wheat bread, brown rice and whole grain pasta (instead of white bread, rice, and pasta).  Get enough calcium and vitamin D. Check the label on foods and aim for 100% of the RDA (recommended daily allowance).  Lifestyle  Exercise at least 150 minutes each week  (30 minutes a day, 5 days a week).  Do muscle strengthening activities 2 days a week. These help control your weight and prevent disease.  No smoking.  Wear sunscreen to prevent skin cancer.  Have a dental exam and cleaning every 6 months.  Yearly exams  See your health care team every year to talk about:  Any changes in your health.  Any medicines your care team has prescribed.  Preventive care, family planning, and ways to prevent chronic diseases.  Shots (vaccines)   HPV shots (up to age 26), if you've never had them before.  Hepatitis B shots (up to age 59), if you've never had them before.  COVID-19 shot: Get this shot when it's due.  Flu shot: Get a flu shot every year.  Tetanus shot: Get a tetanus shot every 10 years.  Pneumococcal, hepatitis A, and RSV shots: Ask your care team if you need these based on your risk.  Shingles shot (for age 50 and up)  General health tests  Diabetes screening:  Starting at age 35, Get screened for diabetes at least every 3 years.  If you are younger than age 35, ask your care team if you should be screened for diabetes.  Cholesterol test: At age 39, start having a cholesterol test every 5 years, or more often if advised.  Bone density scan (DEXA): At age 50, ask your care team if you should have this scan for  osteoporosis (brittle bones).  Hepatitis C: Get tested at least once in your life.  STIs (sexually transmitted infections)  Before age 24: Ask your care team if you should be screened for STIs.  After age 24: Get screened for STIs if you're at risk. You are at risk for STIs (including HIV) if:  You are sexually active with more than one person.  You don't use condoms every time.  You or a partner was diagnosed with a sexually transmitted infection.  If you are at risk for HIV, ask about PrEP medicine to prevent HIV.  Get tested for HIV at least once in your life, whether you are at risk for HIV or not.  Cancer screening tests  Cervical cancer screening: If you have a cervix, begin getting regular cervical cancer screening tests starting at age 21.  Breast cancer scan (mammogram): If you've ever had breasts, begin having regular mammograms starting at age 40. This is a scan to check for breast cancer.  Colon cancer screening: It is important to start screening for colon cancer at age 45.  Have a colonoscopy test every 10 years (or more often if you're at risk) Or, ask your provider about stool tests like a FIT test every year or Cologuard test every 3 years.  To learn more about your testing options, visit:   .  For help making a decision, visit:   https://bit.ly/yf87195.  Prostate cancer screening test: If you have a prostate, ask your care team if a prostate cancer screening test (PSA) at age 55 is right for you.  Lung cancer screening: If you are a current or former smoker ages 50 to 80, ask your care team if ongoing lung cancer screenings are right for you.  For informational purposes only. Not to replace the advice of your health care provider. Copyright   2023 [employer]. All rights reserved. Clinically reviewed by the [facility]. [employer] [id number] - REV 01/24.  Learning About Activities of Daily Living  What are activities of daily living?     Activities of daily living  (ADLs) are the basic self-care tasks you do every day. These include eating, bathing, dressing, and moving around.  As you age, and if you have health problems, you may find that it's harder to do some of these tasks. If so, your doctor can suggest ideas that may help.  To measure what kind of help you may need, your doctor will ask how well you are able to do ADLs. Let your doctor know if there are any tasks that you are having trouble doing. This is an important first step to getting help. And when you have the help you need, you can stay as independent as possible.  How will a doctor assess your ADLs?  Asking about ADLs is part of a routine health checkup your doctor will likely do as you age. Your health check might be done in a doctor's office, in your home, or at a hospital. The goal is to find out if you are having any problems that could make it hard to care for yourself or that make it unsafe for you to be on your own.  To measure your ADLs, your doctor will ask how hard it is for you to do routine tasks. Your doctor may also want to know if you have changed the way you do a task because of a health problem. Your doctor may watch how you:  Walk back and forth.  Keep your balance while you stand or walk.  Move from sitting to standing or from a bed to a chair.  Button or unbutton a shirt or sweater.  Remove and put on your shoes.  It's common to feel a little worried or anxious if you find you can't do all the things you used to be able to do. Talking with your doctor about ADLs is a way to make sure you're as safe as possible and able to care for yourself as well as you can. You may want to bring a caregiver, friend, or family member to your checkup. They can help you talk to your doctor.  Follow-up care is a key part of your treatment and safety. Be sure to make and go to all appointments, and call your doctor if you are having problems. It's also a good idea to know your test results and keep a list of  the medicines you take.  Current as of: October 24, 2023  Content Version: 14.2 2024 [facility] [employer].   Care instructions adapted under license by your healthcare professional. If you have questions about a medical condition or this instruction, always ask your healthcare professional. [employer] Incorporated disclaims any warranty or liability for your use of this information.    Hearing Loss: Care Instructions  Overview     Hearing loss is a sudden or slow decrease in how well you hear. It can range from slight to profound. Permanent hearing loss can occur with aging. It also can happen when you are exposed long-term to loud noise. Examples include listening to loud music, riding motorcycles, or being around other loud machines.  Hearing loss can affect your work and home life. It can make you feel lonely or depressed. You may feel that you have lost your independence. But hearing aids and other devices can help you hear better and feel connected to others.  Follow-up care is a key part of your treatment and safety. Be sure to make and go to all appointments, and call your doctor if you are having problems. It's also a good idea to know your test results and keep a list of the medicines you take.  How can you care for yourself at home?  Avoid loud noises whenever possible. This helps keep your hearing from getting worse.  Always wear hearing protection around loud noises.  Wear a hearing aid as directed.  A professional can help you pick a hearing aid that will work best for you.  You can also get hearing aids over the counter for mild to moderate hearing loss.  Have hearing tests as your doctor suggests. They can show whether your hearing has changed. Your hearing aid may need to be adjusted.  Use other devices as needed. These may include:  Telephone amplifiers and hearing aids that can connect to a television, stereo, radio, or microphone.  Devices that use lights or vibrations. These alert you to  "the doorbell, a ringing telephone, or a baby monitor.  Television closed-captioning. This shows the words at the bottom of the screen. Most new TVs can do this.  TTY (text telephone). This lets you type messages back and forth on the telephone instead of talking or listening. These devices are also called TDD. When messages are typed on the keyboard, they are sent over the phone line to a receiving TTY. The message is shown on a monitor.  Use text messaging, social media, and email if it is hard for you to communicate by telephone.  Try to learn a listening technique called speechreading. It is not lipreading. You pay attention to people's gestures, expressions, posture, and tone of voice. These clues can help you understand what a person is saying. Face the person you are talking to, and have them face you. Make sure the lighting is good. You need to see the other person's face clearly.  Think about counseling if you need help to adjust to your hearing loss.  When should you call for help?  Watch closely for changes in your health, and be sure to contact your doctor if:    You think your hearing is getting worse.     You have new symptoms, such as dizziness or nausea.   Where can you learn more?  Go to [email or web address]://[email or web address]/patiented  Enter R798 in the search box to learn more about \"Hearing Loss: Care Instructions.\"  Current as of: September 27, 2023  Content Version: 14.2 2024 [id number].   Care instructions adapted under license by your healthcare professional. If you have questions about a medical condition or this instruction, always ask your healthcare professional. Healthwise, Incorporated disclaims any warranty or liability for your use of this information.    Learning About Sleeping Well  What does sleeping well mean?     Sleeping well means getting enough sleep to feel good and stay healthy. How much sleep is enough varies among people.  The number of hours you sleep and how you feel " when you wake up are both important. If you do not feel refreshed, you probably need more sleep. Another sign of not getting enough sleep is feeling tired during the day.  Experts recommend that adults get at least 7 or more hours of sleep per day. Children and older adults need more sleep.  Why is getting enough sleep important?  Getting enough quality sleep is a basic part of good health. When your sleep suffers, your physical health, mood, and your thoughts can suffer too. You may find yourself feeling more grumpy or stressed. Not getting enough sleep also can lead to serious problems, including injury, accidents, anxiety, and depression.  What might cause poor sleeping?  Many things can cause sleep problems, including:  Changes to your sleep schedule.  Stress. Stress can be caused by fear about a single event, such as giving a speech. Or you may have ongoing stress, such as worry about work or school.  Depression, anxiety, and other mental or emotional conditions.  Changes in your sleep habits or surroundings. This includes changes that happen where you sleep, such as noise, light, or sleeping in a different bed. It also includes changes in your sleep pattern, such as having jet lag or working a late shift.  Health problems, such as pain, breathing problems, and restless legs syndrome.  Lack of regular exercise.  Using alcohol, nicotine, or caffeine before bed.  How can you help yourself?  Here are some tips that may help you sleep more soundly and wake up feeling more refreshed.  Your sleeping area   Use your bedroom only for sleeping and sex. A bit of light reading may help you fall asleep. But if it doesn't, do your reading elsewhere in the house. Try not to use your TV, computer, smartphone, or tablet while you are in bed.  Be sure your bed is big enough to stretch out comfortably, especially if you have a sleep partner.  Keep your bedroom quiet, dark, and cool. Use curtains, blinds, or a sleep mask to block  "out light. To block out noise, use earplugs, soothing music, or a \"white noise\" machine.  Your evening and bedtime routine   Create a relaxing bedtime routine. You might want to take a warm shower or bath, or listen to soothing music.  Go to bed at the same time every night. And get up at the same time every morning, even if you feel tired.  What to avoid   Limit caffeine (coffee, tea, caffeinated sodas) during the day, and don't have any for at least 6 hours before bedtime.  Avoid drinking alcohol before bedtime. Alcohol can cause you to wake up more often during the night.  Try not to smoke or use tobacco, especially in the evening. Nicotine can keep you awake.  Limit naps during the day, especially close to bedtime.  Avoid lying in bed awake for too long. If you can't fall asleep or if you wake up in the middle of the night and can't get back to sleep within about 20 minutes, get out of bed and go to another room until you feel sleepy.  Avoid taking medicine right before bed that may keep you awake or make you feel hyper or energized. Your doctor can tell you if your medicine may do this and if you can take it earlier in the day.  If you can't sleep   Imagine yourself in a peaceful, pleasant scene. Focus on the details and feelings of being in a place that is relaxing.  Get up and do a quiet or boring activity until you feel sleepy.  Avoid drinking any liquids before going to bed to help prevent waking up often to use the bathroom.  Where can you learn more?  Go to https://www.[employer].net/patiented  Enter J942 in the search box to learn more about \"Learning About Sleeping Well.\"  Current as of: July 10, 2023  Content Version: 14.2 2024 [facility].   Care instructions adapted under license by your healthcare professional. If you have questions about a medical condition or this instruction, always ask your healthcare professional. Healthwise, Incorporated disclaims any warranty or liability for " your use of this information.    Bladder Training: Care Instructions  Your Care Instructions     Bladder training is used to treat urge incontinence and stress incontinence. Urge incontinence means that the need to urinate comes on so fast that you can't get to a toilet in time. Stress incontinence means that you leak urine because of pressure on your bladder. For example, it may happen when you laugh, cough, or lift something heavy.  Bladder training can increase how long you can wait before you have to urinate. It can also help your bladder hold more urine. And it can give you better control over the urge to urinate.  It is important to remember that bladder training takes a few weeks to a few months to make a difference. You may not see results right away, but don't give up.  Follow-up care is a key part of your treatment and safety. Be sure to make and go to all appointments, and call your doctor if you are having problems. It's also a good idea to know your test results and keep a list of the medicines you take.  How can you care for yourself at home?  Work with your doctor to come up with a bladder training program that is right for you. You may use one or more of the following methods.  Delayed urination  In the beginning, try to keep from urinating for 5 minutes after you first feel the need to go.  While you wait, take deep, slow breaths to relax. Kegel exercises can also help you delay the need to go to the bathroom.  After some practice, when you can easily wait 5 minutes to urinate, try to wait 10 minutes before you urinate.  Slowly increase the waiting period until you are able to control when you have to urinate.  Scheduled urination  Empty your bladder when you first wake up in the morning.  Schedule times throughout the day when you will urinate.  Start by going to the bathroom every hour, even if you don't need to go.  Slowly increase the time between trips to the bathroom.  When you have found a  "schedule that works well for you, keep doing it.  If you wake up during the night and have to urinate, do it. Apply your schedule to waking hours only.  Kegel exercises  These tighten and strengthen pelvic muscles, which can help you control the flow of urine. (If doing these exercises causes pain, stop doing them and talk with your doctor.) To do Kegel exercises:  Squeeze your muscles as if you were trying not to pass gas. Or squeeze your muscles as if you were stopping the flow of urine. Your belly, legs, and buttocks shouldn't move.  Hold the squeeze for 3 seconds, then relax for 5 to 10 seconds.  Start with 3 seconds, then add 1 second each week until you are able to squeeze for 10 seconds.  Repeat the exercise 10 times a session. Do 3 to 8 sessions a day.  When should you call for help?  Watch closely for changes in your health, and be sure to contact your doctor if:    Your incontinence is getting worse.     You do not get better as expected.   Where can you learn more?  Go to https://www.[employer].net/patiented  Enter V684 in the search box to learn more about \"Bladder Training: Care Instructions.\"  Current as of: November 15, 2023  Content Version: 14.2 2024 [employer].   Care instructions adapted under license by your healthcare professional. If you have questions about a medical condition or this instruction, always ask your healthcare professional. Healthwise, Incorporated disclaims any warranty or liability for your use of this information.    Learning About Depression Screening  What is depression screening?  Depression screening is a way to see if you have depression symptoms. It may be done by a doctor or counselor. It's often part of a routine checkup. That's because your mental health is just as important as your physical health.  Depression is a mental health condition that affects how you feel, think, and act. You may:  Have less energy.  Lose interest in your daily " "activities.  Feel sad and grouchy for a long time.  Depression is very common. It affects people of all ages.  Many things can lead to depression. Some people become depressed after they have a stroke or find out they have a major illness like cancer or heart disease. The death of a loved one or a breakup may lead to depression. It can run in families. Most experts believe that a combination of inherited genes and stressful life events can cause it.  What happens during screening?  You may be asked to fill out a form about your depression symptoms. You and the doctor will discuss your answers. The doctor may ask you more questions to learn more about how you think, act, and feel.  What happens after screening?  If you have symptoms of depression, your doctor will talk to you about your options.  Doctors usually treat depression with medicines or counseling. Often, combining the two works best. Many people don't get help because they think that they'll get over the depression on their own. But people with depression may not get better unless they get treatment.  The cause of depression is not well understood. There may be many factors involved. But if you have depression, it's not your fault.  A serious symptom of depression is thinking about death or suicide. If you or someone you care about talks about this or about feeling hopeless, get help right away.  It's important to know that depression can be treated. Medicine, counseling, and self-care may help.  Where can you learn more?  Go to https://www.[employer].net/patiented  Enter T185 in the search box to learn more about \"Learning About Depression Screening.\"  Current as of: June 24, 2023  Content Version: 14.2 2024 [facility] [employer].   Care instructions adapted under license by your healthcare professional. If you have questions about a medical condition or this instruction, always ask your healthcare professional. Healthwise, Incorporated disclaims any " warranty or liability for your use of this information.

## 2024-12-04 ENCOUNTER — PATIENT OUTREACH (OUTPATIENT)
Dept: CARE COORDINATION | Facility: CLINIC | Age: 64
End: 2024-12-04
Payer: COMMERCIAL

## 2024-12-10 ENCOUNTER — PATIENT OUTREACH (OUTPATIENT)
Dept: CARE COORDINATION | Facility: CLINIC | Age: 64
End: 2024-12-10
Payer: COMMERCIAL

## 2024-12-16 ENCOUNTER — HOSPITAL ENCOUNTER (OUTPATIENT)
Dept: MRI IMAGING | Facility: CLINIC | Age: 64
Discharge: HOME | End: 2024-12-16
Attending: STUDENT IN AN ORGANIZED HEALTH CARE EDUCATION/TRAINING PROGRAM | Admitting: STUDENT IN AN ORGANIZED HEALTH CARE EDUCATION/TRAINING PROGRAM
Payer: MEDICARE

## 2024-12-16 DIAGNOSIS — M17.12 PRIMARY OSTEOARTHRITIS OF LEFT KNEE: ICD-10-CM

## 2024-12-16 PROCEDURE — G1010 CDSM STANSON: HCPCS

## 2024-12-16 PROCEDURE — 73721 MRI JNT OF LWR EXTRE W/O DYE: CPT | Mod: 26 | Performed by: RADIOLOGY

## 2024-12-16 PROCEDURE — G1010 CDSM STANSON: HCPCS | Performed by: RADIOLOGY

## 2024-12-16 PROCEDURE — 73721 MRI JNT OF LWR EXTRE W/O DYE: CPT | Mod: LT,MG

## 2024-12-18 ENCOUNTER — MYC MEDICAL ADVICE (OUTPATIENT)
Dept: ORTHOPEDICS | Facility: CLINIC | Age: 64
End: 2024-12-18
Payer: COMMERCIAL

## 2024-12-18 NOTE — PROGRESS NOTES
Margarita is a 64 year old who is being evaluated via a billable telephone visit.    What phone number would you like to be contacted at? [phone]  How would you like to obtain your AVS? MyChart  Originating Location (pt. Location): Home  Distant Location (provider location):  On-site  Phone call duration: 20 minutes    Nikki Morales  :  1960  DOS: 2024  MRN: [id number]  PCP: Toribio Rodríguez    Sports Medicine Clinic Visit    Interim History - 2024  - Last seen on 2024 for a left knee corticosteroid injection.     - MRI left knee 2024 shows   1. Grade IV chondromalacia in the medial compartment and grade 2/3  chondromalacia in the patellofemoral compartment consistent with  osteoarthrosis.     2. Complex directional tearing of the medial meniscus with peripheral  extrusion into the medial tibial gutter.     3. Small undersurface horizontal tear of the posterior horn of the  lateral meniscus.     4. The anterior and posterior cruciate ligaments and medial and  lateral supporting structures are intact.    - Since the last visit, she continues to have persistent symptoms including sharp medial knee pain with stairs and walking.  No major mechanical locking symptoms or instability, but pain continues to be very limiting.  Presents to discuss results of her recent MRI.   - No interim injury.       Initial Visit: 2024  HPI  Nikki Morales is a 64 year old female who is seen in consultation at the request of  Duncan Burgess PA-C presenting with left knee pain.    - Mechanism of Injury:    - No inciting injury  - Pertinent history and prior evaluations:    - 2023 with Dr. Rodríguez: XR L knee shows mild to moderate tricompartmental degenerative changes, worse medially.  No acute fractures or dislocations. Left knee CSI was given. CSI was very helpful with relief lasting until Feb.   - XR IMPRESSION:  Small knee joint effusion. No fractures are evident. Mild to moderate  "degenerative changes in the medial compartment. Mild degenerative changes in the lateral and patellofemoral compartments.\" Left knee   corticosteroid injection completed.  - US LLE negative for DVT  - Return of left knee pain noted in January 2024.    - Virtual visit with Kellen Burgess on 4/9/2024 where she discussed bilateral lateral lower extremity pain and left knee arthritis.  Prior good relief from right knee corticosteroid injections for arthritis in the right knee with Dr. Montanez in the past.  Did not seem radicular in nature.  Given a referral for physical therapy for the knees.  Also started gabapentin to see if it helps with neuropathic pain for suspected PPN.  Also given meloxicam, which has helped greatly.  Orthopedic referral for consideration of any surgical options need to be considered.    - Pain Character:    - Location:  anteromedial knee normally but also posterior pain with swelling  - Character:  feeling of a needle prick  - Duration:  4 months  - Course:  improving  - Endorses:    - Swelling that has since gone away, feeling of going out, crunching, pin-point pain  - Denies:    - numbness, tingling, radicular shooting pain, mechanical locking symptoms  - Alleviating factors:    - Meloxicam (helpful), corticosteroid injection in December 2023 (helpful for 2 months)  - Aggravating factors:    - going to sit, walking, stairs  - Other treatments tried:    - Meloxicam (helpful), has not started Gabapentin (has not started), corticosteroid injection in December 2023 (helpful for 2 months)    - Patient Goals:    - discuss treatment options  - Social History:   - Retired.  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    Past Medical History:   Diagnosis Date    Attention deficit " hyperactivity disorder, inattentive type     Chronic fatigue     COPD (chronic obstructive pulmonary disease) (H) 1990    Depressive disorder     Hyperlipidemia     Hypothyroid     New onset a-fib (H)     Other vitamin B12 deficiency anemia     Primary osteoarthritis of right knee 08/15/2022    Uncomplicated asthma      Past Surgical History:   Procedure Laterality Date    ABDOMEN SURGERY  06/10/1993    C section    COLONOSCOPY  10/06/2009    COLONOSCOPY  07/02/2013    Procedure: COMBINED COLONOSCOPY, SINGLE BIOPSY/POLYPECTOMY BY BIOPSY;;  Surgeon: Cuate Miles MD;  Location: [facility]    COLONOSCOPY N/A 09/06/2018    Procedure: COLONOSCOPY;  Colonoscopy;  Surgeon: Hamzah Dudley DO;  Location: [facility]    COLONOSCOPY N/A 1/23/2024    Procedure: Colonoscopy;  Surgeon: Felipe Ch MD;  Location:  GI    COMBINED REPAIR PTOSIS WITH BLEPHAROPLASTY BILATERAL Bilateral 09/24/2018    Procedure: COMBINED REPAIR PTOSIS WITH BLEPHAROPLASTY BILATERAL;  Bilateral upper eyelid Blepharoplasty and ptosis repair ;  Surgeon: Martina Andrade MD;  Location: [facility]    CONIZATION CERVIX,KNIFE/LASER      ESOPHAGOSCOPY, GASTROSCOPY, DUODENOSCOPY (EGD), COMBINED  07/02/2013    Procedure: COMBINED ESOPHAGOSCOPY, GASTROSCOPY, DUODENOSCOPY (EGD), BIOPSY SINGLE OR MULTIPLE;  egd with rabdain biopsies and colonoscopy with polypectomy by biopsy ;  Surgeon: Cuate Miles MD;  Location:  GI    ESOPHAGOSCOPY, GASTROSCOPY, DUODENOSCOPY (EGD), COMBINED N/A 1/23/2024    Procedure: Esophagoscopy, gastroscopy, duodenoscopy, combined;  Surgeon: Felipe Ch MD;  Location:  GI    GENITOURINARY SURGERY  ? 2008    I have a bladder sling    HC DILATION/CURETTAGE DIAG/THER NON OB      D & C    HC REMOVAL OF TONSILS,<9 Y/O      Tonsils <10y.o.    HC UGI ENDOSCOPY DIAG W BIOPSY  12/05/2007    HYSTERECTOMY, PAP NO LONGER INDICATED      LAPAROSCOPIC CHOLECYSTECTOMY  10/12/2013    REPAIR PTOSIS      TUBAL LIGATION  1994    [facility]   DELIVERY ONLY      , Low Cervical    ZZC  DELIVERY ONLY      Description:  Section;  Recorded: 11/10/2009;  Comments: @ 29 weeks    Z LIGATE FALLOPIAN TUBE      Description: Tubal Ligation;  Recorded: 11/10/2009;    ZZC NONSPECIFIC PROCEDURE  's    sinus    ZZC NONSPECIFIC PROCEDURE      Esophgeal dilatation multiple    ZZC NONSPECIFIC PROCEDURE      sling    ZZC TOTAL ABDOM HYSTERECTOMY      Description: Hysterectomy;  Recorded: 11/10/2009;  Comments: due to cervical dysplasia    ZZC VAGINAL HYSTERECTOMY  1995    Hysterectomy, Vaginal    ZZ UGI ENDOSCOPY, SIMPLE EXAM  09/10/99 & 98     Family History   Problem Relation Age of Onset    Cancer Mother         Uterine    Other Cancer Mother         First surgery uterine area. Then spread    Diabetes Father     Hypertension Father     Cerebrovascular Disease Father         Age about 63    Prostate Cancer Father     Cardiovascular Sister         recurrent blood clots    Cerebrovascular Disease Sister 51         age 51    Cancer Brother         leukmemia    Coronary Artery Disease Brother         Quadruple bypass    Cerebrovascular Disease Brother         62    Other Cancer Brother         Non-Hodgkin's lymphoma * 2 now lykemia    Coronary Artery Disease Brother         Quadruple bypass also    Hypertension Brother     Cerebrovascular Disease Brother         65    Prostate Cancer Brother     Heart Disease Maternal Grandmother         Heart condition age 96    Diabetes Maternal Grandfather     Cerebrovascular Disease Paternal Grandmother          early 60s    Diabetes Paternal Grandfather     Psychotic Disorder Son         severe Add,     Asthma Son         exercised induced asthma    Depression Son     Substance Abuse Son     Asthma Son         ecxercise induced asthma    Genetic Disorder Cousin         Alpha-1 Antitrypsin Deficiency  Liver transplant    Genetic Disorder Cousin         Idiopathic Trombosenia  Abdulaziz   1977    Cerebrovascular Disease Other         Uncle    Cerebrovascular Disease Other         Uncle age? In his 50s    Colon Cancer Other          Objective  No physical exam due to the nature of the telephone visit      Radiology  I independently reviewed the available relevant imaging in the chart with my interpretations as above in HPI.   - MR Left knee with grade IV chondromalacia in the medial compartment, grade II-III chondromalacia in the patellofemoral compartment, complex multidirectional medial meniscus tear with extrusion into the medial tibial gutter, horizontal lateral meniscus tear.      Assessment  1. Primary osteoarthritis of left knee    2. Complex tear of medial meniscus of left knee as current injury, initial encounter        Plan  Nikki Morales is a pleasant 64 year old female that presents via telephone visit for follow-up of her chronic left knee pain secondary to primary osteoarthritis of the knee with good relief from prior corticosteroid injections and interested in discussing injections and other options for pain relief and optimization of function.    Most recent corticosteroid and viscosupplementation injections did not provide lasting relief.  She continues to feel sharp pain in the medial knee that is debilitating while walking and escalating stairs, which she does frequently in her home.  At this time, does not significant mechanical locking symptoms or instability, limited by pain.    Radiographs reveal moderate degenerative changes within the knee and we have been managing with conservative care including NSAIDs, meloxicam, Tylenol, Voltaren gel, intra-articular corticosteroid and viscosupplementation injections, knee bracing, physical therapy, home exercise program, modalities, and she continues to have pain.    Due to persistent pain, we obtained an MRI of the knee that showed grade IV chondromalacia and a complex multidirectional tear of the medial meniscus  with extrusion into the medial tibial gutter.  This is likely causing her persistent pain and we discussed all nonoperative and operative treatment options.  At this point, since she has essentially failed conservative therapy, she is far more in favor of operative treatment with meniscus surgery versus knee replacement.  I instructed her that with her grade IV chondromalacia, she may find more benefit from a replacement surgery.  She requested the orthopedic  referral to be directed towards Dr. Montanez, so referral was placed today.  She will continue with the treatment plan as above until she can discuss her surgical options with Dr. Montanez.  All questions were answered and she is in agreement with the above plan.      Montana Colón DO, CAQSM  [facility]  [facility] - Department of Orthopedic Surgery       Disclaimer:  This note was prepared and written using Dragon Medical dictation software. As a result, there may be errors in the script that have gone undetected. Please consider this when interpreting the information in this note.     35 minutes were spent on the date of the encounter doing chart review, history and exam, documentation and further activities per the note.

## 2024-12-19 ENCOUNTER — VIRTUAL VISIT (OUTPATIENT)
Dept: ORTHOPEDICS | Facility: CLINIC | Age: 64
End: 2024-12-19
Payer: COMMERCIAL

## 2024-12-19 DIAGNOSIS — M17.12 PRIMARY OSTEOARTHRITIS OF LEFT KNEE: Primary | ICD-10-CM

## 2024-12-19 DIAGNOSIS — S83.232A COMPLEX TEAR OF MEDIAL MENISCUS OF LEFT KNEE AS CURRENT INJURY, INITIAL ENCOUNTER: ICD-10-CM

## 2024-12-19 NOTE — LETTER
2024      Nikki Morales  [address]  [facility] [address]      Dear Colleague,    Thank you for referring your patient, Nikki Morales, to the [facility] [place]. Please see a copy of my visit note below.    Margarita is a 64 year old who is being evaluated via a billable telephone visit.    What phone number would you like to be contacted at? [phone]  How would you like to obtain your AVS? MyChart  Originating Location (pt. Location): Home  Distant Location (provider location):  On-site  Phone call duration: 20 minutes    Nikki Morales  :  1960  DOS: 2024  MRN: [id number]  PCP: Toribio Rodríguez    Sports Medicine Clinic Visit    Interim History - 2024  - Last seen on 2024 for a left knee corticosteroid injection.     - MRI left knee 2024 shows   1. Grade IV chondromalacia in the medial compartment and grade 2/3  chondromalacia in the patellofemoral compartment consistent with  osteoarthrosis.     2. Complex directional tearing of the medial meniscus with peripheral  extrusion into the medial tibial gutter.     3. Small undersurface horizontal tear of the posterior horn of the  lateral meniscus.     4. The anterior and posterior cruciate ligaments and medial and  lateral supporting structures are intact.    - Since the last visit, she continues to have persistent symptoms including sharp medial knee pain with stairs and walking.  No major mechanical locking symptoms or instability, but pain continues to be very limiting.  Presents to discuss results of her recent MRI.   - No interim injury.       Initial Visit: 2024  HPI  Nikki Mroales is a 64 year old female who is seen in consultation at the request of  Duncan Burgess PA-C presenting with left knee pain.    - Mechanism of Injury:    - No inciting injury  - Pertinent history and prior evaluations:    - 2023 with Dr. Rodríguez: XR L knee shows mild to  "moderate tricompartmental degenerative changes, worse medially.  No acute fractures or dislocations. Left knee CSI was given. CSI was very helpful with relief lasting until Feb.   - XR IMPRESSION:  Small knee joint effusion. No fractures are evident. Mild to moderate degenerative changes in the medial compartment. Mild degenerative changes in the lateral and patellofemoral compartments.\" Left knee   corticosteroid injection completed.  - US LLE negative for DVT  - Return of left knee pain noted in January 2024.    - Virtual visit with Kellen Burgess on 4/9/2024 where she discussed bilateral lateral lower extremity pain and left knee arthritis.  Prior good relief from right knee corticosteroid injections for arthritis in the right knee with Dr. Montanez in the past.  Did not seem radicular in nature.  Given a referral for physical therapy for the knees.  Also started gabapentin to see if it helps with neuropathic pain for suspected PPN.  Also given meloxicam, which has helped greatly.  Orthopedic referral for consideration of any surgical options need to be considered.    - Pain Character:    - Location:  anteromedial knee normally but also posterior pain with swelling  - Character:  feeling of a needle prick  - Duration:  4 months  - Course:  improving  - Endorses:    - Swelling that has since gone away, feeling of going out, crunching, pin-point pain  - Denies:    - numbness, tingling, radicular shooting pain, mechanical locking symptoms  - Alleviating factors:    - Meloxicam (helpful), corticosteroid injection in December 2023 (helpful for 2 months)  - Aggravating factors:    - going to sit, walking, stairs  - Other treatments tried:    - Meloxicam (helpful), has not started Gabapentin (has not started), corticosteroid injection in December 2023 (helpful for 2 months)    - Patient Goals:    - discuss treatment options  - Social History:   - Retired.  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    Past Medical History:   Diagnosis Date     Attention deficit hyperactivity disorder, inattentive type      Chronic fatigue      COPD (chronic obstructive pulmonary disease) (H) 1990     Depressive disorder      Hyperlipidemia      Hypothyroid      New onset a-fib (H)      Other vitamin B12 deficiency anemia      Primary osteoarthritis of right knee 08/15/2022     Uncomplicated asthma      Past Surgical History:   Procedure Laterality Date     ABDOMEN SURGERY  06/10/1993    C section     COLONOSCOPY  10/06/2009     COLONOSCOPY  07/02/2013    Procedure: COMBINED COLONOSCOPY, SINGLE BIOPSY/POLYPECTOMY BY BIOPSY;;  Surgeon: Cuate Miles MD;  Location:  GI     COLONOSCOPY N/A 09/06/2018    Procedure: COLONOSCOPY;  Colonoscopy;  Surgeon: Hamzah Dudley DO;  Location:  GI     COLONOSCOPY N/A 1/23/2024    Procedure: Colonoscopy;  Surgeon: Felipe Ch MD;  Location:  GI     COMBINED REPAIR PTOSIS WITH BLEPHAROPLASTY BILATERAL Bilateral 09/24/2018    Procedure: COMBINED REPAIR PTOSIS WITH BLEPHAROPLASTY BILATERAL;  Bilateral upper eyelid Blepharoplasty and ptosis repair ;  Surgeon: Martina Andrade MD;  Location: [facility]     CONIZATION CERVIX,KNIFE/LASER       ESOPHAGOSCOPY, GASTROSCOPY, DUODENOSCOPY (EGD), COMBINED  07/02/2013    Procedure: COMBINED ESOPHAGOSCOPY, GASTROSCOPY, DUODENOSCOPY (EGD), BIOPSY SINGLE OR MULTIPLE;  egd with rabdain biopsies and colonoscopy with polypectomy by biopsy ;  Surgeon: Cuate Miles MD;  Location:  GI     ESOPHAGOSCOPY, GASTROSCOPY, DUODENOSCOPY (EGD), COMBINED N/A 1/23/2024    Procedure: Esophagoscopy, gastroscopy, duodenoscopy, combined;  Surgeon: Felipe Ch MD;  Location:  GI     GENITOURINARY SURGERY  ? 2008    I have a bladder sling      HC DILATION/CURETTAGE DIAG/THER NON OB      D & C     HC REMOVAL OF TONSILS,<11 Y/O      Tonsils <10y.o.     HC UGI ENDOSCOPY DIAG W BIOPSY  2007     HYSTERECTOMY, PAP NO LONGER INDICATED       LAPAROSCOPIC CHOLECYSTECTOMY  10/12/2013     REPAIR PTOSIS       TUBAL LIGATION       Z  DELIVERY ONLY      , Low Cervical     ZC  DELIVERY ONLY      Description:  Section;  Recorded: 11/10/2009;  Comments: @ 29 weeks     [facility] LIGATE FALLOPIAN TUBE      Description: Tubal Ligation;  Recorded: 11/10/2009;     ZZ NONSPECIFIC PROCEDURE  's    sinus     ZZC NONSPECIFIC PROCEDURE      Esophgeal dilatation multiple     ZZC NONSPECIFIC PROCEDURE      sling     Z TOTAL ABDOM HYSTERECTOMY      Description: Hysterectomy;  Recorded: 11/10/2009;  Comments: due to cervical dysplasia     Z VAGINAL HYSTERECTOMY  1995    Hysterectomy, Vaginal     ZZHC UGI ENDOSCOPY, SIMPLE EXAM  09/10/99 & 98     Family History   Problem Relation Age of Onset     Cancer Mother         Uterine     Other Cancer Mother         First surgery uterine area. Then spread     Diabetes Father      Hypertension Father      Cerebrovascular Disease Father         Age about 63     Prostate Cancer Father      Cardiovascular Sister         recurrent blood clots     Cerebrovascular Disease Sister 51         age 51     Cancer Brother         leukmemia     Coronary Artery Disease Brother         Quadruple bypass     Cerebrovascular Disease Brother         62     Other Cancer Brother         Non-Hodgkin's lymphoma * 2 now lykemia     Coronary Artery Disease Brother         Quadruple bypass also     Hypertension Brother      Cerebrovascular Disease Brother         65     Prostate Cancer Brother      Heart Disease Maternal Grandmother         Heart condition age 96     Diabetes Maternal Grandfather      Cerebrovascular Disease Paternal Grandmother          early 60s     Diabetes Paternal Grandfather       Psychotic Disorder Son         severe Add,      Asthma Son         exercised induced asthma     Depression Son      Substance Abuse Son      Asthma Son         ecxercise induced asthma     Genetic Disorder Cousin         Alpha-1 Antitrypsin Deficiency  Liver transplant     Genetic Disorder Cousin         Idiopathic Trombosenia Abdulaziz   1977     Cerebrovascular Disease Other         Uncle     Cerebrovascular Disease Other         Uncle age? In his 50s     Colon Cancer Other          Objective  No physical exam due to the nature of the telephone visit      Radiology  I independently reviewed the available relevant imaging in the chart with my interpretations as above in HPI.   - MR Left knee with grade IV chondromalacia in the medial compartment, grade II-III chondromalacia in the patellofemoral compartment, complex multidirectional medial meniscus tear with extrusion into the medial tibial gutter, horizontal lateral meniscus tear.      Assessment  1. Primary osteoarthritis of left knee    2. Complex tear of medial meniscus of left knee as current injury, initial encounter        Plan  Nikki Morales is a pleasant 64 year old female that presents via telephone visit for follow-up of her chronic left knee pain secondary to primary osteoarthritis of the knee with good relief from prior corticosteroid injections and interested in discussing injections and other options for pain relief and optimization of function.    Most recent corticosteroid and viscosupplementation injections did not provide lasting relief.  She continues to feel sharp pain in the medial knee that is debilitating while walking and escalating stairs, which she does frequently in her home.  At this time, does not significant mechanical locking symptoms or instability, limited by pain.    Radiographs reveal moderate degenerative changes within the knee and we have been managing with conservative care including NSAIDs, meloxicam, Tylenol,  Voltaren gel, intra-articular corticosteroid and viscosupplementation injections, knee bracing, physical therapy, home exercise program, modalities, and she continues to have pain.    Due to persistent pain, we obtained an MRI of the knee that showed grade IV chondromalacia and a complex multidirectional tear of the medial meniscus with extrusion into the medial tibial gutter.  This is likely causing her persistent pain and we discussed all nonoperative and operative treatment options.  At this point, since she has essentially failed conservative therapy, she is far more in favor of operative treatment with meniscus surgery versus knee replacement.  I instructed her that with her grade IV chondromalacia, she may find more benefit from a replacement surgery.  She requested the orthopedic  referral to be directed towards Dr. Montanez, so referral was placed today.  She will continue with the treatment plan as above until she can discuss her surgical options with Dr. Montanez.  All questions were answered and she is in agreement with the above plan.      Montana Colón DO, CAQSM  [facility]  [facility] - Department of Orthopedic Surgery       Disclaimer:  This note was prepared and written using Dragon Medical dictation software. As a result, there may be errors in the script that have gone undetected. Please consider this when interpreting the information in this note.     35 minutes were spent on the date of the encounter doing chart review, history and exam, documentation and further activities per the note.        Again, thank you for allowing me to participate in the care of your patient.        Sincerely,        Montana Colón DO

## 2024-12-30 ENCOUNTER — HOSPITAL ENCOUNTER (OUTPATIENT)
Dept: CT IMAGING | Facility: CLINIC | Age: 64
Discharge: HOME | End: 2024-12-30
Attending: PHYSICIAN ASSISTANT
Payer: MEDICARE

## 2024-12-30 ENCOUNTER — HOSPITAL ENCOUNTER (OUTPATIENT)
Dept: MAMMOGRAPHY | Facility: CLINIC | Age: 64
Discharge: HOME | End: 2024-12-30
Attending: PHYSICIAN ASSISTANT
Payer: MEDICARE

## 2024-12-30 DIAGNOSIS — R91.8 PULMONARY NODULES: ICD-10-CM

## 2024-12-30 DIAGNOSIS — Z12.31 VISIT FOR SCREENING MAMMOGRAM: ICD-10-CM

## 2024-12-30 PROCEDURE — 77063 BREAST TOMOSYNTHESIS BI: CPT

## 2024-12-30 PROCEDURE — 71250 CT THORAX DX C-: CPT | Mod: MF

## 2024-12-30 PROCEDURE — 77067 SCR MAMMO BI INCL CAD: CPT

## 2025-01-07 ENCOUNTER — ANCILLARY PROCEDURE (OUTPATIENT)
Dept: GENERAL RADIOLOGY | Facility: CLINIC | Age: 65
End: 2025-01-07
Attending: ORTHOPAEDIC SURGERY
Payer: COMMERCIAL

## 2025-01-07 ENCOUNTER — OFFICE VISIT (OUTPATIENT)
Dept: ORTHOPEDICS | Facility: CLINIC | Age: 65
End: 2025-01-07
Attending: STUDENT IN AN ORGANIZED HEALTH CARE EDUCATION/TRAINING PROGRAM
Payer: COMMERCIAL

## 2025-01-07 VITALS
WEIGHT: 199 LBS | BODY MASS INDEX: 30.16 KG/M2 | SYSTOLIC BLOOD PRESSURE: 126 MMHG | HEIGHT: 68 IN | RESPIRATION RATE: 18 BRPM | HEART RATE: 75 BPM | DIASTOLIC BLOOD PRESSURE: 74 MMHG

## 2025-01-07 DIAGNOSIS — M17.12 PRIMARY OSTEOARTHRITIS OF LEFT KNEE: ICD-10-CM

## 2025-01-07 DIAGNOSIS — S83.232A COMPLEX TEAR OF MEDIAL MENISCUS OF LEFT KNEE AS CURRENT INJURY, INITIAL ENCOUNTER: ICD-10-CM

## 2025-01-07 DIAGNOSIS — M17.12 PRIMARY OSTEOARTHRITIS OF LEFT KNEE: Primary | ICD-10-CM

## 2025-01-07 PROCEDURE — 73562 X-RAY EXAM OF KNEE 3: CPT | Mod: TC | Performed by: INTERNAL MEDICINE

## 2025-01-07 PROCEDURE — 99214 OFFICE O/P EST MOD 30 MIN: CPT | Performed by: ORTHOPAEDIC SURGERY

## 2025-01-07 NOTE — PROGRESS NOTES
Nikki Morales is a 64 year old female who is seen in consultation at the request of Dr. Colón for left knee pain.  She has had knee pain coming on for the last 1-2 years without specific history of injury.  She has sharp, dull, aching, shooting pains at times.  It hurts most with walking.  Better with sitting.  She was most painful this past summer.  She had steroid injections on 12/13/2023, 4/17/2024, and 8/22/2024.  She also had gel injection on 8/1/2024.  The steroids initially worked well, but the gel injection and later steroid injections did not help as much.  She has used meloxicam 7.5 mg daily this summer.  It helped quite a bit then.  It did bother her stomach so she tried to wean away from it.  This winter she has not used it, as her knee is not quite been as bad as it was last summer.  She had previous x-rays showing moderate arthritis of the knee.  She had a recent MRI scan showing grade IV chondromalacia medially and medial meniscus tear.  She was referred for evaluation of possible knee replacement.  She does have grandchildren who are 2 and 4 and likes to be down on the floor playing with them.  She did have several episodes of physical therapy in May and June for the knee.    New x-ray shows advanced of arthritis.  There is now significant wear medially, but not quite bone-on-bone.    Past Medical History:   Diagnosis Date    Attention deficit hyperactivity disorder, inattentive type     Chronic fatigue     COPD (chronic obstructive pulmonary disease) (H) 1990    Depressive disorder     Hyperlipidemia     Hypothyroid     New onset a-fib (H)     Other vitamin B12 deficiency anemia     Primary osteoarthritis of right knee 08/15/2022    Uncomplicated asthma        Past Surgical History:   Procedure Laterality Date    ABDOMEN SURGERY  06/10/1993    C section    COLONOSCOPY  10/06/2009    COLONOSCOPY  07/02/2013    Procedure: COMBINED COLONOSCOPY, SINGLE BIOPSY/POLYPECTOMY BY BIOPSY;;  Surgeon:  Cuate Miles MD;  Location: [facility]    COLONOSCOPY N/A 2018    Procedure: COLONOSCOPY;  Colonoscopy;  Surgeon: Hamzah Dudley DO;  Location: [facility]    COLONOSCOPY N/A 2024    Procedure: Colonoscopy;  Surgeon: Felipe Ch MD;  Location:  GI    COMBINED REPAIR PTOSIS WITH BLEPHAROPLASTY BILATERAL Bilateral 2018    Procedure: COMBINED REPAIR PTOSIS WITH BLEPHAROPLASTY BILATERAL;  Bilateral upper eyelid Blepharoplasty and ptosis repair ;  Surgeon: Martina Andrade MD;  Location: [facility]    CONIZATION CERVIX,KNIFE/LASER      ESOPHAGOSCOPY, GASTROSCOPY, DUODENOSCOPY (EGD), COMBINED  2013    Procedure: COMBINED ESOPHAGOSCOPY, GASTROSCOPY, DUODENOSCOPY (EGD), BIOPSY SINGLE OR MULTIPLE;  egd with rabdain biopsies and colonoscopy with polypectomy by biopsy ;  Surgeon: Cuate Miles MD;  Location:  GI    ESOPHAGOSCOPY, GASTROSCOPY, DUODENOSCOPY (EGD), COMBINED N/A 2024    Procedure: Esophagoscopy, gastroscopy, duodenoscopy, combined;  Surgeon: Felipe Ch MD;  Location:  GI    GENITOURINARY SURGERY  ?     I have a bladder sling    HC DILATION/CURETTAGE DIAG/THER NON OB      D & C    HC REMOVAL OF TONSILS,<11 Y/O      Tonsils <10y.o.    HC UGI ENDOSCOPY DIAG W BIOPSY  2007    HYSTERECTOMY, PAP NO LONGER INDICATED      LAPAROSCOPIC CHOLECYSTECTOMY  10/12/2013    REPAIR PTOSIS      TUBAL LIGATION      Z  DELIVERY ONLY      , Low Cervical    Z  DELIVERY ONLY      Description:  Section;  Recorded: 11/10/2009;  Comments: @ 29 weeks    Z LIGATE FALLOPIAN TUBE      Description: Tubal Ligation;  Recorded: 11/10/2009;    ZZC NONSPECIFIC PROCEDURE  's    sinus    ZZC NONSPECIFIC PROCEDURE      Esophgeal dilatation multiple    ZZC NONSPECIFIC PROCEDURE  2008    sling    ZZC TOTAL ABDOM HYSTERECTOMY      Description: Hysterectomy;  Recorded: 11/10/2009;  Comments: due to cervical dysplasia    ZZC VAGINAL HYSTERECTOMY   1995    Hysterectomy, Vaginal    ZZ[facility] UGI ENDOSCOPY, SIMPLE EXAM  09/10/99 & 98       Family History   Problem Relation Age of Onset    Cancer Mother         Uterine    Other Cancer Mother         First surgery uterine area. Then spread    Diabetes Father     Hypertension Father     Cerebrovascular Disease Father         Age about 63    Prostate Cancer Father     Cardiovascular Sister         recurrent blood clots    Cerebrovascular Disease Sister 51         age 51    Cancer Brother         leukmemia    Coronary Artery Disease Brother         Quadruple bypass    Cerebrovascular Disease Brother         62    Other Cancer Brother         Non-Hodgkin's lymphoma * 2 now lykemia    Coronary Artery Disease Brother         Quadruple bypass also    Hypertension Brother     Cerebrovascular Disease Brother         65    Prostate Cancer Brother     Heart Disease Maternal Grandmother         Heart condition age 96    Diabetes Maternal Grandfather     Cerebrovascular Disease Paternal Grandmother          early 60s    Diabetes Paternal Grandfather     Psychotic Disorder Son         severe Add,     Asthma Son         exercised induced asthma    Depression Son     Substance Abuse Son     Asthma Son         ecxercise induced asthma    Genetic Disorder Cousin         Alpha-1 Antitrypsin Deficiency  Liver transplant    Genetic Disorder Cousin         Idiopathic Trombosenia Purpla       Cerebrovascular Disease Other         Uncle    Cerebrovascular Disease Other         Uncle age? In his 50s    Colon Cancer Other        Social History     Socioeconomic History    Marital status:      Spouse name: Jared    Number of children: 2    Years of education: 12    Highest education level: Not on file   Occupational History    Occupation: HomeMaker     Employer: [employer]   Tobacco Use    Smoking status: Former     Current packs/day: 0.00     Average packs/day: 0.1 packs/day for 40.0 years (4.0 ttl pk-yrs)      Types: Cigarettes     Start date: 1981     Quit date: 2021     Years since quittin.0    Smokeless tobacco: Never    Tobacco comments:     social smoking   Vaping Use    Vaping status: Never Used   Substance and Sexual Activity    Alcohol use: Yes     Alcohol/week: 4.0 standard drinks of alcohol     Comment: social    Drug use: No     Comment: used in past any and all    Sexual activity: Yes     Partners: Male     Birth control/protection: Post-menopausal, Female Surgical     Comment: Hx: hysterectomy   Other Topics Concern     Service No    Blood Transfusions No    Caffeine Concern No    Occupational Exposure No    Hobby Hazards No    Sleep Concern Yes     Comment: Difficult with sleeping at night, sleeps most of the day    Stress Concern No    Weight Concern Yes     Comment: desire wt loss    Special Diet No    Back Care Yes     Comment: weight restrictions    Exercise Yes    Bike Helmet No    Seat Belt Yes    Self-Exams No    Parent/sibling w/ CABG, MI or angioplasty before 65F 55M? No   Social History Narrative    Not on file     Social Drivers of Health     Financial Resource Strain: Low Risk  (12/3/2024)    Financial Resource Strain     Within the past 12 months, have you or your family members you live with been unable to get utilities (heat, electricity) when it was really needed?: No   Food Insecurity: Low Risk  (12/3/2024)    Food Insecurity     Within the past 12 months, did you worry that your food would run out before you got money to buy more?: No     Within the past 12 months, did the food you bought just not last and you didn t have money to get more?: No   Transportation Needs: Low Risk  (12/3/2024)    Transportation Needs     Within the past 12 months, has lack of transportation kept you from medical appointments, getting your medicines, non-medical meetings or appointments, work, or from getting things that you need?: No   Physical Activity: Insufficiently Active (12/3/2024)     Exercise Vital Sign     Days of Exercise per Week: 1 day     Minutes of Exercise per Session: 10 min   Stress: No Stress Concern Present (12/3/2024)    [place] [employer] - Occupational Stress Questionnaire     Feeling of Stress : Only a little   Social Connections: Unknown (12/3/2024)    Social Connection and Isolation Panel [NHANES]     Frequency of Communication with Friends and Family: Not on file     Frequency of Social Gatherings with Friends and Family: Twice a week     Attends Buddhism Services: Not on file     Active Member of Clubs or Organizations: Not on file     Attends Club or Organization Meetings: Not on file     Marital Status: Not on file   Interpersonal Safety: Low Risk  (12/3/2024)    Interpersonal Safety     Do you feel physically and emotionally safe where you currently live?: Yes     Within the past 12 months, have you been hit, slapped, kicked or otherwise physically hurt by someone?: No     Within the past 12 months, have you been humiliated or emotionally abused in other ways by your partner or ex-partner?: No   Housing Stability: High Risk (12/3/2024)    Housing Stability     Do you have housing? : No     Are you worried about losing your housing?: No       Current Outpatient Medications   Medication Sig Dispense Refill    albuterol (PROAIR HFA/PROVENTIL HFA/VENTOLIN HFA) 108 (90 Base) MCG/ACT inhaler Inhale 2 puffs into the lungs every 6 hours as needed for shortness of breath, wheezing or cough. 18 g 4    budesonide (PULMICORT) 0.5 MG/2ML neb solution Mix 2 vials with 1 oz coffee flavoring and drink twice a day and rinse mouth aftrerwards 60 mL 6    budesonide-formoterol (SYMBICORT) 160-4.5 MCG/ACT Inhaler Inhale 2 puffs into the lungs 2 times daily. Stop the flovent when using this inhaler 6 g 4    calcium carbonate (TUMS) 500 MG chewable tablet Take 2 chew tab by mouth 3 times daily as needed for other (bloating/flatulence)      cyanocobalamin (CYANOCOBALAMIN)  1000 mcg/mL injection Inject 1 mL (1,000 mcg) into the muscle every 30 days. 1 mL 1    estradiol (VIVELLE-DOT) 0.075 MG/24HR BIW patch APPLY 1 PATCH TWICE WEEKLY AS DIRECTED      fexofenadine (ALLEGRA) 60 MG tablet TAKE ONE TABLET BY MOUTH TWICE A  tablet 1    ipratropium - albuterol 0.5 mg/2.5 mg/3 mL (DUONEB) 0.5-2.5 (3) MG/3ML neb solution Take 1 vial (3 mLs) by nebulization every 6 hours as needed for shortness of breath, wheezing or cough 90 mL 0    levothyroxine (SYNTHROID/LEVOTHROID) 100 MCG tablet TAKE ONE TABLET BY MOUTH ONCE DAILY 90 tablet 2    lisdexamfetamine (VYVANSE) 10 MG capsule Take 1 capsule (10 mg) by mouth every morning. 30 capsule 0    lisdexamfetamine (VYVANSE) 30 MG capsule Take 1 capsule (30 mg) by mouth every morning. 14 capsule 0    LORazepam (ATIVAN) 1 MG tablet Take 1 tablet (1 mg) by mouth 2 times daily as needed for anxiety. 20 tablet 0    methylphenidate (RITALIN) 10 MG tablet Take 1 tablet (10 mg) by mouth 2 times daily Takes as needed 30 tablet 0    mirabegron (MYRBETRIQ) 25 MG 24 hr tablet Take 1 tablet (25 mg) by mouth daily 90 tablet 1    nitroGLYcerin (NITROSTAT) 0.4 MG sublingual tablet For chest pain place 1 tablet under the tongue every 5 minutes for 3 doses. If symptoms persist 5 minutes after 1st dose call 911. 25 tablet 11    pantoprazole (PROTONIX) 40 MG EC tablet Take 40 mg by mouth daily      rosuvastatin (CRESTOR) 5 MG tablet Take 1 tablet (5 mg) by mouth daily. 90 tablet 3    STATIN NOT PRESCRIBED (INTENTIONAL) Please choose reason not prescribed from choices below.      valACYclovir (VALTREX) 1000 mg tablet Take 1 tablet (1,000 mg) by mouth 2 times daily 20 tablet 3    zolpidem (AMBIEN) 5 MG tablet TAKE TWO TABLETS BY MOUTH EVERY EVENING AS NEEDED FOR SLEEP 60 tablet 5    meloxicam (MOBIC) 7.5 MG tablet Take 1 tablet (7.5 mg) by mouth daily as needed for moderate pain 30 tablet 1       Allergies   Allergen Reactions    Morphine And Codeine Other (See Comments)  "    Causes agitation       REVIEW OF SYSTEMS:  CONSTITUTIONAL:  NEGATIVE for fever, chills, change in weight, not feeling tired  SKIN:  NEGATIVE for worrisome rashes, no skin lumps, no skin ulcers and no non-healing wounds  EYES:  NEGATIVE for vision changes or irritation.  ENT/MOUTH:  NEGATIVE.  No hearing loss, no hoarseness, no difficulty swallowing.  RESP:  NEGATIVE. No cough or shortness of breath.  CV:  NEGATIVE for chest pain, palpitations or peripheral edema  GI:  NEGATIVE for nausea, abdominal pain, heartburn, or change in bowel habits  :  Negative. No dysuria, no hematuria  MUSCULOSKELETAL:  See HPI above  NEURO:  NEGATIVE . No headaches, no dizziness,  no numbness  ENDOCRINE:  NEGATIVE for temperature intolerance, skin/hair changes  HEME/ALLERGY/IMMUNE:  NEGATIVE for bleeding problems  PSYCHIATRIC:  NEGATIVE. no anxiety, no depression.     Exam:  Vitals: /74   Pulse 75   Resp 18   Ht 1.727 m (5' 8\")   Wt 90.3 kg (199 lb)   LMP  (LMP Unknown)   BMI 30.26 kg/m    BMI= Body mass index is 30.26 kg/m .  Constitutional:  healthy, alert and no distress  Neuro: Alert and Oriented x 3, no focal defects.  Psych: Affect normal   Respiratory: Breathing not labored.  Cardiovascular: normal peripheral pulses  Lymph: no adenopathy  Skin: No rashes,worrisome lesions or skin problems  Range of motion of the right knee is 0 to 127 degrees.  Range of motion of the left knee shows 0 to 119 degrees.  She does have a small effusion on the left knee.  She has tenderness both at the medial and lateral aspect of the left knee.  There is no ligamentous laxity of the knees.  No warmth or erythema.  Sensation, motor and circulation are intact.    Assessment:  left knee osteoarthritis - moderate.  Currently not as painful as it was in summer, but she thinks this is because she is less active in winter.  We have extensively discussed options of further anti-inflammatories, steroid injections, other injections, joint " replacement.  We discussed trying to avoid kneeling after a joint replacement, which she would like to continue playing with her grandkids on the floor.  After thorough discussion we decided to try PRP injection of the knee.  She has had a friend who did well with this.  Will refer to sports medicine for PRP injection.

## 2025-01-07 NOTE — LETTER
1/7/2025      Nikki Morales  [address]  [facility] [address]      Dear Colleague,    Thank you for referring your patient, Nikki Morales, to the [facility]. Please see a copy of my visit note below.    Nikki Morales is a 64 year old female who is seen in consultation at the request of Dr. Colón for left knee pain.  She has had knee pain coming on for the last 1-2 years without specific history of injury.  She has sharp, dull, aching, shooting pains at times.  It hurts most with walking.  Better with sitting.  She was most painful this past summer.  She had steroid injections on 12/13/2023, 4/17/2024, and 8/22/2024.  She also had gel injection on 8/1/2024.  The steroids initially worked well, but the gel injection and later steroid injections did not help as much.  She has used meloxicam 7.5 mg daily this summer.  It helped quite a bit then.  It did bother her stomach so she tried to wean away from it.  This winter she has not used it, as her knee is not quite been as bad as it was last summer.  She had previous x-rays showing moderate arthritis of the knee.  She had a recent MRI scan showing grade IV chondromalacia medially and medial meniscus tear.  She was referred for evaluation of possible knee replacement.  She does have grandchildren who are 2 and 4 and likes to be down on the floor playing with them.  She did have several episodes of physical therapy in May and June for the knee.    New x-ray shows advanced of arthritis.  There is now significant wear medially, but not quite bone-on-bone.    Past Medical History:   Diagnosis Date     Attention deficit hyperactivity disorder, inattentive type      Chronic fatigue      COPD (chronic obstructive pulmonary disease) (H) 1990     Depressive disorder      Hyperlipidemia      Hypothyroid      New onset a-fib (H)      Other vitamin B12 deficiency anemia      Primary osteoarthritis of right knee 08/15/2022      Uncomplicated asthma        Past Surgical History:   Procedure Laterality Date     ABDOMEN SURGERY  06/10/1993    C section     COLONOSCOPY  10/06/2009     COLONOSCOPY  2013    Procedure: COMBINED COLONOSCOPY, SINGLE BIOPSY/POLYPECTOMY BY BIOPSY;;  Surgeon: Cuate Miles MD;  Location: [facility]     COLONOSCOPY N/A 2018    Procedure: COLONOSCOPY;  Colonoscopy;  Surgeon: Hamzah Dudley DO;  Location: [facility]     COLONOSCOPY N/A 2024    Procedure: Colonoscopy;  Surgeon: Felipe Ch MD;  Location: [facility]     COMBINED REPAIR PTOSIS WITH BLEPHAROPLASTY BILATERAL Bilateral 2018    Procedure: COMBINED REPAIR PTOSIS WITH BLEPHAROPLASTY BILATERAL;  Bilateral upper eyelid Blepharoplasty and ptosis repair ;  Surgeon: Martina Andrade MD;  Location: [facility]     CONIZATION CERVIX,KNIFE/LASER       ESOPHAGOSCOPY, GASTROSCOPY, DUODENOSCOPY (EGD), COMBINED  2013    Procedure: COMBINED ESOPHAGOSCOPY, GASTROSCOPY, DUODENOSCOPY (EGD), BIOPSY SINGLE OR MULTIPLE;  egd with rabdain biopsies and colonoscopy with polypectomy by biopsy ;  Surgeon: Cuate Miles MD;  Location:  GI     ESOPHAGOSCOPY, GASTROSCOPY, DUODENOSCOPY (EGD), COMBINED N/A 2024    Procedure: Esophagoscopy, gastroscopy, duodenoscopy, combined;  Surgeon: Felipe Ch MD;  Location:  GI     GENITOURINARY SURGERY  ?     I have a bladder sling     HC DILATION/CURETTAGE DIAG/THER NON OB      D & C     HC REMOVAL OF TONSILS,<11 Y/O      Tonsils <10y.o.     [facility] UGI ENDOSCOPY DIAG W BIOPSY  2007     HYSTERECTOMY, PAP NO LONGER INDICATED       LAPAROSCOPIC CHOLECYSTECTOMY  10/12/2013     REPAIR PTOSIS       TUBAL LIGATION       [facility]  DELIVERY ONLY      , Low Cervical     Z  DELIVERY ONLY      Description:  Section;  Recorded: 11/10/2009;  Comments: @ 29 weeks     [facility] LIGATE FALLOPIAN TUBE      Description: Tubal Ligation;  Recorded: 11/10/2009;     [facility] NONSPECIFIC PROCEDURE   2000's    sinus     [facility] NONSPECIFIC PROCEDURE      Esophgeal dilatation multiple     [facility] NONSPECIFIC PROCEDURE  2008    sling     [facility] TOTAL ABDOM HYSTERECTOMY      Description: Hysterectomy;  Recorded: 11/10/2009;  Comments: due to cervical dysplasia     [facility] VAGINAL HYSTERECTOMY  1995    Hysterectomy, Vaginal     [facility] UGI ENDOSCOPY, SIMPLE EXAM  09/10/99 & 98       Family History   Problem Relation Age of Onset     Cancer Mother         Uterine     Other Cancer Mother         First surgery uterine area. Then spread     Diabetes Father      Hypertension Father      Cerebrovascular Disease Father         Age about 63     Prostate Cancer Father      Cardiovascular Sister         recurrent blood clots     Cerebrovascular Disease Sister 51         age 51     Cancer Brother         leukmemia     Coronary Artery Disease Brother         Quadruple bypass     Cerebrovascular Disease Brother         62     Other Cancer Brother         Non-Hodgkin's lymphoma * 2 now lykemia     Coronary Artery Disease Brother         Quadruple bypass also     Hypertension Brother      Cerebrovascular Disease Brother         65     Prostate Cancer Brother      Heart Disease Maternal Grandmother         Heart condition age 96     Diabetes Maternal Grandfather      Cerebrovascular Disease Paternal Grandmother          early 60s     Diabetes Paternal Grandfather      Psychotic Disorder Son         severe Add,      Asthma Son         exercised induced asthma     Depression Son      Substance Abuse Son      Asthma Son         ecxercise induced asthma     Genetic Disorder Cousin         Alpha-1 Antitrypsin Deficiency  Liver transplant     Genetic Disorder Cousin         Idiopathic Trombosenia Purpla        Cerebrovascular Disease Other         Uncle     Cerebrovascular Disease Other         Uncle age? In his 50s     Colon Cancer Other        Social History     Socioeconomic History     Marital status:      Spouse name:  Jared     Number of children: 2     Years of education: 12     Highest education level: Not on file   Occupational History     Occupation: HomeMaker     Employer: [employer]   Tobacco Use     Smoking status: Former     Current packs/day: 0.00     Average packs/day: 0.1 packs/day for 40.0 years (4.0 ttl pk-yrs)     Types: Cigarettes     Start date: 1981     Quit date: 2021     Years since quittin.0     Smokeless tobacco: Never     Tobacco comments:     social smoking   Vaping Use     Vaping status: Never Used   Substance and Sexual Activity     Alcohol use: Yes     Alcohol/week: 4.0 standard drinks of alcohol     Comment: social     Drug use: No     Comment: used in past any and all     Sexual activity: Yes     Partners: Male     Birth control/protection: Post-menopausal, Female Surgical     Comment: Hx: hysterectomy   Other Topics Concern      Service No     Blood Transfusions No     Caffeine Concern No     Occupational Exposure No     Hobby Hazards No     Sleep Concern Yes     Comment: Difficult with sleeping at night, sleeps most of the day     Stress Concern No     Weight Concern Yes     Comment: desire wt loss     Special Diet No     Back Care Yes     Comment: weight restrictions     Exercise Yes     Bike Helmet No     Seat Belt Yes     Self-Exams No     Parent/sibling w/ CABG, MI or angioplasty before 65F 55M? No   Social History Narrative     Not on file     Social Drivers of Health     Financial Resource Strain: Low Risk  (12/3/2024)    Financial Resource Strain      Within the past 12 months, have you or your family members you live with been unable to get utilities (heat, electricity) when it was really needed?: No   Food Insecurity: Low Risk  (12/3/2024)    Food Insecurity      Within the past 12 months, did you worry that your food would run out before you got money to buy more?: No      Within the past 12 months, did the food you bought just not last and you didn t have money to get  more?: No   Transportation Needs: Low Risk  (12/3/2024)    Transportation Needs      Within the past 12 months, has lack of transportation kept you from medical appointments, getting your medicines, non-medical meetings or appointments, work, or from getting things that you need?: No   Physical Activity: Insufficiently Active (12/3/2024)    Exercise Vital Sign      Days of Exercise per Week: 1 day      Minutes of Exercise per Session: 10 min   Stress: No Stress Concern Present (12/3/2024)    [employer] - Occupational Stress Questionnaire      Feeling of Stress : Only a little   Social Connections: Unknown (12/3/2024)    Social Connection and Isolation Panel [NHANES]      Frequency of Communication with Friends and Family: Not on file      Frequency of Social Gatherings with Friends and Family: Twice a week      Attends Church Services: Not on file      Active Member of Clubs or Organizations: Not on file      Attends Club or Organization Meetings: Not on file      Marital Status: Not on file   Interpersonal Safety: Low Risk  (12/3/2024)    Interpersonal Safety      Do you feel physically and emotionally safe where you currently live?: Yes      Within the past 12 months, have you been hit, slapped, kicked or otherwise physically hurt by someone?: No      Within the past 12 months, have you been humiliated or emotionally abused in other ways by your partner or ex-partner?: No   Housing Stability: High Risk (12/3/2024)    Housing Stability      Do you have housing? : No      Are you worried about losing your housing?: No       Current Outpatient Medications   Medication Sig Dispense Refill     albuterol (PROAIR HFA/PROVENTIL HFA/VENTOLIN HFA) 108 (90 Base) MCG/ACT inhaler Inhale 2 puffs into the lungs every 6 hours as needed for shortness of breath, wheezing or cough. 18 g 4     budesonide (PULMICORT) 0.5 MG/2ML neb solution Mix 2 vials with 1 oz coffee flavoring and drink twice a day  and rinse mouth aftrerwards 60 mL 6     budesonide-formoterol (SYMBICORT) 160-4.5 MCG/ACT Inhaler Inhale 2 puffs into the lungs 2 times daily. Stop the flovent when using this inhaler 6 g 4     calcium carbonate (TUMS) 500 MG chewable tablet Take 2 chew tab by mouth 3 times daily as needed for other (bloating/flatulence)       cyanocobalamin (CYANOCOBALAMIN) 1000 mcg/mL injection Inject 1 mL (1,000 mcg) into the muscle every 30 days. 1 mL 1     estradiol (VIVELLE-DOT) 0.075 MG/24HR BIW patch APPLY 1 PATCH TWICE WEEKLY AS DIRECTED       fexofenadine (ALLEGRA) 60 MG tablet TAKE ONE TABLET BY MOUTH TWICE A  tablet 1     ipratropium - albuterol 0.5 mg/2.5 mg/3 mL (DUONEB) 0.5-2.5 (3) MG/3ML neb solution Take 1 vial (3 mLs) by nebulization every 6 hours as needed for shortness of breath, wheezing or cough 90 mL 0     levothyroxine (SYNTHROID/LEVOTHROID) 100 MCG tablet TAKE ONE TABLET BY MOUTH ONCE DAILY 90 tablet 2     lisdexamfetamine (VYVANSE) 10 MG capsule Take 1 capsule (10 mg) by mouth every morning. 30 capsule 0     lisdexamfetamine (VYVANSE) 30 MG capsule Take 1 capsule (30 mg) by mouth every morning. 14 capsule 0     LORazepam (ATIVAN) 1 MG tablet Take 1 tablet (1 mg) by mouth 2 times daily as needed for anxiety. 20 tablet 0     methylphenidate (RITALIN) 10 MG tablet Take 1 tablet (10 mg) by mouth 2 times daily Takes as needed 30 tablet 0     mirabegron (MYRBETRIQ) 25 MG 24 hr tablet Take 1 tablet (25 mg) by mouth daily 90 tablet 1     nitroGLYcerin (NITROSTAT) 0.4 MG sublingual tablet For chest pain place 1 tablet under the tongue every 5 minutes for 3 doses. If symptoms persist 5 minutes after 1st dose call 911. 25 tablet 11     pantoprazole (PROTONIX) 40 MG EC tablet Take 40 mg by mouth daily       rosuvastatin (CRESTOR) 5 MG tablet Take 1 tablet (5 mg) by mouth daily. 90 tablet 3     STATIN NOT PRESCRIBED (INTENTIONAL) Please choose reason not prescribed from choices below.       valACYclovir (VALTREX)  "1000 mg tablet Take 1 tablet (1,000 mg) by mouth 2 times daily 20 tablet 3     zolpidem (AMBIEN) 5 MG tablet TAKE TWO TABLETS BY MOUTH EVERY EVENING AS NEEDED FOR SLEEP 60 tablet 5     meloxicam (MOBIC) 7.5 MG tablet Take 1 tablet (7.5 mg) by mouth daily as needed for moderate pain 30 tablet 1       Allergies   Allergen Reactions     Morphine And Codeine Other (See Comments)     Causes agitation       REVIEW OF SYSTEMS:  CONSTITUTIONAL:  NEGATIVE for fever, chills, change in weight, not feeling tired  SKIN:  NEGATIVE for worrisome rashes, no skin lumps, no skin ulcers and no non-healing wounds  EYES:  NEGATIVE for vision changes or irritation.  ENT/MOUTH:  NEGATIVE.  No hearing loss, no hoarseness, no difficulty swallowing.  RESP:  NEGATIVE. No cough or shortness of breath.  CV:  NEGATIVE for chest pain, palpitations or peripheral edema  GI:  NEGATIVE for nausea, abdominal pain, heartburn, or change in bowel habits  :  Negative. No dysuria, no hematuria  MUSCULOSKELETAL:  See HPI above  NEURO:  NEGATIVE . No headaches, no dizziness,  no numbness  ENDOCRINE:  NEGATIVE for temperature intolerance, skin/hair changes  HEME/ALLERGY/IMMUNE:  NEGATIVE for bleeding problems  PSYCHIATRIC:  NEGATIVE. no anxiety, no depression.     Exam:  Vitals: /74   Pulse 75   Resp 18   Ht 1.727 m (5' 8\")   Wt 90.3 kg (199 lb)   LMP  (LMP Unknown)   BMI 30.26 kg/m    BMI= Body mass index is 30.26 kg/m .  Constitutional:  healthy, alert and no distress  Neuro: Alert and Oriented x 3, no focal defects.  Psych: Affect normal   Respiratory: Breathing not labored.  Cardiovascular: normal peripheral pulses  Lymph: no adenopathy  Skin: No rashes,worrisome lesions or skin problems  Range of motion of the right knee is 0 to 127 degrees.  Range of motion of the left knee shows 0 to 119 degrees.  She does have a small effusion on the left knee.  She has tenderness both at the medial and lateral aspect of the left knee.  There is no " ligamentous laxity of the knees.  No warmth or erythema.  Sensation, motor and circulation are intact.    Assessment:  left knee osteoarthritis - moderate.  Currently not as painful as it was in summer, but she thinks this is because she is less active in winter.  We have extensively discussed options of further anti-inflammatories, steroid injections, other injections, joint replacement.  We discussed trying to avoid kneeling after a joint replacement, which she would like to continue playing with her grandkids on the floor.  After thorough discussion we decided to try PRP injection of the knee.  She has had a friend who did well with this.  Will refer to sports medicine for PRP injection.    Again, thank you for allowing me to participate in the care of your patient.        Sincerely,        Farhat Montanez MD    Electronically signed

## 2025-01-07 NOTE — PATIENT INSTRUCTIONS
Meir ([place] or [place]) or Veronica Haley ([place] or [place])     What is PRP?   Platelet-rich plasm  (PRP) is a procedure in which the platelets/healing factors are concentrated using the patient's own blood.  The procedure is used to stimulate the body's own natural healing response.    What MSK Conditions Can be PRP be Used for?  Tendinosis/tendonitis  Acute and Chronic muscle strains/tears  Osteoarthritis   Ligament Sprains    How does it Work?  The day of the procedure a small amount of blood will be drawn from the patient's arm.  The blood is then placed into a centrifuge to separate the red blood cells and other blood components to produce PRP.  Using ultrasound guidance to ensure accuracy, the concentrated PRP is then injected into the injured area/tissue.  Following the injection, the PRP releases cell-growth factors that trigger the body's natural healing response.  You will experience increased inflammation and soreness for several days following the procedure.  You should treat area as a new injured and may be placed on crutches or a sling for a short-period.  PRP can usually expect relief 6 - 8 weeks following the procedure.  For chronic issues a repeat injection is sometimes needed.  The decision for repeat injections will be made between you and your doctor.    Is PRP Covered by Health Insurance?  PRP is considered an experimental procedure that is cash based at this time.  [facility] currently charges $800/body part or $1200 for multiple sites in a single day.  Payment for the procedure is due the day of the injection.    It is possible that your HSA dollars may be used for the procedure, however patients are encouraged to check with their insurance first.      What to do Prior to your PRP Procedure?  Patients should not use any anti-inflammatory medications/NSAIDs (like aspirin, naproxen, ibuprofen, diclofenac,  ) for at least 3 weeks prior to the injection.  You may use Tylenol/acetaminophen  for pain control as needed.  Patients on long-term anticoagulation medications like Warfarin/Coumadin, Eliquis, Lovenox, or Plavix should discuss holding the medication for 1 - 2 days with anticoagulation clinic.  Please hydrate by drinking 8 glasses (64 oz) of water within 24 hours of the procedure.  Arrange for someone to drive you home after the procedure    What to Expect Day of Procedure?  Forms will be completed and payment of ($800/1200) is due when checking in for the procedure.  Your arm will be cleaned, and a small amount of blood will be drawn.  The blood will then be taken to a centrifuge to spun down to separate the PRP from the whole blood.    After the PRP is  you will be injected with the concentrated PRP under ultrasound guidance.    Please plan for this appointment to take ~ 60 minutes to complete.    After the PRP Procedure?  You may experience increased achiness and soreness for 3 - 5 days following the procedure.  Your provider may discuss providing a small amount of narcotic pain medication to help with pain.  Please schedule to have another person drive you home following your procedure.  Patients should not use any anti-inflammatory medications/NSAIDs (like aspirin, naproxen, ibuprofen, diclofenac,  ) for at least 3 weeks following the procedure.  Avoid using ice on the area for ~ 2 weeks.  You may use Tylenol/acetaminophen for pain control as needed.    Plan to rest the remainder of the day and for approximately 3 days following the procedure.    You may be in a sling or on crutches for ~ 3 - 5 days.  Plan to provide the clinic with an update either via phone or mychart approximately 2 weeks after your procedure.  We will schedule a follow up appointment for approximately 6 weeks after the procedure to formally reassess your pain levels.    Specific physical therapy instructions will be provided if needed.  Most patients require only a single treatment, depending on the degree of  injury.  However each individual case is different and repeat treatments may be indicated. The decision for repeat injections will be made between you and your doctor.      Please contact (clinic #) or reach out via [facility] [employer] to schedule your PRP procedure or with further questions for your clinical teams.

## 2025-01-08 ENCOUNTER — PATIENT OUTREACH (OUTPATIENT)
Dept: CARE COORDINATION | Facility: CLINIC | Age: 65
End: 2025-01-08
Payer: COMMERCIAL

## 2025-01-22 ENCOUNTER — TELEPHONE (OUTPATIENT)
Dept: ORTHOPEDICS | Facility: CLINIC | Age: 65
End: 2025-01-22
Payer: COMMERCIAL

## 2025-01-22 NOTE — TELEPHONE ENCOUNTER
Other: Patient calling about prior auth for PRP injection looking for possibly both knees if covered would like to know if she should continue with appointment or not      Could we send this information to you in "[employer] or would you prefer to receive a phone call?:   Patient would prefer a phone call   Okay to leave a detailed message?: Yes at Cell number on file:    Telephone Information:   Mobile [phone]

## 2025-01-23 NOTE — TELEPHONE ENCOUNTER
ATC spoke with patient regarding her appointment on 1/30/2025 with Dr. Wise for a consultation for a left knee PRP injection per discuss with Dr. Montanez on 1/7/2025. ATC explained that Dr. Wise will be able to discuss all non-operative treatments including PRP injections. ATC explained that PRP injections are rarely covered by insurance companies and that the out of pocket expense will be ~$900.    The patient explained that she spoke with her insurance company [employer] and was told that the PRP is authorized with coverage of 80/20. ATC explained that as clinic staff we have not heard this and have had many issues with multiple insurance companies when it comes to coverage for PRP injections. ATC explained we are happy to provide the necessary documentation to her insurance company if it is provided to us as the clinic staff have difficulties speaking with insurance companies about PRP injections.     [facility] will connect with prior authorization department to see if there has been an update in 2025 about coverage for PRP injection.    THOMAS Collado

## 2025-01-23 NOTE — TELEPHONE ENCOUNTER
REASON FOR VISIT: Primary osteoarthritis of left knee    DATE OF APPT: 1/30/2025   NOTES (FOR ALL VISITS) STATUS DETAILS   OFFICE NOTE from referring provider Internal [facility]  Farhat Montanez MD 1/07/2025   OFFICE NOTE from other specialist Internal [facility]  Montana Colón DO 12/19/2024   EMG N/A    MEDICATION LIST N/A    IMAGING  (FOR ALL VISITS)     XR Internal [facility]  XR Knee left 1/07/2025   MRI (HEAD, NECK, SPINE) Internal [facility]  MR Knee left 12/16/2024   CT (HEAD, NECK, SPINE) N/A

## 2025-01-30 ENCOUNTER — PRE VISIT (OUTPATIENT)
Dept: ORTHOPEDICS | Facility: CLINIC | Age: 65
End: 2025-01-30

## 2025-01-30 ENCOUNTER — OFFICE VISIT (OUTPATIENT)
Dept: ORTHOPEDICS | Facility: CLINIC | Age: 65
End: 2025-01-30
Attending: ORTHOPAEDIC SURGERY
Payer: COMMERCIAL

## 2025-01-30 DIAGNOSIS — S83.232A COMPLEX TEAR OF MEDIAL MENISCUS OF LEFT KNEE AS CURRENT INJURY, INITIAL ENCOUNTER: ICD-10-CM

## 2025-01-30 DIAGNOSIS — M17.12 PRIMARY OSTEOARTHRITIS OF LEFT KNEE: ICD-10-CM

## 2025-01-30 DIAGNOSIS — M21.41 PES PLANUS OF BOTH FEET: Primary | ICD-10-CM

## 2025-01-30 DIAGNOSIS — M21.42 PES PLANUS OF BOTH FEET: Primary | ICD-10-CM

## 2025-01-30 RX ORDER — LIDOCAINE HYDROCHLORIDE 10 MG/ML
4 INJECTION, SOLUTION INFILTRATION; PERINEURAL
Status: COMPLETED | OUTPATIENT
Start: 2025-01-30 | End: 2025-01-30

## 2025-01-30 RX ORDER — TRIAMCINOLONE ACETONIDE 40 MG/ML
40 INJECTION, SUSPENSION INTRA-ARTICULAR; INTRAMUSCULAR
Status: COMPLETED | OUTPATIENT
Start: 2025-01-30 | End: 2025-01-30

## 2025-01-30 RX ADMIN — LIDOCAINE HYDROCHLORIDE 4 ML: 10 INJECTION, SOLUTION INFILTRATION; PERINEURAL at 11:35

## 2025-01-30 RX ADMIN — TRIAMCINOLONE ACETONIDE 40 MG: 40 INJECTION, SUSPENSION INTRA-ARTICULAR; INTRAMUSCULAR at 11:35

## 2025-01-30 ASSESSMENT — PAIN SCALES - GENERAL: PAINLEVEL_OUTOF10: MODERATE PAIN (5)

## 2025-01-30 NOTE — LETTER
1/30/2025      Nikki Morales  [address]  [facility] [address]      Dear Colleague,    Thank you for referring your patient, Nikki Morales, to the [facility] [place]. Please see a copy of my visit note below.        CHIEF COMPLAINT:  Pain and New Patient of the Left Knee (PRP discussion)       HISTORY OF PRESENT ILLNESS  Ms. Morales is a 64 year old female who presents to clinic today with left knee osteoarthritis.  Margarita is here today to discuss PRP injections at the request of Dr. Montanez.  She has knee arthritis and resultant pain that is persistent and worsening.  She has had a corticosteroid injection in the past, she has performed physical therapy, she has also had a hyaluronic acid injection and tried analgesics.          Additional history: as documented    MEDICAL HISTORY  Patient Active Problem List   Diagnosis     Chronic fatigue syndrome     ESOPHAGEAL REFLUX     Herpes simplex virus (HSV) infection     HYPERLIPIDEMIA LDL GOAL <130     Eosinophilic esophagitis     Obesity (BMI 30.0-34.9)     Degenerative arthritis of cervical spine with cord compression     Hypothyroidism due to acquired atrophy of thyroid     Mild persistent asthma without complication     KAILEE (generalized anxiety disorder)     Moderate recurrent major depression (H)     Mixed incontinence     Primary osteoarthritis of right knee     Fibromyalgia     Degeneration of lumbar or lumbosacral intervertebral disc     Vitamin B12 deficiency     Vitamin D deficiency     FH: CVA - parents and several siblings     Prediabetes     Bilateral lower extremity pain     Peripheral polyneuropathy     Exertional angina     Primary osteoarthritis of left knee       Current Outpatient Medications   Medication Sig Dispense Refill     albuterol (PROAIR HFA/PROVENTIL HFA/VENTOLIN HFA) 108 (90 Base) MCG/ACT inhaler Inhale 2 puffs into the lungs every 6 hours as needed for shortness of breath,  wheezing or cough. 18 g 4     budesonide (PULMICORT) 0.5 MG/2ML neb solution Mix 2 vials with 1 oz coffee flavoring and drink twice a day and rinse mouth aftrerwards 60 mL 6     budesonide-formoterol (SYMBICORT) 160-4.5 MCG/ACT Inhaler Inhale 2 puffs into the lungs 2 times daily. Stop the flovent when using this inhaler 6 g 4     calcium carbonate (TUMS) 500 MG chewable tablet Take 2 chew tab by mouth 3 times daily as needed for other (bloating/flatulence)       cyanocobalamin (CYANOCOBALAMIN) 1000 mcg/mL injection Inject 1 mL (1,000 mcg) into the muscle every 30 days. 1 mL 1     estradiol (VIVELLE-DOT) 0.075 MG/24HR BIW patch APPLY 1 PATCH TWICE WEEKLY AS DIRECTED       fexofenadine (ALLEGRA) 60 MG tablet TAKE ONE TABLET BY MOUTH TWICE A  tablet 1     ipratropium - albuterol 0.5 mg/2.5 mg/3 mL (DUONEB) 0.5-2.5 (3) MG/3ML neb solution Take 1 vial (3 mLs) by nebulization every 6 hours as needed for shortness of breath, wheezing or cough 90 mL 0     levothyroxine (SYNTHROID/LEVOTHROID) 100 MCG tablet TAKE ONE TABLET BY MOUTH ONCE DAILY 90 tablet 2     lisdexamfetamine (VYVANSE) 10 MG capsule Take 1 capsule (10 mg) by mouth every morning. 30 capsule 0     lisdexamfetamine (VYVANSE) 30 MG capsule Take 1 capsule (30 mg) by mouth every morning. 14 capsule 0     LORazepam (ATIVAN) 1 MG tablet Take 1 tablet (1 mg) by mouth 2 times daily as needed for anxiety. 20 tablet 0     methylphenidate (RITALIN) 10 MG tablet Take 1 tablet (10 mg) by mouth 2 times daily Takes as needed 30 tablet 0     mirabegron (MYRBETRIQ) 25 MG 24 hr tablet Take 1 tablet (25 mg) by mouth daily 90 tablet 1     nitroGLYcerin (NITROSTAT) 0.4 MG sublingual tablet For chest pain place 1 tablet under the tongue every 5 minutes for 3 doses. If symptoms persist 5 minutes after 1st dose call 911. 25 tablet 11     pantoprazole (PROTONIX) 40 MG EC tablet Take 40 mg by mouth daily       rosuvastatin (CRESTOR) 5 MG tablet Take 1 tablet (5 mg) by mouth  daily. 90 tablet 3     STATIN NOT PRESCRIBED (INTENTIONAL) Please choose reason not prescribed from choices below.       valACYclovir (VALTREX) 1000 mg tablet Take 1 tablet (1,000 mg) by mouth 2 times daily 20 tablet 3     zolpidem (AMBIEN) 5 MG tablet TAKE TWO TABLETS BY MOUTH EVERY EVENING AS NEEDED FOR SLEEP 60 tablet 5     meloxicam (MOBIC) 7.5 MG tablet Take 1 tablet (7.5 mg) by mouth daily as needed for moderate pain 30 tablet 1       Allergies   Allergen Reactions     Morphine And Codeine Other (See Comments)     Causes agitation       Family History   Problem Relation Age of Onset     Cancer Mother         Uterine     Other Cancer Mother         First surgery uterine area. Then spread     Diabetes Father      Hypertension Father      Cerebrovascular Disease Father         Age about 63     Prostate Cancer Father      Cardiovascular Sister         recurrent blood clots     Cerebrovascular Disease Sister 51         age 51     Cancer Brother         leukmemia     Coronary Artery Disease Brother         Quadruple bypass     Cerebrovascular Disease Brother         62     Other Cancer Brother         Non-Hodgkin's lymphoma * 2 now lykemia     Coronary Artery Disease Brother         Quadruple bypass also     Hypertension Brother      Cerebrovascular Disease Brother         65     Prostate Cancer Brother      Heart Disease Maternal Grandmother         Heart condition age 96     Diabetes Maternal Grandfather      Cerebrovascular Disease Paternal Grandmother          early 60s     Diabetes Paternal Grandfather      Psychotic Disorder Son         severe Add,      Asthma Son         exercised induced asthma     Depression Son      Substance Abuse Son      Asthma Son         ecxercise induced asthma     Genetic Disorder Cousin         Alpha-1 Antitrypsin Deficiency  Liver transplant     Genetic Disorder Cousin         Idiopathic Trombosenia Purpla        Cerebrovascular Disease Other         Uncle      Cerebrovascular Disease Other         Uncle age? In his 50s     Colon Cancer Other        Additional medical/Social/Surgical histories reviewed in [facility] and updated as appropriate.        PHYSICAL EXAM  General  - normal appearance, in no obvious distress  Musculoskeletal - left knee  - stance: mildly antalgic gait  - inspection: trace effusion  - palpation: medial joint line tenderness  - ROM: 120 degrees flexion, 0 degrees extension, painful active ROM  - strength: 5/5 in flexion, 5/5 in extension  - special tests:  (-) Chaparro  (-) varus at 0 and 30 degrees flexion  (-) valgus at 0 and 30 degrees flexion  Neuro  - no sensory or motor deficit, grossly normal coordination, normal muscle tone         {Diagnostic Test Results (Optional):640779}     ASSESSMENT & PLAN  Ms. Mroales is a 64 year old female who presents to clinic today with left knee osteoarthritis.    I reviewed her most recent x-ray in the room with her, this does reveal severe medial compartment predominant osteoarthritis.    Margarita and I had a good discussion centering around the spectrum of treatment options for osteoarthritis that preclude surgery.  We discussed nonoperative treatment with pain relievers, icing, low impact exercise, and weight control.  We also talked about the role of injection based therapies.  She is familiar with this discussion given her previous treatments.    We discussed PRP in some depth, this discussion included the fact that PRP is not a permanent treatment, but may give her some pain relief for some time.  She is going to contemplate this, I am going to write a letter of support for insurance coverage given her failure of conservative therapy thus far.    We also discussed global care for her osteoarthritis in the form of exercise based treatment, footwear, and orthotics.  I am referring her to our orthotist to have custom orthotics made.    Lastly, she is interested in a repeat corticosteroid injection today, which we  did perform through shared decision making.    She is going to contemplate PRP further as well.    It was a pleasure seeing Nikki today.    Greater than 30 minutes were spent on the day of visit reviewing records, in direct face-to-face consultation and exam, and documentation independent of the procedure.       Farhat Wise DO, [facility]  Primary Care Sports Medicine      This note was constructed using Dragon dictation software, please excuse any minor errors in spelling, grammar, or syntax.                Large Joint Injection/Arthocentesis: L knee joint    Date/Time: 1/30/2025 11:35 AM    Performed by: Farhat Wise DO  Authorized by: Farhat Wise DO    Indications:  Pain  Needle Size:  22 G  Guidance: landmark guided    Approach:  Lateral  Location:  Knee      Medications:  40 mg triamcinolone 40 MG/ML; 4 mL lidocaine 1 %  Outcome:  Tolerated well, no immediate complications  Procedure discussed: discussed risks, benefits, and alternatives    Consent Given by:  Patient  Timeout: timeout called immediately prior to procedure    Prep: patient was prepped and draped in usual sterile fashion       PROCEDURE    Knee Injection - Intraarticular  The patient was informed of the risks and the benefits of the procedure and a written consent was signed.  The patient's left knee was prepped with chlorhexidine in sterile fashion.   40 mg of triamcinolone suspension was drawn up into a 5 mL syringe with 4 mL of 1% lidocaine.  Injection was performed using substerile technique.  A 1.5-inch 22-gauge needle was used to enter the lateral aspect of the knee.  Injection performed successfully without difficulty.  There were no complications. The patient tolerated the procedure well. There was negligible bleeding.                Again, thank you for allowing me to participate in the care of your patient.        Sincerely,        Farhat Wise DO    Electronically signed

## 2025-01-30 NOTE — NURSING NOTE
[facility]   ORTHOPEDICS & SPORTS MEDICINE  [address] [place] [address]  Dept: [phone]  ______________________________________________________________________________    Patient: Nikki Morales   : 1960   MRN: [id number]   2025    INVASIVE PROCEDURE SAFETY CHECKLIST    Date: 2025   Procedure:Left knee injection  Patient Name: Nikki Morales  MRN: [id number]  YOB: 1960    Action: Complete sections as appropriate. Any discrepancy results in a HARD COPY until resolved.     PRE PROCEDURE:  Patient ID verified with 2 identifiers (name and  or MRN): Yes  Procedure and site verified with patient/designee (when able): Yes  Accurate consent documentation in medical record: Yes  H&P (or appropriate assessment) documented in medical record: Yes  H&P must be up to 20 days prior to procedure and updates within 24 hours of procedure as applicable: NA  Relevant diagnostic and radiology test results appropriately labeled and displayed as applicable: NA  Procedure site(s) marked with provider initials: NA    TIMEOUT:  Time-Out performed immediately prior to starting procedure, including verbal and active participation of all team members addressing the following:Yes  * Correct patient identify  * Confirmed that the correct side and site are marked  * An accurate procedure consent form  * Agreement on the procedure to be done  * Correct patient position  * Relevant images and results are properly labeled and appropriately displayed  * The need to administer antibiotics or fluids for irrigation purposes during the procedure as applicable   * Safety precautions based on patient history or medication use    DURING PROCEDURE: Verification of correct person, site, and procedures any time the responsibility for care of the patient is transferred to another member of the care team.       Prior to injection, verified patient identity using patient's name and  date of birth.  Due to injection administration, patient instructed to remain in clinic for 15 minutes  afterwards, and to report any adverse reaction to me immediately.    Joint injection was performed.      Drug Amount Wasted:  None.  Vial/Syringe: Single dose vial  Expiration Date:  9/30/2026      KIRSTEN Cervantes  January 30, 2025

## 2025-01-30 NOTE — PROGRESS NOTES
CHIEF COMPLAINT:  Pain and New Patient of the Left Knee (PRP discussion)       HISTORY OF PRESENT ILLNESS  Ms. Morales is a 64 year old female who presents to clinic today with left knee osteoarthritis.  Margarita is here today to discuss PRP injections at the request of Dr. Montanez.  She has knee arthritis and resultant pain that is persistent and worsening.  She has had a corticosteroid injection in the past, she has performed physical therapy, she has also had a hyaluronic acid injection and tried analgesics.          Additional history: as documented    MEDICAL HISTORY  Patient Active Problem List   Diagnosis    Chronic fatigue syndrome    ESOPHAGEAL REFLUX    Herpes simplex virus (HSV) infection    HYPERLIPIDEMIA LDL GOAL <130    Eosinophilic esophagitis    Obesity (BMI 30.0-34.9)    Degenerative arthritis of cervical spine with cord compression    Hypothyroidism due to acquired atrophy of thyroid    Mild persistent asthma without complication    KAILEE (generalized anxiety disorder)    Moderate recurrent major depression (H)    Mixed incontinence    Primary osteoarthritis of right knee    Fibromyalgia    Degeneration of lumbar or lumbosacral intervertebral disc    Vitamin B12 deficiency    Vitamin D deficiency    FH: CVA - parents and several siblings    Prediabetes    Bilateral lower extremity pain    Peripheral polyneuropathy    Exertional angina    Primary osteoarthritis of left knee       Current Outpatient Medications   Medication Sig Dispense Refill    albuterol (PROAIR HFA/PROVENTIL HFA/VENTOLIN HFA) 108 (90 Base) MCG/ACT inhaler Inhale 2 puffs into the lungs every 6 hours as needed for shortness of breath, wheezing or cough. 18 g 4    budesonide (PULMICORT) 0.5 MG/2ML neb solution Mix 2 vials with 1 oz coffee flavoring and drink twice a day and rinse mouth aftrerwards 60 mL 6    budesonide-formoterol (SYMBICORT) 160-4.5 MCG/ACT Inhaler Inhale 2 puffs into the lungs 2 times daily. Stop the flovent when  using this inhaler 6 g 4    calcium carbonate (TUMS) 500 MG chewable tablet Take 2 chew tab by mouth 3 times daily as needed for other (bloating/flatulence)      cyanocobalamin (CYANOCOBALAMIN) 1000 mcg/mL injection Inject 1 mL (1,000 mcg) into the muscle every 30 days. 1 mL 1    estradiol (VIVELLE-DOT) 0.075 MG/24HR BIW patch APPLY 1 PATCH TWICE WEEKLY AS DIRECTED      fexofenadine (ALLEGRA) 60 MG tablet TAKE ONE TABLET BY MOUTH TWICE A  tablet 1    ipratropium - albuterol 0.5 mg/2.5 mg/3 mL (DUONEB) 0.5-2.5 (3) MG/3ML neb solution Take 1 vial (3 mLs) by nebulization every 6 hours as needed for shortness of breath, wheezing or cough 90 mL 0    levothyroxine (SYNTHROID/LEVOTHROID) 100 MCG tablet TAKE ONE TABLET BY MOUTH ONCE DAILY 90 tablet 2    lisdexamfetamine (VYVANSE) 10 MG capsule Take 1 capsule (10 mg) by mouth every morning. 30 capsule 0    lisdexamfetamine (VYVANSE) 30 MG capsule Take 1 capsule (30 mg) by mouth every morning. 14 capsule 0    LORazepam (ATIVAN) 1 MG tablet Take 1 tablet (1 mg) by mouth 2 times daily as needed for anxiety. 20 tablet 0    methylphenidate (RITALIN) 10 MG tablet Take 1 tablet (10 mg) by mouth 2 times daily Takes as needed 30 tablet 0    mirabegron (MYRBETRIQ) 25 MG 24 hr tablet Take 1 tablet (25 mg) by mouth daily 90 tablet 1    nitroGLYcerin (NITROSTAT) 0.4 MG sublingual tablet For chest pain place 1 tablet under the tongue every 5 minutes for 3 doses. If symptoms persist 5 minutes after 1st dose call 911. 25 tablet 11    pantoprazole (PROTONIX) 40 MG EC tablet Take 40 mg by mouth daily      rosuvastatin (CRESTOR) 5 MG tablet Take 1 tablet (5 mg) by mouth daily. 90 tablet 3    STATIN NOT PRESCRIBED (INTENTIONAL) Please choose reason not prescribed from choices below.      valACYclovir (VALTREX) 1000 mg tablet Take 1 tablet (1,000 mg) by mouth 2 times daily 20 tablet 3    zolpidem (AMBIEN) 5 MG tablet TAKE TWO TABLETS BY MOUTH EVERY EVENING AS NEEDED FOR SLEEP 60 tablet 5     meloxicam (MOBIC) 7.5 MG tablet Take 1 tablet (7.5 mg) by mouth daily as needed for moderate pain 30 tablet 1       Allergies   Allergen Reactions    Morphine And Codeine Other (See Comments)     Causes agitation       Family History   Problem Relation Age of Onset    Cancer Mother         Uterine    Other Cancer Mother         First surgery uterine area. Then spread    Diabetes Father     Hypertension Father     Cerebrovascular Disease Father         Age about 63    Prostate Cancer Father     Cardiovascular Sister         recurrent blood clots    Cerebrovascular Disease Sister 51         age 51    Cancer Brother         leukmemia    Coronary Artery Disease Brother         Quadruple bypass    Cerebrovascular Disease Brother         62    Other Cancer Brother         Non-Hodgkin's lymphoma * 2 now lykemia    Coronary Artery Disease Brother         Quadruple bypass also    Hypertension Brother     Cerebrovascular Disease Brother         65    Prostate Cancer Brother     Heart Disease Maternal Grandmother         Heart condition age 96    Diabetes Maternal Grandfather     Cerebrovascular Disease Paternal Grandmother          early 60s    Diabetes Paternal Grandfather     Psychotic Disorder Son         severe Add,     Asthma Son         exercised induced asthma    Depression Son     Substance Abuse Son     Asthma Son         ecxercise induced asthma    Genetic Disorder Cousin         Alpha-1 Antitrypsin Deficiency  Liver transplant    Genetic Disorder Cousin         Idiopathic Trombosenia Purpla       Cerebrovascular Disease Other         Uncle    Cerebrovascular Disease Other         Uncle age? In his 50s    Colon Cancer Other        Additional medical/Social/Surgical histories reviewed in [facility] and updated as appropriate.        PHYSICAL EXAM  General  - normal appearance, in no obvious distress  Musculoskeletal - left knee  - stance: mildly antalgic gait  - inspection: trace effusion  - palpation:  medial joint line tenderness  - ROM: 120 degrees flexion, 0 degrees extension, painful active ROM  - strength: 5/5 in flexion, 5/5 in extension  - special tests:  (-) Chaparro  (-) varus at 0 and 30 degrees flexion  (-) valgus at 0 and 30 degrees flexion  Neuro  - no sensory or motor deficit, grossly normal coordination, normal muscle tone              ASSESSMENT & PLAN  Ms. Morales is a 64 year old female who presents to clinic today with left knee osteoarthritis.    I reviewed her most recent x-ray in the room with her, this does reveal severe medial compartment predominant osteoarthritis.    Margarita and I had a good discussion centering around the spectrum of treatment options for osteoarthritis that preclude surgery.  We discussed nonoperative treatment with pain relievers, icing, low impact exercise, and weight control.  We also talked about the role of injection based therapies.  She is familiar with this discussion given her previous treatments.    We discussed PRP in some depth, this discussion included the fact that PRP is not a permanent treatment, but may give her some pain relief for some time.  She is going to contemplate this, I am going to write a letter of support for insurance coverage given her failure of conservative therapy thus far.    We also discussed global care for her osteoarthritis in the form of exercise based treatment, footwear, and orthotics.  I am referring her to our orthotist to have custom orthotics made.    Lastly, she is interested in a repeat corticosteroid injection today, which we did perform through shared decision making.    She is going to contemplate PRP further as well.    It was a pleasure seeing Nikki today.    Greater than 30 minutes were spent on the day of visit reviewing records, in direct face-to-face consultation and exam, and documentation independent of the procedure.       Farhat Wise DO, [facility]  Primary Care Sports Medicine      This note was constructed  using Dragon dictation software, please excuse any minor errors in spelling, grammar, or syntax.                Large Joint Injection/Arthocentesis: L knee joint    Date/Time: 1/30/2025 11:35 AM    Performed by: Farhat Wise DO  Authorized by: Farhat Wise DO    Indications:  Pain  Needle Size:  22 G  Guidance: landmark guided    Approach:  Lateral  Location:  Knee      Medications:  40 mg triamcinolone 40 MG/ML; 4 mL lidocaine 1 %  Outcome:  Tolerated well, no immediate complications  Procedure discussed: discussed risks, benefits, and alternatives    Consent Given by:  Patient  Timeout: timeout called immediately prior to procedure    Prep: patient was prepped and draped in usual sterile fashion       PROCEDURE    Knee Injection - Intraarticular  The patient was informed of the risks and the benefits of the procedure and a written consent was signed.  The patient's left knee was prepped with chlorhexidine in sterile fashion.   40 mg of triamcinolone suspension was drawn up into a 5 mL syringe with 4 mL of 1% lidocaine.  Injection was performed using substerile technique.  A 1.5-inch 22-gauge needle was used to enter the lateral aspect of the knee.  Injection performed successfully without difficulty.  There were no complications. The patient tolerated the procedure well. There was negligible bleeding.

## 2025-01-30 NOTE — NURSING NOTE
Chief Complaint   Patient presents with    Left Knee - Pain, New Patient     PRP discussion       There were no vitals filed for this visit.    There is no height or weight on file to calculate BMI.      AXEL Boyle NREMT

## 2025-01-30 NOTE — LETTER
Dear Sir rudolph Felipe,        MsNakia Morales is under the care of our orthopedic office for osteoarthritis of her knee.  To date she has engaged in many conservative therapies over the years including exercise based treatment, oral analgesics, injections with corticosteroids and hyaluronic acid, as well as bracing.  She has also seen an orthopedic surgeon regarding a potential knee replacement.  Given her failure of conservative therapy thus far it is my professional opinion that she may do well with a platelet rich plasma injection.  It would be reasonable to employ this as a means of further conservative care, as opposed to a knee replacement.  Please accept this letter as medical justification of such.  Please contact me directly with questions or if clarification is necessary.    Sincerely,            Farhat Wise, DO CAQSM

## 2025-02-24 DIAGNOSIS — E03.4 HYPOTHYROIDISM DUE TO ACQUIRED ATROPHY OF THYROID: ICD-10-CM

## 2025-02-24 DIAGNOSIS — G93.32 CHRONIC FATIGUE SYNDROME: ICD-10-CM

## 2025-02-24 RX ORDER — LEVOTHYROXINE SODIUM 100 UG/1
100 TABLET ORAL DAILY
Qty: 30 TABLET | Refills: 0 | Status: SHIPPED | OUTPATIENT
Start: 2025-02-24

## 2025-02-24 RX ORDER — CYANOCOBALAMIN 1000 UG/ML
1 INJECTION, SOLUTION INTRAMUSCULAR; SUBCUTANEOUS
Qty: 3 ML | Refills: 0 | Status: SHIPPED | OUTPATIENT
Start: 2025-02-24

## 2025-02-24 NOTE — TELEPHONE ENCOUNTER
1 month supply refilled.  Please stop by to get the lab done before med run out.  Has not had a thyroid level checked for almost a year.

## 2025-02-25 NOTE — TELEPHONE ENCOUNTER
[facility] with 3 day reminder to send letter or call if not read.    Trice EDMONDS CMA

## 2025-02-26 ENCOUNTER — LAB (OUTPATIENT)
Dept: LAB | Facility: CLINIC | Age: 65
End: 2025-02-26
Payer: COMMERCIAL

## 2025-02-26 DIAGNOSIS — E03.4 HYPOTHYROIDISM DUE TO ACQUIRED ATROPHY OF THYROID: ICD-10-CM

## 2025-02-26 DIAGNOSIS — E78.5 HYPERLIPIDEMIA LDL GOAL <130: ICD-10-CM

## 2025-02-26 LAB
ALT SERPL W P-5'-P-CCNC: 51 U/L (ref 0–50)
CHOLEST SERPL-MCNC: 358 MG/DL
FASTING STATUS PATIENT QL REPORTED: NO
HDLC SERPL-MCNC: 74 MG/DL
LDLC SERPL CALC-MCNC: 249 MG/DL
NONHDLC SERPL-MCNC: 284 MG/DL
TRIGL SERPL-MCNC: 175 MG/DL
TSH SERPL DL<=0.005 MIU/L-ACNC: 2.21 UIU/ML (ref 0.3–4.2)

## 2025-02-26 PROCEDURE — 36415 COLL VENOUS BLD VENIPUNCTURE: CPT

## 2025-02-26 PROCEDURE — 84460 ALANINE AMINO (ALT) (SGPT): CPT

## 2025-02-26 PROCEDURE — 80061 LIPID PANEL: CPT

## 2025-02-26 PROCEDURE — 84443 ASSAY THYROID STIM HORMONE: CPT

## 2025-03-10 NOTE — PATIENT INSTRUCTIONS
Current Outpatient Medications:     budesonide (PULMICORT) 0.5 MG/2ML neb solution, no change    calcium carbonate (TUMS) - no change    doxycycline hyclate (VIBRAMYCIN) - no change    estradiol (VIVELLE-DOT) 0.05 MG/24HR - No change    fexofenadine (ALLEGRA) 60 MG tablet, TAKE ONE TABLET BY MOUTH TWICE A DAY, Disp: 30 tablet, Rfl: 1 - no change    fluticasone (FLOVENT HFA) 220 MCG/ACT inhaler, - no change    levothyroxine (SYNTHROID/LEVOTHROID) 88 MCG tablet, - no chnage    LORazepam (ATIVAN) 1 MG tablet, TAKE ONE TABLET BY MOUTH TWICE A DAY AS NEEDED FOR ANXIETY, - no change    methylphenidate (RITALIN) 10 MG tablet - no change.     mirabegron (MYRBETRIQ) 25 MG 24 hr tablet - she has not been taking     pantoprazole (PROTONIX) 40 MG EC tablet - no change    rosuvastatin (CRESTOR) 10 MG tablet - take 1/2 tab for the next 7 days.     valACYclovir (VALTREX) 1000 mg tablet, Take 1 tablet (1,000 mg) by mouth 2 times daily, Disp: 20 tablet, Rfl: 3    zolpidem (AMBIEN) 5 MG tablet,  - take 7.5 mg of ambien at bedtime for sleep   
Home

## 2025-03-25 ENCOUNTER — MYC REFILL (OUTPATIENT)
Dept: FAMILY MEDICINE | Facility: CLINIC | Age: 65
End: 2025-03-25
Payer: COMMERCIAL

## 2025-03-25 ENCOUNTER — MYC REFILL (OUTPATIENT)
Dept: FAMILY MEDICINE | Facility: OTHER | Age: 65
End: 2025-03-25
Payer: COMMERCIAL

## 2025-03-25 DIAGNOSIS — F90.0 ATTENTION DEFICIT HYPERACTIVITY DISORDER (ADHD), PREDOMINANTLY INATTENTIVE TYPE: ICD-10-CM

## 2025-03-25 DIAGNOSIS — G47.00 INSOMNIA, UNSPECIFIED TYPE: ICD-10-CM

## 2025-03-25 NOTE — TELEPHONE ENCOUNTER
Please forward to Aditya Foreman; she saw her last.  I have not seen her for over a year.  Looks like she has transferred her care to her.

## 2025-03-26 RX ORDER — ZOLPIDEM TARTRATE 5 MG/1
10 TABLET ORAL
Qty: 60 TABLET | Refills: 1 | Status: SHIPPED | OUTPATIENT
Start: 2025-03-26

## 2025-03-26 RX ORDER — LISDEXAMFETAMINE DIMESYLATE 10 MG/1
10 CAPSULE ORAL EVERY MORNING
Qty: 30 CAPSULE | Refills: 0 | Status: SHIPPED | OUTPATIENT
Start: 2025-03-26

## 2025-03-27 ENCOUNTER — TELEPHONE (OUTPATIENT)
Dept: CARDIOLOGY | Facility: CLINIC | Age: 65
End: 2025-03-27

## 2025-03-27 ENCOUNTER — OFFICE VISIT (OUTPATIENT)
Dept: CARDIOLOGY | Facility: CLINIC | Age: 65
End: 2025-03-27
Payer: COMMERCIAL

## 2025-03-27 VITALS
DIASTOLIC BLOOD PRESSURE: 78 MMHG | WEIGHT: 207 LBS | OXYGEN SATURATION: 94 % | BODY MASS INDEX: 31.37 KG/M2 | HEART RATE: 78 BPM | HEIGHT: 68 IN | SYSTOLIC BLOOD PRESSURE: 132 MMHG

## 2025-03-27 DIAGNOSIS — E78.5 HYPERLIPIDEMIA LDL GOAL <130: Primary | ICD-10-CM

## 2025-03-27 DIAGNOSIS — Z78.9 STATIN INTOLERANCE: ICD-10-CM

## 2025-03-27 DIAGNOSIS — E78.5 HYPERLIPIDEMIA LDL GOAL <130: ICD-10-CM

## 2025-03-27 DIAGNOSIS — I48.0 PAROXYSMAL ATRIAL FIBRILLATION (H): Primary | ICD-10-CM

## 2025-03-27 ASSESSMENT — PAIN SCALES - GENERAL: PAINLEVEL_OUTOF10: NO PAIN (0)

## 2025-03-27 NOTE — LETTER
3/27/2025    Toribio Perez Mai, MD  [address] [place] [address]    RE: Nikki Morales       Dear Colleague,     I had the pleasure of seeing Nikki Morales in the [facility].  Cardiology Clinic Progress Note  Nikki Morales MRN# [id number]   YOB: 1960 Age: 65 year old       HPI:    Nikki Morales is a delightful 65 year old patient with past medical history significant for:    Paroxysmal atrial fibrillation requiring cardioversion in 2018 (related to excessive alcohol use at that time).  No A-fib detected on 30-day monitor last year.  Patient is not on OAC.  [id number] score 2 (gender and age)  Hyperlipidemia with history of statin intolerance.  Previously on Repatha  Asthma  Fibromyalgia and chronic fatigue  Prediabetic, last A1c 6  Obesity with BMI of 31  ADHD  General Anxiety disorder  Hypothyroidism on levothyroxine    It is my pleasure meeting Margarita at our [place] location today.  She is here today for a 6-month follow-up.  She was started on Crestor 5 mg twice a week for statin intolerance, but patient reports that she is no longer taking the medication.  Previously on Repatha, but stopped when her subsidy .  It is unclear if she actually tried to take Crestor.  Throughout her visit she just repeats that her body does not like statins.    No complaints of chest pain, shortness of breath, lightheadedness, dizziness, lower extremity edema.  Her grandmother who also had significant dyslipidemia lived to be 101.  Patient is not overly concerned about her elevated cholesterol, but when I did report that she jumped almost 100 point since December she was surprised.  Has recently started different essential oils and hoping that this will help.        Diagnotic studies:  Cardiac event monitor 2024: Sinus rhythm.  No arrhythmias noted.  No episodes of PAF were appreciated.  Echocardiogram 3/2023: EF 60 to 65%.  No wall motion abnormalities  noted.  1+ TR.  Left atrium mildly dilated.  Lexiscan 3/2023: Negative for inducible ischemia or infarction.  LV hyperdynamic at 76%.      Latest Reference Range & Units 24 10:04 25 10:40   Sodium 135 - 145 mmol/L 138    Potassium 3.4 - 5.3 mmol/L 4.3    Chloride 98 - 107 mmol/L 105    Carbon Dioxide (CO2) 22 - 29 mmol/L 22    Urea Nitrogen 8.0 - 23.0 mg/dL 18.3    Creatinine 0.51 - 0.95 mg/dL 0.95    GFR Estimate >60 mL/min/1.73m2 66    Calcium 8.8 - 10.4 mg/dL 9.0    Anion Gap 7 - 15 mmol/L 11    Albumin 3.5 - 5.2 g/dL 4.2    Protein Total 6.4 - 8.3 g/dL 6.9    Alkaline Phosphatase 40 - 150 U/L 73    ALT 0 - 50 U/L 25 51 (H)   AST 0 - 45 U/L 19    Bilirubin Total <=1.2 mg/dL 0.5    Cholesterol <200 mg/dL 266 (H) 358 (H)     Component      Latest Ref Rng 2023  8:30 AM 12/3/2024  10:04 AM 2025  10:40 AM   Cholesterol      <200 mg/dL 189  266 (H)  358 (H)    Triglycerides      <150 mg/dL 159 (H)  167 (H)  175 (H)    HDL Cholesterol      >=50 mg/dL 59  57  74    LDL Cholesterol Calculated      <100 mg/dL 98  176 (H)  249 (H)    Non HDL Cholesterol      <130 mg/dL 130 (H)  209 (H)  284 (H)    Patient Fasting?  Yes  No         Plan:   Dyslipidemia- doesn't like cholesterol medication.  I have sent a message to pharmacy liaison through [employer] to see if there is another subsidy available.  If not, how much Repatha would cost the patient a month.  If this is not feasible we could consider either pravastatin or Livalo though patient has repeated multiple times she is reluctant to take oral medication.  PAF, no A-fib detected on monitor.  Patient in sinus rhythm today.  Prefers not to be on anticoagulation.  States that she had a brother who  of a brain bleed secondary to OAC.    We will reach out to the patient once we find notable Repatha coverage.  At that time we will discuss follow-up and repeat labs.    Thank you for including us in her care.  Abbey Martinez NP, APRN CNP        Today's clinic  visit entailed:  Review of the result(s) of each unique test - Labd and monitor  Prescription drug management  25  minutes spent by me on the date of the encounter doing chart review, review of test results, patient visit, and documentation   Provider  Link to [facility] Help Grid     The level of medical decision making during this visit was of moderate complexity.      No orders of the defined types were placed in this encounter.    No orders of the defined types were placed in this encounter.    There are no discontinued medications.      No diagnosis found.    CURRENT MEDICATIONS:  Current Outpatient Medications   Medication Sig Dispense Refill     albuterol (PROAIR HFA/PROVENTIL HFA/VENTOLIN HFA) 108 (90 Base) MCG/ACT inhaler Inhale 2 puffs into the lungs every 6 hours as needed for shortness of breath, wheezing or cough. 18 g 4     budesonide (PULMICORT) 0.5 MG/2ML neb solution Mix 2 vials with 1 oz coffee flavoring and drink twice a day and rinse mouth aftrerwards 60 mL 6     budesonide-formoterol (SYMBICORT) 160-4.5 MCG/ACT Inhaler Inhale 2 puffs into the lungs 2 times daily. Stop the flovent when using this inhaler 6 g 4     calcium carbonate (TUMS) 500 MG chewable tablet Take 2 chew tab by mouth 3 times daily as needed for other (bloating/flatulence)       cyanocobalamin (CYANOCOBALAMIN) 1000 mcg/mL injection INJECT 1 ML INTO THE MUSCLE EVERY 30 DAYS 3 mL 0     estradiol (VIVELLE-DOT) 0.075 MG/24HR BIW patch APPLY 1 PATCH TWICE WEEKLY AS DIRECTED       ipratropium - albuterol 0.5 mg/2.5 mg/3 mL (DUONEB) 0.5-2.5 (3) MG/3ML neb solution Take 1 vial (3 mLs) by nebulization every 6 hours as needed for shortness of breath, wheezing or cough 90 mL 0     levothyroxine (SYNTHROID/LEVOTHROID) 100 MCG tablet TAKE ONE TABLET BY MOUTH ONCE DAILY 30 tablet 0     lisdexamfetamine (VYVANSE) 10 MG capsule Take 1 capsule (10 mg) by mouth every morning. 30 capsule 0     LORazepam (ATIVAN) 1 MG tablet Take 1 tablet (1 mg) by mouth  2 times daily as needed for anxiety. 20 tablet 0     mirabegron (MYRBETRIQ) 25 MG 24 hr tablet Take 1 tablet (25 mg) by mouth daily 90 tablet 1     pantoprazole (PROTONIX) 40 MG EC tablet Take 40 mg by mouth daily       valACYclovir (VALTREX) 1000 mg tablet Take 1 tablet (1,000 mg) by mouth 2 times daily 20 tablet 3     zolpidem (AMBIEN) 5 MG tablet Take 2 tablets (10 mg) by mouth nightly as needed for sleep. 60 tablet 1     fexofenadine (ALLEGRA) 60 MG tablet TAKE ONE TABLET BY MOUTH TWICE A DAY (Patient not taking: Reported on 3/27/2025) 180 tablet 1     lisdexamfetamine (VYVANSE) 30 MG capsule Take 1 capsule (30 mg) by mouth every morning. (Patient not taking: Reported on 3/27/2025) 14 capsule 0     meloxicam (MOBIC) 7.5 MG tablet Take 1 tablet (7.5 mg) by mouth daily as needed for moderate pain 30 tablet 1     methylphenidate (RITALIN) 10 MG tablet Take 1 tablet (10 mg) by mouth 2 times daily Takes as needed (Patient not taking: Reported on 3/27/2025) 30 tablet 0     nitroGLYcerin (NITROSTAT) 0.4 MG sublingual tablet For chest pain place 1 tablet under the tongue every 5 minutes for 3 doses. If symptoms persist 5 minutes after 1st dose call 911. (Patient not taking: Reported on 3/27/2025) 25 tablet 11     rosuvastatin (CRESTOR) 5 MG tablet Take 1 tablet (5 mg) by mouth daily. (Patient not taking: Reported on 3/27/2025) 90 tablet 3     STATIN NOT PRESCRIBED (INTENTIONAL) Please choose reason not prescribed from choices below.         ALLERGIES     Allergies   Allergen Reactions     Morphine And Codeine Other (See Comments)     Causes agitation       PAST MEDICAL HISTORY:  Past Medical History:   Diagnosis Date     Attention deficit hyperactivity disorder, inattentive type      Chronic fatigue      COPD (chronic obstructive pulmonary disease) (H) 1990     Depressive disorder      Hyperlipidemia      Hypothyroid      New onset a-fib (H)      Other vitamin B12 deficiency anemia      Primary osteoarthritis of right  knee 08/15/2022     Uncomplicated asthma        PAST SURGICAL HISTORY:  Past Surgical History:   Procedure Laterality Date     ABDOMEN SURGERY  06/10/1993    C section     COLONOSCOPY  10/06/2009     COLONOSCOPY  2013    Procedure: COMBINED COLONOSCOPY, SINGLE BIOPSY/POLYPECTOMY BY BIOPSY;;  Surgeon: Cuate Miles MD;  Location: [facility]     COLONOSCOPY N/A 2018    Procedure: COLONOSCOPY;  Colonoscopy;  Surgeon: Hamzah Dudley DO;  Location: [facility]     COLONOSCOPY N/A 2024    Procedure: Colonoscopy;  Surgeon: Felipe Ch MD;  Location: [facility]     COMBINED REPAIR PTOSIS WITH BLEPHAROPLASTY BILATERAL Bilateral 2018    Procedure: COMBINED REPAIR PTOSIS WITH BLEPHAROPLASTY BILATERAL;  Bilateral upper eyelid Blepharoplasty and ptosis repair ;  Surgeon: Martina Andrade MD;  Location: [facility]     CONIZATION CERVIX,KNIFE/LASER       ESOPHAGOSCOPY, GASTROSCOPY, DUODENOSCOPY (EGD), COMBINED  2013    Procedure: COMBINED ESOPHAGOSCOPY, GASTROSCOPY, DUODENOSCOPY (EGD), BIOPSY SINGLE OR MULTIPLE;  egd with rabdain biopsies and colonoscopy with polypectomy by biopsy ;  Surgeon: Cuate Miles MD;  Location:  GI     ESOPHAGOSCOPY, GASTROSCOPY, DUODENOSCOPY (EGD), COMBINED N/A 2024    Procedure: Esophagoscopy, gastroscopy, duodenoscopy, combined;  Surgeon: Felipe Ch MD;  Location:  GI     GENITOURINARY SURGERY  ?     I have a bladder sling     HC DILATION/CURETTAGE DIAG/THER NON OB      D & C     HC REMOVAL OF TONSILS,<9 Y/O      Tonsils <10y.o.     HC UGI ENDOSCOPY DIAG W BIOPSY  2007     HYSTERECTOMY, PAP NO LONGER INDICATED       LAPAROSCOPIC CHOLECYSTECTOMY  10/12/2013     REPAIR PTOSIS       TUBAL LIGATION       ZZC  DELIVERY ONLY      , Low Cervical     ZZC  DELIVERY ONLY      Description:  Section;  Recorded: 11/10/2009;  Comments: @ 29 weeks     ZZC LIGATE FALLOPIAN TUBE      Description: Tubal Ligation;   Recorded: 11/10/2009;     [facility] NONSPECIFIC PROCEDURE  2000's    sinus     ZZC NONSPECIFIC PROCEDURE      Esophgeal dilatation multiple     Z NONSPECIFIC PROCEDURE  2008    sling     Z TOTAL ABDOM HYSTERECTOMY      Description: Hysterectomy;  Recorded: 11/10/2009;  Comments: due to cervical dysplasia     Z VAGINAL HYSTERECTOMY  1995    Hysterectomy, Vaginal     ZZ UGI ENDOSCOPY, SIMPLE EXAM  09/10/99 & 98       FAMILY HISTORY:  Family History   Problem Relation Age of Onset     Cancer Mother         Uterine     Other Cancer Mother         First surgery uterine area. Then spread     Diabetes Father      Hypertension Father      Cerebrovascular Disease Father         Age about 63     Prostate Cancer Father      Cardiovascular Sister         recurrent blood clots     Cerebrovascular Disease Sister 51         age 51     Cancer Brother         leukmemia     Coronary Artery Disease Brother         Quadruple bypass     Cerebrovascular Disease Brother         62     Other Cancer Brother         Non-Hodgkin's lymphoma * 2 now lykemia     Coronary Artery Disease Brother         Quadruple bypass also     Hypertension Brother      Cerebrovascular Disease Brother         65     Prostate Cancer Brother      Heart Disease Maternal Grandmother         Heart condition age 96     Diabetes Maternal Grandfather      Cerebrovascular Disease Paternal Grandmother          early 60s     Diabetes Paternal Grandfather      Psychotic Disorder Son         severe Add,      Asthma Son         exercised induced asthma     Depression Son      Substance Abuse Son      Asthma Son         ecxercise induced asthma     Genetic Disorder Cousin         Alpha-1 Antitrypsin Deficiency  Liver transplant     Genetic Disorder Cousin         Idiopathic Trombosenia Purpla        Cerebrovascular Disease Other         Uncle     Cerebrovascular Disease Other         Uncle age? In his 50s     Colon Cancer Other        SOCIAL  HISTORY:  Social History     Socioeconomic History     Marital status:      Spouse name: Jared     Number of children: 2     Years of education: 12     Highest education level: None   Occupational History     Occupation: HomeMaker     Employer: [employer]   Tobacco Use     Smoking status: Former     Current packs/day: 0.00     Average packs/day: 0.1 packs/day for 40.0 years (4.0 ttl pk-yrs)     Types: Cigarettes     Start date: 1981     Quit date: 2021     Years since quittin.2     Smokeless tobacco: Never     Tobacco comments:     social smoking   Vaping Use     Vaping status: Never Used   Substance and Sexual Activity     Alcohol use: Yes     Alcohol/week: 4.0 standard drinks of alcohol     Comment: social     Drug use: No     Comment: used in past any and all     Sexual activity: Yes     Partners: Male     Birth control/protection: Post-menopausal, Female Surgical     Comment: Hx: hysterectomy   Other Topics Concern      Service No     Blood Transfusions No     Caffeine Concern No     Occupational Exposure No     Hobby Hazards No     Sleep Concern Yes     Comment: Difficult with sleeping at night, sleeps most of the day     Stress Concern No     Weight Concern Yes     Comment: desire wt loss     Special Diet No     Back Care Yes     Comment: weight restrictions     Exercise Yes     Bike Helmet No     Seat Belt Yes     Self-Exams No     Parent/sibling w/ CABG, MI or angioplasty before 65F 55M? No     Social Drivers of Health     Financial Resource Strain: Low Risk  (12/3/2024)    Financial Resource Strain      Within the past 12 months, have you or your family members you live with been unable to get utilities (heat, electricity) when it was really needed?: No   Food Insecurity: Low Risk  (12/3/2024)    Food Insecurity      Within the past 12 months, did you worry that your food would run out before you got money to buy more?: No      Within the past 12 months, did the food you bought just  not last and you didn t have money to get more?: No   Transportation Needs: Low Risk  (12/3/2024)    Transportation Needs      Within the past 12 months, has lack of transportation kept you from medical appointments, getting your medicines, non-medical meetings or appointments, work, or from getting things that you need?: No   Physical Activity: Insufficiently Active (12/3/2024)    Exercise Vital Sign      Days of Exercise per Week: 1 day      Minutes of Exercise per Session: 10 min   Stress: No Stress Concern Present (12/3/2024)    [employer] - Occupational Stress Questionnaire      Feeling of Stress : Only a little   Social Connections: Unknown (12/3/2024)    Social Connection and Isolation Panel [NHANES]      Frequency of Social Gatherings with Friends and Family: Twice a week   Interpersonal Safety: Low Risk  (12/3/2024)    Interpersonal Safety      Do you feel physically and emotionally safe where you currently live?: Yes      Within the past 12 months, have you been hit, slapped, kicked or otherwise physically hurt by someone?: No      Within the past 12 months, have you been humiliated or emotionally abused in other ways by your partner or ex-partner?: No   Housing Stability: High Risk (12/3/2024)    Housing Stability      Do you have housing? : No      Are you worried about losing your housing?: No       Review of Systems:  Skin:  Positive for lumps or bumps   Eyes:    glasses  ENT:  Positive for postnasal drainage  Respiratory:  Positive for shortness of breath  Cardiovascular:    fatigue, Positive for  Gastroenterology: Positive for reflux  Genitourinary:  Positive for urinary frequency, dysuria, urgency  Musculoskeletal:  Positive for joint pain, joint swelling, joint stiffness  Neurologic:  Positive for headaches  Psychiatric:  Positive for depression, anxiety  Heme/Lymph/Imm:    allergies  Endocrine:    thyroid disorder    Physical Exam:    Vitals: /78 (BP Location:  "Right arm, Patient Position: Sitting, Cuff Size: Adult Large)   Pulse 78   Ht 1.727 m (5' 8\")   Wt 93.9 kg (207 lb)   LMP  (LMP Unknown)   SpO2 94%   Breastfeeding No   BMI 31.47 kg/m    Constitutional: Well nourished and in no apparent distress.  Eyes: Pupils equal, round. Sclerae anicteric.   HEENT: Normocephalic, atraumatic.   Neck: Supple. No apaprent JVD   Respiratory: Breathing non-labored. Lungs clear to auscultation bilaterally. No crackles, wheezes, rhonchi, or rales.  Cardiovascular:  Regular rate and rhythm, normal S1 and S2. No murmur  Skin: Warm, dry. No rashes, cyanosis, or xanthelasma.  Extremities: No edema.  Neurologic: No gross motor deficits. Alert, awake, and oriented to person, place and time.  Psychiatric: Affect appropriate, but appears anxious and biting nails.      Recent Lab Results:  LIPID RESULTS:  Lab Results   Component Value Date    CHOL 358 (H) 02/26/2025    CHOL 190 03/24/2021    HDL 74 02/26/2025    HDL 59 03/24/2021     (H) 02/26/2025     (H) 03/24/2021    TRIG 175 (H) 02/26/2025    TRIG 114 03/24/2021    CHOLHDLRATIO 6.6 (H) 09/23/2015       LIVER ENZYME RESULTS:  Lab Results   Component Value Date    AST 19 12/03/2024    AST 14 03/24/2021    ALT 51 (H) 02/26/2025    ALT 25 03/24/2021       CBC RESULTS:  Lab Results   Component Value Date    WBC 9.3 09/28/2023    WBC 7.3 03/24/2021    RBC 4.89 09/28/2023    RBC 4.62 03/24/2021    HGB 14.3 09/28/2023    HGB 13.7 03/24/2021    HCT 42.5 09/28/2023    HCT 41.1 03/24/2021    MCV 87 09/28/2023    MCV 89 03/24/2021    MCH 29.2 09/28/2023    MCH 29.7 03/24/2021    MCHC 33.6 09/28/2023    MCHC 33.3 03/24/2021    RDW 12.8 09/28/2023    RDW 12.5 03/24/2021     09/28/2023     03/24/2021       BMP RESULTS:  Lab Results   Component Value Date     12/03/2024     03/24/2021    POTASSIUM 4.3 12/03/2024    POTASSIUM 4.1 11/09/2022    POTASSIUM 4.5 03/24/2021    CHLORIDE 105 12/03/2024    CHLORIDE 107 " 11/09/2022    CHLORIDE 108 03/24/2021    CO2 22 12/03/2024    CO2 29 11/09/2022    CO2 28 03/24/2021    ANIONGAP 11 12/03/2024    ANIONGAP 4 11/09/2022    ANIONGAP 4 03/24/2021     (H) 12/03/2024    GLC 83 11/09/2022     (H) 03/24/2021    BUN 18.3 12/03/2024    BUN 23 11/09/2022    BUN 33 (H) 03/24/2021    CR 0.95 12/03/2024    CR 0.75 03/24/2021    GFRESTIMATED 66 12/03/2024    GFRESTIMATED 85 03/24/2021    GFRESTBLACK >90 03/24/2021    ROLANDA 9.0 12/03/2024    ROLANDA 9.3 03/24/2021        A1C RESULTS:  Lab Results   Component Value Date    A1C 6.0 (H) 12/03/2024    A1C 5.6 01/26/2018       INR RESULTS:  Lab Results   Component Value Date    INR 0.95 11/12/2018    INR 0.96 01/15/2018           CC  No referring provider defined for this encounter.                  Thank you for allowing me to participate in the care of your patient.      Sincerely,     Abbey Martinez, ALEA, APRN CNP     [facility]  cc:   JOSEFINA Cooney CNP  [address]  [place] [id number]

## 2025-03-27 NOTE — PROGRESS NOTES
Cardiology Clinic Progress Note  Nikki Morales MRN# [id number]   YOB: 1960 Age: 65 year old       HPI:    Nikki Morales is a delightful 65 year old patient with past medical history significant for:    Paroxysmal atrial fibrillation requiring cardioversion in 2018 (related to excessive alcohol use at that time).  No A-fib detected on 30-day monitor last year.  Patient is not on OAC.  [id number] score 2 (gender and age)  Hyperlipidemia with history of statin intolerance.  Previously on Repatha  Asthma  Fibromyalgia and chronic fatigue  Prediabetic, last A1c 6  Obesity with BMI of 31  ADHD  General Anxiety disorder  Hypothyroidism on levothyroxine    It is my pleasure meeting Margarita at our [place] location today.  She is here today for a 6-month follow-up.  She was started on Crestor 5 mg twice a week for statin intolerance, but patient reports that she is no longer taking the medication.  Previously on Repatha, but stopped when her subsidy .  It is unclear if she actually tried to take Crestor.  Throughout her visit she just repeats that her body does not like statins.    No complaints of chest pain, shortness of breath, lightheadedness, dizziness, lower extremity edema.  Her grandmother who also had significant dyslipidemia lived to be 101.  Patient is not overly concerned about her elevated cholesterol, but when I did report that she jumped almost 100 point since December she was surprised.  Has recently started different essential oils and hoping that this will help.        Diagnotic studies:  Cardiac event monitor 2024: Sinus rhythm.  No arrhythmias noted.  No episodes of PAF were appreciated.  Echocardiogram 3/2023: EF 60 to 65%.  No wall motion abnormalities noted.  1+ TR.  Left atrium mildly dilated.  Lexiscan 3/2023: Negative for inducible ischemia or infarction.  LV hyperdynamic at 76%.      Latest Reference Range & Units 24 10:04 25 10:40   Sodium 135 -  145 mmol/L 138    Potassium 3.4 - 5.3 mmol/L 4.3    Chloride 98 - 107 mmol/L 105    Carbon Dioxide (CO2) 22 - 29 mmol/L 22    Urea Nitrogen 8.0 - 23.0 mg/dL 18.3    Creatinine 0.51 - 0.95 mg/dL 0.95    GFR Estimate >60 mL/min/1.73m2 66    Calcium 8.8 - 10.4 mg/dL 9.0    Anion Gap 7 - 15 mmol/L 11    Albumin 3.5 - 5.2 g/dL 4.2    Protein Total 6.4 - 8.3 g/dL 6.9    Alkaline Phosphatase 40 - 150 U/L 73    ALT 0 - 50 U/L 25 51 (H)   AST 0 - 45 U/L 19    Bilirubin Total <=1.2 mg/dL 0.5    Cholesterol <200 mg/dL 266 (H) 358 (H)     Component      Latest Ref Rng 2023  8:30 AM 12/3/2024  10:04 AM 2025  10:40 AM   Cholesterol      <200 mg/dL 189  266 (H)  358 (H)    Triglycerides      <150 mg/dL 159 (H)  167 (H)  175 (H)    HDL Cholesterol      >=50 mg/dL 59  57  74    LDL Cholesterol Calculated      <100 mg/dL 98  176 (H)  249 (H)    Non HDL Cholesterol      <130 mg/dL 130 (H)  209 (H)  284 (H)    Patient Fasting?  Yes  No         Plan:   Dyslipidemia- doesn't like cholesterol medication.  I have sent a message to pharmacy liaison through [employer] to see if there is another subsidy available.  If not, how much Repatha would cost the patient a month.  If this is not feasible we could consider either pravastatin or Livalo though patient has repeated multiple times she is reluctant to take oral medication.  PAF, no A-fib detected on monitor.  Patient in sinus rhythm today.  Prefers not to be on anticoagulation.  States that she had a brother who  of a brain bleed secondary to OAC.    We will reach out to the patient once we find notable Repatha coverage.  At that time we will discuss follow-up and repeat labs.    Thank you for including us in her care.  Abbey Martinez, NP, APRN CNP        Today's clinic visit entailed:  Review of the result(s) of each unique test - Labd and monitor  Prescription drug management  25  minutes spent by me on the date of the encounter doing chart review, review of test results,  patient visit, and documentation   Provider  Link to [facility] Help Grid     The level of medical decision making during this visit was of moderate complexity.      No orders of the defined types were placed in this encounter.    No orders of the defined types were placed in this encounter.    There are no discontinued medications.      No diagnosis found.    CURRENT MEDICATIONS:  Current Outpatient Medications   Medication Sig Dispense Refill    albuterol (PROAIR HFA/PROVENTIL HFA/VENTOLIN HFA) 108 (90 Base) MCG/ACT inhaler Inhale 2 puffs into the lungs every 6 hours as needed for shortness of breath, wheezing or cough. 18 g 4    budesonide (PULMICORT) 0.5 MG/2ML neb solution Mix 2 vials with 1 oz coffee flavoring and drink twice a day and rinse mouth aftrerwards 60 mL 6    budesonide-formoterol (SYMBICORT) 160-4.5 MCG/ACT Inhaler Inhale 2 puffs into the lungs 2 times daily. Stop the flovent when using this inhaler 6 g 4    calcium carbonate (TUMS) 500 MG chewable tablet Take 2 chew tab by mouth 3 times daily as needed for other (bloating/flatulence)      cyanocobalamin (CYANOCOBALAMIN) 1000 mcg/mL injection INJECT 1 ML INTO THE MUSCLE EVERY 30 DAYS 3 mL 0    estradiol (VIVELLE-DOT) 0.075 MG/24HR BIW patch APPLY 1 PATCH TWICE WEEKLY AS DIRECTED      ipratropium - albuterol 0.5 mg/2.5 mg/3 mL (DUONEB) 0.5-2.5 (3) MG/3ML neb solution Take 1 vial (3 mLs) by nebulization every 6 hours as needed for shortness of breath, wheezing or cough 90 mL 0    levothyroxine (SYNTHROID/LEVOTHROID) 100 MCG tablet TAKE ONE TABLET BY MOUTH ONCE DAILY 30 tablet 0    lisdexamfetamine (VYVANSE) 10 MG capsule Take 1 capsule (10 mg) by mouth every morning. 30 capsule 0    LORazepam (ATIVAN) 1 MG tablet Take 1 tablet (1 mg) by mouth 2 times daily as needed for anxiety. 20 tablet 0    mirabegron (MYRBETRIQ) 25 MG 24 hr tablet Take 1 tablet (25 mg) by mouth daily 90 tablet 1    pantoprazole (PROTONIX) 40 MG EC tablet Take 40 mg by mouth daily       valACYclovir (VALTREX) 1000 mg tablet Take 1 tablet (1,000 mg) by mouth 2 times daily 20 tablet 3    zolpidem (AMBIEN) 5 MG tablet Take 2 tablets (10 mg) by mouth nightly as needed for sleep. 60 tablet 1    fexofenadine (ALLEGRA) 60 MG tablet TAKE ONE TABLET BY MOUTH TWICE A DAY (Patient not taking: Reported on 3/27/2025) 180 tablet 1    lisdexamfetamine (VYVANSE) 30 MG capsule Take 1 capsule (30 mg) by mouth every morning. (Patient not taking: Reported on 3/27/2025) 14 capsule 0    meloxicam (MOBIC) 7.5 MG tablet Take 1 tablet (7.5 mg) by mouth daily as needed for moderate pain 30 tablet 1    methylphenidate (RITALIN) 10 MG tablet Take 1 tablet (10 mg) by mouth 2 times daily Takes as needed (Patient not taking: Reported on 3/27/2025) 30 tablet 0    nitroGLYcerin (NITROSTAT) 0.4 MG sublingual tablet For chest pain place 1 tablet under the tongue every 5 minutes for 3 doses. If symptoms persist 5 minutes after 1st dose call 911. (Patient not taking: Reported on 3/27/2025) 25 tablet 11    rosuvastatin (CRESTOR) 5 MG tablet Take 1 tablet (5 mg) by mouth daily. (Patient not taking: Reported on 3/27/2025) 90 tablet 3    STATIN NOT PRESCRIBED (INTENTIONAL) Please choose reason not prescribed from choices below.         ALLERGIES     Allergies   Allergen Reactions    Morphine And Codeine Other (See Comments)     Causes agitation       PAST MEDICAL HISTORY:  Past Medical History:   Diagnosis Date    Attention deficit hyperactivity disorder, inattentive type     Chronic fatigue     COPD (chronic obstructive pulmonary disease) (H) 1990    Depressive disorder     Hyperlipidemia     Hypothyroid     New onset a-fib (H)     Other vitamin B12 deficiency anemia     Primary osteoarthritis of right knee 08/15/2022    Uncomplicated asthma        PAST SURGICAL HISTORY:  Past Surgical History:   Procedure Laterality Date    ABDOMEN SURGERY  06/10/1993    C section    COLONOSCOPY  10/06/2009    COLONOSCOPY  07/02/2013    Procedure:  COMBINED COLONOSCOPY, SINGLE BIOPSY/POLYPECTOMY BY BIOPSY;;  Surgeon: Cuate Miles MD;  Location: [facility]    COLONOSCOPY N/A 2018    Procedure: COLONOSCOPY;  Colonoscopy;  Surgeon: Hamzah Dudley DO;  Location: [facility]    COLONOSCOPY N/A 2024    Procedure: Colonoscopy;  Surgeon: Felipe Ch MD;  Location: [facility]    COMBINED REPAIR PTOSIS WITH BLEPHAROPLASTY BILATERAL Bilateral 2018    Procedure: COMBINED REPAIR PTOSIS WITH BLEPHAROPLASTY BILATERAL;  Bilateral upper eyelid Blepharoplasty and ptosis repair ;  Surgeon: Martina Andrade MD;  Location: [facility]    CONIZATION CERVIX,KNIFE/LASER      ESOPHAGOSCOPY, GASTROSCOPY, DUODENOSCOPY (EGD), COMBINED  2013    Procedure: COMBINED ESOPHAGOSCOPY, GASTROSCOPY, DUODENOSCOPY (EGD), BIOPSY SINGLE OR MULTIPLE;  egd with rabdain biopsies and colonoscopy with polypectomy by biopsy ;  Surgeon: Cuate Miles MD;  Location: [facility]    ESOPHAGOSCOPY, GASTROSCOPY, DUODENOSCOPY (EGD), COMBINED N/A 2024    Procedure: Esophagoscopy, gastroscopy, duodenoscopy, combined;  Surgeon: Felipe Ch MD;  Location: [facility]    GENITOURINARY SURGERY  ?     I have a bladder sling    HC DILATION/CURETTAGE DIAG/THER NON OB      D & C    HC REMOVAL OF TONSILS,<11 Y/O      Tonsils <10y.o.    HC UGI ENDOSCOPY DIAG W BIOPSY  2007    HYSTERECTOMY, PAP NO LONGER INDICATED      LAPAROSCOPIC CHOLECYSTECTOMY  10/12/2013    REPAIR PTOSIS      TUBAL LIGATION      Z  DELIVERY ONLY      , Low Cervical    ZZC  DELIVERY ONLY      Description:  Section;  Recorded: 11/10/2009;  Comments: @ 29 weeks    ZZC LIGATE FALLOPIAN TUBE      Description: Tubal Ligation;  Recorded: 11/10/2009;    ZZC NONSPECIFIC PROCEDURE  's    sinus    ZZC NONSPECIFIC PROCEDURE      Esophgeal dilatation multiple    ZZC NONSPECIFIC PROCEDURE      sling    ZZC TOTAL ABDOM HYSTERECTOMY      Description: Hysterectomy;  Recorded: 11/10/2009;   Comments: due to cervical dysplasia    ZZC VAGINAL HYSTERECTOMY  1995    Hysterectomy, Vaginal    ZZHC UGI ENDOSCOPY, SIMPLE EXAM  09/10/99 & 98       FAMILY HISTORY:  Family History   Problem Relation Age of Onset    Cancer Mother         Uterine    Other Cancer Mother         First surgery uterine area. Then spread    Diabetes Father     Hypertension Father     Cerebrovascular Disease Father         Age about 63    Prostate Cancer Father     Cardiovascular Sister         recurrent blood clots    Cerebrovascular Disease Sister 51         age 51    Cancer Brother         leukmemia    Coronary Artery Disease Brother         Quadruple bypass    Cerebrovascular Disease Brother         62    Other Cancer Brother         Non-Hodgkin's lymphoma * 2 now lykemia    Coronary Artery Disease Brother         Quadruple bypass also    Hypertension Brother     Cerebrovascular Disease Brother         65    Prostate Cancer Brother     Heart Disease Maternal Grandmother         Heart condition age 96    Diabetes Maternal Grandfather     Cerebrovascular Disease Paternal Grandmother          early 60s    Diabetes Paternal Grandfather     Psychotic Disorder Son         severe Add,     Asthma Son         exercised induced asthma    Depression Son     Substance Abuse Son     Asthma Son         ecxercise induced asthma    Genetic Disorder Cousin         Alpha-1 Antitrypsin Deficiency  Liver transplant    Genetic Disorder Cousin         Idiopathic Trombosenia Purpla       Cerebrovascular Disease Other         Uncle    Cerebrovascular Disease Other         Uncle age? In his 50s    Colon Cancer Other        SOCIAL HISTORY:  Social History     Socioeconomic History    Marital status:      Spouse name: Jared    Number of children: 2    Years of education: 12    Highest education level: None   Occupational History    Occupation: HomeMaker     Employer: [employer]   Tobacco Use    Smoking status: Former     Current  packs/day: 0.00     Average packs/day: 0.1 packs/day for 40.0 years (4.0 ttl pk-yrs)     Types: Cigarettes     Start date: 1981     Quit date: 2021     Years since quittin.2    Smokeless tobacco: Never    Tobacco comments:     social smoking   Vaping Use    Vaping status: Never Used   Substance and Sexual Activity    Alcohol use: Yes     Alcohol/week: 4.0 standard drinks of alcohol     Comment: social    Drug use: No     Comment: used in past any and all    Sexual activity: Yes     Partners: Male     Birth control/protection: Post-menopausal, Female Surgical     Comment: Hx: hysterectomy   Other Topics Concern     Service No    Blood Transfusions No    Caffeine Concern No    Occupational Exposure No    Hobby Hazards No    Sleep Concern Yes     Comment: Difficult with sleeping at night, sleeps most of the day    Stress Concern No    Weight Concern Yes     Comment: desire wt loss    Special Diet No    Back Care Yes     Comment: weight restrictions    Exercise Yes    Bike Helmet No    Seat Belt Yes    Self-Exams No    Parent/sibling w/ CABG, MI or angioplasty before 65F 55M? No     Social Drivers of Health     Financial Resource Strain: Low Risk  (12/3/2024)    Financial Resource Strain     Within the past 12 months, have you or your family members you live with been unable to get utilities (heat, electricity) when it was really needed?: No   Food Insecurity: Low Risk  (12/3/2024)    Food Insecurity     Within the past 12 months, did you worry that your food would run out before you got money to buy more?: No     Within the past 12 months, did the food you bought just not last and you didn t have money to get more?: No   Transportation Needs: Low Risk  (12/3/2024)    Transportation Needs     Within the past 12 months, has lack of transportation kept you from medical appointments, getting your medicines, non-medical meetings or appointments, work, or from getting things that you need?: No   Physical  "Activity: Insufficiently Active (12/3/2024)    Exercise Vital Sign     Days of Exercise per Week: 1 day     Minutes of Exercise per Session: 10 min   Stress: No Stress Concern Present (12/3/2024)    [place] [employer] - Occupational Stress Questionnaire     Feeling of Stress : Only a little   Social Connections: Unknown (12/3/2024)    Social Connection and Isolation Panel [NHANES]     Frequency of Social Gatherings with Friends and Family: Twice a week   Interpersonal Safety: Low Risk  (12/3/2024)    Interpersonal Safety     Do you feel physically and emotionally safe where you currently live?: Yes     Within the past 12 months, have you been hit, slapped, kicked or otherwise physically hurt by someone?: No     Within the past 12 months, have you been humiliated or emotionally abused in other ways by your partner or ex-partner?: No   Housing Stability: High Risk (12/3/2024)    Housing Stability     Do you have housing? : No     Are you worried about losing your housing?: No       Review of Systems:  Skin:  Positive for lumps or bumps   Eyes:    glasses  ENT:  Positive for postnasal drainage  Respiratory:  Positive for shortness of breath  Cardiovascular:    fatigue, Positive for  Gastroenterology: Positive for reflux  Genitourinary:  Positive for urinary frequency, dysuria, urgency  Musculoskeletal:  Positive for joint pain, joint swelling, joint stiffness  Neurologic:  Positive for headaches  Psychiatric:  Positive for depression, anxiety  Heme/Lymph/Imm:    allergies  Endocrine:    thyroid disorder    Physical Exam:    Vitals: /78 (BP Location: Right arm, Patient Position: Sitting, Cuff Size: Adult Large)   Pulse 78   Ht 1.727 m (5' 8\")   Wt 93.9 kg (207 lb)   LMP  (LMP Unknown)   SpO2 94%   Breastfeeding No   BMI 31.47 kg/m    Constitutional: Well nourished and in no apparent distress.  Eyes: Pupils equal, round. Sclerae anicteric.   HEENT: Normocephalic, atraumatic.   Neck: " Supple. No apaprent JVD   Respiratory: Breathing non-labored. Lungs clear to auscultation bilaterally. No crackles, wheezes, rhonchi, or rales.  Cardiovascular:  Regular rate and rhythm, normal S1 and S2. No murmur  Skin: Warm, dry. No rashes, cyanosis, or xanthelasma.  Extremities: No edema.  Neurologic: No gross motor deficits. Alert, awake, and oriented to person, place and time.  Psychiatric: Affect appropriate, but appears anxious and biting nails.      Recent Lab Results:  LIPID RESULTS:  Lab Results   Component Value Date    CHOL 358 (H) 02/26/2025    CHOL 190 03/24/2021    HDL 74 02/26/2025    HDL 59 03/24/2021     (H) 02/26/2025     (H) 03/24/2021    TRIG 175 (H) 02/26/2025    TRIG 114 03/24/2021    CHOLHDLRATIO 6.6 (H) 09/23/2015       LIVER ENZYME RESULTS:  Lab Results   Component Value Date    AST 19 12/03/2024    AST 14 03/24/2021    ALT 51 (H) 02/26/2025    ALT 25 03/24/2021       CBC RESULTS:  Lab Results   Component Value Date    WBC 9.3 09/28/2023    WBC 7.3 03/24/2021    RBC 4.89 09/28/2023    RBC 4.62 03/24/2021    HGB 14.3 09/28/2023    HGB 13.7 03/24/2021    HCT 42.5 09/28/2023    HCT 41.1 03/24/2021    MCV 87 09/28/2023    MCV 89 03/24/2021    MCH 29.2 09/28/2023    MCH 29.7 03/24/2021    MCHC 33.6 09/28/2023    MCHC 33.3 03/24/2021    RDW 12.8 09/28/2023    RDW 12.5 03/24/2021     09/28/2023     03/24/2021       BMP RESULTS:  Lab Results   Component Value Date     12/03/2024     03/24/2021    POTASSIUM 4.3 12/03/2024    POTASSIUM 4.1 11/09/2022    POTASSIUM 4.5 03/24/2021    CHLORIDE 105 12/03/2024    CHLORIDE 107 11/09/2022    CHLORIDE 108 03/24/2021    CO2 22 12/03/2024    CO2 29 11/09/2022    CO2 28 03/24/2021    ANIONGAP 11 12/03/2024    ANIONGAP 4 11/09/2022    ANIONGAP 4 03/24/2021     (H) 12/03/2024    GLC 83 11/09/2022     (H) 03/24/2021    BUN 18.3 12/03/2024    BUN 23 11/09/2022    BUN 33 (H) 03/24/2021    CR 0.95 12/03/2024    CR  0.75 03/24/2021    GFRESTIMATED 66 12/03/2024    GFRESTIMATED 85 03/24/2021    GFRESTBLACK >90 03/24/2021    ROLANDA 9.0 12/03/2024    ROLANDA 9.3 03/24/2021        A1C RESULTS:  Lab Results   Component Value Date    A1C 6.0 (H) 12/03/2024    A1C 5.6 01/26/2018       INR RESULTS:  Lab Results   Component Value Date    INR 0.95 11/12/2018    INR 0.96 01/15/2018           CC  No referring provider defined for this encounter.

## 2025-03-27 NOTE — TELEPHONE ENCOUNTER
Patient notified. Patient was very pleased to hear this. She did have follow up quesitons such has how much is the yenny and how long does the yenny last. Will route back to pharmacy liaison to see if she can help answer those questions. Patient is okay with a mychart message back.     Abbey Martinez APRN CNP Koch, Brenna M, RN  Please put her on the previous dose.  Recheck lipids in 6 months.  I can see her at that time  Thank you-Abbey    ----- Message from Abbey Martinez NP sent at 3/27/2025  3:10 PM CDT -----  Fantastic news!  I have copied my nurse, Saira and she will let the patient know.  Thank you-Abbey    ----- Message -----  From: Yessy Stuart  Sent: 3/27/2025   1:40 PM CDT  To: JOSEFINA Santiago CNPlo!     I renewed patient's yenny. Her copay will be $0 for repatha.    Thanks!  ----- Message -----  From: Maddison Hernandez  Sent: 3/27/2025   1:34 PM CDT  To: JOSEFINA Triplett CNP    Hi Yessy,    Can you see if the patient's [facility] yenny can be renewed?    Maddison Hernandez [facility]  Pharmacy Technician/Liaison, Discharge Pharmacy   [email or web address]  ----- Message -----  From: Abbey Martinez APRN CNP  Sent: 3/27/2025  12:19 PM CDT  To: Rx Coverage Check For Heart Clinics    Good afternoon!  Patient previously had a subsidy for Repatha.  This has since .  Is she able to reapply?  If not, how much would Repatha cost her?    Thank you-Abbey

## 2025-03-27 NOTE — TELEPHONE ENCOUNTER
COLLEEN APPROVED    Medication: REPATHA 140 MG/ML SC SOSY  Amount: $ 2,500  [facility] Name: [facility] Phone: [phone]  [facility] Fax:   Member ID: [id number]  BIN: [id number]  PCN: [id number]  Group: [id number]  Foundation Effective Date: 2/25/2025  Foundation Expiration Date: 2/24/2026  Additional Information: [employer] ID [id number]  Patient Notified: no

## 2025-03-27 NOTE — PATIENT INSTRUCTIONS
Call my nurse with any questions or concerns:  [phone]  *If you have concerns after hours, please call [phone], option 2 to speak with on call Cardiologist.    I will send a message about Repatha to pharmacy liaison  Can try Pravastatin or Livalo     Component      Latest Ref Rng 9/28/2023  8:30 AM 12/3/2024  10:04 AM 2/26/2025  10:40 AM   Cholesterol      <200 mg/dL 189  266 (H)  358 (H)    Triglycerides      <150 mg/dL 159 (H)  167 (H)  175 (H)    HDL Cholesterol      >=50 mg/dL 59  57  74    LDL Cholesterol Calculated      <100 mg/dL 98  176 (H)  249 (H)    Non HDL Cholesterol      <130 mg/dL 130 (H)  209 (H)  284 (H)    Patient Fasting?  Yes  No       Legend:  (H) High

## 2025-03-27 NOTE — TELEPHONE ENCOUNTER
The yenny is for $2,500 and will require renewal on 2/24/2026.    Maddison Hernandez  Pharmacy Technician/Liaison, Discharge Pharmacy   [phone] (voice or text)  [email or web address]  Pharmacy test claims are estimates and may not reflect final costs.  Suggested alternatives aim to be cost-effective and may not be therapeutically equivalent as this consult is informational and does not constitute medical advice.  Clinical decisions should be made by qualified healthcare providers.

## 2025-04-21 ENCOUNTER — VIRTUAL VISIT (OUTPATIENT)
Dept: FAMILY MEDICINE | Facility: OTHER | Age: 65
End: 2025-04-21
Payer: COMMERCIAL

## 2025-04-21 DIAGNOSIS — U07.1 INFECTION DUE TO 2019 NOVEL CORONAVIRUS: Primary | ICD-10-CM

## 2025-04-21 DIAGNOSIS — F90.0 ATTENTION DEFICIT HYPERACTIVITY DISORDER (ADHD), PREDOMINANTLY INATTENTIVE TYPE: ICD-10-CM

## 2025-04-21 DIAGNOSIS — J22 LRTI (LOWER RESPIRATORY TRACT INFECTION): ICD-10-CM

## 2025-04-21 PROCEDURE — 98005 SYNCH AUDIO-VIDEO EST LOW 20: CPT | Performed by: PHYSICIAN ASSISTANT

## 2025-04-21 RX ORDER — DOXYCYCLINE 100 MG/1
100 CAPSULE ORAL 2 TIMES DAILY
Qty: 20 CAPSULE | Refills: 0 | Status: SHIPPED | OUTPATIENT
Start: 2025-04-21 | End: 2025-05-01

## 2025-04-21 RX ORDER — LISDEXAMFETAMINE DIMESYLATE 30 MG/1
30 CAPSULE ORAL EVERY MORNING
Qty: 30 CAPSULE | Refills: 0 | Status: SHIPPED | OUTPATIENT
Start: 2025-04-21

## 2025-04-21 ASSESSMENT — PATIENT HEALTH QUESTIONNAIRE - PHQ9
SUM OF ALL RESPONSES TO PHQ QUESTIONS 1-9: 11
SUM OF ALL RESPONSES TO PHQ QUESTIONS 1-9: 11
10. IF YOU CHECKED OFF ANY PROBLEMS, HOW DIFFICULT HAVE THESE PROBLEMS MADE IT FOR YOU TO DO YOUR WORK, TAKE CARE OF THINGS AT HOME, OR GET ALONG WITH OTHER PEOPLE: SOMEWHAT DIFFICULT

## 2025-04-21 NOTE — PROGRESS NOTES
Margarita is a 65 year old who is being evaluated via a billable video visit.    How would you like to obtain your AVS? MyChart  If the video visit is dropped, the invitation should be resent by: Text to cell phone: [phone]  Will anyone else be joining your video visit? No      Assessment & Plan     Infection due to 2019 novel coronavirus  - Had COVID initially in the beginning of the month.   - it sounds like COVID symptoms improved.      LRTI (lower respiratory tract infection)  Concern for secondary bacterial infection like pneumonia at this point with her persistent cough and worsening chest congestion. She will remain on OTC options to help keep her dry and then we will start her on Doxycycline twice daily for 10 days. Advised she continue with her Symbicort twice daily and add her Albuterol 1-2 puffs every 4-6 hours regularly since she has not been using this. Recommended close follow-up if not improving through the week, sooner if worse or new concerns.   - doxycycline monohydrate (MONODOX) 100 MG capsule; Take 1 capsule (100 mg) by mouth 2 times daily for 10 days.      Options for treatment and follow-up care were reviewed with the patient and/or guardian. Patient and/or guardian engaged in the decision making process and verbalized understanding of the options discussed and agreed with the final plan.      Subjective   Margarita is a 65 year old, presenting for the following health issues:  No chief complaint on file.      Video Start Time: 10:57 AM    HPI      Acute Illness  Acute illness concerns: Cough  Onset/Duration: Beginning of April.   had COVID on April 5th.  Hers started a few days later.  She had the aches, pains, headache.  By the 9th she thought she was doing well.  On the 10th she had a sore throat and an ear ache and on the 11th she had a head cold. She has been taking a lot of cold medication.  She is having a lot of chest congestion.   Symptoms:  Fever: No  Chills/Sweats: No  Headache  (location?): YES  Sinus Pressure: YES  Conjunctivitis:  No  Ear Pain: no - had it for one day.   Rhinorrhea: YES  Congestion: YES  Sore Throat: No  Cough: YES-productive of clear sputum  Wheeze: No  Nausea: No  Vomiting: No  Fatigue/Achiness: Yes  Sick/Strep Exposure: No  Therapies tried and outcome: Multiple OTC decongestants.       Review of Systems  Constitutional, HEENT, cardiovascular, pulmonary, gi and gu systems are negative, except as otherwise noted.      Objective         Vitals:  No vitals were obtained today due to virtual visit.    Physical Exam   GENERAL: alert and no distress  EYES: Eyes grossly normal to inspection.  No discharge or erythema, or obvious scleral/conjunctival abnormalities.  RESP: Intermittent cough.  No audible wheeze, or visible cyanosis.    SKIN: Visible skin clear. No significant rash, abnormal pigmentation or lesions.  NEURO: Cranial nerves grossly intact.  Mentation and speech appropriate for age.  PSYCH: Appropriate affect, tone, and pace of words        Video-Visit Details    Type of service:  Video Visit   Video End Time:11:09 AM  Originating Location (pt. Location): Home    Distant Location (provider location):  Off-site  Platform used for Video Visit: Adelaida  Signed Electronically by: Duncan Burgess PA-C

## 2025-04-29 ENCOUNTER — TRANSFERRED RECORDS (OUTPATIENT)
Dept: HEALTH INFORMATION MANAGEMENT | Facility: CLINIC | Age: 65
End: 2025-04-29
Payer: COMMERCIAL

## 2025-05-05 DIAGNOSIS — E03.4 HYPOTHYROIDISM DUE TO ACQUIRED ATROPHY OF THYROID: ICD-10-CM

## 2025-05-05 RX ORDER — LEVOTHYROXINE SODIUM 100 UG/1
100 TABLET ORAL DAILY
Qty: 90 TABLET | Refills: 0 | Status: SHIPPED | OUTPATIENT
Start: 2025-05-05

## 2025-05-05 NOTE — TELEPHONE ENCOUNTER
Prescription has been signed under Dr. Rodríguez, however has not seen him since 2023. Routing to PCP to sign.

## 2025-05-06 ENCOUNTER — TRANSCRIBE ORDERS (OUTPATIENT)
Dept: OTHER | Age: 65
End: 2025-05-06

## 2025-05-06 DIAGNOSIS — Z96.652 S/P TOTAL KNEE ARTHROPLASTY, LEFT: Primary | ICD-10-CM

## 2025-05-07 ENCOUNTER — VIRTUAL VISIT (OUTPATIENT)
Dept: FAMILY MEDICINE | Facility: OTHER | Age: 65
End: 2025-05-07
Payer: COMMERCIAL

## 2025-05-07 DIAGNOSIS — R20.2 PARESTHESIA: Primary | ICD-10-CM

## 2025-05-07 DIAGNOSIS — G89.29 CHRONIC PAIN OF LEFT KNEE: ICD-10-CM

## 2025-05-07 DIAGNOSIS — E03.4 HYPOTHYROIDISM DUE TO ACQUIRED ATROPHY OF THYROID: ICD-10-CM

## 2025-05-07 DIAGNOSIS — M25.562 CHRONIC PAIN OF LEFT KNEE: ICD-10-CM

## 2025-05-07 PROCEDURE — 98005 SYNCH AUDIO-VIDEO EST LOW 20: CPT | Performed by: PHYSICIAN ASSISTANT

## 2025-05-07 ASSESSMENT — ENCOUNTER SYMPTOMS: LEG PAIN: 1

## 2025-05-07 NOTE — PROGRESS NOTES
Margarita is a 65 year old who is being evaluated via a billable video visit.    How would you like to obtain your AVS? MyChart  If the video visit is dropped, the invitation should be resent by: Text to cell phone: [phone]  Will anyone else be joining your video visit? No      Assessment & Plan     Paresthesia  Symptoms sound more consistent with nerve pathology, question if this could be from the lumbar spine or more peripherally, she has had MRI in past but no major compression but this was back in 2022.  She is having issues with her left knee and contemplating replacement so could be extension of pain from the knee itself.  Will check for electrolyte concerns, muscle breakdown, anemia.  She notes she has some gabapentin at home, could try tonight to see if it helps at all. If this was viral would expect it to have resolved by now.  Not sure if it is any potential consequence of COVID infection, no rashing to suggest Shingles at this time.   - CBC with platelets and differential; Future  - Iron and iron binding capacity; Future  - Comprehensive metabolic panel (BMP + Alb, Alk Phos, ALT, AST, Total. Bili, TP); Future  - Magnesium; Future  - CK total; Future  - Lactate Dehydrogenase; Future    Hypothyroidism due to acquired atrophy of thyroid  Rechecking to make sure this isn't causing her paresthesias.   - TSH with free T4 reflex; Future    Chronic pain of left knee  - Iron and iron binding capacity; Future        Options for treatment and follow-up care were reviewed with the patient and/or guardian. Patient and/or guardian engaged in the decision making process and verbalized understanding of the options discussed and agreed with the final plan.      Subjective   Margarita is a 65 year old, presenting for the following health issues:  Leg Pain      5/7/2025     1:29 PM   Additional Questions   Roomed by [facility]     Video Start Time: 1:39 PM    Leg Pain    History of Present Illness       Reason for visit:  Burning  sensation in left thigh  Symptom onset:  1-2 weeks ago  Symptoms include:  Burning sensation consistentantly there, skin feels warm to touch, worsens with palpation  Symptom intensity:  Moderate  Symptom progression:  Worsening         - She is going to have knee surgery.   - She has been having some burning on the left thigh outside. Sensitive if she touches it but she can walk without any problems, activity doesn't bother it at all.  No rashes.  It feels like there is a mild burn, no tingling or numbness.  After the zoo she notes that it felt it a lot more but in general nothing makes it better or worse.  No weakness, if anything knee has been strong after the physical therapy.    - Sometimes she can have burning in both knees sometimes and wonders if it is something she ate.  It'll happen in the evening and then go away but this has been lasting longer than normal.  It will usually last through evening and better in the morning.    - OTC: rubbed arthritis cream, aspercreme - slightly numbs it.   - No back pain other than slightly yesterday with senior yoga. No issues with bowel movements or urination.   - No new medications.  She did start taking fish oil supplement, she started that 6 weeks ago.    - Hydration - doing well she always has a large water with her  - Did have COVID beginning of April then this started a couple of weeks ago.   - Still not 100% from COVID with slight cough on and off but no new symptoms. No fevers.  Chills intermittently at night but nothing that has been new.       Review of Systems  Constitutional, HEENT, musculoskeletal, neuro, skin systems are negative, except as otherwise noted.      Objective    Vitals - Patient Reported  Pain Score: No Pain (0)        Physical Exam   GENERAL: alert and no distress  EYES: Eyes grossly normal to inspection.  No discharge or erythema, or obvious scleral/conjunctival abnormalities.  RESP: No audible wheeze, cough, or visible cyanosis.    SKIN:  Visible skin clear. No significant rash, abnormal pigmentation or lesions.  NEURO: Cranial nerves grossly intact.  Mentation and speech appropriate for age.  PSYCH: Appropriate affect, tone, and pace of words          Video-Visit Details    Type of service:  Video Visit   Video End Time:1:56 PM  Originating Location (pt. Location): Home    Distant Location (provider location):  Off-site  Platform used for Video Visit: Adelaida  Signed Electronically by: Duncan Burgess PA-C

## 2025-05-12 ENCOUNTER — LAB (OUTPATIENT)
Dept: LAB | Facility: CLINIC | Age: 65
End: 2025-05-12
Payer: COMMERCIAL

## 2025-05-12 ENCOUNTER — RESULTS FOLLOW-UP (OUTPATIENT)
Dept: FAMILY MEDICINE | Facility: OTHER | Age: 65
End: 2025-05-12

## 2025-05-12 DIAGNOSIS — Z78.9 STATIN INTOLERANCE: ICD-10-CM

## 2025-05-12 DIAGNOSIS — E03.4 HYPOTHYROIDISM DUE TO ACQUIRED ATROPHY OF THYROID: ICD-10-CM

## 2025-05-12 DIAGNOSIS — E78.5 HYPERLIPIDEMIA LDL GOAL <130: ICD-10-CM

## 2025-05-12 DIAGNOSIS — M25.562 CHRONIC PAIN OF LEFT KNEE: ICD-10-CM

## 2025-05-12 DIAGNOSIS — G89.29 CHRONIC PAIN OF LEFT KNEE: ICD-10-CM

## 2025-05-12 DIAGNOSIS — R20.2 PARESTHESIA: ICD-10-CM

## 2025-05-12 LAB
ALBUMIN SERPL BCG-MCNC: 3.9 G/DL (ref 3.5–5.2)
ALP SERPL-CCNC: 58 U/L (ref 40–150)
ALT SERPL W P-5'-P-CCNC: 22 U/L (ref 0–50)
ALT SERPL W P-5'-P-CCNC: 22 U/L (ref 0–50)
ANION GAP SERPL CALCULATED.3IONS-SCNC: 10 MMOL/L (ref 7–15)
AST SERPL W P-5'-P-CCNC: 20 U/L (ref 0–45)
BASOPHILS # BLD AUTO: 0 10E3/UL (ref 0–0.2)
BASOPHILS NFR BLD AUTO: 1 %
BILIRUB SERPL-MCNC: 0.8 MG/DL
BUN SERPL-MCNC: 17.3 MG/DL (ref 8–23)
CALCIUM SERPL-MCNC: 9.2 MG/DL (ref 8.8–10.4)
CHLORIDE SERPL-SCNC: 105 MMOL/L (ref 98–107)
CK SERPL-CCNC: 85 U/L (ref 26–192)
CREAT SERPL-MCNC: 0.9 MG/DL (ref 0.51–0.95)
EGFRCR SERPLBLD CKD-EPI 2021: 70 ML/MIN/1.73M2
EOSINOPHIL # BLD AUTO: 0.3 10E3/UL (ref 0–0.7)
EOSINOPHIL NFR BLD AUTO: 5 %
ERYTHROCYTE [DISTWIDTH] IN BLOOD BY AUTOMATED COUNT: 13.3 % (ref 10–15)
GLUCOSE SERPL-MCNC: 124 MG/DL (ref 70–99)
HCO3 SERPL-SCNC: 25 MMOL/L (ref 22–29)
HCT VFR BLD AUTO: 40 % (ref 35–47)
HGB BLD-MCNC: 13.6 G/DL (ref 11.7–15.7)
IMM GRANULOCYTES # BLD: 0 10E3/UL
IMM GRANULOCYTES NFR BLD: 0 %
IRON BINDING CAPACITY (ROCHE): 228 UG/DL (ref 240–430)
IRON SATN MFR SERPL: 61 % (ref 15–46)
IRON SERPL-MCNC: 138 UG/DL (ref 37–145)
LDH SERPL L TO P-CCNC: 202 U/L (ref 0–250)
LYMPHOCYTES # BLD AUTO: 2.4 10E3/UL (ref 0.8–5.3)
LYMPHOCYTES NFR BLD AUTO: 41 %
MAGNESIUM SERPL-MCNC: 2 MG/DL (ref 1.7–2.3)
MCH RBC QN AUTO: 29.7 PG (ref 26.5–33)
MCHC RBC AUTO-ENTMCNC: 34 G/DL (ref 31.5–36.5)
MCV RBC AUTO: 87 FL (ref 78–100)
MONOCYTES # BLD AUTO: 0.8 10E3/UL (ref 0–1.3)
MONOCYTES NFR BLD AUTO: 13 %
NEUTROPHILS # BLD AUTO: 2.4 10E3/UL (ref 1.6–8.3)
NEUTROPHILS NFR BLD AUTO: 41 %
NRBC # BLD AUTO: 0 10E3/UL
NRBC BLD AUTO-RTO: 0 /100
PLATELET # BLD AUTO: 211 10E3/UL (ref 150–450)
POTASSIUM SERPL-SCNC: 4.2 MMOL/L (ref 3.4–5.3)
PROT SERPL-MCNC: 6.5 G/DL (ref 6.4–8.3)
RBC # BLD AUTO: 4.58 10E6/UL (ref 3.8–5.2)
SODIUM SERPL-SCNC: 140 MMOL/L (ref 135–145)
TSH SERPL DL<=0.005 MIU/L-ACNC: 0.83 UIU/ML (ref 0.3–4.2)
WBC # BLD AUTO: 6 10E3/UL (ref 4–11)

## 2025-05-12 PROCEDURE — 83615 LACTATE (LD) (LDH) ENZYME: CPT

## 2025-05-12 PROCEDURE — 83540 ASSAY OF IRON: CPT

## 2025-05-12 PROCEDURE — 84443 ASSAY THYROID STIM HORMONE: CPT

## 2025-05-12 PROCEDURE — 82550 ASSAY OF CK (CPK): CPT

## 2025-05-12 PROCEDURE — 36415 COLL VENOUS BLD VENIPUNCTURE: CPT

## 2025-05-12 PROCEDURE — 83550 IRON BINDING TEST: CPT

## 2025-05-12 PROCEDURE — 85025 COMPLETE CBC W/AUTO DIFF WBC: CPT

## 2025-05-12 PROCEDURE — 83735 ASSAY OF MAGNESIUM: CPT

## 2025-05-12 PROCEDURE — 80053 COMPREHEN METABOLIC PANEL: CPT

## 2025-05-14 ENCOUNTER — HOSPITAL ENCOUNTER (OUTPATIENT)
Dept: CARDIOLOGY | Facility: CLINIC | Age: 65
Discharge: HOME | End: 2025-05-14
Attending: PHYSICIAN ASSISTANT
Payer: COMMERCIAL

## 2025-05-14 ENCOUNTER — TELEPHONE (OUTPATIENT)
Dept: FAMILY MEDICINE | Facility: OTHER | Age: 65
End: 2025-05-14
Payer: COMMERCIAL

## 2025-05-14 ENCOUNTER — MYC MEDICAL ADVICE (OUTPATIENT)
Dept: FAMILY MEDICINE | Facility: OTHER | Age: 65
End: 2025-05-14
Payer: COMMERCIAL

## 2025-05-14 DIAGNOSIS — I48.0 PAROXYSMAL ATRIAL FIBRILLATION (H): ICD-10-CM

## 2025-05-14 PROCEDURE — 93005 ELECTROCARDIOGRAM TRACING: CPT | Performed by: REHABILITATION PRACTITIONER

## 2025-05-14 NOTE — TELEPHONE ENCOUNTER
RN called patient regarding MyChart. Patient has also reached out to cardiology and has EKG within the next hour and appointment with Cardio team. She will reach back out to care team for further follow up if needed.     Sharmaine Rhodes RN, BSN

## 2025-05-14 NOTE — TELEPHONE ENCOUNTER
RN reached out to patient regarding heart rate concerns, see separate encounter. Patient requesting Pre-op for surgery scheduled for 6/9/25. Assisted with scheduling Pre-op.     Sharmaine Rhodes, RN, BSN

## 2025-05-21 ENCOUNTER — NURSE TRIAGE (OUTPATIENT)
Dept: FAMILY MEDICINE | Facility: OTHER | Age: 65
End: 2025-05-21

## 2025-05-21 ENCOUNTER — OFFICE VISIT (OUTPATIENT)
Dept: FAMILY MEDICINE | Facility: CLINIC | Age: 65
End: 2025-05-21
Payer: COMMERCIAL

## 2025-05-21 VITALS
OXYGEN SATURATION: 96 % | WEIGHT: 207.3 LBS | TEMPERATURE: 98 F | BODY MASS INDEX: 32.54 KG/M2 | RESPIRATION RATE: 18 BRPM | DIASTOLIC BLOOD PRESSURE: 74 MMHG | HEIGHT: 67 IN | HEART RATE: 76 BPM | SYSTOLIC BLOOD PRESSURE: 124 MMHG

## 2025-05-21 DIAGNOSIS — F33.1 MODERATE RECURRENT MAJOR DEPRESSION (H): ICD-10-CM

## 2025-05-21 DIAGNOSIS — K20.0 EOSINOPHILIC ESOPHAGITIS: ICD-10-CM

## 2025-05-21 DIAGNOSIS — J45.31 MILD PERSISTENT ASTHMA WITH ACUTE EXACERBATION: Primary | ICD-10-CM

## 2025-05-21 DIAGNOSIS — J20.8 VIRAL BRONCHITIS: ICD-10-CM

## 2025-05-21 PROCEDURE — 1125F AMNT PAIN NOTED PAIN PRSNT: CPT | Performed by: STUDENT IN AN ORGANIZED HEALTH CARE EDUCATION/TRAINING PROGRAM

## 2025-05-21 PROCEDURE — 3078F DIAST BP <80 MM HG: CPT | Performed by: STUDENT IN AN ORGANIZED HEALTH CARE EDUCATION/TRAINING PROGRAM

## 2025-05-21 PROCEDURE — G2211 COMPLEX E/M VISIT ADD ON: HCPCS | Performed by: STUDENT IN AN ORGANIZED HEALTH CARE EDUCATION/TRAINING PROGRAM

## 2025-05-21 PROCEDURE — 3074F SYST BP LT 130 MM HG: CPT | Performed by: STUDENT IN AN ORGANIZED HEALTH CARE EDUCATION/TRAINING PROGRAM

## 2025-05-21 PROCEDURE — 99214 OFFICE O/P EST MOD 30 MIN: CPT | Performed by: STUDENT IN AN ORGANIZED HEALTH CARE EDUCATION/TRAINING PROGRAM

## 2025-05-21 RX ORDER — AZITHROMYCIN 250 MG/1
TABLET, FILM COATED ORAL
Qty: 6 TABLET | Refills: 0 | Status: SHIPPED | OUTPATIENT
Start: 2025-05-21 | End: 2025-05-26

## 2025-05-21 RX ORDER — BUDESONIDE 0.5 MG/2ML
INHALANT ORAL
COMMUNITY
Start: 2025-05-21

## 2025-05-21 RX ORDER — PREDNISONE 10 MG/1
40 TABLET ORAL DAILY
Qty: 20 TABLET | Refills: 0 | Status: SHIPPED | OUTPATIENT
Start: 2025-05-21 | End: 2025-05-26

## 2025-05-21 ASSESSMENT — ENCOUNTER SYMPTOMS: COUGH: 1

## 2025-05-21 ASSESSMENT — PAIN SCALES - GENERAL: PAINLEVEL_OUTOF10: MODERATE PAIN (4)

## 2025-05-21 NOTE — TELEPHONE ENCOUNTER
"  Nurse Triage SBAR    Is this a 2nd Level Triage? NO    Situation: patient calling in with complaints of a congested cough with cold symptoms. Patient reports she had her teeth cleaned on Thursday and she ALWAYS gets sick after doing this. Friday she woke up with sinus congestion and a cough.  Cough continued to get worse over the weekend.  By Monday she has had a severe congested cough.  She will cough and cough, hack and cough and it will pull at one specific spot in her check until she gets up a good chunk of phlegm. Then her chest will feel better for a while. States she tends to get this same cough very often throughout  year, always has the same congested cough with it pulling at the same spot in her chest. She always gets a Z-pack for it or another antibiotics and it goes away for a while.  She does have some intermittent wheezing, she  uses her inhalers. She has asthma, the inhalers helps some. Temp on Monday was 100.5. today 98.1. her normal temp is 97.4. she does report that at times she has a gurgling/congested sound with her breathing until she gets a good cough and coughs up phlegm.  Denies any blood in phelgm.  It is yellowish.  Denies feeling shortness of breath or that breathing is worse for her.  Patient's cough is pretty continuous. Patient states she gets into good \"coughing jags\". Covid test was negative.     Background: congested cough with fever , started Friday.     Assessment: advised to be seen today.     Protocol Recommended Disposition:   See in Office Today    Recommendation: scheduled with open appointment with provider in [place] today. Patient verbalized understanding, however does verbalize frustration as her previous provider would jsut sent in a Z-Pack for her when this happened, has been part of her life for 20 years or more. Requests message be sent to  PCP asking for a Z-pack be sent in possibly without being seen. Otherwise she will keep appointment set with [place] provider. "     Routed to provider for possibility of prescription be sent in.  Patient is also scheduled at 1:40 with provider in     Does the patient meet one of the following criteria for ADS visit consideration? 16+ years old, with an [facility] PCP     TIP  Providers, please consider if this condition is appropriate for management at one of our Acute and Diagnostic Services sites.     If patient is a good candidate, please use dotphrase <dot>triageresponse and select Refer to ADS to document.      Reason for Disposition   SEVERE coughing spells (e.g., whooping sound after coughing, vomiting after coughing)    Additional Information   Negative: Bluish (or gray) lips or face   Negative: SEVERE difficulty breathing (e.g., struggling for each breath, speaks in single words)   Negative: Rapid onset of cough and has hives   Negative: Coughing started suddenly after medicine, an allergic food or bee sting   Negative: Difficulty breathing after exposure to flames, smoke, or fumes   Negative: Sounds like a life-threatening emergency to the triager   Negative: Previous asthma attacks and this feels like asthma attack   Negative: Dry cough (non-productive; no sputum or minimal clear sputum) and within 14 days of COVID-19 Exposure   Negative: MODERATE difficulty breathing (e.g., speaks in phrases, SOB even at rest, pulse 100-120) and still present when not coughing   Negative: Chest pain present when not coughing   Negative: Passed out (e.g., fainted, lost consciousness, blacked out and was not responding)   Negative: Patient sounds very sick or weak to the triager   Negative: MILD difficulty breathing (e.g., minimal/no SOB at rest, SOB with walking, pulse <100) and still present when not coughing   Negative: Coughed up > 1 tablespoon (15 ml) blood (Exception: Blood-tinged sputum.)   Negative: Fever > 103 F (39.4 C)   Negative: Fever > 101 F (38.3 C) and over 60 years of age   Negative: Fever > 100 F (37.8 C) and has diabetes mellitus or a  weak immune system (e.g., HIV positive, cancer chemotherapy, organ transplant, splenectomy, chronic steroids)   Negative: Fever > 100 F (37.8 C) and bedridden (e.g., CVA, chronic illness, recovering from surgery)   Negative: Increasing ankle swelling   Negative: Wheezing is present    Protocols used: Cough-A-OH

## 2025-05-21 NOTE — TELEPHONE ENCOUNTER
RN Triage    Patient Contact    Attempt # 1    Was call answered?  No.  Left message on voicemail with information to call me back and sent MCM.    Caty Toure RN on 5/21/2025 at 12:08 PM

## 2025-05-21 NOTE — PROGRESS NOTES
Assessment & Plan   Problem List Items Addressed This Visit          Respiratory    Mild persistent asthma without complication - Primary    Relevant Medications    budesonide (PULMICORT) 0.5 MG/2ML neb solution       Digestive    Eosinophilic esophagitis    Relevant Medications    budesonide (PULMICORT) 0.5 MG/2ML neb solution       Behavioral    Moderate recurrent major depression (H)     Other Visit Diagnoses         Viral bronchitis        Relevant Medications    azithromycin (ZITHROMAX) 250 MG tablet           Patient with suspected viral bronchitis in the setting of her asthma with acute exacerbation.  Continue inhalers now with course of prednisone and potential side effects reviewed.  Education today regarding causes of bronchitis with 95% being viral.  Previous illness from 2 months ago appeared to have complete resolved and I think you illness now but we did discuss CXR today.  I do not think antibiotics would  at this time and potential side effects of further yeast infection discussed.  If things not improving or new or worsening symptoms arise would start azithromycin. Given timeframe further viral swabs not needed at this time.  Follow-up if new or worsening symptoms  or things not improving. Follow up with PCP regarding her asthma.          Subjective   Margarita is a 65 year old, presenting for the following health issues:  Cough (Since 5/16/2025)      5/21/2025     1:41 PM   Additional Questions   Roomed by Melina HIDALGO   Accompanied by Andrzej,      Cough        Patient with 6 days of cough, wheeze and did feel chilled initially when symptoms started.  Has not had a fever since.  Notes home temperature slightly above average.  Goldish sputum production.  She does have very mild smoking history and asthma diagnosis.  PFTs from 13 years ago relatively unremarkable.  Notes son was also sick.  She does not have any GI symptoms.  No rashes.  No sore throat or ear pain.  No itchy watery  "eyes or sneezing.  Pain into her ribs from coughing.        Review of Systems  Constitutional, HEENT, cardiovascular, pulmonary, GI, , musculoskeletal, neuro, skin, endocrine and psych systems are negative, except as otherwise noted.      Objective    /74   Pulse 76   Temp 98  F (36.7  C) (Temporal)   Resp 18   Ht 1.708 m (5' 7.25\")   Wt 94 kg (207 lb 4.8 oz)   LMP  (LMP Unknown)   SpO2 96%   BMI 32.23 kg/m    Body mass index is 32.23 kg/m .  Physical Exam   GENERAL: alert and no distress  EYES: Eyes grossly normal to inspection, PERRL and conjunctivae and sclerae normal  HENT: ear canals and TM's normal, nose and mouth without ulcers or lesions  NECK: no adenopathy, no asymmetry, masses, or scars  RESP: Expiratory wheezing without crackles noted, good air movement  CV: regular rate and rhythm, normal S1 S2, no S3 or S4, no murmur, click or rub, no peripheral edema  ABDOMEN: soft, nontender, no hepatosplenomegaly, no masses and bowel sounds normal  MS: no gross musculoskeletal defects noted, no edema  SKIN: no suspicious lesions or rashes  NEURO:mentation intact and speech normal  PSYCH: mentation appears normal, affect normal/bright          Signed Electronically by: Juan White MD    "

## 2025-06-01 DIAGNOSIS — G47.00 INSOMNIA, UNSPECIFIED TYPE: ICD-10-CM

## 2025-06-02 RX ORDER — ZOLPIDEM TARTRATE 5 MG/1
TABLET ORAL
Qty: 60 TABLET | Refills: 1 | Status: SHIPPED | OUTPATIENT
Start: 2025-06-02

## 2025-06-05 ENCOUNTER — RESULTS FOLLOW-UP (OUTPATIENT)
Dept: CARDIOLOGY | Facility: CLINIC | Age: 65
End: 2025-06-05

## 2025-06-11 ENCOUNTER — RESULTS FOLLOW-UP (OUTPATIENT)
Dept: CARDIOLOGY | Facility: CLINIC | Age: 65
End: 2025-06-11

## 2025-07-08 ENCOUNTER — LAB (OUTPATIENT)
Dept: LAB | Facility: CLINIC | Age: 65
End: 2025-07-08
Payer: COMMERCIAL

## 2025-07-08 DIAGNOSIS — Z00.00 HEALTH MAINTENANCE EXAMINATION: ICD-10-CM

## 2025-07-08 LAB — CORTIS SERPL-MCNC: 15.5 UG/DL

## 2025-07-08 PROCEDURE — 82533 TOTAL CORTISOL: CPT

## 2025-07-08 PROCEDURE — 36415 COLL VENOUS BLD VENIPUNCTURE: CPT

## 2025-08-21 DIAGNOSIS — E03.4 HYPOTHYROIDISM DUE TO ACQUIRED ATROPHY OF THYROID: ICD-10-CM

## 2025-08-21 RX ORDER — LEVOTHYROXINE SODIUM 100 UG/1
100 TABLET ORAL DAILY
Qty: 90 TABLET | Refills: 1 | Status: SHIPPED | OUTPATIENT
Start: 2025-08-21

## (undated) DEVICE — SU MERSILENE 5-0 S-24 18" 1764G

## (undated) DEVICE — PACK MINOR EYE

## (undated) DEVICE — ESU EYE HIGH TEMP 65410-183

## (undated) DEVICE — NDL 30GA 0.5" 305106

## (undated) DEVICE — SYR 03ML LL W/O NDL

## (undated) DEVICE — GLOVE PROTEXIS W/NEU-THERA 7.5  2D73TE75

## (undated) DEVICE — TUBING SUCTION 6"X3/16" N56A

## (undated) DEVICE — SOL WATER IRRIG 1000ML BOTTLE 2F7114

## (undated) DEVICE — ESU ELEC NDL 1" COATED/INSULATED E1465

## (undated) DEVICE — KIT ENDO TURNOVER/PROCEDURE CARRY-ON 101822

## (undated) DEVICE — LUBRICATING JELLY 4.25OZ

## (undated) DEVICE — SOL WATER IRRIG 1000ML BOTTLE 07139-09

## (undated) DEVICE — MARKER SKIN DOUBLE TIP W/FLEXI-RULER W/LABELS

## (undated) DEVICE — SU PLAIN 6-0 G-1DA 18" 770G

## (undated) DEVICE — TUBING SUCTION 12"X1/4" N612

## (undated) RX ORDER — ONDANSETRON 2 MG/ML
INJECTION INTRAMUSCULAR; INTRAVENOUS
Status: DISPENSED
Start: 2018-09-24

## (undated) RX ORDER — ONDANSETRON 2 MG/ML
INJECTION INTRAMUSCULAR; INTRAVENOUS
Status: DISPENSED
Start: 2018-11-12

## (undated) RX ORDER — FENTANYL CITRATE 50 UG/ML
INJECTION, SOLUTION INTRAMUSCULAR; INTRAVENOUS
Status: DISPENSED
Start: 2018-09-24

## (undated) RX ORDER — DEXAMETHASONE SODIUM PHOSPHATE 4 MG/ML
INJECTION, SOLUTION INTRA-ARTICULAR; INTRALESIONAL; INTRAMUSCULAR; INTRAVENOUS; SOFT TISSUE
Status: DISPENSED
Start: 2018-09-24

## (undated) RX ORDER — LIDOCAINE HCL/EPINEPHRINE/PF 2%-1:200K
VIAL (ML) INJECTION
Status: DISPENSED
Start: 2018-09-24

## (undated) RX ORDER — FENTANYL CITRATE 50 UG/ML
INJECTION, SOLUTION INTRAMUSCULAR; INTRAVENOUS
Status: DISPENSED
Start: 2018-11-12

## (undated) RX ORDER — ACETAMINOPHEN 325 MG/1
TABLET ORAL
Status: DISPENSED
Start: 2018-09-24

## (undated) RX ORDER — ERYTHROMYCIN 5 MG/G
OINTMENT OPHTHALMIC
Status: DISPENSED
Start: 2018-09-24

## (undated) RX ORDER — SIMETHICONE 20 MG/.3ML
EMULSION ORAL
Status: DISPENSED
Start: 2018-11-12

## (undated) RX ORDER — TETRACAINE HYDROCHLORIDE 5 MG/ML
SOLUTION OPHTHALMIC
Status: DISPENSED
Start: 2018-09-24

## (undated) RX ORDER — LIDOCAINE HYDROCHLORIDE 20 MG/ML
INJECTION, SOLUTION EPIDURAL; INFILTRATION; INTRACAUDAL; PERINEURAL
Status: DISPENSED
Start: 2018-09-24

## (undated) RX ORDER — PROPOFOL 10 MG/ML
INJECTION, EMULSION INTRAVENOUS
Status: DISPENSED
Start: 2018-09-24

## (undated) RX ORDER — DIPHENHYDRAMINE HYDROCHLORIDE 50 MG/ML
INJECTION INTRAMUSCULAR; INTRAVENOUS
Status: DISPENSED
Start: 2018-11-12

## (undated) RX ORDER — BUPIVACAINE HYDROCHLORIDE 5 MG/ML
INJECTION, SOLUTION PERINEURAL
Status: DISPENSED
Start: 2018-09-24